# Patient Record
Sex: FEMALE | Race: WHITE | Employment: OTHER | URBAN - METROPOLITAN AREA
[De-identification: names, ages, dates, MRNs, and addresses within clinical notes are randomized per-mention and may not be internally consistent; named-entity substitution may affect disease eponyms.]

---

## 2017-01-10 ENCOUNTER — OFFICE VISIT (OUTPATIENT)
Dept: PULMONOLOGY | Age: 72
End: 2017-01-10

## 2017-01-10 VITALS
DIASTOLIC BLOOD PRESSURE: 70 MMHG | RESPIRATION RATE: 18 BRPM | WEIGHT: 120 LBS | OXYGEN SATURATION: 99 % | BODY MASS INDEX: 19.29 KG/M2 | HEIGHT: 66 IN | HEART RATE: 93 BPM | TEMPERATURE: 97 F | SYSTOLIC BLOOD PRESSURE: 124 MMHG

## 2017-01-10 DIAGNOSIS — Z79.52 CURRENT CHRONIC USE OF SYSTEMIC STEROIDS: ICD-10-CM

## 2017-01-10 DIAGNOSIS — J44.1 COPD WITH ACUTE EXACERBATION (HCC): ICD-10-CM

## 2017-01-10 DIAGNOSIS — F17.200 TOBACCO DEPENDENCE: ICD-10-CM

## 2017-01-10 DIAGNOSIS — J44.9 COPD, SEVERE (HCC): Primary | ICD-10-CM

## 2017-01-10 DIAGNOSIS — M71.21 BAKER'S CYST, RIGHT: ICD-10-CM

## 2017-01-10 NOTE — PROGRESS NOTES
Ms. Hamida Hogan has a reminder for a \"due or due soon\" health maintenance. I have asked that she contact her primary care provider for follow-up on this health maintenance.   O2 SAT RA Rest 84%   Oxygen 2 LPM started O2 SAT 99%

## 2017-01-10 NOTE — PROGRESS NOTES
HISTORY OF PRESENT ILLNESS  Princess Bradshaw is a 73 y.o. female. HPI Comments:  Pt is now back on Advair and Spiriva Respimat added with moderate response. Still complains of baseline SOB with minimal exertion, wheezing and tight cough with chest congestion and tenacious phlegm. This despite compliance with medications and O2. She is on Prednisone 10 mg daily after a taper started early December during an exacerbation. Pt is on home O2, last FEV1 was 30% in Jan 2015. Admits to continued smoking despite several efforts at quitting in the past. She is down to about 1/2 ppd. Shortness of Breath   The history is provided by the patient and relative. This is a chronic problem. The problem occurs frequently. The current episode started more than 1 week ago. The problem has been gradually improving. Associated symptoms include wheezing and leg pain. Pertinent negatives include no fever, no coryza, no rhinorrhea, no sore throat, no swollen glands, no ear pain, no neck pain, no hemoptysis, no PND, no orthopnea, no syncope, no vomiting, no rash and no claudication. Leg swelling: right knee. The problem's precipitants include exercise and weather/humidity. Risk factors include smoking. Treatments tried: Advair and Spiriva. The treatment provided moderate relief. She has had prior hospitalizations. She has had prior ED visits. She has had no prior ICU admissions. Associated medical issues include COPD and chronic lung disease. Leg Pain    The history is provided by the patient. This is a new problem. The current episode started more than 1 week ago. The problem has been gradually improving. The pain is present in the right knee (right leg). The quality of the pain is described as aching. The pain is at a severity of 5/10. Pertinent negatives include no tingling, no itching, no back pain and no neck pain. Associated symptoms comments: swelling. The symptoms are aggravated by movement.        Review of Systems Constitutional: Positive for malaise/fatigue and weight loss. Negative for chills, diaphoresis and fever. HENT: Negative for congestion, ear discharge, ear pain, hearing loss, nosebleeds, rhinorrhea, sore throat and tinnitus. Eyes: Negative for blurred vision, double vision, photophobia, pain, discharge and redness. Respiratory: Positive for wheezing. Negative for hemoptysis and stridor. Cardiovascular: Negative for palpitations, orthopnea, claudication, syncope and PND. Leg swelling: right knee. Gastrointestinal: Negative for blood in stool, constipation, diarrhea, heartburn, melena, nausea and vomiting. Genitourinary: Negative for dysuria, flank pain, frequency, hematuria and urgency. Musculoskeletal: Positive for joint pain. Negative for back pain, falls and neck pain. Myalgias: resolved. Skin: Negative for itching and rash. Neurological: Negative for dizziness, tingling, tremors, sensory change, speech change, focal weakness, seizures, loss of consciousness and weakness. Endo/Heme/Allergies: Negative for environmental allergies. Does not bruise/bleed easily. Psychiatric/Behavioral: Negative for depression, hallucinations, memory loss, substance abuse and suicidal ideas. The patient is not nervous/anxious and does not have insomnia. Past Medical History   Diagnosis Date    Anxiety     Arthritis     Asthma     Back problem     Baker's cyst     Balance problems     Chronic lung disease     Cigarette smoker     Clotting disorder [facility])     COPD (chronic obstructive pulmonary disease) ([facility])     Depression     History of DVT (deep vein thrombosis)     Leg pain      Right    Lung disease     Nausea & vomiting     Osteoporosis     Restless leg syndrome     Spinal stenosis     Vitamin D deficiency      Current Outpatient Prescriptions on File Prior to Visit   Medication Sig Dispense Refill    predniSONE (DELTASONE) 10 mg tablet Take 1 Tab by mouth daily (with breakfast). 60 Tab 1    albuterol (PROAIR HFA) 90 mcg/actuation inhaler INHALE 2 PUFFS BY MOUTH EVERY 4 HOURS AS NEEDED FOR WHEEZING OR SHORTNESS OF BREATH 1 Inhaler 6    fluticasone-salmeterol (ADVAIR) 250-50 mcg/dose diskus inhaler Take 1 Puff by inhalation every twelve (12) hours. 1 Inhaler 3    tiotropium bromide (SPIRIVA RESPIMAT) 2.5 mcg/actuation inhaler Take 2 Puffs by inhalation daily. 3 Inhaler 3    LORazepam (ATIVAN) 1 mg tablet Take  by mouth every four (4) hours as needed for Anxiety.  rOPINIRole (REQUIP) 1 mg tablet Take  by mouth two (2) times a day.  albuterol (PROVENTIL VENTOLIN) 2.5 mg /3 mL (0.083 %) nebulizer solution 3 mL by Nebulization route every four (4) hours as needed for Wheezing or Shortness of Breath. Diag. Code: 290 564 NLJOQWU 3    ergocalciferol (VITAMIN D2) 50,000 unit capsule Take 50,000 Units by mouth every thirty (30) days.  OXYGEN-AIR DELIVERY SYSTEMS 2 L by Does Not Apply route. O2 via nasal cannula with bedtime and activity. O2 Company: [employer]      guaiFENesin-codeine (CHERATUSSIN AC) 100-10 mg/5 mL solution Take 10 mL by mouth three (3) times daily as needed for Cough. Max Daily Amount: 30 mL. 236 mL 0    levoFLOXacin (LEVAQUIN) 750 mg tablet Take 1 Tab by mouth daily. 5 Tab 0    potassium chloride SR (K-TAB) 20 mEq tablet Take 1 Tab by mouth two (2) times a day. 6 Tab 0    PROAIR RESPICLICK 90 mcg/actuation aepb   0    roflumilast (DALIRESP) tab tablet Take 1 Tab by mouth daily. 90 Tab 3    citalopram (CELEXA) 10 mg tablet Take  by mouth daily.  HYDROcodone-acetaminophen (NORCO) 5-325 mg per tablet Take 1-2 tablets PO every 4-6 hours as needed for pain control. If over the counter ibuprofen or acetaminophen was suggested, then only take the vicodin for pain not well controlled with the over the counter medication. 20 Tab 0     No current facility-administered medications on file prior to visit.       Allergies   Allergen Reactions    Anoro Ellipta [Umeclidinium-Vilanterol] Rash and Other (comments)     Muscle cramps in arms    Aspirin Other (comments)     GI upset and bleeding    Augmentin [Amoxicillin-Pot Clavulanate] Not Reported This Time     Possible cramping    Doxycycline Nausea and Vomiting    Morphine Other (comments)     Neurologic symptoms - severe agitation      Shellfish Derived Unknown (comments)     Pt able to eat small amounts per report     Sulfa (Sulfonamide Antibiotics) Rash     Patient relates no longer allergic     Social History     Social History    Marital status:      Spouse name: N/A    Number of children: N/A    Years of education: N/A     Occupational History    Not on file. Social History Main Topics    Smoking status: Current Every Day Smoker     Packs/day: 0.50     Years: 50.00     Types: Cigarettes     Start date: 10/21/1964    Smokeless tobacco: Never Used    Alcohol use No    Drug use: No    Sexual activity: Not on file     Other Topics Concern    Not on file     Social History Narrative     Blood pressure 124/70, pulse 93, temperature 97 °F (36.1 °C), temperature source Oral, resp. rate 18, height 5' 6\" (1.676 m), weight 54.4 kg (120 lb), SpO2 99 %, unknown if currently breastfeeding. Physical Exam   Constitutional: She is oriented to person, place, and time. She appears well-developed. No distress. slender   HENT:   Head: Normocephalic and atraumatic. Nose: Nose normal.   Mouth/Throat: No oropharyngeal exudate. Eyes: Conjunctivae and EOM are normal. Pupils are equal, round, and reactive to light. Right eye exhibits no discharge. Left eye exhibits no discharge. No scleral icterus. Neck: No JVD present. No tracheal deviation present. No thyromegaly present. Cardiovascular: Normal rate, regular rhythm, normal heart sounds and intact distal pulses. Exam reveals no gallop. No murmur heard. Pulmonary/Chest: Effort normal. No stridor. No respiratory distress.  Wheezes: bilateral upper lung fields. She has no rales. She exhibits no tenderness. Distant breath sounds   Abdominal: Soft. She exhibits no mass. There is no tenderness. Musculoskeletal: She exhibits deformity (firm and tender 4x5x5 cm mass right popliteal, tender 1x3 cm lesion without surrounding erythema just distal to right knee). She exhibits no edema or tenderness. Lymphadenopathy:     She has no cervical adenopathy. Neurological: She is alert and oriented to person, place, and time. Skin: Skin is warm and dry. No rash noted. She is not diaphoretic. No erythema. Psychiatric: She has a normal mood and affect. Her behavior is normal. Judgment and thought content normal.       ASSESSMENT and PLAN  Encounter Diagnoses   Name Primary?  COPD, severe ([address] Yes    COPD with acute exacerbation ([address]     Tobacco dependence     Baker's cyst, right     Current chronic use of systemic steroids      Discussed LE  on phone, pt undergoing treatment for Baker's cyst.  Continue Advair and Spiriva. Continue Prednisone 10 mg daily x one month then start 5 mg daily for slow taper. Continue medications and supplemental O2 as before  RTC 4-5 months or sooner prn.

## 2017-01-10 NOTE — MR AVS SNAPSHOT
Visit Information Date & Time Provider Department Dept. Phone Encounter #  
 1/10/2017  9:45 AM Shadi Whitaker MD [facility] [place] [phone] [id number] Follow-up Instructions Return in about 5 months (around 6/10/2017). Upcoming Health Maintenance Date Due Hepatitis C Screening 1945 DTaP/Tdap/Td series (1 - Tdap) 6/4/1968 FOBT Q 1 YEAR AGE 50-75 6/4/1997 ZOSTER VACCINE AGE 60> 6/4/2007 GLAUCOMA SCREENING Q2Y 6/4/2012 MEDICARE YEARLY EXAM 6/4/2012 BREAST CANCER SCRN MAMMOGRAM 9/6/2015 INFLUENZA AGE 9 TO ADULT 8/1/2016 Pneumococcal 65+ High/Highest Risk (2 of 2 - PPSV23) 10/1/2019 Allergies as of 1/10/2017  Review Complete On: 1/10/2017 By: Shadi Whitaker MD  
  
 Severity Noted Reaction Type Reactions Anoro Ellipta [Umeclidinium-vilanterol]  04/04/2016    Rash, Other (comments) Muscle cramps in arms Aspirin    Other (comments) GI upset and bleeding Augmentin [Amoxicillin-pot Clavulanate]    Not Reported This Time Possible cramping Doxycycline  12/30/2013    Nausea and Vomiting Morphine  03/11/2014   Intolerance Other (comments) Neurologic symptoms - severe agitation Shellfish Derived  12/16/2015    Unknown (comments) Pt able to eat small amounts per report Sulfa (Sulfonamide Antibiotics)    Rash Patient relates no longer allergic Current Immunizations  Reviewed on 1/10/2013 Name Date Influenza High Dose Vaccine PF 1/10/2013 Influenza Vaccine (Quad) PF 12/16/2015  2:50 PM  
 Pneumococcal Vaccine (Unspecified Type) 10/1/2014 Not reviewed this visit Vitals BP Pulse Temp Resp Height(growth percentile) Weight(growth percentile) 124/70 (BP 1 Location: Left arm, BP Patient Position: Sitting) 93 97 °F (36.1 °C) (Oral) 18 5' 6\" (1.676 m) 120 lb (54.4 kg) SpO2 BMI OB Status Smoking Status 99% 19.37 kg/m2 Hysterectomy Current Every Day Smoker BMI and BSA Data Body Mass Index Body Surface Area  
 19.37 kg/m 2 1.59 m 2 Preferred Pharmacy Pharmacy Name Phone  [address] [phone] Your Updated Medication List  
  
   
This list is accurate as of: 1/10/17 10:08 AM.  Always use your most recent med list.  
  
  
  
  
 * albuterol 2.5 mg /3 mL (0.083 %) nebulizer solution Commonly known as:  PROVENTIL VENTOLIN  
3 mL by Nebulization route every four (4) hours as needed for Wheezing or Shortness of Breath. Diag. Code: 642 * PROAIR RESPICLICK 90 mcg/actuation Aepb Generic drug:  albuterol sulfate * albuterol 90 mcg/actuation inhaler Commonly known as:  PROAIR HFA INHALE 2 PUFFS BY MOUTH EVERY 4 HOURS AS NEEDED FOR WHEEZING OR SHORTNESS OF BREATH CeleXA 10 mg tablet Generic drug:  citalopram  
Take  by mouth daily. fluticasone-salmeterol 250-50 mcg/dose diskus inhaler Commonly known as:  ADVAIR Take 1 Puff by inhalation every twelve (12) hours. guaiFENesin-codeine 100-10 mg/5 mL solution Commonly known as:  Randine Sallies Take 10 mL by mouth three (3) times daily as needed for Cough. Max Daily Amount: 30 mL. HYDROcodone-acetaminophen 5-325 mg per tablet Commonly known as:  Keisha Oyster Take 1-2 tablets PO every 4-6 hours as needed for pain control. If over the counter ibuprofen or acetaminophen was suggested, then only take the vicodin for pain not well controlled with the over the counter medication. levoFLOXacin 750 mg tablet Commonly known as:  No Schneiders Take 1 Tab by mouth daily. LORazepam 1 mg tablet Commonly known as:  ATIVAN Take  by mouth every four (4) hours as needed for Anxiety. OXYGEN-AIR DELIVERY SYSTEMS  
2 L by Does Not Apply route. O2 via nasal cannula with bedtime and activity. O2 Company: [employer]  
  
 potassium chloride SR 20 mEq tablet Commonly known as:  K-TAB Take 1 Tab by mouth two (2) times a day. predniSONE 10 mg tablet Commonly known as:  Carlitos Anderson Take 1 Tab by mouth daily (with breakfast). roflumilast Tab tablet Commonly known as:  Scott Hagen Take 1 Tab by mouth daily. rOPINIRole 1 mg tablet Commonly known as:  Rose Briggs Take  by mouth two (2) times a day. tiotropium bromide 2.5 mcg/actuation inhaler Commonly known as:  Hannah Buys Take 2 Puffs by inhalation daily. VITAMIN D2 50,000 unit capsule Generic drug:  ergocalciferol Take 50,000 Units by mouth every thirty (30) days. * Notice: This list has 3 medication(s) that are the same as other medications prescribed for you. Read the directions carefully, and ask your doctor or other care provider to review them with you. Follow-up Instructions Return in about 5 months (around 6/10/2017). Introducing [facility]! Tani Aceves introduces [employer] patient portal. Now you can access parts of your medical record, email your doctor's office, and request medication refills online. 1. In your internet browser, go to [email or web address] 2. Click on the First Time User? Click Here link in the Sign In box. You will see the New Member Sign Up page. 3. Enter your [employer] Access Code exactly as it appears below. You will not need to use this code after youve completed the sign-up process. If you do not sign up before the expiration date, you must request a new code. · [employer]ula Access Code: [email or web address] Expires: 4/10/2017 10:08 AM 
 
4. Enter the last four digits of your Social Security Number (xxxx) and Date of Birth (mm/dd/yyyy) as indicated and click Submit. You will be taken to the next sign-up page. 5. Create a [employer] ID. This will be your [employer] login ID and cannot be changed, so think of one that is secure and easy to remember. 6. Create a [employer] password. You can change your password at any time. 7. Enter your Password Reset Question and Answer. This can be used at a later time if you forget your password. 8. Enter your e-mail address. You will receive e-mail notification when new information is available in [address] 9. Click Sign Up. You can now view and download portions of your medical record. 10. Click the Download Summary menu link to download a portable copy of your medical information. If you have questions, please visit the Frequently Asked Questions section of the [employer] website. Remember, [employer] is NOT to be used for urgent needs. For medical emergencies, dial 911. Now available from your iPhone and Android! Please provide this summary of care documentation to your next provider. Your primary care clinician is listed as Karl Segura. If you have any questions after today's visit, please call [phone].

## 2017-02-01 RX ORDER — PREDNISONE 10 MG/1
10 TABLET ORAL
Qty: 60 TAB | Refills: 1 | Status: SHIPPED | OUTPATIENT
Start: 2017-02-01 | End: 2017-02-02

## 2017-02-02 RX ORDER — PREDNISONE 10 MG/1
10 TABLET ORAL DAILY
Qty: 60 TAB | Refills: 1 | Status: SHIPPED | OUTPATIENT
Start: 2017-02-02 | End: 2017-06-22 | Stop reason: SDUPTHER

## 2017-02-02 NOTE — TELEPHONE ENCOUNTER
Pt's prednisone was sent to wrong pharmacy. It should have been sent to [facility] not [facility]. Please resend to correct.

## 2017-02-05 ENCOUNTER — HOSPITAL ENCOUNTER (EMERGENCY)
Age: 72
Discharge: LEFT BEFORE DISCHARGE | End: 2017-02-05
Attending: EMERGENCY MEDICINE
Payer: MEDICARE

## 2017-02-05 ENCOUNTER — APPOINTMENT (OUTPATIENT)
Dept: GENERAL RADIOLOGY | Age: 72
End: 2017-02-05
Attending: EMERGENCY MEDICINE
Payer: MEDICARE

## 2017-02-05 VITALS
RESPIRATION RATE: 20 BRPM | TEMPERATURE: 98.1 F | HEIGHT: 66 IN | HEART RATE: 85 BPM | WEIGHT: 120 LBS | OXYGEN SATURATION: 100 % | BODY MASS INDEX: 19.29 KG/M2 | DIASTOLIC BLOOD PRESSURE: 79 MMHG | SYSTOLIC BLOOD PRESSURE: 142 MMHG

## 2017-02-05 DIAGNOSIS — J44.1 COPD EXACERBATION (HCC): Primary | ICD-10-CM

## 2017-02-05 DIAGNOSIS — Z53.29 LEFT AGAINST MEDICAL ADVICE: ICD-10-CM

## 2017-02-05 LAB
ALBUMIN SERPL BCP-MCNC: 3.4 G/DL (ref 3.4–5)
ALBUMIN/GLOB SERPL: 0.8 {RATIO} (ref 0.8–1.7)
ALP SERPL-CCNC: 76 U/L (ref 45–117)
ALT SERPL-CCNC: 20 U/L (ref 13–56)
ANION GAP BLD CALC-SCNC: 7 MMOL/L (ref 3–18)
AST SERPL W P-5'-P-CCNC: 16 U/L (ref 15–37)
BASOPHILS # BLD AUTO: 0 K/UL (ref 0–0.06)
BASOPHILS # BLD: 0 % (ref 0–2)
BILIRUB SERPL-MCNC: 0.4 MG/DL (ref 0.2–1)
BUN SERPL-MCNC: 19 MG/DL (ref 7–18)
BUN/CREAT SERPL: 25 (ref 12–20)
CALCIUM SERPL-MCNC: 9.5 MG/DL (ref 8.5–10.1)
CHLORIDE SERPL-SCNC: 98 MMOL/L (ref 100–108)
CO2 SERPL-SCNC: 34 MMOL/L (ref 21–32)
CREAT SERPL-MCNC: 0.75 MG/DL (ref 0.6–1.3)
DIFFERENTIAL METHOD BLD: ABNORMAL
EOSINOPHIL # BLD: 0 K/UL (ref 0–0.4)
EOSINOPHIL NFR BLD: 0 % (ref 0–5)
ERYTHROCYTE [DISTWIDTH] IN BLOOD BY AUTOMATED COUNT: 13.1 % (ref 11.6–14.5)
GLOBULIN SER CALC-MCNC: 4.1 G/DL (ref 2–4)
GLUCOSE SERPL-MCNC: 136 MG/DL (ref 74–99)
HCT VFR BLD AUTO: 46.4 % (ref 35–45)
HGB BLD-MCNC: 14.7 G/DL (ref 12–16)
LYMPHOCYTES # BLD AUTO: 5 % (ref 21–52)
LYMPHOCYTES # BLD: 0.7 K/UL (ref 0.9–3.6)
MCH RBC QN AUTO: 31.7 PG (ref 24–34)
MCHC RBC AUTO-ENTMCNC: 31.7 G/DL (ref 31–37)
MCV RBC AUTO: 100 FL (ref 74–97)
MONOCYTES # BLD: 0.5 K/UL (ref 0.05–1.2)
MONOCYTES NFR BLD AUTO: 4 % (ref 3–10)
NEUTS SEG # BLD: 12.6 K/UL (ref 1.8–8)
NEUTS SEG NFR BLD AUTO: 91 % (ref 40–73)
PLATELET # BLD AUTO: 319 K/UL (ref 135–420)
PMV BLD AUTO: 9.8 FL (ref 9.2–11.8)
POTASSIUM SERPL-SCNC: 4.2 MMOL/L (ref 3.5–5.5)
PROT SERPL-MCNC: 7.5 G/DL (ref 6.4–8.2)
RBC # BLD AUTO: 4.64 M/UL (ref 4.2–5.3)
SODIUM SERPL-SCNC: 139 MMOL/L (ref 136–145)
WBC # BLD AUTO: 13.8 K/UL (ref 4.6–13.2)

## 2017-02-05 PROCEDURE — 96374 THER/PROPH/DIAG INJ IV PUSH: CPT

## 2017-02-05 PROCEDURE — 80053 COMPREHEN METABOLIC PANEL: CPT | Performed by: EMERGENCY MEDICINE

## 2017-02-05 PROCEDURE — 99283 EMERGENCY DEPT VISIT LOW MDM: CPT

## 2017-02-05 PROCEDURE — 85025 COMPLETE CBC W/AUTO DIFF WBC: CPT | Performed by: EMERGENCY MEDICINE

## 2017-02-05 PROCEDURE — 74011250636 HC RX REV CODE- 250/636: Performed by: EMERGENCY MEDICINE

## 2017-02-05 PROCEDURE — 94640 AIRWAY INHALATION TREATMENT: CPT

## 2017-02-05 PROCEDURE — 77030013140 HC MSK NEB VYRM -A

## 2017-02-05 PROCEDURE — 71020 XR CHEST PA LAT: CPT

## 2017-02-05 PROCEDURE — 74011000250 HC RX REV CODE- 250: Performed by: EMERGENCY MEDICINE

## 2017-02-05 PROCEDURE — 74011250637 HC RX REV CODE- 250/637: Performed by: EMERGENCY MEDICINE

## 2017-02-05 RX ORDER — LEVOFLOXACIN 500 MG/1
500 TABLET, FILM COATED ORAL DAILY
Qty: 10 TAB | Refills: 0 | Status: SHIPPED | OUTPATIENT
Start: 2017-02-05 | End: 2017-02-15

## 2017-02-05 RX ORDER — LEVOFLOXACIN 500 MG/1
500 TABLET, FILM COATED ORAL
Status: COMPLETED | OUTPATIENT
Start: 2017-02-05 | End: 2017-02-05

## 2017-02-05 RX ORDER — IPRATROPIUM BROMIDE AND ALBUTEROL SULFATE 2.5; .5 MG/3ML; MG/3ML
3 SOLUTION RESPIRATORY (INHALATION) ONCE
Status: COMPLETED | OUTPATIENT
Start: 2017-02-05 | End: 2017-02-05

## 2017-02-05 RX ADMIN — IPRATROPIUM BROMIDE AND ALBUTEROL SULFATE 3 ML: .5; 3 SOLUTION RESPIRATORY (INHALATION) at 17:25

## 2017-02-05 RX ADMIN — LEVOFLOXACIN 500 MG: 500 TABLET, FILM COATED ORAL at 19:00

## 2017-02-05 RX ADMIN — METHYLPREDNISOLONE SODIUM SUCCINATE 125 MG: 125 INJECTION, POWDER, FOR SOLUTION INTRAMUSCULAR; INTRAVENOUS at 17:24

## 2017-02-05 NOTE — ED PROVIDER NOTES
HPI Comments: 5:07 PM Cecilio Hernandez is a 73 y.o. female with a history of COPD who presents to ED to be evaluated for SOB onset Tuesday, 1/31/2017. Pt also c/o cough productive of blood and intermittent fever (100F). Pt has a doctor's appointment schedule for this Wednesday 2/8/2017 but was unable to make it till then due to severity of symptoms. Pt has upped her oxygen to 3, taken 40 mg of prednisone, and 3 albuterol today with no relief. Pt states she would smoke cigarettes \"if she could\" but is unable. Pt has been admitted for COPD before. Pt was seen at [facility] in December 2016 for similar COPD symptoms. Denies nausea, vomiting, diarrhea, or urinary symptoms. No other concerns at this time. The history is provided by the patient.         Past Medical History:   Diagnosis Date    Anxiety     Arthritis     Asthma     Back problem     Baker's cyst     Balance problems     Chronic lung disease     Cigarette smoker     Clotting disorder ([address]     COPD (chronic obstructive pulmonary disease) ([facility])     Depression     History of DVT (deep vein thrombosis)     Leg pain      Right    Lung disease     Nausea & vomiting     Osteoporosis     Restless leg syndrome     Spinal stenosis     Vitamin D deficiency        Past Surgical History:   Procedure Laterality Date    Hx hysterectomy      Hx tonsillectomy      Hx appendectomy      Hx polypectomy      Hx breast biopsy      Hx orthopaedic       Knee    Hx back surgery       x4    Hx lumbar laminectomy      Hx meniscus repair      Hx vein stripping           Family History:   Problem Relation Age of Onset    Cancer Father     Heart Disease Mother     Hypertension Mother     Cancer Brother     Cancer Sister     Diabetes Other     Hypertension Other     Stroke Other     Heart Disease Sister     Hypertension Sister     Heart Disease Brother        Social History     Social History    Marital status:      Spouse name: N/A  Number of children: N/A    Years of education: N/A     Occupational History    Not on file. Social History Main Topics    Smoking status: Current Every Day Smoker     Packs/day: 0.50     Years: 50.00     Types: Cigarettes     Start date: 10/21/1964    Smokeless tobacco: Never Used    Alcohol use No    Drug use: No    Sexual activity: Not on file     Other Topics Concern    Not on file     Social History Narrative         ALLERGIES: Anoro ellipta [umeclidinium-vilanterol]; Aspirin; Augmentin [amoxicillin-pot clavulanate]; Doxycycline; Morphine; Shellfish derived; and Sulfa (sulfonamide antibiotics)    Review of Systems   Constitutional: Positive for fever. HENT: Negative for congestion, ear pain and sore throat. Eyes: Negative for discharge and redness. Respiratory: Positive for cough and shortness of breath. Cardiovascular: Negative for chest pain. Gastrointestinal: Negative for abdominal pain and vomiting. Genitourinary: Negative for difficulty urinating, dysuria and hematuria. Musculoskeletal: Negative for back pain and neck pain. Skin: Negative for rash. Neurological: Negative for dizziness and light-headedness. All other systems reviewed and are negative. Vitals:    02/05/17 1717   BP: 142/79   Pulse: 85   Resp: 20   Temp: 98.1 °F (36.7 °C)   SpO2: 100%   Weight: 54.4 kg (120 lb)   Height: 5' 6\" (1.676 m)            Physical Exam   Constitutional: She is oriented to person, place, and time. She appears well-developed and well-nourished. Non-toxic appearance. She does not have a sickly appearance. She appears ill. No distress. HENT:   Head: Normocephalic and atraumatic. Mouth/Throat: Oropharynx is clear and moist. No oropharyngeal exudate. Eyes: Conjunctivae and EOM are normal. Pupils are equal, round, and reactive to light. No scleral icterus. Neck: Trachea normal and normal range of motion. Neck supple. No hepatojugular reflux and no JVD present.  No tracheal deviation present. No thyromegaly present. Cardiovascular: Regular rhythm, S1 normal, S2 normal, normal heart sounds, intact distal pulses and normal pulses. Tachycardia present. Exam reveals no gallop, no S3 and no S4. No murmur heard. Pulses:       Radial pulses are 2+ on the right side, and 2+ on the left side. Dorsalis pedis pulses are 2+ on the right side, and 2+ on the left side. Pulmonary/Chest: Accessory muscle usage present. Tachypnea noted. She is in respiratory distress (mild). She has no decreased breath sounds. She has wheezes in the right middle field, the right lower field and the left middle field. She has rhonchi in the right upper field, the right middle field, the right lower field, the left middle field and the left lower field. She has no rales. Abdominal: Soft. Normal appearance and bowel sounds are normal. She exhibits no distension and no mass. There is no hepatosplenomegaly. There is no tenderness. There is no rigidity, no rebound, no guarding, no CVA tenderness, no tenderness at McBurney's point and negative Patel's sign. Musculoskeletal: Normal range of motion. She exhibits no edema or tenderness. Strength 5/5 throughout    Lymphadenopathy:        Head (right side): No submental, no submandibular, no preauricular and no occipital adenopathy present. Head (left side): No submental, no submandibular, no preauricular and no occipital adenopathy present. She has no cervical adenopathy. Right: No supraclavicular adenopathy present. Left: No supraclavicular adenopathy present. Neurological: She is alert and oriented to person, place, and time. She has normal strength and normal reflexes. She is not disoriented. No cranial nerve deficit or sensory deficit. Coordination and gait normal. GCS eye subscore is 4. GCS verbal subscore is 5. GCS motor subscore is 6. Grossly intact    Skin: Skin is warm, dry and intact. No rash noted.  She is not diaphoretic. Psychiatric: She has a normal mood and affect. Her speech is normal and behavior is normal. Judgment and thought content normal. Cognition and memory are normal.   Nursing note and vitals reviewed. MDM  Number of Diagnoses or Management Options  COPD exacerbation [facility]):   Left against medical advice:   Diagnosis management comments: Shortness of breath etiologies include chronic obstructive pulmonary disease (COPD), acute asthma exacerbation, congestive heart failure, pneumonia, acute bronchitis, pulmonary embolism, upper respiratory infection, cardiac event to include acute coronary syndrome, acute myocardial infarction or a combination of the above (ex URI on top of COPD thus causing respiratory distress). ED Course       Procedures    Disposition:Discharged    Diagnosis:   1. COPD exacerbation ([address]    2. Left against medical advice          Follow-up Information     Follow up With Details Comments Contact Info    Brock Solorio MD  Follow up [address] [place] [address]  [phone]            6:55 PM  I have reassessed the patient. Patient is not feeling better but would like to be discharged home. I advised against it because she is still SOB and has rhonchi, but the patient has home nebulizers and will follow up with her pulmonologist this week. Patient will be prescribed Levaquin. Patient was discharged in stable condition. Patient is to return to emergency department if any new or worsening condition. Scribe Attestation  I, González Mora, am scribing for, and in the presence of Vicky Haque DO 5:14 PM, 02/05/17. Physician Attestation  I personally performed the services described in the documentation, reviewed the documentation, as recorded by the scribe in my presence, and it accurately and completely records my words and actions.     Vicky Haque DO

## 2017-02-05 NOTE — ED TRIAGE NOTES
Pt reports worsening of cough and wheezing with shortness of breath for past several days.  Reports intermittent fever of 100.0F has hx of COPD on home O2 at 2lpm. Has had to increase her O2 to 3lpm.

## 2017-02-06 NOTE — DISCHARGE INSTRUCTIONS
Chronic Obstructive Pulmonary Disease (COPD): Care Instructions  Your Care Instructions    Chronic obstructive pulmonary disease (COPD) is a general term for a group of lung diseases, including emphysema and chronic bronchitis. People with COPD have decreased airflow in and out of the lungs, which makes it hard to breathe. The airways also can get clogged with thick mucus. Cigarette smoking is a major cause of COPD. Although there is no cure for COPD, you can slow its progress. Following your treatment plan and taking care of yourself can help you feel better and live longer. Follow-up care is a key part of your treatment and safety. Be sure to make and go to all appointments, and call your doctor if you are having problems. It's also a good idea to know your test results and keep a list of the medicines you take. How can you care for yourself at home? Staying healthy  · Do not smoke. This is the most important step you can take to prevent more damage to your lungs. If you need help quitting, talk to your doctor about stop-smoking programs and medicines. These can increase your chances of quitting for good. · Avoid colds and flu. Get a pneumococcal vaccine shot. If you have had one before, ask your doctor whether you need a second dose. Get the flu vaccine every fall. If you must be around people with colds or the flu, wash your hands often. · Avoid secondhand smoke, air pollution, and high altitudes. Also avoid cold, dry air and hot, humid air. Stay at home with your windows closed when air pollution is bad. Medicines and oxygen therapy  · Take your medicines exactly as prescribed. Call your doctor if you think you are having a problem with your medicine. · You may be taking medicines such as:  ¨ Bronchodilators. These help open your airways and make breathing easier. Bronchodilators are either short-acting (work for 6 to 9 hours) or long-acting (work for 24 hours).  You inhale most bronchodilators, so they start to act quickly. Always carry your quick-relief inhaler with you in case you need it while you are away from home. ¨ Corticosteroids (prednisone, budesonide). These reduce airway inflammation. They come in pill or inhaled form. You must take these medicines every day for them to work well. · A spacer may help you get more inhaled medicine to your lungs. Ask your doctor or pharmacist if a spacer is right for you. If it is, ask how to use it properly. · Do not take any vitamins, over-the-counter medicine, or herbal products without talking to your doctor first.  · If your doctor prescribed antibiotics, take them as directed. Do not stop taking them just because you feel better. You need to take the full course of antibiotics. · Oxygen therapy boosts the amount of oxygen in your blood and helps you breathe easier. Use the flow rate your doctor has recommended, and do not change it without talking to your doctor first.  Activity  · Get regular exercise. Walking is an easy way to get exercise. Start out slowly, and walk a little more each day. · Pay attention to your breathing. You are exercising too hard if you cannot talk while you are exercising. · Take short rest breaks when doing household chores and other activities. · Learn breathing methods--such as breathing through pursed lips--to help you become less short of breath. · If your doctor has not set you up with a pulmonary rehabilitation program, talk to him or her about whether rehab is right for you. Rehab includes exercise programs, education about your disease and how to manage it, help with diet and other changes, and emotional support. Diet  · Eat regular, healthy meals. Use bronchodilators about 1 hour before you eat to make it easier to eat. Eat several small meals instead of three large ones. Drink beverages at the end of the meal. Avoid foods that are hard to chew.   · Eat foods that contain protein so that you do not lose muscle mass.  Mental health  · Talk to your family, friends, or a therapist about your feelings. It is normal to feel frightened, angry, hopeless, helpless, and even guilty. Talking openly about bad feelings can help you cope. If these feelings last, talk to your doctor. When should you call for help? Call 911 anytime you think you may need emergency care. For example, call if:  · You have severe trouble breathing. Call your doctor now or seek immediate medical care if:  · You have new or worse trouble breathing. · You cough up blood. · You have a fever. Watch closely for changes in your health, and be sure to contact your doctor if:  · You cough more deeply or more often, especially if you notice more mucus or a change in the color of your mucus. · You have new or worse swelling in your legs or belly. · You are not getting better as expected. Where can you learn more? Go to http://robert-jorge.info/. [place] in the search box to learn more about \"Chronic Obstructive Pulmonary Disease (COPD): Care Instructions. \"  Current as of: May 23, 2016  Content Version: 11.1  © 8825-3732 [employer]. Care instructions adapted under license by [employer] (which disclaims liability or warranty for this information). If you have questions about a medical condition or this instruction, always ask your healthcare professional. [address] any warranty or liability for your use of this information.

## 2017-06-22 ENCOUNTER — OFFICE VISIT (OUTPATIENT)
Dept: PULMONOLOGY | Age: 72
End: 2017-06-22

## 2017-06-22 VITALS
HEIGHT: 66 IN | DIASTOLIC BLOOD PRESSURE: 60 MMHG | TEMPERATURE: 98.6 F | BODY MASS INDEX: 19.77 KG/M2 | RESPIRATION RATE: 18 BRPM | WEIGHT: 123 LBS | OXYGEN SATURATION: 96 % | HEART RATE: 86 BPM | SYSTOLIC BLOOD PRESSURE: 94 MMHG

## 2017-06-22 DIAGNOSIS — R09.02 HYPOXEMIA REQUIRING SUPPLEMENTAL OXYGEN: ICD-10-CM

## 2017-06-22 DIAGNOSIS — J44.9 STAGE 3 SEVERE COPD BY GOLD CLASSIFICATION (HCC): Primary | ICD-10-CM

## 2017-06-22 DIAGNOSIS — Z79.52 CURRENT CHRONIC USE OF SYSTEMIC STEROIDS: ICD-10-CM

## 2017-06-22 DIAGNOSIS — R04.2 HEMOPTYSIS: Chronic | ICD-10-CM

## 2017-06-22 DIAGNOSIS — Z99.81 HYPOXEMIA REQUIRING SUPPLEMENTAL OXYGEN: ICD-10-CM

## 2017-06-22 DIAGNOSIS — J44.9 STEROID-DEPENDENT COPD (HCC): ICD-10-CM

## 2017-06-22 RX ORDER — AZITHROMYCIN 250 MG/1
TABLET, FILM COATED ORAL
Qty: 24 TAB | Refills: 3 | Status: SHIPPED | OUTPATIENT
Start: 2017-06-22 | End: 2017-06-22 | Stop reason: CLARIF

## 2017-06-22 RX ORDER — AZITHROMYCIN 250 MG/1
TABLET, FILM COATED ORAL
Qty: 24 TAB | Refills: 2 | Status: SHIPPED | OUTPATIENT
Start: 2017-06-22 | End: 2017-06-22 | Stop reason: CLARIF

## 2017-06-22 RX ORDER — PREDNISONE 10 MG/1
10 TABLET ORAL DAILY
Qty: 60 TAB | Refills: 3 | Status: SHIPPED | OUTPATIENT
Start: 2017-06-22 | End: 2017-12-05

## 2017-06-22 RX ORDER — AZITHROMYCIN 250 MG/1
TABLET, FILM COATED ORAL
Qty: 24 TAB | Refills: 3 | Status: SHIPPED | OUTPATIENT
Start: 2017-06-22 | End: 2017-06-27

## 2017-06-22 NOTE — MR AVS SNAPSHOT
Visit Information Date & Time Provider Department Dept. Phone Encounter #  
 6/22/2017  3:45 PM MD Hedy Blackman [facility] Aubrie Villa [id number] Follow-up Instructions Return in about 6 months (around 12/22/2017). Upcoming Health Maintenance Date Due Hepatitis C Screening 1945 DTaP/Tdap/Td series (1 - Tdap) 6/4/1968 FOBT Q 1 YEAR AGE 50-75 6/4/1997 ZOSTER VACCINE AGE 60> 6/4/2007 GLAUCOMA SCREENING Q2Y 6/4/2012 MEDICARE YEARLY EXAM 6/4/2012 BREAST CANCER SCRN MAMMOGRAM 9/6/2015 INFLUENZA AGE 9 TO ADULT 8/1/2017 Pneumococcal 65+ High/Highest Risk (2 of 2 - PPSV23) 10/1/2019 Allergies as of 6/22/2017  Review Complete On: 6/22/2017 By: Sharon Tellez MD  
  
 Severity Noted Reaction Type Reactions Anoro Ellipta [Umeclidinium-vilanterol]  04/04/2016    Rash, Other (comments) Muscle cramps in arms Aspirin    Other (comments) GI upset and bleeding Augmentin [Amoxicillin-pot Clavulanate]    Not Reported This Time Possible cramping Doxycycline  12/30/2013    Nausea and Vomiting Morphine  03/11/2014   Intolerance Other (comments) Neurologic symptoms - severe agitation Shellfish Derived  12/16/2015    Unknown (comments) Pt able to eat small amounts per report Sulfa (Sulfonamide Antibiotics)    Rash Patient relates no longer allergic Current Immunizations  Reviewed on 1/10/2013 Name Date Influenza High Dose Vaccine PF 1/10/2013 Influenza Vaccine (Quad) PF 12/16/2015  2:50 PM  
 Pneumococcal Vaccine (Unspecified Type) 10/1/2014 Not reviewed this visit You Were Diagnosed With   
  
 Codes Comments Stage 3 severe COPD by GOLD classification ([facility] [address]    -  Primary ICD-10-CM: J44.9 ICD-9-CM: 743 Hemoptysis     ICD-10-CM: R04.2 ICD-9-CM: 786.30  Current chronic use of systemic steroids     ICD-10-CM: Z79.52 
ICD-9-CM: V58.65   
 Steroid-dependent COPD ([facility]     ICD-10-CM: J44.9 ICD-9-CM: 496, V58.65 Hypoxemia requiring supplemental oxygen     ICD-10-CM: R09.02, Z99.81 ICD-9-CM: 799.02 Vitals BP Pulse Temp Resp Height(growth percentile) Weight(growth percentile) 94/60 (BP 1 Location: Right arm, BP Patient Position: Sitting) 86 98.6 °F (37 °C) (Oral) 18 5' 6\" (1.676 m) 123 lb (55.8 kg) SpO2 BMI OB Status Smoking Status 96% 19.85 kg/m2 Hysterectomy Current Every Day Smoker BMI and BSA Data Body Mass Index Body Surface Area  
 19.85 kg/m 2 1.61 m 2 Preferred Pharmacy Pharmacy Name Phone [address], [address] [place] [address] [phone] Your Updated Medication List  
  
   
This list is accurate as of: 6/22/17  4:55 PM.  Always use your most recent med list.  
  
  
  
  
 * albuterol 2.5 mg /3 mL (0.083 %) nebulizer solution Commonly known as:  PROVENTIL VENTOLIN  
3 mL by Nebulization route every four (4) hours as needed for Wheezing or Shortness of Breath. Diag. Code: 255 * albuterol 90 mcg/actuation inhaler Commonly known as:  PROAIR HFA INHALE 2 PUFFS BY MOUTH EVERY 4 HOURS AS NEEDED FOR WHEEZING OR SHORTNESS OF BREATH  
  
 azithromycin 250 mg tablet Commonly known as:  Jerlean  Take 2 tablets today, then take 1 tablet daily  
  
 fluticasone-salmeterol 250-50 mcg/dose diskus inhaler Commonly known as:  ADVAIR Take 1 Puff by inhalation every twelve (12) hours. LORazepam 1 mg tablet Commonly known as:  ATIVAN Take  by mouth every four (4) hours as needed for Anxiety. OXYGEN-AIR DELIVERY SYSTEMS  
2 L by Does Not Apply route. O2 via nasal cannula with bedtime and activity. O2 Company: [employer]  
  
 predniSONE 10 mg tablet Commonly known as:  Nankennedy Cuna Take 1 Tab by mouth daily. roflumilast Tab tablet Commonly known as:  Clyda Offer Take 1 Tab by mouth daily. rOPINIRole 1 mg tablet Commonly known as:  Percy Dearborn Take  by mouth two (2) times a day. tiotropium bromide 2.5 mcg/actuation inhaler Commonly known as:  Michelwaldo Haider Take 2 Puffs by inhalation daily. VITAMIN D2 50,000 unit capsule Generic drug:  ergocalciferol Take 50,000 Units by mouth every thirty (30) days. * Notice: This list has 2 medication(s) that are the same as other medications prescribed for you. Read the directions carefully, and ask your doctor or other care provider to review them with you. Prescriptions Printed Refills  
 predniSONE (DELTASONE) 10 mg tablet 3 Sig: Take 1 Tab by mouth daily. Class: Print Route: Oral  
 azithromycin (ZITHROMAX) 250 mg tablet 2 Sig: Take 2 tablets today, then take 1 tablet daily Class: Print Follow-up Instructions Return in about 6 months (around 12/22/2017). To-Do List   
 06/23/2017 Imaging:  XR CHEST PA LAT Introducing [facility] SERVICES! Neelima Aly introduces [employer] patient portal. Now you can access parts of your medical record, email your doctor's office, and request medication refills online. 1. In your internet browser, go to [email or web address] 2. Click on the First Time User? Click Here link in the Sign In box. You will see the New Member Sign Up page. 3. Enter your [employer] Access Code exactly as it appears below. You will not need to use this code after youve completed the sign-up process. If you do not sign up before the expiration date, you must request a new code. · [employer] Access Code: [email or web address] Expires: 9/20/2017  3:44 PM 
 
4. Enter the last four digits of your Social Security Number (xxxx) and Date of Birth (mm/dd/yyyy) as indicated and click Submit. You will be taken to the next sign-up page. 5. Create a [facility] ID. This will be your [facility] login ID and cannot be changed, so think of one that is secure and easy to remember. 6. Create a [employer] password. You can change your password at any time. 7. Enter your Password Reset Question and Answer. This can be used at a later time if you forget your password. 8. Enter your e-mail address. You will receive e-mail notification when new information is available in [address] 9. Click Sign Up. You can now view and download portions of your medical record. 10. Click the Download Summary menu link to download a portable copy of your medical information. If you have questions, please visit the Frequently Asked Questions section of the [employer] website. Remember, [employer] is NOT to be used for urgent needs. For medical emergencies, dial 911. Now available from your iPhone and Android! Please provide this summary of care documentation to your next provider. Your primary care clinician is listed as German Mcintosh. If you have any questions after today's visit, please call [phone].

## 2017-06-22 NOTE — PROGRESS NOTES
Ms. Sharifa Hernandez has a reminder for a \"due or due soon\" health maintenance. I have asked that she contact her primary care provider for follow-up on this health maintenance.

## 2017-06-22 NOTE — PROGRESS NOTES
HISTORY OF PRESENT ILLNESS  Shayla Guerrero is a 81 y.o. female. HPI Comments:  Pt is now back on Advair and Spiriva Respimat added with moderate response. Still complains of baseline SOB with minimal exertion, wheezing and tight cough with chest congestion and tenacious phlegm. This despite compliance with medications and O2. She is on Prednisone 10 mg daily after a taper started early December during an exacerbation. She experienced a mild exacerbation when Prednisone was tapered below 10 mg. Pt complains of intermittent hemoptysis, known since 2013, no fever or chills. Pt is on home O2, last FEV1 was 30% in Jan 2015. Admits to continued smoking despite several efforts at quitting in the past. She is down to about 1/2 ppd. Pt reports good response to NIV but had to stop due to facial hematomas probably related to Prednisone use. Shortness of Breath   The history is provided by the patient and relative. This is a chronic problem. The problem occurs frequently. The current episode started more than 1 week ago. The problem has not changed since onset. Associated symptoms include cough, hemoptysis and wheezing. Pertinent negatives include no fever, no headaches, no coryza, no rhinorrhea, no sore throat, no swollen glands, no ear pain, no neck pain, no PND, no orthopnea, no syncope, no vomiting, no abdominal pain, no rash and no claudication. Sputum production: thick and tenacious. Leg swelling: right knee. The problem's precipitants include exercise and weather/humidity. Risk factors include smoking. Treatments tried: Advair and Spiriva. The treatment provided moderate relief. She has had prior hospitalizations. She has had prior ED visits. She has had no prior ICU admissions. Associated medical issues include COPD and chronic lung disease. Review of Systems   Constitutional: Positive for malaise/fatigue and weight loss. Negative for chills, diaphoresis and fever.    HENT: Negative for congestion, ear discharge, ear pain, hearing loss, nosebleeds, rhinorrhea, sore throat and tinnitus. Eyes: Negative for blurred vision, double vision, photophobia, pain, discharge and redness. Respiratory: Positive for cough, hemoptysis, shortness of breath and wheezing. Negative for stridor. Sputum production: thick and tenacious. Cardiovascular: Negative for palpitations, orthopnea, claudication, syncope and PND. Leg swelling: right knee. Gastrointestinal: Negative for abdominal pain, blood in stool, constipation, diarrhea, heartburn, melena, nausea and vomiting. Genitourinary: Negative for dysuria, flank pain, frequency, hematuria and urgency. Musculoskeletal: Positive for joint pain. Negative for falls and neck pain. Myalgias: resolved. Skin: Negative for rash. Neurological: Negative for dizziness, tingling, tremors, sensory change, speech change, focal weakness, seizures, loss of consciousness, weakness and headaches. Endo/Heme/Allergies: Negative for environmental allergies and polydipsia. Bruises/bleeds easily. Psychiatric/Behavioral: Positive for depression. Negative for hallucinations, memory loss, substance abuse and suicidal ideas. The patient is nervous/anxious. The patient does not have insomnia.       Past Medical History:   Diagnosis Date    Anxiety     Arthritis     Asthma     Back problem     Baker's cyst     Balance problems     Chronic lung disease     Cigarette smoker     [facility]     COPD (chronic obstructive pulmonary disease) [facility]     Depression     History of DVT (deep vein thrombosis)     Leg pain     Right    Lung disease     Nausea & vomiting     Osteoporosis     Restless leg syndrome     Spinal stenosis     Vitamin D deficiency      Current Outpatient Prescriptions on File Prior to Visit   Medication Sig Dispense Refill    albuterol (PROAIR HFA) 90 mcg/actuation inhaler INHALE 2 PUFFS BY MOUTH EVERY 4 HOURS AS NEEDED FOR WHEEZING OR SHORTNESS OF BREATH 1 Inhaler 6    fluticasone-salmeterol (ADVAIR) 250-50 mcg/dose diskus inhaler Take 1 Puff by inhalation every twelve (12) hours. 1 Inhaler 3    tiotropium bromide (SPIRIVA RESPIMAT) 2.5 mcg/actuation inhaler Take 2 Puffs by inhalation daily. 3 Inhaler 3    LORazepam (ATIVAN) 1 mg tablet Take  by mouth every four (4) hours as needed for Anxiety.  rOPINIRole (REQUIP) 1 mg tablet Take  by mouth two (2) times a day.  albuterol (PROVENTIL VENTOLIN) 2.5 mg /3 mL (0.083 %) nebulizer solution 3 mL by Nebulization route every four (4) hours as needed for Wheezing or Shortness of Breath. Diag. Code: 957 961 [facility] 3    ergocalciferol (VITAMIN D2) 50,000 unit capsule Take 50,000 Units by mouth every thirty (30) days.  OXYGEN-AIR DELIVERY SYSTEMS 2 L by Does Not Apply route. O2 via nasal cannula with bedtime and activity. O2 Company: [employer]      roflumilast (DALIRESP) tab tablet Take 1 Tab by mouth daily. 90 Tab 3     No current facility-administered medications on file prior to visit. Allergies   Allergen Reactions    Anoro Ellipta [Umeclidinium-Vilanterol] Rash and Other (comments)     Muscle cramps in arms    Aspirin Other (comments)     GI upset and bleeding    Augmentin [Amoxicillin-Pot Clavulanate] Not Reported This Time     Possible cramping    Doxycycline Nausea and Vomiting    Morphine Other (comments)     Neurologic symptoms - severe agitation      Shellfish Derived Unknown (comments)     Pt able to eat small amounts per report     Sulfa (Sulfonamide Antibiotics) Rash     Patient relates no longer allergic     Social History     Social History    Marital status:      Spouse name: N/A    Number of children: N/A    Years of education: N/A     Occupational History    Not on file.      Social History Main Topics    Smoking status: Current Every Day Smoker     Packs/day: 0.50     Years: 50.00     Types: Cigarettes     Start date: 10/21/1964    Smokeless tobacco: Never Used    Alcohol use No    Drug use: No    Sexual activity: Not on file     Other Topics Concern    Not on file     Social History Narrative     Blood pressure 94/60, pulse 86, temperature 98.6 °F (37 °C), temperature source Oral, resp. rate 18, height 5' 6\" (1.676 m), weight 55.8 kg (123 lb), SpO2 96 %, unknown if currently breastfeeding. Physical Exam   Constitutional: She is oriented to person, place, and time. She appears well-developed. No distress. slender   HENT:   Head: Normocephalic and atraumatic. Nose: Nose normal.   Mouth/Throat: No oropharyngeal exudate. Eyes: Conjunctivae and EOM are normal. Pupils are equal, round, and reactive to light. Right eye exhibits no discharge. Left eye exhibits no discharge. No scleral icterus. Neck: No JVD present. No tracheal deviation present. No thyromegaly present. Cardiovascular: Normal rate, regular rhythm, normal heart sounds and intact distal pulses. Exam reveals no gallop. No murmur heard. Pulmonary/Chest: Effort normal. No stridor. No respiratory distress. Wheezes: lft mid lung field. She has no rales. She exhibits no tenderness. Distant breath sounds with bilateral upper lung field rhonchi   Abdominal: Soft. She exhibits no mass. There is no tenderness. Musculoskeletal: She exhibits no edema, tenderness or deformity. Lymphadenopathy:     She has no cervical adenopathy. Neurological: She is alert and oriented to person, place, and time. Skin: Skin is warm and dry. No rash noted. She is not diaphoretic. No erythema. Psychiatric: She has a normal mood and affect. Her behavior is normal. Judgment and thought content normal.       ASSESSMENT and PLAN  Encounter Diagnoses   Name Primary?  Stage 3 severe COPD by GOLD classification ([address] Yes    Hemoptysis     Current chronic use of systemic steroids     Steroid-dependent COPD ([address]     Hypoxemia requiring supplemental oxygen      Continue Advair and Spiriva.   Continue Prednisone 10 mg daily but will start Azithromycin 250 mg MWF for suppression. Continue medications and supplemental O2 as before. Pt instructed to retry NIV use  RTC 6 months or sooner prn.

## 2017-06-27 ENCOUNTER — HOSPITAL ENCOUNTER (OUTPATIENT)
Dept: GENERAL RADIOLOGY | Age: 72
Discharge: HOME | End: 2017-06-27
Payer: MEDICARE

## 2017-06-27 DIAGNOSIS — R04.2 HEMOPTYSIS: Chronic | ICD-10-CM

## 2017-06-27 DIAGNOSIS — R91.1 LUNG NODULE: Primary | ICD-10-CM

## 2017-06-27 DIAGNOSIS — J44.9 STAGE 3 SEVERE COPD BY GOLD CLASSIFICATION (HCC): ICD-10-CM

## 2017-06-27 PROCEDURE — 71020 XR CHEST PA LAT: CPT

## 2017-07-06 ENCOUNTER — HOSPITAL ENCOUNTER (OUTPATIENT)
Dept: CT IMAGING | Age: 72
Discharge: HOME | End: 2017-07-06
Attending: INTERNAL MEDICINE
Payer: MEDICARE

## 2017-07-06 DIAGNOSIS — R91.1 LUNG NODULE: ICD-10-CM

## 2017-07-06 PROCEDURE — 71250 CT THORAX DX C-: CPT

## 2017-07-07 ENCOUNTER — TELEPHONE (OUTPATIENT)
Dept: PULMONOLOGY | Age: 72
End: 2017-07-07

## 2017-08-02 ENCOUNTER — TELEPHONE (OUTPATIENT)
Dept: PULMONOLOGY | Age: 72
End: 2017-08-02

## 2017-08-02 RX ORDER — ALBUTEROL SULFATE 0.83 MG/ML
2.5 SOLUTION RESPIRATORY (INHALATION)
Qty: 375 EACH | Refills: 3 | Status: SHIPPED | OUTPATIENT
Start: 2017-08-02 | End: 2018-11-26 | Stop reason: SDUPTHER

## 2017-08-02 NOTE — TELEPHONE ENCOUNTER
Can Dr. Angela Liu send in albuterol sol to [facility] on [facility]? She was getting from Dr. Raven Kearney, who . She is out.

## 2017-08-14 ENCOUNTER — HOSPITAL ENCOUNTER (OUTPATIENT)
Dept: BONE DENSITY | Age: 72
Discharge: HOME | End: 2017-08-14
Attending: FAMILY MEDICINE
Payer: MEDICARE

## 2017-08-14 ENCOUNTER — HOSPITAL ENCOUNTER (OUTPATIENT)
Dept: MAMMOGRAPHY | Age: 72
Discharge: HOME | End: 2017-08-14
Attending: FAMILY MEDICINE
Payer: MEDICARE

## 2017-08-14 DIAGNOSIS — Z12.31 VISIT FOR SCREENING MAMMOGRAM: ICD-10-CM

## 2017-08-14 DIAGNOSIS — M81.0 OSTEOPOROSIS, POSTMENOPAUSAL: ICD-10-CM

## 2017-08-14 PROCEDURE — 77063 BREAST TOMOSYNTHESIS BI: CPT

## 2017-08-14 PROCEDURE — 77080 DXA BONE DENSITY AXIAL: CPT

## 2017-09-11 ENCOUNTER — OFFICE VISIT (OUTPATIENT)
Dept: PULMONOLOGY | Age: 72
End: 2017-09-11

## 2017-09-11 VITALS
HEIGHT: 66 IN | BODY MASS INDEX: 19.44 KG/M2 | OXYGEN SATURATION: 98 % | DIASTOLIC BLOOD PRESSURE: 50 MMHG | TEMPERATURE: 97.7 F | WEIGHT: 121 LBS | SYSTOLIC BLOOD PRESSURE: 90 MMHG | RESPIRATION RATE: 20 BRPM | HEART RATE: 79 BPM

## 2017-09-11 DIAGNOSIS — F32.9 DEPRESSION, REACTIVE: ICD-10-CM

## 2017-09-11 DIAGNOSIS — Z77.090 ASBESTOS EXPOSURE: ICD-10-CM

## 2017-09-11 DIAGNOSIS — Z99.81 HYPOXEMIA REQUIRING SUPPLEMENTAL OXYGEN: ICD-10-CM

## 2017-09-11 DIAGNOSIS — R09.02 HYPOXEMIA REQUIRING SUPPLEMENTAL OXYGEN: ICD-10-CM

## 2017-09-11 DIAGNOSIS — J44.9 PULMONARY CACHEXIA DUE TO COPD (HCC): ICD-10-CM

## 2017-09-11 DIAGNOSIS — G47.00 INSOMNIA, UNSPECIFIED TYPE: ICD-10-CM

## 2017-09-11 DIAGNOSIS — R64 PULMONARY CACHEXIA DUE TO COPD (HCC): ICD-10-CM

## 2017-09-11 DIAGNOSIS — J44.9 COPD, SEVERE (HCC): Primary | ICD-10-CM

## 2017-09-11 RX ORDER — AZITHROMYCIN 250 MG/1
1 TABLET, FILM COATED ORAL
Refills: 0 | COMMUNITY
Start: 2017-08-16 | End: 2018-01-10

## 2017-09-11 RX ORDER — RAMELTEON 8 MG/1
8 TABLET ORAL
Qty: 30 TAB | Refills: 3 | Status: SHIPPED | OUTPATIENT
Start: 2017-09-11 | End: 2018-08-16

## 2017-09-11 NOTE — MR AVS SNAPSHOT
Visit Information Date & Time Provider Department Dept. Phone Encounter #  
 9/11/2017 11:30 AM [facility] [place] [id number] Follow-up Instructions Return in about 4 months (around 1/11/2018). Upcoming Health Maintenance Date Due Hepatitis C Screening 1945 DTaP/Tdap/Td series (1 - Tdap) 6/4/1968 FOBT Q 1 YEAR AGE 50-75 6/4/1997 ZOSTER VACCINE AGE 60> 4/4/2007 GLAUCOMA SCREENING Q2Y 6/4/2012 MEDICARE YEARLY EXAM 6/4/2012 INFLUENZA AGE 9 TO ADULT 8/1/2017 BREAST CANCER SCRN MAMMOGRAM 8/14/2019 Pneumococcal 65+ High/Highest Risk (2 of 2 - PPSV23) 10/1/2019 Allergies as of 9/11/2017  Review Complete On: 9/11/2017 By: Kasi Early MD  
  
 Severity Noted Reaction Type Reactions Anoro Ellipta [Umeclidinium-vilanterol]  04/04/2016    Rash, Other (comments) Muscle cramps in arms Aspirin    Other (comments) GI upset and bleeding Augmentin [Amoxicillin-pot Clavulanate]    Not Reported This Time Possible cramping Doxycycline  12/30/2013    Nausea and Vomiting Morphine  03/11/2014   Intolerance Other (comments) Neurologic symptoms - severe agitation Shellfish Derived  12/16/2015    Unknown (comments) Pt able to eat small amounts per report Sulfa (Sulfonamide Antibiotics)    Rash Patient relates no longer allergic Current Immunizations  Reviewed on 1/10/2013 Name Date Influenza High Dose Vaccine PF 1/10/2013 Influenza Vaccine (Quad) PF 12/16/2015  2:50 PM  
 Pneumococcal Vaccine (Unspecified Type) 10/1/2014 Not reviewed this visit You Were Diagnosed With   
  
 Codes Comments COPD, severe ([facility]    -  Primary ICD-10-CM: J44.9 ICD-9-CM: 268 Pulmonary cachexia due to COPD ([facility]     ICD-10-CM: J44.9, R64 
ICD-9-CM: 496, 799.4 Depression, reactive     ICD-10-CM: F32.9 ICD-9-CM: 300.4 Hypoxemia requiring supplemental oxygen     ICD-10-CM: R09.02, Z99.81 ICD-9-CM: 799.02 Insomnia, unspecified type     ICD-10-CM: G47.00 ICD-9-CM: 780.52 Asbestos exposure     ICD-10-CM: Z77.090 
ICD-9-CM: V15.84 Vitals BP Pulse Temp Resp Height(growth percentile) Weight(growth percentile) 90/50 (BP 1 Location: Left arm, BP Patient Position: Sitting) 79 97.7 °F (36.5 °C) (Oral) 20 5' 6\" (1.676 m) 121 lb (54.9 kg) SpO2 BMI OB Status Smoking Status 98% 19.53 kg/m2 Hysterectomy Current Every Day Smoker BMI and BSA Data Body Mass Index Body Surface Area  
 19.53 kg/m 2 1.6 m 2 Preferred Pharmacy Pharmacy Name Phone [address], [address] [place] [address] [phone] Your Updated Medication List  
  
   
This list is accurate as of: 9/11/17 12:58 PM.  Always use your most recent med list.  
  
  
  
  
 * albuterol 90 mcg/actuation inhaler Commonly known as:  PROAIR HFA INHALE 2 PUFFS BY MOUTH EVERY 4 HOURS AS NEEDED FOR WHEEZING OR SHORTNESS OF BREATH  
  
 * albuterol 2.5 mg /3 mL (0.083 %) nebulizer solution Commonly known as:  PROVENTIL VENTOLIN  
3 mL by Nebulization route every four (4) hours as needed for Wheezing or Shortness of Breath. Diag. Code: J44.9, R09.02  
  
 azithromycin 250 mg tablet Commonly known as:  Carlos Stallings Take 1 Tab by mouth every Monday, Wednesday, Friday. fluticasone-salmeterol 250-50 mcg/dose diskus inhaler Commonly known as:  ADVAIR Take 1 Puff by inhalation every twelve (12) hours. LORazepam 1 mg tablet Commonly known as:  ATIVAN Take  by mouth every four (4) hours as needed for Anxiety. OXYGEN-AIR DELIVERY SYSTEMS  
2 L by Does Not Apply route. O2 via nasal cannula with bedtime and activity. O2 Company: [employer]  
  
 predniSONE 10 mg tablet Commonly known as:  Kojo Hogan Take 1 Tab by mouth daily. ramelteon 8 mg tablet Commonly known as:  ROZEREM Take 1 Tab by mouth nightly as needed for Sleep. roflumilast Tab tablet Commonly known as:  Liset Cadet Take 1 Tab by mouth daily. rOPINIRole 1 mg tablet Commonly known as:  Emerson Moritz Take  by mouth two (2) times a day. tiotropium bromide 2.5 mcg/actuation inhaler Commonly known as:  Blas Arce Take 2 Puffs by inhalation daily. VITAMIN D2 50,000 unit capsule Generic drug:  ergocalciferol Take 50,000 Units by mouth every thirty (30) days. * Notice: This list has 2 medication(s) that are the same as other medications prescribed for you. Read the directions carefully, and ask your doctor or other care provider to review them with you. Prescriptions Sent to Pharmacy Refills  
 ramelteon (ROZEREM) 8 mg tablet 3 Sig: Take 1 Tab by mouth nightly as needed for Sleep. Class: Normal  
 Pharmacy: [facility], [address] [place] [address] #: [phone] Route: Oral  
  
Follow-up Instructions Return in about 4 months (around 1/11/2018). Please provide this summary of care documentation to your next provider. Your primary care clinician is listed as David Nuno. If you have any questions after today's visit, please call [phone].

## 2017-09-11 NOTE — PROGRESS NOTES
HISTORY OF PRESENT ILLNESS  Daisha Junaid Irvin is a 81 y.o. female. HPI Comments:  Pt is now back on Advair and Spiriva Respimat added with moderate response. Still complains of baseline SOB with minimal exertion, wheezing and tight cough with chest congestion and tenacious phlegm. This despite compliance with medications and O2. She is on Prednisone 10 mg daily after a taper started early December during an exacerbation. She experienced a mild exacerbation when Prednisone was tapered below 10 mg. Pt complains of intermittent hemoptysis, known since 2013, no fever or chills. Pt is on home O2, last FEV1 was 30% in Jan 2015. Admits to continued smoking despite several efforts at quitting in the past.     Pt reports good response to NIV but had to stop due to facial hematomas probably related to Prednisone use. She is waiting for a new interface for he device. Pt notes no apparent response to TIW Azithromycin with no change in amount and character of phlegm. Pt reports depression due to several personal and family stressors. She is planning on moving to an apartment with her daughter. COPD   The history is provided by the patient and relative. This is a chronic problem. The current episode started more than 1 week ago. The problem occurs hourly. The problem has been gradually worsening. Associated symptoms include shortness of breath. Pertinent negatives include no chest pain, no abdominal pain and no headaches. The symptoms are aggravated by stress and exertion. Shortness of Breath   The history is provided by the patient and relative. This is a chronic problem. The problem occurs frequently. The current episode started more than 1 week ago. The problem has not changed since onset. Associated symptoms include cough, hemoptysis and wheezing.  Pertinent negatives include no fever, no headaches, no coryza, no rhinorrhea, no sore throat, no swollen glands, no ear pain, no neck pain, no PND, no orthopnea, no chest pain, no syncope, no vomiting, no abdominal pain, no rash and no claudication. Sputum production: thick and tenacious. Leg swelling: right knee. The problem's precipitants include exercise and weather/humidity. Risk factors include smoking. Treatments tried: Advair and Spiriva. The treatment provided moderate relief. She has had prior hospitalizations. She has had prior ED visits. She has had no prior ICU admissions. Associated medical issues include COPD and chronic lung disease. Review of Systems   Constitutional: Positive for malaise/fatigue and weight loss. Negative for chills, diaphoresis and fever. HENT: Negative for congestion, ear discharge, ear pain, hearing loss, nosebleeds, rhinorrhea, sore throat and tinnitus. Eyes: Negative for blurred vision, double vision, photophobia, pain, discharge and redness. Respiratory: Positive for cough, hemoptysis, shortness of breath and wheezing. Negative for stridor. Sputum production: thick and tenacious. Cardiovascular: Negative for chest pain, palpitations, orthopnea, claudication, syncope and PND. Leg swelling: right knee. Gastrointestinal: Negative for abdominal pain, blood in stool, constipation, diarrhea, heartburn, melena, nausea and vomiting. Genitourinary: Negative for dysuria, flank pain, frequency, hematuria and urgency. Musculoskeletal: Positive for joint pain. Negative for falls and neck pain. Myalgias: resolved. Skin: Negative for itching and rash. Neurological: Negative for dizziness, tingling, tremors, sensory change, speech change, focal weakness, seizures, loss of consciousness, weakness and headaches. Endo/Heme/Allergies: Negative for environmental allergies and polydipsia. Bruises/bleeds easily. Psychiatric/Behavioral: Positive for depression. Negative for hallucinations, memory loss, substance abuse and suicidal ideas. The patient is nervous/anxious and has insomnia.       Past Medical History:   Diagnosis Date  Anxiety     Arthritis     Asthma     Back problem     Baker's cyst     Balance problems     Chronic lung disease     Cigarette smoker     Clotting disorder (HCC)     COPD (chronic obstructive pulmonary disease) (HCC)     Depression     History of DVT (deep vein thrombosis)     Leg pain     Right    Lung disease     Nausea & vomiting     Osteoporosis     Restless leg syndrome     Spinal stenosis     Vitamin D deficiency      Current Outpatient Prescriptions on File Prior to Visit   Medication Sig Dispense Refill    albuterol (PROVENTIL VENTOLIN) 2.5 mg /3 mL (0.083 %) nebulizer solution 3 mL by Nebulization route every four (4) hours as needed for Wheezing or Shortness of Breath. Diag. Code: J44.9, R09.02 375 Each 3    predniSONE (DELTASONE) 10 mg tablet Take 1 Tab by mouth daily. 60 Tab 3    albuterol (PROAIR HFA) 90 mcg/actuation inhaler INHALE 2 PUFFS BY MOUTH EVERY 4 HOURS AS NEEDED FOR WHEEZING OR SHORTNESS OF BREATH 1 Inhaler 6    fluticasone-salmeterol (ADVAIR) 250-50 mcg/dose diskus inhaler Take 1 Puff by inhalation every twelve (12) hours. 1 Inhaler 3    tiotropium bromide (SPIRIVA RESPIMAT) 2.5 mcg/actuation inhaler Take 2 Puffs by inhalation daily. 3 Inhaler 3    LORazepam (ATIVAN) 1 mg tablet Take  by mouth every four (4) hours as needed for Anxiety.  rOPINIRole (REQUIP) 1 mg tablet Take  by mouth two (2) times a day.  ergocalciferol (VITAMIN D2) 50,000 unit capsule Take 50,000 Units by mouth every thirty (30) days.  OXYGEN-AIR DELIVERY SYSTEMS 2 L by Does Not Apply route. O2 via nasal cannula with bedtime and activity. O2 Company: [employer]      roflumilast (DALIRESP) tab tablet Take 1 Tab by mouth daily. 90 Tab 3     No current facility-administered medications on file prior to visit.       Allergies   Allergen Reactions    Anoro Ellipta [Umeclidinium-Vilanterol] Rash and Other (comments)     Muscle cramps in arms    Aspirin Other (comments)     GI upset and bleeding    Augmentin [Amoxicillin-Pot Clavulanate] Not Reported This Time     Possible cramping    Doxycycline Nausea and Vomiting    Morphine Other (comments)     Neurologic symptoms - severe agitation      Shellfish Derived Unknown (comments)     Pt able to eat small amounts per report     Sulfa (Sulfonamide Antibiotics) Rash     Patient relates no longer allergic     Social History     Social History    Marital status:      Spouse name: N/A    Number of children: N/A    Years of education: N/A     Occupational History    Not on file. Social History Main Topics    Smoking status: Current Every Day Smoker     Packs/day: 0.50     Years: 50.00     Types: Cigarettes     Start date: 10/21/1964    Smokeless tobacco: Never Used    Alcohol use No    Drug use: No    Sexual activity: Not on file     Other Topics Concern    Not on file     Social History Narrative     Blood pressure 90/50, pulse 79, temperature 97.7 °F (36.5 °C), temperature source Oral, resp. rate 20, height 5' 6\" (1.676 m), weight 54.9 kg (121 lb), SpO2 98 %, unknown if currently breastfeeding. Physical Exam   Constitutional: She is oriented to person, place, and time. She appears well-developed. No distress. Cachectic   HENT:   Head: Normocephalic and atraumatic. Nose: Nose normal.   Mouth/Throat: No oropharyngeal exudate. Eyes: Conjunctivae and EOM are normal. Pupils are equal, round, and reactive to light. Right eye exhibits no discharge. Left eye exhibits no discharge. No scleral icterus. Neck: No JVD present. No tracheal deviation present. No thyromegaly present. Cardiovascular: Normal rate, regular rhythm, normal heart sounds and intact distal pulses. Exam reveals no gallop. No murmur heard. Pulmonary/Chest: Effort normal. No stridor. No respiratory distress. Wheezes: lft mid lung field  She has no rales. She exhibits no tenderness.    Distant breath sounds with bilateral upper lung field rhonchi Abdominal: Soft. She exhibits no mass. There is no tenderness. Musculoskeletal: She exhibits no edema, tenderness or deformity. Lymphadenopathy:     She has no cervical adenopathy. Neurological: She is alert and oriented to person, place, and time. Skin: Skin is warm and dry. No rash noted. She is not diaphoretic. No erythema. Psychiatric: She has a normal mood and affect. Her behavior is normal. Judgment and thought content normal.     CT Results (most recent):    Results from Hospital Encounter encounter on 07/06/17   CT CHEST WO CONT   Narrative CT CHEST WITHOUT ENHANCEMENT    INDICATION: Lung nodule. TECHNIQUE: Axial images obtained from the thoracic inlet to the level of the  diaphragm without intravenous contrast. Coronal and sagittal reformatted images  were obtained. All CT scans are performed using dose optimization techniques as  appropriate to the performed exam including the following: Automated exposure  control, adjustment of mA and/or kV according to patient size, and use of  iterative reconstructive technique. COMPARISON: Chest film 6/27/2017, CTA chest 4/17/2015. CHEST FINDINGS:   The evaluation of the mediastinum, hilum and vascular structures is limited in  the absence of intravenous contrast.    Lungs: Emphysema. No new or significant lung nodule. New small region of  interstitial thickening and/or atelectasis in the anterior basilar right lower  lobe. Pleura: No pleural effusion. Calcified left pleural plaques. This calcification  explains a nodular focus in the left mid chest on the prior chest film. Pericardium/ Heart: No significant effusion. Lymph Nodes: No mediastinal or axillary adenopathy. The hilum is limited without  contrast.  Aorta/ Vessels: No aortic aneurysm. Thyroid: Unremarkable in its visualized aspects. Bones/soft tissues: Unremarkable. Upper Abdomen: Unremarkable. Impression IMPRESSION:    1.  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 This calcification causes a nodular appearance in the left mid chest on prior  chest film. No lung nodule. 2. Emphysema. ASSESSMENT and PLAN  Encounter Diagnoses   Name Primary?  COPD, severe ([address] Yes    Pulmonary cachexia due to COPD ([address]     Depression, reactive     Hypoxemia requiring supplemental oxygen     Insomnia, unspecified type     Asbestos exposure      Continue Advair and Spiriva. Continue Prednisone 10 mg daily but discontinue Azithromycin for lack of perceived benefit. Continue medications and supplemental O2 as before. Start Ramelteon HS prn, pt educated on use and possible side effects. Await NIV mask  RTC 4 months or sooner prn.   More than 50% of this 40 minute visit spent in face to face counseling

## 2017-12-04 ENCOUNTER — APPOINTMENT (OUTPATIENT)
Dept: GENERAL RADIOLOGY | Age: 72
End: 2017-12-04
Attending: EMERGENCY MEDICINE
Payer: MEDICARE

## 2017-12-04 ENCOUNTER — HOSPITAL ENCOUNTER (EMERGENCY)
Age: 72
Discharge: HOME | End: 2017-12-05
Attending: EMERGENCY MEDICINE
Payer: MEDICARE

## 2017-12-04 ENCOUNTER — APPOINTMENT (OUTPATIENT)
Dept: CT IMAGING | Age: 72
End: 2017-12-04
Attending: EMERGENCY MEDICINE
Payer: MEDICARE

## 2017-12-04 DIAGNOSIS — J44.1 ACUTE EXACERBATION OF CHRONIC OBSTRUCTIVE PULMONARY DISEASE (COPD) (HCC): Primary | ICD-10-CM

## 2017-12-04 DIAGNOSIS — J18.9 PNEUMONIA DUE TO INFECTIOUS ORGANISM, UNSPECIFIED LATERALITY, UNSPECIFIED PART OF LUNG: ICD-10-CM

## 2017-12-04 LAB
ALBUMIN SERPL-MCNC: 4 G/DL (ref 3.4–5)
ALBUMIN/GLOB SERPL: 1.1 {RATIO} (ref 0.8–1.7)
ALP SERPL-CCNC: 97 U/L (ref 45–117)
ALT SERPL-CCNC: 19 U/L (ref 13–56)
ANION GAP SERPL CALC-SCNC: 10 MMOL/L (ref 3–18)
AST SERPL-CCNC: 17 U/L (ref 15–37)
BASOPHILS # BLD: 0 K/UL (ref 0–0.06)
BASOPHILS NFR BLD: 0 % (ref 0–2)
BILIRUB SERPL-MCNC: 0.5 MG/DL (ref 0.2–1)
BNP SERPL-MCNC: 323 PG/ML (ref 0–900)
BUN SERPL-MCNC: 28 MG/DL (ref 7–18)
BUN/CREAT SERPL: 37 (ref 12–20)
CALCIUM SERPL-MCNC: 9.7 MG/DL (ref 8.5–10.1)
CHLORIDE SERPL-SCNC: 101 MMOL/L (ref 100–108)
CO2 SERPL-SCNC: 28 MMOL/L (ref 21–32)
CREAT SERPL-MCNC: 0.75 MG/DL (ref 0.6–1.3)
D DIMER PPP FEU-MCNC: 0.45 UG/ML(FEU)
DIFFERENTIAL METHOD BLD: ABNORMAL
EOSINOPHIL # BLD: 0.1 K/UL (ref 0–0.4)
EOSINOPHIL NFR BLD: 1 % (ref 0–5)
ERYTHROCYTE [DISTWIDTH] IN BLOOD BY AUTOMATED COUNT: 12.6 % (ref 11.6–14.5)
GLOBULIN SER CALC-MCNC: 3.5 G/DL (ref 2–4)
GLUCOSE SERPL-MCNC: 81 MG/DL (ref 74–99)
HCT VFR BLD AUTO: 43.3 % (ref 35–45)
HGB BLD-MCNC: 14.8 G/DL (ref 12–16)
LYMPHOCYTES # BLD: 2.5 K/UL (ref 0.9–3.6)
LYMPHOCYTES NFR BLD: 20 % (ref 21–52)
MAGNESIUM SERPL-MCNC: 2.2 MG/DL (ref 1.6–2.6)
MCH RBC QN AUTO: 32.7 PG (ref 24–34)
MCHC RBC AUTO-ENTMCNC: 34.2 G/DL (ref 31–37)
MCV RBC AUTO: 95.6 FL (ref 74–97)
MONOCYTES # BLD: 1.1 K/UL (ref 0.05–1.2)
MONOCYTES NFR BLD: 9 % (ref 3–10)
NEUTS SEG # BLD: 9.2 K/UL (ref 1.8–8)
NEUTS SEG NFR BLD: 70 % (ref 40–73)
PLATELET # BLD AUTO: 336 K/UL (ref 135–420)
PMV BLD AUTO: 10.6 FL (ref 9.2–11.8)
POTASSIUM SERPL-SCNC: 4.1 MMOL/L (ref 3.5–5.5)
PROT SERPL-MCNC: 7.5 G/DL (ref 6.4–8.2)
RBC # BLD AUTO: 4.53 M/UL (ref 4.2–5.3)
SODIUM SERPL-SCNC: 139 MMOL/L (ref 136–145)
TROPONIN I SERPL-MCNC: <0.02 NG/ML (ref 0–0.04)
WBC # BLD AUTO: 12.9 K/UL (ref 4.6–13.2)

## 2017-12-04 PROCEDURE — 85379 FIBRIN DEGRADATION QUANT: CPT | Performed by: EMERGENCY MEDICINE

## 2017-12-04 PROCEDURE — 74011000250 HC RX REV CODE- 250: Performed by: EMERGENCY MEDICINE

## 2017-12-04 PROCEDURE — 84484 ASSAY OF TROPONIN QUANT: CPT | Performed by: EMERGENCY MEDICINE

## 2017-12-04 PROCEDURE — 80053 COMPREHEN METABOLIC PANEL: CPT | Performed by: EMERGENCY MEDICINE

## 2017-12-04 PROCEDURE — 83735 ASSAY OF MAGNESIUM: CPT | Performed by: EMERGENCY MEDICINE

## 2017-12-04 PROCEDURE — 74011250636 HC RX REV CODE- 250/636: Performed by: EMERGENCY MEDICINE

## 2017-12-04 PROCEDURE — 94640 AIRWAY INHALATION TREATMENT: CPT

## 2017-12-04 PROCEDURE — 96374 THER/PROPH/DIAG INJ IV PUSH: CPT

## 2017-12-04 PROCEDURE — 83880 ASSAY OF NATRIURETIC PEPTIDE: CPT | Performed by: EMERGENCY MEDICINE

## 2017-12-04 PROCEDURE — 71010 XR CHEST SNGL V: CPT

## 2017-12-04 PROCEDURE — 96375 TX/PRO/DX INJ NEW DRUG ADDON: CPT

## 2017-12-04 PROCEDURE — 71275 CT ANGIOGRAPHY CHEST: CPT

## 2017-12-04 PROCEDURE — 93005 ELECTROCARDIOGRAM TRACING: CPT

## 2017-12-04 PROCEDURE — 85025 COMPLETE CBC W/AUTO DIFF WBC: CPT | Performed by: EMERGENCY MEDICINE

## 2017-12-04 PROCEDURE — 99284 EMERGENCY DEPT VISIT MOD MDM: CPT

## 2017-12-04 PROCEDURE — 74011636320 HC RX REV CODE- 636/320: Performed by: EMERGENCY MEDICINE

## 2017-12-04 RX ORDER — ALBUTEROL SULFATE 0.83 MG/ML
2.5 SOLUTION RESPIRATORY (INHALATION)
Status: DISCONTINUED | OUTPATIENT
Start: 2017-12-04 | End: 2017-12-05 | Stop reason: HOSPADM

## 2017-12-04 RX ORDER — IPRATROPIUM BROMIDE AND ALBUTEROL SULFATE 2.5; .5 MG/3ML; MG/3ML
3 SOLUTION RESPIRATORY (INHALATION)
Status: COMPLETED | OUTPATIENT
Start: 2017-12-04 | End: 2017-12-04

## 2017-12-04 RX ORDER — HYDROMORPHONE HYDROCHLORIDE 1 MG/ML
0.5 INJECTION, SOLUTION INTRAMUSCULAR; INTRAVENOUS; SUBCUTANEOUS
Status: COMPLETED | OUTPATIENT
Start: 2017-12-04 | End: 2017-12-04

## 2017-12-04 RX ORDER — ALBUTEROL SULFATE 0.83 MG/ML
5 SOLUTION RESPIRATORY (INHALATION)
Status: COMPLETED | OUTPATIENT
Start: 2017-12-05 | End: 2017-12-05

## 2017-12-04 RX ADMIN — IPRATROPIUM BROMIDE AND ALBUTEROL SULFATE 3 ML: 2.5; .5 SOLUTION RESPIRATORY (INHALATION) at 21:42

## 2017-12-04 RX ADMIN — IPRATROPIUM BROMIDE AND ALBUTEROL SULFATE 3 ML: 2.5; .5 SOLUTION RESPIRATORY (INHALATION) at 20:46

## 2017-12-04 RX ADMIN — HYDROMORPHONE HYDROCHLORIDE 0.5 MG: 1 INJECTION, SOLUTION INTRAMUSCULAR; INTRAVENOUS; SUBCUTANEOUS at 20:47

## 2017-12-04 RX ADMIN — IPRATROPIUM BROMIDE AND ALBUTEROL SULFATE 3 ML: 2.5; .5 SOLUTION RESPIRATORY (INHALATION) at 21:09

## 2017-12-04 RX ADMIN — IOPAMIDOL 100 ML: 755 INJECTION, SOLUTION INTRAVENOUS at 21:54

## 2017-12-04 RX ADMIN — METHYLPREDNISOLONE SODIUM SUCCINATE 125 MG: 125 INJECTION, POWDER, FOR SOLUTION INTRAMUSCULAR; INTRAVENOUS at 20:47

## 2017-12-05 VITALS
WEIGHT: 113.6 LBS | HEIGHT: 66 IN | RESPIRATION RATE: 22 BRPM | DIASTOLIC BLOOD PRESSURE: 61 MMHG | BODY MASS INDEX: 18.26 KG/M2 | TEMPERATURE: 97.9 F | SYSTOLIC BLOOD PRESSURE: 102 MMHG | HEART RATE: 98 BPM | OXYGEN SATURATION: 95 %

## 2017-12-05 LAB
ATRIAL RATE: 88 BPM
CALCULATED P AXIS, ECG09: 87 DEGREES
CALCULATED R AXIS, ECG10: 94 DEGREES
CALCULATED T AXIS, ECG11: 74 DEGREES
DIAGNOSIS, 93000: NORMAL
P-R INTERVAL, ECG05: 122 MS
Q-T INTERVAL, ECG07: 370 MS
QRS DURATION, ECG06: 74 MS
QTC CALCULATION (BEZET), ECG08: 447 MS
VENTRICULAR RATE, ECG03: 88 BPM

## 2017-12-05 PROCEDURE — 77030029684 HC NEB SM VOL KT MONA -A

## 2017-12-05 PROCEDURE — 94640 AIRWAY INHALATION TREATMENT: CPT

## 2017-12-05 PROCEDURE — 74011000250 HC RX REV CODE- 250: Performed by: EMERGENCY MEDICINE

## 2017-12-05 RX ORDER — LEVOFLOXACIN 750 MG/1
750 TABLET ORAL DAILY
Qty: 5 TAB | Refills: 0 | Status: SHIPPED | OUTPATIENT
Start: 2017-12-05 | End: 2017-12-10

## 2017-12-05 RX ORDER — HYDROCODONE BITARTRATE AND ACETAMINOPHEN 5; 325 MG/1; MG/1
1 TABLET ORAL
Qty: 15 TAB | Refills: 0 | Status: ON HOLD | OUTPATIENT
Start: 2017-12-05 | End: 2018-09-24

## 2017-12-05 RX ORDER — PREDNISONE 20 MG/1
60 TABLET ORAL DAILY
Qty: 15 TAB | Refills: 0 | Status: SHIPPED | OUTPATIENT
Start: 2017-12-05 | End: 2017-12-10

## 2017-12-05 RX ADMIN — ALBUTEROL SULFATE 5 MG: 2.5 SOLUTION RESPIRATORY (INHALATION) at 00:00

## 2017-12-05 NOTE — ED PROVIDER NOTES
EMERGENCY DEPARTMENT HISTORY AND PHYSICAL EXAM    8:12 PM      Date: 12/4/2017  Patient Name: Mau Pan    History of Presenting Illness     Chief Complaint   Patient presents with    Shortness of Breath         History Provided By: Patient    Chief Complaint: SOB  Duration:  Many weeks  Timing:  Constant, Progressive and Worsening  Location: Chest  Quality: N/A  Severity: Moderate  Modifying Factors: None  Associated Symptoms: Productive Cough (Yellow-Green, Red streaks)      Additional History (Context): Mau Pan is a 81 y.o. female with history of COPD, DVT, Asthma and Smoking who presents to the ED c/o SOB. Pt reports that she has chronic lung problems but has had worsening SOB for the past couple of weeks. Pt also notes that she has been having left rib pain, left upper back pain and intermittently productive cough (yellow-green to red streaks). Pt notes that she was on hydrocodone cough medication in the past. Pt came to ED because she was not able to be seen by her pulmonologist (Dr. Leon Haider) prior to Thursday. Pt is on 2L O2 at home currently. Pt denies any other acute sx at this time. PCP: Cecy Baker MD    Current Outpatient Prescriptions   Medication Sig Dispense Refill    predniSONE (DELTASONE) 20 mg tablet Take 3 Tabs by mouth daily for 5 days. 15 Tab 0    levoFLOXacin (LEVAQUIN) 750 mg tablet Take 1 Tab by mouth daily for 5 days. 5 Tab 0    HYDROcodone-acetaminophen (NORCO) 5-325 mg per tablet Take 1 Tab by mouth every four (4) hours as needed for Pain. Max Daily Amount: 6 Tabs. 15 Tab 0    azithromycin (ZITHROMAX) 250 mg tablet Take 1 Tab by mouth every Monday, Wednesday, Friday. 0    ramelteon (ROZEREM) 8 mg tablet Take 1 Tab by mouth nightly as needed for Sleep. 30 Tab 3    albuterol (PROVENTIL VENTOLIN) 2.5 mg /3 mL (0.083 %) nebulizer solution 3 mL by Nebulization route every four (4) hours as needed for Wheezing or Shortness of Breath. Diag.  Code: J44.9, R09.02 375 Each 3    albuterol (PROAIR HFA) 90 mcg/actuation inhaler INHALE 2 PUFFS BY MOUTH EVERY 4 HOURS AS NEEDED FOR WHEEZING OR SHORTNESS OF BREATH 1 Inhaler 6    fluticasone-salmeterol (ADVAIR) 250-50 mcg/dose diskus inhaler Take 1 Puff by inhalation every twelve (12) hours. 1 Inhaler 3    tiotropium bromide (SPIRIVA RESPIMAT) 2.5 mcg/actuation inhaler Take 2 Puffs by inhalation daily. 3 Inhaler 3    LORazepam (ATIVAN) 1 mg tablet Take  by mouth every four (4) hours as needed for Anxiety.  roflumilast (DALIRESP) tab tablet Take 1 Tab by mouth daily. 90 Tab 3    rOPINIRole (REQUIP) 1 mg tablet Take  by mouth two (2) times a day.  ergocalciferol (VITAMIN D2) 50,000 unit capsule Take 50,000 Units by mouth every thirty (30) days.  OXYGEN-AIR DELIVERY SYSTEMS 2 L by Does Not Apply route. O2 via nasal cannula with bedtime and activity.  O2 Company: [id number]         Past History     Past Medical History:  Past Medical History:   Diagnosis Date    Anxiety     Arthritis     Asthma     Back problem     Baker's cyst     Balance problems     Chronic lung disease     Cigarette smoker     Clotting disorder (HCC)     COPD (chronic obstructive pulmonary disease) (HCC)     Depression     History of DVT (deep vein thrombosis)     Leg pain     Right    Lung disease     Nausea & vomiting     Osteoporosis     Restless leg syndrome     Spinal stenosis     Vitamin D deficiency        Past Surgical History:  Past Surgical History:   Procedure Laterality Date    HX APPENDECTOMY      HX BACK SURGERY      x4    HX BREAST BIOPSY      HX HYSTERECTOMY      HX LUMBAR LAMINECTOMY      HX MENISCUS REPAIR      HX ORTHOPAEDIC      Knee    HX POLYPECTOMY      HX TONSILLECTOMY      HX VEIN STRIPPING         Family History:  Family History   Problem Relation Age of Onset    Cancer Father     Heart Disease Mother     Hypertension Mother     Cancer Brother     Cancer Sister     Diabetes Other     Hypertension Other     Stroke Other     Heart Disease Sister     Hypertension Sister     Heart Disease Brother        Social History:  Social History   Substance Use Topics    Smoking status: Current Every Day Smoker     Packs/day: 0.50     Years: 50.00     Types: Cigarettes     Start date: 10/21/1964    Smokeless tobacco: Never Used    Alcohol use No       Allergies: Allergies   Allergen Reactions    Anoro Ellipta [Umeclidinium-Vilanterol] Rash and Other (comments)     Muscle cramps in arms    Aspirin Other (comments)     GI upset and bleeding    Augmentin [Amoxicillin-Pot Clavulanate] Not Reported This Time     Possible cramping    Doxycycline Nausea and Vomiting    Morphine Other (comments)     Neurologic symptoms - severe agitation      Shellfish Derived Unknown (comments)     Pt able to eat small amounts per report     Sulfa (Sulfonamide Antibiotics) Rash     Patient relates no longer allergic         Review of Systems       Review of Systems   Constitutional: Negative for chills and fever. HENT: Negative for congestion. Respiratory: Positive for cough and shortness of breath. Cardiovascular: Positive for chest pain (Rib pain). Gastrointestinal: Negative for abdominal pain, diarrhea, nausea and vomiting. Genitourinary: Negative for dysuria. Musculoskeletal: Positive for back pain (Left upper back). Neurological: Negative for dizziness and numbness. All other systems reviewed and are negative. Physical Exam     Visit Vitals    /61    Pulse 98    Temp 97.9 °F (36.6 °C)    Resp 22    Ht 5' 6\" (1.676 m)    Wt 51.5 kg (113 lb 9.6 oz)    SpO2 95%    BMI 18.34 kg/m2       Physical Exam   Constitutional:   General:  Well-developed, well-nourished, thin female not warm to touch moderate respiratory distress. Head:  Normocephalic atraumatic. Eyes:  Pupils midrange extraocular movements intact. No pallor or conjunctival injection.     Nose:  No rhinorrhea, inspection grossly normal.    Ears:  Grossly normal to inspection, no discharge. Mouth:  Mucous membranes moist, no appreciable intraoral lesion. Neck/Back:  Trachea midline, no asymmetry. Chest:  Grossly normal inspection, symmetric chest rise. Pulmonary: Prolonged expiration phase, respiratory wheezing, left-sided rhonchi. Cardiovascular:  S1-S2 no murmurs rubs or gallops. Abdomen: Soft, nontender, scaphoid abdomen no guarding rebound or peritoneal signs. No CVA tenderness  Extremities:  Grossly normal to inspection, peripheral pulses intact in all 4 extremities   no pain on palpation of the calves no asymmetry no swelling no overlying skin changes  Neurologic:  Alert and oriented no appreciable focal neurologic deficit. Skin:  Warm and dry  Psychiatric:  Grossly normal mood and affect. Nursing note reviewed, vital signs reviewed.            Diagnostic Study Results     Labs -  Recent Results (from the past 12 hour(s))   EKG, 12 LEAD, INITIAL    Collection Time: 12/04/17  7:35 PM   Result Value Ref Range    Ventricular Rate 88 BPM    Atrial Rate 88 BPM    P-R Interval 122 ms    QRS Duration 74 ms    Q-T Interval 370 ms    QTC Calculation (Bezet) 447 ms    Calculated P Axis 87 degrees    Calculated R Axis 94 degrees    Calculated T Axis 74 degrees    Diagnosis       Normal sinus rhythm  Rightward axis  Pulmonary disease pattern  Abnormal ECG  When compared with ECG of 02-DEC-2016 03:05,  premature supraventricular complexes are no longer present     CBC WITH AUTOMATED DIFF    Collection Time: 12/04/17  7:53 PM   Result Value Ref Range    WBC 12.9 4.6 - 13.2 K/uL    RBC 4.53 4.20 - 5.30 M/uL    HGB 14.8 12.0 - 16.0 g/dL    HCT 43.3 35.0 - 45.0 %    MCV 95.6 74.0 - 97.0 FL    MCH 32.7 24.0 - 34.0 PG    MCHC 34.2 31.0 - 37.0 g/dL    RDW 12.6 11.6 - 14.5 %    PLATELET 968 184 - 554 K/uL    MPV 10.6 9.2 - 11.8 FL    NEUTROPHILS 70 40 - 73 %    LYMPHOCYTES 20 (L) 21 - 52 %    MONOCYTES 9 3 - 10 % EOSINOPHILS 1 0 - 5 %    BASOPHILS 0 0 - 2 %    ABS. NEUTROPHILS 9.2 (H) 1.8 - 8.0 K/UL    ABS. LYMPHOCYTES 2.5 0.9 - 3.6 K/UL    ABS. MONOCYTES 1.1 0.05 - 1.2 K/UL    ABS. EOSINOPHILS 0.1 0.0 - 0.4 K/UL    ABS. BASOPHILS 0.0 0.0 - 0.06 K/UL    DF AUTOMATED     METABOLIC PANEL, COMPREHENSIVE    Collection Time: 12/04/17  7:53 PM   Result Value Ref Range    Sodium 139 136 - 145 mmol/L    Potassium 4.1 3.5 - 5.5 mmol/L    Chloride 101 100 - 108 mmol/L    CO2 28 21 - 32 mmol/L    Anion gap 10 3.0 - 18 mmol/L    Glucose 81 74 - 99 mg/dL    BUN 28 (H) 7.0 - 18 MG/DL    Creatinine 0.75 0.6 - 1.3 MG/DL    BUN/Creatinine ratio 37 (H) 12 - 20      GFR est AA >60 >60 ml/min/1.73m2    GFR est non-AA >60 >60 ml/min/1.73m2    Calcium 9.7 8.5 - 10.1 MG/DL    Bilirubin, total 0.5 0.2 - 1.0 MG/DL    ALT (SGPT) 19 13 - 56 U/L    AST (SGOT) 17 15 - 37 U/L    Alk. phosphatase 97 45 - 117 U/L    Protein, total 7.5 6.4 - 8.2 g/dL    Albumin 4.0 3.4 - 5.0 g/dL    Globulin 3.5 2.0 - 4.0 g/dL    A-G Ratio 1.1 0.8 - 1.7     MAGNESIUM    Collection Time: 12/04/17  7:53 PM   Result Value Ref Range    Magnesium 2.2 1.6 - 2.6 mg/dL   NT-PRO BNP    Collection Time: 12/04/17  7:53 PM   Result Value Ref Range    NT pro- 0 - 900 PG/ML   D DIMER    Collection Time: 12/04/17  7:53 PM   Result Value Ref Range    D DIMER 0.45 <0.46 ug/ml(FEU)   TROPONIN I    Collection Time: 12/04/17  7:53 PM   Result Value Ref Range    Troponin-I, Qt. <0.02 0.0 - 0.045 NG/ML       Radiologic Studies -   XR CHEST SNGL V   Final Result      CTA CHEST ABD PELV W WO CONT    (Results Pending)         Medical Decision Making   I am the first provider for this patient. I reviewed the vital signs, available nursing notes, past medical history, past surgical history, family history and social history. Vital Signs-Reviewed the patient's vital signs.     Pulse Oximetry Analysis -  97% on room air (Normal)    ED course:  Patient presenting with shortness of breath, left-sided thoracic pain clinically seems to be a COPD exacerbation, has no central chest pain but does report thoracic pain over last 1 week, her family members have arrived and urging her to tell me more about her abdominal pain she reports is in the epigastric region occurring immediately after eating any type of food. She denies any blood in her bowels or vomiting. Primary concern is for recurrence of her known duodenal ulcer versus chronic mesenteric ischemia. She has no active belly pain at this time but given the location of the back pain also concern for aortic pathology. Patient reevaluated at 1149 hrs.: Lab work is completely unremarkable, CTa chest abdomen pelvis is pending read at this time. She states that she feels slightly better, her oxygen saturation is 97%, her lung sounds are persistently wheezy and transmitted upper airway sounds, she does not want to stay in the hospital, discussed her negative workup but I'm still concerned that her pulmonary function is not back to her normal baseline, we'll attempt trial ambulation    Patient ambulated, was short of breath, desaturated down to 92% on her normal 3 L/m nasal cannula, did recommend that she be admitted, she refused, she will be discharged with prednisone, antibiotic, she did not need any albuterol she will follow-up with her pulmonologist and return here with any concerns. This was discussed with her daughter and family present. There is no indication of force her against her will especially since her workup was unremarkable    Patient's presentation, history, physical exam and laboratory evaluations were reviewed. At this time patient was felt to be stable for outpatient management and follow with primary care/specialist.  Patient was instructed to return to the emergency department with any concerns.     Disposition:    Discharged home      Portions of this chart were created with Dragon medical speech to text program.   Unrecognized errors may be present. Disposition:    Follow-up Information     Follow up With Details Comments Contact Michelle Denton MD Call in 1 day  [place] [address] [facility]  [address] [facility] EM[facility]PT  As needed, If symptoms worsen [address]  [phone]           Discharge Medication List as of 12/5/2017  1:21 AM      START taking these medications    Details   levoFLOXacin (LEVAQUIN) 750 mg tablet Take 1 Tab by mouth daily for 5 days. , Print, Disp-5 Tab, R-0      HYDROcodone-acetaminophen (NORCO) 5-325 mg per tablet Take 1 Tab by mouth every four (4) hours as needed for Pain. Max Daily Amount: 6 Tabs., Print, Disp-15 Tab, R-0         CONTINUE these medications which have CHANGED    Details   predniSONE (DELTASONE) 20 mg tablet Take 3 Tabs by mouth daily for 5 days. , Print, Disp-15 Tab, R-0         CONTINUE these medications which have NOT CHANGED    Details   azithromycin (ZITHROMAX) 250 mg tablet Take 1 Tab by mouth every Monday, Wednesday, Friday., Historical Med, R-0      ramelteon (ROZEREM) 8 mg tablet Take 1 Tab by mouth nightly as needed for Sleep., Normal, Disp-30 Tab, R-3      albuterol (PROVENTIL VENTOLIN) 2.5 mg /3 mL (0.083 %) nebulizer solution 3 mL by Nebulization route every four (4) hours as needed for Wheezing or Shortness of Breath. Diag. Code: J44.9, R09.02, Normal, Disp-375 Each, R-3      albuterol (PROAIR HFA) 90 mcg/actuation inhaler INHALE 2 PUFFS BY MOUTH EVERY 4 HOURS AS NEEDED FOR WHEEZING OR SHORTNESS OF BREATH, Normal, Disp-1 Inhaler, R-6      fluticasone-salmeterol (ADVAIR) 250-50 mcg/dose diskus inhaler Take 1 Puff by inhalation every twelve (12) hours. , Normal, Disp-1 Inhaler, R-3      tiotropium bromide (SPIRIVA RESPIMAT) 2.5 mcg/actuation inhaler Take 2 Puffs by inhalation daily. , Normal, Disp-3 Inhaler, R-3      LORazepam (ATIVAN) 1 mg tablet Take  by mouth every four (4) hours as needed for Anxiety. , Historical Med      roflumilast (DALIRESP) tab tablet Take 1 Tab by mouth daily. , Print, Disp-90 Tab, R-3      rOPINIRole (REQUIP) 1 mg tablet Take  by mouth two (2) times a day., Historical Med      ergocalciferol (VITAMIN D2) 50,000 unit capsule Take 50,000 Units by mouth every thirty (30) days. , Historical Med      OXYGEN-AIR DELIVERY SYSTEMS 2 L by Does Not Apply route. O2 via nasal cannula with bedtime and activity. O[employer] Company: [employer] Historical Med           _______________________________    Attestations:  Scribe Attestation     Mallory Solis acting as a scribe for and in the presence of Hillary Galeas MD      December 04, 2017 at 8:12 PM       Provider Attestation:      I personally performed the services described in the documentation, reviewed the documentation, as recorded by the scribe in my presence, and it accurately and completely records my words and actions.  December 04, 2017 at 8:12 PM - Hillary Galeas MD    _______________________________

## 2017-12-05 NOTE — DISCHARGE INSTRUCTIONS
Chronic Obstructive Pulmonary Disease (COPD): Care Instructions  Your Care Instructions    Chronic obstructive pulmonary disease (COPD) is a general term for a group of lung diseases, including emphysema and chronic bronchitis. People with COPD have decreased airflow in and out of the lungs, which makes it hard to breathe. The airways also can get clogged with thick mucus. Cigarette smoking is a major cause of COPD. Although there is no cure for COPD, you can slow its progress. Following your treatment plan and taking care of yourself can help you feel better and live longer. Follow-up care is a key part of your treatment and safety. Be sure to make and go to all appointments, and call your doctor if you are having problems. It's also a good idea to know your test results and keep a list of the medicines you take. How can you care for yourself at home? ?Staying healthy  ? · Do not smoke. This is the most important step you can take to prevent more damage to your lungs. If you need help quitting, talk to your doctor about stop-smoking programs and medicines. These can increase your chances of quitting for good. ? · Avoid colds and flu. Get a pneumococcal vaccine shot. If you have had one before, ask your doctor whether you need a second dose. Get the flu vaccine every fall. If you must be around people with colds or the flu, wash your hands often. ? · Avoid secondhand smoke, air pollution, and high altitudes. Also avoid cold, dry air and hot, humid air. Stay at home with your windows closed when air pollution is bad. ?Medicines and oxygen therapy  ? · Take your medicines exactly as prescribed. Call your doctor if you think you are having a problem with your medicine. ? · You may be taking medicines such as:  ¨ Bronchodilators. These help open your airways and make breathing easier. Bronchodilators are either short-acting (work for 6 to 9 hours) or long-acting (work for 24 hours).  You inhale most bronchodilators, so they start to act quickly. Always carry your quick-relief inhaler with you in case you need it while you are away from home. ¨ Corticosteroids (prednisone, budesonide). These reduce airway inflammation. They come in pill or inhaled form. You must take these medicines every day for them to work well. ? · A spacer may help you get more inhaled medicine to your lungs. Ask your doctor or pharmacist if a spacer is right for you. If it is, ask how to use it properly. ? · Do not take any vitamins, over-the-counter medicine, or herbal products without talking to your doctor first.   ? · If your doctor prescribed antibiotics, take them as directed. Do not stop taking them just because you feel better. You need to take the full course of antibiotics. ? · Oxygen therapy boosts the amount of oxygen in your blood and helps you breathe easier. Use the flow rate your doctor has recommended, and do not change it without talking to your doctor first.   Activity  ? · Get regular exercise. Walking is an easy way to get exercise. Start out slowly, and walk a little more each day. ? · Pay attention to your breathing. You are exercising too hard if you cannot talk while you are exercising. ? · Take short rest breaks when doing household chores and other activities. ? · Learn breathing methods-such as breathing through pursed lips-to help you become less short of breath. ? · If your doctor has not set you up with a pulmonary rehabilitation program, talk to him or her about whether rehab is right for you. Rehab includes exercise programs, education about your disease and how to manage it, help with diet and other changes, and emotional support. Diet  ? · Eat regular, healthy meals. Use bronchodilators about 1 hour before you eat to make it easier to eat. Eat several small meals instead of three large ones. Drink beverages at the end of the meal. Avoid foods that are hard to chew.    ? · Eat foods that contain protein so that you do not lose muscle mass. ? · Talk with your doctor if you gain too much weight or if you lose weight without trying. ?Mental health  ? · Talk to your family, friends, or a therapist about your feelings. It is normal to feel frightened, angry, hopeless, helpless, and even guilty. Talking openly about bad feelings can help you cope. If these feelings last, talk to your doctor. When should you call for help? Call 911 anytime you think you may need emergency care. For example, call if:  ? · You have severe trouble breathing. ?Call your doctor now or seek immediate medical care if:  ? · You have new or worse trouble breathing. ? · You cough up blood. ? · You have a fever. ? Watch closely for changes in your health, and be sure to contact your doctor if:  ? · You cough more deeply or more often, especially if you notice more mucus or a change in the color of your mucus. ? · You have new or worse swelling in your legs or belly. ? · You are not getting better as expected. Where can you learn more? Go to http://robert-jorge.info/. Leatha Jovel in the search box to learn more about \"Chronic Obstructive Pulmonary Disease (COPD): Care Instructions. \"  Current as of: May 12, 2017  Content Version: 11.4  © 8194-8610 [employer]. Care instructions adapted under license by [employer] (which disclaims liability or warranty for this information). If you have questions about a medical condition or this instruction, always ask your healthcare professional. [address] any warranty or liability for your use of this information.

## 2017-12-05 NOTE — ED NOTES
I have reviewed discharge instructions with the patient. The patient verbalized understanding. Patient armband removed and shredded. Attempted to provide patient with incentive spirometer, but patient reports that she has numerous at home. Provided prescriptions for Prednisone, Levaquin, and Norco at this time. Patient instructed on use. Patient is now leaving the ED in stable condition, steady gait noted.

## 2017-12-05 NOTE — ED TRIAGE NOTES
Pt reports all over body pain x a \"few weeks. \" Pt also has a hx of COPD and is having increased SOB.

## 2017-12-21 ENCOUNTER — HOSPITAL ENCOUNTER (OUTPATIENT)
Dept: LAB | Age: 72
Discharge: HOME | End: 2017-12-21
Payer: MEDICARE

## 2017-12-21 ENCOUNTER — HOSPITAL ENCOUNTER (OUTPATIENT)
Dept: GENERAL RADIOLOGY | Age: 72
Discharge: HOME | End: 2017-12-21
Payer: MEDICARE

## 2017-12-21 DIAGNOSIS — M79.641 HAND PAIN, RIGHT: ICD-10-CM

## 2017-12-21 DIAGNOSIS — R94.31 ABNORMAL EKG: ICD-10-CM

## 2017-12-21 LAB
ATRIAL RATE: 71 BPM
CALCULATED P AXIS, ECG09: 85 DEGREES
CALCULATED R AXIS, ECG10: 96 DEGREES
CALCULATED T AXIS, ECG11: 67 DEGREES
DIAGNOSIS, 93000: NORMAL
P-R INTERVAL, ECG05: 126 MS
Q-T INTERVAL, ECG07: 396 MS
QRS DURATION, ECG06: 84 MS
QTC CALCULATION (BEZET), ECG08: 430 MS
VENTRICULAR RATE, ECG03: 71 BPM

## 2017-12-21 PROCEDURE — 93005 ELECTROCARDIOGRAM TRACING: CPT

## 2017-12-21 PROCEDURE — 73130 X-RAY EXAM OF HAND: CPT

## 2018-01-10 ENCOUNTER — TELEPHONE (OUTPATIENT)
Dept: PULMONOLOGY | Age: 73
End: 2018-01-10

## 2018-01-10 DIAGNOSIS — J44.1 COPD WITH ACUTE EXACERBATION (HCC): Primary | ICD-10-CM

## 2018-01-10 RX ORDER — LEVOFLOXACIN 750 MG/1
750 TABLET ORAL DAILY
Qty: 5 TAB | Refills: 0 | Status: SHIPPED | OUTPATIENT
Start: 2018-01-10 | End: 2018-07-05

## 2018-01-10 NOTE — TELEPHONE ENCOUNTER
Pt c/o congestion and cough x 2 weeks. No pt coughing up yellowish sputum. Pt states she feels she needs Levaquin. She states she doesn't respond well to [address].  [address] [place] [address]

## 2018-02-14 ENCOUNTER — OFFICE VISIT (OUTPATIENT)
Dept: PULMONOLOGY | Age: 73
End: 2018-02-14

## 2018-02-14 VITALS
HEART RATE: 74 BPM | WEIGHT: 115 LBS | BODY MASS INDEX: 18.48 KG/M2 | TEMPERATURE: 97.8 F | HEIGHT: 66 IN | SYSTOLIC BLOOD PRESSURE: 100 MMHG | OXYGEN SATURATION: 98 % | DIASTOLIC BLOOD PRESSURE: 72 MMHG | RESPIRATION RATE: 16 BRPM

## 2018-02-14 DIAGNOSIS — J43.9 PULMONARY EMPHYSEMA, UNSPECIFIED EMPHYSEMA TYPE (HCC): ICD-10-CM

## 2018-02-14 DIAGNOSIS — Z77.090 ASBESTOS EXPOSURE: ICD-10-CM

## 2018-02-14 DIAGNOSIS — R64 PULMONARY CACHEXIA DUE TO CHRONIC OBSTRUCTIVE PULMONARY DISEASE (HCC): ICD-10-CM

## 2018-02-14 DIAGNOSIS — R07.9 CHEST PAIN, UNSPECIFIED TYPE: ICD-10-CM

## 2018-02-14 DIAGNOSIS — J44.9 PULMONARY CACHEXIA DUE TO CHRONIC OBSTRUCTIVE PULMONARY DISEASE (HCC): ICD-10-CM

## 2018-02-14 DIAGNOSIS — J44.9 COPD, SEVERE (HCC): Primary | ICD-10-CM

## 2018-02-14 DIAGNOSIS — R04.2 HEMOPTYSIS: Chronic | ICD-10-CM

## 2018-02-14 DIAGNOSIS — R09.02 HYPOXEMIA REQUIRING SUPPLEMENTAL OXYGEN: ICD-10-CM

## 2018-02-14 DIAGNOSIS — Z99.81 HYPOXEMIA REQUIRING SUPPLEMENTAL OXYGEN: ICD-10-CM

## 2018-02-14 RX ORDER — PERPHENAZINE 8 MG
1 TABLET ORAL 2 TIMES DAILY
Qty: 60 TAB | Refills: 5 | Status: SHIPPED | OUTPATIENT
Start: 2018-02-14 | End: 2018-07-19 | Stop reason: SDUPTHER

## 2018-02-14 RX ORDER — PREDNISONE 10 MG/1
TABLET ORAL
COMMUNITY
End: 2018-06-21 | Stop reason: SDUPTHER

## 2018-02-14 NOTE — PROGRESS NOTES
HISTORY OF PRESENT ILLNESS  Villa Pearson Nani Alcaraz is a 81 y.o. female. HPI Comments:  Pt is now back on Advair and Spiriva Respimat added with moderate response. Still complains of baseline SOB with minimal exertion, wheezing and tight cough with chest congestion and tenacious phlegm. This despite compliance with medications including Azithromycin and O2. She is on Prednisone 10 mg daily after a taper during a recent exacerbation. She experienced a mild exacerbation when Prednisone was tapered below 10 mg. Pt complains of intermittent hemoptysis, known since 2013, no fever or chills. Pt is on home O2, last FEV1 was 30% in Jan 2015. Admits to continued smoking despite several efforts at quitting in the past.     Pt reports good response to NIV but had to stop due to facial hematomas probably related to Prednisone use. She is waiting for another new interface for he device. Pt notes no apparent response to TIW Azithromycin with no change in amount and character of phlegm. Pt reports depression due to several personal and family stressors. She is planning on moving to an apartment with her daughter. Of note, recent CT chest showed some pleural calcification, consistent with her history of Asbestos exposure from handling her late 's clothes. Lately, pt has been complaining of left sided chest pain brought on by exertion or emotional upset. COPD   The history is provided by the patient and relative. This is a chronic problem. The current episode started more than 1 week ago. The problem occurs hourly. The problem has been gradually worsening. Associated symptoms include shortness of breath. Pertinent negatives include no chest pain, no abdominal pain and no headaches. The symptoms are aggravated by stress and exertion. Shortness of Breath   The history is provided by the patient and relative. This is a chronic problem. The problem occurs frequently. The current episode started more than 1 week ago. The problem has not changed since onset. Associated symptoms include cough, hemoptysis and wheezing. Pertinent negatives include no fever, no headaches, no coryza, no rhinorrhea, no sore throat, no swollen glands, no ear pain, no neck pain, no PND, no orthopnea, no chest pain, no syncope, no vomiting, no abdominal pain, no rash and no claudication. Sputum production: thick and tenacious. Leg swelling: right knee. The problem's precipitants include exercise and weather/humidity. Risk factors include smoking. Treatments tried: Advair and Spiriva. The treatment provided moderate relief. She has had prior hospitalizations. She has had prior ED visits. She has had no prior ICU admissions. Associated medical issues include COPD and chronic lung disease. Review of Systems   Constitutional: Positive for malaise/fatigue and weight loss. Negative for chills, diaphoresis and fever. HENT: Negative for congestion, ear discharge, ear pain, hearing loss, nosebleeds, rhinorrhea, sore throat and tinnitus. Eyes: Negative for blurred vision, double vision, photophobia, pain, discharge and redness. Respiratory: Positive for cough, hemoptysis, shortness of breath and wheezing. Negative for stridor. Sputum production: thick and tenacious. Cardiovascular: Negative for chest pain, palpitations, orthopnea, claudication, syncope and PND. Leg swelling: right knee. Gastrointestinal: Negative for abdominal pain, blood in stool, constipation, diarrhea, heartburn, melena, nausea and vomiting. Genitourinary: Negative for dysuria, flank pain, frequency, hematuria and urgency. Musculoskeletal: Positive for joint pain. Negative for falls and neck pain. Myalgias: resolved. Skin: Negative for itching and rash. Neurological: Negative for dizziness, tingling, tremors, sensory change, speech change, focal weakness, seizures, loss of consciousness, weakness and headaches.    Endo/Heme/Allergies: Negative for environmental allergies and polydipsia. Bruises/bleeds easily. Psychiatric/Behavioral: Positive for depression. Negative for hallucinations, memory loss, substance abuse and suicidal ideas. The patient is nervous/anxious and has insomnia. Past Medical History:   Diagnosis Date    Anxiety     Arthritis     Asbestosis ([address]     Asthma     Back problem     Baker's cyst     Balance problems     Chronic lung disease     Cigarette smoker     Clotting disorder (HCC)     COPD (chronic obstructive pulmonary disease) (HCC)     Depression     History of DVT (deep vein thrombosis)     Leg pain     Right    Lung disease     Nausea & vomiting     Osteoporosis     Restless leg syndrome     Spinal stenosis     Vitamin D deficiency      Current Outpatient Prescriptions on File Prior to Visit   Medication Sig Dispense Refill    HYDROcodone-acetaminophen (NORCO) 5-325 mg per tablet Take 1 Tab by mouth every four (4) hours as needed for Pain. Max Daily Amount: 6 Tabs. 15 Tab 0    albuterol (PROVENTIL VENTOLIN) 2.5 mg /3 mL (0.083 %) nebulizer solution 3 mL by Nebulization route every four (4) hours as needed for Wheezing or Shortness of Breath. Diag. Code: J44.9, R09.02 375 Each 3    albuterol (PROAIR HFA) 90 mcg/actuation inhaler INHALE 2 PUFFS BY MOUTH EVERY 4 HOURS AS NEEDED FOR WHEEZING OR SHORTNESS OF BREATH 1 Inhaler 6    fluticasone-salmeterol (ADVAIR) 250-50 mcg/dose diskus inhaler Take 1 Puff by inhalation every twelve (12) hours. 1 Inhaler 3    tiotropium bromide (SPIRIVA RESPIMAT) 2.5 mcg/actuation inhaler Take 2 Puffs by inhalation daily. 3 Inhaler 3    LORazepam (ATIVAN) 1 mg tablet Take  by mouth two (2) times a day.  rOPINIRole (REQUIP) 1 mg tablet Take  by mouth two (2) times a day.  ergocalciferol (VITAMIN D2) 50,000 unit capsule Take 50,000 Units by mouth every thirty (30) days.  OXYGEN-AIR DELIVERY SYSTEMS 2 L by Does Not Apply route. O2 via nasal cannula with bedtime and activity. O2 Company: [employer]      levoFLOXacin (LEVAQUIN) 750 mg tablet Take 1 Tab by mouth daily. Indications: COPD exacerbation 5 Tab 0    ramelteon (ROZEREM) 8 mg tablet Take 1 Tab by mouth nightly as needed for Sleep. 30 Tab 3    roflumilast (DALIRESP) tab tablet Take 1 Tab by mouth daily. 90 Tab 3     No current facility-administered medications on file prior to visit. Allergies   Allergen Reactions    Anoro Ellipta [Umeclidinium-Vilanterol] Rash and Other (comments)     Muscle cramps in arms    Aspirin Other (comments)     GI upset and bleeding    Augmentin [Amoxicillin-Pot Clavulanate] Not Reported This Time     Possible cramping    Doxycycline Nausea and Vomiting    Morphine Other (comments)     Neurologic symptoms - severe agitation      Shellfish Derived Unknown (comments)     Pt able to eat small amounts per report     Sulfa (Sulfonamide Antibiotics) Rash     Patient relates no longer allergic     Social History     Social History    Marital status:      Spouse name: N/A    Number of children: N/A    Years of education: N/A     Occupational History    Not on file. Social History Main Topics    Smoking status: Current Every Day Smoker     Packs/day: 0.50     Years: 50.00     Types: Cigarettes     Start date: 10/21/1964    Smokeless tobacco: Never Used    Alcohol use No    Drug use: No    Sexual activity: Not on file     Other Topics Concern    Not on file     Social History Narrative     Blood pressure 100/72, pulse 74, temperature 97.8 °F (36.6 °C), temperature source Oral, resp. rate 16, height 5' 6\" (1.676 m), weight 52.2 kg (115 lb), SpO2 98 %, unknown if currently breastfeeding. Physical Exam   Constitutional: She is oriented to person, place, and time. She appears well-developed. No distress. Cachectic   HENT:   Head: Normocephalic and atraumatic. Nose: Nose normal.   Mouth/Throat: No oropharyngeal exudate.    Eyes: Conjunctivae and EOM are normal. Pupils are equal, round, and reactive to light. Right eye exhibits no discharge. Left eye exhibits no discharge. No scleral icterus. Neck: No JVD present. No tracheal deviation present. No thyromegaly present. Cardiovascular: Normal rate, regular rhythm, normal heart sounds and intact distal pulses. Exam reveals no gallop. No murmur heard. Pulmonary/Chest: Effort normal. No stridor. No respiratory distress. Wheezes: lft mid lung field  She has no rales. She exhibits no tenderness. Distant breath sounds with bilateral upper lung field rhonchi   Abdominal: Soft. She exhibits no mass. There is no tenderness. Musculoskeletal: She exhibits no edema, tenderness or deformity. Lymphadenopathy:     She has no cervical adenopathy. Neurological: She is alert and oriented to person, place, and time. Skin: Skin is warm and dry. No rash noted. She is not diaphoretic. No erythema. Psychiatric: She has a normal mood and affect. Her behavior is normal. Judgment and thought content normal.     CT Results (most recent):    Results from Hospital Encounter encounter on 12/04/17   CTA CHEST ABD PELV W WO CONT   Narrative CTA of chest, abdomen and pelvis: Without and with contrast enhancement:        INDICATION:    Acute back pain and chest pain. Increased shortness of breath. Evaluation for thoracic and abdominal aortic aneurysm,/dissection. Evaluation for possible PE. History of COPD, and chronic lung disease. TECHNIQUE:    Multidetector CT scan with 2.5 mm slice thickness, without intravenous contrast,  without oral contrast, including chest, abdomen and pelvis. Then with intravenous administration of 100 cc of Isovue-370 contrast,  multidetector CT scan with 1.24 mm slice thickness, including chest, abdomen and  pelvis. 3-D MIP coronal and sagittal images reformatted. Multilevel multiplanar CPR images reformatted.     3-D images with surface rendering of aorta and branches are reformatted and  displayed with rotating frames. All CT scans at this facility are performed using dose optimization technique as  appropriate to a  performed  examination, to include automated exposer control,  adjustment mA and / or  KV according to patient size (including appropriate  matching  for site specific examination), or use  of iterative  reconstruction  technique. COMPARISON STUDY: CT scan of chest without contrast on 7/6/2017. CTA of chest on  4/17/2015.    -----------------------------------------------    FINDINGS:  ------------------------------------------  CTA of chest:        No evidence of pulmonary embolism. There is no evidence of thoracic aortic dissection or intramural hematoma. There  are findings of mild to moderate calcified plaques in the walls of arch of aorta  and mild calcified plaques in the descending thoracic aorta. The diameter of thoracic aorta at the level of sinus of Valsalva is 3.2 cm. At the sinotubular junction the diameter of thoracic aorta is 2.73 cm. The midportion of ascending thoracic aorta measures 3.0 cm in diameter. The right side of arch of aorta measures 2.79 cm in diameter. The left side of  arch of aorta measures 2.68 cm in diameter. Midportion of ascending thoracic  aorta measures 2.30 cm in diameter. Distal portion of descending thoracic aorta  measures 2.55 cm in diameter. ---------------------------------  No evidence of mass or lymphadenopathy at the thoracic inlet. There is no  pathologic lymphadenopathy in mediastinum. At the upper aspect of right  pulmonary hilum there is hypodense area measuring 1.9 cm x 2.0 cm, which may  represent lymph node or pericardial recess. There are a few additional  subcentimeter lymph nodes in pulmonary sarina bilaterally. No evidence of pericardial effusion, mediastinal hematoma, pleural effusion or  pneumothorax. Lungs are mildly hyperinflated, indicating COPD.  There are findings are listed  above mild to moderate centrilobular pulmonary emphysema in upper and mid lungs. There is no evidence of borderline lungs. There is no discrete pulmonary nodule  or mass lesion. At the basal right middle lobe there are mild streaky fibrotic  or atelectatic changes noted. At the posterior aspect of basal right lower lobe  there is mild focal wedge-shaped atelectasis is noted. At the base of left lung  posteriorly there are also patchy, wedge-shaped atelectatic changes, with  without focal infiltrates. Mild focal atelectatic changes, or infiltrates  identified in the medial aspect of superior lingula of left lung. This density  measures 1.57 cm x 1.1 cm time 3.5 cm. In the bony thorax there is no definable focal lesion. No definable fracture or  compression in thoracic spine or in visualized ribs. Mild focal pleural  calcifications noted at the lateral aspect of superior lingular segment of left  lung.    --------------------------------------------------------------    CTA of abdomen and pelvis:    Moderate to marked calcified plaques in the abdominal aortic walls and in the  walls of the common iliac arteries bilaterally. The abdominal aorta appears to normal size without definable or dissection. The diameter of proximal abdominal aorta: 2.45 cm x 1.86 cm. Diameter of mid abdominal aorta: 1.93 cm x 2.16 cm. Diameter of infrarenal  segment of abdominal aorta: 1.56 cm x 1.7 cm. Diameter of distal portion of  distal abdominal aorta: 1.48 cm x 1.49 cm. Diameter of right common iliac  artery: 0.9 cm. Diameter of left common iliac artery: 1.0 cm. The celiac artery and superior mesenteric artery and their major branches are  patent and opacified. The inferior mesenteric artery is patent but of small  caliber. At the origin of the right renal artery there are mild to moderate calcified  plaques with mild to moderate stenosis.  At the origin of left renal artery there  are also mild calcified plaques with mild to moderate stenosis.    --------------------------------------    There is no definable focal abnormality or diagnostic finding in liver,  gallbladder, spleen, or bilateral adrenal glands. In both kidney there is no obvious mass, stone or hydronephrosis. No evidence of  ureteric stone or obstruction in either side. No evidence of stone in bladder,  which is not optimally distended. Pancreas is moderate to markedly atrophied without any focal abnormality. No evidence of periaortic or mesenteric pathologic lymphadenopathy. No ascites. Moderate amount of gas scattered in multiple loops of small bowel. Appendix is surgically absent. Moderate amount of stool and small amount of gas present in right colon. Transverse colon contains small to moderate amount of gas and small amount of  scattered stool. In the descending colon small amount of stool and gas are  present. In pelvis, there is no free fluid. Status post hysterectomy. There is no evidence of mass lesion or obvious  pathologic lymphadenopathy in pelvis. Some loops of small bowel contains mildly  increases fluid in pelvis. No evidence of inguinal, femoral or ventral hernia. In the bony structures of pelvis and lumbar spine there is no focal lesion. There are findings of previous fracture and treatment with vertebroplasty  involving L5 vertebral body. In upper sacrum there are some sclerotic changes,  likely to be due to previous surgery with posterior spinal bony fusion at L5-S1  level.    ------------------------------------------------------    IMPRESSIONS:  1. There is no evidence of thoracic or abdominal aortic aneurysm or dissection. 2.  Mild to moderate calcified plaques in arch of aorta. Moderate to marked  calcified plaques in the abdominal aortic walls. 3.  No evidence of pulmonary embolism. 4. In lung there are findings of mild-to-moderate pulmonary emphysema without bulla  formation.   5.  Focal atelectatic changes or fibrotic changes are noted in base of right lower  lobe cortex of left lower lobe and superior lingula of left lung. These focal  findings are new as compared to previous CT scan on 04/17/2015. 6.  Follow-up CT scan of chest, without contrast, in 6 months, is recommended. 7.  Moderate calcified plaques at the origins of renal arteries bilaterally with  mild-to-moderate stenosis. 8.  Nonspecific moderate gaseous scattered in small and large bowel. 9.  All mesenteric arteries and their major branches appear to be patent. 10.    Additional benign findings or negative findings, as described. ASSESSMENT and PLAN  Encounter Diagnoses   Name Primary?  COPD, severe ([address] Yes    Pulmonary emphysema, unspecified emphysema type ([address]     Hypoxemia requiring supplemental oxygen     Pulmonary cachexia due to chronic obstructive pulmonary disease (HCC)     Hemoptysis     Chest pain, unspecified type     Asbestos exposure      Continue Advair and Spiriva. Continue Prednisone 10 mg daily but discontinue Azithromycin for lack of perceived benefit. Will start NAC bid and monitor response  Continue medications and supplemental O2 as before. Pt advised to resume care with Cardiology as CP may be due to angina. Await NIV mask  RTC 4 months or sooner prn.   More than 50% of this 40 minute visit spent in face to face counseling

## 2018-02-14 NOTE — MR AVS SNAPSHOT
[address], [address] [facility] [address] 
[phone] Patient: Caleb Dolan MRN: [id number] [facility]:[birth year] Visit Information Date & Time Provider Department Dept. Phone Encounter #  
 2/14/2018  2:15 PM Marcelina Alcaraz MD [facility] Aubrie Villa [id number] Follow-up Instructions Return in about 4 months (around 6/14/2018). Routing History Follow-up and Disposition History Upcoming Health Maintenance Date Due Hepatitis C Screening 1945 DTaP/Tdap/Td series (1 - Tdap) 6/4/1968 FOBT Q 1 YEAR AGE 50-75 6/4/1997 ZOSTER VACCINE AGE 60> 4/4/2007 GLAUCOMA SCREENING Q2Y 6/4/2012 MEDICARE YEARLY EXAM 6/4/2012 Influenza Age 5 to Adult 8/1/2017 BREAST CANCER SCRN MAMMOGRAM 8/14/2019 Pneumococcal 65+ Low/Medium Risk (2 of 2 - PPSV23) 10/1/2019 Allergies as of 2/14/2018  Review Complete On: 2/14/2018 By: Marcelina Alcaraz MD  
  
 Severity Noted Reaction Type Reactions Anoro Ellipta [Umeclidinium-vilanterol]  04/04/2016    Rash, Other (comments) Muscle cramps in arms Aspirin    Other (comments) GI upset and bleeding Augmentin [Amoxicillin-pot Clavulanate]    Not Reported This Time Possible cramping Doxycycline  12/30/2013    Nausea and Vomiting Morphine  03/11/2014   Intolerance Other (comments) Neurologic symptoms - severe agitation Shellfish Derived  12/16/2015    Unknown (comments) Pt able to eat small amounts per report Sulfa (Sulfonamide Antibiotics)    Rash Patient relates no longer allergic Current Immunizations  Reviewed on 1/10/2013 Name Date Influenza High Dose Vaccine PF 1/10/2013 Influenza Vaccine (Quad) PF 12/16/2015  2:50 PM  
 Pneumococcal Vaccine (Unspecified Type) 10/1/2014 Not reviewed this visit You Were Diagnosed With   
  
 Codes Comments COPD, severe ([facility]    -  Primary ICD-10-CM: J44.9 ICD-9-CM: 831 Pulmonary emphysema, unspecified emphysema type ([facility]     ICD-10-CM: J43.9 ICD-9-CM: 492.8 Hypoxemia requiring supplemental oxygen     ICD-10-CM: R09.02, Z99.81 ICD-9-CM: 799.02   
 Pulmonary cachexia due to chronic obstructive pulmonary disease ([facility]     ICD-10-CM: J44.9, R64 
ICD-9-CM: 496, 799.4 Hemoptysis     ICD-10-CM: R04.2 ICD-9-CM: 786.30 Chest pain, unspecified type     ICD-10-CM: R07.9 ICD-9-CM: 786.50 Asbestos exposure     ICD-10-CM: Z77.090 
ICD-9-CM: V15.84 Vitals BP Pulse Temp Resp Height(growth percentile) Weight(growth percentile) 100/72 (BP 1 Location: Left arm, BP Patient Position: Sitting) 74 97.8 °F (36.6 °C) (Oral) 16 5' 6\" (1.676 m) 115 lb (52.2 kg) SpO2 BMI OB Status Smoking Status 98% 18.56 kg/m2 Hysterectomy Current Every Day Smoker BMI and BSA Data Body Mass Index Body Surface Area 18.56 kg/m 2 1.56 m 2 Preferred Pharmacy Pharmacy Name Phone [address], [address] [place] [address] [phone] Your Updated Medication List  
  
   
This list is accurate as of: 2/14/18  3:41 PM.  Always use your most recent med list.  
  
  
  
  
 acetylcysteine 500 mg Cap Take 1 Caplet by mouth two (2) times a day. * albuterol 90 mcg/actuation inhaler Commonly known as:  PROAIR HFA INHALE 2 PUFFS BY MOUTH EVERY 4 HOURS AS NEEDED FOR WHEEZING OR SHORTNESS OF BREATH  
  
 * albuterol 2.5 mg /3 mL (0.083 %) nebulizer solution Commonly known as:  PROVENTIL VENTOLIN  
3 mL by Nebulization route every four (4) hours as needed for Wheezing or Shortness of Breath. Diag. Code: J44.9, R09.02  
  
 fluticasone-salmeterol 250-50 mcg/dose diskus inhaler Commonly known as:  ADVAIR Take 1 Puff by inhalation every twelve (12) hours. HYDROcodone-acetaminophen 5-325 mg per tablet Commonly known as:  Hong Helms  
 Take 1 Tab by mouth every four (4) hours as needed for Pain. Max Daily Amount: 6 Tabs. levoFLOXacin 750 mg tablet Commonly known as:  Jaiden Dumont Take 1 Tab by mouth daily. Indications: COPD exacerbation LORazepam 1 mg tablet Commonly known as:  ATIVAN Take  by mouth two (2) times a day. OXYGEN-AIR DELIVERY SYSTEMS  
2 L by Does Not Apply route. O2 via nasal cannula with bedtime and activity. O2 Company: [employer]  
  
 predniSONE 10 mg tablet Commonly known as:  Fortuna Rinks Take  by mouth daily (with breakfast). ramelteon 8 mg tablet Commonly known as:  ROZEREM Take 1 Tab by mouth nightly as needed for Sleep. roflumilast Tab tablet Commonly known as:  Rayo Rain Take 1 Tab by mouth daily. rOPINIRole 1 mg tablet Commonly known as:  Mireya Lozoya Take  by mouth two (2) times a day. tiotropium bromide 2.5 mcg/actuation inhaler Commonly known as:  Leland Chi Take 2 Puffs by inhalation daily. VITAMIN D2 50,000 unit capsule Generic drug:  ergocalciferol Take 50,000 Units by mouth every thirty (30) days. * Notice: This list has 2 medication(s) that are the same as other medications prescribed for you. Read the directions carefully, and ask your doctor or other care provider to review them with you. Prescriptions Sent to Pharmacy Refills  
 acetylcysteine 500 mg cap 5 Sig: Take 1 Caplet by mouth two (2) times a day. Class: Normal  
 Pharmacy: [facility], [address] [place] [address] #: [phone] Route: Oral  
  
Follow-up Instructions Return in about 4 months (around 6/14/2018). Please provide this summary of care documentation to your next provider. Your primary care clinician is listed as David Nuno. If you have any questions after today's visit, please call [phone].

## 2018-06-14 ENCOUNTER — HOSPITAL ENCOUNTER (OUTPATIENT)
Dept: CT IMAGING | Age: 73
Discharge: HOME | End: 2018-06-14
Attending: PHYSICIAN ASSISTANT
Payer: MEDICARE

## 2018-06-14 DIAGNOSIS — J98.11 ATELECTASIS OF LEFT LUNG: ICD-10-CM

## 2018-06-14 PROCEDURE — 71250 CT THORAX DX C-: CPT

## 2018-06-21 RX ORDER — PREDNISONE 10 MG/1
10 TABLET ORAL
Qty: 30 TAB | Refills: 2 | Status: SHIPPED | OUTPATIENT
Start: 2018-06-21 | End: 2018-08-16

## 2018-07-05 ENCOUNTER — TELEPHONE (OUTPATIENT)
Dept: PULMONOLOGY | Age: 73
End: 2018-07-05

## 2018-07-05 RX ORDER — PREDNISONE 10 MG/1
TABLET ORAL
Qty: 18 TAB | Refills: 0 | Status: SHIPPED | OUTPATIENT
Start: 2018-07-05 | End: 2018-08-16

## 2018-07-05 RX ORDER — AZITHROMYCIN 250 MG/1
250 TABLET, FILM COATED ORAL SEE ADMIN INSTRUCTIONS
Qty: 6 TAB | Refills: 0 | Status: SHIPPED | OUTPATIENT
Start: 2018-07-05 | End: 2018-07-10

## 2018-07-05 NOTE — TELEPHONE ENCOUNTER
Lisbet White MD   You 32 minutes ago (2:29 PM)                 Sent Rx for Pred taper and Z pack (Routing comment)         The pt. Is informed of same.

## 2018-07-05 NOTE — TELEPHONE ENCOUNTER
The pt. C/o over a week of quite audible chest congestion with a non stop cough with prod. of very thick yellow mucus. Increased SOB and wheezing. On Pred. 10 mg daily. Unknown temp. .  Attempted Delsym and cough drops without effect.

## 2018-07-16 ENCOUNTER — TELEPHONE (OUTPATIENT)
Dept: PULMONOLOGY | Age: 73
End: 2018-07-16

## 2018-07-16 RX ORDER — CLOTRIMAZOLE 10 MG/1
10 LOZENGE ORAL; TOPICAL
Qty: 50 TAB | Refills: 0 | Status: SHIPPED | OUTPATIENT
Start: 2018-07-16 | End: 2018-07-26

## 2018-07-16 NOTE — TELEPHONE ENCOUNTER
Per pt, mouth is extremely sore from the antibiotics. She would like a diflucan sent to [facility]. Please call once done.

## 2018-07-19 ENCOUNTER — OFFICE VISIT (OUTPATIENT)
Dept: PULMONOLOGY | Age: 73
End: 2018-07-19

## 2018-07-19 VITALS
RESPIRATION RATE: 18 BRPM | SYSTOLIC BLOOD PRESSURE: 102 MMHG | BODY MASS INDEX: 18.96 KG/M2 | OXYGEN SATURATION: 97 % | TEMPERATURE: 97.8 F | HEART RATE: 82 BPM | WEIGHT: 118 LBS | HEIGHT: 66 IN | DIASTOLIC BLOOD PRESSURE: 58 MMHG

## 2018-07-19 DIAGNOSIS — Z99.81 HYPOXEMIA REQUIRING SUPPLEMENTAL OXYGEN: ICD-10-CM

## 2018-07-19 DIAGNOSIS — F32.9 DEPRESSION, REACTIVE: ICD-10-CM

## 2018-07-19 DIAGNOSIS — R91.1 LUNG NODULE: Primary | ICD-10-CM

## 2018-07-19 DIAGNOSIS — J44.9 COPD, SEVERE (HCC): ICD-10-CM

## 2018-07-19 DIAGNOSIS — M48.062 SPINAL STENOSIS OF LUMBAR REGION WITH NEUROGENIC CLAUDICATION: ICD-10-CM

## 2018-07-19 DIAGNOSIS — R09.02 HYPOXEMIA REQUIRING SUPPLEMENTAL OXYGEN: ICD-10-CM

## 2018-07-19 RX ORDER — PERPHENAZINE 8 MG
1 TABLET ORAL 2 TIMES DAILY
Qty: 60 CAP | Refills: 3 | Status: SHIPPED | OUTPATIENT
Start: 2018-07-19 | End: 2018-08-16

## 2018-07-19 NOTE — MR AVS SNAPSHOT
[address] [facility], [address] [place] [facility] [address] 
[phone] Patient: Tonya Brown MRN: [id number] AEM:[birth year] Visit Information Date & Time Provider Department Dept. Phone Encounter #  
 7/19/2018 10:15 AM Piper Russo MD [facility] [place] [id number] Upcoming Health Maintenance Date Due Hepatitis C Screening 1945 DTaP/Tdap/Td series (1 - Tdap) 6/4/1968 FOBT Q 1 YEAR AGE 50-75 6/4/1997 ZOSTER VACCINE AGE 60> 4/4/2007 GLAUCOMA SCREENING Q2Y 6/4/2012 MEDICARE YEARLY EXAM 3/14/2018 Influenza Age 5 to Adult 8/1/2018 BREAST CANCER SCRN MAMMOGRAM 8/14/2019 Pneumococcal 65+ Low/Medium Risk (2 of 2 - PPSV23) 10/1/2019 Allergies as of 7/19/2018  Review Complete On: 7/19/2018 By: Piper Russo MD  
  
 Severity Noted Reaction Type Reactions Anoro Ellipta [Umeclidinium-vilanterol]  04/04/2016    Rash, Other (comments) Muscle cramps in arms Aspirin    Other (comments) GI upset and bleeding Augmentin [Amoxicillin-pot Clavulanate]    Not Reported This Time Possible cramping Doxycycline  12/30/2013    Nausea and Vomiting Morphine  03/11/2014   Intolerance Other (comments) Neurologic symptoms - severe agitation Shellfish Derived  12/16/2015    Unknown (comments) Pt able to eat small amounts per report Sulfa (Sulfonamide Antibiotics)    Rash Patient relates no longer allergic Current Immunizations  Reviewed on 1/10/2013 Name Date Influenza High Dose Vaccine PF 1/10/2013 Influenza Vaccine (Quad) PF 12/16/2015  2:50 PM  
 Pneumococcal Vaccine (Unspecified Type) 10/1/2014 Not reviewed this visit You Were Diagnosed With   
  
 Codes Comments Lung nodule    -  Primary ICD-10-CM: R91.1 ICD-9-CM: 793.11 Vitals BP Pulse Temp Resp Height(growth percentile) Weight(growth percentile) 102/58 (BP 1 Location: Left arm, BP Patient Position: Sitting) 82 97.8 °F (36.6 °C) (Oral) 18 5' 6\" (1.676 m) 118 lb (53.5 kg) SpO2 BMI OB Status Smoking Status 97% 19.05 kg/m2 Hysterectomy Current Some Day Smoker Vitals History BMI and BSA Data Body Mass Index Body Surface Area 19.05 kg/m 2 1.58 m 2 Preferred Pharmacy Pharmacy Name Phone [address], [address] [place] [address] [phone] Your Updated Medication List  
  
   
This list is accurate as of 7/19/18 11:12 AM.  Always use your most recent med list.  
  
  
  
  
 acetylcysteine 500 mg Cap Take 1 Caplet by mouth two (2) times a day. * albuterol 90 mcg/actuation inhaler Commonly known as:  PROAIR HFA INHALE 2 PUFFS BY MOUTH EVERY 4 HOURS AS NEEDED FOR WHEEZING OR SHORTNESS OF BREATH  
  
 * albuterol 2.5 mg /3 mL (0.083 %) nebulizer solution Commonly known as:  PROVENTIL VENTOLIN  
3 mL by Nebulization route every four (4) hours as needed for Wheezing or Shortness of Breath. Diag. Code: J44.9, R09.02  
  
 clotrimazole 10 mg keiry Commonly known as:  Therisa Grosser Take 1 Tab by mouth five (5) times daily for 10 days. fluticasone-salmeterol 250-50 mcg/dose diskus inhaler Commonly known as:  ADVAIR Take 1 Puff by inhalation every twelve (12) hours. HYDROcodone-acetaminophen 5-325 mg per tablet Commonly known as:  Radha Arts Take 1 Tab by mouth every four (4) hours as needed for Pain. Max Daily Amount: 6 Tabs. LORazepam 1 mg tablet Commonly known as:  ATIVAN Take  by mouth two (2) times a day. OXYGEN-AIR DELIVERY SYSTEMS  
2 L by Does Not Apply route. O2 via nasal cannula with bedtime and activity. O2 Company: [address] * predniSONE 10 mg tablet Commonly known as:  Amauri Razor Take 1 Tab by mouth daily (with breakfast). * predniSONE 10 mg tablet Commonly known as:  Amauri Razor  
 30 mg po dailyx 3 days 20 mg po daily x 3 days 10 mg po daily x3 days  
  
 ramelteon 8 mg tablet Commonly known as:  ROZEREM Take 1 Tab by mouth nightly as needed for Sleep. rOPINIRole 1 mg tablet Commonly known as:  Mai Glaze Take  by mouth two (2) times a day. tiotropium bromide 2.5 mcg/actuation inhaler Commonly known as:  Gailen Salon Take 2 Puffs by inhalation daily. VITAMIN D2 50,000 unit capsule Generic drug:  ergocalciferol Take 50,000 Units by mouth every thirty (30) days. * Notice: This list has 4 medication(s) that are the same as other medications prescribed for you. Read the directions carefully, and ask your doctor or other care provider to review them with you. Prescriptions Sent to Pharmacy Refills  
 acetylcysteine 500 mg cap 3 Sig: Take 1 Caplet by mouth two (2) times a day. Class: Normal  
 Pharmacy: [facility], [address] [place] [address] #: [phone] Route: Oral  
  
To-Do List   
 10/19/2018 Imaging:  CT CHEST WO CONT Introducing [facility]! [facility] introduces [employer] patient portal. Now you can access parts of your medical record, email your doctor's office, and request medication refills online. 1. In your internet browser, go to [email or web address] 2. Click on the First Time User? Click Here link in the Sign In box. You will see the New Member Sign Up page. 3. Enter your QuN[employer] Access Code exactly as it appears below. You will not need to use this code after youve completed the sign-up process. If you do not sign up before the expiration date, you must request a new code. · QuN[employer] Access Code: [email or web address] Expires: 10/17/2018 10:29 AM 
 
4. Enter the last four digits of your Social Security Number (xxxx) and Date of Birth (mm/dd/yyyy) as indicated and click Submit. You will be taken to the next sign-up page. 5. Create a UtiliData ID. This will be your Util[employer] login ID and cannot be changed, so think of one that is secure and easy to remember. 6. Create a UtiliData password. You can change your password at any time. 7. Enter your Password Reset Question and Answer. This can be used at a later time if you forget your password. 8. Enter your e-mail address. You will receive e-mail notification when new information is available in [address] 9. Click Sign Up. You can now view and download portions of your medical record. 10. Click the Download Summary menu link to download a portable copy of your medical information. If you have questions, please visit the Frequently Asked Questions section of the Utili[employer] website. Remember, UtiliData is NOT to be used for urgent needs. For medical emergencies, dial 911. Now available from your iPhone and Android! Please provide this summary of care documentation to your next provider. Your primary care clinician is listed as David Nuno. If you have any questions after today's visit, please call [phone].

## 2018-07-19 NOTE — PROGRESS NOTES
HISTORY OF PRESENT ILLNESS  Coretta Maples Ettie Leventhal is a 72 y.o. female. HPI Comments:  Pt is now back on Advair and Spiriva Respimat added with moderate response. Still complains of baseline SOB with minimal exertion, wheezing and tight cough with chest congestion and tenacious phlegm. This despite compliance with medications including Azithromycin and O2. She is on Prednisone 10 mg daily after a taper during a recurrent exacerbation. She experienced a mild exacerbation when Prednisone was tapered below 10 mg. Pt uses rescue Albuterol up to 4 times daily. Pt complains of intermittent hemoptysis, known since 2013, no fever or chills. Pt is on home O2, last FEV1 was 30% in Jan 2015. Admits to continued smoking despite several efforts at quitting in the past.     Pt reports good response to NIV but had to stop due to facial hematomas probably related to Prednisone use. She is waiting for another new interface for he device. Pt notes no apparent response to maintenance Azithromycin with no change in amount and character of phlegm. Anthony Jose also did not improve symptoms. Pt was Rxed NAC last visit but for some reasons has not started taking this. Pt reports depression due to several personal and family stressors. She is planning on moving to an apartment with her daughter. Of note, recent CT chest showed some pleural calcification, consistent with her history of Asbestos exposure from handling her late 's clothes. Lately, pt has been complaining of left sided chest pain brought on by exertion or emotional upset. COPD   The history is provided by the patient and relative. This is a chronic problem. The current episode started more than 1 week ago. The problem occurs hourly. The problem has been gradually worsening. Associated symptoms include shortness of breath. Pertinent negatives include no chest pain, no abdominal pain and no headaches. The symptoms are aggravated by stress and exertion. Cough   Associated symptoms include shortness of breath. Pertinent negatives include no chest pain, no abdominal pain and no headaches. Wheezing    Associated symptoms include cough and hemoptysis. Pertinent negatives include no chest pain, no fever, no abdominal pain, no vomiting, no diarrhea, no dysuria, no coryza, no ear pain, no headaches, no rhinorrhea, no sore throat, no swollen glands, no neck pain and no rash. Sputum production: thick and tenacious. Her past medical history is significant for COPD and chronic lung disease. Shortness of Breath   The history is provided by the patient and relative. This is a chronic problem. The problem occurs frequently. The current episode started more than 1 week ago. The problem has not changed since onset. Associated symptoms include cough, hemoptysis and wheezing. Pertinent negatives include no fever, no headaches, no coryza, no rhinorrhea, no sore throat, no swollen glands, no ear pain, no neck pain, no PND, no orthopnea, no chest pain, no syncope, no vomiting, no abdominal pain, no rash and no claudication. Sputum production: thick and tenacious. Leg swelling: right knee. The problem's precipitants include exercise and weather/humidity. Risk factors include smoking. Treatments tried: Advair and Spiriva. The treatment provided moderate relief. She has had prior hospitalizations. She has had prior ED visits. She has had no prior ICU admissions. Associated medical issues include COPD and chronic lung disease. Review of Systems   Constitutional: Positive for malaise/fatigue. Negative for chills, diaphoresis, fever and weight loss. HENT: Negative for congestion, ear discharge, ear pain, hearing loss, nosebleeds, rhinorrhea, sinus pain, sore throat and tinnitus. Eyes: Negative for blurred vision, double vision, photophobia, pain, discharge and redness. Respiratory: Positive for cough, hemoptysis, shortness of breath and wheezing. Negative for stridor.  Sputum production: thick and tenacious. Cardiovascular: Negative for chest pain, palpitations, orthopnea, claudication, syncope and PND. Leg swelling: right knee. Gastrointestinal: Negative for abdominal pain, blood in stool, constipation, diarrhea, heartburn, melena, nausea and vomiting. Genitourinary: Negative for dysuria, flank pain, frequency, hematuria and urgency. Musculoskeletal: Positive for joint pain. Negative for falls and neck pain. Myalgias: resolved. Skin: Negative for itching and rash. Neurological: Negative for dizziness, tingling, tremors, sensory change, speech change, focal weakness, seizures, loss of consciousness, weakness and headaches. Endo/Heme/Allergies: Negative for environmental allergies and polydipsia. Bruises/bleeds easily. Psychiatric/Behavioral: Positive for depression. Negative for hallucinations, memory loss, substance abuse and suicidal ideas. The patient is nervous/anxious and has insomnia. Past Medical History:   Diagnosis Date    Anxiety     Arthritis     Asbestosis ([address]     Asthma     Back problem     Baker's cyst     Balance problems     Chronic lung disease     Cigarette smoker     [facility]ting disorder [facility]     COPD (chronic obstructive pulmonary disease) ([facility])     Depression     History of DVT (deep vein thrombosis)     Leg pain     Right    Lung disease     Nausea & vomiting     Osteoporosis     Restless leg syndrome     Spinal stenosis     Vitamin D deficiency      Current Outpatient Prescriptions on File Prior to Visit   Medication Sig Dispense Refill    clotrimazole (MYCELEX) 10 mg keiry Take 1 Tab by mouth five (5) times daily for 10 days. 50 Tab 0    predniSONE (DELTASONE) 10 mg tablet Take 1 Tab by mouth daily (with breakfast). 30 Tab 2    albuterol (PROVENTIL VENTOLIN) 2.5 mg /3 mL (0.083 %) nebulizer solution 3 mL by Nebulization route every four (4) hours as needed for Wheezing or Shortness of Breath. Diag.  Code: J44.9, R09.02 375 Each 3    albuterol (PROAIR HFA) 90 mcg/actuation inhaler INHALE 2 PUFFS BY MOUTH EVERY 4 HOURS AS NEEDED FOR WHEEZING OR SHORTNESS OF BREATH 1 Inhaler 6    fluticasone-salmeterol (ADVAIR) 250-50 mcg/dose diskus inhaler Take 1 Puff by inhalation every twelve (12) hours. 1 Inhaler 3    tiotropium bromide (SPIRIVA RESPIMAT) 2.5 mcg/actuation inhaler Take 2 Puffs by inhalation daily. 3 Inhaler 3    LORazepam (ATIVAN) 1 mg tablet Take  by mouth two (2) times a day.  rOPINIRole (REQUIP) 1 mg tablet Take  by mouth two (2) times a day.  ergocalciferol (VITAMIN D2) 50,000 unit capsule Take 50,000 Units by mouth every thirty (30) days.  OXYGEN-AIR DELIVERY SYSTEMS 2 L by Does Not Apply route. O2 via nasal cannula with bedtime and activity. O2 Company: [facility]      predniSONE (DELTASONE) 10 mg tablet 30 mg po dailyx 3 days 20 mg po daily x 3 days 10 mg po daily x3 days 18 Tab 0    HYDROcodone-acetaminophen (NORCO) 5-325 mg per tablet Take 1 Tab by mouth every four (4) hours as needed for Pain. Max Daily Amount: 6 Tabs. 15 Tab 0    ramelteon (ROZEREM) 8 mg tablet Take 1 Tab by mouth nightly as needed for Sleep. 30 Tab 3     No current facility-administered medications on file prior to visit.       Allergies   Allergen Reactions    Anoro Ellipta [Umeclidinium-Vilanterol] Rash and Other (comments)     Muscle cramps in arms    Aspirin Other (comments)     GI upset and bleeding    Augmentin [Amoxicillin-Pot Clavulanate] Not Reported This Time     Possible cramping    Doxycycline Nausea and Vomiting    Morphine Other (comments)     Neurologic symptoms - severe agitation      Shellfish Derived Unknown (comments)     Pt able to eat small amounts per report     Sulfa (Sulfonamide Antibiotics) Rash     Patient relates no longer allergic     Social History     Social History    Marital status:      Spouse name: N/A    Number of children: N/A    Years of education: N/A     Occupational History  Not on file. Social History Main Topics    Smoking status: Current Some Day Smoker     Packs/day: 0.25     Years: 50.00     Types: Cigarettes     Start date: 10/21/1964    Smokeless tobacco: Never Used    Alcohol use No    Drug use: No    Sexual activity: Not on file     Other Topics Concern    Not on file     Social History Narrative     Blood pressure 102/58, pulse 82, temperature 97.8 °F (36.6 °C), temperature source Oral, resp. rate 18, height 5' 6\" (1.676 m), weight 53.5 kg (118 lb), SpO2 97 %, unknown if currently breastfeeding. Physical Exam   Constitutional: She is oriented to person, place, and time. She appears well-developed. No distress. Cachectic   HENT:   Head: Normocephalic and atraumatic. Nose: Nose normal.   Mouth/Throat: No oropharyngeal exudate. Ecchymoses over bridge of nose   Eyes: Conjunctivae and EOM are normal. Pupils are equal, round, and reactive to light. Right eye exhibits no discharge. Left eye exhibits no discharge. No scleral icterus. Neck: No JVD present. No tracheal deviation present. No thyromegaly present. Cardiovascular: Normal rate, regular rhythm, normal heart sounds and intact distal pulses. Exam reveals no gallop. No murmur heard. Pulmonary/Chest: Effort normal. No stridor. No respiratory distress. Wheezes: lft mid lung field  She has no rales. She exhibits no tenderness. Distant breath sounds with bilateral upper lung field rhonchi   Abdominal: Soft. She exhibits no mass. There is no tenderness. Musculoskeletal: She exhibits no edema, tenderness or deformity. Lymphadenopathy:     She has no cervical adenopathy. Neurological: She is alert and oriented to person, place, and time. Skin: Skin is warm and dry. No rash noted. She is not diaphoretic. No erythema. Skin tears on both forearms   Psychiatric: She has a normal mood and affect.  Her behavior is normal. Judgment and thought content normal.     CT Results (most recent):    Results from [place] encounter on 06/14/18   CT CHEST WO CONT   Narrative CT scan of chest, without intravenous contrast:        INDICATION:    Focal density, probably atelectasis in left lung on previous CT scan. Follow-up  evaluation. History of COPD. Current smoking. History of asbestosis. TECHNIQUE:    Multidetector CT scan with 5 mm size thickness, without intravenous contrast,  including chest and subphrenic levels. Coronal and sagittal images are reformatted. All CT scans at this facility are performed using dose optimization technique as  appropriate to a  performed  examination, to include automated exposer control,  adjustment mA and / or  KV according to patient size (including appropriate  matching  for site specific examination), or use  of iterative  reconstruction  technique. COMPARISON STUDY: Previous CT scan of chest on 12/0/17. FINDINGS:        Mediastinum and pulmonary sarina:    No diagnostic finding in the thoracic inlet. In mediastinum there is no evidence  of pathologic lymphadenopathy or interval change. At both pulmonary sarina there  is no obvious mass, obvious lymphadenopathy or interval change. Thoracic aorta appears to normal size. Diameter of ascending thoracic aorta is  3.4 cm. No evidence of pleural effusion or pericardial thickening. Pleural spaces:  No evidence of pleural effusion or pneumothorax. Evidence of mild calcified  pleural plaques left hemithorax and minimal calcified pleural plaques in right  hemithorax, suggestive of as best is related pleural disease, similar to  previous study. Bilateral lungs:  Evidence of moderate to marked pulmonary emphysema with moderate to marked  hyperinflation of lungs without any obvious bulla formation.   In the lateral portion of posterior segment of right upper lobe as on image  number 21/3 there is evidence of mild focal irregular density, measuring 0.83 cm  x 0.65 cm, new finding as compared to previous study. In the medial portion of basal right middle lobe there are mild focal irregular  fibrotic/chronic atelectatic changes, which are similar to previous study. On previous study, at the posterolateral portion of base of left lower lobe  there was focal density probably due to focal atelectasis, which is AT this  time. Currently in the lower lingula of left lung there are new patchy and irregular  densities, which vary but a new focal atelectatic changes although subtle  infiltrates cannot be excluded. At the anterior aspect of lower lingula of left  lung there appears to be focal pleural thickening or pleural-based density  measuring 2.3 cm transverse, 1.4 cm AP and 1.89 cm side. These densities are new  as compared to previous study. Thoracic bony structures: There is no definable focal lesion in thoracic bony structures. There are  findings of mild multilevel spondylosis in thoracic spine. Subphrenic images: In visualized upper and midportion of liver, spleen and  bilateral adrenal glands there is no obvious abnormality. In the visualized  portion of tail and body of pancreas and upper poles of both kidneys there is no  focal abnormality. No hiatal hernia. IMPRESSIONS:        Moderate to marked pulmonary emphysema redemonstrated. Mild calcified pleural plaques, left more than right, consistent with  asbestos-related pleural disease, similar to previous study. Previously noted focal density, probably atelectasis in basal left lower lobe  appears to be completely resolved. In lower lingula left lung there are new focal density including patchy  densities and some confluent densities, most likely due to focal atelectasis but  focal infiltrates are not excluded. Focal mass lesion is questionable  possibility.     In the right upper lobe peripherally there is small irregular density, which is  probably fibrotic density but pulmonary nodule is not excluded. For evaluation, follow CT scan of chest without contrast, in 3 months, is  recommended. Additional findings as above in body of report. ASSESSMENT and PLAN  Encounter Diagnoses   Name Primary?  Lung nodule Yes    COPD, severe ([address]     Spinal stenosis of lumbar region with neurogenic claudication     Hypoxemia requiring supplemental oxygen     Depression, reactive      Continue Advair and Spiriva. Continue Prednisone 10 mg daily   Will restart NAC bid and monitor response  Continue medications and supplemental O2 as before. Check Echo to assess right heart pressures. Advance care planning discussed with pt who expressed her wishes for DNR. DDNR form given to pt who will bring this back signed. Repeat CT in 3 months and follow up after CT. Further intervention pending CT results and response to therapy.

## 2018-07-19 NOTE — PROGRESS NOTES
Chief Complaint   Patient presents with    COPD     CT done 6/14    Cough    Wheezing       1. Have you been to the ER, urgent care clinic since your last visit? Hospitalized since your last visit? No    2. Have you seen or consulted any other health care providers outside of the [address] [place] [address] since your last visit? Include any pap smears or colon screening.  Yes Where: Dr Cary Gillespie PCP

## 2018-07-25 ENCOUNTER — HOSPITAL ENCOUNTER (OUTPATIENT)
Dept: CT IMAGING | Age: 73
Discharge: HOME | End: 2018-07-25
Attending: INTERNAL MEDICINE
Payer: MEDICARE

## 2018-07-25 ENCOUNTER — TELEPHONE (OUTPATIENT)
Dept: PULMONOLOGY | Age: 73
End: 2018-07-25

## 2018-07-25 DIAGNOSIS — R91.1 LUNG NODULE: ICD-10-CM

## 2018-07-25 PROCEDURE — 71250 CT THORAX DX C-: CPT

## 2018-08-14 ENCOUNTER — HOSPITAL ENCOUNTER (INPATIENT)
Age: 73
LOS: 2 days | Discharge: HOME | DRG: 191 | End: 2018-08-16
Attending: EMERGENCY MEDICINE | Admitting: HOSPITALIST
Payer: MEDICARE

## 2018-08-14 ENCOUNTER — APPOINTMENT (OUTPATIENT)
Dept: GENERAL RADIOLOGY | Age: 73
DRG: 191 | End: 2018-08-14
Attending: EMERGENCY MEDICINE
Payer: MEDICARE

## 2018-08-14 DIAGNOSIS — J44.1 ACUTE EXACERBATION OF CHRONIC OBSTRUCTIVE PULMONARY DISEASE (COPD) (HCC): Primary | ICD-10-CM

## 2018-08-14 DIAGNOSIS — M54.32 SCIATICA, LEFT SIDE: ICD-10-CM

## 2018-08-14 DIAGNOSIS — M51.36 DEGENERATIVE DISC DISEASE, LUMBAR: ICD-10-CM

## 2018-08-14 PROBLEM — M54.42 LEFT-SIDED LOW BACK PAIN WITH SCIATICA: Status: ACTIVE | Noted: 2018-08-14

## 2018-08-14 LAB
ALBUMIN SERPL-MCNC: 3.6 G/DL (ref 3.4–5)
ALBUMIN/GLOB SERPL: 1 {RATIO} (ref 0.8–1.7)
ALP SERPL-CCNC: 87 U/L (ref 45–117)
ALT SERPL-CCNC: 17 U/L (ref 13–56)
ANION GAP SERPL CALC-SCNC: 5 MMOL/L (ref 3–18)
AST SERPL-CCNC: 16 U/L (ref 15–37)
BASOPHILS # BLD: 0 K/UL (ref 0–0.1)
BASOPHILS NFR BLD: 0 % (ref 0–2)
BILIRUB SERPL-MCNC: 0.4 MG/DL (ref 0.2–1)
BNP SERPL-MCNC: 442 PG/ML (ref 0–900)
BUN SERPL-MCNC: 17 MG/DL (ref 7–18)
BUN/CREAT SERPL: 23 (ref 12–20)
CALCIUM SERPL-MCNC: 8.9 MG/DL (ref 8.5–10.1)
CHLORIDE SERPL-SCNC: 102 MMOL/L (ref 100–108)
CO2 SERPL-SCNC: 31 MMOL/L (ref 21–32)
CREAT SERPL-MCNC: 0.75 MG/DL (ref 0.6–1.3)
DIFFERENTIAL METHOD BLD: NORMAL
EOSINOPHIL # BLD: 0.1 K/UL (ref 0–0.4)
EOSINOPHIL NFR BLD: 1 % (ref 0–5)
ERYTHROCYTE [DISTWIDTH] IN BLOOD BY AUTOMATED COUNT: 13.2 % (ref 11.6–14.5)
GLOBULIN SER CALC-MCNC: 3.5 G/DL (ref 2–4)
GLUCOSE SERPL-MCNC: 80 MG/DL (ref 74–99)
HCT VFR BLD AUTO: 42.3 % (ref 35–45)
HGB BLD-MCNC: 14.7 G/DL (ref 12–16)
LYMPHOCYTES # BLD: 2.2 K/UL (ref 0.9–3.6)
LYMPHOCYTES NFR BLD: 25 % (ref 21–52)
MCH RBC QN AUTO: 32.9 PG (ref 24–34)
MCHC RBC AUTO-ENTMCNC: 34.8 G/DL (ref 31–37)
MCV RBC AUTO: 94.6 FL (ref 74–97)
MONOCYTES # BLD: 0.6 K/UL (ref 0.05–1.2)
MONOCYTES NFR BLD: 7 % (ref 3–10)
NEUTS SEG # BLD: 5.9 K/UL (ref 1.8–8)
NEUTS SEG NFR BLD: 67 % (ref 40–73)
PLATELET # BLD AUTO: 349 K/UL (ref 135–420)
PMV BLD AUTO: 9.7 FL (ref 9.2–11.8)
POTASSIUM SERPL-SCNC: 3.7 MMOL/L (ref 3.5–5.5)
PROT SERPL-MCNC: 7.1 G/DL (ref 6.4–8.2)
RBC # BLD AUTO: 4.47 M/UL (ref 4.2–5.3)
SODIUM SERPL-SCNC: 138 MMOL/L (ref 136–145)
WBC # BLD AUTO: 8.7 K/UL (ref 4.6–13.2)

## 2018-08-14 PROCEDURE — 99285 EMERGENCY DEPT VISIT HI MDM: CPT

## 2018-08-14 PROCEDURE — 74011250637 HC RX REV CODE- 250/637: Performed by: EMERGENCY MEDICINE

## 2018-08-14 PROCEDURE — 71045 X-RAY EXAM CHEST 1 VIEW: CPT

## 2018-08-14 PROCEDURE — 74011250636 HC RX REV CODE- 250/636: Performed by: HOSPITALIST

## 2018-08-14 PROCEDURE — 74011250636 HC RX REV CODE- 250/636: Performed by: EMERGENCY MEDICINE

## 2018-08-14 PROCEDURE — 72100 X-RAY EXAM L-S SPINE 2/3 VWS: CPT

## 2018-08-14 PROCEDURE — 83880 ASSAY OF NATRIURETIC PEPTIDE: CPT | Performed by: EMERGENCY MEDICINE

## 2018-08-14 PROCEDURE — 94640 AIRWAY INHALATION TREATMENT: CPT

## 2018-08-14 PROCEDURE — 74011000250 HC RX REV CODE- 250: Performed by: EMERGENCY MEDICINE

## 2018-08-14 PROCEDURE — 96375 TX/PRO/DX INJ NEW DRUG ADDON: CPT

## 2018-08-14 PROCEDURE — 85025 COMPLETE CBC W/AUTO DIFF WBC: CPT | Performed by: EMERGENCY MEDICINE

## 2018-08-14 PROCEDURE — 65660000000 HC RM CCU STEPDOWN

## 2018-08-14 PROCEDURE — 96365 THER/PROPH/DIAG IV INF INIT: CPT

## 2018-08-14 PROCEDURE — 80053 COMPREHEN METABOLIC PANEL: CPT | Performed by: EMERGENCY MEDICINE

## 2018-08-14 PROCEDURE — 77030029684 HC NEB SM VOL KT MONA -A

## 2018-08-14 PROCEDURE — 74011250637 HC RX REV CODE- 250/637: Performed by: HOSPITALIST

## 2018-08-14 RX ORDER — ROPINIROLE 1 MG/1
1 TABLET, FILM COATED ORAL 2 TIMES DAILY
Status: DISCONTINUED | OUTPATIENT
Start: 2018-08-14 | End: 2018-08-16 | Stop reason: HOSPADM

## 2018-08-14 RX ORDER — ALBUTEROL SULFATE 0.83 MG/ML
2.5 SOLUTION RESPIRATORY (INHALATION)
Status: COMPLETED | OUTPATIENT
Start: 2018-08-14 | End: 2018-08-14

## 2018-08-14 RX ORDER — HYDROCODONE BITARTRATE AND ACETAMINOPHEN 5; 325 MG/1; MG/1
1 TABLET ORAL
Status: DISCONTINUED | OUTPATIENT
Start: 2018-08-14 | End: 2018-08-16 | Stop reason: HOSPADM

## 2018-08-14 RX ORDER — SODIUM CHLORIDE 0.9 % (FLUSH) 0.9 %
5-10 SYRINGE (ML) INJECTION EVERY 8 HOURS
Status: DISCONTINUED | OUTPATIENT
Start: 2018-08-14 | End: 2018-08-16 | Stop reason: HOSPADM

## 2018-08-14 RX ORDER — SODIUM CHLORIDE 9 MG/ML
125 INJECTION, SOLUTION INTRAVENOUS CONTINUOUS
Status: DISCONTINUED | OUTPATIENT
Start: 2018-08-14 | End: 2018-08-14

## 2018-08-14 RX ORDER — BUDESONIDE AND FORMOTEROL FUMARATE DIHYDRATE 160; 4.5 UG/1; UG/1
2 AEROSOL RESPIRATORY (INHALATION)
Status: DISCONTINUED | OUTPATIENT
Start: 2018-08-14 | End: 2018-08-16 | Stop reason: HOSPADM

## 2018-08-14 RX ORDER — LEVOFLOXACIN 5 MG/ML
500 INJECTION, SOLUTION INTRAVENOUS EVERY 24 HOURS
Status: DISCONTINUED | OUTPATIENT
Start: 2018-08-14 | End: 2018-08-16 | Stop reason: HOSPADM

## 2018-08-14 RX ORDER — MAGNESIUM SULFATE HEPTAHYDRATE 40 MG/ML
2 INJECTION, SOLUTION INTRAVENOUS ONCE
Status: COMPLETED | OUTPATIENT
Start: 2018-08-14 | End: 2018-08-14

## 2018-08-14 RX ORDER — SODIUM CHLORIDE 0.9 % (FLUSH) 0.9 %
5-10 SYRINGE (ML) INJECTION AS NEEDED
Status: DISCONTINUED | OUTPATIENT
Start: 2018-08-14 | End: 2018-08-16 | Stop reason: HOSPADM

## 2018-08-14 RX ORDER — HEPARIN SODIUM 5000 [USP'U]/ML
5000 INJECTION, SOLUTION INTRAVENOUS; SUBCUTANEOUS EVERY 8 HOURS
Status: DISCONTINUED | OUTPATIENT
Start: 2018-08-14 | End: 2018-08-16 | Stop reason: HOSPADM

## 2018-08-14 RX ORDER — ALBUTEROL SULFATE 0.83 MG/ML
2.5 SOLUTION RESPIRATORY (INHALATION)
Status: DISCONTINUED | OUTPATIENT
Start: 2018-08-14 | End: 2018-08-16 | Stop reason: HOSPADM

## 2018-08-14 RX ORDER — ONDANSETRON 2 MG/ML
4 INJECTION INTRAMUSCULAR; INTRAVENOUS
Status: DISCONTINUED | OUTPATIENT
Start: 2018-08-14 | End: 2018-08-16 | Stop reason: HOSPADM

## 2018-08-14 RX ORDER — LORAZEPAM 1 MG/1
1 TABLET ORAL
Status: DISCONTINUED | OUTPATIENT
Start: 2018-08-14 | End: 2018-08-16 | Stop reason: HOSPADM

## 2018-08-14 RX ORDER — HYDROCODONE BITARTRATE AND ACETAMINOPHEN 5; 325 MG/1; MG/1
2 TABLET ORAL ONCE
Status: COMPLETED | OUTPATIENT
Start: 2018-08-14 | End: 2018-08-14

## 2018-08-14 RX ORDER — ACETAMINOPHEN 325 MG/1
650 TABLET ORAL
Status: DISCONTINUED | OUTPATIENT
Start: 2018-08-14 | End: 2018-08-16 | Stop reason: HOSPADM

## 2018-08-14 RX ADMIN — SODIUM CHLORIDE 125 ML/HR: 900 INJECTION, SOLUTION INTRAVENOUS at 16:43

## 2018-08-14 RX ADMIN — HYDROCODONE BITARTRATE AND ACETAMINOPHEN 2 TABLET: 5; 325 TABLET ORAL at 09:47

## 2018-08-14 RX ADMIN — ROPINIROLE HYDROCHLORIDE 1 MG: 1 TABLET, FILM COATED ORAL at 23:46

## 2018-08-14 RX ADMIN — LEVOFLOXACIN 500 MG: 5 INJECTION, SOLUTION INTRAVENOUS at 18:00

## 2018-08-14 RX ADMIN — HYDROCODONE BITARTRATE AND ACETAMINOPHEN 1 TABLET: 5; 325 TABLET ORAL at 17:59

## 2018-08-14 RX ADMIN — ALBUTEROL SULFATE 2.5 MG: 2.5 SOLUTION RESPIRATORY (INHALATION) at 13:30

## 2018-08-14 RX ADMIN — ALBUTEROL SULFATE 2.5 MG: 2.5 SOLUTION RESPIRATORY (INHALATION) at 13:17

## 2018-08-14 RX ADMIN — HEPARIN SODIUM 5000 UNITS: 5000 INJECTION, SOLUTION INTRAVENOUS; SUBCUTANEOUS at 18:00

## 2018-08-14 RX ADMIN — METHYLPREDNISOLONE SODIUM SUCCINATE 60 MG: 125 INJECTION, POWDER, FOR SOLUTION INTRAMUSCULAR; INTRAVENOUS at 18:00

## 2018-08-14 RX ADMIN — Medication 10 ML: at 18:01

## 2018-08-14 RX ADMIN — BUDESONIDE AND FORMOTEROL FUMARATE DIHYDRATE 2 PUFF: 160; 4.5 AEROSOL RESPIRATORY (INHALATION) at 19:47

## 2018-08-14 RX ADMIN — MAGNESIUM SULFATE IN WATER 2 G: 40 INJECTION, SOLUTION INTRAVENOUS at 13:25

## 2018-08-14 RX ADMIN — Medication 10 ML: at 23:46

## 2018-08-14 RX ADMIN — METHYLPREDNISOLONE SODIUM SUCCINATE 60 MG: 125 INJECTION, POWDER, FOR SOLUTION INTRAMUSCULAR; INTRAVENOUS at 23:45

## 2018-08-14 RX ADMIN — METHYLPREDNISOLONE SODIUM SUCCINATE 125 MG: 125 INJECTION, POWDER, FOR SOLUTION INTRAMUSCULAR; INTRAVENOUS at 09:47

## 2018-08-14 NOTE — ED PROVIDER NOTES
EMERGENCY DEPARTMENT HISTORY AND PHYSICAL EXAM    9:12 AM      Date: 8/14/2018  Patient Name: Tonya Brown    History of Presenting Illness     Chief Complaint   Patient presents with    Back Pain         History Provided By: Patient    Chief Complaint: Back pain  Duration:  days  Timing:  Worsening and chronic  Location: Lower back, radiating to lower left leg  Quality: Aching  Severity: Severe  Associated Symptoms: left leg pain, cough, wheezing, sob      Additional History (Context): Tonya Brown is a 72 y.o. female with past medical Hx of  spinal stenosis, asthma, chronic lung disease, osteoporesis, and COPD who presents with severe and aching back pain located on her lower back and radiating to her lower left leg with an onset of days  ago. Patient says that pain is chronic and worsening. Associated symptoms include left leg pain, worsening sob, wheezing and cough. Patient says she took OTC Tylenol and one tablet of Gabapentin that she was prescribed in the past with no symptom relief. Her daughter says that she has been prescribed Percocet and Norco for pain in the past. Sister at bedside sates that sob and lung problems seem to be worsening. She states that she uses her nebulizer, takes Prednisone everyday and is on 2L O2 at home.      PCP: Max Nathan MD      Past History     Past Medical History:  Past Medical History:   Diagnosis Date    Anxiety     Arthritis     Asbestosis [facility] [address]     Asthma     Back problem     Baker's cyst     Balance problems     Chronic lung disease     Cigarette smoker     Clotting disorder ([facility] [address]     COPD (chronic obstructive pulmonary disease) (HCC)     Depression     History of DVT (deep vein thrombosis)     Leg pain     Right    Lung disease     Nausea & vomiting     Osteoporosis     Restless leg syndrome     Spinal stenosis     Vitamin D deficiency        Past Surgical History:  Past Surgical History:   Procedure Laterality Date    HX APPENDECTOMY      HX BACK SURGERY      x4    HX BREAST BIOPSY      HX HYSTERECTOMY      HX LUMBAR LAMINECTOMY      HX MENISCUS REPAIR      HX ORTHOPAEDIC      Knee    HX POLYPECTOMY      HX TONSILLECTOMY      HX VEIN STRIPPING         Family History:  Family History   Problem Relation Age of Onset    Cancer Father     Heart Disease Mother     Hypertension Mother     Cancer Brother     Cancer Sister     Diabetes Other     Hypertension Other     Stroke Other     Heart Disease Sister     Hypertension Sister     Heart Disease Brother        Social History:  Social History   Substance Use Topics    Smoking status: Current Some Day Smoker     Packs/day: 0.25     Years: 50.00     Types: Cigarettes     Start date: 10/21/1964    Smokeless tobacco: Never Used    Alcohol use No       Allergies: Allergies   Allergen Reactions    Anoro Ellipta [Umeclidinium-Vilanterol] Rash and Other (comments)     Muscle cramps in arms    Aspirin Other (comments)     GI upset and bleeding    Augmentin [Amoxicillin-Pot Clavulanate] Not Reported This Time     Possible cramping    Doxycycline Nausea and Vomiting    Morphine Other (comments)     Neurologic symptoms - severe agitation      Shellfish Derived Unknown (comments)     Pt able to eat small amounts per report     Sulfa (Sulfonamide Antibiotics) Rash     Patient relates no longer allergic         Review of Systems       Review of Systems   Constitutional: Negative for fever. HENT: Negative for sore throat. Eyes: Negative for redness. Respiratory: Positive for cough, shortness of breath and wheezing. Gastrointestinal: Negative for abdominal pain and nausea. Genitourinary: Negative for dysuria. Musculoskeletal: Positive for arthralgias (left leg), back pain and myalgias (left leg ). Negative for neck stiffness. Skin: Negative for pallor. Neurological: Negative for headaches. Hematological: Does not bruise/bleed easily.    All other systems reviewed and are negative. Physical Exam     Visit Vitals    /72    Pulse 72    Temp 98 °F (36.7 °C)    Resp 15    Ht 5' 5\" (1.651 m)    Wt 55.2 kg (121 lb 11.2 oz)    SpO2 98%    BMI 20.25 kg/m2         Physical Exam   Constitutional: She is oriented to person, place, and time. She appears well-developed and well-nourished. No distress. HENT:   Head: Normocephalic and atraumatic. Mouth/Throat: Oropharynx is clear and moist.   Eyes: Conjunctivae are normal. Pupils are equal, round, and reactive to light. No scleral icterus. Neck: Normal range of motion. Neck supple. Cardiovascular: Intact distal pulses. Capillary refill < 3 seconds   Pulmonary/Chest: Breath sounds normal. She is in respiratory distress. She has no wheezes. Active cough  Patient is in some respiratory distress   Abdominal: Soft. Bowel sounds are normal. She exhibits no distension. There is no tenderness. Musculoskeletal: Normal range of motion. She exhibits no edema. Deep palpation of left gluteal region causes pain to shoot down  There is perispinal and lumbar tenderness to the left and midline  Has good ROM in bilateral lower extremities  Pelvis is stable  There is good flexion of knees  Symmetrical dorsal pedal pulses        Lymphadenopathy:     She has no cervical adenopathy. Neurological: She is alert and oriented to person, place, and time. No cranial nerve deficit. Sensations intact   Skin: Skin is warm and dry. She is not diaphoretic. Nursing note and vitals reviewed.         Diagnostic Study Results     Labs -  Recent Results (from the past 12 hour(s))   CBC WITH AUTOMATED DIFF    Collection Time: 08/14/18  9:42 AM   Result Value Ref Range    WBC 8.7 4.6 - 13.2 K/uL    RBC 4.47 4.20 - 5.30 M/uL    HGB 14.7 12.0 - 16.0 g/dL    HCT 42.3 35.0 - 45.0 %    MCV 94.6 74.0 - 97.0 FL    MCH 32.9 24.0 - 34.0 PG    MCHC 34.8 31.0 - 37.0 g/dL    RDW 13.2 11.6 - 14.5 %    PLATELET 111 473 - 819 K/uL    MPV 9.7 9.2 - 11.8 FL    NEUTROPHILS 67 40 - 73 %    LYMPHOCYTES 25 21 - 52 %    MONOCYTES 7 3 - 10 %    EOSINOPHILS 1 0 - 5 %    BASOPHILS 0 0 - 2 %    ABS. NEUTROPHILS 5.9 1.8 - 8.0 K/UL    ABS. LYMPHOCYTES 2.2 0.9 - 3.6 K/UL    ABS. MONOCYTES 0.6 0.05 - 1.2 K/UL    ABS. EOSINOPHILS 0.1 0.0 - 0.4 K/UL    ABS. BASOPHILS 0.0 0.0 - 0.1 K/UL    DF AUTOMATED     METABOLIC PANEL, COMPREHENSIVE    Collection Time: 08/14/18  9:42 AM   Result Value Ref Range    Sodium 138 136 - 145 mmol/L    Potassium 3.7 3.5 - 5.5 mmol/L    Chloride 102 100 - 108 mmol/L    CO2 31 21 - 32 mmol/L    Anion gap 5 3.0 - 18 mmol/L    Glucose 80 74 - 99 mg/dL    BUN 17 7.0 - 18 MG/DL    Creatinine 0.75 0.6 - 1.3 MG/DL    BUN/Creatinine ratio 23 (H) 12 - 20      GFR est AA >60 >60 ml/min/1.73m2    GFR est non-AA >60 >60 ml/min/1.73m2    Calcium 8.9 8.5 - 10.1 MG/DL    Bilirubin, total 0.4 0.2 - 1.0 MG/DL    ALT (SGPT) 17 13 - 56 U/L    AST (SGOT) 16 15 - 37 U/L    Alk. phosphatase 87 45 - 117 U/L    Protein, total 7.1 6.4 - 8.2 g/dL    Albumin 3.6 3.4 - 5.0 g/dL    Globulin 3.5 2.0 - 4.0 g/dL    A-G Ratio 1.0 0.8 - 1.7     NT-PRO BNP    Collection Time: 08/14/18  9:42 AM   Result Value Ref Range    NT pro- 0 - 900 PG/ML       Radiologic Studies -   XR SPINE LUMB 2 OR 3 V   Final Result   IMPRESSION:  1. Previous kyphoplasty at L1. No new compression fracture was seen. 2. Significant degenerative disc disease at L4-5 and L5-S1. XR CHEST PORT   Final Result   IMPRESSION:  COPD without acute findings. Medical Decision Making   I am the first provider for this patient. I reviewed the vital signs, available nursing notes, past medical history, past surgical history, family history and social history. Vital Signs-Reviewed the patient's vital signs.     Pulse Oximetry Analysis -  2L of O2 via NC (Normal)    Records Reviewed: Nursing Notes (Time of Review: 9:12 AM)    Provider Notes (Medical Decision Making): ddx copd exacerbation, pna, back strain, sciatica, compression fx, lumbar disc disease    Labs, nebs, steroids, analgesia, xrays  MDM  Number of Diagnoses or Management Options  Acute exacerbation of chronic obstructive pulmonary disease (COPD) ([facility] [address]):   Degenerative disc disease, lumbar:   Sciatica, left side:       Medications   magnesium sulfate 2 g/50 ml IVPB (premix or compounded) (2 g IntraVENous New Bag 8/14/18 1325)   0.9% sodium chloride infusion (not administered)   HYDROcodone-acetaminophen (NORCO) 5-325 mg per tablet 2 Tab (2 Tabs Oral Given 8/14/18 0947)   methylPREDNISolone (PF) (SOLU-MEDROL) injection 125 mg (125 mg IntraVENous Given 8/14/18 0947)   albuterol (PROVENTIL VENTOLIN) nebulizer solution 2.5 mg (2.5 mg Nebulization Given 8/14/18 1317)   albuterol (PROVENTIL VENTOLIN) nebulizer solution 2.5 mg (2.5 mg Nebulization Given 8/14/18 1330)     ED Course: Progress Notes, Reevaluation, and Consults:  Reassessed and pain much improved. Still wheezing with sob, give more nebs, add mag IVPB. 12:04 PM  Patient states that she has no allergy to AIbuterol but can take DuoNeb. Uses nebulizer at home. 1:45 PM   Despite, multiple nebulizer, magnesium, and Albuterol treatments, patient is a little better but still experiences wheezing. I believe patient will need admission. Will consult hospitalist.     1:54 PM  Consult:  Discussed care with Dr. Sam Lobo, Specialty: Hospitalist  Standard discussion; including history of patients chief complaint, available diagnostic results, and treatment course. Accepts patient for admission. 8/14/2018     For Hospitalized Patients:    1. Hospitalization Decision Time:  The decision to hospitalize the patient was made by Dr. Sam Lobo at 1:54 PM on 8/14/2018    2. Aspirin: Aspirin was not given because the patient did not present with a stroke at the time of their Emergency Department evaluation      Diagnosis     Clinical Impression:   1.  Acute exacerbation of chronic obstructive pulmonary disease (COPD) ([facility] [address]    2. Degenerative disc disease, lumbar    3. Sciatica, left side        Disposition: Admit    Follow-up Information     None             Attestations:  Scribe Attestation     Hiram Eng acting as a scribe for and in the presence of Elizabeth Clemons DO      August 14, 2018 at 9:21 AM       Provider Attestation:      I personally performed the services described in the documentation, reviewed the documentation, as recorded by the scribe in my presence, and it accurately and completely records my words and actions.  August 14, 2018 at 9:21 AM - Elizabeth Clemons DO    _______________________________

## 2018-08-14 NOTE — H&P
HISTORY & PHYSICAL            Patient: Chichi Van MRN: [id number]  CSN: [id number]    YOB: 1945  Age: 72 y.o. Sex: female    DOA: 8/14/2018 LOS:  LOS: 0 days        DOA: 8/14/2018        Assessment/Plan     Active Problems:    Acute exacerbation of chronic obstructive pulmonary disease (COPD) ([address] (8/14/2018)      Degenerative disc disease, lumbar (8/14/2018)      Left-sided low back pain with sciatica (8/14/2018)      COPD with exacerbation ([facility] [address] (8/14/2018)        Plan:  1. COPD exac - IV Steroids , IV Abx , Bronchodilators   2. Chronic Resp failure - is on Home oxygen 2 lt NC   3. Chronic smoker - says she is still smoking 3-4 cigarettes daily   4.  Chronic Low back pain - Norco prn   DVT Px - heparin   DNR              Severity of Signs & Symptoms -- Moderate  Risk of adverse events -- Moderate  Current Medical Rx Plan - As Above   Patient history & comorbidities - Per HPI  Discharge Plan -- Home            HPI:     Chichi Van is a 72 y.o. female who is being admitted for COPD exac   She mentions she has had a cough with phlegm , yellowish green , no fever , no chest pain , no NVD  She is on home oxygen 2 lt NC & has used her bronchodilators without much relief -- says she wasn't much better so came to the ER for further eval   ER eval - noted to be SOB with wheezing , given IV Mag, IV solumedrol, multiple breathing Rx in the ER , still hypoxic & wheezing - advised admission for further eval   She is a chronic smoker & smokes 3-4 cigarettes daily     Past Medical History:   Diagnosis Date    Anxiety     Arthritis     Asbestosis ([address]     Asthma     Back problem     Baker's cyst     Balance problems     Chronic lung disease     Cigarette smoker     Clotting disorder ([address]     COPD (chronic obstructive pulmonary disease) ([facility] [address]     Depression     History of DVT (deep vein thrombosis)     Leg pain     Right    Lung disease     Nausea & vomiting     Osteoporosis     Restless leg syndrome     Spinal stenosis     Vitamin D deficiency        Past Surgical History:   Procedure Laterality Date    HX APPENDECTOMY      HX BACK SURGERY      x4    HX BREAST BIOPSY      HX HYSTERECTOMY      HX LUMBAR LAMINECTOMY      HX MENISCUS REPAIR      HX ORTHOPAEDIC      Knee    HX POLYPECTOMY      HX TONSILLECTOMY      HX VEIN STRIPPING         Family History   Problem Relation Age of Onset    Cancer Father     Heart Disease Mother     Hypertension Mother     Cancer Brother     Cancer Sister     Diabetes Other     Hypertension Other     Stroke Other     Heart Disease Sister     Hypertension Sister     Heart Disease Brother        Social History     Social History    Marital status:      Spouse name: N/A    Number of children: N/A    Years of education: N/A     Social History Main Topics    Smoking status: Current Some Day Smoker     Packs/day: 0.25     Years: 50.00     Types: Cigarettes     Start date: 10/21/1964    Smokeless tobacco: Never Used    Alcohol use No    Drug use: No    Sexual activity: Not on file     Other Topics Concern    Not on file     Social History Narrative       Prior to Admission medications    Medication Sig Start Date End Date Taking? Authorizing Provider   acetylcysteine 500 mg cap Take 1 Caplet by mouth two (2) times a day. 7/19/18   Katey Maloney MD   predniSONE (DELTASONE) 10 mg tablet 30 mg po dailyx 3 days 20 mg po daily x 3 days 10 mg po daily x3 days 7/5/18   Katey Maloney MD   predniSONE (DELTASONE) 10 mg tablet Take 1 Tab by mouth daily (with breakfast). 6/21/18   Katey Maloney MD   HYDROcodone-acetaminophen (NORCO) 5-325 mg per tablet Take 1 Tab by mouth every four (4) hours as needed for Pain. Max Daily Amount: 6 Tabs. 12/5/17   Eliceo Burden MD   ramelteon (ROZEREM) 8 mg tablet Take 1 Tab by mouth nightly as needed for Sleep.  9/11/17   Katey Maloney MD   albuterol (PROVENTIL VENTOLIN) 2.5 mg /3 mL (0.083 %) nebulizer solution 3 mL by Nebulization route every four (4) hours as needed for Wheezing or Shortness of Breath. Diag. Code: J44.9, R09.02 8/2/17   Cecilia Cortez MD   albuterol (PROAIR HFA) 90 mcg/actuation inhaler INHALE 2 PUFFS BY MOUTH EVERY 4 HOURS AS NEEDED FOR WHEEZING OR SHORTNESS OF BREATH 12/9/16   Noe Rivera MD   fluticasone-salmeterol (ADVAIR) 250-50 mcg/dose diskus inhaler Take 1 Puff by inhalation every twelve (12) hours. 12/9/16   Noe Valverde MD   tiotropium bromide (SPIRIVA RESPIMAT) 2.5 mcg/actuation inhaler Take 2 Puffs by inhalation daily. 9/22/16   Cecilia Cortez MD   LORazepam (ATIVAN) 1 mg tablet Take  by mouth two (2) times a day. Historical Provider   rOPINIRole (REQUIP) 1 mg tablet Take  by mouth two (2) times a day. Historical Provider   ergocalciferol (VITAMIN D2) 50,000 unit capsule Take 50,000 Units by mouth every thirty (30) days. Historical Provider   OXYGEN-AIR DELIVERY SYSTEMS 2 L by Does Not Apply route. O2 via nasal cannula with bedtime and activity. O2 Company: [address]    Historical Provider       Allergies   Allergen Reactions    Anoro Ellipta [Umeclidinium-Vilanterol] Rash and Other (comments)     Muscle cramps in arms    Aspirin Other (comments)     GI upset and bleeding    Augmentin [Amoxicillin-Pot Clavulanate] Not Reported This Time     Possible cramping    Doxycycline Nausea and Vomiting    Morphine Other (comments)     Neurologic symptoms - severe agitation      Shellfish Derived Unknown (comments)     Pt able to eat small amounts per report     Sulfa (Sulfonamide Antibiotics) Rash     Patient relates no longer allergic       Review of Systems  A comprehensive review of systems was negative except for that written in the History of Present Illness.              Physical Exam:      Visit Vitals    /53    Pulse 74    Temp 98 °F (36.7 °C)    Resp 13    Ht 5' 5\" (1.651 m)    Wt 55.2 kg (121 lb 11.2 oz)    SpO2 94%    BMI 20.25 kg/m2       Physical Exam:    Gen: In general, this is a thinly built female in no acute distress  HEENT: Sclerae nonicteric. Oral mucous membranes moist. Dentition normal  Neck: Supple with midline trachea. CV: RRR without murmur or rub appreciated. Resp:Respirations are unlabored without use of accessory muscles. Bilateral decreased BS with expiratory wheezing   Abd: Soft, nontender, nondistended. Extrem: Extremities are warm, without cyanosis or clubbing. No pitting pretibial edema. Skin: Warm, no visible rashes. Neuro: Patient is alert, oriented, and cooperative. No obvious focal defects. Moves all 4 extremities. Labs Reviewed:    Recent Results (from the past 24 hour(s))   CBC WITH AUTOMATED DIFF    Collection Time: 08/14/18  9:42 AM   Result Value Ref Range    WBC 8.7 4.6 - 13.2 K/uL    RBC 4.47 4.20 - 5.30 M/uL    HGB 14.7 12.0 - 16.0 g/dL    HCT 42.3 35.0 - 45.0 %    MCV 94.6 74.0 - 97.0 FL    MCH 32.9 24.0 - 34.0 PG    MCHC 34.8 31.0 - 37.0 g/dL    RDW 13.2 11.6 - 14.5 %    PLATELET 050 077 - 071 K/uL    MPV 9.7 9.2 - 11.8 FL    NEUTROPHILS 67 40 - 73 %    LYMPHOCYTES 25 21 - 52 %    MONOCYTES 7 3 - 10 %    EOSINOPHILS 1 0 - 5 %    BASOPHILS 0 0 - 2 %    ABS. NEUTROPHILS 5.9 1.8 - 8.0 K/UL    ABS. LYMPHOCYTES 2.2 0.9 - 3.6 K/UL    ABS. MONOCYTES 0.6 0.05 - 1.2 K/UL    ABS. EOSINOPHILS 0.1 0.0 - 0.4 K/UL    ABS.  BASOPHILS 0.0 0.0 - 0.1 K/UL    DF AUTOMATED     METABOLIC PANEL, COMPREHENSIVE    Collection Time: 08/14/18  9:42 AM   Result Value Ref Range    Sodium 138 136 - 145 mmol/L    Potassium 3.7 3.5 - 5.5 mmol/L    Chloride 102 100 - 108 mmol/L    CO2 31 21 - 32 mmol/L    Anion gap 5 3.0 - 18 mmol/L    Glucose 80 74 - 99 mg/dL    BUN 17 7.0 - 18 MG/DL    Creatinine 0.75 0.6 - 1.3 MG/DL    BUN/Creatinine ratio 23 (H) 12 - 20      GFR est AA >60 >60 ml/min/1.73m2    GFR est non-AA >60 >60 ml/min/1.73m2    Calcium 8.9 8.5 - 10.1 MG/DL    Bilirubin, total 0.4 0.2 - 1.0 MG/DL    ALT (SGPT) 17 13 - 56 U/L    AST (SGOT) 16 15 - 37 U/L    Alk. phosphatase 87 45 - 117 U/L    Protein, total 7.1 6.4 - 8.2 g/dL    Albumin 3.6 3.4 - 5.0 g/dL    Globulin 3.5 2.0 - 4.0 g/dL    A-G Ratio 1.0 0.8 - 1.7     NT-PRO BNP    Collection Time: 08/14/18  9:42 AM   Result Value Ref Range    NT pro- 0 - 900 PG/ML       Imaging Reviewed:   X ray LS spine     IMPRESSION:  1. Previous kyphoplasty at L1. No new compression fracture was seen. 2. Significant degenerative disc disease at L4-5 and L5-S1. PCXR     IMPRESSION:  COPD without acute findings.       Megan Guerrero MD  8/14/2018, 2:12 PM

## 2018-08-14 NOTE — Clinical Note
Status[de-identified] Inpatient [101] Type of Bed: Medical [8] Inpatient Hospitalization Certified Necessary for the Following Reasons: 3. Patient receiving treatment that can only be provided in an inpatient setting (further clarification in H&P documentation) Admitting Diagnosis: Acute exacerbation of chronic obstructive pulmonary disease (COPD) ([facility] [[id number]] Admitting Diagnosis: Left-sided low back pain with sciatica [[id number]] Admitting Diagnosis: Degenerative disc disease, lumbar [[id number]] Admitting Physician: Adalberto Davila [[id number]] Attending Physician: Adalberto Davila [[id number]] Estimated Length of Stay: 2 Midnights Discharge Plan[de-identified] Home with Office Follow-up

## 2018-08-14 NOTE — ED TRIAGE NOTES
Presents via EMS from home. Chronic back pain that began getting worse approx. 1 week ago after a hard cough.

## 2018-08-14 NOTE — IP AVS SNAPSHOT
[facility] 
 
 
 [address] Patient: Violetta Dietrich MRN: [id number] GWT:[id number] About your hospitalization You were admitted on:  August 14, 2018 You last received care in the:  [address] [place] [address] You were discharged on:  August 16, 2018 Why you were hospitalized Your primary diagnosis was:  Not on File Your diagnoses also included:  Acute Exacerbation Of Chronic Obstructive Pulmonary Disease (Copd) (Hcc), Degenerative Disc Disease, Lumbar, Left-Sided Low Back Pain With Sciatica, Copd With Exacerbation (Hcc) Follow-up Information Follow up With Details Comments Contact Info Linda Randolph MD On 8/20/2018 f/u @ [address] [place] [address] [facility] [place] [address] 
[phone] Your Scheduled Appointments Tuesday August 28, 2018 10:15 AM EDT TRANSITIONAL CARE MANAGEMENT with Butch Devine MD  
4600  46[facility] ([facility]) [address] [place] [address], [address] Saritha Babin [address]  
[phone] Discharge Orders None A check gerardo indicates which time of day the medication should be taken. My Medications START taking these medications Instructions Each Dose to Equal  
 Morning Noon Evening Bedtime  
 levoFLOXacin 500 mg tablet Commonly known as:  Jose Haddad Your last dose was: Your next dose is: Take 1 Tab by mouth daily for 3 days. 500 mg CHANGE how you take these medications Instructions Each Dose to Equal  
 Morning Noon Evening Bedtime  
 predniSONE 50 mg tablet Commonly known as:  Juan A Lopez What changed:   
- medication strength 
- how much to take - when to take this - Another medication with the same name was removed. Continue taking this medication, and follow the directions you see here. Your last dose was: Your next dose is: Take 1 Tab by mouth daily. 50 mg CONTINUE taking these medications Instructions Each Dose to Equal  
 Morning Noon Evening Bedtime * albuterol 90 mcg/actuation inhaler Commonly known as:  PROAIR HFA Your last dose was: Your next dose is:    
   
   
 INHALE 2 PUFFS BY MOUTH EVERY 4 HOURS AS NEEDED FOR WHEEZING OR SHORTNESS OF BREATH  
     
   
   
   
  
 * albuterol 2.5 mg /3 mL (0.083 %) nebulizer solution Commonly known as:  PROVENTIL VENTOLIN Your last dose was: Your next dose is:    
   
   
 3 mL by Nebulization route every four (4) hours as needed for Wheezing or Shortness of Breath. Diag. Code: J44.9, R09.02  
 2.5 mg  
    
   
   
   
  
 fluticasone-salmeterol 250-50 mcg/dose diskus inhaler Commonly known as:  ADVAIR Your last dose was: Your next dose is: Take 1 Puff by inhalation every twelve (12) hours. 1 Puff LORazepam 1 mg tablet Commonly known as:  ATIVAN Your last dose was: Your next dose is: Take  by mouth two (2) times a day. OXYGEN-AIR DELIVERY SYSTEMS Your last dose was: Your next dose is:    
   
   
 2 L by Does Not Apply route. O2 via nasal cannula with bedtime and activity. O2 Company: Pelon Harris 2 L  
    
   
   
   
  
 rOPINIRole 1 mg tablet Commonly known as:  Umang Perez Your last dose was: Your next dose is: Take  by mouth two (2) times a day. tiotropium bromide 2.5 mcg/actuation inhaler Commonly known as:  Relda Daisy Your last dose was: Your next dose is: Take 2 Puffs by inhalation daily. 2 Puff * Notice: This list has 2 medication(s) that are the same as other medications prescribed for you.  Read the directions carefully, and ask your doctor or other care provider to review them with you. STOP taking these medications   
 acetylcysteine 500 mg Cap  
   
  
 ramelteon 8 mg tablet Commonly known as:  ROZEREM  
   
  
 VITAMIN D2 50,000 unit capsule Generic drug:  ergocalciferol ASK your doctor about these medications Instructions Each Dose to Equal  
 Morning Noon Evening Bedtime HYDROcodone-acetaminophen 5-325 mg per tablet Commonly known as:  Radha Elizabeth Your last dose was: Your next dose is: Take 1 Tab by mouth every four (4) hours as needed for Pain. Max Daily Amount: 6 Tabs. 1 Tab Where to Get Your Medications Information on where to get these meds will be given to you by the nurse or doctor. ! Ask your nurse or doctor about these medications  
  levoFLOXacin 500 mg tablet  
 predniSONE 50 mg tablet Opioid Education Prescription Opioids: What You Need to Know: 
 
Prescription opioids can be used to help relieve moderate-to-severe pain and are often prescribed following a surgery or injury, or for certain health conditions. These medications can be an important part of treatment but also come with serious risks. Opioids are strong pain medicines. Examples include hydrocodone, oxycodone, fentanyl, and morphine. Heroin is an example of an illegal opioid. It is important to work with your health care provider to make sure you are getting the safest, most effective care. WHAT ARE THE RISKS AND SIDE EFFECTS OF OPIOID USE? Prescription opioids carry serious risks of addiction and overdose, especially with prolonged use. An opioid overdose, often marked by slow breathing, can cause sudden death. The use of prescription opioids can have a number of side effects as well, even when taken as directed. · Tolerance-meaning you might need to take more of a medication for the same pain relief · Physical dependence-meaning you have symptoms of withdrawal when the medication is stopped. Withdrawal symptoms can include nausea, sweating, chills, diarrhea, stomach cramps, and muscle aches. Withdrawal can last up to several weeks, depending on which drug you took and how long you took it. · Increased sensitivity to pain · Constipation · Nausea, vomiting, and dry mouth · Sleepiness and dizziness · Confusion · Depression · Low levels of testosterone that can result in lower sex drive, energy, and strength · Itching and sweating RISKS ARE GREATER WITH:      
· History of drug misuse, substance use disorder, or overdose · Mental health conditions (such as depression or anxiety) · Sleep apnea · Older age (72 years or older) · Pregnancy Avoid alcohol while taking prescription opioids. Also, unless specifically advised by your health care provider, medications to avoid include: · Benzodiazepines (such as Xanax or Valium) · Muscle relaxants (such as Soma or Flexeril) · Hypnotics (such as Ambien or Lunesta) · Other prescription opioids KNOW YOUR OPTIONS Talk to your health care provider about ways to manage your pain that don't involve prescription opioids. Some of these options may actually work better and have fewer risks and side effects. Options may include: 
· Pain relievers such as acetaminophen, ibuprofen, and naproxen · Some medications that are also used for depression or seizures · Physical therapy and exercise · Counseling to help patients learn how to cope better with triggers of pain and stress. · Application of heat or cold compress · Massage therapy · Relaxation techniques Be Informed Make sure you know the name of your medication, how much and how often to take it, and its potential risks & side effects. IF YOU ARE PRESCRIBED OPIOIDS FOR PAIN: 
· Never take opioids in greater amounts or more often than prescribed. Remember the goal is not to be pain-free but to manage your pain at a tolerable level. · Follow up with your primary care provider to: · Work together to create a plan on how to manage your pain. · Talk about ways to help manage your pain that don't involve prescription opioids. · Talk about any and all concerns and side effects. · Help prevent misuse and abuse. · Never sell or share prescription opioids · Help prevent misuse and abuse. · Store prescription opioids in a secure place and out of reach of others (this may include visitors, children, friends, and family). · Safely dispose of unused/unwanted prescription opioids: Find your community drug take-back program or your pharmacy mail-back program, or flush them down the toilet, following guidance from the Food and Drug Administration (www.fda.gov/Drugs/ResourcesForYou). · Visit www.cdc.gov/drugoverdose to learn about the risks of opioid abuse and overdose. · If you believe you may be struggling with addiction, tell your health care provider and ask for guidance or call [address] [place] [address]"[employer] at 7-539-027-BANJ. Discharge Instructions Learning About Relief for Back Pain What is back tension and strain? Back strain happens when you overstretch, or pull, a muscle in your back. You may hurt your back in an accident or when you exercise or lift something. Most back pain will get better with rest and time. You can take care of yourself at home to help your back heal. 
What can you do first to relieve back pain? When you first feel back pain, try these steps: 
· Walk. Take a short walk (10 to 20 minutes) on a level surface (no slopes, hills, or stairs) every 2 to 3 hours. Walk only distances you can manage without pain, especially leg pain. · Relax.  Find a comfortable position for rest. Some people are comfortable on the floor or a medium-firm bed with a small pillow under their head and another under their knees. Some people prefer to lie on their side with a pillow between their knees. Don't stay in one position for too long. · Try heat or ice. Try using a heating pad on a low or medium setting, or take a warm shower, for 15 to 20 minutes every 2 to 3 hours. Or you can buy single-use heat wraps that last up to 8 hours. You can also try an ice pack for 10 to 15 minutes every 2 to 3 hours. You can use an ice pack or a bag of frozen vegetables wrapped in a thin towel. There is not strong evidence that either heat or ice will help, but you can try them to see if they help. You may also want to try switching between heat and cold. · Take pain medicine exactly as directed. ¨ If the doctor gave you a prescription medicine for pain, take it as prescribed. ¨ If you are not taking a prescription pain medicine, ask your doctor if you can take an over-the-counter medicine. What else can you do? · Stretch and exercise. Exercises that increase flexibility may relieve your pain and make it easier for your muscles to keep your spine in a good, neutral position. And don't forget to keep walking. · Do self-massage. You can use self-massage to unwind after work or school or to energize yourself in the morning. You can easily massage your feet, hands, or neck. Self-massage works best if you are in comfortable clothes and are sitting or lying in a comfortable position. Use oil or lotion to massage bare skin. · Reduce stress. Back pain can lead to a vicious [place]: Distress about the pain tenses the muscles in your back, which in turn causes more pain. Learn how to relax your mind and your muscles to lower your stress. Where can you learn more? Go to http://robert-jorge.info/. Enter E276 in the search box to learn more about \"Learning About Relief for Back Pain. \" Current as of: November 29, 2017 Content Version: 11.7 © 8977-0145 [employer]. Care instructions adapted under license by [employer] (which disclaims liability or warranty for this information). If you have questions about a medical condition or this instruction, always ask your healthcare professional. [address] any warranty or liability for your use of this information. Chronic Obstructive Pulmonary Disease (COPD): Care Instructions Your Care Instructions Chronic obstructive pulmonary disease (COPD) is a general term for a group of lung diseases, including emphysema and chronic bronchitis. People with COPD have decreased airflow in and out of the lungs, which makes it hard to breathe. The airways also can get clogged with thick mucus. Cigarette smoking is a major cause of COPD. Although there is no cure for COPD, you can slow its progress. Following your treatment plan and taking care of yourself can help you feel better and live longer. Follow-up care is a key part of your treatment and safety. Be sure to make and go to all appointments, and call your doctor if you are having problems. It's also a good idea to know your test results and keep a list of the medicines you take. How can you care for yourself at home? 
 Staying healthy 
  · Do not smoke. This is the most important step you can take to prevent more damage to your lungs. If you need help quitting, talk to your doctor about stop-smoking programs and medicines. These can increase your chances of quitting for good.  
  · Avoid colds and flu. Get a pneumococcal vaccine shot. If you have had one before, ask your doctor whether you need a second dose. Get the flu vaccine every fall. If you must be around people with colds or the flu, wash your hands often.  
  · Avoid secondhand smoke, air pollution, and high altitudes. Also avoid cold, dry air and hot, humid air.  Stay at home with your windows closed when air pollution is bad.  
 Medicines and oxygen therapy 
  · Take your medicines exactly as prescribed. Call your doctor if you think you are having a problem with your medicine.  
  · You may be taking medicines such as: ¨ Bronchodilators. These help open your airways and make breathing easier. Bronchodilators are either short-acting (work for 6 to 9 hours) or long-acting (work for 24 hours). You inhale most bronchodilators, so they start to act quickly. Always carry your quick-relief inhaler with you in case you need it while you are away from home. ¨ Corticosteroids (prednisone, budesonide). These reduce airway inflammation. They come in pill or inhaled form. You must take these medicines every day for them to work well.  
  · A spacer may help you get more inhaled medicine to your lungs. Ask your doctor or pharmacist if a spacer is right for you. If it is, ask how to use it properly.  
  · Do not take any vitamins, over-the-counter medicine, or herbal products without talking to your doctor first.  
  · If your doctor prescribed antibiotics, take them as directed. Do not stop taking them just because you feel better. You need to take the full course of antibiotics.  
  · Oxygen therapy boosts the amount of oxygen in your blood and helps you breathe easier. Use the flow rate your doctor has recommended, and do not change it without talking to your doctor first.  
Activity 
  · Get regular exercise. Walking is an easy way to get exercise. Start out slowly, and walk a little more each day.  
  · Pay attention to your breathing.  You are exercising too hard if you cannot talk while you are exercising.  
  · Take short rest breaks when doing household chores and other activities.  
  · Learn breathing methods-such as breathing through pursed lips-to help you become less short of breath.  
  · If your doctor has not set you up with a pulmonary rehabilitation program, talk to him or her about whether rehab is right for you. Rehab includes exercise programs, education about your disease and how to manage it, help with diet and other changes, and emotional support. Diet 
  · Eat regular, healthy meals. Use bronchodilators about 1 hour before you eat to make it easier to eat. Eat several small meals instead of three large ones. Drink beverages at the end of the meal. Avoid foods that are hard to chew.  
  · Eat foods that contain protein so that you do not lose muscle mass.  
  · Talk with your doctor if you gain too much weight or if you lose weight without trying.  
 Mental health 
  · Talk to your family, friends, or a therapist about your feelings. It is normal to feel frightened, angry, hopeless, helpless, and even guilty. Talking openly about bad feelings can help you cope. If these feelings last, talk to your doctor. When should you call for help? Call 911 anytime you think you may need emergency care. For example, call if: 
  · You have severe trouble breathing.  
 Call your doctor now or seek immediate medical care if: 
  · You have new or worse trouble breathing.  
  · You cough up blood.  
  · You have a fever.  
 Watch closely for changes in your health, and be sure to contact your doctor if: 
  · You cough more deeply or more often, especially if you notice more mucus or a change in the color of your mucus.  
  · You have new or worse swelling in your legs or belly.  
  · You are not getting better as expected. Where can you learn more? Go to http://robert-jorge.info/. Fidelia Valiente in the search box to learn more about \"Chronic Obstructive Pulmonary Disease (COPD): Care Instructions. \" Current as of: December 6, 2017 Content Version: 11.7 © 1832-4570 [facility].  Care instructions adapted under license by [employer] (which disclaims liability or warranty for this information). If you have questions about a medical condition or this instruction, always ask your healthcare professional. [address] any warranty or liability for your use of this information. CORP80 Announcement We are excited to announce that we are making your provider's discharge notes available to you in CORP80. You will see these notes when they are completed and signed by the physician that discharged you from your recent hospital stay. If you have any questions or concerns about any information you see in CORP80, please call the Health Information Department where you were seen or reach out to your Primary Care Provider for more information about your plan of care. Introducing [facility]! [employer] introduces CORP80 patient portal. Now you can access parts of your medical record, email your doctor's office, and request medication refills online. 1. In your internet browser, go to [email or web address] 2. Click on the First Time User? Click Here link in the Sign In box. You will see the New Member Sign Up page. 3. Enter your CORP80 Access Code exactly as it appears below. You will not need to use this code after youve completed the sign-up process. If you do not sign up before the expiration date, you must request a new code. · CORP80 Access Code: [email or web address] Expires: 10/17/2018 10:29 AM 
 
4. Enter the last four digits of your Social Security Number (xxxx) and Date of Birth (mm/dd/yyyy) as indicated and click Submit. You will be taken to the next sign-up page. 5. Create a CORP80 ID. This will be your CORP80 login ID and cannot be changed, so think of one that is secure and easy to remember. 6. Create a CORP80 password. You can change your password at any time. 7. Enter your Password Reset Question and Answer. This can be used at a later time if you forget your password. 8. Enter your e-mail address. You will receive e-mail notification when new information is available in [address] 9. Click Sign Up. You can now view and download portions of your medical record. 10. Click the Download Summary menu link to download a portable copy of your medical information. If you have questions, please visit the Frequently Asked Questions section of the [employer] website. Remember, [employer] is NOT to be used for urgent needs. For medical emergencies, dial 911. Now available from your iPhone and Android! Introducing Jacky Ramirez As a [facility] patient, I wanted to make you aware of our electronic visit tool called Jacky Ramirez. [employer] 24/7 allows you to connect within minutes with a medical provider 24 hours a day, seven days a week via a mobile device or tablet or logging into a secure website from your computer. You can access Jacky Ramirez from anywhere in the [place]. A virtual visit might be right for you when you have a simple condition and feel like you just dont want to get out of bed, or cant get away from work for an appointment, when your regular [facility] provider is not available (evenings, weekends or holidays), or when youre out of town and need minor care. Electronic visits cost only $49 and if the [facility] [employer] 24/7 provider determines a prescription is needed to treat your condition, one can be electronically transmitted to a nearby pharmacy*. Please take a moment to enroll today if you have not already done so. The enrollment process is free and takes just a few minutes. To enroll, please download the [employer] 24/[employer] zay to your tablet or phone, or visit [email or web address] to enroll on your computer.    
And, as an [address] [place] [address] patient with a ParentingInformer account, the results of your visits will be scanned into your electronic medical record and your primary care provider will be able to view the scanned results. We urge you to continue to see your regular [employer] provider for your ongoing medical care. And while your primary care provider may not be the one available when you seek a LifeIMAGE virtual visit, the peace of mind you get from getting a real diagnosis real time can be priceless. For more information on LifeIMAGE, view our Frequently Asked Questions (FAQs) at [email or web address]. Sincerely, 
 
David Gaona MD 
Chief Medical Officer Jolene8 Kailyn Romano *:  certain medications cannot be prescribed via LifeIMAGE Providers Seen During Your Hospitalization Provider Specialty Primary office phone [employer], DO Emergency Medicine [phone] Breana Burt MD Internal Medicine [phone] Luciano Gaviria MD [facility] [phone] Mayela Cosby MD Internal Medicine [phone] Your Primary Care Physician (PCP) Primary Care Physician Office Phone Office Fax Teodoro Pankaj, [address] [place] [address] [phone] You are allergic to the following Allergen Reactions Anoro Ellipta (Umeclidinium-Vilanterol) Rash Other (comments) Muscle cramps in arms Aspirin Other (comments) GI upset and bleeding Augmentin (Amoxicillin-Pot Clavulanate) Not Reported This Time Possible cramping Doxycycline Nausea and Vomiting Morphine Other (comments) Neurologic symptoms - severe agitation Shellfish Derived Unknown (comments) Pt able to eat small amounts per report Sulfa (Sulfonamide Antibiotics) Rash Patient relates no longer allergic Recent Documentation Height Weight Breastfeeding? BMI OB Status Smoking Status 1.651 m 55.5 kg No 20.35 kg/m2 Hysterectomy Current Some Day Smoker Emergency Contacts Name Discharge Info Relation Home Work Mobile [facility] DISCHARGE CAREGIVER [3] Son [22] [phone] [facility] DISCHARGE CAREGIVER [3] Sister [23] [phone] Patient Belongings The following personal items are in your possession at time of discharge: 
  Dental Appliances: None  Visual Aid: None      Home Medications: None   Jewelry: None  Clothing: Shirt, Footwear, Pants    Other Valuables: Cell Phone  Personal Items Sent to Safe: 0 Please provide this summary of care documentation to your next provider. Signatures-by signing, you are acknowledging that this After Visit Summary has been reviewed with you and you have received a copy. Patient Signature:  ____________________________________________________________ Date:  ____________________________________________________________  
  
[facility] Signature:  ____________________________________________________________ Date:  ____________________________________________________________

## 2018-08-14 NOTE — IP AVS SNAPSHOT
[address] Patient: Will Orozco MRN: [id number] BJX:0/1/2249 A check gerardo indicates which time of day the medication should be taken. My Medications START taking these medications Instructions Each Dose to Equal  
 Morning Noon Evening Bedtime  
 levoFLOXacin 500 mg tablet Commonly known as:  No Tian Your last dose was: Your next dose is: Take 1 Tab by mouth daily for 3 days. 500 mg CHANGE how you take these medications Instructions Each Dose to Equal  
 Morning Noon Evening Bedtime  
 predniSONE 50 mg tablet Commonly known as:  Cayla Rios What changed:   
- medication strength 
- how much to take - when to take this - Another medication with the same name was removed. Continue taking this medication, and follow the directions you see here. Your last dose was: Your next dose is: Take 1 Tab by mouth daily. 50 mg CONTINUE taking these medications Instructions Each Dose to Equal  
 Morning Noon Evening Bedtime * albuterol 90 mcg/actuation inhaler Commonly known as:  PROAIR HFA Your last dose was: Your next dose is:    
   
   
 INHALE 2 PUFFS BY MOUTH EVERY 4 HOURS AS NEEDED FOR WHEEZING OR SHORTNESS OF BREATH  
     
   
   
   
  
 * albuterol 2.5 mg /3 mL (0.083 %) nebulizer solution Commonly known as:  PROVENTIL VENTOLIN Your last dose was: Your next dose is:    
   
   
 3 mL by Nebulization route every four (4) hours as needed for Wheezing or Shortness of Breath. Diag. Code: J44.9, R09.02  
 2.5 mg  
    
   
   
   
  
 fluticasone-salmeterol 250-50 mcg/dose diskus inhaler Commonly known as:  ADVAIR Your last dose was: Your next dose is: Take 1 Puff by inhalation every twelve (12) hours. 1 Puff LORazepam 1 mg tablet Commonly known as:  ATIVAN Your last dose was: Your next dose is: Take  by mouth two (2) times a day. OXYGEN-AIR DELIVERY SYSTEMS Your last dose was: Your next dose is:    
   
   
 2 L by Does Not Apply route. O2 via nasal cannula with bedtime and activity. O2 Company: [employer] 2 L  
    
   
   
   
  
 rOPINIRole 1 mg tablet Commonly known as:  Daniel Horner Your last dose was: Your next dose is: Take  by mouth two (2) times a day. tiotropium bromide 2.5 mcg/actuation inhaler Commonly known as:  James Giron Your last dose was: Your next dose is: Take 2 Puffs by inhalation daily. 2 Puff * Notice: This list has 2 medication(s) that are the same as other medications prescribed for you. Read the directions carefully, and ask your doctor or other care provider to review them with you. STOP taking these medications   
 acetylcysteine 500 mg Cap  
   
  
 ramelteon 8 mg tablet Commonly known as:  ROZEREM  
   
  
 VITAMIN D2 50,000 unit capsule Generic drug:  ergocalciferol ASK your doctor about these medications Instructions Each Dose to Equal  
 Morning Noon Evening Bedtime HYDROcodone-acetaminophen 5-325 mg per tablet Commonly known as:  Bert Meneses Your last dose was: Your next dose is: Take 1 Tab by mouth every four (4) hours as needed for Pain. Max Daily Amount: 6 Tabs. 1 Tab Where to Get Your Medications Information on where to get these meds will be given to you by the nurse or doctor. ! Ask your nurse or doctor about these medications  
  levoFLOXacin 500 mg tablet  
 predniSONE 50 mg tablet

## 2018-08-14 NOTE — ROUTINE PROCESS
Bedside and Verbal shift change report given to Herminio Sandy (oncoming nurse) by Nelson Humphrey (offgoing nurse). Report included the following information Kardex, Intake/Output and MAR.

## 2018-08-15 LAB
ALBUMIN SERPL-MCNC: 3.1 G/DL (ref 3.4–5)
ALBUMIN/GLOB SERPL: 1 {RATIO} (ref 0.8–1.7)
ALP SERPL-CCNC: 75 U/L (ref 45–117)
ALT SERPL-CCNC: 17 U/L (ref 13–56)
ANION GAP SERPL CALC-SCNC: 6 MMOL/L (ref 3–18)
APTT PPP: 82.5 SEC (ref 23–36.4)
AST SERPL-CCNC: 12 U/L (ref 15–37)
BILIRUB SERPL-MCNC: 0.2 MG/DL (ref 0.2–1)
BUN SERPL-MCNC: 19 MG/DL (ref 7–18)
BUN/CREAT SERPL: 25 (ref 12–20)
CALCIUM SERPL-MCNC: 8 MG/DL (ref 8.5–10.1)
CHLORIDE SERPL-SCNC: 103 MMOL/L (ref 100–108)
CHOLEST SERPL-MCNC: 169 MG/DL
CO2 SERPL-SCNC: 30 MMOL/L (ref 21–32)
CREAT SERPL-MCNC: 0.75 MG/DL (ref 0.6–1.3)
ERYTHROCYTE [DISTWIDTH] IN BLOOD BY AUTOMATED COUNT: 12.7 % (ref 11.6–14.5)
GLOBULIN SER CALC-MCNC: 3.2 G/DL (ref 2–4)
GLUCOSE BLD STRIP.AUTO-MCNC: 123 MG/DL (ref 70–110)
GLUCOSE SERPL-MCNC: 126 MG/DL (ref 74–99)
HCT VFR BLD AUTO: 36.9 % (ref 35–45)
HDLC SERPL-MCNC: 65 MG/DL (ref 40–60)
HDLC SERPL: 2.6 {RATIO} (ref 0–5)
HGB BLD-MCNC: 13 G/DL (ref 12–16)
LDLC SERPL CALC-MCNC: 94 MG/DL (ref 0–100)
LIPID PROFILE,FLP: ABNORMAL
MCH RBC QN AUTO: 32.5 PG (ref 24–34)
MCHC RBC AUTO-ENTMCNC: 35.2 G/DL (ref 31–37)
MCV RBC AUTO: 92.3 FL (ref 74–97)
PLATELET # BLD AUTO: 358 K/UL (ref 135–420)
PMV BLD AUTO: 9.9 FL (ref 9.2–11.8)
POTASSIUM SERPL-SCNC: 4.3 MMOL/L (ref 3.5–5.5)
PROT SERPL-MCNC: 6.3 G/DL (ref 6.4–8.2)
RBC # BLD AUTO: 4 M/UL (ref 4.2–5.3)
SODIUM SERPL-SCNC: 139 MMOL/L (ref 136–145)
TRIGL SERPL-MCNC: 50 MG/DL (ref ?–150)
VLDLC SERPL CALC-MCNC: 10 MG/DL
WBC # BLD AUTO: 6.8 K/UL (ref 4.6–13.2)

## 2018-08-15 PROCEDURE — 74011250636 HC RX REV CODE- 250/636: Performed by: FAMILY MEDICINE

## 2018-08-15 PROCEDURE — 74011250636 HC RX REV CODE- 250/636: Performed by: HOSPITALIST

## 2018-08-15 PROCEDURE — 85730 THROMBOPLASTIN TIME PARTIAL: CPT | Performed by: HOSPITALIST

## 2018-08-15 PROCEDURE — 36415 COLL VENOUS BLD VENIPUNCTURE: CPT | Performed by: HOSPITALIST

## 2018-08-15 PROCEDURE — 80053 COMPREHEN METABOLIC PANEL: CPT | Performed by: HOSPITALIST

## 2018-08-15 PROCEDURE — 82962 GLUCOSE BLOOD TEST: CPT

## 2018-08-15 PROCEDURE — 94640 AIRWAY INHALATION TREATMENT: CPT

## 2018-08-15 PROCEDURE — 74011250637 HC RX REV CODE- 250/637: Performed by: HOSPITALIST

## 2018-08-15 PROCEDURE — 65660000000 HC RM CCU STEPDOWN

## 2018-08-15 PROCEDURE — 80061 LIPID PANEL: CPT | Performed by: HOSPITALIST

## 2018-08-15 PROCEDURE — 85027 COMPLETE CBC AUTOMATED: CPT | Performed by: HOSPITALIST

## 2018-08-15 RX ADMIN — HYDROCODONE BITARTRATE AND ACETAMINOPHEN 1 TABLET: 5; 325 TABLET ORAL at 21:20

## 2018-08-15 RX ADMIN — Medication 10 ML: at 21:22

## 2018-08-15 RX ADMIN — METHYLPREDNISOLONE SODIUM SUCCINATE 60 MG: 125 INJECTION, POWDER, FOR SOLUTION INTRAMUSCULAR; INTRAVENOUS at 17:13

## 2018-08-15 RX ADMIN — HYDROCODONE BITARTRATE AND ACETAMINOPHEN 1 TABLET: 5; 325 TABLET ORAL at 11:05

## 2018-08-15 RX ADMIN — METHYLPREDNISOLONE SODIUM SUCCINATE 60 MG: 125 INJECTION, POWDER, FOR SOLUTION INTRAMUSCULAR; INTRAVENOUS at 05:46

## 2018-08-15 RX ADMIN — HYDROCODONE BITARTRATE AND ACETAMINOPHEN 1 TABLET: 5; 325 TABLET ORAL at 15:00

## 2018-08-15 RX ADMIN — Medication 10 ML: at 08:50

## 2018-08-15 RX ADMIN — METHYLPREDNISOLONE SODIUM SUCCINATE 60 MG: 125 INJECTION, POWDER, FOR SOLUTION INTRAMUSCULAR; INTRAVENOUS at 11:05

## 2018-08-15 RX ADMIN — HEPARIN SODIUM 5000 UNITS: 5000 INJECTION, SOLUTION INTRAVENOUS; SUBCUTANEOUS at 08:48

## 2018-08-15 RX ADMIN — METHYLPREDNISOLONE SODIUM SUCCINATE 60 MG: 125 INJECTION, POWDER, FOR SOLUTION INTRAMUSCULAR; INTRAVENOUS at 21:21

## 2018-08-15 RX ADMIN — BUDESONIDE AND FORMOTEROL FUMARATE DIHYDRATE 2 PUFF: 160; 4.5 AEROSOL RESPIRATORY (INHALATION) at 08:06

## 2018-08-15 RX ADMIN — LORAZEPAM 1 MG: 1 TABLET ORAL at 00:12

## 2018-08-15 RX ADMIN — HEPARIN SODIUM 5000 UNITS: 5000 INJECTION, SOLUTION INTRAVENOUS; SUBCUTANEOUS at 00:11

## 2018-08-15 RX ADMIN — LEVOFLOXACIN 500 MG: 5 INJECTION, SOLUTION INTRAVENOUS at 17:13

## 2018-08-15 RX ADMIN — BUDESONIDE AND FORMOTEROL FUMARATE DIHYDRATE 2 PUFF: 160; 4.5 AEROSOL RESPIRATORY (INHALATION) at 19:59

## 2018-08-15 RX ADMIN — HEPARIN SODIUM 5000 UNITS: 5000 INJECTION, SOLUTION INTRAVENOUS; SUBCUTANEOUS at 17:12

## 2018-08-15 RX ADMIN — ROPINIROLE HYDROCHLORIDE 1 MG: 1 TABLET, FILM COATED ORAL at 21:20

## 2018-08-15 RX ADMIN — ROPINIROLE HYDROCHLORIDE 1 MG: 1 TABLET, FILM COATED ORAL at 08:47

## 2018-08-15 RX ADMIN — Medication 10 ML: at 15:01

## 2018-08-15 NOTE — ROUTINE PROCESS
Bedside and Verbal shift change report given to monserrat suazo (oncoming nurse) by Baron Hashimoto (offgoing nurse). Report included the following information Kardex, Intake/Output and MAR.

## 2018-08-15 NOTE — PROGRESS NOTES
Reason for Admission:  Acute exacerbation of COPD                    RRAT Score:  12                   Plan for utilizing home health:  Home                         Likelihood of Readmission:  Low                         Transition of Care Plan:         Patient was admitted with a dx of acute exacerbation of COPD. Patient is alert and oriented, and independent with her ADL's most of the time, but stated that she does have a walker with wheels and a cane if needed. Patient's PCP is Manju Sepulveda MD, and her last visit was a few months ago. Patient has designated her sister Shahid Arroyo (phone: [phone] (home) [phone] (cell) to participate in her discharge plan and to receive any needed information. Patient is on 2 liters of continuous O2 at home, and her O2 provider is [facility]. Patient is not currently utilizing any other healthcare services at this time. D/c plan is to return home. SARAH Peterson, Care Manager. Care Management Interventions  PCP Verified by CM: Yes (A few months ago)  Palliative Care Criteria Met (RRAT>21 & CHF Dx)?: No  Mode of Transport at Discharge:  Other (see comment) (Family)  Transition of Care Consult (CM Consult): Discharge Planning  Discharge Durable Medical Equipment: No  Physical Therapy Consult: No  Occupational Therapy Consult: No  Current Support Network: Lives Alone  Confirm Follow Up Transport: Family  Plan discussed with Pt/Family/Caregiver: Yes  Discharge Location  Discharge Placement: Home

## 2018-08-15 NOTE — ROUTINE PROCESS
Bedside and Verbal shift change report given to Mark Vieira (oncoming nurse) by Radha Strong   (offgoing nurse). Report included the following information SBAR, Intake/Output, MAR, Recent Results and Cardiac Rhythm Sinus Rhythm.

## 2018-08-15 NOTE — PROGRESS NOTES
conducted an initial consultation and Spiritual Assessment for Alirio Luu, who is a 72 y.o.,female. Patients Primary Language is: [place]. According to the patients EMR Taoism Affiliation is: Non Christianity.     The reason the Patient came to the hospital is:   Patient Active Problem List    Diagnosis Date Noted    Acute exacerbation of chronic obstructive pulmonary disease (COPD) ([address] 08/14/2018    Degenerative disc disease, lumbar 08/14/2018    Left-sided low back pain with sciatica 08/14/2018    COPD with exacerbation ([address] 08/14/2018    Muscle spasm of back 09/12/2016    Sacroiliac joint pain 09/12/2016    Current chronic use of systemic steroids 09/12/2016    Baker's cyst of knee 09/12/2016    COPD with acute exacerbation ([address] 12/15/2015    Neuritis of lower extremity 11/18/2015    Lumbar spinal stenosis 11/13/2015    Facet arthritis of lumbar region ([address] 11/13/2015    Emphysema of lung ([address] 04/15/2015    Hemoptysis 02/05/2014    Nicotine addiction 05/16/2013    COPD, severe ([address]         The  provided the following Interventions:  Initiated a relationship of care and support. Explored issues of janelle, belief, spirituality and Anglican/ritual needs while hospitalized. Listened empathically. Provided information about Spiritual Care Services. Offered prayer and assurance of continued prayers on patient's behalf. Chart reviewed. The following outcomes where achieved:   confirmed Patient's Taoism Affiliation. Patient expressed gratitude for 's visit. Assessment:  Patient's janelle in God is very important for her to cope. Patient does not have any Anglican/cultural needs that will affect patients preferences in health care. There are no spiritual or Anglican issues which require intervention at this time. Plan:  Chaplains will continue to follow and will provide pastoral care on an as needed/requested basis.    recommends bedside caregivers page  on duty if patient shows signs of acute spiritual or emotional distress.     [address]  [phone])

## 2018-08-15 NOTE — PROGRESS NOTES
NUTRITION    Nursing Referral: [facility]     RECOMMENDATIONS / PLAN:     - Add supplements: Ensure Enlive, once daily.  - Monitor and encourage po/supplement intake. - Continue RD inpatient monitoring and evaluation. NUTRITION INTERVENTIONS & DIAGNOSIS:     [x] Meals/snacks: modify composition  [x] Medical food supplement therapy: initiate    Nutrition Diagnosis: Chronic disease or condition related malnutrition related to inadequate energy intake as evidenced by pt consuming <75% of energy needs, > 1 month with severe fat and muscle loss in multiple body regions. ASSESSMENT:     Pt reports unplanned weight loss x several years PTA with poor/decreased po intake. Pt with COPD and reports she finds it difficult to eat well due to shortness of breath and tiredness. Discussed ways in implement more calories at mealtime. Also discussed nutritional supplements and different options. Pt agreeable to try Ensure, states Boost is \"too sweet\" for her. NFPE completed. Average po intake adequate to meet patients estimated nutritional needs:   [] Yes     [x] No   [] Unable to determine at this time    Diet: DIET CARDIAC Regular      Food Allergies: Shellfish  Current Appetite:   [] Good     [] Fair     [x] Poor     [] Other:  Appetite/meal intake prior to admission:   [] Good     [] Fair     [x] Poor x several months     [x] Other: pt reports she is a \"grazer\" consumes very small meals and does not consume nutritional supplements  Feeding Limitations:  [] Swallowing difficulty    [] Chewing difficulty    [] Other:  Current Meal Intake: No data found.       BM: PTA  Skin Integrity: WDL  Edema:   [x] No     [] Yes   Pertinent Medications: Reviewed: steroid, zofran    Recent Labs      08/15/18   0225  08/14/18   0942   NA  139  138   K  4.3  3.7   CL  103  102   CO2  30  31   GLU  126*  80   BUN  19*  17   CREA  0.75  0.75   CA  8.0*  8.9   ALB  3.1*  3.6   SGOT  12*  16   ALT  17  17     No intake or output data in the 24 hours ending 08/15/18 1126    Anthropometrics:  Ht Readings from Last 1 Encounters:   08/14/18 5' 5\" (1.651 m)     Last 3 Recorded Weights in this Encounter    08/14/18 0939   Weight: 55.2 kg (121 lb 11.2 oz)     Body mass index is 20.25 kg/(m^2). Weight History: Pt reports weight loss of 20 lbs over the course of 5 years PTA. Pt denies recent weight changes. Weight Metrics 8/14/2018 7/19/2018 2/14/2018 12/4/2017 9/11/2017 6/22/2017 2/5/2017   Weight 121 lb 11.2 oz 118 lb 115 lb 113 lb 9.6 oz 121 lb 123 lb 120 lb   BMI 20.25 kg/m2 19.05 kg/m2 18.56 kg/m2 18.34 kg/m2 19.53 kg/m2 19.85 kg/m2 19.37 kg/m2        Admitting Diagnosis: Acute exacerbation of chronic obstructive pulmonary disease (COPD) (HCC)  Left-sided low back pain with sciatica  Degenerative disc disease, lumbar  COPD with exacerbation ([facility]  Pertinent PMHx: COPD, lung disease    Education Needs:        [x] None identified  [] Identified - Not appropriate at this time  []  Identified and addressed - refer to education log  Learning Limitations:   [x] None identified  [] Identified    Cultural, Uatsdin & ethnic food preferences:  [x] None identified    [] Identified and addressed     ESTIMATED NUTRITION NEEDS:     Calories: 0736-1070 kcal (25-35 kcal/kg) based on  [] Actual BW      [x] IBW: 57 kg   Protein: 46-68 gm (0.8-1.2 gm/kg) based on  [] Actual BW      [x] IBW   Fluid: 1 mL/kcal     MONITORING & EVALUATION:     Nutrition Goal(s):   1. Po intake of meals will meet >75% of patient estimated nutritional needs within the next 7 days.   Outcome:  [] Met/Ongoing    []  Not Met    [x] New/Initial Goal     Monitoring:   [x] Food and beverage intake   [x] Diet order   [x] Nutrition-focused physical findings   [x] Treatment/therapy   [] Weight   [] Enteral nutrition intake        Previous Recommendations (for follow-up assessments only):     []   Implemented       []   Not Implemented (RD to address)      [] No Longer Appropriate     [] No Recommendation Made     Discharge Planning: cardiac diet; Ensure Enlive or similar nutritional supplements 2-3x daily.   [x] Participated in care planning, discharge planning, & interdisciplinary rounds as appropriate      Asha Baltazar, CASTILLO   Pager: [phone]

## 2018-08-15 NOTE — CDMP QUERY
Please clarify type of chronic respiratory failure:    =>Hypoxic, hypercapneic or combined  =>Other Explanation of clinical findings  =>Unable to Determine (no explanation of clinical findings)    The medical record reflects the following Risk/Clinical Indicators:/Treatment:   Hx COPD  Home O2 continued in hospital    If you DECLINE this query or would like to communicate with [employer], please utilize the \"[employer] message box\" at the TOP of the Progress Note on the right.       Thank you,  Ceci Amor RN/CCDS  [phone]

## 2018-08-15 NOTE — PROGRESS NOTES
[facility]  Progress Note    Patient: Frantz Pelayo Age: 72 y.o. : 1945 MR#: [id number] [id number]: [id number]  Date/Time: 8/15/2018    Subjective:     Seen with family in room. Feels much better. Denies F/C, N/V, CP. Occasional SOB. Wants to go home, understands need to receive continued med tx. Reviewed meds with pt, which family has brought in. Specifically: pt reports intolerance of Spiriva caps, asks to use her home Respimat. Assessment/Plan:   1. Acute COPD exac with chronic hypoxic resp failure - continue steroids, nebs Spiriva, empiric abx. Still wheezing quite a bit on exam. Takes prednisone 10mg daily as chronic medication. Baseline O2 requirement: 2lpm via NC around-the-clock. 2. Chronic back pain - DJDz of L-spine. Does not take any narcotics at home. Tried Neurontin, but this made her feel unwell. Maintain current pain medication regimen, possible short course on discharge with outpt f/u.   3. Tobacco abuse - counseled. Pt has no interest in quitting. She states that she knows the COPD is end-stage. 4. Pt reiterates DNR status during interview. Additional Notes:      Case discussed with:  [x]Patient  [x]Family  [x]Nursing  []Case Management  DVT Prophylaxis:  []Lovenox  [x]Hep SQ  []SCDs  []Coumadin   []On Heparin gtt    Objective:   VS:   Visit Vitals    /62 (BP 1 Location: Left arm, BP Patient Position: At rest)    Pulse 74    Temp 97.5 °F (36.4 °C)    Resp 18    Ht 5' 5\" (1.651 m)    Wt 55.2 kg (121 lb 11.2 oz)    SpO2 92%    Breastfeeding No    BMI 20.25 kg/m2      Tmax/24hrs: Temp (24hrs), Av.4 °F (36.3 °C), Min:97 °F (36.1 °C), Max:98.1 °F (36.7 °C)    Input/Output: No intake or output data in the 24 hours ending 08/15/18 1722    General:  Awake, alert, NAD. Cardiovascular:  RRR. Pulmonary:  CTA B with diffuse exp wheezes. GI:  Soft, NT/ND, NABS. Extremities:  No CT or edema.    Additional:      Labs:    Recent Results (from the past 24 hour(s))   METABOLIC PANEL, COMPREHENSIVE    Collection Time: 08/15/18  2:25 AM   Result Value Ref Range    Sodium 139 136 - 145 mmol/L    Potassium 4.3 3.5 - 5.5 mmol/L    Chloride 103 100 - 108 mmol/L    CO2 30 21 - 32 mmol/L    Anion gap 6 3.0 - 18 mmol/L    Glucose 126 (H) 74 - 99 mg/dL    BUN 19 (H) 7.0 - 18 MG/DL    Creatinine 0.75 0.6 - 1.3 MG/DL    BUN/Creatinine ratio 25 (H) 12 - 20      GFR est AA >60 >60 ml/min/1.73m2    GFR est non-AA >60 >60 ml/min/1.73m2    Calcium 8.0 (L) 8.5 - 10.1 MG/DL    Bilirubin, total 0.2 0.2 - 1.0 MG/DL    ALT (SGPT) 17 13 - 56 U/L    AST (SGOT) 12 (L) 15 - 37 U/L    Alk.  phosphatase 75 45 - 117 U/L    Protein, total 6.3 (L) 6.4 - 8.2 g/dL    Albumin 3.1 (L) 3.4 - 5.0 g/dL    Globulin 3.2 2.0 - 4.0 g/dL    A-G Ratio 1.0 0.8 - 1.7     LIPID PANEL    Collection Time: 08/15/18  2:25 AM   Result Value Ref Range    LIPID PROFILE          Cholesterol, total 169 <200 MG/DL    Triglyceride 50 <150 MG/DL    HDL Cholesterol 65 (H) 40 - 60 MG/DL    LDL, calculated 94 0 - 100 MG/DL    VLDL, calculated 10 MG/DL    CHOL/HDL Ratio 2.6 0 - 5.0     CBC W/O DIFF    Collection Time: 08/15/18  2:25 AM   Result Value Ref Range    WBC 6.8 4.6 - 13.2 K/uL    RBC 4.00 (L) 4.20 - 5.30 M/uL    HGB 13.0 12.0 - 16.0 g/dL    HCT 36.9 35.0 - 45.0 %    MCV 92.3 74.0 - 97.0 FL    MCH 32.5 24.0 - 34.0 PG    MCHC 35.2 31.0 - 37.0 g/dL    RDW 12.7 11.6 - 14.5 %    PLATELET 136 730 - 673 K/uL    MPV 9.9 9.2 - 11.8 FL   PTT    Collection Time: 08/15/18  2:25 AM   Result Value Ref Range    aPTT 82.5 (H) 23.0 - 36.4 SEC     Additional Data Reviewed:      Signed By: Amber Villanueva MD     August 15, 2018 5:22 PM

## 2018-08-16 VITALS
RESPIRATION RATE: 18 BRPM | TEMPERATURE: 98.1 F | SYSTOLIC BLOOD PRESSURE: 106 MMHG | BODY MASS INDEX: 20.37 KG/M2 | HEART RATE: 79 BPM | OXYGEN SATURATION: 100 % | HEIGHT: 65 IN | WEIGHT: 122.3 LBS | DIASTOLIC BLOOD PRESSURE: 65 MMHG

## 2018-08-16 LAB
ANION GAP SERPL CALC-SCNC: 7 MMOL/L (ref 3–18)
BASOPHILS # BLD: 0 K/UL (ref 0–0.1)
BASOPHILS NFR BLD: 0 % (ref 0–2)
BUN SERPL-MCNC: 22 MG/DL (ref 7–18)
BUN/CREAT SERPL: 28 (ref 12–20)
CALCIUM SERPL-MCNC: 8.3 MG/DL (ref 8.5–10.1)
CHLORIDE SERPL-SCNC: 102 MMOL/L (ref 100–108)
CO2 SERPL-SCNC: 29 MMOL/L (ref 21–32)
CREAT SERPL-MCNC: 0.79 MG/DL (ref 0.6–1.3)
DIFFERENTIAL METHOD BLD: ABNORMAL
EOSINOPHIL # BLD: 0 K/UL (ref 0–0.4)
EOSINOPHIL NFR BLD: 0 % (ref 0–5)
ERYTHROCYTE [DISTWIDTH] IN BLOOD BY AUTOMATED COUNT: 13.2 % (ref 11.6–14.5)
GLUCOSE SERPL-MCNC: 139 MG/DL (ref 74–99)
HCT VFR BLD AUTO: 37.9 % (ref 35–45)
HGB BLD-MCNC: 12.5 G/DL (ref 12–16)
LYMPHOCYTES # BLD: 0.7 K/UL (ref 0.9–3.6)
LYMPHOCYTES NFR BLD: 5 % (ref 21–52)
MCH RBC QN AUTO: 31.7 PG (ref 24–34)
MCHC RBC AUTO-ENTMCNC: 33 G/DL (ref 31–37)
MCV RBC AUTO: 96.2 FL (ref 74–97)
MONOCYTES # BLD: 0.4 K/UL (ref 0.05–1.2)
MONOCYTES NFR BLD: 3 % (ref 3–10)
NEUTS SEG # BLD: 12.9 K/UL (ref 1.8–8)
NEUTS SEG NFR BLD: 92 % (ref 40–73)
PLATELET # BLD AUTO: 355 K/UL (ref 135–420)
PMV BLD AUTO: 10.1 FL (ref 9.2–11.8)
POTASSIUM SERPL-SCNC: 4.5 MMOL/L (ref 3.5–5.5)
RBC # BLD AUTO: 3.94 M/UL (ref 4.2–5.3)
SODIUM SERPL-SCNC: 138 MMOL/L (ref 136–145)
WBC # BLD AUTO: 13.9 K/UL (ref 4.6–13.2)

## 2018-08-16 PROCEDURE — 74011250636 HC RX REV CODE- 250/636: Performed by: FAMILY MEDICINE

## 2018-08-16 PROCEDURE — 80048 BASIC METABOLIC PNL TOTAL CA: CPT | Performed by: FAMILY MEDICINE

## 2018-08-16 PROCEDURE — 74011250636 HC RX REV CODE- 250/636: Performed by: HOSPITALIST

## 2018-08-16 PROCEDURE — 36415 COLL VENOUS BLD VENIPUNCTURE: CPT | Performed by: FAMILY MEDICINE

## 2018-08-16 PROCEDURE — 74011250637 HC RX REV CODE- 250/637: Performed by: HOSPITALIST

## 2018-08-16 PROCEDURE — 85025 COMPLETE CBC W/AUTO DIFF WBC: CPT | Performed by: FAMILY MEDICINE

## 2018-08-16 RX ORDER — LEVOFLOXACIN 500 MG/1
500 TABLET, FILM COATED ORAL DAILY
Qty: 3 TAB | Refills: 0 | Status: SHIPPED | OUTPATIENT
Start: 2018-08-16 | End: 2018-08-19

## 2018-08-16 RX ORDER — PREDNISONE 50 MG/1
50 TABLET ORAL DAILY
Qty: 5 TAB | Refills: 0 | Status: SHIPPED | OUTPATIENT
Start: 2018-08-16 | End: 2018-08-31 | Stop reason: ALTCHOICE

## 2018-08-16 RX ADMIN — METHYLPREDNISOLONE SODIUM SUCCINATE 60 MG: 125 INJECTION, POWDER, FOR SOLUTION INTRAMUSCULAR; INTRAVENOUS at 14:19

## 2018-08-16 RX ADMIN — ACETAMINOPHEN 650 MG: 325 TABLET ORAL at 09:00

## 2018-08-16 RX ADMIN — Medication 10 ML: at 14:15

## 2018-08-16 RX ADMIN — Medication 10 ML: at 05:52

## 2018-08-16 RX ADMIN — ROPINIROLE HYDROCHLORIDE 1 MG: 1 TABLET, FILM COATED ORAL at 09:01

## 2018-08-16 RX ADMIN — HEPARIN SODIUM 5000 UNITS: 5000 INJECTION, SOLUTION INTRAVENOUS; SUBCUTANEOUS at 01:53

## 2018-08-16 RX ADMIN — HEPARIN SODIUM 5000 UNITS: 5000 INJECTION, SOLUTION INTRAVENOUS; SUBCUTANEOUS at 09:01

## 2018-08-16 RX ADMIN — METHYLPREDNISOLONE SODIUM SUCCINATE 60 MG: 125 INJECTION, POWDER, FOR SOLUTION INTRAMUSCULAR; INTRAVENOUS at 05:52

## 2018-08-16 NOTE — DISCHARGE SUMMARY
[facility]       Patient: Miquel Page Age: 72 y.o. : 1945 MR#: [id number] SSN: [id number]  PCP on record: Brando Francisco MD  Admit date: 2018  Discharge date: 2018    Consults:  none  -   Procedures: none  -     Significant Diagnostic Studies: -CXR 18: IMPRESSION:  COPD without acute findings  - XR spine lumbar  18: IMPRESSION  IMPRESSION:  1. Previous kyphoplasty at L1. No new compression fracture was seen. 2. Significant degenerative disc disease at L4-5 and L5-S1. Discharge Diagnoses:   -Acute COPD exacerbation w/ chronic hypoxic resp failure  -Chronic back pain                                        Patient Active Problem List   Diagnosis Code    COPD, severe ([facility]) J44.9    Nicotine addiction F17.200    Hemoptysis R04.2    Lumbar spinal stenosis M48.061    Facet arthritis of lumbar region ([address] M46.96    Neuritis of lower extremity G57.90    Emphysema of lung ([facility] J43.9    COPD with acute exacerbation ([facility]) J44.1    Muscle spasm of back M62.830    Sacroiliac joint pain M53.3    Current chronic use of systemic steroids Z79.52    Baker's cyst of knee M71.20    Acute exacerbation of chronic obstructive pulmonary disease (COPD) ([facility]) J44.1    Degenerative disc disease, lumbar M51.36    Left-sided low back pain with sciatica M54.42    COPD with exacerbation [facility]) J44.1       Hospital Course by Problem     72 y o female w/ h/o COPD on home 02 presented to the ED w/ c/o productive cough, noted to be wheezing and hypoxic. She was admitted and was given IV steroid and abx. She had slow improvement and in fact on date of dc still had some wheezing. However pt states her breathing is much improved from how it was when she came in and stated she wanted to go home. She stated that she was able to ambulate w/o significant ONEAL.             Today's examination of the patient revealed:     Subjective: Objective:   VS:   Visit Vitals    /65 (BP 1 Location: Left arm, BP Patient Position: At rest)    Pulse 79    Temp 98.1 °F (36.7 °C)    Resp 18    Ht 5' 5\" (1.651 m)    Wt 55.5 kg (122 lb 4.8 oz)    SpO2 100%    Breastfeeding No    BMI 20.35 kg/m2      Tmax/24hrs: Temp (24hrs), Av.3 °F (36.3 °C), Min:97 °F (36.1 °C), Max:98.1 °F (36.7 °C)     Input/Output:   Intake/Output Summary (Last 24 hours) at 18 1305  Last data filed at 18 1884   Gross per 24 hour   Intake              200 ml   Output                0 ml   Net              200 ml       General:  Alert, awake, in nad  Cardiovascular:  Rrr, no murmurs  Pulmonary:  Diffuse wheezing  GI: soft, nt, nd   Extremities:  No edema  Additional:      Labs:    Recent Results (from the past 24 hour(s))   GLUCOSE, POC    Collection Time: 08/15/18  8:38 PM   Result Value Ref Range    Glucose (POC) 123 (H) 70 - 110 mg/dL   CBC WITH AUTOMATED DIFF    Collection Time: 18  4:00 AM   Result Value Ref Range    WBC 13.9 (H) 4.6 - 13.2 K/uL    RBC 3.94 (L) 4.20 - 5.30 M/uL    HGB 12.5 12.0 - 16.0 g/dL    HCT 37.9 35.0 - 45.0 %    MCV 96.2 74.0 - 97.0 FL    MCH 31.7 24.0 - 34.0 PG    MCHC 33.0 31.0 - 37.0 g/dL    RDW 13.2 11.6 - 14.5 %    PLATELET 615 322 - 796 K/uL    MPV 10.1 9.2 - 11.8 FL    NEUTROPHILS 92 (H) 40 - 73 %    LYMPHOCYTES 5 (L) 21 - 52 %    MONOCYTES 3 3 - 10 %    EOSINOPHILS 0 0 - 5 %    BASOPHILS 0 0 - 2 %    ABS. NEUTROPHILS 12.9 (H) 1.8 - 8.0 K/UL    ABS. LYMPHOCYTES 0.7 (L) 0.9 - 3.6 K/UL    ABS. MONOCYTES 0.4 0.05 - 1.2 K/UL    ABS. EOSINOPHILS 0.0 0.0 - 0.4 K/UL    ABS.  BASOPHILS 0.0 0.0 - 0.1 K/UL    DF AUTOMATED     METABOLIC PANEL, BASIC    Collection Time: 18  4:00 AM   Result Value Ref Range    Sodium 138 136 - 145 mmol/L    Potassium 4.5 3.5 - 5.5 mmol/L    Chloride 102 100 - 108 mmol/L    CO2 29 21 - 32 mmol/L    Anion gap 7 3.0 - 18 mmol/L    Glucose 139 (H) 74 - 99 mg/dL    BUN 22 (H) 7.0 - 18 MG/DL Creatinine 0.79 0.6 - 1.3 MG/DL    BUN/Creatinine ratio 28 (H) 12 - 20      GFR est AA >60 >60 ml/min/1.73m2    GFR est non-AA >60 >60 ml/min/1.73m2    Calcium 8.3 (L) 8.5 - 10.1 MG/DL     Additional Data Reviewed:     Condition: stable  Disposition:    [x]Home   []Home with Home Health   []SNF/NH   []Rehab   []Home with family   []Alternate Facility:____________________      Discharge Medications:     Current Discharge Medication List      START taking these medications    Details   levoFLOXacin (LEVAQUIN) 500 mg tablet Take 1 Tab by mouth daily for 3 days. Qty: 3 Tab, Refills: 0         CONTINUE these medications which have CHANGED    Details   predniSONE (DELTASONE) 50 mg tablet Take 1 Tab by mouth daily. Qty: 5 Tab, Refills: 0         CONTINUE these medications which have NOT CHANGED    Details   HYDROcodone-acetaminophen (NORCO) 5-325 mg per tablet Take 1 Tab by mouth every four (4) hours as needed for Pain. Max Daily Amount: 6 Tabs. Qty: 15 Tab, Refills: 0      albuterol (PROVENTIL VENTOLIN) 2.5 mg /3 mL (0.083 %) nebulizer solution 3 mL by Nebulization route every four (4) hours as needed for Wheezing or Shortness of Breath. Diag. Code: J44.9, R09.02  Qty: 375 Each, Refills: 3      albuterol (PROAIR HFA) 90 mcg/actuation inhaler INHALE 2 PUFFS BY MOUTH EVERY 4 HOURS AS NEEDED FOR WHEEZING OR SHORTNESS OF BREATH  Qty: 1 Inhaler, Refills: 6    Associated Diagnoses: Chronic obstructive pulmonary disease, unspecified COPD type ([facility]      fluticasone-salmeterol (ADVAIR) 250-50 mcg/dose diskus inhaler Take 1 Puff by inhalation every twelve (12) hours. Qty: 1 Inhaler, Refills: 3      tiotropium bromide (SPIRIVA RESPIMAT) 2.5 mcg/actuation inhaler Take 2 Puffs by inhalation daily. Qty: 3 Inhaler, Refills: 3      LORazepam (ATIVAN) 1 mg tablet Take  by mouth two (2) times a day. rOPINIRole (REQUIP) 1 mg tablet Take  by mouth two (2) times a day. OXYGEN-AIR DELIVERY SYSTEMS 2 L by Does Not Apply route. O2 via nasal cannula with bedtime and activity. O2 Company: [facility]         STOP taking these medications       acetylcysteine 500 mg cap Comments:   Reason for Stopping:         ramelteon (ROZEREM) 8 mg tablet Comments:   Reason for Stopping:         ergocalciferol (VITAMIN D2) 50,000 unit capsule Comments:   Reason for Stopping: Follow-up Appointments:   1.  Your PCP: Odilon Cadet MD, within 7-10days    >30 minutes spent coordinating this discharge (review instructions/follow-up, prescriptions, preparing report for sign off)    Signed:  Doyle Paz MD  8/16/2018  1:05 PM

## 2018-08-16 NOTE — PROGRESS NOTES
Problem: Falls - Risk of  Goal: *Absence of Falls  Document [place] Fall Risk and appropriate interventions in the flowsheet. Outcome: Progressing Towards Goal  Fall Risk Interventions:            Medication Interventions: Bed/chair exit alarm                  Problem: Pressure Injury - Risk of  Goal: *Prevention of pressure injury  Document Roman Scale and appropriate interventions in the flowsheet. Outcome: Progressing Towards Goal  Pressure Injury Interventions:             Activity Interventions: Pressure redistribution bed/mattress(bed type)         Nutrition Interventions: Document food/fluid/supplement intake

## 2018-08-16 NOTE — NURSE NAVIGATOR
The objective of todays visit was to review the transitional care plan with the patient. We discussed the importance of transitional care as it relates to reducing the likelihood of readmission. We reviewed the goals for the first 30 days following hospital discharge. The patient and I discussed wrap around services including nurse navigation, care coordination, home health, transitional care appointments with their primary care provider and specialist team. Patient education focused on readmission zones as described as  The Red Zone: High risk for readmission, days 1-21  The Yellow Zone: Moderate risk for readmission, days 22-29  The Green Zone: Lower risk for readmission, days 30 and after    Medication reconciliation was performed. In addition, the patient was instructed on worrisome symptoms for worsening of their medical conditions and sepsis. Learning was assessed using teach back in the form of role playing. The patient identified appropriate wrap around services during this interaction. A written individual care plan was reviewed with the patient as well today. The patient expressed the following specific concerns regarding their discharge  None    Navigator requested family member to bring her portable O2 for discharge. Prescriptions faxed to OP pharmacy, Nurse notified. Discussed ACP as she is a DNR but plan is not on file. Ms. Junior Palafox is tearful about end of life but states \"its coming, I know it\". Encouraged her to bring into her pulmonary appointment for the office to scan in.

## 2018-08-16 NOTE — DISCHARGE INSTRUCTIONS
Using a Metered-Dose Inhaler: Care Instructions  Your Care Instructions    A metered-dose inhaler lets you breathe medicine into your lungs quickly. Inhaled medicine works faster than the same medicine in a pill. An inhaler allows you to take less medicine than you would need if you took it as a pill. \"Metered-dose\" means that the inhaler gives a measured amount of medicine each time you use it. A metered-dose inhaler gives medicine in the form of a liquid mist.  Your doctor may want you to use a spacer with your inhaler. A spacer is a chamber that you attach to the inhaler. The chamber holds the medicine before you inhale it. That way, you can inhale the medicine in as many breaths as you need. Doctors recommend using a spacer with most metered-dose inhalers, especially those with corticosteroid medicines. Follow-up care is a key part of your treatment and safety. Be sure to make and go to all appointments, and call your doctor if you are having problems. It's also a good idea to know your test results and keep a list of the medicines you take. How can you care for yourself at home? To get started using your inhaler  · Talk with your health care provider to be sure you are using your inhaler the right way. It might help if you practice using it in front of a mirror. Use the inhaler exactly as prescribed. · Check that you have the correct medicine. If you use more than one inhaler, put a label on each one. This will let you know which one to use at the right time. · Keep track of how much medicine is in the inhaler. Check the label to see how many doses are in the container. If you know how many puffs you can take, you can replace the inhaler before you run out. Ask your health care provider how you can keep track of how much medicine is left. · Use a spacer if you have problems pressing the inhaler and breathing in at the same time.  You also may need a spacer if you are using corticosteroid medicines. · If you are using a corticosteroid inhaler, gargle and rinse out your mouth with water after use. Do not swallow the water. Swallowing the water will increase the chance that the medicine will get into your bloodstream. This may make it more likely that you will have side effects. To use a spacer with an inhaler  1. Shake the inhaler. Remove the inhaler cap, and place the mouthpiece of the inhaler into the spacer. Check the inhaler instructions to see if you need to prime your inhaler before you use it. If it needs priming, follow the instructions on how to prime your inhaler. 2. Remove the cap from the spacer. 3. Hold the inhaler upright with the mouthpiece at the bottom. 4. Tilt your head back a little, and breathe out slowly and completely. 5. Place the spacer's mouthpiece in your mouth. 6. Press down on the inhaler to spray one puff of medicine into the spacer, and then start breathing in slowly. Wait to inhale until after you have pressed down on the inhaler. Some spacers have a whistle. If you hear it, you should breathe in more slowly. 7. Hold your breath for 10 seconds. This will let the medicine settle in your lungs. 8. If you need to take a second dose, wait 30 to 60 seconds to allow the inhaler valve to refill. To use an inhaler without a spacer  1. Shake the inhaler as directed. Remove the cap. Check the instructions to see if you need to prime your inhaler before you use it. If it needs priming, follow the instructions on how to prime your inhaler. 2. Hold the inhaler upright with the mouthpiece at the bottom. 3. Tilt your head back a little, and breathe out slowly and completely. 4. Position the inhaler in one of two ways:  ¨ You can place the inhaler in your mouth. This is easier for most people. And it lowers the risk that any of the medicine will get into your eyes. ¨ Or you can place the inhaler 1 to 2 inches in front of your open mouth, without closing your lips over it. Try to open your mouth as wide as you can. Placing the inhaler in front of your open mouth may be better for getting the medicine into your lungs. But some people may find this too hard to do. 5. Start taking slow, even breaths through your mouth. Press down on the inhaler once, then inhale fully. 6. Hold your breath for 10 seconds. This will let the medicine settle in your lungs. 7. If you need to take a second dose, wait 30 to 60 seconds to allow the inhaler valve to refill. Where can you learn more? Go to http://robert-jorge.info/. Enter K111 in the search box to learn more about \"Using a Metered-Dose Inhaler: Care Instructions. \"  Current as of: November 12, 2017  Content Version: 11.7  © 3512-1908 [employer]. Care instructions adapted under license by [employer] (which disclaims liability or warranty for this information). If you have questions about a medical condition or this instruction, always ask your healthcare professional. [place] [address] any warranty or liability for your use of this information. Learning About Relief for Back Pain  What is back tension and strain? Back strain happens when you overstretch, or pull, a muscle in your back. You may hurt your back in an accident or when you exercise or lift something. Most back pain will get better with rest and time. You can take care of yourself at home to help your back heal.  What can you do first to relieve back pain? When you first feel back pain, try these steps:  · Walk. Take a short walk (10 to 20 minutes) on a level surface (no slopes, hills, or stairs) every 2 to 3 hours. Walk only distances you can manage without pain, especially leg pain. · Relax. Find a comfortable position for rest. Some people are comfortable on the floor or a medium-firm bed with a small pillow under their head and another under their knees.  Some people prefer to lie on their side with a pillow between their knees. Don't stay in one position for too long. · Try heat or ice. Try using a heating pad on a low or medium setting, or take a warm shower, for 15 to 20 minutes every 2 to 3 hours. Or you can buy single-use heat wraps that last up to 8 hours. You can also try an ice pack for 10 to 15 minutes every 2 to 3 hours. You can use an ice pack or a bag of frozen vegetables wrapped in a thin towel. There is not strong evidence that either heat or ice will help, but you can try them to see if they help. You may also want to try switching between heat and cold. · Take pain medicine exactly as directed. ¨ If the doctor gave you a prescription medicine for pain, take it as prescribed. ¨ If you are not taking a prescription pain medicine, ask your doctor if you can take an over-the-counter medicine. What else can you do? · Stretch and exercise. Exercises that increase flexibility may relieve your pain and make it easier for your muscles to keep your spine in a good, neutral position. And don't forget to keep walking. · Do self-massage. You can use self-massage to unwind after work or school or to energize yourself in the morning. You can easily massage your feet, hands, or neck. Self-massage works best if you are in comfortable clothes and are sitting or lying in a comfortable position. Use oil or lotion to massage bare skin. · Reduce stress. Back pain can lead to a vicious [place]: Distress about the pain tenses the muscles in your back, which in turn causes more pain. Learn how to relax your mind and your muscles to lower your stress. Where can you learn more? Go to http://robert-jorge.info/. Enter Q997 in the search box to learn more about \"Learning About Relief for Back Pain. \"  Current as of: November 29, 2017  Content Version: 11.7  © 7015-8005 [employer], [employer].  Care instructions adapted under license by [employer] (which disclaims liability or warranty for this information). If you have questions about a medical condition or this instruction, always ask your healthcare professional. [address] any warranty or liability for your use of this information. Chronic Obstructive Pulmonary Disease (COPD): Care Instructions  Your Care Instructions    Chronic obstructive pulmonary disease (COPD) is a general term for a group of lung diseases, including emphysema and chronic bronchitis. People with COPD have decreased airflow in and out of the lungs, which makes it hard to breathe. The airways also can get clogged with thick mucus. Cigarette smoking is a major cause of COPD. Although there is no cure for COPD, you can slow its progress. Following your treatment plan and taking care of yourself can help you feel better and live longer. Follow-up care is a key part of your treatment and safety. Be sure to make and go to all appointments, and call your doctor if you are having problems. It's also a good idea to know your test results and keep a list of the medicines you take. How can you care for yourself at home?   Staying healthy    · Do not smoke. This is the most important step you can take to prevent more damage to your lungs. If you need help quitting, talk to your doctor about stop-smoking programs and medicines. These can increase your chances of quitting for good.     · Avoid colds and flu. Get a pneumococcal vaccine shot. If you have had one before, ask your doctor whether you need a second dose. Get the flu vaccine every fall. If you must be around people with colds or the flu, wash your hands often.     · Avoid secondhand smoke, air pollution, and high altitudes. Also avoid cold, dry air and hot, humid air. Stay at home with your windows closed when air pollution is bad.    Medicines and oxygen therapy    · Take your medicines exactly as prescribed.  Call your doctor if you think you are having a problem with your medicine.     · You may be taking medicines such as:  ¨ Bronchodilators. These help open your airways and make breathing easier. Bronchodilators are either short-acting (work for 6 to 9 hours) or long-acting (work for 24 hours). You inhale most bronchodilators, so they start to act quickly. Always carry your quick-relief inhaler with you in case you need it while you are away from home. ¨ Corticosteroids (prednisone, budesonide). These reduce airway inflammation. They come in pill or inhaled form. You must take these medicines every day for them to work well.     · A spacer may help you get more inhaled medicine to your lungs. Ask your doctor or pharmacist if a spacer is right for you. If it is, ask how to use it properly.     · Do not take any vitamins, over-the-counter medicine, or herbal products without talking to your doctor first.     · If your doctor prescribed antibiotics, take them as directed. Do not stop taking them just because you feel better. You need to take the full course of antibiotics.     · Oxygen therapy boosts the amount of oxygen in your blood and helps you breathe easier. Use the flow rate your doctor has recommended, and do not change it without talking to your doctor first.   Activity    · Get regular exercise. Walking is an easy way to get exercise. Start out slowly, and walk a little more each day.     · Pay attention to your breathing. You are exercising too hard if you cannot talk while you are exercising.     · Take short rest breaks when doing household chores and other activities.     · Learn breathing methods-such as breathing through pursed lips-to help you become less short of breath.     · If your doctor has not set you up with a pulmonary rehabilitation program, talk to him or her about whether rehab is right for you. Rehab includes exercise programs, education about your disease and how to manage it, help with diet and other changes, and emotional support. Diet    · Eat regular, healthy meals. Use bronchodilators about 1 hour before you eat to make it easier to eat. Eat several small meals instead of three large ones. Drink beverages at the end of the meal. Avoid foods that are hard to chew.     · Eat foods that contain protein so that you do not lose muscle mass.     · Talk with your doctor if you gain too much weight or if you lose weight without trying.    Mental health    · Talk to your family, friends, or a therapist about your feelings. It is normal to feel frightened, angry, hopeless, helpless, and even guilty. Talking openly about bad feelings can help you cope. If these feelings last, talk to your doctor. When should you call for help? Call 911 anytime you think you may need emergency care. For example, call if:    · You have severe trouble breathing.    Call your doctor now or seek immediate medical care if:    · You have new or worse trouble breathing.     · You cough up blood.     · You have a fever.    Watch closely for changes in your health, and be sure to contact your doctor if:    · You cough more deeply or more often, especially if you notice more mucus or a change in the color of your mucus.     · You have new or worse swelling in your legs or belly.     · You are not getting better as expected. Where can you learn more? Go to http://robert-jorge.info/. Westley Villegas in the search box to learn more about \"Chronic Obstructive Pulmonary Disease (COPD): Care Instructions. \"  Current as of: December 6, 2017  Content Version: 11.7  © 3697-7656 [employer]. Care instructions adapted under license by [employer] (which disclaims liability or warranty for this information). If you have questions about a medical condition or this instruction, always ask your healthcare professional. [place] [address] any warranty or liability for your use of this information.

## 2018-08-16 NOTE — ROUTINE PROCESS
Bedside and Verbal shift change report given Lisa Nick RN to  (oncoming nurse) by Esther Draper   (offgoing nurse).  Report included the following information SBAR, Intake/Output, MAR, Recent Results and Cardiac Rhythm Sinus Rhythm

## 2018-08-17 ENCOUNTER — PATIENT OUTREACH (OUTPATIENT)
Dept: PULMONOLOGY | Age: 73
End: 2018-08-17

## 2018-08-17 RX ORDER — ACETAMINOPHEN 500 MG
500 TABLET ORAL
COMMUNITY

## 2018-08-17 NOTE — Clinical Note
Patient d/c from [facility] on 8/16/18. D/C on  Levaquin ( 2 left ) and prednisone 50 mg daily. On LAMA/LABA/ICS. Last PFT 1/15/16 30% predicted predrug/43% post.  She reports being SOB with min. Exertion , occasional productive cough of clear foamy to thick white. Since yesterday used neb 1 time and rescue inhaler x 3 for SOB . Using 2L NC. No smoking in 7 days but has the urge. No Nicotine patch/reports doesn't work and Chantix makes her have bad dreams. KUNAL appt. 8/28/18 ( [facility]  [id number] ) . No pulse ox in home and No Home health.

## 2018-08-17 NOTE — PROGRESS NOTES
Chronic Obstructive Pulmonary Disease Initial Follow Up Call    Chani Davila RNNurse Navigator (NN) contacted Linda Coyne ,  by telephone to perform COPD Transitions of Care. Verified Name and  as identifiers. Patient reports :      I have been better    A little productive coughing of clear foamy to thick yellow mucus. I sometime force myself to cough to get up the phlegm   ( NN encouraged patient to use  Cornet/ Flute , and TCRS- DB as much as possible and increase water intake to liquify secretions ) patient reports drinking fluids and reports when she uses the flutter device to much xhe becomes dizzy. ( NN encouraged patient to continue to use but decrease frequency.) She voiced understanding. Occasionally a little pain when coughing. Don't like taking pain meds so I take ES tylenol as needed but haven't taken any today. Having 2 Antibiotic pills left    Taking all medications as recommended except takes Requip as needed. ( NN encouraged pt. To rinse mouth after using her Advair )   Patient asked if she had to continue on 50 mg Prednisone . ( NN encouraged her to continue prednisone as ordered until seen by Dr. Sharonda Lewis. ) She voiced understanding. Sleeps in [address] with a couple of pillows: bad for my back but I breath better. ( NN discussed elevation her HOB on blocks or use wedge to elevate self to assist with Breathing)  Patient reports that she has tried all of these with no success. Has a good support system. Her sister stayed with her last night. She tries not to bother her children but if needed they will assist her. SOB with minimal exertion today. ( NN encouraged patient to alternate rest and activity and use of purse lip breathing ) Patient voiced understanding. Holds onto her furniture when mobile. ( NN discussed safety with patient and to use her cane for stability due to furniture is prone to moving when lend against. )  Patient stated, \" okay. \" Using 2 L oxygen continuously     Denies having pulse ox and stated, \" I don't need one because I know my body and if it is low. \"  (NN discussed with patient of why having a pulse oximetry in home is importance to obtain accurate reading to report to MD for evaluation of how she is progressing. )  She voiced understanding. Doesn't sleep well. Up during night , all night and tired in daytime. Patient reports Dr. Roberto Samayoa called her something in for sleep but  She couldn't afford it. ( NN encouraged her to speak with her PCP during upcoming visit ) She voiced understanding. I will be okay. I try not to stress myself, keep confusion away and stay hydrated. I will be alright. Eating okay and having good BM /voiding without problems. Having urges to smoke ( denies smoking x 7 days. ) ( NN discussed Nicotine patch with pt.)     Has bad dreams when she takes Chantix and Nicotine patches doesn't help her. ( NN informed patient that on next outreach NN will give her a resource # to call for smoking cessation support. Patient asked if she could use saline under nose due to irritation. ( NN informed her that any water soluble product is okay but not petroleum based.)   She voiced understanding. Patient denies:     Wheezing today    Chest pain    Hemoptysis    Dizziness    Pain at this time    Smoking x 7 days       Patient very pleasant during outreach but frequently routing conversation to end thus ACP not discussed and depression scale not addressed on this outreach. Noted Priorities:    Patient recently quit smoking ; Reported having urges; refused Nicotine patch d/t doesn't help and Chantix caused her to dream. ( NN will give resource on smoking cessation on next outreach. )   Patient has hx. Of depression and taking Ativan 2 x day as needed per pt. Are you using a rescue inhaler? yes, Type  Albuterol , frequency reports used x 1 since d/c home.      Nebulizer? yes, frequency:  2 times last night and once this morning     Zone:(Pt Reported)  yellow   Provider Notified: yes      Barriers to care? Anxiety, depression        Needs addressed from pathway:   24-48 Hours/Initial   Review/Verification:    ? Identify Care Team    Disposition (Home)   ? DC Instructions, Education, and COPD Zones  ? Mateus Mcmullen in place. [facility]) ( Patient reported she has her family and didn't need anyone to come see her)   ? Evaluate DME needs; arrange home equipment   ? Advanced care planning (e.g.: Palliative Care; Hospice  ? Type of monitoring  ? Follow-up appts are arranged-   ? Identify smoking status,  ? Occupational exposure-   ?      asbestos        ? Identify transportation    Med Rec*   ? CONNOR  ? OSMANY  ? LAMA  ? LABA  ? Steroids- rinse mouth  ? ICS  ? LTOT  ? Antibiotics    Baseline Labs*  ? ABG  ? BMP  ? AAt  ? Sputum Culture      KUNAL Documentation:  ? Goals  ? Challenges/Plan  ? Symptoms      Education Disease Management:    ? Comorbidity Management  ? Advance medical directive status   ? Cornet/ Flute   ? TCRS- DB  ?  Abdominal/ purse lip breathing             Pulmonologist consult while IP: no    Palliative consult while IP:    no      PCP/Specialist follow up: Future Appointments  Date Time Provider Aubrie Mills   8/28/2018 10:15 AM Brook Obrien MD [address]     8/20/18           11:00 Guillermina Maoy MD               PCP    Patient aware of appointments     Transportation: Sister /Daughter provides her transportation     COPD Assessment Test (CAT)    Cat Score >10 with  2 Exacerbation in a year ( Dec. 2017 to  Dec. 2018 )  Category D     I never cough 0 1 2 3  X   4 5 I cough all the time   I have no phelgm (mucus) 0 1 2  X 3 4 5 My chest is completely full of phelgm (mucus)   My chest does not feel tight at all    0 1  X 2 3 4 5 My chest feels tight   When I walk up a hill or one flight of stairs I am not breathless   0 1 2 3 4 5  X When I walk up a hill or one flight of stairs I am very breathless   I am not limited doing any activities at home   0 1 2 3 4  X 5 I am very limited doing activities at home    I am confident leaving my home despite my condition  0  X 1 2 3 4 5 I am not at all confident leaving my home because of my lung condition   I sleep soundly  0 1  X 2 3 4 5 I don't sleep soundly because of my lung condition   I have lots of energy   0   1 2 3  X 4   5 I have no energy at all     TOTAL: 19                    GOLD class- 1, 2, 3, 4  / PFT on 1/15/16  FEV 1 30% predicted predrug and 43 % predicted                                              post drug   FEV1  ? GOLD 1(mild)=FEV1 ?80%   predicted  ? GOLD 2 (Moderate)=50% ? FEV 1< 80%  predicted  ? GOLD 3(severe)= 30% ? FEV1 < 50% predicted  ? GOLD 4 (very severe)=<30% predicted      COPD Medications: CONNOR; OSMANY; LAMA; LABA; ICS; PDE4 ; Macrolides ; O2 LTOT @ 2L NC    Group LABA LAMA OSMANY  prn CONNOR  prn LABA+ICS LAMA+ICS LABA+LAMA LABA+LAMA+ICS PDE-4 Inhibitor  Chronic bronchitis, FEV1<50% predicted   A.       X x        B       X x   X     C    x X  x  X     D  x     X  X  Albuterol neb/  inhaler     X  X  Advair  Spiriva X       Disposition of patient:  Home     [facility] and Contact information: [facility]      DME: O2:  Nebulizer; cane   DME Company and Contact Information: [address] at [phone]    Social support: SisterTheresa Beckman and children. Does patient have an Advance Directive:  not on file  ( Per Eulalia Johnson RN NN note on 8/16/18 reports she discussed ACP with patient and encouraged her to bring completed DNR from to f/u Pulmonary Appt.  To scan into her chart. )      Advance Directive scanned into patients chart:  no       Medication reconciliation performed with patient, reviewed discharge instructions and  Educated patient to monitor and report the following  Red flags:   Increased shortness of breath, wheezing not relieved with albuterol, fever, lethargy, increased fatigue, increased cough, change in sputum color or texture, Irregular HR/Palpitations, having to prop up on pillows to sleep or any new or concerning symptoms. Patient Voiced understanding. Patient reports taking her Requip, and  Ativan  As needed twice a day. Denies having Norco medication filled or Rx but takes ES tylenol 1000 mg as needed. Goals      Attends follow-up appointments as directed. Target Date:  8/28/18      Identification of barriers to adherence to a plan of care such as inability to afford medications, lack of insurance, lack of transportation, etc.Target Date:  11/19/18      Improve activity tolerance and quality of life (ie. identify issue such as depression) Target Date:  11/19/18      Prepare patients and caregivers for end of life decisions (ie. need for hospice, pain management, symptom relief, advance directives etc.)Target Date:  8/28/18           Plan:    Patient will attend f/u visit  Patient will either purchase pulse oximetry or NN will supply patient with one when she comes to pulmonary appt. Patient will notify MD with S/S of Red Flags or worsening of symptoms. Patient will complete her antibiotic regimen   NN will evaluate patient depression level with next outreach and as needed throughout episode. NN will remind patient to bring her DNR/ACP with her to f/u Pulmonary  appt. On next outreach. Patient reminded that there are physicians on call 24 hours a day / 7 days a week (M-F 5pm to 8am and from Friday 5pm until Monday 8a for the weekend) should the patient have questions or concerns. Patient reminded to call 911 if situation is emergent or patient feels the situation is emergent. Pt verbalizes understanding. NN routed staff and chart message to Dr. Cris Forrester re: Patient.

## 2018-08-24 ENCOUNTER — PATIENT OUTREACH (OUTPATIENT)
Dept: PULMONOLOGY | Age: 73
End: 2018-08-24

## 2018-08-24 NOTE — PROGRESS NOTES
Minnie Dubon, RNNurse Navigator (NN) contacted Chris Cazares , by telephone to perform COPD Transitions of Care. No answer. Left message introducing self, role and reason for call. Requested return call. Contact information providedVerified Name and  as identifiers.

## 2018-08-28 ENCOUNTER — OFFICE VISIT (OUTPATIENT)
Dept: PULMONOLOGY | Age: 73
End: 2018-08-28

## 2018-08-28 VITALS
HEART RATE: 72 BPM | WEIGHT: 116 LBS | DIASTOLIC BLOOD PRESSURE: 64 MMHG | SYSTOLIC BLOOD PRESSURE: 118 MMHG | TEMPERATURE: 97.5 F | OXYGEN SATURATION: 96 % | RESPIRATION RATE: 20 BRPM | BODY MASS INDEX: 19.33 KG/M2 | HEIGHT: 65 IN

## 2018-08-28 DIAGNOSIS — R64 PULMONARY CACHEXIA DUE TO CHRONIC OBSTRUCTIVE PULMONARY DISEASE (HCC): ICD-10-CM

## 2018-08-28 DIAGNOSIS — Z99.81 HYPOXEMIA REQUIRING SUPPLEMENTAL OXYGEN: ICD-10-CM

## 2018-08-28 DIAGNOSIS — F17.200 TOBACCO DEPENDENCE: ICD-10-CM

## 2018-08-28 DIAGNOSIS — J44.1 COPD WITH ACUTE EXACERBATION (HCC): Primary | ICD-10-CM

## 2018-08-28 DIAGNOSIS — J44.9 PULMONARY CACHEXIA DUE TO CHRONIC OBSTRUCTIVE PULMONARY DISEASE (HCC): ICD-10-CM

## 2018-08-28 DIAGNOSIS — J43.9 PULMONARY EMPHYSEMA, UNSPECIFIED EMPHYSEMA TYPE (HCC): ICD-10-CM

## 2018-08-28 DIAGNOSIS — R09.02 HYPOXEMIA REQUIRING SUPPLEMENTAL OXYGEN: ICD-10-CM

## 2018-08-28 RX ORDER — BENZONATATE 200 MG/1
200 CAPSULE ORAL
Qty: 90 CAP | Refills: 2 | Status: SHIPPED | OUTPATIENT
Start: 2018-08-28 | End: 2018-09-04

## 2018-08-28 NOTE — PROGRESS NOTES
Chief Complaint   Patient presents with    COPD     [facility] [place] [facility]2018    Lung Nodule    Cough    Shortness of Breath       1. Have you been to the ER, urgent care clinic since your last visit? Hospitalized since your last visit? Yes Where: [facility]-August 2018    2. Have you seen or consulted any other health care providers outside of the [address] [place] [address] since your last visit? Include any pap smears or colon screening.  No

## 2018-08-28 NOTE — PROGRESS NOTES
Patient in to see Dr. Sadia Mo for f/u visit s/p hosp. Stay. NN introduced self to patient since only have spoken with patient via phone. Patient with portable oxygen on at 2 L NC with some rattling of congestion in chest ear audible by NN. Patient reports that is my normal.  No distress noted. Patient very friendly and stated, \" I am so glad I got to meet you. \"  NN informed patient that I would follow up with her later this week and NN called her on last Friday but line was busy. Patient stated, \" I told my daughter that something was wrong with the phone because we were not getting any calls. \"      NN also met patient's daughter, Alonso Potts who transported her to office visit.

## 2018-08-28 NOTE — PROGRESS NOTES
HISTORY OF PRESENT ILLNESS  Read Gita is a 72 y.o. female with COPD. HPI Comments: KUNAL visit after discharge for COPD exacerbation on 8/14. Pt is now back on Advair and Spiriva Respimat. Still complains of baseline SOB with minimal exertion, wheezing and tight cough with chest congestion and tenacious phlegm. Hospital CXR reviewed , no acute infiltrate but with hyperinflation. Pt complains of intermittent hemoptysis, known since 2013, no fever or chills. Pt is on home O2, last FEV1 was 30% in Jan 2015. Admits to continued smoking despite several efforts at quitting in the past.     Pt notes no apparent response to maintenance Azithromycin with no change in amount and character of phlegm. Lenon Gash also did not improve symptoms. Pt was Rxed NAC last visit but for some reasons has not started taking this. Pt reports depression due to several personal and family stressors but is trying to remedy these. Review of Systems   Constitutional: Positive for malaise/fatigue. Negative for chills, diaphoresis, fever and weight loss. HENT: Negative for congestion, ear discharge, ear pain, hearing loss, nosebleeds, sinus pain, sore throat and tinnitus. Eyes: Negative for blurred vision, double vision, photophobia, pain, discharge and redness. Respiratory: Positive for cough, hemoptysis, sputum production and shortness of breath. Negative for stridor. Cardiovascular: Negative for chest pain, palpitations, orthopnea, claudication, leg swelling and PND. Gastrointestinal: Negative for blood in stool, constipation, heartburn, melena and nausea. Genitourinary: Negative for flank pain, frequency, hematuria and urgency. Musculoskeletal: Positive for joint pain. Negative for falls. Myalgias: resolved. Skin: Negative for itching. Neurological: Negative for dizziness, tingling, tremors, sensory change, speech change, focal weakness, seizures, loss of consciousness and weakness. Endo/Heme/Allergies: Negative for environmental allergies and polydipsia. Bruises/bleeds easily. Psychiatric/Behavioral: Positive for depression. Negative for hallucinations, memory loss, substance abuse and suicidal ideas. The patient is nervous/anxious and has insomnia. Past Medical History:   Diagnosis Date    Anxiety     Arthritis     Asbestosis ([address]     Asthma     Back problem     Baker's cyst     Balance problems     Chronic lung disease     Cigarette smoker     Clotting disorder (HCC)     COPD (chronic obstructive pulmonary disease) (HCC)     Depression     History of DVT (deep vein thrombosis)     Leg pain     Right    Lung disease     Nausea & vomiting     Osteoporosis     Restless leg syndrome     Spinal stenosis     Vitamin D deficiency      Current Outpatient Prescriptions on File Prior to Visit   Medication Sig Dispense Refill    acetaminophen (TYLENOL EXTRA STRENGTH) 500 mg tablet Take  by mouth every six (6) hours as needed for Pain.  predniSONE (DELTASONE) 50 mg tablet Take 1 Tab by mouth daily. 5 Tab 0    albuterol (PROVENTIL VENTOLIN) 2.5 mg /3 mL (0.083 %) nebulizer solution 3 mL by Nebulization route every four (4) hours as needed for Wheezing or Shortness of Breath. Diag. Code: J44.9, R09.02 375 Each 3    albuterol (PROAIR HFA) 90 mcg/actuation inhaler INHALE 2 PUFFS BY MOUTH EVERY 4 HOURS AS NEEDED FOR WHEEZING OR SHORTNESS OF BREATH 1 Inhaler 6    fluticasone-salmeterol (ADVAIR) 250-50 mcg/dose diskus inhaler Take 1 Puff by inhalation every twelve (12) hours. 1 Inhaler 3    tiotropium bromide (SPIRIVA RESPIMAT) 2.5 mcg/actuation inhaler Take 2 Puffs by inhalation daily. 3 Inhaler 3    LORazepam (ATIVAN) 1 mg tablet Take  by mouth two (2) times a day.  rOPINIRole (REQUIP) 1 mg tablet Take  by mouth two (2) times a day.  OXYGEN-AIR DELIVERY SYSTEMS 2 L by Does Not Apply route. O2 via nasal cannula with bedtime and activity.  O2 Company: [employer]  HYDROcodone-acetaminophen (NORCO) 5-325 mg per tablet Take 1 Tab by mouth every four (4) hours as needed for Pain. Max Daily Amount: 6 Tabs. 15 Tab 0     No current facility-administered medications on file prior to visit. Allergies   Allergen Reactions    Anoro Ellipta [Umeclidinium-Vilanterol] Rash and Other (comments)     Muscle cramps in arms    Aspirin Other (comments)     GI upset and bleeding    Augmentin [Amoxicillin-Pot Clavulanate] Not Reported This Time     Possible cramping    Doxycycline Nausea and Vomiting    Morphine Other (comments)     Neurologic symptoms - severe agitation      Shellfish Derived Unknown (comments)     Pt able to eat small amounts per report     Sulfa (Sulfonamide Antibiotics) Rash     Patient relates no longer allergic     Social History     Social History    Marital status:      Spouse name: N/A    Number of children: N/A    Years of education: N/A     Occupational History    Not on file. Social History Main Topics    Smoking status: Current Some Day Smoker     Packs/day: 0.25     Years: 50.00     Types: Cigarettes     Start date: 10/21/1964    Smokeless tobacco: Never Used    Alcohol use No    Drug use: No    Sexual activity: Not on file     Other Topics Concern    Not on file     Social History Narrative     Blood pressure 118/64, pulse 72, temperature 97.5 °F (36.4 °C), temperature source Oral, resp. rate 20, height 5' 5\" (1.651 m), weight 52.6 kg (116 lb), SpO2 96 %. Physical Exam   Constitutional: She is oriented to person, place, and time. She appears well-developed. No distress. Cachectic   HENT:   Head: Normocephalic and atraumatic. Nose: Nose normal.   Mouth/Throat: No oropharyngeal exudate. Ecchymoses over bridge of nose   Eyes: Conjunctivae and EOM are normal. Pupils are equal, round, and reactive to light. Right eye exhibits no discharge. Left eye exhibits no discharge. No scleral icterus. Neck: No JVD present.  No tracheal deviation present. No thyromegaly present. Cardiovascular: Normal rate, regular rhythm, normal heart sounds and intact distal pulses. Exam reveals no gallop. No murmur heard. Pulmonary/Chest: Effort normal. No stridor. No respiratory distress. Wheezes: lft mid lung field  She has no rales. She exhibits no tenderness. Distant breath sounds with bilateral upper lung field rhonchi. Transmitted upper airway sounds   Abdominal: Soft. She exhibits no mass. There is no tenderness. Musculoskeletal: She exhibits no edema, tenderness or deformity. Lymphadenopathy:     She has no cervical adenopathy. Neurological: She is alert and oriented to person, place, and time. Skin: Skin is warm and dry. No rash noted. She is not diaphoretic. No erythema. Skin tears on both forearms   Psychiatric: She has a normal mood and affect. Her behavior is normal. Judgment and thought content normal.     CT Results (most recent):    Results from Hospital Encounter encounter on 07/25/18   CT CHEST WO CONT   Narrative EXAM: CT scan of the chest without contrast.    CLINICAL HISTORY/INDICATION: Shortness of breath and cough. COMPARISON: CT chest 6/14/2018. TECHNIQUE: All CT scans at this facility are performed using dose optimization  technique as appropriate to a performed exam, to include automated exposure  control, adjustment of the mA and/or kV according to patient's size (including  appropriate matching for site-specific examinations), or use of iterative  reconstruction technique. James Sparks FINDINGS:    A CT scan of the patient's chest is performed without contrast administration. The noncontrast mediastinal images shows no evidence for mediastinal adenopathy. The hilar areas cannot be evaluated. The caliber of the thoracic aorta is within  normal limits. The lobes of the thyroid gland appears symmetric. No evidence for  pericardial effusion is seen.  The partially visualized upper abdominal organs  are unremarkable. Lung window images shows hyperexpansion of the lung fields with moderate  emphysematous changes bilaterally. When compared with the previous study the  patchy areas of focal irregular density in the lateral portion of the right  upper lobe, the medial portion of the basilar right middle lobe and at the base  of the left lower lobe resolved. In the anterior left lower lobe. Pleural  thickening and density has partially resolved. No new infiltrates pulmonary  nodules pleural effusions or pneumothoraces are observed. .         Impression IMPRESSION:    Previously noted patchy areas of density and interstitial thickening have  resolved. In the anterior left lobe pleural thickening and interstitial density has  partially resolved. No new pulmonary nodules infiltrates pleural effusions or pneumothoraces are  seen. No mediastinal adenopathy. .        ASSESSMENT and PLAN  Encounter Diagnoses   Name Primary?  COPD with acute exacerbation ([address] Yes    Pulmonary emphysema, unspecified emphysema type ([address]     Tobacco dependence     Pulmonary cachexia due to chronic obstructive pulmonary disease (HCC)     Hypoxemia requiring supplemental oxygen      Pt improving after hospital discharge. Continue Advair and Spiriva and other medications as listed. Will  Not restart NAC as pt without response. Start Tessalon perles prn, Rx sent    Discussed CT results, no follow up indicated at this time.   RTC 4 months or sooner prn

## 2018-08-31 ENCOUNTER — PATIENT OUTREACH (OUTPATIENT)
Dept: PULMONOLOGY | Age: 73
End: 2018-08-31

## 2018-08-31 NOTE — Clinical Note
JOSEI:  NN spoke with patient today. Patient  with congested cough, wheezing  and reports it is very productive/thick yellow. NN Educated her on alternating rest/activity, TDRS-DB, use of flutter device, increase water intake, avoid heat, use tessalon pearls/nebs/inhalers / and to call 911/or go to ED if symptoms worsen to Red Zone. She denied any temp., chest pain, or  dizziness or at this time. Thanks.

## 2018-08-31 NOTE — PROGRESS NOTES
Melodie Boss, RNNurse Navigator (NN) contacted Nate Mcfadden , by telephone to perform COPD Transitions of Care. Verified Name and  as identifiers. Patient reports :      I am okay. Just short winded today      Productive cough of thick yellow mucus ( NN encouraged patient to increase water intake TCRS- DB /use flutter device and nebs as needed)  Patient voiced understanding and reported she is doing those things. Has her Tessalon pearls but don't want to stop herself from bringing up the mucus. ( NN instructed her to use Tessalon as ordered but jeanne. If needed to due if profusely coughing and to allow herself to rest at night ) Patient voiced understanding. Some wheezing.   ( NN noted patient with frequent congestive cough while speaking via phone.)     Coughing and short winded  For a couple of days. Using oxygen at 2 L NC continuously. Taking all medications as recommended except takes Requip as needed. ( NN encouraged pt. To rinse mouth after using her Advair )   Patient asked if she had to continue on 50 mg Prednisone . ( NN encouraged her to continue prednisone as ordered until seen by Dr. Abigail Blue. ) She voiced understanding. Sleeps in [address] with a couple of pillows: bad for my back but I breath better. ( NN discussed elevation her HOB on blocks or use wedge to elevate self to assist with Breathing)  Patient reports that she has tried all of these with no success. Has a good support system. Her sister stayed with her last night. She tries not to bother her children but if needed they will assist her. SOB with minimal exertion today. ( NN encouraged patient to alternate rest and activity and use of purse lip breathing ) Patient voiced understanding. Holds onto her furniture when mobile. ( NN discussed safety with patient and to use her cane for stability due to furniture is prone to moving when lend against. )  Patient stated, \" okay. \"      Using 2 L oxygen continuously     Denies having pulse oximetry. ( NN notified patient that Maris Cardozo has a pulse oximetry for her to use daily/as needed for SOB and to log finding for NN review.)     She voiced understanding. Eating okay and having good BM /voiding without problems. Patient denies:     Fever/chills    Chest pain    Hemoptysis    Dizziness    Increase HR/Palpitations. Pain at this time    Smoking since being in hospital       NN encouraged patient to go to ED or  Call 911 if symtoms worsen to Red Zone. Patient voiced understanidng. NN spoke with patent's daughter, Gill Florentino. NN asked Gill Florentino to encourage her mother to alternate her rest/activity, stay out of the heat as much as possible due to reported by Gill Florentino that patient has been out in the heat in the past few days, and to take tessalon pearls as directed so she can get some rest.  Gill Florentino stated, \" I will and I am glad you told her that because she doesn't listen and does to much. \"      Noted Priorities:  Patient's checking her oxygen level daily and as needed for Respiratory distress  ( NN sending a pulse oximetry to patient via Luna Whaley, family member,  to check her oxygen levels daily//prn and log for NN to review. Are you using a rescue inhaler? yes, Type  Albuterol , frequency reports used x 1 since d/c home. Nebulizer? yes, frequency:  2-3 times a day     Zone:(Pt Reported)  yellow     Provider Notified: yes      Barriers to care? None noted today      Week 1-4   ( Wk 3 )    Provide Daily Disease Management  (NN initiated)  Daily Zone Identification (symptom management; increased mucus or discolored, fever, increased cough, SOB, activity/sleep changes, BLE)-notify provider as identified   immediately as indicated  ? Comorbidity Management  ? Confirm follow-up appointments/transportation. Reschedule if needed. ? Smoking status  ? GOLD Stage      Additional assessment   ? CAT score  ?  Activity tolerance assessment   (eg: Vital signs; level of consciousness; dyspnea on exertion; pillow usage; recliner vs bed)   ? Energy conservation management (balance activity with rest)  ? Medication Therapy *as directed  ? Diet/appetite assessment  ? ED/Hospital utilization  ? Immunizations up to date  Yes        Pneumonia       Flu       Shingles    ? Transportation  assistance      Psychosocial: Reassurance/emotional support    Monitoring: ( NONE )  ? Home health  ? H2H  ? [facility]  ? My Chart    Education:    ? Support system identification ( eg: Caregiver, meal planning, community resources, family, friends, Buddhist, support group)   ? Health literacy for COPD  ? TCRS-DB  ? Cornet/ Flute   ? Abdominal/ purse lip breathing  ? Caregiver education and preparation    Surgery:  ? NA    If not progressing- inbox Pulmonologist - may need to increase use of NIPPV      Cat Score >10 with  2 Exacerbation in a year ( Dec. 2017 to  Dec. 2018 )  Category D     I never cough 0 1 2 3     4  X 5 I cough all the time   I have no phelgm (mucus) 0 1 2   3  X 4 5 My chest is completely full of phelgm (mucus)   My chest does not feel tight at all    0 1  X 2 3 4 5 My chest feels tight   When I walk up a hill or one flight of stairs I am not breathless   0 1 2 3 4 5  X When I walk up a hill or one flight of stairs I am very breathless   I am not limited doing any activities at home   0 1 2 3 4  X 5 I am very limited doing activities at home    I am confident leaving my home despite my condition  0  X 1 2 3 4 5 I am not at all confident leaving my home because of my lung condition   I sleep soundly  0 1  X 2 3 4 5 I don't sleep soundly because of my lung condition   I have lots of energy   0   1 2 3   4  X   5 I have no energy at all     TOTAL: 21                  GOLD class- 1, 2, 3, 4  / PFT on 1/15/16  FEV 1 30% predicted predrug and 43 % predicted                                              post drug   FEV1  ? GOLD 1(mild)=FEV1 ?80%   predicted  ?  GOLD 2 (Moderate)=50% ? FEV 1< 80%  predicted  ? GOLD 3(severe)= 30% ? FEV1 < 50% predicted  ? GOLD 4 (very severe)=<30% predicted      COPD Medications: CONNOR; OSMANY; LAMA; LABA; ICS; PDE4 ; Macrolides ; O2 LTOT @ 2L NC    Group LABA LAMA OSMANY  prn CONNOR  prn LABA+ICS LAMA+ICS LABA+LAMA LABA+LAMA+ICS PDE-4 Inhibitor  Chronic bronchitis, FEV1<50% predicted   A.       X x        B       X x   X     C    x X  x  X     D  x     X  X  Albuterol neb/  inhaler     X  X  Advair  Spiriva X       Have you been to an ER/Hospital since discharge from the hospital?   {no    Have you followed up with Pulmonology/ PCP?  ? yes      DME: O2: : nebulizer: Pulse oximetry given on 8/31/18     DME Company and Contact Information: Ana Paula Valdivia  [phone]    Social support: Family / lives with her Daughter, Nick Denson. Does patient have an Advance Directive:  not on file  Patient's family member, Alexis Combs will bring a copy of patient's DNR to office to be scanned into chart. Advance Directive scanned into patients chart:  no      Goals      Attends follow-up appointments as directed. [employer] Date:  8/28/18 8/31/18 Attended f/u appt. With Dr. Abigail Blue on 8/28/18.    8/31/18  Attended f/u appt. With PCP office on 8/20/18        Identification of barriers to adherence to a plan of care such as inability to afford medications, lack of insurance, lack of transportation, etc.Target Date:  11/19/18 8/31/18  Not having a pulse oximetry. ( NN discussed why checking oxygen level is important and pulse oximetry sent to patient on 8/31/18 via Family member listing on [employer] .)         Improve activity tolerance and quality of life (ie. identify issue such as depression) Target Date:  11/19/18 8/31/18  NN discuss with patient to alternate her rest and activity, Use of medications as directed  and to stay out of the heat.          Prepare patients and caregivers for end of life decisions (ie. need for hospice, pain management, symptom relief, advance directives etc.)Target Date:  8/28/18 8/31/18  NN addressed ACP with patient again due to not having on file. Patient will send a copy of her DNR by Deanna Bartholomew ( listed on PMI )  to NN to scan into chart. Plan  Patient will notify MD with S/S of Red Flags or worsening of symptoms   Patient will call 911 or go to ED if current symptoms worsen to Red Zone during extended holiday weekend. Pateint will send a copy of her DNR to NN to scan into her chart    Patient will avoid heat due to trigger for COPD / adhere to medication regimen and pulmonary toileting. NN will evaluate patient depression level  as needed throughout episode. NN will send pulse oximetry to patient via family member listed on PMI. NN will continue to monitor. Patient reminded that there is a physician on call 24 hours a day / 7 days a week (M-F 5pm to 8am and from Friday 5pm until Monday 8a for the weekend) should the patient have questions or concerns. Patient reminded to call 911 if situation is emergent or patient feels the situation is emergent. Pt verbalizes understanding. NN routed staff and chart message to Dr. Brian Yip.

## 2018-09-05 ENCOUNTER — PATIENT OUTREACH (OUTPATIENT)
Dept: PULMONOLOGY | Age: 73
End: 2018-09-05

## 2018-09-05 NOTE — PROGRESS NOTES
Monique Saenz RN, Nurse Navigator (NN) contacted Nilesh Polar , by telephone to perform COPD Transitions of Care Week 4. Verified Name and  as identifiers. Patient reports :      I am trying to stay cool and behave myself. Doing better. Doing my breathing treatments    I'll be honest, I  forgot what you told me about this oxygen monitor. When I checked it my lebel was 81- 89 %. NN asked if that was on her 2 L and patient stated, \" no, that was without oxygen \"      NN reeducated patient that always check her levels with oxygen on because she is on it continuously. ( NN reviewed that writer wants her to check her levels at least daily but also if she is having SOB before and after episode and log for NN to review.)  Patient stated, \" okay honey, I will do that. \"  ( NN asked patient what her 02 level was today and she reports having company all day and hasn't checked it but will now. Productive cough of thick yellow mucus ( NN encouraged patient to increase water intake TCRS- DB /use flutter device and nebs as needed)  Patient voiced understanding and reported she is doing those things. Some wheezing.   ( NN noted patient with frequent congestive cough while speaking via phone.)     Using oxygen at 2 L NC continuously. Taking all medications as recommended except takes Requip as needed. ( NN encouraged pt. To rinse mouth after using her Advair )     Sleeps in Recliner with a couple of pillows: bad for my back but I breath better. ( NN discussed elevation her HOB on blocks or use wedge to elevate self to assist with Breathing)  Patient reports that she has tried all of these with no success. SOB with moderate exertion today. ( NN encouraged patient to alternate rest and activity and use of purse lip breathing ) Patient voiced understanding. Patient stated, \" I have to concentrate when I get SOB because I start to Panic/hyperventilate. I tell myself blow out B'day candles. \" Reports she falls against walls due to dizziness sometimes but denies telling any of the doctors. ( NN encouraged patient to inform her PCP, to check her oxygen levels when she feel dizzy and log results, rise slowly from sitting or lying position and use her walker.) patient stated, \" I have a rollator but my son has it at his house. \"  NN encouraged patient to retrieve walker and use for her safety to avoid falls. She voiced understanding. Eating okay and having good BM /voiding without problems. Patient denies:     Fever/chills    Chest pain    Hemoptysis    Dizziness    Increase HR/Palpitations. Pain at this time    Smoking since being in hospital       Noted Priorities:  Patient's checking her oxygen level daily and as needed for Respiratory distress and using rollator walker for safety. Are you using a rescue inhaler? yes, Type  Albuterol , frequency once this wek    Nebulizer? yes, frequency:  2-3 times a day     Zone:(Pt Reported)  yellow     Provider Notified: yes      Barriers to care? None noted today      Week 1-4   ( Wk 3 )    Provide Daily Disease Management  (NN initiated)  Daily Zone Identification (symptom management; increased mucus or discolored, fever, increased cough, SOB, activity/sleep changes, BLE)-notify provider as identified   immediately as indicated  ? Comorbidity Management  ? Confirm follow-up appointments/transportation. Reschedule if needed. ? Smoking status  ? GOLD Stage      Additional assessment   ? CAT score  ? Activity tolerance assessment   (eg: Vital signs; level of consciousness; dyspnea on exertion; pillow usage; recliner vs bed)   ? Energy conservation management (balance activity with rest)  ? Medication Therapy *as directed  ? Diet/appetite assessment  ? ED/Hospital utilization  ? Immunizations up to date  Yes        Pneumonia       Flu       Shingles    ?  Transportation  assistance      Psychosocial: Reassurance/emotional support      Education:    ? Support system identification ( eg: Caregiver, meal planning, community resources, family, friends, Tenriism, support group)   ? Health literacy for COPD  ? TCRS-DB               IS                           Abdominal/ purse lip breathing      If not progressing- inbox Pulmonologist - may need to increase use of NIPPV      Cat Score >10 with  2 Exacerbation in a year ( Dec. 2017 to  Dec. 2018 )  Category D     I never cough 0 1 2   3  X   4   5 I cough all the time   I have no phelgm (mucus) 0 1 2  X 3   4 5 My chest is completely full of phelgm (mucus)   My chest does not feel tight at all    0 1  X 2 3 4 5 My chest feels tight   When I walk up a hill or one flight of stairs I am not breathless   0 1 2 3 4 5  X When I walk up a hill or one flight of stairs I am very breathless   I am not limited doing any activities at home   0 1 2 3 4  X 5 I am very limited doing activities at home    I am confident leaving my home despite my condition  0  X 1 2 3 4 5 I am not at all confident leaving my home because of my lung condition   I sleep soundly  0 1  X 2 3 4 5 I don't sleep soundly because of my lung condition   I have lots of energy   0   1 2 3  X 4     5 I have no energy at all     TOTAL: 19                  GOLD class- 1, 2, 3, 4  / PFT on 1/15/16  FEV 1 30% predicted predrug and 43 % predicted                                              post drug   FEV1  ? GOLD 1(mild)=FEV1 ?80%   predicted  ? GOLD 2 (Moderate)=50% ? FEV 1< 80%  predicted  ? GOLD 3(severe)= 30% ? FEV1 < 50% predicted  ?  GOLD 4 (very severe)=<30% predicted      COPD Medications: CONNOR; OSMANY; LAMA; LABA; ICS; PDE4 ; Macrolides ; O2 LTOT @ 2L NC    Group LABA LAMA OSMANY  prn CONNOR  prn LABA+ICS LAMA+ICS LABA+LAMA LABA+LAMA+ICS PDE-4 Inhibitor  Chronic bronchitis, FEV1<50% predicted   A.       X x        B       X x   X     C    x X  x  X     D  x     X  X  Albuterol neb/  inhaler     X  X  Advair  Spiriva X       Have you been to an ER/Hospital since discharge from the hospital?   {no    Have you followed up with Pulmonology/ PCP?  ? yes      DME: O2: : nebulizer: Pulse oximetry given on 8/31/18     DME Company and Contact Information: Damaris Tavera  [phone]    Social support: Family / lives with her Daughter, Celina Santana. Does patient have an Advance Directive:  NN received a copy of patient's Advance  directive and DDNR to be scanned into the chart     Advance Directive scanned into patients chart:  no but has in office for scanning. Goals      Attends follow-up appointments as directed. Target Date:  8/28/18 8/31/18 Attended f/u appt. With Dr. Srinivasa Treadwell on 8/28/18.    8/31/18  Attended f/u appt. With PCP office on 8/20/18        Identification of barriers to adherence to a plan of care such as inability to afford medications, lack of insurance, lack of transportation, etc.Target Date:  11/19/18 8/31/18  Not having a pulse oximetry. ( NN discussed why checking oxygen level is important and pulse oximetry sent to patient on 8/31/18 via Family member listing on Agustin Yadav .)         Improve activity tolerance and quality of life (ie. identify issue such as depression) Target Date:  11/19/18 8/31/18  NN discuss with patient to alternate her rest and activity, Use of medications as directed  and to stay out of the heat.  Prepare patients and caregivers for end of life decisions (ie. need for hospice, pain management, symptom relief, advance directives etc.)Target Date:  8/28/18 8/31/18  NN addressed ACP with patient again due to not having on file. Patient will send a copy of her DNR by Montse Hannah ( listed on PMI )  to NN to scan into chart. 9/5/18  NN received copy of patient's DDNR and Advance Directive / Marizol Gottlieb will scanned copies into patient's chart.               Plan  Patient will notify MD with S/S of Red Flags or worsening of symptoms   Patient will call 911 or go to ED if current symptoms worsen to Red Zone during extended holiday weekend. Patient will avoid heat due to trigger for COPD / adhere to medication regimen and pulmonary toileting. Patient will check oxygen level daily and as needed and log for NN review and use IS as tolerated. NN will evaluate patient depression level  as needed throughout episode. NN will continue to monitor. Patient reminded that there is a physician on call 24 hours a day / 7 days a week (M-F 5pm to 8am and from Friday 5pm until Monday 8a for the weekend) should the patient have questions or concerns. Patient reminded to call 911 if situation is emergent or patient feels the situation is emergent. Pt verbalizes understanding.

## 2018-09-07 ENCOUNTER — PATIENT OUTREACH (OUTPATIENT)
Dept: PULMONOLOGY | Age: 73
End: 2018-09-07

## 2018-09-10 NOTE — PROGRESS NOTES
Nurse Navigator, ( NN )  contacted patient for f/u. Verified 2 patient identifiers. Introduced self, role and reason for call. Patient reported:      I am okay. Doing better. On 2 L oxygen     I don't thing this machine you give me to check my oxygen is working right . Patient stated, \" I want to bring it to you next week to see if it is working correctly. \"  NN encouraged patent to bring in the pulse ox. I know I will have a problem if I go out into the heat. Taking Mycelex that Dr. Claudean Fire gave me and it has helped my fouth feel better. I am doing my oral care after using my inhaler. Smoking; last on 3 am this morning. Patient stated, \" I went outside on the porch but I couldn't inhale it . I am wasting cigarettes. ( NN asked patient about using Nicotine patches.) she reported trying but only for 1 day and ripped the patch off.)     Patient stated, \" I don't think that I want to quit. apparently if I wanted to , I would have by now. Bad habit . I don't enjoy it. \"  ( NN offered patient different resources but patient decline at this time and NN encouraged patient to speak with her PCP or Pulmonary MD re: options.) She voiced understanding. Denies having her Rolator due to son has not brought it to her. NN discuss POC to decrease outreach to every 2 wks or as needed and patient is in agreement. NN will continue to monitor patient.

## 2018-09-19 ENCOUNTER — APPOINTMENT (OUTPATIENT)
Dept: GENERAL RADIOLOGY | Age: 73
DRG: 871 | End: 2018-09-19
Attending: EMERGENCY MEDICINE
Payer: MEDICARE

## 2018-09-19 ENCOUNTER — HOSPITAL ENCOUNTER (INPATIENT)
Age: 73
LOS: 4 days | Discharge: HOME HEALTH | DRG: 871 | End: 2018-09-24
Attending: EMERGENCY MEDICINE | Admitting: INTERNAL MEDICINE
Payer: MEDICARE

## 2018-09-19 DIAGNOSIS — R10.31 RIGHT LOWER QUADRANT ABDOMINAL PAIN: ICD-10-CM

## 2018-09-19 DIAGNOSIS — J18.9 SEPSIS DUE TO PNEUMONIA (HCC): ICD-10-CM

## 2018-09-19 DIAGNOSIS — J18.9 PNEUMONIA OF LEFT LOWER LOBE DUE TO INFECTIOUS ORGANISM: ICD-10-CM

## 2018-09-19 DIAGNOSIS — J44.9 CHRONIC OBSTRUCTIVE PULMONARY DISEASE, UNSPECIFIED COPD TYPE (HCC): Primary | ICD-10-CM

## 2018-09-19 DIAGNOSIS — A41.9 SEPSIS DUE TO PNEUMONIA (HCC): ICD-10-CM

## 2018-09-19 DIAGNOSIS — C18.9 COLON CANCER WITHOUT DISTANT METASTASIS (HCC): ICD-10-CM

## 2018-09-19 LAB — LIPASE SERPL-CCNC: 166 U/L (ref 73–393)

## 2018-09-19 PROCEDURE — 71045 X-RAY EXAM CHEST 1 VIEW: CPT

## 2018-09-19 PROCEDURE — 93005 ELECTROCARDIOGRAM TRACING: CPT

## 2018-09-19 PROCEDURE — 99285 EMERGENCY DEPT VISIT HI MDM: CPT

## 2018-09-19 PROCEDURE — 80053 COMPREHEN METABOLIC PANEL: CPT | Performed by: EMERGENCY MEDICINE

## 2018-09-19 PROCEDURE — 83690 ASSAY OF LIPASE: CPT | Performed by: EMERGENCY MEDICINE

## 2018-09-19 PROCEDURE — 85025 COMPLETE CBC W/AUTO DIFF WBC: CPT | Performed by: EMERGENCY MEDICINE

## 2018-09-19 PROCEDURE — 94762 N-INVAS EAR/PLS OXIMTRY CONT: CPT

## 2018-09-19 RX ORDER — SODIUM CHLORIDE 0.9 % (FLUSH) 0.9 %
5-10 SYRINGE (ML) INJECTION AS NEEDED
Status: DISCONTINUED | OUTPATIENT
Start: 2018-09-19 | End: 2018-09-24 | Stop reason: HOSPADM

## 2018-09-19 NOTE — Clinical Note
Status[de-identified] Inpatient [101] Type of Bed: Telemetry [19] Inpatient Hospitalization Certified Necessary for the Following Reasons: 3. Patient receiving treatment that can only be provided in an inpatient setting (further clarification in H&P documentation) Admitting Diagnosis: Pneumonia [[id number]] Admitting Diagnosis: Sepsis due to pneumonia [facility] [[id number]] Admitting Diagnosis: Abdominal pain [[id number]] Admitting Diagnosis: COPD (chronic obstructive pulmonary disease) ([facility] [[id number]] Admitting Physician: Mihaela Barillas [[id number]] Attending Physician: Mihaela Barillas [[id number]] Estimated Length of Stay: 2 Midnights Discharge Plan[de-identified] Home with Office Follow-up

## 2018-09-19 NOTE — IP AVS SNAPSHOT
[address] Patient: Elvis Oliveira MRN: [id number] [facility]:7/0/8438 About your hospitalization You were admitted on:  September 20, 2018 You last received care in the:  [facility] [address] [facility] You were discharged on:  September 24, 2018 Why you were hospitalized Your primary diagnosis was:  Not on File Your diagnoses also included:  Copd (Chronic Obstructive Pulmonary Disease) (Hcc), Pneumonia, Abdominal Pain, Sepsis Due To Pneumonia (Hcc), Abdominal Mass, Colon Cancer Without Distant Metastasis (Hcc), Colon Polyps, Pre-Operative Cardiovascular Examination Follow-up Information Follow up With Details Comments Contact Info Calvin Guido MD On 10/3/2018 @9:20AM [facility] [address] 
[phone] Rita Malcolm MD On 9/27/2018 @8:00AM [facility] 
[phone] Theresa Greer MD   [address] [place] [address] 
[phone] Your Scheduled Appointments Thursday September 27, 2018  8:30 AM EDT New Patient with Rita Malcolm MD  
[facility] ([facility]) Critical access hospital 469 [facility] 240 200 [facility]  
[phone] Thursday October 04, 2018 10:15 AM EDT TRANSITIONAL CARE MANAGEMENT with Ron Farmer MD  
[address] ([address]) [address] [place] [address]  
[phone] Discharge Orders None A check gerardo indicates which time of day the medication should be taken. My Medications START taking these medications Instructions Each Dose to Equal  
 Morning Noon Evening Bedtime  
 therapeutic multivitamin tablet Commonly known as:  [facility] Your last dose was: Your next dose is: Take 1 Tab by mouth daily. 1 Tab CHANGE how you take these medications Instructions Each Dose to Equal  
 Morning Noon Evening Bedtime  
 predniSONE 20 mg tablet Commonly known as:  Alexa Gonzales What changed:   
- medication strength 
- how much to take Your last dose was: Your next dose is: Take 2 Tabs by mouth daily for 2 days. 40 mg CONTINUE taking these medications Instructions Each Dose to Equal  
 Morning Noon Evening Bedtime * albuterol 90 mcg/actuation inhaler Commonly known as:  PROAIR HFA Your last dose was: Your next dose is:    
   
   
 INHALE 2 PUFFS BY MOUTH EVERY 4 HOURS AS NEEDED FOR WHEEZING OR SHORTNESS OF BREATH  
     
   
   
   
  
 * albuterol 2.5 mg /3 mL (0.083 %) nebulizer solution Commonly known as:  PROVENTIL VENTOLIN Your last dose was: Your next dose is:    
   
   
 3 mL by Nebulization route every four (4) hours as needed for Wheezing or Shortness of Breath. Diag. Code: J44.9, R09.02  
 2.5 mg  
    
   
   
   
  
 fluticasone-salmeterol 250-50 mcg/dose diskus inhaler Commonly known as:  ADVAIR Your last dose was: Your next dose is: Take 1 Puff by inhalation every twelve (12) hours. 1 Puff HYDROcodone-acetaminophen 5-325 mg per tablet Commonly known as:  Lenon Gauze Your last dose was: Your next dose is: Take 1 Tab by mouth every four (4) hours as needed for Pain. Max Daily Amount: 6 Tabs. 1 Tab LORazepam 1 mg tablet Commonly known as:  ATIVAN Your last dose was: Your next dose is: Take  by mouth two (2) times a day. OXYGEN-AIR DELIVERY SYSTEMS Your last dose was: Your next dose is:    
   
   
 2 L by Does Not Apply route.  O2 via nasal cannula with bedtime and activity. O2 Company: Dennieerica Fox 2 L  
    
   
   
   
  
 rOPINIRole 1 mg tablet Commonly known as:  Thao Rizzo Your last dose was: Your next dose is: Take  by mouth two (2) times a day. tiotropium bromide 2.5 mcg/actuation inhaler Commonly known as:  Savnanah Aschoff Your last dose was: Your next dose is: Take 2 Puffs by inhalation daily. 2 Puff TYLENOL EXTRA STRENGTH 500 mg tablet Generic drug:  acetaminophen Your last dose was: Your next dose is: Take  by mouth every six (6) hours as needed for Pain. * Notice: This list has 2 medication(s) that are the same as other medications prescribed for you. Read the directions carefully, and ask your doctor or other care provider to review them with you. Where to Get Your Medications Information on where to get these meds will be given to you by the nurse or doctor. ! Ask your nurse or doctor about these medications  
  predniSONE 20 mg tablet  
 therapeutic multivitamin tablet Opioid Education Prescription Opioids: What You Need to Know: 
 
Prescription opioids can be used to help relieve moderate-to-severe pain and are often prescribed following a surgery or injury, or for certain health conditions. These medications can be an important part of treatment but also come with serious risks. Opioids are strong pain medicines. Examples include hydrocodone, oxycodone, fentanyl, and morphine. Heroin is an example of an illegal opioid. It is important to work with your health care provider to make sure you are getting the safest, most effective care. WHAT ARE THE RISKS AND SIDE EFFECTS OF OPIOID USE? Prescription opioids carry serious risks of addiction and overdose, especially with prolonged use.   An opioid overdose, often marked by slow breathing, can cause sudden death. The use of prescription opioids can have a number of side effects as well, even when taken as directed. · Tolerance-meaning you might need to take more of a medication for the same pain relief · Physical dependence-meaning you have symptoms of withdrawal when the medication is stopped. Withdrawal symptoms can include nausea, sweating, chills, diarrhea, stomach cramps, and muscle aches. Withdrawal can last up to several weeks, depending on which drug you took and how long you took it. · Increased sensitivity to pain · Constipation · Nausea, vomiting, and dry mouth · Sleepiness and dizziness · Confusion · Depression · Low levels of testosterone that can result in lower sex drive, energy, and strength · Itching and sweating RISKS ARE GREATER WITH:      
· History of drug misuse, substance use disorder, or overdose · Mental health conditions (such as depression or anxiety) · Sleep apnea · Older age (72 years or older) · Pregnancy Avoid alcohol while taking prescription opioids. Also, unless specifically advised by your health care provider, medications to avoid include: · Benzodiazepines (such as Xanax or Valium) · Muscle relaxants (such as Soma or Flexeril) · Hypnotics (such as Ambien or Lunesta) · Other prescription opioids KNOW YOUR OPTIONS Talk to your health care provider about ways to manage your pain that don't involve prescription opioids. Some of these options may actually work better and have fewer risks and side effects. Consult your physician before adding or stopping any medications, treatments, or physical activity. Options may include: 
· Pain relievers such as acetaminophen, ibuprofen, and naproxen · Some medications that are also used for depression or seizures · Physical therapy and exercise · Counseling to help patients learn how to cope better with triggers of pain and stress. · Application of heat or cold compress · Massage therapy · Relaxation techniques Be Informed Make sure you know the name of your medication, how much and how often to take it, and its potential risks & side effects. IF YOU ARE PRESCRIBED OPIOIDS FOR PAIN: 
· Never take opioids in greater amounts or more often than prescribed. Remember the goal is not to be pain-free but to manage your pain at a tolerable level. · Follow up with your primary care provider to: · Work together to create a plan on how to manage your pain. · Talk about ways to help manage your pain that don't involve prescription opioids. · Talk about any and all concerns and side effects. · Help prevent misuse and abuse. · Never sell or share prescription opioids · Help prevent misuse and abuse. · Store prescription opioids in a secure place and out of reach of others (this may include visitors, children, friends, and family). · Safely dispose of unused/unwanted prescription opioids: Find your community drug take-back program or your pharmacy mail-back program, or flush them down the toilet, following guidance from the Food and Drug Administration (www.fda.gov/Drugs/ResourcesForYou). · Visit www.cdc.gov/drugoverdose to learn about the risks of opioid abuse and overdose. · If you believe you may be struggling with addiction, tell your health care provider and ask for guidance or call [address] [place] [address] at [phone]. Discharge Instructions My[employer] Activation Thank you for requesting access to [employer]. Please follow the instructions below to securely access and download your online medical record. [employer] allows you to send messages to your doctor, view your test results, renew your prescriptions, schedule appointments, and more. How Do I Sign Up? 1. In your internet browser, go to [email or web address] 
 2. Click on the First Time User? Click Here link in the Sign In box. You will be redirect to the New Member Sign Up page. 3. Enter your [employer] Access Code exactly as it appears below. You will not need to use this code after youve completed the sign-up process. If you do not sign up before the expiration date, you must request a new code. [employer] Access Code: [email or web address] Expires: 10/17/2018 10:29 AM (This is the date your [employer] access code will ) 4. Enter the last four digits of your Social Security Number (xxxx) and Date of Birth (mm/dd/yyyy) as indicated and click Submit. You will be taken to the next sign-up page. 5. Create a [employer] ID. This will be your [employer] login ID and cannot be changed, so think of one that is secure and easy to remember. 6. Create a [employer] password. You can change your password at any time. 7. Enter your Password Reset Question and Answer. This can be used at a later time if you forget your password. 8. Enter your e-mail address. You will receive e-mail notification when new information is available in [address] 9. Click Sign Up. You can now view and download portions of your medical record. 10. Click the Download Summary menu link to download a portable copy of your medical information. Additional Information If you have questions, please visit the Frequently Asked Questions section of the [employer] website at [email or web address] Remember, [employer] is NOT to be used for urgent needs. For medical emergencies, dial 911. Patient armband removed and shredded DISCHARGE SUMMARY from Nurse PATIENT INSTRUCTIONS: 
 
 
F-face looks uneven A-arms unable to move or move unevenly S-speech slurred or non-existent T-time-call 911 as soon as signs and symptoms begin-DO NOT go Back to bed or wait to see if you get better-TIME IS BRAIN. Warning Signs of HEART ATTACK Call 911 if you have these symptoms: 
? Chest discomfort. Most heart attacks involve discomfort in the center of the chest that lasts more than a few minutes, or that goes away and comes back. It can feel like uncomfortable pressure, squeezing, fullness, or pain. ? Discomfort in other areas of the upper body. Symptoms can include pain or discomfort in one or both arms, the back, neck, jaw, or stomach. ? Shortness of breath with or without chest discomfort. ? Other signs may include breaking out in a cold sweat, nausea, or lightheadedness. Don't wait more than five minutes to call [address]! Fast action can save your life. Calling 911 is almost always the fastest way to get lifesaving treatment. Emergency Medical Services staff can begin treatment when they arrive  up to an hour sooner than if someone gets to the hospital by car. The discharge information has been reviewed with the patient. The patient verbalized understanding. Discharge medications reviewed with the patient and appropriate educational materials and side effects teaching were provided. ___________________________________________________________________________________________________________________________________ Abdominal Pain: Care Instructions Your Care Instructions Abdominal pain has many possible causes. Some aren't serious and get better on their own in a few days. Others need more testing and treatment. If your pain continues or gets worse, you need to be rechecked and may need more tests to find out what is wrong. You may need surgery to correct the problem.  
Don't ignore new symptoms, such as fever, nausea and vomiting, urination problems, pain that gets worse, and dizziness. These may be signs of a more serious problem. Your doctor may have recommended a follow-up visit in the next 8 to 12 hours. If you are not getting better, you may need more tests or treatment. The doctor has checked you carefully, but problems can develop later. If you notice any problems or new symptoms, get medical treatment right away. Follow-up care is a key part of your treatment and safety. Be sure to make and go to all appointments, and call your doctor if you are having problems. It's also a good idea to know your test results and keep a list of the medicines you take. How can you care for yourself at home? · Rest until you feel better. · To prevent dehydration, drink plenty of fluids, enough so that your urine is light yellow or clear like water. Choose water and other caffeine-free clear liquids until you feel better. If you have kidney, heart, or liver disease and have to limit fluids, talk with your doctor before you increase the amount of fluids you drink. · If your stomach is upset, eat mild foods, such as rice, dry toast or crackers, bananas, and applesauce. Try eating several small meals instead of two or three large ones. · Wait until 48 hours after all symptoms have gone away before you have spicy foods, alcohol, and drinks that contain caffeine. · Do not eat foods that are high in fat. · Avoid anti-inflammatory medicines such as aspirin, ibuprofen (Advil, Motrin), and naproxen (Aleve). These can cause stomach upset. Talk to your doctor if you take daily aspirin for another health problem. When should you call for help? Call 911 anytime you think you may need emergency care. For example, call if: 
  · You passed out (lost consciousness).  
  · You pass maroon or very bloody stools.  
  · You vomit blood or what looks like coffee grounds.  
  · You have new, severe belly pain.  
 Call your doctor now or seek immediate medical care if:   · Your pain gets worse, especially if it becomes focused in one area of your belly.  
  · You have a new or higher fever.  
  · Your stools are black and look like tar, or they have streaks of blood.  
  · You have unexpected vaginal bleeding.  
  · You have symptoms of a urinary tract infection. These may include: 
¨ Pain when you urinate. ¨ Urinating more often than usual. 
¨ Blood in your urine.  
  · You are dizzy or lightheaded, or you feel like you may faint.  
 Watch closely for changes in your health, and be sure to contact your doctor if: 
  · You are not getting better after 1 day (24 hours). Where can you learn more? Go to http://robert-jorge.info/. Enter T338 in the search box to learn more about \"Abdominal Pain: Care Instructions. \" Current as of: November 20, 2017 Content Version: 11.7 © 5409-5518 [employer]. Care instructions adapted under license by [employer] (which disclaims liability or warranty for this information). If you have questions about a medical condition or this instruction, always ask your healthcare professional. [address] any warranty or liability for your use of this information. Learning About Colon Cancer What is colon cancer? Colon cancer happens when cells that are not normal grow in your colon. These cells grow together and form tumors. Colon cancer occurs most often in people older than 48. As with other cancers, treatment works best when colon cancer is found early. What happens when you have colon cancer? Most cases begin as polyps, which are small growths inside the colon. These polyps are very common, and most of them do not turn into cancer. Colon cancer usually grows very slowly. It usually takes years for the cancer to become large enough to cause symptoms.  If the cancer is not removed and keeps growing, it eventually will invade and destroy nearby tissues and then spread farther, first to nearby lymph nodes. From there it may spread to other parts of the body. What are the symptoms? Colon cancer in its early stages usually doesn't cause any symptoms. Symptoms occur later, when the cancer may be harder to treat. The most common symptoms include: 
· Pain in the belly. · Blood in your stool or very dark stools. · A change in your bowel habits, such as more frequent stools or a feeling that your bowels are not emptying completely. · Always feeling tired. How can you preventcolon cancer? Screening tests can find or prevent many cases of colon cancer. They look for a certain disease or condition before any symptoms appear. Screening tests that may find colon cancer early include: · Stool tests, such as the fecal immunochemical test or the fecal occult blood test. 
· Sigmoidoscopy, which lets your doctor look at the inside of the lower part of your colon using a lighted tube. · Colonoscopy, which lets your doctor look at the inside of your entire colon using a thin, flexible tube. Experts recommend colon cancer testing for everyone age 48 and older who has a normal risk for colon cancer. People with a higher risk, such as those with a strong family history of colon cancer, should be tested sooner. Talk to your doctor about when you should be tested. Here are other things you can do to help prevent colon cancer: · Watch your weight. Being very overweight may increase your chance of getting colon cancer. · Eat well. Eat more whole grains, fruits, vegetables, poultry, and fish. And eat less red meat, refined grains, and sweets. · Limit drinking. Drink less than 2 alcohol drinks a day. · Get active. Keep up a physically active lifestyle. · Quit smoking. If you smoke cigarettes, quit smoking to reduce your chance of getting colon cancer. How is colon cancer treated? · Surgery is almost always used to treat colon cancer.  The cancer is more easily removed when it is found early. · If the cancer has spread into the wall of the colon or farther, you may also need radiation or chemotherapy. Follow-up care is a key part of your treatment and safety. Be sure to make and go to all appointments, and call your doctor if you are having problems. It's also a good idea to know your test results and keep a list of the medicines you take. Where can you learn more? Go to http://robert-jorge.info/. Enter M414 in the search box to learn more about \"Learning About Colon Cancer. \" Current as of: May 12, 2017 Content Version: 11.7 © 0645-9743 [employer]. Care instructions adapted under license by [employer] (which disclaims liability or warranty for this information). If you have questions about a medical condition or this instruction, always ask your healthcare professional. [place] [address] any warranty or liability for your use of this information. Colonoscopy: What to Expect at [facility] Your Recovery After you have a colonoscopy, you will stay at the clinic for 1 to 2 hours until the medicines wear off. Then you can go home. But you will need to arrange for a ride. Your doctor will tell you when you can eat and do your other usual activities. Your doctor will talk to you about when you will need your next colonoscopy. Your doctor can help you decide how often you need to be checked. This will depend on the results of your test and your risk for colorectal cancer. After the test, you may be bloated or have gas pains. You may need to pass gas. If a biopsy was done or a polyp was removed, you may have streaks of blood in your stool (feces) for a few days. Problems such as heavy rectal bleeding may not occur until several weeks after the test. This isn't common. But it can happen after polyps are removed.  
This care sheet gives you a general idea about how long it will take for you to recover. But each person recovers at a different pace. Follow the steps below to get better as quickly as possible. How can you care for yourself at home? Activity 
  · Rest when you feel tired.  
  · You can do your normal activities when it feels okay to do so. Diet 
  · Follow your doctor's directions for eating.  
  · Unless your doctor has told you not to, drink plenty of fluids. This helps to replace the fluids that were lost during the colon prep.  
  · Do not drink alcohol. Medicines 
  · Your doctor will tell you if and when you can restart your medicines. He or she will also give you instructions about taking any new medicines.  
  · If you take blood thinners, such as warfarin (Coumadin), clopidogrel (Plavix), or aspirin, be sure to talk to your doctor. He or she will tell you if and when to start taking those medicines again. Make sure that you understand exactly what your doctor wants you to do.  
  · If polyps were removed or a biopsy was done during the test, your doctor may tell you not to take aspirin or other anti-inflammatory medicines for a few days. These include ibuprofen (Advil, Motrin) and naproxen (Aleve). Other instructions 
  · For your safety, do not drive or operate machinery until the medicine wears off and you can think clearly. Your doctor may tell you not to drive or operate machinery until the day after your test.  
  · Do not sign legal documents or make major decisions until the medicine wears off and you can think clearly. The anesthesia can make it hard for you to fully understand what you are agreeing to. Follow-up care is a key part of your treatment and safety. Be sure to make and go to all appointments, and call your doctor if you are having problems. It's also a good idea to know your test results and keep a list of the medicines you take. When should you call for help? Call 911 anytime you think you may need emergency care. For example, call if:   · You passed out (lost consciousness).  
  · You pass maroon or bloody stools.  
  · You have trouble breathing.  
 Call your doctor now or seek immediate medical care if: 
  · You have pain that does not get better after you take pain medicine.  
  · You are sick to your stomach or cannot drink fluids.  
  · You have new or worse belly pain.  
  · You have blood in your stools.  
  · You have a fever.  
  · You cannot pass stools or gas.  
 Watch closely for changes in your health, and be sure to contact your doctor if you have any problems. Where can you learn more? Go to http://robert-jorge.info/. Enter E264 in the search box to learn more about \"Colonoscopy: What to Expect at Home. \" Current as of: May 12, 2017 Content Version: 11.7 © 0114-1852 [employer]. Care instructions adapted under license by [employer]You (which disclaims liability or warranty for this information). If you have questions about a medical condition or this instruction, always ask your healthcare professional. [address] any warranty or liability for your use of this information. Chronic Obstructive Pulmonary Disease (COPD): Care Instructions Your Care Instructions Chronic obstructive pulmonary disease (COPD) is a general term for a group of lung diseases, including emphysema and chronic bronchitis. People with COPD have decreased airflow in and out of the lungs, which makes it hard to breathe. The airways also can get clogged with thick mucus. Cigarette smoking is a major cause of COPD. Although there is no cure for COPD, you can slow its progress. Following your treatment plan and taking care of yourself can help you feel better and live longer. Follow-up care is a key part of your treatment and safety.  Be sure to make and go to all appointments, and call your doctor if you are having problems. It's also a good idea to know your test results and keep a list of the medicines you take. How can you care for yourself at home? 
 Staying healthy 
  · Do not smoke. This is the most important step you can take to prevent more damage to your lungs. If you need help quitting, talk to your doctor about stop-smoking programs and medicines. These can increase your chances of quitting for good.  
  · Avoid colds and flu. Get a pneumococcal vaccine shot. If you have had one before, ask your doctor whether you need a second dose. Get the flu vaccine every fall. If you must be around people with colds or the flu, wash your hands often.  
  · Avoid secondhand smoke, air pollution, and high altitudes. Also avoid cold, dry air and hot, humid air. Stay at home with your windows closed when air pollution is bad.  
 Medicines and oxygen therapy 
  · Take your medicines exactly as prescribed. Call your doctor if you think you are having a problem with your medicine.  
  · You may be taking medicines such as: ¨ Bronchodilators. These help open your airways and make breathing easier. Bronchodilators are either short-acting (work for 6 to 9 hours) or long-acting (work for 24 hours). You inhale most bronchodilators, so they start to act quickly. Always carry your quick-relief inhaler with you in case you need it while you are away from home. ¨ Corticosteroids (prednisone, budesonide). These reduce airway inflammation. They come in pill or inhaled form. You must take these medicines every day for them to work well.  
  · A spacer may help you get more inhaled medicine to your lungs. Ask your doctor or pharmacist if a spacer is right for you. If it is, ask how to use it properly.  
  · Do not take any vitamins, over-the-counter medicine, or herbal products without talking to your doctor first.  
  · If your doctor prescribed antibiotics, take them as directed.  Do not stop taking them just because you feel better. You need to take the full course of antibiotics.  
  · Oxygen therapy boosts the amount of oxygen in your blood and helps you breathe easier. Use the flow rate your doctor has recommended, and do not change it without talking to your doctor first.  
Activity 
  · Get regular exercise. Walking is an easy way to get exercise. Start out slowly, and walk a little more each day.  
  · Pay attention to your breathing. You are exercising too hard if you cannot talk while you are exercising.  
  · Take short rest breaks when doing household chores and other activities.  
  · Learn breathing methods-such as breathing through pursed lips-to help you become less short of breath.  
  · If your doctor has not set you up with a pulmonary rehabilitation program, talk to him or her about whether rehab is right for you. Rehab includes exercise programs, education about your disease and how to manage it, help with diet and other changes, and emotional support. Diet 
  · Eat regular, healthy meals. Use bronchodilators about 1 hour before you eat to make it easier to eat. Eat several small meals instead of three large ones. Drink beverages at the end of the meal. Avoid foods that are hard to chew.  
  · Eat foods that contain protein so that you do not lose muscle mass.  
  · Talk with your doctor if you gain too much weight or if you lose weight without trying.  
 Mental health 
  · Talk to your family, friends, or a therapist about your feelings. It is normal to feel frightened, angry, hopeless, helpless, and even guilty. Talking openly about bad feelings can help you cope. If these feelings last, talk to your doctor. When should you call for help? Call 911 anytime you think you may need emergency care. For example, call if: 
  · You have severe trouble breathing.  
 Call your doctor now or seek immediate medical care if: 
  · You have new or worse trouble breathing.   · You cough up blood.  
  · You have a fever.  
 Watch closely for changes in your health, and be sure to contact your doctor if: 
  · You cough more deeply or more often, especially if you notice more mucus or a change in the color of your mucus.  
  · You have new or worse swelling in your legs or belly.  
  · You are not getting better as expected. Where can you learn more? Go to http://robert-jorge.info/. Сергей Darnell in the search box to learn more about \"Chronic Obstructive Pulmonary Disease (COPD): Care Instructions. \" Current as of: December 6, 2017 Content Version: 11.7 © 1164-7024 [employer]. Care instructions adapted under license by [employer] (which disclaims liability or warranty for this information). If you have questions about a medical condition or this instruction, always ask your healthcare professional. [address] any warranty or liability for your use of this information. [facility] Announcement We are excited to announce that we are making your provider's discharge notes available to you in [facility]. You will see these notes when they are completed and signed by the physician that discharged you from your recent hospital stay. If you have any questions or concerns about any information you see in [facility], please call the Health Information Department where you were seen or reach out to your Primary Care Provider for more information about your plan of care. Introducing [facility] [place] [facility]! [employer] introduces [facility] patient portal. Now you can access parts of your medical record, email your doctor's office, and request medication refills online. 1. In your internet browser, go to [email or web address]/[email or web address] Platformhart 2. Click on the First Time User? Click Here link in the Sign In box. You will see the New Member Sign Up page. 3. Enter your [facility] Access Code exactly as it appears below. You will not need to use this code after youve completed the sign-up process. If you do not sign up before the expiration date, you must request a new code. · [facility] Access Code: [email or web address] Expires: 10/17/2018 10:29 AM 
 
4. Enter the last four digits of your Social Security Number (xxxx) and Date of Birth (mm/dd/yyyy) as indicated and click Submit. You will be taken to the next sign-up page. 5. Create a NPMt ID. This will be your [facility] login ID and cannot be changed, so think of one that is secure and easy to remember. 6. Create a [facility] password. You can change your password at any time. 7. Enter your Password Reset Question and Answer. This can be used at a later time if you forget your password. 8. Enter your e-mail address. You will receive e-mail notification when new information is available in [facility] [address] 9. Click Sign Up. You can now view and download portions of your medical record. 10. Click the Download Summary menu link to download a portable copy of your medical information. If you have questions, please visit the Frequently Asked Questions section of the [facility] website. Remember, [facility] is NOT to be used for urgent needs. For medical emergencies, dial 911. Now available from your iPhone and Android! Introducing Jacky Ramierz As a [employer] patient, I wanted to make you aware of our electronic visit tool called Jacky Shawshaniqua. [employer] 24/7 allows you to connect within minutes with a medical provider 24 hours a day, seven days a week via a mobile device or tablet or logging into a secure website from your computer. You can access Jacky Ramirez from anywhere in the [place].  
 
A virtual visit might be right for you when you have a simple condition and feel like you just dont want to get out of bed, or cant get away from work for an appointment, when your regular [employer] provider is not available (evenings, weekends or holidays), or when youre out of town and need minor care. Electronic visits cost only $49 and if the [employer] 24/7 provider determines a prescription is needed to treat your condition, one can be electronically transmitted to a nearby pharmacy*. Please take a moment to enroll today if you have not already done so. The enrollment process is free and takes just a few minutes. To enroll, please download the [employer] 24/7 zay to your tablet or phone, or visit [email or web address] to enroll on your computer. And, as an [address] [place] [address] patient with a [employer] account, the results of your visits will be scanned into your electronic medical record and your primary care provider will be able to view the scanned results. We urge you to continue to see your regular [employer] provider for your ongoing medical care. And while your primary care provider may not be the one available when you seek a [employer] virtual visit, the peace of mind you get from getting a real diagnosis real time can be priceless. For more information on commercetools, view our Frequently Asked Questions (FAQs) at [email or web address]. Sincerely, 
 
Ada Cabral MD 
Chief Medical Officer [employer] *:  certain medications cannot be prescribed via commercetools Unresulted Labs-Please follow up with your PCP about these lab tests Order Current Status CULTURE, BLOOD Preliminary result CULTURE, BLOOD Preliminary result Providers Seen During Your Hospitalization Provider Specialty Primary office phone Yolanda Thomas MD Emergency Medicine [phone] Jonathan Tam MD [facility] [place], [facility] [phone] Endy Lucero MD Internal Medicine [phone] Delaney Thorpe MD Internal Medicine [phone] Your Primary Care Physician (PCP) Primary Care Physician Office Phone Office Fax Hilario Kingston  [phone] You are allergic to the following Allergen Reactions Anoro Ellipta (Umeclidinium-Vilanterol) Rash Other (comments) Muscle cramps in arms Aspirin Other (comments) GI upset and bleeding Augmentin (Amoxicillin-Pot Clavulanate) Not Reported This Time Possible cramping Doxycycline Nausea and Vomiting Morphine Other (comments) Neurologic symptoms - severe agitation Shellfish Derived Unknown (comments) Pt able to eat small amounts per report Sulfa (Sulfonamide Antibiotics) Rash Patient relates no longer allergic Recent Documentation Height Weight BMI OB Status Smoking Status 1.676 m 53.1 kg 18.88 kg/m2 Hysterectomy Current Some Day Smoker Emergency Contacts Name Discharge Info Relation Home Work Mobile [facility] DISCHARGE CAREGIVER [3] Son [22] [phone] [facility] DISCHARGE CAREGIVER [3] Sister [23] [phone] Patient Belongings The following personal items are in your possession at time of discharge: 
  Dental Appliances: None  Visual Aid: None      Home Medications: None   Jewelry: None  Clothing: Undergarments, Pajamas, With patient, Footwear    Other Valuables: Cell Phone, Other (comment) (oxygen tank from home) Discharge Instructions Attachments/References MEFS - PREDNISONE (PREDNISONE INTENSOL, PREDNICOT, DELTASONE, DANIELLE) - (BY MOUTH) (ENGLISH) MEFS - MULTIVITAMINS WITH MINERALS (AIRBORNE, BASIC'S WOMEN'S MULTIVITAMIN HEALTH FORMULA, CALTRATE PLUS, BUGS BUNNY COMPLETE) - (BY MOUTH) (ENGLISH) Patient Handouts Prednisone (predniSONE Intensol, Prednicot, Deltasone, Danielle) - (By mouth) Why this medicine is used:  
Treats many diseases and conditions, especially problems related to inflammation. Contact a nurse or doctor right away if you have: 
· Rapid weight gain; swelling in your hands, ankles, or feet · Severe stomach pain, nausea, vomiting, or red or black stools · Depression, unusual thoughts, feelings, or behaviors, trouble sleeping · Fever, chills, cough, sore throat, and body aches · Trouble seeing, eye pain Common side effects: 
· Increased appetite, weight gain · Round, puffy face · Muscle pain, weakness © 2017 [address] Information is for End User's use only and may not be sold, redistributed or otherwise used for commercial purposes. Multivitamins with Minerals (Airborne, Basic's Women's Multivitamin Health Formula, Caltrate Plus, Bugs Bunny Complete) - (By mouth) Why this medicine is used:  
Multivitamins with minerals are a dietary supplement. Contact a nurse or doctor right away if you have: 
· Nausea, vomiting, constipation, weakness · Painful or aching bones Common side effects: 
· White, brown, or black spots on the teeth © 2017 [address] Information is for End User's use only and may not be sold, redistributed or otherwise used for commercial purposes. Please provide this summary of care documentation to your next provider. Signatures-by signing, you are acknowledging that this After Visit Summary has been reviewed with you and you have received a copy. Patient Signature:  ____________________________________________________________ Date:  ____________________________________________________________  
  
Madelin Andreea Provider Signature:  ____________________________________________________________ Date:  ____________________________________________________________

## 2018-09-19 NOTE — IP AVS SNAPSHOT
[address] Patient: Monse Marcus MRN: [id number] A check gerardo indicates which time of day the medication should be taken. My Medications START taking these medications Instructions Each Dose to Equal  
 Morning Noon Evening Bedtime  
 therapeutic multivitamin tablet Commonly known as:  [facility] Your last dose was: Your next dose is: Take 1 Tab by mouth daily. 1 Tab CHANGE how you take these medications Instructions Each Dose to Equal  
 Morning Noon Evening Bedtime  
 predniSONE 20 mg tablet Commonly known as:  Eleanor Riggs What changed:   
- medication strength 
- how much to take Your last dose was: Your next dose is: Take 2 Tabs by mouth daily for 2 days. 40 mg CONTINUE taking these medications Instructions Each Dose to Equal  
 Morning Noon Evening Bedtime * albuterol 90 mcg/actuation inhaler Commonly known as:  PROAIR HFA Your last dose was: Your next dose is:    
   
   
 INHALE 2 PUFFS BY MOUTH EVERY 4 HOURS AS NEEDED FOR WHEEZING OR SHORTNESS OF BREATH  
     
   
   
   
  
 * albuterol 2.5 mg /3 mL (0.083 %) nebulizer solution Commonly known as:  PROVENTIL VENTOLIN Your last dose was: Your next dose is:    
   
   
 3 mL by Nebulization route every four (4) hours as needed for Wheezing or Shortness of Breath. Diag. Code: J44.9, R09.02  
 2.5 mg  
    
   
   
   
  
 fluticasone-salmeterol 250-50 mcg/dose diskus inhaler Commonly known as:  ADVAIR Your last dose was: Your next dose is: Take 1 Puff by inhalation every twelve (12) hours. 1 Puff HYDROcodone-acetaminophen 5-325 mg per tablet Commonly known as:  Gerry Adkins Your last dose was: Your next dose is: Take 1 Tab by mouth every four (4) hours as needed for Pain. Max Daily Amount: 6 Tabs. 1 Tab LORazepam 1 mg tablet Commonly known as:  ATIVAN Your last dose was: Your next dose is: Take  by mouth two (2) times a day. OXYGEN-AIR DELIVERY SYSTEMS Your last dose was: Your next dose is:    
   
   
 2 L by Does Not Apply route. O2 via nasal cannula with bedtime and activity. O2 Company: Allylix 2 L  
    
   
   
   
  
 rOPINIRole 1 mg tablet Commonly known as:  Todd Beck Your last dose was: Your next dose is: Take  by mouth two (2) times a day. tiotropium bromide 2.5 mcg/actuation inhaler Commonly known as:  Verlon Girish Your last dose was: Your next dose is: Take 2 Puffs by inhalation daily. 2 Puff TYLENOL EXTRA STRENGTH 500 mg tablet Generic drug:  acetaminophen Your last dose was: Your next dose is: Take  by mouth every six (6) hours as needed for Pain. * Notice: This list has 2 medication(s) that are the same as other medications prescribed for you. Read the directions carefully, and ask your doctor or other care provider to review them with you. Where to Get Your Medications Information on where to get these meds will be given to you by the nurse or doctor. ! Ask your nurse or doctor about these medications  
  predniSONE 20 mg tablet  
 therapeutic multivitamin tablet

## 2018-09-20 ENCOUNTER — APPOINTMENT (OUTPATIENT)
Dept: CT IMAGING | Age: 73
DRG: 871 | End: 2018-09-20
Attending: NURSE PRACTITIONER
Payer: MEDICARE

## 2018-09-20 PROBLEM — R19.00 ABDOMINAL MASS: Status: ACTIVE | Noted: 2018-09-20

## 2018-09-20 PROBLEM — J18.9 PNEUMONIA: Status: ACTIVE | Noted: 2018-09-20

## 2018-09-20 PROBLEM — A41.9 SEPSIS DUE TO PNEUMONIA (HCC): Status: ACTIVE | Noted: 2018-09-20

## 2018-09-20 PROBLEM — J18.9 SEPSIS DUE TO PNEUMONIA (HCC): Status: ACTIVE | Noted: 2018-09-20

## 2018-09-20 PROBLEM — R10.9 ABDOMINAL PAIN: Status: ACTIVE | Noted: 2018-09-20

## 2018-09-20 PROBLEM — J44.9 COPD (CHRONIC OBSTRUCTIVE PULMONARY DISEASE) (HCC): Status: ACTIVE | Noted: 2018-09-20

## 2018-09-20 LAB
ALBUMIN SERPL-MCNC: 2.9 G/DL (ref 3.4–5)
ALBUMIN SERPL-MCNC: 3.9 G/DL (ref 3.4–5)
ALBUMIN/GLOB SERPL: 0.9 {RATIO} (ref 0.8–1.7)
ALBUMIN/GLOB SERPL: 0.9 {RATIO} (ref 0.8–1.7)
ALP SERPL-CCNC: 73 U/L (ref 45–117)
ALP SERPL-CCNC: 97 U/L (ref 45–117)
ALT SERPL-CCNC: 14 U/L (ref 13–56)
ALT SERPL-CCNC: 19 U/L (ref 13–56)
ANION GAP SERPL CALC-SCNC: 6 MMOL/L (ref 3–18)
ANION GAP SERPL CALC-SCNC: 6 MMOL/L (ref 3–18)
APPEARANCE UR: CLEAR
AST SERPL-CCNC: 13 U/L (ref 15–37)
AST SERPL-CCNC: 16 U/L (ref 15–37)
ATRIAL RATE: 93 BPM
BACTERIA URNS QL MICRO: NEGATIVE /HPF
BASOPHILS # BLD: 0 K/UL (ref 0–0.06)
BASOPHILS # BLD: 0 K/UL (ref 0–0.1)
BASOPHILS NFR BLD: 0 % (ref 0–2)
BASOPHILS NFR BLD: 0 % (ref 0–3)
BILIRUB SERPL-MCNC: 0.6 MG/DL (ref 0.2–1)
BILIRUB SERPL-MCNC: 0.7 MG/DL (ref 0.2–1)
BILIRUB UR QL: NEGATIVE
BUN SERPL-MCNC: 15 MG/DL (ref 7–18)
BUN SERPL-MCNC: 20 MG/DL (ref 7–18)
BUN/CREAT SERPL: 22 (ref 12–20)
BUN/CREAT SERPL: 25 (ref 12–20)
CALCIUM SERPL-MCNC: 7.9 MG/DL (ref 8.5–10.1)
CALCIUM SERPL-MCNC: 9.2 MG/DL (ref 8.5–10.1)
CALCULATED P AXIS, ECG09: 78 DEGREES
CALCULATED R AXIS, ECG10: 84 DEGREES
CALCULATED T AXIS, ECG11: 58 DEGREES
CHLORIDE SERPL-SCNC: 106 MMOL/L (ref 100–108)
CHLORIDE SERPL-SCNC: 98 MMOL/L (ref 100–108)
CO2 SERPL-SCNC: 27 MMOL/L (ref 21–32)
CO2 SERPL-SCNC: 31 MMOL/L (ref 21–32)
COLOR UR: YELLOW
CREAT SERPL-MCNC: 0.6 MG/DL (ref 0.6–1.3)
CREAT SERPL-MCNC: 0.89 MG/DL (ref 0.6–1.3)
DIAGNOSIS, 93000: NORMAL
DIFFERENTIAL METHOD BLD: ABNORMAL
DIFFERENTIAL METHOD BLD: ABNORMAL
EOSINOPHIL # BLD: 0 K/UL (ref 0–0.4)
EOSINOPHIL # BLD: 0.2 K/UL (ref 0–0.4)
EOSINOPHIL NFR BLD: 0 % (ref 0–5)
EOSINOPHIL NFR BLD: 1 % (ref 0–5)
EPITH CASTS URNS QL MICRO: NORMAL /LPF (ref 0–5)
ERYTHROCYTE [DISTWIDTH] IN BLOOD BY AUTOMATED COUNT: 13.7 % (ref 11.6–14.5)
ERYTHROCYTE [DISTWIDTH] IN BLOOD BY AUTOMATED COUNT: 13.7 % (ref 11.6–14.5)
GLOBULIN SER CALC-MCNC: 3.2 G/DL (ref 2–4)
GLOBULIN SER CALC-MCNC: 4.3 G/DL (ref 2–4)
GLUCOSE BLD STRIP.AUTO-MCNC: 149 MG/DL (ref 70–110)
GLUCOSE SERPL-MCNC: 112 MG/DL (ref 74–99)
GLUCOSE SERPL-MCNC: 119 MG/DL (ref 74–99)
GLUCOSE UR STRIP.AUTO-MCNC: NEGATIVE MG/DL
HCT VFR BLD AUTO: 37.5 % (ref 35–45)
HCT VFR BLD AUTO: 45.5 % (ref 35–45)
HGB BLD-MCNC: 12.4 G/DL (ref 12–16)
HGB BLD-MCNC: 15.3 G/DL (ref 12–16)
HGB UR QL STRIP: NEGATIVE
HYALINE CASTS URNS QL MICRO: NORMAL /LPF (ref 0–2)
KETONES UR QL STRIP.AUTO: NEGATIVE MG/DL
LACTATE BLD-SCNC: 1.2 MMOL/L (ref 0.4–2)
LEUKOCYTE ESTERASE UR QL STRIP.AUTO: ABNORMAL
LYMPHOCYTES # BLD: 1.4 K/UL (ref 0.9–3.6)
LYMPHOCYTES # BLD: 1.5 K/UL (ref 0.8–3.5)
LYMPHOCYTES NFR BLD: 7 % (ref 20–51)
LYMPHOCYTES NFR BLD: 8 % (ref 21–52)
MCH RBC QN AUTO: 32.6 PG (ref 24–34)
MCH RBC QN AUTO: 33.1 PG (ref 24–34)
MCHC RBC AUTO-ENTMCNC: 33.1 G/DL (ref 31–37)
MCHC RBC AUTO-ENTMCNC: 33.6 G/DL (ref 31–37)
MCV RBC AUTO: 98.5 FL (ref 74–97)
MCV RBC AUTO: 98.7 FL (ref 74–97)
MONOCYTES # BLD: 1.1 K/UL (ref 0.05–1.2)
MONOCYTES # BLD: 1.9 K/UL (ref 0–1)
MONOCYTES NFR BLD: 7 % (ref 3–10)
MONOCYTES NFR BLD: 9 % (ref 2–9)
NEUTS SEG # BLD: 14.4 K/UL (ref 1.8–8)
NEUTS SEG # BLD: 17.7 K/UL (ref 1.8–8)
NEUTS SEG NFR BLD: 83 % (ref 42–75)
NEUTS SEG NFR BLD: 85 % (ref 40–73)
NITRITE UR QL STRIP.AUTO: NEGATIVE
P-R INTERVAL, ECG05: 120 MS
PH UR STRIP: 8 [PH] (ref 5–8)
PLATELET # BLD AUTO: 278 K/UL (ref 135–420)
PLATELET # BLD AUTO: 330 K/UL (ref 135–420)
PLATELET COMMENTS,PCOM: ABNORMAL
PMV BLD AUTO: 9.8 FL (ref 9.2–11.8)
PMV BLD AUTO: 9.9 FL (ref 9.2–11.8)
POTASSIUM SERPL-SCNC: 3.9 MMOL/L (ref 3.5–5.5)
POTASSIUM SERPL-SCNC: 4.1 MMOL/L (ref 3.5–5.5)
PROT SERPL-MCNC: 6.1 G/DL (ref 6.4–8.2)
PROT SERPL-MCNC: 8.2 G/DL (ref 6.4–8.2)
PROT UR STRIP-MCNC: NEGATIVE MG/DL
Q-T INTERVAL, ECG07: 342 MS
QRS DURATION, ECG06: 82 MS
QTC CALCULATION (BEZET), ECG08: 425 MS
RBC # BLD AUTO: 3.8 M/UL (ref 4.2–5.3)
RBC # BLD AUTO: 4.62 M/UL (ref 4.2–5.3)
RBC #/AREA URNS HPF: NORMAL /HPF (ref 0–5)
RBC MORPH BLD: ABNORMAL
SODIUM SERPL-SCNC: 135 MMOL/L (ref 136–145)
SODIUM SERPL-SCNC: 139 MMOL/L (ref 136–145)
SP GR UR REFRACTOMETRY: 1.02 (ref 1–1.03)
UROBILINOGEN UR QL STRIP.AUTO: 1 EU/DL (ref 0.2–1)
VENTRICULAR RATE, ECG03: 93 BPM
WBC # BLD AUTO: 17 K/UL (ref 4.6–13.2)
WBC # BLD AUTO: 21.3 K/UL (ref 4.6–13.2)
WBC URNS QL MICRO: NORMAL /HPF (ref 0–5)

## 2018-09-20 PROCEDURE — 87040 BLOOD CULTURE FOR BACTERIA: CPT | Performed by: NURSE PRACTITIONER

## 2018-09-20 PROCEDURE — 80053 COMPREHEN METABOLIC PANEL: CPT | Performed by: INTERNAL MEDICINE

## 2018-09-20 PROCEDURE — 96375 TX/PRO/DX INJ NEW DRUG ADDON: CPT

## 2018-09-20 PROCEDURE — 74011000250 HC RX REV CODE- 250: Performed by: INTERNAL MEDICINE

## 2018-09-20 PROCEDURE — 74011000250 HC RX REV CODE- 250: Performed by: NURSE PRACTITIONER

## 2018-09-20 PROCEDURE — 74011250636 HC RX REV CODE- 250/636

## 2018-09-20 PROCEDURE — 74177 CT ABD & PELVIS W/CONTRAST: CPT

## 2018-09-20 PROCEDURE — 65270000029 HC RM PRIVATE

## 2018-09-20 PROCEDURE — 96361 HYDRATE IV INFUSION ADD-ON: CPT

## 2018-09-20 PROCEDURE — 74011250636 HC RX REV CODE- 250/636: Performed by: NURSE PRACTITIONER

## 2018-09-20 PROCEDURE — 83605 ASSAY OF LACTIC ACID: CPT

## 2018-09-20 PROCEDURE — 82962 GLUCOSE BLOOD TEST: CPT

## 2018-09-20 PROCEDURE — 81001 URINALYSIS AUTO W/SCOPE: CPT | Performed by: NURSE PRACTITIONER

## 2018-09-20 PROCEDURE — 96374 THER/PROPH/DIAG INJ IV PUSH: CPT

## 2018-09-20 PROCEDURE — 74011636320 HC RX REV CODE- 636/320: Performed by: EMERGENCY MEDICINE

## 2018-09-20 PROCEDURE — 74011250637 HC RX REV CODE- 250/637: Performed by: INTERNAL MEDICINE

## 2018-09-20 PROCEDURE — 87086 URINE CULTURE/COLONY COUNT: CPT | Performed by: NURSE PRACTITIONER

## 2018-09-20 PROCEDURE — 74011250636 HC RX REV CODE- 250/636: Performed by: INTERNAL MEDICINE

## 2018-09-20 PROCEDURE — 85025 COMPLETE CBC W/AUTO DIFF WBC: CPT | Performed by: INTERNAL MEDICINE

## 2018-09-20 RX ORDER — PREDNISONE 10 MG/1
10 TABLET ORAL DAILY
Status: ON HOLD | COMMUNITY
End: 2018-09-24

## 2018-09-20 RX ORDER — SODIUM CHLORIDE 0.9 % (FLUSH) 0.9 %
5-10 SYRINGE (ML) INJECTION EVERY 8 HOURS
Status: DISCONTINUED | OUTPATIENT
Start: 2018-09-21 | End: 2018-09-22 | Stop reason: HOSPADM

## 2018-09-20 RX ORDER — IPRATROPIUM BROMIDE AND ALBUTEROL SULFATE 2.5; .5 MG/3ML; MG/3ML
3 SOLUTION RESPIRATORY (INHALATION) ONCE
Status: COMPLETED | OUTPATIENT
Start: 2018-09-20 | End: 2018-09-20

## 2018-09-20 RX ORDER — SODIUM CHLORIDE FOR INHALATION 10 %
5 VIAL, NEBULIZER (ML) INHALATION 3 TIMES DAILY
Status: DISCONTINUED | OUTPATIENT
Start: 2018-09-20 | End: 2018-09-21

## 2018-09-20 RX ORDER — MAGNESIUM CITRATE
296 SOLUTION, ORAL ORAL
Status: COMPLETED | OUTPATIENT
Start: 2018-09-20 | End: 2018-09-20

## 2018-09-20 RX ORDER — HYDROMORPHONE HYDROCHLORIDE 1 MG/ML
0.5 INJECTION, SOLUTION INTRAMUSCULAR; INTRAVENOUS; SUBCUTANEOUS
Status: DISCONTINUED | OUTPATIENT
Start: 2018-09-20 | End: 2018-09-24 | Stop reason: HOSPADM

## 2018-09-20 RX ORDER — SODIUM CHLORIDE 0.9 % (FLUSH) 0.9 %
5-10 SYRINGE (ML) INJECTION AS NEEDED
Status: DISCONTINUED | OUTPATIENT
Start: 2018-09-21 | End: 2018-09-22 | Stop reason: HOSPADM

## 2018-09-20 RX ORDER — SODIUM CHLORIDE 0.9 % (FLUSH) 0.9 %
5-10 SYRINGE (ML) INJECTION AS NEEDED
Status: DISCONTINUED | OUTPATIENT
Start: 2018-09-20 | End: 2018-09-24 | Stop reason: HOSPADM

## 2018-09-20 RX ORDER — HYDROMORPHONE HYDROCHLORIDE 2 MG/ML
0.5 INJECTION, SOLUTION INTRAMUSCULAR; INTRAVENOUS; SUBCUTANEOUS ONCE
Status: COMPLETED | OUTPATIENT
Start: 2018-09-20 | End: 2018-09-20

## 2018-09-20 RX ORDER — ACETAMINOPHEN 325 MG/1
650 TABLET ORAL
Status: DISCONTINUED | OUTPATIENT
Start: 2018-09-20 | End: 2018-09-24 | Stop reason: HOSPADM

## 2018-09-20 RX ORDER — SODIUM CHLORIDE, SODIUM LACTATE, POTASSIUM CHLORIDE, CALCIUM CHLORIDE 600; 310; 30; 20 MG/100ML; MG/100ML; MG/100ML; MG/100ML
75 INJECTION, SOLUTION INTRAVENOUS CONTINUOUS
Status: DISPENSED | OUTPATIENT
Start: 2018-09-21 | End: 2018-09-22

## 2018-09-20 RX ORDER — NALOXONE HYDROCHLORIDE 0.4 MG/ML
0.4 INJECTION, SOLUTION INTRAMUSCULAR; INTRAVENOUS; SUBCUTANEOUS AS NEEDED
Status: DISCONTINUED | OUTPATIENT
Start: 2018-09-20 | End: 2018-09-24 | Stop reason: HOSPADM

## 2018-09-20 RX ORDER — IPRATROPIUM BROMIDE AND ALBUTEROL SULFATE 2.5; .5 MG/3ML; MG/3ML
3 SOLUTION RESPIRATORY (INHALATION)
Status: DISCONTINUED | OUTPATIENT
Start: 2018-09-20 | End: 2018-09-21

## 2018-09-20 RX ORDER — HEPARIN SODIUM 5000 [USP'U]/ML
5000 INJECTION, SOLUTION INTRAVENOUS; SUBCUTANEOUS EVERY 8 HOURS
Status: DISCONTINUED | OUTPATIENT
Start: 2018-09-20 | End: 2018-09-24 | Stop reason: HOSPADM

## 2018-09-20 RX ORDER — HYDROMORPHONE HYDROCHLORIDE 1 MG/ML
0.5 INJECTION, SOLUTION INTRAMUSCULAR; INTRAVENOUS; SUBCUTANEOUS ONCE
Status: COMPLETED | OUTPATIENT
Start: 2018-09-20 | End: 2018-09-20

## 2018-09-20 RX ORDER — POLYETHYLENE GLYCOL 3350 17 G/17G
17 POWDER, FOR SOLUTION ORAL ONCE
Status: COMPLETED | OUTPATIENT
Start: 2018-09-20 | End: 2018-09-20

## 2018-09-20 RX ORDER — INSULIN LISPRO 100 [IU]/ML
INJECTION, SOLUTION INTRAVENOUS; SUBCUTANEOUS ONCE
Status: ACTIVE | OUTPATIENT
Start: 2018-09-21 | End: 2018-09-21

## 2018-09-20 RX ORDER — ONDANSETRON 2 MG/ML
INJECTION INTRAMUSCULAR; INTRAVENOUS
Status: COMPLETED
Start: 2018-09-20 | End: 2018-09-20

## 2018-09-20 RX ORDER — HYDROMORPHONE HYDROCHLORIDE 2 MG/ML
0.5 INJECTION, SOLUTION INTRAMUSCULAR; INTRAVENOUS; SUBCUTANEOUS ONCE
Status: DISCONTINUED | OUTPATIENT
Start: 2018-09-20 | End: 2018-09-20

## 2018-09-20 RX ORDER — LIDOCAINE HYDROCHLORIDE 10 MG/ML
0.1 INJECTION, SOLUTION EPIDURAL; INFILTRATION; INTRACAUDAL; PERINEURAL AS NEEDED
Status: DISCONTINUED | OUTPATIENT
Start: 2018-09-21 | End: 2018-09-22 | Stop reason: HOSPADM

## 2018-09-20 RX ORDER — ONDANSETRON 2 MG/ML
4 INJECTION INTRAMUSCULAR; INTRAVENOUS
Status: COMPLETED | OUTPATIENT
Start: 2018-09-20 | End: 2018-09-20

## 2018-09-20 RX ORDER — SODIUM CHLORIDE 0.9 % (FLUSH) 0.9 %
5-10 SYRINGE (ML) INJECTION EVERY 8 HOURS
Status: DISCONTINUED | OUTPATIENT
Start: 2018-09-20 | End: 2018-09-24 | Stop reason: HOSPADM

## 2018-09-20 RX ORDER — ALBUTEROL SULFATE 0.83 MG/ML
2.5 SOLUTION RESPIRATORY (INHALATION)
Status: DISCONTINUED | OUTPATIENT
Start: 2018-09-20 | End: 2018-09-24 | Stop reason: HOSPADM

## 2018-09-20 RX ORDER — HYDROMORPHONE HYDROCHLORIDE 1 MG/ML
INJECTION, SOLUTION INTRAMUSCULAR; INTRAVENOUS; SUBCUTANEOUS
Status: COMPLETED
Start: 2018-09-20 | End: 2018-09-20

## 2018-09-20 RX ADMIN — SODIUM CHLORIDE 1000 ML: 900 INJECTION, SOLUTION INTRAVENOUS at 00:49

## 2018-09-20 RX ADMIN — IOPAMIDOL 100 ML: 612 INJECTION, SOLUTION INTRAVENOUS at 02:00

## 2018-09-20 RX ADMIN — SODIUM CHLORIDE 500 MG: 900 INJECTION, SOLUTION INTRAVENOUS at 05:07

## 2018-09-20 RX ADMIN — HYDROMORPHONE HYDROCHLORIDE 0.5 MG: 2 INJECTION, SOLUTION INTRAMUSCULAR; INTRAVENOUS; SUBCUTANEOUS at 04:48

## 2018-09-20 RX ADMIN — IPRATROPIUM BROMIDE AND ALBUTEROL SULFATE 3 ML: .5; 3 SOLUTION RESPIRATORY (INHALATION) at 12:07

## 2018-09-20 RX ADMIN — SODIUM CHLORIDE 10 ML: 9 INJECTION INTRAMUSCULAR; INTRAVENOUS; SUBCUTANEOUS at 06:29

## 2018-09-20 RX ADMIN — METHYLPREDNISOLONE SODIUM SUCCINATE 60 MG: 40 INJECTION, POWDER, FOR SOLUTION INTRAMUSCULAR; INTRAVENOUS at 14:00

## 2018-09-20 RX ADMIN — HEPARIN SODIUM 5000 UNITS: 5000 INJECTION, SOLUTION INTRAVENOUS; SUBCUTANEOUS at 06:26

## 2018-09-20 RX ADMIN — SODIUM CHLORIDE 10 ML: 9 INJECTION INTRAMUSCULAR; INTRAVENOUS; SUBCUTANEOUS at 18:13

## 2018-09-20 RX ADMIN — SODIUM CHLORIDE 10 ML: 9 INJECTION INTRAMUSCULAR; INTRAVENOUS; SUBCUTANEOUS at 23:49

## 2018-09-20 RX ADMIN — IPRATROPIUM BROMIDE AND ALBUTEROL SULFATE 3 ML: .5; 3 SOLUTION RESPIRATORY (INHALATION) at 16:34

## 2018-09-20 RX ADMIN — HYDROMORPHONE HYDROCHLORIDE 0.5 MG: 1 INJECTION, SOLUTION INTRAMUSCULAR; INTRAVENOUS; SUBCUTANEOUS at 01:43

## 2018-09-20 RX ADMIN — POLYETHYLENE GLYCOL 3350 17 G: 17 POWDER, FOR SOLUTION ORAL at 19:25

## 2018-09-20 RX ADMIN — Medication 5 ML: at 16:34

## 2018-09-20 RX ADMIN — METHYLPREDNISOLONE SODIUM SUCCINATE 60 MG: 40 INJECTION, POWDER, FOR SOLUTION INTRAMUSCULAR; INTRAVENOUS at 23:48

## 2018-09-20 RX ADMIN — METHYLPREDNISOLONE SODIUM SUCCINATE 125 MG: 125 INJECTION, POWDER, FOR SOLUTION INTRAMUSCULAR; INTRAVENOUS at 05:02

## 2018-09-20 RX ADMIN — MAGNESIUM CITRATE 296 ML: 1.75 LIQUID ORAL at 19:22

## 2018-09-20 RX ADMIN — HEPARIN SODIUM 5000 UNITS: 5000 INJECTION, SOLUTION INTRAVENOUS; SUBCUTANEOUS at 15:30

## 2018-09-20 RX ADMIN — IPRATROPIUM BROMIDE AND ALBUTEROL SULFATE 3 ML: .5; 3 SOLUTION RESPIRATORY (INHALATION) at 05:00

## 2018-09-20 RX ADMIN — HYDROMORPHONE HYDROCHLORIDE 0.5 MG: 1 INJECTION, SOLUTION INTRAMUSCULAR; INTRAVENOUS; SUBCUTANEOUS at 16:37

## 2018-09-20 RX ADMIN — ONDANSETRON 4 MG: 2 INJECTION INTRAMUSCULAR; INTRAVENOUS at 01:42

## 2018-09-20 RX ADMIN — HYDROMORPHONE HYDROCHLORIDE 0.5 MG: 1 INJECTION, SOLUTION INTRAMUSCULAR; INTRAVENOUS; SUBCUTANEOUS at 20:52

## 2018-09-20 RX ADMIN — CEFEPIME HYDROCHLORIDE 1 G: 1 INJECTION, POWDER, FOR SOLUTION INTRAMUSCULAR; INTRAVENOUS at 01:05

## 2018-09-20 RX ADMIN — HYDROMORPHONE HYDROCHLORIDE 0.5 MG: 1 INJECTION, SOLUTION INTRAMUSCULAR; INTRAVENOUS; SUBCUTANEOUS at 12:09

## 2018-09-20 RX ADMIN — IPRATROPIUM BROMIDE AND ALBUTEROL SULFATE 3 ML: .5; 3 SOLUTION RESPIRATORY (INHALATION) at 08:45

## 2018-09-20 RX ADMIN — SODIUM CHLORIDE 587 ML: 900 INJECTION, SOLUTION INTRAVENOUS at 00:49

## 2018-09-20 RX ADMIN — ACETAMINOPHEN 650 MG: 325 TABLET ORAL at 18:04

## 2018-09-20 NOTE — CONSULTS
[email or web address]  [phone]    GASTROENTEROLOGY CONSULT      Impression:   1. Abdominal pain - RLQ  2. Abnormal CT - notable for soft tissue mass in medial wall of cecum  3. Pneumonia - management as per medical team  4. COPD on home O2      Plan:     1. Soap suds enema x2  2. Start Golytely prep tonight, slip dose  3. Clear liquids today  4. NPO after midnight  5. Plan for colonoscopy tomorrow      Chief Complaint: lower abdominal pain       HPI:  Bienvenido Hi is a 72 y.o. female who I am being asked to see in consultation for an opinion regarding lower abdominal pain that she localizes to her RLQ, has been present for the past 6 weeks but worsening over the last 2 days. She reports a 50 lb weight loss over the last few years unintentionally and intermittent pencil thin stools. Denies fevers, hematochezia, constipation, or diarrhea. No upper abdominal complaints. Last colonoscopy was by Dr. Mirna Neal 7 years ago with a history of numerous polyps one of which has been pre-cancerous and colonoscopies every 3 years.      PMH:   Past Medical History:   Diagnosis Date    Anxiety     Arthritis     Asbestosis ([address]     Asthma     Back problem     Baker's cyst     Balance problems     Chronic lung disease     Cigarette smoker     Clotting disorder (HCC)     COPD (chronic obstructive pulmonary disease) (HCC)     Depression     History of DVT (deep vein thrombosis)     Leg pain     Right    Lung disease     Nausea & vomiting     Osteoporosis     Restless leg syndrome     Spinal stenosis     Vitamin D deficiency        PSH:   Past Surgical History:   Procedure Laterality Date    HX APPENDECTOMY      HX BACK SURGERY      x4    HX BREAST BIOPSY      HX HYSTERECTOMY      HX LUMBAR LAMINECTOMY      HX MENISCUS REPAIR      HX ORTHOPAEDIC      Knee    HX POLYPECTOMY      HX TONSILLECTOMY      HX VEIN STRIPPING         Social HX:   Social History     Social History    Marital status:      Spouse name: N/A    Number of children: N/A    Years of education: N/A     Occupational History    Not on file.      Social History Main Topics    Smoking status: Current Some Day Smoker     Packs/day: 0.25     Years: 50.00     Types: Cigarettes     Start date: 10/21/1964    Smokeless tobacco: Never Used    Alcohol use No    Drug use: No    Sexual activity: Not on file     Other Topics Concern    Not on file     Social History Narrative       FHX:   Family History   Problem Relation Age of Onset    Cancer Father     Heart Disease Mother     Hypertension Mother     Cancer Brother     Cancer Sister     Diabetes Other     Hypertension Other     Stroke Other     Heart Disease Sister     Hypertension Sister     Heart Disease Brother        Allergy:   Allergies   Allergen Reactions    Anoro Ellipta [Umeclidinium-Vilanterol] Rash and Other (comments)     Muscle cramps in arms    Aspirin Other (comments)     GI upset and bleeding    Augmentin [Amoxicillin-Pot Clavulanate] Not Reported This Time     Possible cramping    Doxycycline Nausea and Vomiting    Morphine Other (comments)     Neurologic symptoms - severe agitation      Shellfish Derived Unknown (comments)     Pt able to eat small amounts per report     Sulfa (Sulfonamide Antibiotics) Rash     Patient relates no longer allergic       Patient Active Problem List   Diagnosis Code    COPD, severe ([address] J44.9    Nicotine addiction F17.200    Hemoptysis R04.2    Lumbar spinal stenosis M48.061    Facet arthritis of lumbar region ([address] M46.96    Neuritis of lower extremity G57.90    Emphysema of lung ([address] J43.9    COPD with acute exacerbation ([address] J44.1    Muscle spasm of back M62.830    Sacroiliac joint pain M53.3    Current chronic use of systemic steroids Z79.52    Baker's cyst of knee M71.20    Acute exacerbation of chronic obstructive pulmonary disease (COPD) ([address] J44.1    Degenerative disc disease, lumbar M51.36    Left-sided low back pain with sciatica M54.42    COPD with exacerbation ([facility]) J44.1    COPD (chronic obstructive pulmonary disease) ([facility]) J44.9    Pneumonia J18.9    Abdominal pain R10.9    Sepsis due to pneumonia ([facility] J18.9, A41.9    Abdominal mass R19.00       Home Medications:       (Not in a hospital admission)    Review of Systems:     Constitutional:  + unintentional weight loss, No fevers, chills, fatigue. Skin: No rashes, pruritis, jaundice, ulcerations, erythema. HENT: No headaches, nosebleeds, sinus pressure, rhinorrhea, sore throat. Eyes: No visual changes, blurred vision, eye pain, photophobia, jaundice. Cardiovascular: No chest pain, heart palpitations. Respiratory: No cough, SOB, wheezing, chest discomfort, orthopnea. Gastrointestinal: Abdominal pain RLQ, thin stools   Genitourinary: No dysuria, bleeding, discharge, pyuria. Musculoskeletal: No weakness, arthralgias, wasting. Endo: No sweats. Heme: No bruising, easy bleeding. Allergies: As noted. Neurological: Cranial nerves intact. Alert and oriented. Gait not assessed. Psychiatric:  No anxiety, depression, hallucinations. Visit Vitals    /63 (BP 1 Location: Left arm, BP Patient Position: At rest)    Pulse 81    Temp 98.2 °F (36.8 °C)    Resp 18    Wt 52.9 kg (116 lb 9 oz)    SpO2 92%    BMI 19.4 kg/m2       Physical Assessment:     constitutional: appearance: well developed, well nourished, normal habitus, no deformities, in no acute distress. skin: inspection: no rashes, ulcers, icterus or other lesions; no clubbing or telangiectasias. eyes: inspection: normal conjunctivae and lids; no jaundice pupils: normal  ENMT: mouth: normal oral mucosa,lips and gums; good dentition. neck: thyroid: normal size, consistency and position; no masses or tenderness. respiratory: effort: normal chest excursion; no intercostal retraction or accessory muscle use. Cardiovascular: normal rate & rhythm; no thrill or murmurs. abdominal: abdomen: normal consistency; TTP over RLQ, no masses. hernias: no hernias appreciated. liver: normal size and consistency. spleen: not palpable. rectal: hemoccult/guaiac: not performed. musculoskeletal:  normal range of motion; no pain, deformity or contracture. neurologic: cranial nerves: II-XII normal.   psychiatric: judgement/insight: within normal limits. memory: within normal limits for recent and remote events. mood and affect: no evidence of depression, anxiety or agitation. orientation: oriented to time, space and person. Basic Metabolic Profile   Recent Labs      09/20/18   0630   NA  139   K  4.1   CL  106   CO2  27   BUN  15   GLU  119*   CA  7.9*         CBC w/Diff    Recent Labs      09/20/18   0630   WBC  17.0*   RBC  3.80*   HGB  12.4   HCT  37.5   MCV  98.7*   MCH  32.6   MCHC  33.1   RDW  13.7   PLT  278    Recent Labs      09/20/18   0630   GRANS  85*   LYMPH  8*   EOS  0        Hepatic Function   Recent Labs      09/20/18   0630  09/19/18   2320   ALB  2.9*  3.9   TP  6.1*  8.2   TBILI  0.6  0.7   SGOT  13*  16   AP  73  97   LPSE   --   166        Coags   No results for input(s): PTP, INR, APTT in the last 72 hours. No lab exists for component: TYRELL Barton NP. [facility] [address]  Cell: [phone]  [email or web address]

## 2018-09-20 NOTE — ED NOTES
Emesis bag provided in case pt needs to cough anything up. Pt resting in bed. Family at bedside. Denies needs at this time.

## 2018-09-20 NOTE — ED TRIAGE NOTES
Pt. States \"I'm having terrible pains on the right side of my belly\" reports symptoms started on \"Sunday\". Pt. Also c/o no appetite.

## 2018-09-20 NOTE — ED NOTES
Pt resting on stretcher with no acute distress observed and family at bedside. Pt finished urinating on bedside commode, sample collected. Call bell within reach. No further needs expressed at this time. Will continue to monitor pt and carry out orders. Yes

## 2018-09-20 NOTE — CONSULTS
[facility]  Pulmonary, Critical Care, and Sleep Medicine    Initial Patient Consult    Name: Bart France MRN: [id number]   : 1945 Hospital: [address] [place] [address]   Date: 2018        IMPRESSION:   · Abdominal pain- suspected cecal lesion/malignancy vs infection  · COPD- GOLD 2017-group D   COPD with acute exacerbation ([facility]    Pulmonary emphysema, unspecified emphysema type ([facility] [address] [facility]    Tobacco dependence    Pulmonary cachexia due to chronic obstructive pulmonary disease ([facility] [address]    Hypoxemia requiring supplemental oxygen       · Asbestosis  · Depression and anxiety  · H/o DVT- not on anticogulation      RECOMMENDATIONS:   · Oxygen- continue at 2-3 L to maintain SaO2 goal > 90%  · Bronchial hygiene protocol- will add hypertonic saline  · Bronchodilators- continue LABA + LAMA and prn OSMANY  · Steroids- short taper  · Antibiotics- to cover Pneumonia and abdominal pathogens  · Aspiration precautions  · Nutrition  · Colonoscopy per GI  · Assess home Oxygen needs at discharge  · OT, PT, OOB and ambulate  · Healthy weight  · Will Follow  · DVT, PUD prophylaxis     Subjective:       Patient is a 72 y.o. female with a history of COPD, chronic pain, DVT, depression, anxiety, who presents to ED with a 2 days hx of right lower abd pain,no fever reported. No nausea or vomiting, pt states bowel movements normal, she does state that her urine has been dark and smelly. She reports a 50 lb weight loss over the last few years unintentionally and intermittent pencil thin stools. Denies fevers, hematochezia, constipation, or diarrhea. No upper abdominal complaints. Last colonoscopy was by   Dr. Michele Milton 7 years ago with a history of numerous polyps one of which has been pre-cancerous and colonoscopies every 3 years. Abdominal Pain    This is a new problem. The current episode started 2 days ago. The problem occurs constantly. The problem has been gradually worsening. The pain is located in the RLQ. The pain is at a severity of 9/10. The pain is moderate. Pertinent negatives include no fever, no diarrhea, no nausea, no vomiting, no constipation, no dysuria, no chest pain and no back pain. The pain is worsened by certain positions. The pain is relieved by nothing. COPD:  She follows with Dr. Juan Carlos Brown and is on Advair and Spiriva Respimat. Still complains of baseline SOB with minimal exertion, wheezing and tight cough with chest congestion and tenacious phlegm. August CXR reviewed , no acute infiltrate but with hyperinflation. Pt complains of intermittent hemoptysis, known since 2013, no fever or chills. Pt is on home O2, last FEV1 was 30% in Jan 2015. Admits to continued smoking despite several efforts at quitting in the past.   Pt notes no apparent response to maintenance Azithromycin with no change in amount and character of phlegm. Daliresp also did not improve symptoms      Past Medical History:   Diagnosis Date    Anxiety     Arthritis     Asbestosis ([address]     Asthma     Back problem     Baker's cyst     Balance problems     Chronic lung disease     Cigarette smoker     Clotting disorder (HCC)     COPD (chronic obstructive pulmonary disease) (HCC)     Depression     History of DVT (deep vein thrombosis)     Leg pain     Right    Lung disease     Nausea & vomiting     Osteoporosis     Restless leg syndrome     Spinal stenosis     Vitamin D deficiency       Past Surgical History:   Procedure Laterality Date    HX APPENDECTOMY      HX BACK SURGERY      x4    HX BREAST BIOPSY      HX HYSTERECTOMY      HX LUMBAR LAMINECTOMY      HX MENISCUS REPAIR      HX ORTHOPAEDIC      Knee    HX POLYPECTOMY      HX TONSILLECTOMY      HX VEIN STRIPPING        Prior to Admission medications    Medication Sig Start Date End Date Taking? Authorizing Provider   acetaminophen (TYLENOL EXTRA STRENGTH) 500 mg tablet Take  by mouth every six (6) hours as needed for Pain.     Historical Provider HYDROcodone-acetaminophen (NORCO) 5-325 mg per tablet Take 1 Tab by mouth every four (4) hours as needed for Pain. Max Daily Amount: 6 Tabs. 12/5/17   Angelica Johnson MD   albuterol (PROVENTIL VENTOLIN) 2.5 mg /3 mL (0.083 %) nebulizer solution 3 mL by Nebulization route every four (4) hours as needed for Wheezing or Shortness of Breath. Diag. Code: J44.9, R09.02 8/2/17   Ashley Maria MD   albuterol (PROAIR HFA) 90 mcg/actuation inhaler INHALE 2 PUFFS BY MOUTH EVERY 4 HOURS AS NEEDED FOR WHEEZING OR SHORTNESS OF BREATH 12/9/16   Noe Dobbins MD   fluticasone-salmeterol (ADVAIR) 250-50 mcg/dose diskus inhaler Take 1 Puff by inhalation every twelve (12) hours. 12/9/16   Noe Ray MD   tiotropium bromide (SPIRIVA RESPIMAT) 2.5 mcg/actuation inhaler Take 2 Puffs by inhalation daily. 9/22/16   Ashley Maria MD   LORazepam (ATIVAN) 1 mg tablet Take  by mouth two (2) times a day. Historical Provider   rOPINIRole (REQUIP) 1 mg tablet Take  by mouth two (2) times a day. Historical Provider   OXYGEN-AIR DELIVERY SYSTEMS 2 L by Does Not Apply route. O2 via nasal cannula with bedtime and activity.  O2 Company: [employer]    Historical Provider     Allergies   Allergen Reactions    Anoro Ellipta [Umeclidinium-Vilanterol] Rash and Other (comments)     Muscle cramps in arms    Aspirin Other (comments)     GI upset and bleeding    Augmentin [Amoxicillin-Pot Clavulanate] Not Reported This Time     Possible cramping    Doxycycline Nausea and Vomiting    Morphine Other (comments)     Neurologic symptoms - severe agitation      Shellfish Derived Unknown (comments)     Pt able to eat small amounts per report     Sulfa (Sulfonamide Antibiotics) Rash     Patient relates no longer allergic      Social History   Substance Use Topics    Smoking status: Current Some Day Smoker     Packs/day: 0.25     Years: 50.00     Types: Cigarettes     Start date: 10/21/1964    Smokeless tobacco: Never Used    Alcohol use No Family History   Problem Relation Age of Onset    Cancer Father     Heart Disease Mother     Hypertension Mother     Cancer Brother     Cancer Sister     Diabetes Other     Hypertension Other     Stroke Other     Heart Disease Sister     Hypertension Sister     Heart Disease Brother       Current Facility-Administered Medications   Medication Dose Route Frequency    albuterol-ipratropium (DUO-NEB) 2.5 MG-0.5 MG/3 ML  3 mL Nebulization Q4H RT    sodium chloride (NS) flush 5-10 mL  5-10 mL IntraVENous Q8H    [START ON 2018] azithromycin (ZITHROMAX) 500 mg in 0.9% sodium chloride (MBP/ADV) 250 mL adv  500 mg IntraVENous Q24H    cefTRIAXone (ROCEPHIN) 1 g in sterile water (preservative free) 10 mL IV syringe  1 g IntraVENous Q24H    heparin (porcine) injection 5,000 Units  5,000 Units SubCUTAneous Q8H    methylPREDNISolone (PF) (SOLU-MEDROL) injection 60 mg  60 mg IntraVENous Q8H    peg 3350-electrolytes (COLYTE) 4000 mL  4,000 mL Oral ONCE       Review of Systems:  A comprehensive review of systems was negative except for that written in the HPI. Objective:   Vital Signs:    Visit Vitals    BP 97/54    Pulse 74    Temp 98.2 °F (36.8 °C)    Resp 18    Wt 52.9 kg (116 lb 9 oz)    SpO2 94%    BMI 19.4 kg/m2       O2 Device: Nasal cannula   O2 Flow Rate (L/min): 2 l/min   Temp (24hrs), Av.3 °F (36.8 °C), Min:98.1 °F (36.7 °C), Max:98.7 °F (37.1 °C)       Intake/Output:   Last shift:         Last 3 shifts:    No intake or output data in the 24 hours ending 18 1243   Physical Exam:   General:  Alert, cooperative, no distress, appears stated age. using accessory muscles    Head:  Normocephalic, without obvious abnormality, atraumatic. Eyes:  Conjunctivae/corneas clear. PERRL, EOMs intact. Nose: Nares normal. Septum midline. Mucosa normal. No drainage or sinus tenderness.    Throat: Lips, mucosa, and tongue normal. Teeth and gums normal.   Neck: Supple, symmetrical, trachea midline, no adenopathy, thyroid: no enlargment/tenderness/nodules, no carotid bruit and no JVD. Back:   Symmetric, no curvature. ROM normal.   Lungs:   Bilateral diffuse ronchi and wheezing   Chest wall:  No tenderness or deformity. Heart:  Regular rate and rhythm, S1, S2 normal, no murmur, click, rub or gallop. Abdomen:   Soft, ++ Tenderness RLQ, Bowel sounds normal. No masses,  No organomegaly. Extremities: Extremities normal, atraumatic, no cyanosis or edema. Pulses: 2+ and symmetric all extremities. Skin: Skin color, texture, turgor normal. No rashes or lesions   Lymph nodes: Cervical, supraclavicular, and axillary nodes normal.   Neurologic: Grossly nonfocal     Data review:     Recent Results (from the past 24 hour(s))   CBC WITH AUTOMATED DIFF    Collection Time: 09/19/18 11:20 PM   Result Value Ref Range    WBC 21.3 (H) 4.6 - 13.2 K/uL    RBC 4.62 4.20 - 5.30 M/uL    HGB 15.3 12.0 - 16.0 g/dL    HCT 45.5 (H) 35.0 - 45.0 %    MCV 98.5 (H) 74.0 - 97.0 FL    MCH 33.1 24.0 - 34.0 PG    MCHC 33.6 31.0 - 37.0 g/dL    RDW 13.7 11.6 - 14.5 %    PLATELET 789 759 - 424 K/uL    MPV 9.9 9.2 - 11.8 FL    NEUTROPHILS 83 (H) 42 - 75 %    LYMPHOCYTES 7 (L) 20 - 51 %    MONOCYTES 9 2 - 9 %    EOSINOPHILS 1 0 - 5 %    BASOPHILS 0 0 - 3 %    ABS. NEUTROPHILS 17.7 (H) 1.8 - 8.0 K/UL    ABS. LYMPHOCYTES 1.5 0.8 - 3.5 K/UL    ABS. MONOCYTES 1.9 (H) 0 - 1.0 K/UL    ABS. EOSINOPHILS 0.2 0.0 - 0.4 K/UL    ABS.  BASOPHILS 0.0 0.0 - 0.06 K/UL    DF MANUAL      PLATELET COMMENTS ADEQUATE PLATELETS      RBC COMMENTS NORMOCYTIC, NORMOCHROMIC     METABOLIC PANEL, COMPREHENSIVE    Collection Time: 09/19/18 11:20 PM   Result Value Ref Range    Sodium 135 (L) 136 - 145 mmol/L    Potassium 3.9 3.5 - 5.5 mmol/L    Chloride 98 (L) 100 - 108 mmol/L    CO2 31 21 - 32 mmol/L    Anion gap 6 3.0 - 18 mmol/L    Glucose 112 (H) 74 - 99 mg/dL    BUN 20 (H) 7.0 - 18 MG/DL    Creatinine 0.89 0.6 - 1.3 MG/DL    BUN/Creatinine ratio 22 (H) 12 - 20      GFR est AA >60 >60 ml/min/1.73m2    GFR est non-AA >60 >60 ml/min/1.73m2    Calcium 9.2 8.5 - 10.1 MG/DL    Bilirubin, total 0.7 0.2 - 1.0 MG/DL    ALT (SGPT) 19 13 - 56 U/L    AST (SGOT) 16 15 - 37 U/L    Alk.  phosphatase 97 45 - 117 U/L    Protein, total 8.2 6.4 - 8.2 g/dL    Albumin 3.9 3.4 - 5.0 g/dL    Globulin 4.3 (H) 2.0 - 4.0 g/dL    A-G Ratio 0.9 0.8 - 1.7     LIPASE    Collection Time: 09/19/18 11:20 PM   Result Value Ref Range    Lipase 166 73 - 393 U/L   EKG, 12 LEAD, INITIAL    Collection Time: 09/19/18 11:39 PM   Result Value Ref Range    Ventricular Rate 93 BPM    Atrial Rate 93 BPM    P-R Interval 120 ms    QRS Duration 82 ms    Q-T Interval 342 ms    QTC Calculation (Bezet) 425 ms    Calculated P Axis 78 degrees    Calculated R Axis 84 degrees    Calculated T Axis 58 degrees    Diagnosis       Normal sinus rhythm  Possible Left atrial enlargement  Nonspecific ST abnormality  Abnormal ECG  When compared with ECG of 21-DEC-2017 13:07,  premature ventricular complexes are no longer present  premature atrial complexes are no longer present  Confirmed by David Kerns [id number]) on 9/20/2018 7:41:58 AM     CULTURE, BLOOD    Collection Time: 09/20/18 12:31 AM   Result Value Ref Range    Special Requests: NO SPECIAL REQUESTS      Culture result: NO GROWTH AFTER 5 HOURS     CULTURE, BLOOD    Collection Time: 09/20/18 12:51 AM   Result Value Ref Range    Special Requests: NO SPECIAL REQUESTS      Culture result: NO GROWTH AFTER 5 HOURS     POC LACTIC ACID    Collection Time: 09/20/18 12:53 AM   Result Value Ref Range    Lactic Acid (POC) 1.2 0.4 - 2.0 mmol/L   URINALYSIS W/ RFLX MICROSCOPIC    Collection Time: 09/20/18  1:34 AM   Result Value Ref Range    Color YELLOW      Appearance CLEAR      Specific gravity 1.021 1.005 - 1.030      pH (UA) 8.0 5.0 - 8.0      Protein NEGATIVE  NEG mg/dL    Glucose NEGATIVE  NEG mg/dL    Ketone NEGATIVE  NEG mg/dL    Bilirubin NEGATIVE  NEG      Blood NEGATIVE  NEG Urobilinogen 1.0 0.2 - 1.0 EU/dL    Nitrites NEGATIVE  NEG      Leukocyte Esterase TRACE (A) NEG     URINE MICROSCOPIC ONLY    Collection Time: 09/20/18  1:34 AM   Result Value Ref Range    WBC 0 to 3 0 - 5 /hpf    RBC 4 to 10 0 - 5 /hpf    Epithelial cells FEW 0 - 5 /lpf    Bacteria NEGATIVE  NEG /hpf    Hyaline cast 0 to 3 0 - 2 /lpf   METABOLIC PANEL, COMPREHENSIVE    Collection Time: 09/20/18  6:30 AM   Result Value Ref Range    Sodium 139 136 - 145 mmol/L    Potassium 4.1 3.5 - 5.5 mmol/L    Chloride 106 100 - 108 mmol/L    CO2 27 21 - 32 mmol/L    Anion gap 6 3.0 - 18 mmol/L    Glucose 119 (H) 74 - 99 mg/dL    BUN 15 7.0 - 18 MG/DL    Creatinine 0.60 0.6 - 1.3 MG/DL    BUN/Creatinine ratio 25 (H) 12 - 20      GFR est AA >60 >60 ml/min/1.73m2    GFR est non-AA >60 >60 ml/min/1.73m2    Calcium 7.9 (L) 8.5 - 10.1 MG/DL    Bilirubin, total 0.6 0.2 - 1.0 MG/DL    ALT (SGPT) 14 13 - 56 U/L    AST (SGOT) 13 (L) 15 - 37 U/L    Alk. phosphatase 73 45 - 117 U/L    Protein, total 6.1 (L) 6.4 - 8.2 g/dL    Albumin 2.9 (L) 3.4 - 5.0 g/dL    Globulin 3.2 2.0 - 4.0 g/dL    A-G Ratio 0.9 0.8 - 1.7     CBC WITH AUTOMATED DIFF    Collection Time: 09/20/18  6:30 AM   Result Value Ref Range    WBC 17.0 (H) 4.6 - 13.2 K/uL    RBC 3.80 (L) 4.20 - 5.30 M/uL    HGB 12.4 12.0 - 16.0 g/dL    HCT 37.5 35.0 - 45.0 %    MCV 98.7 (H) 74.0 - 97.0 FL    MCH 32.6 24.0 - 34.0 PG    MCHC 33.1 31.0 - 37.0 g/dL    RDW 13.7 11.6 - 14.5 %    PLATELET 065 135 - 468 K/uL    MPV 9.8 9.2 - 11.8 FL    NEUTROPHILS 85 (H) 40 - 73 %    LYMPHOCYTES 8 (L) 21 - 52 %    MONOCYTES 7 3 - 10 %    EOSINOPHILS 0 0 - 5 %    BASOPHILS 0 0 - 2 %    ABS. NEUTROPHILS 14.4 (H) 1.8 - 8.0 K/UL    ABS. LYMPHOCYTES 1.4 0.9 - 3.6 K/UL    ABS. MONOCYTES 1.1 0.05 - 1.2 K/UL    ABS. EOSINOPHILS 0.0 0.0 - 0.4 K/UL    ABS.  BASOPHILS 0.0 0.0 - 0.1 K/UL    DF AUTOMATED         Imaging:  I have personally reviewed the patients radiographs and have reviewed the reports:  XR Results (most recent):    Results from Hospital Encounter encounter on 09/19/18   XR CHEST PORT   Narrative Chest portable    History: Pain on the right side of the belly since Sunday, shortness of breath. Comparisons: 8/14/2018    Findings:     Lungs are hyperexpanded, compatible with COPD. Heart and mediastinum are grossly  stable. Atherosclerotic calcifications are seen at the arch. Breast and nipple  shadows overlie the midlungs bilaterally. Patchy infiltrate identified at the  left lung base. 1.1 cm nodule at the left midlung appears stable from at least  2/5/2017. Favor granuloma. Impression Impression:     Patchy infiltrate at the left lung base. Infection could not be excluded. Emphysema. Thank you for this referral.          CT Results (most recent):    Results from [place] encounter on 09/19/18   CT ABD PELV W CONT   Narrative Description:  CT abdomen and pelvis with IV contrast    TECHNIQUE: [Helically acquired axial] CT imaging of the [abdomen and pelvis]. 100 cc's of Isovue-300 injected intravenously. [ Coronal and sagittal  reformations generated and reviewed. All CT scans at this facility are performed using dose optimization technique as  appropriate to a performed exam, to include automated exposure control,  adjustment of the mA and/or kV according to patient size (including appropriate  matching for site-specific examinations), or use of iterative reconstruction  technique. Clinical Indication:  Right lower quadrant pain    Comparison: July 25, 2018. Findings:      CT ABDOMEN:  Left lower lobe infiltrate is present. The right lung base is clear with the  exception of mild bronchiectasis at the medial right middle lobe. No pleural  effusion is present. The liver appears normal.  The spleen appears normal.  The pancreas appears normal.  The adrenal glands appear normal.  The kidneys show symmetric enhancement. No hydronephrosis or mass.   The stomach and the duodenum appear normal.  The gallbladder is not well-distended. No hyperdense gallstones are seen. There is normal contrast filling of the portal and the splenic veins. Moderate atherosclerosis of the abdominal aorta is present. No mesenteric or retroperitoneal adenopathy. No free fluid or air. CT PELVIS:  There is increased soft tissue in the cecum (axial, 48). The appendix is not  identified. No definite right lower quadrant inflammation is seen. There is a large amount of retained fecal material within the rectum. The bladder is well distended and has a normal unopacified appearance. No pelvic adenopathy or free fluid. Skeleton/soft tissues: L5 vertebral plasty. Moderate osteopenia. Multilevel  degenerative changes. Impression Impression:      1. Left lower lobe pneumonia. 2. Abnormal soft tissue in the medial wall of the cecum without surrounding  inflammation. Appearance is more concerning for malignancy than an acute  inflammatory process. The appendix is not definitively identified however. Clinical correlation needed. If the patient has not had a recent colonoscopy,  one is advised. Discussed with Dr. Auther Cowden. High complexity decision making was performed during the evaluation of this patient at high risk for decompensation with multiple organ involvement     Above mentioned total time spent on reviewing the case/medical record/data/notes/EMR/patient examination/documentation/coordinating care with nurse/consultants, exclusive of procedures with complex decision making performed and > 50% time spent in face to face evaluation.           Danilo Jones MD

## 2018-09-20 NOTE — ED NOTES
Pt finished breathing treatment. Resting in bed. Lighting adjusted. Pt denies other needs at this time.

## 2018-09-20 NOTE — H&P
History & Physical    Patient: Arielle Isbell MRN: [id number]  CSN: [id number]    YOB: 1945  Age: 72 y.o. Sex: female      DOA: 9/19/2018    Chief Complaint: No chief complaint on file. HPI:     Arielle Isbell is a 72 y.o.   female who has COPD on home oxygen  2liters   Presents to ER with increasing cough and congestion with sputum production and increasing abdominal pain   Causing dyspnea to be worse has hx of villous adenoma and has not had colonoscopy in over 7 years  Noted 30 pound weight loss and some times pencil stool  Denies bloody stools or anemia also continues to smoke   Several cigarettes a day despite inability to inhale completely and being on oxygen     Past Medical History:   Diagnosis Date    Anxiety     Arthritis     Asbestosis ([facility] [address]     Asthma     Back problem     Baker's cyst     Balance problems     Chronic lung disease     Cigarette smoker     Clotting disorder ([facility] [address]     COPD (chronic obstructive pulmonary disease) (HCC)     Depression     History of DVT (deep vein thrombosis)     Leg pain     Right    Lung disease     Nausea & vomiting     Osteoporosis     Restless leg syndrome     Spinal stenosis     Vitamin D deficiency        Past Surgical History:   Procedure Laterality Date    HX APPENDECTOMY      HX BACK SURGERY      x4    HX BREAST BIOPSY      HX HYSTERECTOMY      HX LUMBAR LAMINECTOMY      HX MENISCUS REPAIR      HX ORTHOPAEDIC      Knee    HX POLYPECTOMY      HX TONSILLECTOMY      HX VEIN STRIPPING         Family History   Problem Relation Age of Onset    Cancer Father     Heart Disease Mother     Hypertension Mother     Cancer Brother     Cancer Sister     Diabetes Other     Hypertension Other     Stroke Other     Heart Disease Sister     Hypertension Sister     Heart Disease Brother        Social History     Social History    Marital status:      Spouse name: N/A    Number of children: N/A    Years of education: N/A     Social History Main Topics    Smoking status: Current Some Day Smoker     Packs/day: 0.25     Years: 50.00     Types: Cigarettes     Start date: 10/21/1964    Smokeless tobacco: Never Used    Alcohol use No    Drug use: No    Sexual activity: Not Asked     Other Topics Concern    None     Social History Narrative       Prior to Admission medications    Medication Sig Start Date End Date Taking? Authorizing Provider   acetaminophen (TYLENOL EXTRA STRENGTH) 500 mg tablet Take  by mouth every six (6) hours as needed for Pain. Historical Provider   HYDROcodone-acetaminophen (NORCO) 5-325 mg per tablet Take 1 Tab by mouth every four (4) hours as needed for Pain. Max Daily Amount: 6 Tabs. 12/5/17   Monse Salcido MD   albuterol (PROVENTIL VENTOLIN) 2.5 mg /3 mL (0.083 %) nebulizer solution 3 mL by Nebulization route every four (4) hours as needed for Wheezing or Shortness of Breath. Diag. Code: J44.9, R09.02 8/2/17   Cecilia Cortez MD   albuterol (PROAIR HFA) 90 mcg/actuation inhaler INHALE 2 PUFFS BY MOUTH EVERY 4 HOURS AS NEEDED FOR WHEEZING OR SHORTNESS OF BREATH 12/9/16   Noe Rivera MD   fluticasone-salmeterol (ADVAIR) 250-50 mcg/dose diskus inhaler Take 1 Puff by inhalation every twelve (12) hours. 12/9/16   Noe Valverde MD   tiotropium bromide (SPIRIVA RESPIMAT) 2.5 mcg/actuation inhaler Take 2 Puffs by inhalation daily. 9/22/16   Cecilia Cortez MD   LORazepam (ATIVAN) 1 mg tablet Take  by mouth two (2) times a day. Historical Provider   rOPINIRole (REQUIP) 1 mg tablet Take  by mouth two (2) times a day. Historical Provider   OXYGEN-AIR DELIVERY SYSTEMS 2 L by Does Not Apply route. O2 via nasal cannula with bedtime and activity.  O2 Company: [employer] [place] Provider       Allergies   Allergen Reactions    Anoro Ellipta [Umeclidinium-Vilanterol] Rash and Other (comments)     Muscle cramps in arms    Aspirin Other (comments)     GI upset and bleeding    Augmentin [Amoxicillin-Pot Clavulanate] Not Reported This Time     Possible cramping    Doxycycline Nausea and Vomiting    Morphine Other (comments)     Neurologic symptoms - severe agitation      Shellfish Derived Unknown (comments)     Pt able to eat small amounts per report     Sulfa (Sulfonamide Antibiotics) Rash     Patient relates no longer allergic         Review of Systems  GENERAL: Patient alert, awake and oriented times 3, able to communicate full sentences and not in distress. HEENT: No change in vision, no earache, tinnitus, sore throat or sinus congestion. NECK: No pain or stiffness. PULMONARY: +shortness of breath, cough or wheeze. Cardiovascular: no pnd or orthopnea, no CP  GASTROINTESTINAL:+ abdominal pain, nausea, vomiting or diarrhea, melena or bright red blood per rectum. GENITOURINARY: No urinary frequency, urgency, hesitancy or dysuria. MUSCULOSKELETAL: No joint or muscle pain, no back pain, no recent trauma. DERMATOLOGIC: No rash, no itching, no lesions. ENDOCRINE: No polyuria, polydipsia, no heat or cold intolerance. + recent change in weight. HEMATOLOGICAL: No anemia or easy bruising or bleeding. NEUROLOGIC: No headache, seizures, numbness, tingling+weakness. Physical Exam:     Physical Exam:  Visit Vitals    /66    Pulse 88    Temp 98.7 °F (37.1 °C)    Resp 18    Wt 52.9 kg (116 lb 9 oz)    SpO2 94%    BMI 19.4 kg/m2    O2 Flow Rate (L/min): 2 l/min O2 Device: Nasal cannula    Temp (24hrs), Av.5 °F (36.9 °C), Min:98.3 °F (36.8 °C), Max:98.7 °F (37.1 °C)             General:  Alert, cooperative, no distress, appears stated age. Head: Normocephalic, without obvious abnormality, atraumatic. Eyes:  Conjunctivae/corneas clear. PERRL, EOMs intact. Nose: Nares normal. No drainage or sinus tenderness. Positive thrush    Neck: Supple, symmetrical, trachea midline, no adenopathy, thyroid: no enlargement, no carotid bruit and no JVD.    Lungs: Clear to auscultation bilaterally. Diffuse exp wheeze    Heart:  Regular rate and rhythm, S1, S2 normal.     Abdomen: Soft, non-tender. Bowel sounds normal. Right lower abdominal pain    Extremities: Extremities normal, atraumatic, no cyanosis or edema. Pulses: 2+ and symmetric all extremities. Skin:  No rashes or lesions   Neurologic: AAOx3, No focal motor or sensory deficit. Labs Reviewed:    Recent Results (from the past 24 hour(s))   CBC WITH AUTOMATED DIFF    Collection Time: 09/19/18 11:20 PM   Result Value Ref Range    WBC 21.3 (H) 4.6 - 13.2 K/uL    RBC 4.62 4.20 - 5.30 M/uL    HGB 15.3 12.0 - 16.0 g/dL    HCT 45.5 (H) 35.0 - 45.0 %    MCV 98.5 (H) 74.0 - 97.0 FL    MCH 33.1 24.0 - 34.0 PG    MCHC 33.6 31.0 - 37.0 g/dL    RDW 13.7 11.6 - 14.5 %    PLATELET 116 088 - 843 K/uL    MPV 9.9 9.2 - 11.8 FL    NEUTROPHILS 83 (H) 42 - 75 %    LYMPHOCYTES 7 (L) 20 - 51 %    MONOCYTES 9 2 - 9 %    EOSINOPHILS 1 0 - 5 %    BASOPHILS 0 0 - 3 %    ABS. NEUTROPHILS 17.7 (H) 1.8 - 8.0 K/UL    ABS. LYMPHOCYTES 1.5 0.8 - 3.5 K/UL    ABS. MONOCYTES 1.9 (H) 0 - 1.0 K/UL    ABS. EOSINOPHILS 0.2 0.0 - 0.4 K/UL    ABS. BASOPHILS 0.0 0.0 - 0.06 K/UL    DF MANUAL      PLATELET COMMENTS ADEQUATE PLATELETS      RBC COMMENTS NORMOCYTIC, NORMOCHROMIC     METABOLIC PANEL, COMPREHENSIVE    Collection Time: 09/19/18 11:20 PM   Result Value Ref Range    Sodium 135 (L) 136 - 145 mmol/L    Potassium 3.9 3.5 - 5.5 mmol/L    Chloride 98 (L) 100 - 108 mmol/L    CO2 31 21 - 32 mmol/L    Anion gap 6 3.0 - 18 mmol/L    Glucose 112 (H) 74 - 99 mg/dL    BUN 20 (H) 7.0 - 18 MG/DL    Creatinine 0.89 0.6 - 1.3 MG/DL    BUN/Creatinine ratio 22 (H) 12 - 20      GFR est AA >60 >60 ml/min/1.73m2    GFR est non-AA >60 >60 ml/min/1.73m2    Calcium 9.2 8.5 - 10.1 MG/DL    Bilirubin, total 0.7 0.2 - 1.0 MG/DL    ALT (SGPT) 19 13 - 56 U/L    AST (SGOT) 16 15 - 37 U/L    Alk.  phosphatase 97 45 - 117 U/L    Protein, total 8.2 6.4 - 8.2 g/dL    Albumin 3.9 3.4 - 5.0 g/dL    Globulin 4.3 (H) 2.0 - 4.0 g/dL    A-G Ratio 0.9 0.8 - 1.7     LIPASE    Collection Time: 09/19/18 11:20 PM   Result Value Ref Range    Lipase 166 73 - 393 U/L   EKG, 12 LEAD, INITIAL    Collection Time: 09/19/18 11:39 PM   Result Value Ref Range    Ventricular Rate 93 BPM    Atrial Rate 93 BPM    P-R Interval 120 ms    QRS Duration 82 ms    Q-T Interval 342 ms    QTC Calculation (Bezet) 425 ms    Calculated P Axis 78 degrees    Calculated R Axis 84 degrees    Calculated T Axis 58 degrees    Diagnosis       Normal sinus rhythm  Possible Left atrial enlargement  Nonspecific ST abnormality  Abnormal ECG  When compared with ECG of 21-DEC-2017 13:07,  premature ventricular complexes are no longer present  premature atrial complexes are no longer present     POC LACTIC ACID    Collection Time: 09/20/18 12:53 AM   Result Value Ref Range    Lactic Acid (POC) 1.2 0.4 - 2.0 mmol/L   URINALYSIS W/ RFLX MICROSCOPIC    Collection Time: 09/20/18  1:34 AM   Result Value Ref Range    Color YELLOW      Appearance CLEAR      Specific gravity 1.021 1.005 - 1.030      pH (UA) 8.0 5.0 - 8.0      Protein NEGATIVE  NEG mg/dL    Glucose NEGATIVE  NEG mg/dL    Ketone NEGATIVE  NEG mg/dL    Bilirubin NEGATIVE  NEG      Blood NEGATIVE  NEG      Urobilinogen 1.0 0.2 - 1.0 EU/dL    Nitrites NEGATIVE  NEG      Leukocyte Esterase TRACE (A) NEG     URINE MICROSCOPIC ONLY    Collection Time: 09/20/18  1:34 AM   Result Value Ref Range    WBC 0 to 3 0 - 5 /hpf    RBC 4 to 10 0 - 5 /hpf    Epithelial cells FEW 0 - 5 /lpf    Bacteria NEGATIVE  NEG /hpf    Hyaline cast 0 to 3 0 - 2 /lpf       All lab results for the last 24 hours reviewed.    and EKG    Procedures/imaging: see electronic medical records for all procedures/Xrays and details which were not copied into this note but were reviewed prior to creation of Plan    CT Results  (Last 48 hours)               09/20/18 0214  CT ABD PELV W CONT Final result    Impression: Impression:         1. Left lower lobe pneumonia. 2. Abnormal soft tissue in the medial wall of the cecum without surrounding   inflammation. Appearance is more concerning for malignancy than an acute   inflammatory process. The appendix is not definitively identified however. Clinical correlation needed. If the patient has not had a recent colonoscopy,   one is advised. Discussed with Dr. Wade Sanchez. Narrative:  Description:  CT abdomen and pelvis with IV contrast       TECHNIQUE: [Helically acquired axial] CT imaging of the [abdomen and pelvis]. 100 cc's of Isovue-300 injected intravenously. [ Coronal and sagittal   reformations generated and reviewed. All CT scans at this facility are performed using dose optimization technique as   appropriate to a performed exam, to include automated exposure control,   adjustment of the mA and/or kV according to patient size (including appropriate   matching for site-specific examinations), or use of iterative reconstruction   technique. Clinical Indication:  Right lower quadrant pain       Comparison: July 25, 2018. Findings:         CT ABDOMEN:   Left lower lobe infiltrate is present. The right lung base is clear with the   exception of mild bronchiectasis at the medial right middle lobe. No pleural   effusion is present. The liver appears normal.   The spleen appears normal.   The pancreas appears normal.   The adrenal glands appear normal.   The kidneys show symmetric enhancement. No hydronephrosis or mass. The stomach and the duodenum appear normal.   The gallbladder is not well-distended. No hyperdense gallstones are seen. There is normal contrast filling of the portal and the splenic veins. Moderate atherosclerosis of the abdominal aorta is present. No mesenteric or retroperitoneal adenopathy. No free fluid or air. CT PELVIS:   There is increased soft tissue in the cecum (axial, 48).  The appendix is not identified. No definite right lower quadrant inflammation is seen. There is a large amount of retained fecal material within the rectum. The bladder is well distended and has a normal unopacified appearance. No pelvic adenopathy or free fluid. Skeleton/soft tissues: L5 vertebral plasty. Moderate osteopenia. Multilevel   degenerative changes. Assessment/Plan     Active Problems:    COPD (chronic obstructive pulmonary disease) ([address] (9/20/2018)      Pneumonia (9/20/2018)      Abdominal pain (9/20/2018)      Sepsis due to pneumonia ([address] (9/20/2018)      Abdominal mass (9/20/2018)    Plan: Iv rocephin azithromax  Iv steroids  Gi consult   duoneb   DNR Status    DVT/GI Prophylaxis: Hep SQ    Discussed with patient at bedside about hospital admission and my plan care, who understood and agree with my plan care.     Christie Cadena MD  9/20/2018 5:58 AM

## 2018-09-20 NOTE — ED NOTES
Hospitalist paged.  Awaiting giving second dose of solu-medrol and rocephin until discussed with hospitalist.

## 2018-09-20 NOTE — ED NOTES
Pt sleeping on stretcher with family at bedside. PT in a position of comfort with call bell in reach. Stable vital signs. Will continue to monitor pt and await further orders.

## 2018-09-20 NOTE — PROGRESS NOTES
met with patient while in the Emergency Department, completed the initial Spiritual Assessment of the patient, and offered Pastoral Care, see flow sheets for interventions. Patient was talkative, said she was [employer] in the past and now attends another Faith. Her sister and son were with her. Pastoral support provided. [employer] devotional provided also. Patient does not have any Roman [employer]/cultural needs that will affect patients preferences in health care. Chart reviewed. Chaplains will continue to follow and will provide pastoral care on an as needed/as requested basis. Tim Dalal MDiv.   Board Certified [employer] [phone]

## 2018-09-20 NOTE — ED NOTES
Received patient in room 1. Family at bedside. Patient appears in no distress. Cardiac monitor in place. Awaiting inpatient bed assignment. Reviewed poc with patient and family. Questions encouraged and answered. Patient and family verbalized an understanding. Patient is currently without complaints.

## 2018-09-20 NOTE — ED NOTES
Pt resting on stretcher with family member and other members at bedside. Vital signs stable. No acute distress observed. No needs expressed at time. Additional blanket given. Will continue to monitor pt and carry out orders.

## 2018-09-20 NOTE — PROGRESS NOTES
[facility]ist [facility]  Progress Note    Patient: Frances Bay Age: 72 y.o. : 1945 MR#: [id number] SSN: [id number]  Date/Time: 2018    Subjective:     Seen with family @ bedside. Pt feels slightly better. Still SOB and easily ONEAL. No F/C, N/V, CP. Has occasional cough, productive of yellow-green sputum. Uses 2lpm O2 via NC around-the-clock at home. Assessment/Plan:   1. Acute COPD exac with hx of chronic hypoxic resp failure - med tx with IV steroids, nebs, abx for #2. Appreciate pulmonology eval and recs. 2. Sepsis POA due to PNA - VSS, wnl. WBC down slightly. On abx. Blood cx NGTD so far. 3. Abdominal pain with soft tissue mass medial wall of cecum - as seen on CT. Plan for colo tomorrow per GI. Pt cannot tolerate GoLytely-- has nausea/vomiting/abd pain. Ok with MiraLax. Case d/w GI. NPO p MN. 4. Chronic back pain - hx of DJDz of L-spine. Controlled. 5. Tobacco abuse - counseled on cessation. In the past, has expressed no interest in quitting. 6. Hx anxiety, depression - noted. No acute. 7. Hx DVT - noted. Additional Notes:      Case discussed with:  [x]Patient  [x]Family  [x]Nursing  []Case Management  DVT Prophylaxis:  []Lovenox  [x]Hep SQ  []SCDs  []Coumadin   []On Heparin gtt    Objective:   VS:   Visit Vitals    /74    Pulse 77    Temp 98.2 °F (36.8 °C)    Resp 18    Wt 52.9 kg (116 lb 9 oz)    SpO2 93%    BMI 19.4 kg/m2      Tmax/24hrs: Temp (24hrs), Av.3 °F (36.8 °C), Min:98.1 °F (36.7 °C), Max:98.7 °F (37.1 °C)    Input/Output: No intake or output data in the 24 hours ending 18 1546    General:  Awake, alert, NAD. Cardiovascular:  RRR. Pulmonary:  CTA B with diffuse exp wheezes. GI:  Soft, ND, NABS. Mild TTP RLQ. No rebound/guarding. Extremities:  No CT or edema.    Additional:      Labs:    Recent Results (from the past 24 hour(s))   CBC WITH AUTOMATED DIFF    Collection Time: 18 11:20 PM   Result Value Ref Range    WBC 21.3 (H) 4.6 - 13.2 K/uL    RBC 4.62 4.20 - 5.30 M/uL    HGB 15.3 12.0 - 16.0 g/dL    HCT 45.5 (H) 35.0 - 45.0 %    MCV 98.5 (H) 74.0 - 97.0 FL    MCH 33.1 24.0 - 34.0 PG    MCHC 33.6 31.0 - 37.0 g/dL    RDW 13.7 11.6 - 14.5 %    PLATELET 582 623 - 792 K/uL    MPV 9.9 9.2 - 11.8 FL    NEUTROPHILS 83 (H) 42 - 75 %    LYMPHOCYTES 7 (L) 20 - 51 %    MONOCYTES 9 2 - 9 %    EOSINOPHILS 1 0 - 5 %    BASOPHILS 0 0 - 3 %    ABS. NEUTROPHILS 17.7 (H) 1.8 - 8.0 K/UL    ABS. LYMPHOCYTES 1.5 0.8 - 3.5 K/UL    ABS. MONOCYTES 1.9 (H) 0 - 1.0 K/UL    ABS. EOSINOPHILS 0.2 0.0 - 0.4 K/UL    ABS. BASOPHILS 0.0 0.0 - 0.06 K/UL    DF MANUAL      PLATELET COMMENTS ADEQUATE PLATELETS      RBC COMMENTS NORMOCYTIC, NORMOCHROMIC     METABOLIC PANEL, COMPREHENSIVE    Collection Time: 09/19/18 11:20 PM   Result Value Ref Range    Sodium 135 (L) 136 - 145 mmol/L    Potassium 3.9 3.5 - 5.5 mmol/L    Chloride 98 (L) 100 - 108 mmol/L    CO2 31 21 - 32 mmol/L    Anion gap 6 3.0 - 18 mmol/L    Glucose 112 (H) 74 - 99 mg/dL    BUN 20 (H) 7.0 - 18 MG/DL    Creatinine 0.89 0.6 - 1.3 MG/DL    BUN/Creatinine ratio 22 (H) 12 - 20      GFR est AA >60 >60 ml/min/1.73m2    GFR est non-AA >60 >60 ml/min/1.73m2    Calcium 9.2 8.5 - 10.1 MG/DL    Bilirubin, total 0.7 0.2 - 1.0 MG/DL    ALT (SGPT) 19 13 - 56 U/L    AST (SGOT) 16 15 - 37 U/L    Alk.  phosphatase 97 45 - 117 U/L    Protein, total 8.2 6.4 - 8.2 g/dL    Albumin 3.9 3.4 - 5.0 g/dL    Globulin 4.3 (H) 2.0 - 4.0 g/dL    A-G Ratio 0.9 0.8 - 1.7     LIPASE    Collection Time: 09/19/18 11:20 PM   Result Value Ref Range    Lipase 166 73 - 393 U/L   EKG, 12 LEAD, INITIAL    Collection Time: 09/19/18 11:39 PM   Result Value Ref Range    Ventricular Rate 93 BPM    Atrial Rate 93 BPM    P-R Interval 120 ms    QRS Duration 82 ms    Q-T Interval 342 ms    QTC Calculation (Bezet) 425 ms    Calculated P Axis 78 degrees    Calculated R Axis 84 degrees    Calculated T Axis 58 degrees    Diagnosis Normal sinus rhythm  Possible Left atrial enlargement  Nonspecific ST abnormality  Abnormal ECG  When compared with ECG of 21-DEC-2017 13:07,  premature ventricular complexes are no longer present  premature atrial complexes are no longer present  Confirmed by Sadaf Melton ([id number]) on 9/20/2018 7:41:58 AM     CULTURE, BLOOD    Collection Time: 09/20/18 12:31 AM   Result Value Ref Range    Special Requests: NO SPECIAL REQUESTS      Culture result: NO GROWTH AFTER 5 HOURS     CULTURE, BLOOD    Collection Time: 09/20/18 12:51 AM   Result Value Ref Range    Special Requests: NO SPECIAL REQUESTS      Culture result: NO GROWTH AFTER 5 HOURS     POC LACTIC ACID    Collection Time: 09/20/18 12:53 AM   Result Value Ref Range    Lactic Acid (POC) 1.2 0.4 - 2.0 mmol/L   URINALYSIS W/ RFLX MICROSCOPIC    Collection Time: 09/20/18  1:34 AM   Result Value Ref Range    Color YELLOW      Appearance CLEAR      Specific gravity 1.021 1.005 - 1.030      pH (UA) 8.0 5.0 - 8.0      Protein NEGATIVE  NEG mg/dL    Glucose NEGATIVE  NEG mg/dL    Ketone NEGATIVE  NEG mg/dL    Bilirubin NEGATIVE  NEG      Blood NEGATIVE  NEG      Urobilinogen 1.0 0.2 - 1.0 EU/dL    Nitrites NEGATIVE  NEG      Leukocyte Esterase TRACE (A) NEG     URINE MICROSCOPIC ONLY    Collection Time: 09/20/18  1:34 AM   Result Value Ref Range    WBC 0 to 3 0 - 5 /hpf    RBC 4 to 10 0 - 5 /hpf    Epithelial cells FEW 0 - 5 /lpf    Bacteria NEGATIVE  NEG /hpf    Hyaline cast 0 to 3 0 - 2 /lpf   METABOLIC PANEL, COMPREHENSIVE    Collection Time: 09/20/18  6:30 AM   Result Value Ref Range    Sodium 139 136 - 145 mmol/L    Potassium 4.1 3.5 - 5.5 mmol/L    Chloride 106 100 - 108 mmol/L    CO2 27 21 - 32 mmol/L    Anion gap 6 3.0 - 18 mmol/L    Glucose 119 (H) 74 - 99 mg/dL    BUN 15 7.0 - 18 MG/DL    Creatinine 0.60 0.6 - 1.3 MG/DL    BUN/Creatinine ratio 25 (H) 12 - 20      GFR est AA >60 >60 ml/min/1.73m2    GFR est non-AA >60 >60 ml/min/1.73m2    Calcium 7.9 (L) 8.5 - 10.1 MG/DL    Bilirubin, total 0.6 0.2 - 1.0 MG/DL    ALT (SGPT) 14 13 - 56 U/L    AST (SGOT) 13 (L) 15 - 37 U/L    Alk. phosphatase 73 45 - 117 U/L    Protein, total 6.1 (L) 6.4 - 8.2 g/dL    Albumin 2.9 (L) 3.4 - 5.0 g/dL    Globulin 3.2 2.0 - 4.0 g/dL    A-G Ratio 0.9 0.8 - 1.7     CBC WITH AUTOMATED DIFF    Collection Time: 09/20/18  6:30 AM   Result Value Ref Range    WBC 17.0 (H) 4.6 - 13.2 K/uL    RBC 3.80 (L) 4.20 - 5.30 M/uL    HGB 12.4 12.0 - 16.0 g/dL    HCT 37.5 35.0 - 45.0 %    MCV 98.7 (H) 74.0 - 97.0 FL    MCH 32.6 24.0 - 34.0 PG    MCHC 33.1 31.0 - 37.0 g/dL    RDW 13.7 11.6 - 14.5 %    PLATELET 573 070 - 343 K/uL    MPV 9.8 9.2 - 11.8 FL    NEUTROPHILS 85 (H) 40 - 73 %    LYMPHOCYTES 8 (L) 21 - 52 %    MONOCYTES 7 3 - 10 %    EOSINOPHILS 0 0 - 5 %    BASOPHILS 0 0 - 2 %    ABS. NEUTROPHILS 14.4 (H) 1.8 - 8.0 K/UL    ABS. LYMPHOCYTES 1.4 0.9 - 3.6 K/UL    ABS. MONOCYTES 1.1 0.05 - 1.2 K/UL    ABS. EOSINOPHILS 0.0 0.0 - 0.4 K/UL    ABS.  BASOPHILS 0.0 0.0 - 0.1 K/UL    DF AUTOMATED       Additional Data Reviewed:      Signed By: Hue Rey MD     September 20, 2018 3:46 PM

## 2018-09-20 NOTE — ED PROVIDER NOTES
HPI Comments: 12:40 AM Frances Bay is a 72 y.o. female with a history of Copd, chronic pain, DVT, depression, anxiety, who presents to ED with a 2 days hx of right lower abd pain,no fever reported. No nausea or vomiting, pt states bowel movements normal, she does state that her urine has been dark and smelly. No other complaints, associated symptoms or modifying factors at this time. PCP: Oniel Colunga MD      Patient is a 72 y.o. female presenting with abdominal pain. The history is provided by the patient. Abdominal Pain    This is a new problem. The current episode started 2 days ago. The problem occurs constantly. The problem has been gradually worsening. The pain is located in the RLQ. The pain is at a severity of 9/10. The pain is moderate. Pertinent negatives include no fever, no diarrhea, no nausea, no vomiting, no constipation, no dysuria, no chest pain and no back pain. The pain is worsened by certain positions. The pain is relieved by nothing.         Past Medical History:   Diagnosis Date    Anxiety     Arthritis     Asbestosis ([address]     Asthma     Back problem     Baker's cyst     Balance problems     Chronic lung disease     Cigarette smoker     Clotting disorder (HCC)     COPD (chronic obstructive pulmonary disease) (HCC)     Depression     History of DVT (deep vein thrombosis)     Leg pain     Right    Lung disease     Nausea & vomiting     Osteoporosis     Restless leg syndrome     Spinal stenosis     Vitamin D deficiency        Past Surgical History:   Procedure Laterality Date    HX APPENDECTOMY      HX BACK SURGERY      x4    HX BREAST BIOPSY      HX HYSTERECTOMY      HX LUMBAR LAMINECTOMY      HX MENISCUS REPAIR      HX ORTHOPAEDIC      Knee    HX POLYPECTOMY      HX TONSILLECTOMY      HX VEIN STRIPPING           Family History:   Problem Relation Age of Onset    Cancer Father     Heart Disease Mother     Hypertension Mother     Cancer Brother     Cancer Sister     Diabetes Other     Hypertension Other     Stroke Other     Heart Disease Sister     Hypertension Sister     Heart Disease Brother        Social History     Social History    Marital status:      Spouse name: N/A    Number of children: N/A    Years of education: N/A     Occupational History    Not on file. Social History Main Topics    Smoking status: Current Some Day Smoker     Packs/day: 0.25     Years: 50.00     Types: Cigarettes     Start date: 10/21/1964    Smokeless tobacco: Never Used    Alcohol use No    Drug use: No    Sexual activity: Not on file     Other Topics Concern    Not on file     Social History Narrative         ALLERGIES: Anoro ellipta [umeclidinium-vilanterol]; Aspirin; Augmentin [amoxicillin-pot clavulanate]; Doxycycline; Morphine; Shellfish derived; and Sulfa (sulfonamide antibiotics)    Review of Systems   Constitutional: Negative for fever. Respiratory: Negative for chest tightness. Cardiovascular: Negative for chest pain and palpitations. Gastrointestinal: Positive for abdominal pain. Negative for blood in stool, constipation, diarrhea, nausea and vomiting. Genitourinary: Negative for dysuria. Musculoskeletal: Negative for back pain. Neurological: Negative for dizziness. All other systems reviewed and are negative. Vitals:    09/20/18 0145 09/20/18 0230 09/20/18 0245 09/20/18 0315   BP: 122/59 121/42 116/54 104/55   Pulse: 89 90 90 87   Resp:       Temp:       SpO2: 98% 96% 96% 95%   Weight:                Physical Exam   Constitutional: She is oriented to person, place, and time. She appears well-developed and well-nourished. She appears distressed. Thin    Distressed with pain   HENT:   Head: Normocephalic and atraumatic. Eyes: Conjunctivae are normal. Pupils are equal, round, and reactive to light. Neck: Normal range of motion. Neck supple. Cardiovascular: Normal rate and regular rhythm. Pulmonary/Chest: Effort normal and breath sounds normal. No respiratory distress. Diffuse rhonchi suspected from chronic emphysema     Abdominal: Soft. Bowel sounds are normal. There is tenderness. +exquisite tenderness to right lower abd, no swelling no bruising noted   Musculoskeletal: Normal range of motion. Neurological: She is alert and oriented to person, place, and time. She has normal reflexes. Skin: Skin is warm and dry. Psychiatric: She has a normal mood and affect. Her behavior is normal. Judgment and thought content normal.   Nursing note and vitals reviewed. MDM  Number of Diagnoses or Management Options  Chronic obstructive pulmonary disease, unspecified COPD type ([facility] [address]): established and worsening  Pneumonia of left lower lobe due to infectious organism [facility]): established and worsening  Right lower quadrant abdominal pain: established and improving  Sepsis due to pneumonia [facility]): established and worsening  Diagnosis management comments: Initial labs were completed before I evaluated the patient and given the exquisite nature of her tenderness in her abd and elevated WBC I will activate the septic protocol. Pain meds and ct abd ordered. pts symptoms improved after medication. No increase in respiratory complaints noted per pt.      0345 discussed result of ct abd with radiologist.  azthromycin added to abx now for respiratory source. In to discuss with pt and family, neb, and solumedrol ordered. Pt continues not to complain of increased respiratory problems. A second order of pain med ordered. Discussed admission and pt consented to admission.          Amount and/or Complexity of Data Reviewed  Clinical lab tests: ordered and reviewed  Tests in the radiology section of CPT®: ordered and reviewed  Tests in the medicine section of CPT®: ordered and reviewed  Review and summarize past medical records: yes  Independent visualization of images, tracings, or specimens: yes    Risk of Complications, Morbidity, and/or Mortality  Presenting problems: moderate  Diagnostic procedures: moderate  Management options: moderate    Patient Progress  Patient progress: stable        ED Course       Procedures          Vitals:  Patient Vitals for the past 12 hrs:   Temp Pulse Resp BP SpO2   09/20/18 0315 - 87 - 104/55 95 %   09/20/18 0245 - 90 - 116/54 96 %   09/20/18 0230 - 90 - 121/42 96 %   09/20/18 0145 - 89 - 122/59 98 %   09/20/18 0130 - 92 - 108/64 100 %   09/20/18 0100 - 92 - 125/71 99 %   09/20/18 0045 - 95 - 107/63 94 %   09/20/18 0030 - 95 - 113/88 95 %   09/20/18 0015 - 96 - 107/61 95 %   09/20/18 0000 - 96 - 111/70 91 %   09/19/18 2330 - 96 - 114/76 98 %   09/19/18 2328 - - - - 95 %   09/19/18 2322 - 93 - - 100 %   09/19/18 2321 - 94 - - 97 %   09/19/18 2320 - 94 - - -   09/19/18 2318 - - - 133/77 92 %   09/19/18 2233 98.3 °F (36.8 °C) 100 18 134/89 96 %       Medications ordered:   Medications   sodium chloride (NS) flush 5-10 mL (not administered)   sodium chloride (NS) flush 5-10 mL (not administered)   azithromycin (ZITHROMAX) 500 mg in 0.9% sodium chloride (MBP/ADV) 250 mL adv (not administered)   HYDROmorphone (PF) (DILAUDID) injection 0.5 mg (not administered)   albuterol-ipratropium (DUO-NEB) 2.5 MG-0.5 MG/3 ML (not administered)   methylPREDNISolone (PF) (SOLU-MEDROL) injection 125 mg (not administered)   sodium chloride 0.9 % bolus infusion 1,000 mL (0 mL IntraVENous IV Completed 9/20/18 0136)     Followed by   sodium chloride 0.9 % bolus infusion 587 mL (0 mL IntraVENous IV Completed 9/20/18 0213)   cefepime (MAXIPIME) 1 g in sterile water (preservative free) 10 mL IV syringe (1 g IntraVENous Given 9/20/18 0105)   ondansetron (ZOFRAN) injection 4 mg (4 mg IntraVENous Given 9/20/18 0142)   HYDROmorphone (PF) (DILAUDID) injection 0.5 mg (0.5 mg IntraVENous Given 9/20/18 0143)   iopamidol (ISOVUE 300) 61 % contrast injection  mL (100 mL IntraVENous Given 9/20/18 0200)         Lab findings:  Recent Results (from the past 12 hour(s))   CBC WITH AUTOMATED DIFF    Collection Time: 09/19/18 11:20 PM   Result Value Ref Range    WBC 21.3 (H) 4.6 - 13.2 K/uL    RBC 4.62 4.20 - 5.30 M/uL    HGB 15.3 12.0 - 16.0 g/dL    HCT 45.5 (H) 35.0 - 45.0 %    MCV 98.5 (H) 74.0 - 97.0 FL    MCH 33.1 24.0 - 34.0 PG    MCHC 33.6 31.0 - 37.0 g/dL    RDW 13.7 11.6 - 14.5 %    PLATELET 631 385 - 624 K/uL    MPV 9.9 9.2 - 11.8 FL    NEUTROPHILS 83 (H) 42 - 75 %    LYMPHOCYTES 7 (L) 20 - 51 %    MONOCYTES 9 2 - 9 %    EOSINOPHILS 1 0 - 5 %    BASOPHILS 0 0 - 3 %    ABS. NEUTROPHILS 17.7 (H) 1.8 - 8.0 K/UL    ABS. LYMPHOCYTES 1.5 0.8 - 3.5 K/UL    ABS. MONOCYTES 1.9 (H) 0 - 1.0 K/UL    ABS. EOSINOPHILS 0.2 0.0 - 0.4 K/UL    ABS. BASOPHILS 0.0 0.0 - 0.06 K/UL    DF MANUAL      PLATELET COMMENTS ADEQUATE PLATELETS      RBC COMMENTS NORMOCYTIC, NORMOCHROMIC     METABOLIC PANEL, COMPREHENSIVE    Collection Time: 09/19/18 11:20 PM   Result Value Ref Range    Sodium 135 (L) 136 - 145 mmol/L    Potassium 3.9 3.5 - 5.5 mmol/L    Chloride 98 (L) 100 - 108 mmol/L    CO2 31 21 - 32 mmol/L    Anion gap 6 3.0 - 18 mmol/L    Glucose 112 (H) 74 - 99 mg/dL    BUN 20 (H) 7.0 - 18 MG/DL    Creatinine 0.89 0.6 - 1.3 MG/DL    BUN/Creatinine ratio 22 (H) 12 - 20      GFR est AA >60 >60 ml/min/1.73m2    GFR est non-AA >60 >60 ml/min/1.73m2    Calcium 9.2 8.5 - 10.1 MG/DL    Bilirubin, total 0.7 0.2 - 1.0 MG/DL    ALT (SGPT) 19 13 - 56 U/L    AST (SGOT) 16 15 - 37 U/L    Alk.  phosphatase 97 45 - 117 U/L    Protein, total 8.2 6.4 - 8.2 g/dL    Albumin 3.9 3.4 - 5.0 g/dL    Globulin 4.3 (H) 2.0 - 4.0 g/dL    A-G Ratio 0.9 0.8 - 1.7     LIPASE    Collection Time: 09/19/18 11:20 PM   Result Value Ref Range    Lipase 166 73 - 393 U/L   EKG, 12 LEAD, INITIAL    Collection Time: 09/19/18 11:39 PM   Result Value Ref Range    Ventricular Rate 93 BPM    Atrial Rate 93 BPM    P-R Interval 120 ms    QRS Duration 82 ms    Q-T Interval 342 ms    QTC Calculation (Bezet) 425 ms    Calculated P Axis 78 degrees    Calculated R Axis 84 degrees    Calculated T Axis 58 degrees    Diagnosis       Normal sinus rhythm  Possible Left atrial enlargement  Nonspecific ST abnormality  Abnormal ECG  When compared with ECG of 21-DEC-2017 13:07,  premature ventricular complexes are no longer present  premature atrial complexes are no longer present     POC LACTIC ACID    Collection Time: 09/20/18 12:53 AM   Result Value Ref Range    Lactic Acid (POC) 1.2 0.4 - 2.0 mmol/L   URINALYSIS W/ RFLX MICROSCOPIC    Collection Time: 09/20/18  1:34 AM   Result Value Ref Range    Color YELLOW      Appearance CLEAR      Specific gravity 1.021 1.005 - 1.030      pH (UA) 8.0 5.0 - 8.0      Protein NEGATIVE  NEG mg/dL    Glucose NEGATIVE  NEG mg/dL    Ketone NEGATIVE  NEG mg/dL    Bilirubin NEGATIVE  NEG      Blood NEGATIVE  NEG      Urobilinogen 1.0 0.2 - 1.0 EU/dL    Nitrites NEGATIVE  NEG      Leukocyte Esterase TRACE (A) NEG     URINE MICROSCOPIC ONLY    Collection Time: 09/20/18  1:34 AM   Result Value Ref Range    WBC 0 to 3 0 - 5 /hpf    RBC 4 to 10 0 - 5 /hpf    Epithelial cells FEW 0 - 5 /lpf    Bacteria NEGATIVE  NEG /hpf    Hyaline cast 0 to 3 0 - 2 /lpf         EKG:   Normal sinus rhythm   Possible Left atrial enlargement   Nonspecific ST abnormality   Abnormal ECG   When compared with ECG of 21-DEC-2017 13:07,   premature ventricular complexes are no longer present   premature atrial complexes are no longer present     X-Ray, CT or other radiology findings or impressions:  CT ABD PELV W CONT   Final Result      XR CHEST PORT    (Results Pending)     Description:  CT abdomen and pelvis with IV contrast     TECHNIQUE: [Helically acquired axial] CT imaging of the [abdomen and pelvis]. 100 cc's of Isovue-300 injected intravenously.  [ Coronal and sagittal  reformations generated and reviewed.     All CT scans at this facility are performed using dose optimization technique as  appropriate to a performed exam, to include automated exposure control,  adjustment of the mA and/or kV according to patient size (including appropriate  matching for site-specific examinations), or use of iterative reconstruction  technique.     Clinical Indication:  Right lower quadrant pain     Comparison: July 25, 2018.     Findings:       CT ABDOMEN:  Left lower lobe infiltrate is present. The right lung base is clear with the  exception of mild bronchiectasis at the medial right middle lobe. No pleural  effusion is present. The liver appears normal.  The spleen appears normal.  The pancreas appears normal.  The adrenal glands appear normal.  The kidneys show symmetric enhancement. No hydronephrosis or mass. The stomach and the duodenum appear normal.  The gallbladder is not well-distended. No hyperdense gallstones are seen. There is normal contrast filling of the portal and the splenic veins. Moderate atherosclerosis of the abdominal aorta is present. No mesenteric or retroperitoneal adenopathy. No free fluid or air.     CT PELVIS:  There is increased soft tissue in the cecum (axial, 48). The appendix is not  identified. No definite right lower quadrant inflammation is seen. There is a large amount of retained fecal material within the rectum. The bladder is well distended and has a normal unopacified appearance. No pelvic adenopathy or free fluid.     Skeleton/soft tissues: L5 vertebral plasty. Moderate osteopenia. Multilevel  degenerative changes.     IMPRESSION  Impression:       1. Left lower lobe pneumonia. 2. Abnormal soft tissue in the medial wall of the cecum without surrounding  inflammation. Appearance is more concerning for malignancy than an acute  inflammatory process. The appendix is not definitively identified however. Clinical correlation needed. If the patient has not had a recent colonoscopy,  one is advised. Discussed with Dr. Deangelo Taylor.     Chest xray with chronic emphysema Progress notes, Consult notes or additional Procedure notes:     Discussed with Dr. Audra Lion for admission and she accepted       Disposition:    Diagnosis:   1. Chronic obstructive pulmonary disease, unspecified COPD type ([facility] [address]    2. Pneumonia of left lower lobe due to infectious organism ([facility]    3. Sepsis due to pneumonia ([facility]    4. Right lower quadrant abdominal pain        Disposition: admitted to [facility]      Patient's Medications   Start Taking    No medications on file   Continue Taking    ACETAMINOPHEN (TYLENOL EXTRA STRENGTH) 500 MG TABLET    Take  by mouth every six (6) hours as needed for Pain. ALBUTEROL (PROAIR HFA) 90 MCG/ACTUATION INHALER    INHALE 2 PUFFS BY MOUTH EVERY 4 HOURS AS NEEDED FOR WHEEZING OR SHORTNESS OF BREATH    ALBUTEROL (PROVENTIL VENTOLIN) 2.5 MG /3 ML (0.083 %) NEBULIZER SOLUTION    3 mL by Nebulization route every four (4) hours as needed for Wheezing or Shortness of Breath. Diag. Code: J44.9, R09.02    FLUTICASONE-SALMETEROL (ADVAIR) 250-50 MCG/DOSE DISKUS INHALER    Take 1 Puff by inhalation every twelve (12) hours. HYDROCODONE-ACETAMINOPHEN (NORCO) 5-325 MG PER TABLET    Take 1 Tab by mouth every four (4) hours as needed for Pain. Max Daily Amount: 6 Tabs. LORAZEPAM (ATIVAN) 1 MG TABLET    Take  by mouth two (2) times a day. OXYGEN-AIR DELIVERY SYSTEMS    2 L by Does Not Apply route. O2 via nasal cannula with bedtime and activity. O2 Company: [employer]    ROPINIROLE (REQUIP) 1 MG TABLET    Take  by mouth two (2) times a day. TIOTROPIUM BROMIDE (SPIRIVA RESPIMAT) 2.5 MCG/ACTUATION INHALER    Take 2 Puffs by inhalation daily.    These Medications have changed    No medications on file   Stop Taking    No medications on file            Sepsis Re-Assessment Documentation:     Date: 9/20/2018   Time: 4:18 AM      Vital Signs  Level of Consciousness: Alert  Temp: 98.3 °F (36.8 °C)  Temp Source: Oral  Pulse (Heart Rate): 87  Heart Rate Source: Monitor  Resp Rate: 18  BP: 104/55  MAP (Monitor): 68  MAP (Calculated): 104  BP 1 Location: Left arm  BP 1 Method: Automatic  BP Patient Position: Sitting  MEWS Score: 1          ADMISSION NOTE:  4:06 AM  Patient is being admitted to the hospital by Dr. Prashanth Cunha. The results of their tests and reasons for their admission have been discussed with them and/or available family. They convey agreement and understanding for the need to be admitted and for their admission diagnosis.            Paige Doherty ENP-MORRIS,FNP-C

## 2018-09-20 NOTE — ED NOTES
Pt resting on stretcher sleeping on and off. Pt had noted hypotension on monitor. Pt reminded RN that her normal\" is typically slightly low. Pt has family bedside. Pt stated she felt warm prior. RN checked temp 98.1 orally. Pt expressed no further needs at this time. BP cuff readjusted. Pt expressed no further needs at this time. Will continue to monitor pt and carry out orders.

## 2018-09-21 ENCOUNTER — ANESTHESIA EVENT (OUTPATIENT)
Dept: ENDOSCOPY | Age: 73
DRG: 871 | End: 2018-09-21
Payer: MEDICARE

## 2018-09-21 LAB
ANION GAP SERPL CALC-SCNC: 5 MMOL/L (ref 3–18)
BACTERIA SPEC CULT: NORMAL
BASOPHILS # BLD: 0 K/UL (ref 0–0.1)
BASOPHILS NFR BLD: 0 % (ref 0–2)
BUN SERPL-MCNC: 18 MG/DL (ref 7–18)
BUN/CREAT SERPL: 31 (ref 12–20)
CALCIUM SERPL-MCNC: 8.8 MG/DL (ref 8.5–10.1)
CHLORIDE SERPL-SCNC: 99 MMOL/L (ref 100–108)
CO2 SERPL-SCNC: 32 MMOL/L (ref 21–32)
CREAT SERPL-MCNC: 0.59 MG/DL (ref 0.6–1.3)
DIFFERENTIAL METHOD BLD: ABNORMAL
EOSINOPHIL # BLD: 0 K/UL (ref 0–0.4)
EOSINOPHIL NFR BLD: 0 % (ref 0–5)
ERYTHROCYTE [DISTWIDTH] IN BLOOD BY AUTOMATED COUNT: 13.3 % (ref 11.6–14.5)
GLUCOSE BLD STRIP.AUTO-MCNC: 117 MG/DL (ref 70–110)
GLUCOSE BLD STRIP.AUTO-MCNC: 123 MG/DL (ref 70–110)
GLUCOSE BLD STRIP.AUTO-MCNC: 123 MG/DL (ref 70–110)
GLUCOSE BLD STRIP.AUTO-MCNC: 130 MG/DL (ref 70–110)
GLUCOSE SERPL-MCNC: 143 MG/DL (ref 74–99)
HCT VFR BLD AUTO: 36.3 % (ref 35–45)
HGB BLD-MCNC: 12.1 G/DL (ref 12–16)
LYMPHOCYTES # BLD: 0.8 K/UL (ref 0.9–3.6)
LYMPHOCYTES NFR BLD: 11 % (ref 21–52)
MCH RBC QN AUTO: 32.1 PG (ref 24–34)
MCHC RBC AUTO-ENTMCNC: 33.3 G/DL (ref 31–37)
MCV RBC AUTO: 96.3 FL (ref 74–97)
MONOCYTES # BLD: 0.3 K/UL (ref 0.05–1.2)
MONOCYTES NFR BLD: 4 % (ref 3–10)
NEUTS SEG # BLD: 5.8 K/UL (ref 1.8–8)
NEUTS SEG NFR BLD: 85 % (ref 40–73)
PLATELET # BLD AUTO: 262 K/UL (ref 135–420)
PMV BLD AUTO: 9.8 FL (ref 9.2–11.8)
POTASSIUM SERPL-SCNC: 4.3 MMOL/L (ref 3.5–5.5)
RBC # BLD AUTO: 3.77 M/UL (ref 4.2–5.3)
SERVICE CMNT-IMP: NORMAL
SODIUM SERPL-SCNC: 136 MMOL/L (ref 136–145)
WBC # BLD AUTO: 6.8 K/UL (ref 4.6–13.2)

## 2018-09-21 PROCEDURE — 94640 AIRWAY INHALATION TREATMENT: CPT

## 2018-09-21 PROCEDURE — 74011000250 HC RX REV CODE- 250: Performed by: INTERNAL MEDICINE

## 2018-09-21 PROCEDURE — 74011000250 HC RX REV CODE- 250: Performed by: NURSE PRACTITIONER

## 2018-09-21 PROCEDURE — 74011250636 HC RX REV CODE- 250/636: Performed by: INTERNAL MEDICINE

## 2018-09-21 PROCEDURE — 85025 COMPLETE CBC W/AUTO DIFF WBC: CPT | Performed by: FAMILY MEDICINE

## 2018-09-21 PROCEDURE — 65270000029 HC RM PRIVATE

## 2018-09-21 PROCEDURE — 74011250637 HC RX REV CODE- 250/637: Performed by: INTERNAL MEDICINE

## 2018-09-21 PROCEDURE — 80048 BASIC METABOLIC PNL TOTAL CA: CPT | Performed by: FAMILY MEDICINE

## 2018-09-21 PROCEDURE — 74011250636 HC RX REV CODE- 250/636: Performed by: HOSPITALIST

## 2018-09-21 PROCEDURE — 77010033678 HC OXYGEN DAILY

## 2018-09-21 PROCEDURE — 82962 GLUCOSE BLOOD TEST: CPT

## 2018-09-21 PROCEDURE — 36415 COLL VENOUS BLD VENIPUNCTURE: CPT | Performed by: FAMILY MEDICINE

## 2018-09-21 RX ORDER — ONDANSETRON 2 MG/ML
INJECTION INTRAMUSCULAR; INTRAVENOUS
Status: DISPENSED
Start: 2018-09-21 | End: 2018-09-22

## 2018-09-21 RX ORDER — ALBUTEROL SULFATE 1.25 MG/3ML
1.25 SOLUTION RESPIRATORY (INHALATION)
Status: DISCONTINUED | OUTPATIENT
Start: 2018-09-21 | End: 2018-09-24 | Stop reason: HOSPADM

## 2018-09-21 RX ORDER — FENTANYL CITRATE 50 UG/ML
50 INJECTION, SOLUTION INTRAMUSCULAR; INTRAVENOUS AS NEEDED
Status: CANCELLED | OUTPATIENT
Start: 2018-09-21

## 2018-09-21 RX ORDER — SODIUM CHLORIDE, SODIUM LACTATE, POTASSIUM CHLORIDE, CALCIUM CHLORIDE 600; 310; 30; 20 MG/100ML; MG/100ML; MG/100ML; MG/100ML
75 INJECTION, SOLUTION INTRAVENOUS CONTINUOUS
Status: CANCELLED | OUTPATIENT
Start: 2018-09-21 | End: 2018-09-21

## 2018-09-21 RX ORDER — FAMOTIDINE 20 MG/1
20 TABLET, FILM COATED ORAL ONCE
Status: CANCELLED | OUTPATIENT
Start: 2018-09-21 | End: 2018-09-21

## 2018-09-21 RX ORDER — SODIUM CHLORIDE, SODIUM LACTATE, POTASSIUM CHLORIDE, CALCIUM CHLORIDE 600; 310; 30; 20 MG/100ML; MG/100ML; MG/100ML; MG/100ML
75 INJECTION, SOLUTION INTRAVENOUS CONTINUOUS
Status: CANCELLED | OUTPATIENT
Start: 2018-09-22 | End: 2018-09-22

## 2018-09-21 RX ORDER — ONDANSETRON 2 MG/ML
4 INJECTION INTRAMUSCULAR; INTRAVENOUS
Status: DISCONTINUED | OUTPATIENT
Start: 2018-09-21 | End: 2018-09-24 | Stop reason: HOSPADM

## 2018-09-21 RX ORDER — SODIUM CHLORIDE FOR INHALATION 10 %
5 VIAL, NEBULIZER (ML) INHALATION
Status: DISCONTINUED | OUTPATIENT
Start: 2018-09-21 | End: 2018-09-24 | Stop reason: HOSPADM

## 2018-09-21 RX ORDER — ONDANSETRON 4 MG/1
4 TABLET, ORALLY DISINTEGRATING ORAL
Status: DISCONTINUED | OUTPATIENT
Start: 2018-09-21 | End: 2018-09-21

## 2018-09-21 RX ORDER — NALOXONE HYDROCHLORIDE 0.4 MG/ML
0.1 INJECTION, SOLUTION INTRAMUSCULAR; INTRAVENOUS; SUBCUTANEOUS ONCE
Status: CANCELLED | OUTPATIENT
Start: 2018-09-21 | End: 2018-09-21

## 2018-09-21 RX ORDER — ALBUTEROL SULFATE 1.25 MG/3ML
1.25 SOLUTION RESPIRATORY (INHALATION)
Status: DISCONTINUED | OUTPATIENT
Start: 2018-09-21 | End: 2018-09-21

## 2018-09-21 RX ADMIN — ONDANSETRON HYDROCHLORIDE 4 MG: 2 INJECTION INTRAMUSCULAR; INTRAVENOUS at 17:56

## 2018-09-21 RX ADMIN — HYDROMORPHONE HYDROCHLORIDE 0.5 MG: 1 INJECTION, SOLUTION INTRAMUSCULAR; INTRAVENOUS; SUBCUTANEOUS at 23:38

## 2018-09-21 RX ADMIN — Medication 10 ML: at 15:14

## 2018-09-21 RX ADMIN — SODIUM CHLORIDE 10 ML: 9 INJECTION INTRAMUSCULAR; INTRAVENOUS; SUBCUTANEOUS at 15:14

## 2018-09-21 RX ADMIN — ALBUTEROL SULFATE 1.25 MG: 1.25 SOLUTION RESPIRATORY (INHALATION) at 09:39

## 2018-09-21 RX ADMIN — SODIUM CHLORIDE 10 ML: 9 INJECTION INTRAMUSCULAR; INTRAVENOUS; SUBCUTANEOUS at 23:42

## 2018-09-21 RX ADMIN — METHYLPREDNISOLONE SODIUM SUCCINATE 60 MG: 40 INJECTION, POWDER, FOR SOLUTION INTRAMUSCULAR; INTRAVENOUS at 06:05

## 2018-09-21 RX ADMIN — HYDROMORPHONE HYDROCHLORIDE 0.5 MG: 1 INJECTION, SOLUTION INTRAMUSCULAR; INTRAVENOUS; SUBCUTANEOUS at 01:09

## 2018-09-21 RX ADMIN — TIOTROPIUM BROMIDE 18 MCG: 18 CAPSULE ORAL; RESPIRATORY (INHALATION) at 10:00

## 2018-09-21 RX ADMIN — POLYETHYLENE GLYCOL-3350 AND ELECTROLYTES 4000 ML: 236; 6.74; 5.86; 2.97; 22.74 POWDER, FOR SOLUTION ORAL at 18:11

## 2018-09-21 RX ADMIN — CEFTRIAXONE SODIUM 1 G: 1 INJECTION, POWDER, FOR SOLUTION INTRAMUSCULAR; INTRAVENOUS at 06:05

## 2018-09-21 RX ADMIN — METHYLPREDNISOLONE SODIUM SUCCINATE 40 MG: 40 INJECTION, POWDER, FOR SOLUTION INTRAMUSCULAR; INTRAVENOUS at 23:39

## 2018-09-21 RX ADMIN — SODIUM CHLORIDE 10 ML: 9 INJECTION INTRAMUSCULAR; INTRAVENOUS; SUBCUTANEOUS at 06:06

## 2018-09-21 RX ADMIN — Medication 10 ML: at 23:41

## 2018-09-21 RX ADMIN — HYDROMORPHONE HYDROCHLORIDE 0.5 MG: 1 INJECTION, SOLUTION INTRAMUSCULAR; INTRAVENOUS; SUBCUTANEOUS at 10:47

## 2018-09-21 RX ADMIN — SODIUM CHLORIDE 500 MG: 900 INJECTION, SOLUTION INTRAVENOUS at 06:04

## 2018-09-21 RX ADMIN — METHYLPREDNISOLONE SODIUM SUCCINATE 40 MG: 40 INJECTION, POWDER, FOR SOLUTION INTRAMUSCULAR; INTRAVENOUS at 15:14

## 2018-09-21 NOTE — ROUTINE PROCESS
1600 Received pt on bed. AO4, hob>30. Pt on 2L NC. C/o of pain 5/10 R abdomen. Pain med given. Family at bedside. Pt c/o of nausea, called MD to change to zofran IV, med given. 1800 Golytely given, intructions provided, pt is aware of NPO status at midnight. 1900 Bedside and Verbal shift change report given to [address] [place] [address] (oncoming nurse) by Riana Sanders RN   (offgoing nurse). Report included the following information SBAR and Cardiac Rhythm . . '

## 2018-09-21 NOTE — DIABETES MGMT
Glycemic Control Plan of Care    No history of diabetes mellitus. IV Solumedrol 40 mg every 6 hours. Noted bowel prep today, 9/21/2018, and colonoscopy tomorrow, 9/22/2018. Explained reason for BG monitoring while on steroids. Patient verbalized understanding. Recommendation(s):  1.) BG monitoring with correctional lispro insulin while patient on steroids. Assessment:  Patient is 72year old with past medical history including COPD on home oxygen, asthma, tobacco dependence, asbestosis, DVT, anxiety, and depression,  - was admitted on 9/19/2018 with report of  abdominal pain causing dyspnea, and 30 lbs weight loss. Noted:  COPD exacerbation. Tobacco dependence despite being on home oxygen. Pneumonia. Abdominal pain with abnormal CT: soft tissue mass in medial wall of cecum. Most recent blood glucose values:    Results for Vaibhav Jones (MRN [id number]) as of 9/21/2018 10:39   Ref. Range 9/20/2018 23:15   GLUCOSE,FAST - POC Latest Ref Range: 70 - 110 mg/dL 149 (H)     Results for Vaibhav Jones (MRN [id number]) as of 9/21/2018 10:39   Ref. Range 9/21/2018 09:27   GLUCOSE,FAST - POC Latest Ref Range: 70 - 110 mg/dL 130 (H)     Current A1C: None. No history of diabetes mellitus. Current hospital diabetes medications:  None. Total daily dose insulin requirement previous day: 9/20/2018  None. Home diabetes medications: None. No history of diabetes mellitus.     Diet: Clear liquid    Goals:  Blood glucose will be within target range of  mg/dL by 9/24/2018    Education:  ___  Refer to Diabetes Education Record             _X__  Education not indicated at this time    Ewelina Kearney RN [facility]  Pager: [phone]

## 2018-09-21 NOTE — PROGRESS NOTES
[facility] Hospitalist Group  Progress Note    Patient: Jovan Stevenson Age: 72 y.o. : 1945 MR#: [id number] SSN: [id number]  Date/Time: 2018    Subjective:     not adequately prepped for colonoscopy today with several formed stools throughout the night, no nausea, vomiting, or blood in stool. Has experienced unintentional weight loss over the past year or so. Lights cigarettes but tends not to smoke them. Resides at home with daughter who does not smoke. Most of the time able to break up and expectorate thick sputum, had a hot shower this am which helped. Had abdominal pain since August but did not have it investigated. Assessment/Plan:     1. Acute COPD exac with hx of chronic hypoxic resp failure - med tx with IV steroids, nebs, abx for #2. Appreciate pulmonology eval and recs. 2. Sepsis POA due to PNA - VSS, wnl. WBC down slightly. On abx. Blood cx NGTD so far. 3. Abdominal pain with soft tissue mass medial wall of cecum - as seen on CT. Plan for colo tomorrow per GI. GoLytely. NPO p MN. 4. Chronic back pain - hx of DJDz of L-spine. Controlled. 5. Tobacco abuse - smoking cessation education. 6. Hx anxiety, depression - noted. No acute. 7. Hx DVT - noted. 8. DNR. Durable DNR on file in cc.      Additional Notes:      Case discussed with:  [x]Patient  [x]Family  [x]Nursing  []Case Management  DVT Prophylaxis:  []Lovenox  [x]Hep SQ  []SCDs  []Coumadin   []On Heparin gtt    Objective:   VS:   Visit Vitals    /73 (BP 1 Location: Left arm, BP Patient Position: At rest)    Pulse 78    Temp 98 °F (36.7 °C)    Resp 18    Ht 5' 6\" (1.676 m)    Wt 53.1 kg (117 lb)    SpO2 95%    BMI 18.88 kg/m2      Tmax/24hrs: Temp (24hrs), Av.6 °F (36.4 °C), Min:97.1 °F (36.2 °C), Max:98 °F (36.7 °C)    Input/Output:     Intake/Output Summary (Last 24 hours) at 18 1215  Last data filed at 18 1000   Gross per 24 hour   Intake                0 ml   Output 1150 ml   Net            -1150 ml       General:  Awake, alert, NAD. Family at bedside. Heent: ncat. perrl. Cardiovascular:  RRR. Pulmonary:  CTA B with diffuse exp wheezes. GI:  Soft, ND, NABS. Mild TTP RLQ. No rebound/guarding. Extremities:  No edema. Additional: no rash    Labs:    Recent Results (from the past 24 hour(s))   GLUCOSE, POC    Collection Time: 09/20/18 11:15 PM   Result Value Ref Range    Glucose (POC) 149 (H) 70 - 110 mg/dL   CBC WITH AUTOMATED DIFF    Collection Time: 09/21/18  4:03 AM   Result Value Ref Range    WBC 6.8 4.6 - 13.2 K/uL    RBC 3.77 (L) 4.20 - 5.30 M/uL    HGB 12.1 12.0 - 16.0 g/dL    HCT 36.3 35.0 - 45.0 %    MCV 96.3 74.0 - 97.0 FL    MCH 32.1 24.0 - 34.0 PG    MCHC 33.3 31.0 - 37.0 g/dL    RDW 13.3 11.6 - 14.5 %    PLATELET 805 074 - 288 K/uL    MPV 9.8 9.2 - 11.8 FL    NEUTROPHILS 85 (H) 40 - 73 %    LYMPHOCYTES 11 (L) 21 - 52 %    MONOCYTES 4 3 - 10 %    EOSINOPHILS 0 0 - 5 %    BASOPHILS 0 0 - 2 %    ABS. NEUTROPHILS 5.8 1.8 - 8.0 K/UL    ABS. LYMPHOCYTES 0.8 (L) 0.9 - 3.6 K/UL    ABS. MONOCYTES 0.3 0.05 - 1.2 K/UL    ABS. EOSINOPHILS 0.0 0.0 - 0.4 K/UL    ABS.  BASOPHILS 0.0 0.0 - 0.1 K/UL    DF AUTOMATED     METABOLIC PANEL, BASIC    Collection Time: 09/21/18  4:03 AM   Result Value Ref Range    Sodium 136 136 - 145 mmol/L    Potassium 4.3 3.5 - 5.5 mmol/L    Chloride 99 (L) 100 - 108 mmol/L    CO2 32 21 - 32 mmol/L    Anion gap 5 3.0 - 18 mmol/L    Glucose 143 (H) 74 - 99 mg/dL    BUN 18 7.0 - 18 MG/DL    Creatinine 0.59 (L) 0.6 - 1.3 MG/DL    BUN/Creatinine ratio 31 (H) 12 - 20      GFR est AA >60 >60 ml/min/1.73m2    GFR est non-AA >60 >60 ml/min/1.73m2    Calcium 8.8 8.5 - 10.1 MG/DL   GLUCOSE, POC    Collection Time: 09/21/18  9:27 AM   Result Value Ref Range    Glucose (POC) 130 (H) 70 - 110 mg/dL     Additional Data Reviewed:      Signed By: Bridger Shi MD     September 21, 2018 3:46 PM

## 2018-09-21 NOTE — PROGRESS NOTES
NUTRITION    Nursing Referral: [facility]     RECOMMENDATIONS / PLAN:     - Recommend advancing to cardiac diet as medically appropriate   - Add Ensure Clear TID  - Continue RD inpatient monitoring and evaluation. NUTRITION INTERVENTIONS & DIAGNOSIS:     [x] Meals/snacks: modified composition  [x] Medical food supplement therapy: initiate    Nutrition Diagnosis:   Inadequate energy intake related to insufficient calorie diet as evidenced by pt on clear liquid diet. ASSESSMENT:     Pt unavailable at time of visit. Was NPO today for procedure; started on clear liquid diet. Plan for colonoscopy tomorrow.  Pt underweight; BMI 18.8 kg/m^2    Average po intake adequate to meet patients estimated nutritional needs:   [] Yes     [x] No   [] Unable to determine at this time    Diet: DIET NPO  DIET CLEAR LIQUID      Food Allergies: none known   Current Appetite:   [] Good     [] Fair     [] Poor     [x] Other: unknown   Appetite/meal intake prior to admission:   [] Good     [] Fair     [x] Poor: per nursing screen    [] Other:  Feeding Limitations:  [] Swallowing difficulty    [] Chewing difficulty    [] Other:  Current Meal Intake: Patient Vitals for the past 100 hrs:   % Diet Eaten   09/21/18 0917 0 %   09/20/18 1810 0 %       BM:  9/21  Skin Integrity: no pressure injury or wound noted  Edema:   [x] No     [] Yes   Pertinent Medications: Reviewed: SSI, LR at 75 mL/hr, steroid therapy, zofran    Recent Labs      09/21/18   0403  09/20/18   0630  09/19/18   2320   NA  136  139  135*   K  4.3  4.1  3.9   CL  99*  106  98*   CO2  32  27  31   GLU  143*  119*  112*   BUN  18  15  20*   CREA  0.59*  0.60  0.89   CA  8.8  7.9*  9.2   ALB   --   2.9*  3.9   SGOT   --   13*  16   ALT   --   14  19       Intake/Output Summary (Last 24 hours) at 09/21/18 1529  Last data filed at 09/21/18 1350   Gross per 24 hour   Intake              320 ml   Output             1150 ml   Net             -830 ml       Anthropometrics:  Ht Readings from Last 1 Encounters:   09/20/18 5' 6\" (1.676 m)     Last 3 Recorded Weights in this Encounter    09/19/18 2233 09/20/18 1744   Weight: 52.9 kg (116 lb 9 oz) 53.1 kg (117 lb)     Body mass index is 18.88 kg/(m^2). Underweight    Weight History:   Per H&P, pt reported unplanned wt loss of 30 lb PTA. Per chart hx, noted weight fluctuating from 113-122 in past 1 year PTA    Weight Metrics 9/20/2018 8/28/2018 8/16/2018 7/19/2018 2/14/2018 12/4/2017 9/11/2017   Weight 117 lb 116 lb 122 lb 4.8 oz 118 lb 115 lb 113 lb 9.6 oz 121 lb   BMI 18.88 kg/m2 19.3 kg/m2 20.35 kg/m2 19.05 kg/m2 18.56 kg/m2 18.34 kg/m2 19.53 kg/m2        Admitting Diagnosis: Pneumonia  Sepsis due to pneumonia (HCC)  Abdominal pain  COPD (chronic obstructive pulmonary disease) (HCC)  Pneumonia  DX  dx  Pertinent PMHx: COPD, depression, lung disease, osteoporosis, vitamin D deficiency    Education Needs:        [x] None identified  [] Identified - Not appropriate at this time  []  Identified and addressed - refer to education log  Learning Limitations:   [x] None identified  [] Identified    Cultural, Jewish & ethnic food preferences:  [x] None identified    [] Identified and addressed     ESTIMATED NUTRITION NEEDS:     Calories: 9925-6779 kcal (30-35 kcal/kg) based on  [x] Actual BW: 53 kg      [] IBW   Protein: 53-80 gm (1-1.5 gm/kg) based on  [x] Actual BW      [] IBW   Fluid: 1 mL/kcal     MONITORING & EVALUATION:     Nutrition Goal(s):   1. Po intake of meals will meet >75% of patient estimated nutritional needs within the next 7 days.   Outcome:  [] Met/Ongoing    []  Not Met    [x] New/Initial Goal     Monitoring:   [x] Food and beverage intake   [x] Diet order   [x] Nutrition-focused physical findings   [x] Treatment/therapy   [] Weight   [] Enteral nutrition intake        Previous Recommendations (for follow-up assessments only):     []   Implemented       []   Not Implemented (RD to address)      [] No Longer Appropriate     [] No Recommendation Made     Discharge Planning:  regular diet   [x] Participated in care planning, discharge planning, & interdisciplinary rounds as appropriate      [place], [address] [place] [address]   Pager: [phone]

## 2018-09-21 NOTE — PROGRESS NOTES
Problem: Falls - Risk of  Goal: *Absence of Falls  Document [place] Fall Risk and appropriate interventions in the flowsheet.    Outcome: Progressing Towards Goal  Fall Risk Interventions:  Mobility Interventions: Patient to call before getting OOB         Medication Interventions: Patient to call before getting OOB    Elimination Interventions: Call light in reach, Patient to call for help with toileting needs    History of Falls Interventions: Door open when patient unattended, Investigate reason for fall

## 2018-09-21 NOTE — ROUTINE PROCESS
Bedside shift change report given to Ifeoma Norton (oncoming nurse) by Maricarmen Pacheco RN (offgoing nurse). Report included the following information SBAR, Kardex, Intake/Output, MAR, Recent Results and Quality Measures.

## 2018-09-21 NOTE — PROGRESS NOTES
[facility]  Pulmonary, Critical Care, and Sleep Medicine    Progress note    Name: Rita Chamberlain MRN: [id number]   : 1945 Hospital: [address] [place] [address]    Date: 2018        IMPRESSION:   · Abdominal pain- suspected cecal lesion/malignancy vs infection  · COPD- GOLD 2017-group D   COPD with acute exacerbation ([address]    Pulmonary emphysema, unspecified emphysema type ([facility]    Tobacco dependence    Pulmonary cachexia due to chronic obstructive pulmonary disease ([facility] [facility]    Hypoxemia requiring supplemental oxygen       · Asbestosis  · Depression and anxiety  · H/o DVT- not on anticogulation      RECOMMENDATIONS:   · Oxygen- continue at 2-3 L to maintain SaO2 goal > 90%  · Bronchial hygiene protocol- will add hypertonic saline  · Bronchodilators- continue LABA + LAMA and prn OSMANY. Will start Spiriva, use albuterol prn  · Steroids- short taper  · Antibiotics- to cover Pneumonia and abdominal pathogens  · Aspiration precautions  · Nutrition  · Colonoscopy per GI  · Assess home Oxygen needs at discharge  · OT, PT, OOB and ambulate  · Healthy weight  · Will Follow  · DVT, PUD prophylaxis     Subjective:     18   Feels better  Still with cough and wheezing- improved  For colonoscopy- getting prepped. Denies any new complaints. HPI:  Patient is a 72 y.o. female with a history of COPD, chronic pain, DVT, depression, anxiety, who presents to ED with a 2 days hx of right lower abd pain,no fever reported. No nausea or vomiting, pt states bowel movements normal, she does state that her urine has been dark and smelly. She reports a 50 lb weight loss over the last few years unintentionally and intermittent pencil thin stools. Denies fevers, hematochezia, constipation, or diarrhea. No upper abdominal complaints. [facility] with a history of numerous polyps one of which has been pre-cancerous and colonoscopies every 3 years.    Abdominal Pain This is a new problem. The current episode started 2 days ago. The problem occurs constantly. The problem has been gradually worsening. The pain is located in the RLQ. The pain is at a severity of 9/10. The pain is moderate. Pertinent negatives include no fever, no diarrhea, no nausea, no vomiting, no constipation, no dysuria, no chest pain and no back pain. The pain is worsened by certain positions. The pain is relieved by nothing. COPD:  She follows with Dr. Cris Forrester and is on Advair and Spiriva Respimat. Still complains of baseline SOB with minimal exertion, wheezing and tight cough with chest congestion and tenacious phlegm. August CXR reviewed , no acute infiltrate but with hyperinflation. Pt complains of intermittent hemoptysis, known since 2013, no fever or chills. Pt is on home O2, last FEV1 was 30% in Jan 2015. Admits to continued smoking despite several efforts at quitting in the past.   Pt notes no apparent response to maintenance Azithromycin with no change in amount and character of phlegm.  Daliresp also did not improve symptoms      Past Medical History:   Diagnosis Date    Anxiety     Arthritis     Asbestosis ([address]     Asthma     Back problem     Baker's cyst     Balance problems     Chronic lung disease     Cigarette smoker     Clotting disorder (HCC)     COPD (chronic obstructive pulmonary disease) (HCC)     Depression     History of DVT (deep vein thrombosis)     Leg pain     Right    Lung disease     Nausea & vomiting     Osteoporosis     Restless leg syndrome     Spinal stenosis     Vitamin D deficiency       Past Surgical History:   Procedure Laterality Date    HX APPENDECTOMY      HX BACK SURGERY      x4    HX BREAST BIOPSY      HX HYSTERECTOMY      HX LUMBAR LAMINECTOMY      HX MENISCUS REPAIR      HX ORTHOPAEDIC      Knee    HX POLYPECTOMY      HX TONSILLECTOMY      HX VEIN STRIPPING          Allergies   Allergen Reactions    Anoro Ellipta [Umeclidinium-Vilanterol] Rash and Other (comments)     Muscle cramps in arms    Aspirin Other (comments)     GI upset and bleeding    Augmentin [Amoxicillin-Pot Clavulanate] Not Reported This Time     Possible cramping    Doxycycline Nausea and Vomiting    Morphine Other (comments)     Neurologic symptoms - severe agitation      Shellfish Derived Unknown (comments)     Pt able to eat small amounts per report     Sulfa (Sulfonamide Antibiotics) Rash     Patient relates no longer allergic         Current Facility-Administered Medications   Medication Dose Route Frequency    albuterol (ACCUNEB) nebulizer solution 1.25 mg  1.25 mg Nebulization Q4H RT    methylPREDNISolone (PF) (SOLU-MEDROL) injection 40 mg  40 mg IntraVENous Q8H    tiotropium (SPIRIVA) inhalation capsule 18 mcg  1 Cap Inhalation DAILY    sodium chloride (NS) flush 5-10 mL  5-10 mL IntraVENous Q8H    azithromycin (ZITHROMAX) 500 mg in 0.9% sodium chloride (MBP/ADV) 250 mL adv  500 mg IntraVENous Q24H    cefTRIAXone (ROCEPHIN) 1 g in sterile water (preservative free) 10 mL IV syringe  1 g IntraVENous Q24H    heparin (porcine) injection 5,000 Units  5,000 Units SubCUTAneous Q8H    sodium chloride 10% 15 ml hypertonic neb solution  5 mL Nebulization TID    lactated Ringers infusion  75 mL/hr IntraVENous CONTINUOUS    sodium chloride (NS) flush 5-10 mL  5-10 mL IntraVENous Q8H    insulin lispro (HUMALOG) injection   SubCUTAneous ONCE    famotidine (PF) (PEPCID) 20 mg in sodium chloride 0.9% 10 mL injection  20 mg IntraVENous ONCE       Review of Systems:  A comprehensive review of systems was negative except for that written in the HPI.     Objective:   Vital Signs:    Visit Vitals    /78 (BP 1 Location: Left arm, BP Patient Position: At rest)    Pulse 84    Temp 97.7 °F (36.5 °C)    Resp 18    Ht 5' 6\" (1.676 m)    Wt 53.1 kg (117 lb)    SpO2 94%    BMI 18.88 kg/m2       O2 Device: Nasal cannula   O2 Flow Rate (L/min): 2 l/min   Temp (24hrs), Av.3 °F (36.3 °C), Min:97.1 °F (36.2 °C), Max:97.7 °F (36.5 °C)       Intake/Output:   Last shift:         Last 3 shifts: 1901 -  07  In: 0   Out: 800 [Urine:800]    Intake/Output Summary (Last 24 hours) at 18 0827  Last data filed at 18 1810   Gross per 24 hour   Intake                0 ml   Output              800 ml   Net             -800 ml      Physical Exam:   General:  Alert, cooperative, no distress, appears stated age. using accessory muscles    Head:  Normocephalic, without obvious abnormality, atraumatic. Eyes:  Conjunctivae/corneas clear. PERRL, EOMs intact. Nose: Nares normal. Septum midline. Mucosa normal. No drainage or sinus tenderness. Throat: Lips, mucosa, and tongue normal. Teeth and gums normal.   Neck: Supple, symmetrical, trachea midline, no adenopathy, thyroid: no enlargment/tenderness/nodules, no carotid bruit and no JVD. Back:   Symmetric, no curvature. ROM normal.   Lungs:   Bilateral diffuse ronchi and wheezing   Chest wall:  No tenderness or deformity. Heart:  Regular rate and rhythm, S1, S2 normal, no murmur, click, rub or gallop. Abdomen:   Soft, ++ Tenderness RLQ, Bowel sounds normal. No masses,  No organomegaly. Extremities: Extremities normal, atraumatic, no cyanosis or edema. Pulses: 2+ and symmetric all extremities.    Skin: Skin color, texture, turgor normal. No rashes or lesions   Lymph nodes: Cervical, supraclavicular, and axillary nodes normal.   Neurologic: Grossly nonfocal     Data review:     Recent Results (from the past 24 hour(s))   GLUCOSE, POC    Collection Time: 18 11:15 PM   Result Value Ref Range    Glucose (POC) 149 (H) 70 - 110 mg/dL   CBC WITH AUTOMATED DIFF    Collection Time: 18  4:03 AM   Result Value Ref Range    WBC 6.8 4.6 - 13.2 K/uL    RBC 3.77 (L) 4.20 - 5.30 M/uL    HGB 12.1 12.0 - 16.0 g/dL    HCT 36.3 35.0 - 45.0 %    MCV 96.3 74.0 - 97.0 FL    MCH 32.1 24.0 - 34.0 PG    MCHC 33.3 31.0 - 37.0 g/dL    RDW 13.3 11.6 - 14.5 %    PLATELET 486 156 - 719 K/uL    MPV 9.8 9.2 - 11.8 FL    NEUTROPHILS 85 (H) 40 - 73 %    LYMPHOCYTES 11 (L) 21 - 52 %    MONOCYTES 4 3 - 10 %    EOSINOPHILS 0 0 - 5 %    BASOPHILS 0 0 - 2 %    ABS. NEUTROPHILS 5.8 1.8 - 8.0 K/UL    ABS. LYMPHOCYTES 0.8 (L) 0.9 - 3.6 K/UL    ABS. MONOCYTES 0.3 0.05 - 1.2 K/UL    ABS. EOSINOPHILS 0.0 0.0 - 0.4 K/UL    ABS. BASOPHILS 0.0 0.0 - 0.1 K/UL    DF AUTOMATED     METABOLIC PANEL, BASIC    Collection Time: 09/21/18  4:03 AM   Result Value Ref Range    Sodium 136 136 - 145 mmol/L    Potassium 4.3 3.5 - 5.5 mmol/L    Chloride 99 (L) 100 - 108 mmol/L    CO2 32 21 - 32 mmol/L    Anion gap 5 3.0 - 18 mmol/L    Glucose 143 (H) 74 - 99 mg/dL    BUN 18 7.0 - 18 MG/DL    Creatinine 0.59 (L) 0.6 - 1.3 MG/DL    BUN/Creatinine ratio 31 (H) 12 - 20      GFR est AA >60 >60 ml/min/1.73m2    GFR est non-AA >60 >60 ml/min/1.73m2    Calcium 8.8 8.5 - 10.1 MG/DL       Imaging:  I have personally reviewed the patients radiographs and have reviewed the reports:  XR Results (most recent):    Results from Hospital Encounter encounter on 09/19/18   XR CHEST PORT   Narrative Chest portable    History: Pain on the right side of the belly since Sunday, shortness of breath. Comparisons: 8/14/2018    Findings:     Lungs are hyperexpanded, compatible with COPD. Heart and mediastinum are grossly  stable. Atherosclerotic calcifications are seen at the arch. Breast and nipple  shadows overlie the midlungs bilaterally. Patchy infiltrate identified at the  left lung base. 1.1 cm nodule at the left midlung appears stable from at least  2/5/2017. Favor granuloma. Impression Impression:     Patchy infiltrate at the left lung base. Infection could not be excluded. Emphysema.     Thank you for this referral.          CT Results (most recent):    Results from [place] encounter on 09/19/18   CT ABD PELV W CONT   Narrative Description:  CT abdomen and pelvis with IV contrast    TECHNIQUE: [Helically acquired axial] CT imaging of the [abdomen and pelvis]. 100 cc's of Isovue-300 injected intravenously. [ Coronal and sagittal  reformations generated and reviewed. All CT scans at this facility are performed using dose optimization technique as  appropriate to a performed exam, to include automated exposure control,  adjustment of the mA and/or kV according to patient size (including appropriate  matching for site-specific examinations), or use of iterative reconstruction  technique. Clinical Indication:  Right lower quadrant pain    Comparison: July 25, 2018. Findings:      CT ABDOMEN:  Left lower lobe infiltrate is present. The right lung base is clear with the  exception of mild bronchiectasis at the medial right middle lobe. No pleural  effusion is present. The liver appears normal.  The spleen appears normal.  The pancreas appears normal.  The adrenal glands appear normal.  The kidneys show symmetric enhancement. No hydronephrosis or mass. The stomach and the duodenum appear normal.  The gallbladder is not well-distended. No hyperdense gallstones are seen. There is normal contrast filling of the portal and the splenic veins. Moderate atherosclerosis of the abdominal aorta is present. No mesenteric or retroperitoneal adenopathy. No free fluid or air. CT PELVIS:  There is increased soft tissue in the cecum (axial, 48). The appendix is not  identified. No definite right lower quadrant inflammation is seen. There is a large amount of retained fecal material within the rectum. The bladder is well distended and has a normal unopacified appearance. No pelvic adenopathy or free fluid. Skeleton/soft tissues: L5 vertebral plasty. Moderate osteopenia. Multilevel  degenerative changes. Impression Impression:      1. Left lower lobe pneumonia. 2. Abnormal soft tissue in the medial wall of the cecum without surrounding  inflammation. Appearance is more concerning for malignancy than an acute  inflammatory process. The appendix is not definitively identified however. Clinical correlation needed. If the patient has not had a recent colonoscopy,  one is advised. Discussed with Dr. Mirian Paul. High complexity decision making was performed during the evaluation of this patient at high risk for decompensation with multiple organ involvement     Above mentioned total time spent on reviewing the case/medical record/data/notes/EMR/patient examination/documentation/coordinating care with nurse/consultants, exclusive of procedures with complex decision making performed and > 50% time spent in face to face evaluation.           Laura Devine MD

## 2018-09-21 NOTE — PROGRESS NOTES
0715-  Bedside and Verbal shift change report given to Michela Andres RN (oncoming nurse) by Gab Krause RN (offgoing nurse). Report included the following information SBAR, Kardex, Intake/Output, MAR and Recent Results. 1400- Patient told care partner that one of her IV's was leaking and that she wanted the other one out since we were not using it. When this RN entered the room and asked what was wrong with the IV's and that patient had to at least have one IV even if we weren't using it at the moment. Patient's daughter, who is an RN, yelled out stating that both IV's needed to come out. This RN asked patient again what was wrong with the IV\"s and as patient began to explain her daughter yelled at her mother stating, \"Dont' explain it to her, just take them out. \"  This RN said to the daughter that I would like to talk to my patient. The daughter then yelled at this RN stating, \"I wasn't talking to you\"! This RN asked the daughter not to speak to this RN that way and the daughter stated, \"I'm a nurse and I told you they need to come out\"! This RN then went to the patient to check the IV's to see if there was a problem with them and the daughter yelled, \"Don't check them, I told you to take them out\"! This RN then checked the IV's . One IV was leaking and though it did not appear to be infiltrated patient reported that she felt pressure in her arm when flushing the IV. Patient's daughter continued to yell and be disrespectful to this RN. This RN left the room and asked that Yasmin take over the patient. Yasmin removed both IV's and started a new one. Patient's daughter continued to laugh and tease this RN making snide remarks whenever this RN walked past the room.

## 2018-09-21 NOTE — ROUTINE PROCESS
ADULT PROTOCOL: JET AEROSOL ASSESSMENT    Patient  Nae Green     72 y.o.   female     9/20/2018  10:35 PM    Breath Sounds Pre Procedure:                                               Breath Sounds Post Procedure:                                                 Breathing pattern: Pre procedure             Post procedure       Cough: Pre procedure                  Post procedure      Heart Rate: Pre procedure             Post procedure      Resp Rate: Pre procedure              Post procedure          Nebulizer Therapy: Current medications         Problem List:   Patient Active Problem List   Diagnosis Code    COPD, severe ([facility] J44.9    Nicotine addiction F17.200    Hemoptysis R04.2    Lumbar spinal stenosis M48.061    Facet arthritis of lumbar region ([facility] M46.96    Neuritis of lower extremity G57.90    Emphysema of lung ([facility] J43.9    COPD with acute exacerbation ([facility] J44.1    Muscle spasm of back M62.830    Sacroiliac joint pain M53.3    Current chronic use of systemic steroids Z79.52    Baker's cyst of knee M71.20    Acute exacerbation of chronic obstructive pulmonary disease (COPD) ([facility]) J44.1    Degenerative disc disease, lumbar M51.36    Left-sided low back pain with sciatica M54.42    COPD with exacerbation ([facility]) J44.1    COPD (chronic obstructive pulmonary disease) ([facility]) J44.9    Pneumonia J18.9    Abdominal pain R10.9    Sepsis due to pneumonia ([facility] J18.9, A41.9    Abdominal mass R19.00       Patient alert and cooperative to use MDI: Yes    Home Respiratory Therapy Regimen/Frequency:  YES  Medication Albuterol/Proair/Advair/Spiriva  Device MDI/DPI  Frequency Q4 PRN/Q4 PRN/BID / Daily    SEVERITY INDEX:    ITEM 0 1 2 3 4 Score   Respiratory Pattern and or Rate Regular  10-19 Regular  20-24   24-30    30-34 Severe SOB or   Greater than 35 0   Breath Sounds Clear Occasional Wheeze Mild Wheezing Moderate Wheezing  wheezing/Absent breath sounds 2   Shortness of Breath None Dyspnea on Exertion Dyspnea at Rest Moderate Shortness of Breath at Rest Severe Shortness of Breath - Limited Speech 1       Total Score:  3    * Scoring Guidelines  0-4 pts:  PRN-BID   5-7 pts:  BID, TID, QID  8-9 pts:  TID, QID, Q6  10-12 pts:  Q4-Q6  * - Guidelines used with clinical judgement. PRN Treatments can be ordered to supplement scheduled treatments. Regardless of score, frequency should not be less than normal home regimen. Recommended Order/Frequency: Albuterol HHN Q4 PRN     Comments: Add Albuterol HHN Q4 PRN to scheduled Duoneb tx.         Respiratory Therapist: Jaxon Proctor, RT

## 2018-09-22 ENCOUNTER — ANESTHESIA (OUTPATIENT)
Dept: ENDOSCOPY | Age: 73
DRG: 871 | End: 2018-09-22
Payer: MEDICARE

## 2018-09-22 PROBLEM — K63.5 COLON POLYPS: Status: ACTIVE | Noted: 2018-09-22

## 2018-09-22 PROBLEM — C18.9 COLON CANCER WITHOUT DISTANT METASTASIS (HCC): Status: ACTIVE | Noted: 2018-09-22

## 2018-09-22 LAB
ANION GAP SERPL CALC-SCNC: 5 MMOL/L (ref 3–18)
BASOPHILS # BLD: 0 K/UL (ref 0–0.1)
BASOPHILS NFR BLD: 0 % (ref 0–2)
BUN SERPL-MCNC: 24 MG/DL (ref 7–18)
BUN/CREAT SERPL: 38 (ref 12–20)
CALCIUM SERPL-MCNC: 8.3 MG/DL (ref 8.5–10.1)
CHLORIDE SERPL-SCNC: 99 MMOL/L (ref 100–108)
CO2 SERPL-SCNC: 34 MMOL/L (ref 21–32)
CREAT SERPL-MCNC: 0.64 MG/DL (ref 0.6–1.3)
DIFFERENTIAL METHOD BLD: ABNORMAL
EOSINOPHIL # BLD: 0 K/UL (ref 0–0.4)
EOSINOPHIL NFR BLD: 0 % (ref 0–5)
ERYTHROCYTE [DISTWIDTH] IN BLOOD BY AUTOMATED COUNT: 13.3 % (ref 11.6–14.5)
GLUCOSE BLD STRIP.AUTO-MCNC: 147 MG/DL (ref 70–110)
GLUCOSE SERPL-MCNC: 115 MG/DL (ref 74–99)
HCT VFR BLD AUTO: 35.2 % (ref 35–45)
HGB BLD-MCNC: 11.8 G/DL (ref 12–16)
INR PPP: 1.1 (ref 0.8–1.2)
LYMPHOCYTES # BLD: 0.5 K/UL (ref 0.9–3.6)
LYMPHOCYTES NFR BLD: 5 % (ref 21–52)
MAGNESIUM SERPL-MCNC: 2.7 MG/DL (ref 1.6–2.6)
MCH RBC QN AUTO: 31.9 PG (ref 24–34)
MCHC RBC AUTO-ENTMCNC: 33.5 G/DL (ref 31–37)
MCV RBC AUTO: 95.1 FL (ref 74–97)
MONOCYTES # BLD: 0.8 K/UL (ref 0.05–1.2)
MONOCYTES NFR BLD: 7 % (ref 3–10)
NEUTS SEG # BLD: 9.5 K/UL (ref 1.8–8)
NEUTS SEG NFR BLD: 88 % (ref 40–73)
PLATELET # BLD AUTO: 292 K/UL (ref 135–420)
PMV BLD AUTO: 10 FL (ref 9.2–11.8)
POTASSIUM SERPL-SCNC: 4.1 MMOL/L (ref 3.5–5.5)
PROTHROMBIN TIME: 13.5 SEC (ref 11.5–15.2)
RBC # BLD AUTO: 3.7 M/UL (ref 4.2–5.3)
SODIUM SERPL-SCNC: 138 MMOL/L (ref 136–145)
WBC # BLD AUTO: 10.8 K/UL (ref 4.6–13.2)

## 2018-09-22 PROCEDURE — 0DBL8ZX EXCISION OF TRANSVERSE COLON, VIA NATURAL OR ARTIFICIAL OPENING ENDOSCOPIC, DIAGNOSTIC: ICD-10-PCS | Performed by: INTERNAL MEDICINE

## 2018-09-22 PROCEDURE — 74011000250 HC RX REV CODE- 250: Performed by: INTERNAL MEDICINE

## 2018-09-22 PROCEDURE — 74011250636 HC RX REV CODE- 250/636

## 2018-09-22 PROCEDURE — 88305 TISSUE EXAM BY PATHOLOGIST: CPT | Performed by: INTERNAL MEDICINE

## 2018-09-22 PROCEDURE — 74011250636 HC RX REV CODE- 250/636: Performed by: NURSE ANESTHETIST, CERTIFIED REGISTERED

## 2018-09-22 PROCEDURE — 82962 GLUCOSE BLOOD TEST: CPT

## 2018-09-22 PROCEDURE — 74011250636 HC RX REV CODE- 250/636: Performed by: HOSPITALIST

## 2018-09-22 PROCEDURE — 94640 AIRWAY INHALATION TREATMENT: CPT

## 2018-09-22 PROCEDURE — 36415 COLL VENOUS BLD VENIPUNCTURE: CPT | Performed by: HOSPITALIST

## 2018-09-22 PROCEDURE — 77030009426 HC FCPS BIOP ENDOSC BSC -B: Performed by: INTERNAL MEDICINE

## 2018-09-22 PROCEDURE — 76040000007: Performed by: INTERNAL MEDICINE

## 2018-09-22 PROCEDURE — 80048 BASIC METABOLIC PNL TOTAL CA: CPT | Performed by: HOSPITALIST

## 2018-09-22 PROCEDURE — 77010033678 HC OXYGEN DAILY

## 2018-09-22 PROCEDURE — 65270000029 HC RM PRIVATE

## 2018-09-22 PROCEDURE — 0DBP8ZX EXCISION OF RECTUM, VIA NATURAL OR ARTIFICIAL OPENING ENDOSCOPIC, DIAGNOSTIC: ICD-10-PCS | Performed by: INTERNAL MEDICINE

## 2018-09-22 PROCEDURE — 77030013992 HC SNR POLYP ENDOSC BSC -B: Performed by: INTERNAL MEDICINE

## 2018-09-22 PROCEDURE — 74011250636 HC RX REV CODE- 250/636: Performed by: INTERNAL MEDICINE

## 2018-09-22 PROCEDURE — 74011000258 HC RX REV CODE- 258: Performed by: HOSPITALIST

## 2018-09-22 PROCEDURE — 77030008565 HC TBNG SUC IRR ERBE -B: Performed by: INTERNAL MEDICINE

## 2018-09-22 PROCEDURE — 0DBM8ZX EXCISION OF DESCENDING COLON, VIA NATURAL OR ARTIFICIAL OPENING ENDOSCOPIC, DIAGNOSTIC: ICD-10-PCS | Performed by: INTERNAL MEDICINE

## 2018-09-22 PROCEDURE — 77030018846 HC SOL IRR STRL H20 ICUM -A: Performed by: INTERNAL MEDICINE

## 2018-09-22 PROCEDURE — 76060000032 HC ANESTHESIA 0.5 TO 1 HR: Performed by: INTERNAL MEDICINE

## 2018-09-22 PROCEDURE — 85025 COMPLETE CBC W/AUTO DIFF WBC: CPT | Performed by: HOSPITALIST

## 2018-09-22 PROCEDURE — 85610 PROTHROMBIN TIME: CPT | Performed by: HOSPITALIST

## 2018-09-22 PROCEDURE — 83735 ASSAY OF MAGNESIUM: CPT | Performed by: HOSPITALIST

## 2018-09-22 PROCEDURE — 77030038604 HC SNR ENDO EXACTO USEN -B: Performed by: INTERNAL MEDICINE

## 2018-09-22 PROCEDURE — 77030014179 HC APPL CLP RESOL BSC -C: Performed by: INTERNAL MEDICINE

## 2018-09-22 RX ORDER — THERA TABS 400 MCG
1 TAB ORAL DAILY
Status: DISCONTINUED | OUTPATIENT
Start: 2018-09-23 | End: 2018-09-24 | Stop reason: HOSPADM

## 2018-09-22 RX ORDER — PROPOFOL 10 MG/ML
INJECTION, EMULSION INTRAVENOUS AS NEEDED
Status: DISCONTINUED | OUTPATIENT
Start: 2018-09-22 | End: 2018-09-22 | Stop reason: HOSPADM

## 2018-09-22 RX ORDER — SODIUM CHLORIDE, SODIUM LACTATE, POTASSIUM CHLORIDE, CALCIUM CHLORIDE 600; 310; 30; 20 MG/100ML; MG/100ML; MG/100ML; MG/100ML
75 INJECTION, SOLUTION INTRAVENOUS CONTINUOUS
Status: DISCONTINUED | OUTPATIENT
Start: 2018-09-22 | End: 2018-09-22 | Stop reason: HOSPADM

## 2018-09-22 RX ORDER — LIDOCAINE HYDROCHLORIDE 20 MG/ML
INJECTION, SOLUTION EPIDURAL; INFILTRATION; INTRACAUDAL; PERINEURAL AS NEEDED
Status: DISCONTINUED | OUTPATIENT
Start: 2018-09-22 | End: 2018-09-22 | Stop reason: HOSPADM

## 2018-09-22 RX ORDER — FENTANYL CITRATE 50 UG/ML
INJECTION, SOLUTION INTRAMUSCULAR; INTRAVENOUS
Status: DISCONTINUED
Start: 2018-09-22 | End: 2018-09-22

## 2018-09-22 RX ORDER — NALOXONE HYDROCHLORIDE 0.4 MG/ML
0.1 INJECTION, SOLUTION INTRAMUSCULAR; INTRAVENOUS; SUBCUTANEOUS ONCE
Status: DISCONTINUED | OUTPATIENT
Start: 2018-09-22 | End: 2018-09-22 | Stop reason: HOSPADM

## 2018-09-22 RX ORDER — FENTANYL CITRATE 50 UG/ML
50 INJECTION, SOLUTION INTRAMUSCULAR; INTRAVENOUS AS NEEDED
Status: DISCONTINUED | OUTPATIENT
Start: 2018-09-22 | End: 2018-09-22 | Stop reason: HOSPADM

## 2018-09-22 RX ORDER — SODIUM CHLORIDE, SODIUM LACTATE, POTASSIUM CHLORIDE, CALCIUM CHLORIDE 600; 310; 30; 20 MG/100ML; MG/100ML; MG/100ML; MG/100ML
INJECTION, SOLUTION INTRAVENOUS
Status: DISCONTINUED | OUTPATIENT
Start: 2018-09-22 | End: 2018-09-22 | Stop reason: HOSPADM

## 2018-09-22 RX ADMIN — SODIUM CHLORIDE 10 ML: 9 INJECTION INTRAMUSCULAR; INTRAVENOUS; SUBCUTANEOUS at 23:37

## 2018-09-22 RX ADMIN — HYDROMORPHONE HYDROCHLORIDE 0.5 MG: 1 INJECTION, SOLUTION INTRAMUSCULAR; INTRAVENOUS; SUBCUTANEOUS at 16:41

## 2018-09-22 RX ADMIN — LIDOCAINE HYDROCHLORIDE 20 MG: 20 INJECTION, SOLUTION EPIDURAL; INFILTRATION; INTRACAUDAL; PERINEURAL at 08:27

## 2018-09-22 RX ADMIN — METHYLPREDNISOLONE SODIUM SUCCINATE 40 MG: 40 INJECTION, POWDER, FOR SOLUTION INTRAMUSCULAR; INTRAVENOUS at 22:09

## 2018-09-22 RX ADMIN — LIDOCAINE HYDROCHLORIDE 20 MG: 20 INJECTION, SOLUTION EPIDURAL; INFILTRATION; INTRACAUDAL; PERINEURAL at 08:30

## 2018-09-22 RX ADMIN — ONDANSETRON HYDROCHLORIDE 4 MG: 2 INJECTION INTRAMUSCULAR; INTRAVENOUS at 01:35

## 2018-09-22 RX ADMIN — LIDOCAINE HYDROCHLORIDE 20 MG: 20 INJECTION, SOLUTION EPIDURAL; INFILTRATION; INTRACAUDAL; PERINEURAL at 08:34

## 2018-09-22 RX ADMIN — SODIUM CHLORIDE, SODIUM LACTATE, POTASSIUM CHLORIDE, CALCIUM CHLORIDE: 600; 310; 30; 20 INJECTION, SOLUTION INTRAVENOUS at 08:09

## 2018-09-22 RX ADMIN — HEPARIN SODIUM 5000 UNITS: 5000 INJECTION, SOLUTION INTRAVENOUS; SUBCUTANEOUS at 14:45

## 2018-09-22 RX ADMIN — FENTANYL CITRATE 50 MCG: 50 INJECTION, SOLUTION INTRAMUSCULAR; INTRAVENOUS at 09:03

## 2018-09-22 RX ADMIN — METHYLPREDNISOLONE SODIUM SUCCINATE 40 MG: 40 INJECTION, POWDER, FOR SOLUTION INTRAMUSCULAR; INTRAVENOUS at 14:45

## 2018-09-22 RX ADMIN — SODIUM CHLORIDE 500 MG: 900 INJECTION, SOLUTION INTRAVENOUS at 06:49

## 2018-09-22 RX ADMIN — LIDOCAINE HYDROCHLORIDE 20 MG: 20 INJECTION, SOLUTION EPIDURAL; INFILTRATION; INTRACAUDAL; PERINEURAL at 08:23

## 2018-09-22 RX ADMIN — METHYLPREDNISOLONE SODIUM SUCCINATE 40 MG: 40 INJECTION, POWDER, FOR SOLUTION INTRAMUSCULAR; INTRAVENOUS at 06:52

## 2018-09-22 RX ADMIN — CEFTRIAXONE SODIUM 1 G: 1 INJECTION, POWDER, FOR SOLUTION INTRAMUSCULAR; INTRAVENOUS at 06:50

## 2018-09-22 RX ADMIN — LIDOCAINE HYDROCHLORIDE 20 MG: 20 INJECTION, SOLUTION EPIDURAL; INFILTRATION; INTRACAUDAL; PERINEURAL at 08:36

## 2018-09-22 RX ADMIN — SODIUM CHLORIDE, SODIUM LACTATE, POTASSIUM CHLORIDE, AND CALCIUM CHLORIDE 75 ML/HR: 600; 310; 30; 20 INJECTION, SOLUTION INTRAVENOUS at 09:15

## 2018-09-22 RX ADMIN — HYDROMORPHONE HYDROCHLORIDE 0.5 MG: 1 INJECTION, SOLUTION INTRAMUSCULAR; INTRAVENOUS; SUBCUTANEOUS at 06:48

## 2018-09-22 RX ADMIN — SODIUM CHLORIDE 10 ML: 9 INJECTION INTRAMUSCULAR; INTRAVENOUS; SUBCUTANEOUS at 06:52

## 2018-09-22 RX ADMIN — PROPOFOL 20 MG: 10 INJECTION, EMULSION INTRAVENOUS at 08:19

## 2018-09-22 RX ADMIN — Medication 10 ML: at 09:49

## 2018-09-22 RX ADMIN — HEPARIN SODIUM 5000 UNITS: 5000 INJECTION, SOLUTION INTRAVENOUS; SUBCUTANEOUS at 22:09

## 2018-09-22 RX ADMIN — PROPOFOL 10 MG: 10 INJECTION, EMULSION INTRAVENOUS at 08:23

## 2018-09-22 RX ADMIN — LIDOCAINE HYDROCHLORIDE 20 MG: 20 INJECTION, SOLUTION EPIDURAL; INFILTRATION; INTRACAUDAL; PERINEURAL at 08:17

## 2018-09-22 RX ADMIN — PROPOFOL 20 MG: 10 INJECTION, EMULSION INTRAVENOUS at 08:21

## 2018-09-22 RX ADMIN — LIDOCAINE HYDROCHLORIDE 20 MG: 20 INJECTION, SOLUTION EPIDURAL; INFILTRATION; INTRACAUDAL; PERINEURAL at 08:25

## 2018-09-22 RX ADMIN — LIDOCAINE HYDROCHLORIDE 20 MG: 20 INJECTION, SOLUTION EPIDURAL; INFILTRATION; INTRACAUDAL; PERINEURAL at 08:32

## 2018-09-22 RX ADMIN — Medication 5 ML: at 21:19

## 2018-09-22 RX ADMIN — PROPOFOL 20 MG: 10 INJECTION, EMULSION INTRAVENOUS at 08:17

## 2018-09-22 RX ADMIN — LIDOCAINE HYDROCHLORIDE 20 MG: 20 INJECTION, SOLUTION EPIDURAL; INFILTRATION; INTRACAUDAL; PERINEURAL at 08:19

## 2018-09-22 RX ADMIN — LIDOCAINE HYDROCHLORIDE 20 MG: 20 INJECTION, SOLUTION EPIDURAL; INFILTRATION; INTRACAUDAL; PERINEURAL at 08:42

## 2018-09-22 RX ADMIN — LIDOCAINE HYDROCHLORIDE 20 MG: 20 INJECTION, SOLUTION EPIDURAL; INFILTRATION; INTRACAUDAL; PERINEURAL at 08:38

## 2018-09-22 RX ADMIN — Medication 10 ML: at 06:52

## 2018-09-22 RX ADMIN — PROMETHAZINE HYDROCHLORIDE 12.5 MG: 25 INJECTION INTRAMUSCULAR; INTRAVENOUS at 09:48

## 2018-09-22 RX ADMIN — ALBUTEROL SULFATE 1.25 MG: 1.25 SOLUTION RESPIRATORY (INHALATION) at 21:15

## 2018-09-22 RX ADMIN — SODIUM CHLORIDE 10 ML: 9 INJECTION INTRAMUSCULAR; INTRAVENOUS; SUBCUTANEOUS at 14:57

## 2018-09-22 RX ADMIN — HYDROMORPHONE HYDROCHLORIDE 0.5 MG: 1 INJECTION, SOLUTION INTRAMUSCULAR; INTRAVENOUS; SUBCUTANEOUS at 22:21

## 2018-09-22 NOTE — PERIOP NOTES
TRANSFER - OUT REPORT:    Verbal report given to Fifi AVITIA(name) on [place]   being transferred to [address] [place] [address](unit) for routine post - op       Report consisted of patients Situation, Background, Assessment and   Recommendations(SBAR). Information from the following report(s) SBAR, Kardex, OR Summary, Procedure Summary, Intake/Output, MAR, Recent Results and Med Rec Status was reviewed with the receiving nurse. Lines:   Peripheral IV 09/22/18 Right;Upper Arm (Active)   Site Assessment Clean, dry, & intact 9/22/2018  1:33 AM   Phlebitis Assessment 0 9/22/2018  1:33 AM   Infiltration Assessment 0 9/22/2018  1:33 AM   Dressing Status New 9/22/2018  1:33 AM   Dressing Type Transparent 9/22/2018  1:33 AM   Hub Color/Line Status Pink;Flushed;Capped 9/22/2018  1:33 AM   Alcohol Cap Used Yes 9/22/2018  1:33 AM        Opportunity for questions and clarification was provided.       Patient transported with:   O2 @ 2 liters  Tech

## 2018-09-22 NOTE — ANESTHESIA PREPROCEDURE EVALUATION
Anesthetic History     PONV          Review of Systems / Medical History  Patient summary reviewed, nursing notes reviewed and pertinent labs reviewed    Pulmonary    COPD: severe        Asthma : poorly controlled       Neuro/Psych         Psychiatric history     Cardiovascular                  Exercise tolerance: >4 METS     GI/Hepatic/Renal  Within defined limits              Endo/Other        Arthritis     Other Findings              Physical Exam    Airway  Mallampati: II  TM Distance: 4 - 6 cm  Neck ROM: normal range of motion   Mouth opening: Normal     Cardiovascular  Regular rate and rhythm,  S1 and S2 normal,  no murmur, click, rub, or gallop  Rhythm: regular  Rate: normal         Dental  No notable dental hx       Pulmonary  Breath sounds clear to auscultation               Abdominal  GI exam deferred       Other Findings            Anesthetic Plan    ASA: 4  Anesthesia type: MAC          Induction: Intravenous  Anesthetic plan and risks discussed with: Patient

## 2018-09-22 NOTE — ROUTINE PROCESS
Bedside and Verbal shift change report given to Hilaria Curran (oncoming nurse) by Nena Vela RN (offgoing nurse). Report included the following information SBAR, Kardex, MAR and Recent Results.     SITUATION:    Code Status: DNR   Reason for Admission: Pneumonia   Sepsis due to pneumonia ([facility]   Abdominal pain   COPD (chronic obstructive pulmonary disease) ([facility]   Pneumonia   DX   dx    [facility] day: 2   Problem List:       Hospital Problems  Date Reviewed: 9/22/2018          Codes Class Noted POA    [facility] without distant metastasis [facility] ICD-10-CM: C18.9  ICD-9-CM: 153.9  9/22/2018 Unknown        Colon polyps ICD-10-CM: K63.5  ICD-9-CM: 211.3  9/22/2018 Unknown        COPD (chronic obstructive pulmonary disease) ([facility] ICD-10-CM: J44.9  ICD-9-CM: 496  9/20/2018 Unknown        Pneumonia ICD-10-CM: J18.9  ICD-9-CM: 486  9/20/2018 Unknown        Abdominal pain ICD-10-CM: R10.9  ICD-9-CM: 789.00  9/20/2018 Unknown        Sepsis due to pneumonia [facility]) ICD-10-CM: J18.9, A41.9  ICD-9-CM: 639, 995.91  9/20/2018 Unknown        Abdominal mass ICD-10-CM: R19.00  ICD-9-CM: 789.30  9/20/2018 Unknown              BACKGROUND:    Past Medical History:   Past Medical History:   Diagnosis Date    Anxiety     Arthritis     [facility]     Asthma     Back problem     Baker's cyst     Balance problems     Chronic lung disease     Cigarette smoker     Clotting disorder ([facility]     COPD [facility]chronic obstructive pulmonary disease) [facility]     Depression     History of DVT (deep vein thrombosis)     Leg pain     Right    Lung disease     Nausea & vomiting     Osteoporosis     Restless leg syndrome     Spinal stenosis     Vitamin D deficiency          Patient taking anticoagulants yes     ASSESSMENT:    Changes in Assessment Throughout Shift: colonoscopy this am     Patient has Central Line: no Reasons if yes:    Patient has Morales Cath: no Reasons if yes:       Last Vitals:     Vitals:    09/22/18 6366 09/22/18 0954 09/22/18 1356 09/22/18 1652   BP:  133/84 134/80 95/61   Pulse: 89 80 60 73   Resp: 13 18 16 18   Temp:  97.4 °F (36.3 °C) 97.9 °F (36.6 °C) 98.7 °F (37.1 °C)   SpO2: 99%  95% 95%   Weight:       Height:            IV and DRAINS (will only show if present)   [REMOVED] Peripheral IV 09/20/18 Right Forearm-Site Assessment: Clean, dry, & intact  [REMOVED] Peripheral IV 09/19/18 Left Antecubital-Site Assessment: Clean, dry, & intact  [REMOVED] Peripheral IV 09/21/18 Left Forearm-Site Assessment: Clean, dry, & intact  Peripheral IV 09/22/18 Right;Upper Arm-Site Assessment: Clean, dry, & intact     WOUND (if present)   Wound Type:  none   Dressing present Dressing Present : No   Wound Concerns/Notes:  none     PAIN    Pain Assessment    Pain Intensity 1: 2 (09/22/18 1715)    Pain Location 1: Abdomen    Pain Intervention(s) 1: Medication (see MAR)    Patient Stated Pain Goal: 2  o Interventions for Pain:  Yes, medication  o Intervention effective: yes  o Time of last intervention:    o Reassessment Completed: yes      Last 3 Weights:  Last 3 Recorded Weights in this Encounter    09/19/18 2233 09/20/18 1744   Weight: 52.9 kg (116 lb 9 oz) 53.1 kg (117 lb)     Weight change:      INTAKE/OUPUT    Current Shift: 09/22 0701 - 09/22 1900  In: 830 [P.O.:580; I.V.:250]  Out: 650 [Urine:650]    Last three shifts: 09/20 1901 - 09/22 0700  In: 960 [P.O.:440; I.V.:520]  Out: 750 [Urine:750]     LAB RESULTS     Recent Labs      09/22/18 0415 09/21/18   0403  09/20/18   0630   WBC  10.8  6.8  17.0*   HGB  11.8*  12.1  12.4   HCT  35.2  36.3  37.5   PLT  292  262  278        Recent Labs      09/22/18 0415 09/21/18   0403  09/20/18   0630   NA  138  136  139   K  4.1  4.3  4.1   GLU  115*  143*  119*   BUN  24*  18  15   CREA  0.64  0.59*  0.60   CA  8.3*  8.8  7.9*   MG  2.7*   --    --    INR  1.1   --    --        RECOMMENDATIONS AND DISCHARGE PLANNING     1.  Pending tests/procedures/ Plan of Care or Other Needs: cont current POC, has malignant colon Ca      2. Discharge plan for patient and Needs/Barriers: home    3. Estimated Discharge Date: uknown Posted on Whiteboard in Patients Room: no      4. The patient's care plan was reviewed with the oncoming nurse. \"HEALS\" SAFETY CHECK      Fall Risk    Total Score: 3    Safety Measures: Safety Measures: Bed/Chair-Wheels locked, Bed in low position, Call light within reach, Family at bedside, Fall prevention (comment), Gripper socks, Nurse at bedside, Side rails X 3    A safety check occurred in the patient's room between off going nurse and oncoming nurse listed above. The safety check included the below items  Area Items   H  High Alert Medications - Verify all high alert medication drips (heparin, PCA, etc.)   E  Equipment - Suction is set up for ALL patients (with gloria)  - Red plugs utilized for all equipment (IV pumps, etc.)  - WOWs wiped down at end of shift.  - Room stocked with oxygen, suction, and other unit-specific supplies   A  Alarms - Bed alarm is set for fall risk patients  - Ensure chair alarm is in place and activated if patient is up in a chair   L  Lines - Check IV for any infiltration  - Morales bag is empty if patient has a Morales   - Tubing and IV bags are labeled   S  Safety   - Room is clean, patient is clean, and equipment is clean. - Hallways are clear from equipment besides carts. - Fall bracelet on for fall risk patients  - Ensure room is clear and free of clutter  - Suction is set up for ALL patients (with gloria)  - Hallways are clear from equipment besides carts.    - Isolation precautions followed, supplies available outside room, sign posted     Macarena Paez RN

## 2018-09-22 NOTE — ANESTHESIA POSTPROCEDURE EVALUATION
Post-Anesthesia Evaluation and Assessment    Patient: Charlene Louise MRN: [id number]  SSN: [id number]    YOB: 1945  Age: 72 y.o. Sex: female       Cardiovascular Function/Vital Signs  Visit Vitals    /74    Pulse 75    Temp 36.5 °C (97.7 °F)    Resp 15    Ht 5' 6\" (1.676 m)    Wt 53.1 kg (117 lb)    SpO2 100%    BMI 18.88 kg/m2       Patient is status post MAC anesthesia for Procedure(s):  COLONOSCOPY w bx w polypectonies. Nausea/Vomiting: None    Postoperative hydration reviewed and adequate. Pain:  Pain Scale 1: Numeric (0 - 10) (09/22/18 0746)  Pain Intensity 1: 4 (09/22/18 0746)   Managed    Neurological Status:   Neuro  Neurologic State: Alert (09/21/18 2100)  Orientation Level: Oriented X4 (09/21/18 2100)  Cognition: Appropriate decision making (09/21/18 1506)  Speech: Clear (09/21/18 1506)   At baseline    Mental Status and Level of Consciousness: Arousable    Pulmonary Status:   O2 Device: Nasal cannula (09/22/18 0859)   Adequate oxygenation and airway patent    Complications related to anesthesia: None    Post-anesthesia assessment completed.  No concerns      Signed By: Sanju Ignacio MD     September 22, 2018

## 2018-09-22 NOTE — CONSULTS
[address]    Bianka Naik  MR#: [id number]  : 1945  ACCOUNT #: [de-identified]   DATE OF SERVICE: 2018    REASON FOR CONSULTATION:  Newly diagnosed cecal cancer. HISTORY OF PRESENT ILLNESS:  The patient is a 72-year-old female admitted to the hospitalist service on 2018 complaining of right lower quadrant pain. The pain has been present over the last 4-6 weeks but becoming acutely worse over the last 2-3 days. In discussing this case with her family members who are at the bedside they say that she has had an intermittent \"stomach issues\" including problems tolerating food and digesting her food over the last year, perhaps more. She also reports approximately a 50-pound weight loss over the last several years, which is unintentional.  Her last colonoscopy was 7 years ago, at which time reportedly numerous polyps were excised, one of which was described as precancerous. The family members do not believe she has had a colonoscopy since that time until the one this morning. It is reported that she has had intermittent thinning of her bowel movements, but denies any blood per rectum, melena, constipation, or diarrhea. Colonoscopy this morning with Dr. Sotero Draper revealed a lesion, likely representing a malignancy, in the cecum. It was for this reason, surgical consultation was requested. The patient denies any chest pain, shortness of breath, or other symptoms at this time. PAST MEDICAL HISTORY:  COPD, history of cigarette smoking, history of silent myocardial infarction (no intervention required), history of DVT, on chronic anticoagulation, asthma, asbestosis, arthritis, anxiety. PAST SURGICAL HISTORY:  Appendectomy, back surgery x4, benign breast biopsy, hysterectomy, lumbar laminectomy, knee surgery (meniscus), history of lower extremity vein stripping, history of tonsillectomy. MEDICATIONS: Please see med rec sheet.   Although she did mention she took blood thinners for deep vein thrombosis, I do not see any blood thinners on her admission history and physical.    ALLERGIES:  ANORO. ELLIPTA. ASPIRIN, WHICH JUST CAUSES GI UPSET AND BLEEDING. AUGMENTIN, CRAMPING. DOXYCYCLINE, NAUSEA, VOMITING. MORPHINE, CAUSING AGITATION. SHELLFISH, BUT SHE SAYS SHE IS ABLE TO EAT SMALL AMOUNTS PER REPORT. SULFA, WHICH HAS CAUSED A RASH INTERMITTENTLY IN THE PAST. SOCIAL HISTORY:  She smokes anywhere from a few cigarettes per day to a half pack of cigarettes per day and has done so for the last 50 years. She does not drink any alcohol. She does not use any illicit drugs. FAMILY HISTORY:  Significant for heart disease, diabetes, hypertension, stroke, multiple kinds of cancer including mesothelioma, lung cancer, brain cancer, but no reported family history of colon polyps or colon cancer. REVIEW OF SYSTEMS:  Twelve-point review of systems was reviewed with the patient and was negative apart from that listed in HPI. PHYSICAL EXAMINATION:  GENERAL:  Well-developed, well-nourished elderly, somewhat frail, but no acute distress. VITAL SIGNS:  She had been afebrile for the last 24+ hours, currently 97.4, pulse rate this morning was 80, blood pressure 133/84, respirations 13-18, satting 97%-100% on 2 liters of nasal cannula. HEENT:  Normocephalic, atraumatic. Pupils equal, round, react to light and accommodation. Extraocular movements intact. Sclerae anicteric bilaterally. NECK:  Supple, no JVD. CARDIOVASCULAR:  Regular rate and rhythm. LUNGS:  Clear with good air movement. No wheezing, no accessory muscle use. ABDOMEN:  Soft, nondistended. Good bowel sounds. Minimal tenderness, somewhat localized in the right lower quadrant. No guarding or rebound. Well-healed surgical scars without obvious hernia or breakdown. RECTAL:  Deferred. EXTREMITIES:  No for calf tenderness. Good capillary refill. No edema.     ANCILLARY STUDIES:  Latest labs from this morning show essentially normal BMP except for minimally elevated glucose of 115, BUN 24, creatinine normal at 0.64; white count normal at 10.8, H and H 11.8 and 35.2, slight left shift with 88 neutrophils, platelets 958; PT/INR normal at 13.5 and 1.1. RADIOLOGY DATA:  CT of the abdomen and pelvis from 09/20 shows increased soft tissue in the cecum. The appendix is not identified. No definite right lower quadrant inflammation concerning for malignancy, incidental findings of a liver which appears normal with no mention of any worrisome or suspicious lesions, also incidentally noted is left lower lobe pneumonia. ASSESSMENT:  Newly diagnosed cecal malignancy (likely by colonoscopy report, multiple other polyps removed at time of colonoscopy this morning) in a 79-year-old female with history of coronary artery disease and disease and chronic obstructive pulmonary disease. PLAN:  She has remained on the hospitalist service for ongoing treatment for her pneumonia. Pulmonary consultation has been obtained. Any preoperative recommendations from Pulmonary would be greatly appreciated. Additionally, as long as she is an inpatient, would recommend Cardiology evaluation and any preoperative recommendations they might give in preparation for surgery. A CEA has been ordered as I understand it. We will await the results of that. I did discuss with the patient and the family briefly the nature of surgery required, but would defer any additional specifics regarding the surgery to Dr. Henrry Agarwal of Colorectal Surgery when he sees her on Monday. I remain available for any additional questions over the weekend, but Dr. Henrry Agarwal will be in on Monday to discuss the case further with the patient and her family.       Larissa Castro MD       RP / LN  D: 09/22/2018 12:36     T: 09/22/2018 13:24  JOB #: [id number]  CC: Art Smiley  CC: Volodymyr Tunrer MD  CC: Carlos A Akins MD  CC: Lali Barrios MD

## 2018-09-22 NOTE — ROUTINE PROCESS
TRANSFER - IN REPORT:    Verbal report received from [facility]) on Ruben Barroso  being received from  550(unit) for ordered procedure      Report consisted of patients Situation, Background, Assessment and   Recommendations(SBAR). Information from the following report(s) SBAR and Recent Results was reviewed with the receiving nurse. Opportunity for questions and clarification was provided. Assessment completed upon patients arrival to unit and care assumed.

## 2018-09-22 NOTE — ROUTINE PROCESS
Bedside shift change report given to Kush Babin (oncoming nurse) by Mat  (offgoing nurse). Report included the following information SBAR, Kardex, Intake/Output, MAR, Recent Results, Pre Procedure Checklist, Procedure Verification and Quality Measures.

## 2018-09-22 NOTE — PROGRESS NOTES
[facility]  Pulmonary, Critical Care, and Sleep Medicine    Progress note    Name: Mercedes Honeycutt MRN: [id number]   : 1945 Hospital: [address] [place] [address]   Date: 2018        IMPRESSION:   · Abdominal pain- suspected cecal lesion/malignancy, colon polyps  · COPD- GOLD 2017-group D   COPD with acute exacerbation ([facility]    Pulmonary emphysema, unspecified emphysema type ([facility]    Tobacco dependence    Pulmonary cachexia due to chronic obstructive pulmonary disease ([facility]    Hypoxemia requiring supplemental oxygen       · Asbestosis  · Depression and anxiety  · H/o DVT- not on anticogulation      RECOMMENDATIONS:   · Oxygen- continue at 2-3 L to maintain SaO2 goal > 90%  · Bronchial hygiene protocol- will add hypertonic saline  · Bronchodilators- continue LABA + LAMA and prn OSMANY. Will start Spiriva, use albuterol prn  · Steroids- short taper  · Antibiotics- to cover Pneumonia and abdominal pathogens  · Aspiration precautions  · Nutrition  · Colonoscopy per GI  · Assess home Oxygen needs at discharge  · OT, PT, OOB and ambulate  · Healthy weight  · Will Follow  · DVT, PUD prophylaxis     Subjective:     18   Feels better  Still with cough and wheezing- improved  S/p colonoscopy- findings noted  Denies any new complaints. HPI:  Patient is a 72 y.o. female with a history of COPD, chronic pain, DVT, depression, anxiety, who presents to ED with a 2 days hx of right lower abd pain,no fever reported. No nausea or vomiting, pt states bowel movements normal, she does state that her urine has been dark and smelly. She reports a 50 lb weight loss over the last few years unintentionally and intermittent pencil thin stools. Denies fevers, hematochezia, constipation, or diarrhea. No upper abdominal complaints. Last colonoscopy was by   Dr. Karoline Crawley 7 years ago with a history of numerous polyps one of which has been pre-cancerous and colonoscopies every 3 years.    Abdominal Pain    This is a new problem. The current episode started 2 days ago. The problem occurs constantly. The problem has been gradually worsening. The pain is located in the RLQ. The pain is at a severity of 9/10. The pain is moderate. Pertinent negatives include no fever, no diarrhea, no nausea, no vomiting, no constipation, no dysuria, no chest pain and no back pain. The pain is worsened by certain positions. The pain is relieved by nothing. COPD:  She follows with Dr. Denisse Sauer and is on Advair and Spiriva Respimat. Still complains of baseline SOB with minimal exertion, wheezing and tight cough with chest congestion and tenacious phlegm. August CXR reviewed , no acute infiltrate but with hyperinflation. Pt complains of intermittent hemoptysis, known since 2013, no fever or chills. Pt is on home O2, last FEV1 was 30% in Jan 2015. Admits to continued smoking despite several efforts at quitting in the past.   Pt notes no apparent response to maintenance Azithromycin with no change in amount and character of phlegm.  Daliresp also did not improve symptoms      Past Medical History:   Diagnosis Date    Anxiety     Arthritis     Asbestosis ([address]     Asthma     Back problem     Baker's cyst     Balance problems     Chronic lung disease     Cigarette smoker     Clotting disorder (HCC)     COPD (chronic obstructive pulmonary disease) (HCC)     Depression     History of DVT (deep vein thrombosis)     Leg pain     Right    Lung disease     Nausea & vomiting     Osteoporosis     Restless leg syndrome     Spinal stenosis     Vitamin D deficiency       Past Surgical History:   Procedure Laterality Date    HX APPENDECTOMY      HX BACK SURGERY      x4    HX BREAST BIOPSY      HX HYSTERECTOMY      HX LUMBAR LAMINECTOMY      HX MENISCUS REPAIR      HX ORTHOPAEDIC      Knee    HX POLYPECTOMY      HX TONSILLECTOMY      HX VEIN STRIPPING          Allergies   Allergen Reactions    Anoro Ellipta [Umeclidinium-Vilanterol] Rash and Other (comments)     Muscle cramps in arms    Aspirin Other (comments)     GI upset and bleeding    Augmentin [Amoxicillin-Pot Clavulanate] Not Reported This Time     Possible cramping    Doxycycline Nausea and Vomiting    Morphine Other (comments)     Neurologic symptoms - severe agitation      Shellfish Derived Unknown (comments)     Pt able to eat small amounts per report     Sulfa (Sulfonamide Antibiotics) Rash     Patient relates no longer allergic         Current Facility-Administered Medications   Medication Dose Route Frequency    fentaNYL citrate (PF) 50 mcg/mL injection        promethazine (PHENERGAN) 12.5 mg in 0.9% sodium chloride 50 mL IVPB  12.5 mg IntraVENous ONCE    methylPREDNISolone (PF) (SOLU-MEDROL) injection 40 mg  40 mg IntraVENous Q8H    tiotropium (SPIRIVA) inhalation capsule 18 mcg  1 Cap Inhalation DAILY    albuterol (ACCUNEB) nebulizer solution 1.25 mg  1.25 mg Nebulization TID RT    sodium chloride 10% 15 ml hypertonic neb solution  5 mL Nebulization TID RT    sodium chloride (NS) flush 5-10 mL  5-10 mL IntraVENous Q8H    azithromycin (ZITHROMAX) 500 mg in 0.9% sodium chloride (MBP/ADV) 250 mL adv  500 mg IntraVENous Q24H    cefTRIAXone (ROCEPHIN) 1 g in sterile water (preservative free) 10 mL IV syringe  1 g IntraVENous Q24H    heparin (porcine) injection 5,000 Units  5,000 Units SubCUTAneous Q8H       Review of Systems:  A comprehensive review of systems was negative except for that written in the HPI.     Objective:   Vital Signs:    Visit Vitals    /78    Pulse 89    Temp 97.7 °F (36.5 °C)    Resp 13    Ht 5' 6\" (1.676 m)    Wt 53.1 kg (117 lb)    SpO2 99%    BMI 18.88 kg/m2       O2 Device: Nasal cannula   O2 Flow Rate (L/min): 2 l/min   Temp (24hrs), Av.6 °F (36.4 °C), Min:97 °F (36.1 °C), Max:98 °F (36.7 °C)       Intake/Output:   Last shift:       0701 -  1900  In: 250 [I.V.:250]  Out: -   Last 3 shifts: 09/20 1901 - 09/22 0700  In: 440 [P.O.:440]  Out: 750 [Urine:750]    Intake/Output Summary (Last 24 hours) at 09/22/18 0949  Last data filed at 09/22/18 0845   Gross per 24 hour   Intake              690 ml   Output              400 ml   Net              290 ml      Physical Exam:   General:  Alert, cooperative, no distress, appears stated age. using accessory muscles    Head:  Normocephalic, without obvious abnormality, atraumatic. Eyes:  Conjunctivae/corneas clear. PERRL, EOMs intact. Nose: Nares normal. Septum midline. Mucosa normal. No drainage or sinus tenderness. Throat: Lips, mucosa, and tongue normal. Teeth and gums normal.   Neck: Supple, symmetrical, trachea midline, no adenopathy, thyroid: no enlargment/tenderness/nodules, no carotid bruit and no JVD. Back:   Symmetric, no curvature. ROM normal.   Lungs:   Bilateral diffuse ronchi and wheezing   Chest wall:  No tenderness or deformity. Heart:  Regular rate and rhythm, S1, S2 normal, no murmur, click, rub or gallop. Abdomen:   Soft, ++ Tenderness RLQ, Bowel sounds normal. No masses,  No organomegaly. Extremities: Extremities normal, atraumatic, no cyanosis or edema. Pulses: 2+ and symmetric all extremities.    Skin: Skin color, texture, turgor normal. No rashes or lesions   Lymph nodes: Cervical, supraclavicular, and axillary nodes normal.   Neurologic: Grossly nonfocal     Data review:     Recent Results (from the past 24 hour(s))   GLUCOSE, POC    Collection Time: 09/21/18 11:38 AM   Result Value Ref Range    Glucose (POC) 123 (H) 70 - 110 mg/dL   GLUCOSE, POC    Collection Time: 09/21/18  3:44 PM   Result Value Ref Range    Glucose (POC) 117 (H) 70 - 110 mg/dL   GLUCOSE, POC    Collection Time: 09/21/18  9:28 PM   Result Value Ref Range    Glucose (POC) 123 (H) 70 - 110 mg/dL   CBC WITH AUTOMATED DIFF    Collection Time: 09/22/18  4:15 AM   Result Value Ref Range    WBC 10.8 4.6 - 13.2 K/uL    RBC 3.70 (L) 4.20 - 5.30 M/uL    HGB 11.8 (L) 12.0 - 16.0 g/dL    HCT 35.2 35.0 - 45.0 %    MCV 95.1 74.0 - 97.0 FL    MCH 31.9 24.0 - 34.0 PG    MCHC 33.5 31.0 - 37.0 g/dL    RDW 13.3 11.6 - 14.5 %    PLATELET 271 731 - 342 K/uL    MPV 10.0 9.2 - 11.8 FL    NEUTROPHILS 88 (H) 40 - 73 %    LYMPHOCYTES 5 (L) 21 - 52 %    MONOCYTES 7 3 - 10 %    EOSINOPHILS 0 0 - 5 %    BASOPHILS 0 0 - 2 %    ABS. NEUTROPHILS 9.5 (H) 1.8 - 8.0 K/UL    ABS. LYMPHOCYTES 0.5 (L) 0.9 - 3.6 K/UL    ABS. MONOCYTES 0.8 0.05 - 1.2 K/UL    ABS. EOSINOPHILS 0.0 0.0 - 0.4 K/UL    ABS. BASOPHILS 0.0 0.0 - 0.1 K/UL    DF AUTOMATED     MAGNESIUM    Collection Time: 09/22/18  4:15 AM   Result Value Ref Range    Magnesium 2.7 (H) 1.6 - 2.6 mg/dL   METABOLIC PANEL, BASIC    Collection Time: 09/22/18  4:15 AM   Result Value Ref Range    Sodium 138 136 - 145 mmol/L    Potassium 4.1 3.5 - 5.5 mmol/L    Chloride 99 (L) 100 - 108 mmol/L    CO2 34 (H) 21 - 32 mmol/L    Anion gap 5 3.0 - 18 mmol/L    Glucose 115 (H) 74 - 99 mg/dL    BUN 24 (H) 7.0 - 18 MG/DL    Creatinine 0.64 0.6 - 1.3 MG/DL    BUN/Creatinine ratio 38 (H) 12 - 20      GFR est AA >60 >60 ml/min/1.73m2    GFR est non-AA >60 >60 ml/min/1.73m2    Calcium 8.3 (L) 8.5 - 10.1 MG/DL   PROTHROMBIN TIME + INR    Collection Time: 09/22/18  4:15 AM   Result Value Ref Range    Prothrombin time 13.5 11.5 - 15.2 sec    INR 1.1 0.8 - 1.2         Imaging:  I have personally reviewed the patients radiographs and have reviewed the reports:  XR Results (most recent):    Results from Hospital Encounter encounter on 09/19/18   XR CHEST PORT   Narrative Chest portable    History: Pain on the right side of the belly since Sunday, shortness of breath. Comparisons: 8/14/2018    Findings:     Lungs are hyperexpanded, compatible with COPD. Heart and mediastinum are grossly  stable. Atherosclerotic calcifications are seen at the arch. Breast and nipple  shadows overlie the midlungs bilaterally. Patchy infiltrate identified at the  left lung base.  1.1 cm nodule at the left midlung appears stable from at least  2/5/2017. Favor granuloma. Impression Impression:     Patchy infiltrate at the left lung base. Infection could not be excluded. Emphysema. Thank you for this referral.          CT Results (most recent):    Results from [place] encounter on 09/19/18   CT ABD PELV W CONT   Narrative Description:  CT abdomen and pelvis with IV contrast    TECHNIQUE: [Helically acquired axial] CT imaging of the [abdomen and pelvis]. 100 cc's of Isovue-300 injected intravenously. [ Coronal and sagittal  reformations generated and reviewed. All CT scans at this facility are performed using dose optimization technique as  appropriate to a performed exam, to include automated exposure control,  adjustment of the mA and/or kV according to patient size (including appropriate  matching for site-specific examinations), or use of iterative reconstruction  technique. Clinical Indication:  Right lower quadrant pain    Comparison: July 25, 2018. Findings:      CT ABDOMEN:  Left lower lobe infiltrate is present. The right lung base is clear with the  exception of mild bronchiectasis at the medial right middle lobe. No pleural  effusion is present. The liver appears normal.  The spleen appears normal.  The pancreas appears normal.  The adrenal glands appear normal.  The kidneys show symmetric enhancement. No hydronephrosis or mass. The stomach and the duodenum appear normal.  The gallbladder is not well-distended. No hyperdense gallstones are seen. There is normal contrast filling of the portal and the splenic veins. Moderate atherosclerosis of the abdominal aorta is present. No mesenteric or retroperitoneal adenopathy. No free fluid or air. CT PELVIS:  There is increased soft tissue in the cecum (axial, 48). The appendix is not  identified. No definite right lower quadrant inflammation is seen.   There is a large amount of retained fecal material within the rectum. The bladder is well distended and has a normal unopacified appearance. No pelvic adenopathy or free fluid. Skeleton/soft tissues: L5 vertebral plasty. Moderate osteopenia. Multilevel  degenerative changes. Impression Impression:      1. Left lower lobe pneumonia. 2. Abnormal soft tissue in the medial wall of the cecum without surrounding  inflammation. Appearance is more concerning for malignancy than an acute  inflammatory process. The appendix is not definitively identified however. Clinical correlation needed. If the patient has not had a recent colonoscopy,  one is advised. Discussed with Dr. Belen Baez. High complexity decision making was performed during the evaluation of this patient at high risk for decompensation with multiple organ involvement     Above mentioned total time spent on reviewing the case/medical record/data/notes/EMR/patient examination/documentation/coordinating care with nurse/consultants, exclusive of procedures with complex decision making performed and > 50% time spent in face to face evaluation.           Savana Vieyra MD

## 2018-09-22 NOTE — PROCEDURES
[place] dictated. Cecal malignancy, biopsied. Multiple (>10) mixed polyps throughout rest of colon removed. Will need CRS eval for R hemicolectomy and colo again in 1 year.      Jeremie Guevara MD

## 2018-09-22 NOTE — PROGRESS NOTES
Respiratory Care Assessment for Bronchial hygiene or Lung Expansion Therapy  Patient  Francine Traore     72 y.o.   female     9/22/2018  1:44 PM  Patient Active Problem List   Diagnosis Code    COPD, severe ([facility] J44.9    Nicotine addiction F17.200    Hemoptysis R04.2    Lumbar spinal stenosis M48.061    Facet arthritis of lumbar region ([address] M46.96    Neuritis of lower extremity G57.90    Emphysema of lung ([address] J43.9    COPD with acute exacerbation (HCC) J44.1    Muscle spasm of back M62.830    Sacroiliac joint pain M53.3    Current chronic use of systemic steroids Z79.52    Baker's cyst of knee M71.20    Acute exacerbation of chronic obstructive pulmonary disease (COPD) ([address] J44.1    Degenerative disc disease, lumbar M51.36    Left-sided low back pain with sciatica M54.42    COPD with exacerbation (HCC) J44.1    COPD (chronic obstructive pulmonary disease) (HCC) J44.9    Pneumonia J18.9    Abdominal pain R10.9    Sepsis due to pneumonia ([address] J18.9, A41.9    Abdominal mass R19.00    Colon cancer without distant metastasis (HCC) C18.9    Colon polyps K63.5       ABG:  Date:9/22/2018  No results found for: PH, PHI, PCO2, PCO2I, PO2, PO2I, HCO3, HCO3I, FIO2, FIO2I    Chest X-ray:  Date:9/22/2018    Results from Hospital Encounter encounter on 09/19/18   XR CHEST PORT   Narrative Chest portable    History: Pain on the right side of the belly since Sunday, shortness of breath. Comparisons: 8/14/2018    Findings:     Lungs are hyperexpanded, compatible with COPD. Heart and mediastinum are grossly  stable. Atherosclerotic calcifications are seen at the arch. Breast and nipple  shadows overlie the midlungs bilaterally. Patchy infiltrate identified at the  left lung base. 1.1 cm nodule at the left midlung appears stable from at least  2/5/2017. Favor granuloma. Impression Impression:     Patchy infiltrate at the left lung base. Infection could not be excluded. Emphysema.     Thank you for this referral.          Lab Test:  Date:9/22/2018  WBC:   Lab Results   Component Value Date/Time    WBC 10.8 09/22/2018 04:15 AM   HGB: Lab Results   Component Value Date/Time    HGB 11.8 (L) 09/22/2018 04:15 AM    PLTS: Lab Results   Component Value Date/Time    PLATELET 832 66/72/0755 04:15 AM       SaO2%/flow:   SpO2 Readings from Last 1 Encounters:   09/22/18 99%       Vital Signs:   Patient Vitals for the past 8 hrs:   Temp Pulse Resp BP SpO2   09/22/18 0954 97.4 °F (36.3 °C) 80 18 133/84 -   09/22/18 0922 - 89 13 - 99 %   09/22/18 0917 - 81 16 125/78 97 %   09/22/18 0908 - 74 14 100/74 100 %   09/22/18 0859 97.7 °F (36.5 °C) 75 15 122/74 100 %   09/22/18 0856 97.7 °F (36.5 °C) 75 15 122/74 100 %   09/22/18 0746 97.9 °F (36.6 °C) 74 15 94/69 -         RA/O2 flow/device: NC 2 LPM (home setting)        First Inital Assesment:     [x]Yes []No   Reevaluation/Reassessment:    []Yes [x]No     CHART REVIEW   Points 0 X 1 X 2 X 3 X 4 X Points   Pulmonary History Smoking History (1) none  Recent Smoking History <1 PPD  Recent Smoking History >1 PPD  Pulmonary Disease or Impairment  Severe Pulmonary Disease  4   Surgical History No Surgery  General Surgery  Lower Abdominal  Thoracic or Upper Abdominal  Thoracic & Pulmonary Disease  0   CXR Clear or not indicated  Chronic changes or CXR Pending  Infiltrate, atelectasis or pleural effusions  Infiltrates in more than 1lobe  Infiltrates +atelectasis  +/or pleural effusions  2     PATIENT ASSESSMENT    0 X 1 X 2 X 3 X 4 X Points   Respiratory Pattern Regular pattern RR 12-20  Increased RR 21-27   Mild Dyspnea at rest, irregular pattern RR 28-32  Moderate Dyspnea at rest, Use of accessory muscles, RR 33-36  Severe Dyspnea, Use of accessory muscles RR >36  1   Mental Status Alert Oriented cooperative  Confused, Follows commands  Lethargic, Does not follow commands  Obtunded  Unresponsive  0   Breath Sounds Clear  Decreased Unilaterally  Decreased Bilaterally  Mild Scattered wheezing or Crackles in bases  Severe Wheezing or rhonchi  3   Cough Strong dry NPC  Strong Productive  Weak NPC  Weak productive or weak with rhonchi  No cough or may require suctioning  0   Level of Activity Ambulatory  Ambulatory with assistance  Temporarily Non-ambulatory  Non-Ambulatory, able to position self  Unable to position self, confined to bed  2   Total Points/Score:   12     Specific Intervention Chart(PEP- Aerobika with nebs)    Bronchial Hygiene/Secretion Clearance:    []EZPAP []Rotation bed with vibration    []CPT with percussor []CPT via vest   [x]Oscillastiang positive pressure expiratory device      Lung Expansion:    []Incentive Spirometer w/RT visits []Incentive Spirometer w/nursing    []EZPAP      *Suctioning:    []Nasal Tracheal []Tracheal     *suctioning will be ordered and done PRN with an associated frequency such as QID/PRN based on score       Other:    Care Plan   Level # Score Modality Frequency Comment   Level 1 >17 - -    Level 2 14-17 - -    Level 3 10-13 PEP Q6    Level 4 1-9 - -      BRONCHIAL HYGIENE SCORING AND FREQUENCY GUIDELINES   Frequency Indications/Findings Level #   Q4 ATC Copious secretions, SOB, unable to sleep 1   QID & PRN at night Moderate amounts of secretions 2   TID or Q6 wa Small amounts of secretions and poor cough: recent history of secretions 3   BID or Q8 wa Unable to deep breathe and cough effectively 4        Comments:  -    Respiratory Therapist: Judy Patel, RT

## 2018-09-22 NOTE — OP NOTES
[address] [place] [address]   OPERATIVE REPORT    Richard Mcgee  MR#: [id number]  : 1945  ACCOUNT #: [de-identified]   DATE OF SERVICE: 2018    PREOPERATIVE DIAGNOSIS:  below     POSTOPERATIVE DIAGNOSIS:  Colon cancer      PROCEDURE PERFORMED:  Colonoscopy. INDICATION:  Anemia. ANESTHESIA:  Monitored anesthesia care. SURGEON:  Augustina Mohan MD    SCOPE:  Olympus colonoscope. ASSISTANTS:  None. ESTIMATED BLOOD LOSS:  Zero. SPECIMENS REMOVED:  Multiple colon polyps and biopsy from cecal mass. IMPLANTS:  None. COMPLICATIONS:  None. ESTIMATED BLOOD LOSS:  Zero. OPERATIVE REPORT:  Informed consent was obtained. Risks, benefits, alternatives were discussed with the patient in detail, including but not limited to bleeding, perforation, anesthesia reaction, and death. She was brought to endoscopy suite. Scope was placed in the rectum and advanced to the cecum. Cecum had a large, fungating mass which encompassed majority of the cecum. This area was biopsied. There was a satellite lesion in the proximal ascending colon, which was flat. This was left alone as it will be removed at time of definitive surgery. Multiple hepatic flexure polyps, descending colon polyps, and rectal polyps were all also removed. These polyps ranged from 5 mm to 1.5 cm. They were removed using combination of the hot and cold snare in various locations. Retroflection in the  rectum was unremarkable. The patient tolerated the procedure well without acute complications. ASSESSMENT:  1. Cecal malignancy. 2.  Multiple colon polyps. PLAN:  Consult to colorectal surgery for right hemicolectomy. Will need colonoscopy again in 1 year. A CEA level to be ordered, and I will leave the remainder of the preoperative evaluation to colorectal surgery.       MD LAM Crawford / LN  D: 2018 08:50     T: 2018 12:01  JOB #: [id number]  CC: Nicolasa Piña MD  CC: Fatuma Montes MD

## 2018-09-23 ENCOUNTER — APPOINTMENT (OUTPATIENT)
Dept: NON INVASIVE DIAGNOSTICS | Age: 73
DRG: 871 | End: 2018-09-23
Attending: HOSPITALIST
Payer: MEDICARE

## 2018-09-23 PROBLEM — Z01.810 PRE-OPERATIVE CARDIOVASCULAR EXAMINATION: Status: ACTIVE | Noted: 2018-09-23

## 2018-09-23 LAB
ANION GAP SERPL CALC-SCNC: 7 MMOL/L (ref 3–18)
BASOPHILS # BLD: 0 K/UL (ref 0–0.1)
BASOPHILS NFR BLD: 0 % (ref 0–2)
BUN SERPL-MCNC: 35 MG/DL (ref 7–18)
BUN/CREAT SERPL: 37 (ref 12–20)
CALCIUM SERPL-MCNC: 8.5 MG/DL (ref 8.5–10.1)
CHLORIDE SERPL-SCNC: 97 MMOL/L (ref 100–108)
CO2 SERPL-SCNC: 34 MMOL/L (ref 21–32)
CREAT SERPL-MCNC: 0.95 MG/DL (ref 0.6–1.3)
DIFFERENTIAL METHOD BLD: ABNORMAL
ECHO AO ROOT DIAM: 3.36 CM
ECHO LA AREA 4C: 21.5 CM2
ECHO LA VOL 2C: 33.07 ML (ref 22–52)
ECHO LA VOL 4C: 67.34 ML (ref 22–52)
ECHO LA VOL BP: 52.83 ML (ref 22–52)
ECHO LA VOL/BSA BIPLANE: 33.17 ML/M2
ECHO LA VOLUME INDEX A2C: 20.77 ML/M2
ECHO LA VOLUME INDEX A4C: 42.28 ML/M2
ECHO LV INTERNAL DIMENSION DIASTOLIC: 4.06 CM (ref 3.9–5.3)
ECHO LV INTERNAL DIMENSION SYSTOLIC: 2.64 CM
ECHO LV IVSD: 0.88 CM (ref 0.6–0.9)
ECHO LV MASS 2D: 124.1 G (ref 67–162)
ECHO LV MASS INDEX 2D: 77.9 G/M2
ECHO LV POSTERIOR WALL DIASTOLIC: 0.9 CM (ref 0.6–0.9)
ECHO LVOT DIAM: 1.77 CM
ECHO LVOT PEAK GRADIENT: 5.5 MMHG
ECHO LVOT PEAK VELOCITY: 117.71 CM/S
ECHO LVOT VTI: 23.57 CM
ECHO MV A VELOCITY: 62.18 CM/S
ECHO MV E DECELERATION TIME (DT): 215.4 MS
ECHO MV E VELOCITY: 1 CM/S
ECHO MV E/A RATIO: 0.02
ECHO TV REGURGITANT MAX VELOCITY: 318.12 CM/S
ECHO TV REGURGITANT PEAK GRADIENT: 40.5 MMHG
EOSINOPHIL # BLD: 0 K/UL (ref 0–0.4)
EOSINOPHIL NFR BLD: 0 % (ref 0–5)
ERYTHROCYTE [DISTWIDTH] IN BLOOD BY AUTOMATED COUNT: 13.5 % (ref 11.6–14.5)
GLUCOSE BLD STRIP.AUTO-MCNC: 126 MG/DL (ref 70–110)
GLUCOSE BLD STRIP.AUTO-MCNC: 150 MG/DL (ref 70–110)
GLUCOSE BLD STRIP.AUTO-MCNC: 151 MG/DL (ref 70–110)
GLUCOSE BLD STRIP.AUTO-MCNC: 200 MG/DL (ref 70–110)
GLUCOSE SERPL-MCNC: 173 MG/DL (ref 74–99)
HCT VFR BLD AUTO: 39.8 % (ref 35–45)
HGB BLD-MCNC: 13.3 G/DL (ref 12–16)
LYMPHOCYTES # BLD: 0.9 K/UL (ref 0.9–3.6)
LYMPHOCYTES NFR BLD: 9 % (ref 21–52)
MAGNESIUM SERPL-MCNC: 2.7 MG/DL (ref 1.6–2.6)
MCH RBC QN AUTO: 32.3 PG (ref 24–34)
MCHC RBC AUTO-ENTMCNC: 33.4 G/DL (ref 31–37)
MCV RBC AUTO: 96.6 FL (ref 74–97)
MONOCYTES # BLD: 0 K/UL (ref 0.05–1.2)
MONOCYTES NFR BLD: 0 % (ref 3–10)
NEUTS SEG # BLD: 9.6 K/UL (ref 1.8–8)
NEUTS SEG NFR BLD: 91 % (ref 40–73)
PLATELET # BLD AUTO: 343 K/UL (ref 135–420)
PMV BLD AUTO: 10.3 FL (ref 9.2–11.8)
POTASSIUM SERPL-SCNC: 3.9 MMOL/L (ref 3.5–5.5)
RBC # BLD AUTO: 4.12 M/UL (ref 4.2–5.3)
SODIUM SERPL-SCNC: 138 MMOL/L (ref 136–145)
WBC # BLD AUTO: 10.5 K/UL (ref 4.6–13.2)

## 2018-09-23 PROCEDURE — 94640 AIRWAY INHALATION TREATMENT: CPT

## 2018-09-23 PROCEDURE — 82962 GLUCOSE BLOOD TEST: CPT

## 2018-09-23 PROCEDURE — 74011250636 HC RX REV CODE- 250/636: Performed by: INTERNAL MEDICINE

## 2018-09-23 PROCEDURE — 82607 VITAMIN B-12: CPT | Performed by: HOSPITALIST

## 2018-09-23 PROCEDURE — 77010033678 HC OXYGEN DAILY

## 2018-09-23 PROCEDURE — 80048 BASIC METABOLIC PNL TOTAL CA: CPT | Performed by: HOSPITALIST

## 2018-09-23 PROCEDURE — 83540 ASSAY OF IRON: CPT | Performed by: HOSPITALIST

## 2018-09-23 PROCEDURE — 82728 ASSAY OF FERRITIN: CPT | Performed by: HOSPITALIST

## 2018-09-23 PROCEDURE — 74011250636 HC RX REV CODE- 250/636: Performed by: HOSPITALIST

## 2018-09-23 PROCEDURE — 85025 COMPLETE CBC W/AUTO DIFF WBC: CPT | Performed by: HOSPITALIST

## 2018-09-23 PROCEDURE — 65270000029 HC RM PRIVATE

## 2018-09-23 PROCEDURE — 36415 COLL VENOUS BLD VENIPUNCTURE: CPT | Performed by: HOSPITALIST

## 2018-09-23 PROCEDURE — 82378 CARCINOEMBRYONIC ANTIGEN: CPT | Performed by: HOSPITALIST

## 2018-09-23 PROCEDURE — 74011636637 HC RX REV CODE- 636/637: Performed by: HOSPITALIST

## 2018-09-23 PROCEDURE — 74011250637 HC RX REV CODE- 250/637: Performed by: HOSPITALIST

## 2018-09-23 PROCEDURE — 74011000250 HC RX REV CODE- 250: Performed by: INTERNAL MEDICINE

## 2018-09-23 PROCEDURE — 83735 ASSAY OF MAGNESIUM: CPT | Performed by: HOSPITALIST

## 2018-09-23 PROCEDURE — 94760 N-INVAS EAR/PLS OXIMETRY 1: CPT

## 2018-09-23 PROCEDURE — 93306 TTE W/DOPPLER COMPLETE: CPT

## 2018-09-23 RX ORDER — DEXTROSE 50 % IN WATER (D50W) INTRAVENOUS SYRINGE
25-50 AS NEEDED
Status: DISCONTINUED | OUTPATIENT
Start: 2018-09-23 | End: 2018-09-24 | Stop reason: HOSPADM

## 2018-09-23 RX ORDER — MAGNESIUM SULFATE 100 %
16 CRYSTALS MISCELLANEOUS AS NEEDED
Status: DISCONTINUED | OUTPATIENT
Start: 2018-09-23 | End: 2018-09-24 | Stop reason: HOSPADM

## 2018-09-23 RX ORDER — LORAZEPAM 0.5 MG/1
0.5 TABLET ORAL
Status: DISCONTINUED | OUTPATIENT
Start: 2018-09-23 | End: 2018-09-24 | Stop reason: HOSPADM

## 2018-09-23 RX ORDER — INSULIN LISPRO 100 [IU]/ML
INJECTION, SOLUTION INTRAVENOUS; SUBCUTANEOUS
Status: DISCONTINUED | OUTPATIENT
Start: 2018-09-23 | End: 2018-09-24 | Stop reason: HOSPADM

## 2018-09-23 RX ADMIN — LORAZEPAM 0.5 MG: 0.5 TABLET ORAL at 14:35

## 2018-09-23 RX ADMIN — HEPARIN SODIUM 5000 UNITS: 5000 INJECTION, SOLUTION INTRAVENOUS; SUBCUTANEOUS at 23:01

## 2018-09-23 RX ADMIN — Medication 5 ML: at 20:13

## 2018-09-23 RX ADMIN — SODIUM CHLORIDE 10 ML: 9 INJECTION INTRAMUSCULAR; INTRAVENOUS; SUBCUTANEOUS at 23:05

## 2018-09-23 RX ADMIN — HEPARIN SODIUM 5000 UNITS: 5000 INJECTION, SOLUTION INTRAVENOUS; SUBCUTANEOUS at 06:23

## 2018-09-23 RX ADMIN — METHYLPREDNISOLONE SODIUM SUCCINATE 20 MG: 40 INJECTION, POWDER, FOR SOLUTION INTRAMUSCULAR; INTRAVENOUS at 17:52

## 2018-09-23 RX ADMIN — ALBUTEROL SULFATE 1.25 MG: 1.25 SOLUTION RESPIRATORY (INHALATION) at 20:13

## 2018-09-23 RX ADMIN — SODIUM CHLORIDE 500 MG: 900 INJECTION, SOLUTION INTRAVENOUS at 06:23

## 2018-09-23 RX ADMIN — THERA TABS 1 TABLET: TAB at 08:26

## 2018-09-23 RX ADMIN — ONDANSETRON HYDROCHLORIDE 4 MG: 2 INJECTION INTRAMUSCULAR; INTRAVENOUS at 23:09

## 2018-09-23 RX ADMIN — HYDROMORPHONE HYDROCHLORIDE 0.5 MG: 1 INJECTION, SOLUTION INTRAMUSCULAR; INTRAVENOUS; SUBCUTANEOUS at 15:10

## 2018-09-23 RX ADMIN — SODIUM CHLORIDE 10 ML: 9 INJECTION INTRAMUSCULAR; INTRAVENOUS; SUBCUTANEOUS at 13:58

## 2018-09-23 RX ADMIN — ALBUTEROL SULFATE 1.25 MG: 1.25 SOLUTION RESPIRATORY (INHALATION) at 08:23

## 2018-09-23 RX ADMIN — TIOTROPIUM BROMIDE 18 MCG: 18 CAPSULE ORAL; RESPIRATORY (INHALATION) at 09:03

## 2018-09-23 RX ADMIN — HYDROMORPHONE HYDROCHLORIDE 0.5 MG: 1 INJECTION, SOLUTION INTRAMUSCULAR; INTRAVENOUS; SUBCUTANEOUS at 09:58

## 2018-09-23 RX ADMIN — HYDROMORPHONE HYDROCHLORIDE 0.5 MG: 1 INJECTION, SOLUTION INTRAMUSCULAR; INTRAVENOUS; SUBCUTANEOUS at 03:02

## 2018-09-23 RX ADMIN — Medication 5 ML: at 08:24

## 2018-09-23 RX ADMIN — HYDROMORPHONE HYDROCHLORIDE 0.5 MG: 1 INJECTION, SOLUTION INTRAMUSCULAR; INTRAVENOUS; SUBCUTANEOUS at 21:47

## 2018-09-23 RX ADMIN — ALBUTEROL SULFATE 1.25 MG: 1.25 SOLUTION RESPIRATORY (INHALATION) at 13:56

## 2018-09-23 RX ADMIN — CEFTRIAXONE SODIUM 1 G: 1 INJECTION, POWDER, FOR SOLUTION INTRAMUSCULAR; INTRAVENOUS at 05:03

## 2018-09-23 RX ADMIN — SODIUM CHLORIDE 10 ML: 9 INJECTION INTRAMUSCULAR; INTRAVENOUS; SUBCUTANEOUS at 06:32

## 2018-09-23 RX ADMIN — HEPARIN SODIUM 5000 UNITS: 5000 INJECTION, SOLUTION INTRAVENOUS; SUBCUTANEOUS at 14:33

## 2018-09-23 RX ADMIN — INSULIN LISPRO 2 UNITS: 100 INJECTION, SOLUTION INTRAVENOUS; SUBCUTANEOUS at 23:05

## 2018-09-23 RX ADMIN — METHYLPREDNISOLONE SODIUM SUCCINATE 40 MG: 40 INJECTION, POWDER, FOR SOLUTION INTRAMUSCULAR; INTRAVENOUS at 06:22

## 2018-09-23 RX ADMIN — Medication 5 ML: at 13:56

## 2018-09-23 NOTE — PROGRESS NOTES
Problem: Falls - Risk of  Goal: *Absence of Falls  Document [place] Fall Risk and appropriate interventions in the flowsheet. Outcome: Progressing Towards Goal  Fall Risk Interventions:  Mobility Interventions: Bed/chair exit alarm, Patient to call before getting OOB         Medication Interventions: Bed/chair exit alarm, Evaluate medications/consider consulting pharmacy, Patient to call before getting OOB    Elimination Interventions: Elevated toilet seat, Patient to call for help with toileting needs    History of Falls Interventions: Evaluate medications/consider consulting pharmacy        Problem: Pressure Injury - Risk of  Goal: *Prevention of pressure injury  Document Roman Scale and appropriate interventions in the flowsheet. Outcome: Progressing Towards Goal  Pressure Injury Interventions:             Activity Interventions: PT/OT evaluation, Increase time out of bed    Mobility Interventions: HOB 30 degrees or less    Nutrition Interventions: Document food/fluid/supplement intake    Friction and Shear Interventions: HOB 30 degrees or less

## 2018-09-23 NOTE — CONSULTS
[facility] - Consult Note    Date of  Admission: 9/19/2018 11:15 PM     Primary Care Physician:  Henry Dumont MD     Plan:     1. COPD exacerbation- on antibiotics, breathing tx and inhalers  managed by [facility]. 2. Hx  of CAD- per patient had cardiac cath in [facility] done by Dr. Guadalupe Gorman. Unable to obtain records. No chest no chest pressure. EKG showing NSR w/o acute ischemic changes. 3. Abdominal pain - s/p colonoscopy 9/22/18  With Multiple colon polyps and  cecal mass. 4. Tobacco abuse- Patient advised to stop smoking to reduce future cardiovascular events. 5. Cardiac Clearance-  Will obtain echo to assess lvf,rvf or any wma. Further recommendation based on Echo report. Will follow. Echo reviewed-normal [id number] to proceed with surgery with moderate risk once copd stabilizes   Assessment:     Hospital Problems  Date Reviewed: 9/22/2018          Codes Class Noted POA    [facility] ICD-10-CM: C18.9  ICD-9-CM: 153.9  9/22/2018 Unknown        Colon polyps ICD-10-CM: K63.5  ICD-9-CM: 211.3  9/22/2018 Unknown        COPD (chronic obstructive pulmonary disease) ([facility] ICD-10-CM: J44.9  ICD-9-CM: 496  9/20/2018 Unknown        Pneumonia ICD-10-CM: J18.9  ICD-9-CM: 656  9/20/2018 Unknown        Abdominal pain ICD-10-CM: R10.9  ICD-9-CM: 789.00  9/20/2018 Unknown        Sepsis due to pneumonia [facility]) ICD-10-CM: J18.9, A41.9  ICD-9-CM: 972, 995.91  9/20/2018 Unknown        Abdominal mass ICD-10-CM: R19.00  ICD-9-CM: 789.30  9/20/2018 Unknown                   History of Present Illness: This is a 72 y.o. female admitted for Pneumonia. Sepsis due to pneumonia ([facility] Abdominal pain. COPD (chronic obstructive pulmonary disease) (HCC)Pneumonia. Patient with PMHx of COPD, tobacco abuse and OA. Came to the ED complaining of right lower quadrant pain.   She also reports approximately a 50-pound weight loss over the last several years, which is unintentional. Colonoscopy done 9/22/18  Dr. Veronica Covarrubias revealed a lesion, likely representing a malignancy, in the cecum. Patient  reports a cardiac catheterization in the past  and being followed byDr Devang Kasper for CAD. Cardiology consulted for cardiac clearance. Denies chest pain,  chest pressure. Has SOB on O2 at home. Patient Continues to smoke. Past Medical History:     Past Medical History:   Diagnosis Date    Anxiety     Arthritis     Asbestosis ([address]     Asthma     Back problem     Baker's cyst     Balance problems     Chronic lung disease     Cigarette smoker     Clotting disorder (HCC)     COPD (chronic obstructive pulmonary disease) (HCC)     Depression     History of DVT (deep vein thrombosis)     Leg pain     Right    Lung disease     Nausea & vomiting     Osteoporosis     Restless leg syndrome     Spinal stenosis     Vitamin D deficiency          Social History:     Social History     Social History    Marital status:      Spouse name: N/A    Number of children: N/A    Years of education: N/A     Social History Main Topics    Smoking status: Current Some Day Smoker     Packs/day: 0.25     Years: 50.00     Types: Cigarettes     Start date: 10/21/1964    Smokeless tobacco: Never Used    Alcohol use No    Drug use: No    Sexual activity: Not Asked     Other Topics Concern    None     Social History Narrative        Family History:     Family History   Problem Relation Age of Onset    Cancer Father     Heart Disease Mother     Hypertension Mother     Cancer Brother     Cancer Sister     Diabetes Other     Hypertension Other     Stroke Other     Heart Disease Sister     Hypertension Sister     Heart Disease Brother         Medications:      Allergies   Allergen Reactions    Anoro Ellipta [Umeclidinium-Vilanterol] Rash and Other (comments)     Muscle cramps in arms    Aspirin Other (comments)     GI upset and bleeding    Augmentin [Amoxicillin-Pot Clavulanate] Not Reported This Time     Possible cramping    Doxycycline Nausea and Vomiting    Morphine Other (comments)     Neurologic symptoms - severe agitation      Shellfish Derived Unknown (comments)     Pt able to eat small amounts per report     Sulfa (Sulfonamide Antibiotics) Rash     Patient relates no longer allergic        Current Facility-Administered Medications   Medication Dose Route Frequency    methylPREDNISolone (PF) (SOLU-MEDROL) injection 20 mg  20 mg IntraVENous Q12H    therapeutic multivitamin (THERAGRAN) tablet 1 Tab  1 Tab Oral DAILY    tiotropium (SPIRIVA) inhalation capsule 18 mcg  1 Cap Inhalation DAILY    albuterol (ACCUNEB) nebulizer solution 1.25 mg  1.25 mg Nebulization TID RT    sodium chloride 10% 15 ml hypertonic neb solution  5 mL Nebulization TID RT    ondansetron (ZOFRAN) injection 4 mg  4 mg IntraVENous Q6H PRN    sodium chloride (NS) flush 5-10 mL  5-10 mL IntraVENous PRN    HYDROmorphone (PF) (DILAUDID) injection 0.5 mg  0.5 mg IntraVENous Q4H PRN    sodium chloride (NS) flush 5-10 mL  5-10 mL IntraVENous Q8H    sodium chloride (NS) flush 5-10 mL  5-10 mL IntraVENous PRN    azithromycin (ZITHROMAX) 500 mg in 0.9% sodium chloride (MBP/ADV) 250 mL adv  500 mg IntraVENous Q24H    cefTRIAXone (ROCEPHIN) 1 g in sterile water (preservative free) 10 mL IV syringe  1 g IntraVENous Q24H    acetaminophen (TYLENOL) tablet 650 mg  650 mg Oral Q4H PRN    naloxone (NARCAN) injection 0.4 mg  0.4 mg IntraVENous PRN    heparin (porcine) injection 5,000 Units  5,000 Units SubCUTAneous Q8H    albuterol (PROVENTIL VENTOLIN) nebulizer solution 2.5 mg  2.5 mg Nebulization Q4H PRN    sodium chloride (NS) flush 5-10 mL  5-10 mL IntraVENous PRN        Review Of Systems:       Constitutional: weight loss.  No fever, no chills, no weight loss, no night sweats   HEENT: No epistaxis, no nasal drainage, no difficulty in swallowing, no redness in eyes  Respiratory:  dyspnea on exertion, cough, , wheezing. dyspnea on exertion or emphysema  Cardiovascular: dyspnea  Gastrointestinal: abd pain  Genitourinary: No urinary symptoms or hematuria  Integument/breast: No ulcers or rashes  Musculoskeletal: no muscle pain, no weakness  Neurological: No focal weakness, no seizures, no headaches  Behvioral/Psych: No anxiety, no depression             Physical Exam:     Visit Vitals    /75 (BP 1 Location: Right arm, BP Patient Position: At rest)    Pulse 63    Temp 98 °F (36.7 °C)    Resp 20    Ht 5' 6\" (1.676 m)    Wt 53.1 kg (117 lb)    SpO2 99%    BMI 18.88 kg/m2     BP Readings from Last 3 Encounters:   09/23/18 122/75   08/28/18 118/64   08/16/18 106/65     Pulse Readings from Last 3 Encounters:   09/23/18 63   08/28/18 72   08/16/18 79     Wt Readings from Last 3 Encounters:   09/20/18 53.1 kg (117 lb)   08/28/18 52.6 kg (116 lb)   08/16/18 55.5 kg (122 lb 4.8 oz)       General:  alert, cooperative, no distress, appears stated age  Skin: Warm and dry, acyanotic, normal color. Head: Normocephalic, atraumatic. Eyes: Sclerae anicteric, conjunctivae without injection. Neck:  nontender, no nuchal rigidity, no masses, no stridor, no carotid bruit, no JVD  Lungs:  diffuse  rhonchi , wheezes both lung fields   Heart:  regular rate and rhythm, S1, S2 normal, no click, no rub  Abdomen:  abdomen is soft without significant tenderness, masses, organomegaly or guarding  Extremities:  extremities normal, atraumatic, no cyanosis or edema  Neurological: grossly intact. No focal abnormalities, moves all extremities well. Psychiatric Affect: The patient is awake, alert and oriented x3. Lillette Crank is interactive and appropriate.      Data Review:     Recent Results (from the past 48 hour(s))   GLUCOSE, POC    Collection Time: 09/21/18  9:27 AM   Result Value Ref Range    Glucose (POC) 130 (H) 70 - 110 mg/dL   GLUCOSE, POC    Collection Time: 09/21/18 11:38 AM   Result Value Ref Range    Glucose (POC) 123 (H) 70 - 110 mg/dL   GLUCOSE, POC    Collection Time: 09/21/18  3:44 PM   Result Value Ref Range    Glucose (POC) 117 (H) 70 - 110 mg/dL   GLUCOSE, POC    Collection Time: 09/21/18  9:28 PM   Result Value Ref Range    Glucose (POC) 123 (H) 70 - 110 mg/dL   CBC WITH AUTOMATED DIFF    Collection Time: 09/22/18  4:15 AM   Result Value Ref Range    WBC 10.8 4.6 - 13.2 K/uL    RBC 3.70 (L) 4.20 - 5.30 M/uL    HGB 11.8 (L) 12.0 - 16.0 g/dL    HCT 35.2 35.0 - 45.0 %    MCV 95.1 74.0 - 97.0 FL    MCH 31.9 24.0 - 34.0 PG    MCHC 33.5 31.0 - 37.0 g/dL    RDW 13.3 11.6 - 14.5 %    PLATELET 179 046 - 745 K/uL    MPV 10.0 9.2 - 11.8 FL    NEUTROPHILS 88 (H) 40 - 73 %    LYMPHOCYTES 5 (L) 21 - 52 %    MONOCYTES 7 3 - 10 %    EOSINOPHILS 0 0 - 5 %    BASOPHILS 0 0 - 2 %    ABS. NEUTROPHILS 9.5 (H) 1.8 - 8.0 K/UL    ABS. LYMPHOCYTES 0.5 (L) 0.9 - 3.6 K/UL    ABS. MONOCYTES 0.8 0.05 - 1.2 K/UL    ABS. EOSINOPHILS 0.0 0.0 - 0.4 K/UL    ABS.  BASOPHILS 0.0 0.0 - 0.1 K/UL    DF AUTOMATED     MAGNESIUM    Collection Time: 09/22/18  4:15 AM   Result Value Ref Range    Magnesium 2.7 (H) 1.6 - 2.6 mg/dL   METABOLIC PANEL, BASIC    Collection Time: 09/22/18  4:15 AM   Result Value Ref Range    Sodium 138 136 - 145 mmol/L    Potassium 4.1 3.5 - 5.5 mmol/L    Chloride 99 (L) 100 - 108 mmol/L    CO2 34 (H) 21 - 32 mmol/L    Anion gap 5 3.0 - 18 mmol/L    Glucose 115 (H) 74 - 99 mg/dL    BUN 24 (H) 7.0 - 18 MG/DL    Creatinine 0.64 0.6 - 1.3 MG/DL    BUN/Creatinine ratio 38 (H) 12 - 20      GFR est AA >60 >60 ml/min/1.73m2    GFR est non-AA >60 >60 ml/min/1.73m2    Calcium 8.3 (L) 8.5 - 10.1 MG/DL   PROTHROMBIN TIME + INR    Collection Time: 09/22/18  4:15 AM   Result Value Ref Range    Prothrombin time 13.5 11.5 - 15.2 sec    INR 1.1 0.8 - 1.2     GLUCOSE, POC    Collection Time: 09/22/18 10:00 PM   Result Value Ref Range    Glucose (POC) 147 (H) 70 - 110 mg/dL   CBC WITH AUTOMATED DIFF    Collection Time: 09/23/18  3:01 AM   Result Value Ref Range WBC 10.5 4.6 - 13.2 K/uL    RBC 4.12 (L) 4.20 - 5.30 M/uL    HGB 13.3 12.0 - 16.0 g/dL    HCT 39.8 35.0 - 45.0 %    MCV 96.6 74.0 - 97.0 FL    MCH 32.3 24.0 - 34.0 PG    MCHC 33.4 31.0 - 37.0 g/dL    RDW 13.5 11.6 - 14.5 %    PLATELET 023 587 - 459 K/uL    MPV 10.3 9.2 - 11.8 FL    NEUTROPHILS 91 (H) 40 - 73 %    LYMPHOCYTES 9 (L) 21 - 52 %    MONOCYTES 0 (L) 3 - 10 %    EOSINOPHILS 0 0 - 5 %    BASOPHILS 0 0 - 2 %    ABS. NEUTROPHILS 9.6 (H) 1.8 - 8.0 K/UL    ABS. LYMPHOCYTES 0.9 0.9 - 3.6 K/UL    ABS. MONOCYTES 0.0 (L) 0.05 - 1.2 K/UL    ABS. EOSINOPHILS 0.0 0.0 - 0.4 K/UL    ABS. BASOPHILS 0.0 0.0 - 0.1 K/UL    DF AUTOMATED     MAGNESIUM    Collection Time: 09/23/18  3:01 AM   Result Value Ref Range    Magnesium 2.7 (H) 1.6 - 2.6 mg/dL   METABOLIC PANEL, BASIC    Collection Time: 09/23/18  3:01 AM   Result Value Ref Range    Sodium 138 136 - 145 mmol/L    Potassium 3.9 3.5 - 5.5 mmol/L    Chloride 97 (L) 100 - 108 mmol/L    CO2 34 (H) 21 - 32 mmol/L    Anion gap 7 3.0 - 18 mmol/L    Glucose 173 (H) 74 - 99 mg/dL    BUN 35 (H) 7.0 - 18 MG/DL    Creatinine 0.95 0.6 - 1.3 MG/DL    BUN/Creatinine ratio 37 (H) 12 - 20      GFR est AA >60 >60 ml/min/1.73m2    GFR est non-AA 58 (L) >60 ml/min/1.73m2    Calcium 8.5 8.5 - 10.1 MG/DL   GLUCOSE, POC    Collection Time: 09/23/18  7:45 AM   Result Value Ref Range    Glucose (POC) 126 (H) 70 - 110 mg/dL         Intake/Output Summary (Last 24 hours) at 09/23/18 0847  Last data filed at 09/23/18 8413   Gross per 24 hour   Intake             1420 ml   Output             1000 ml   Net              420 ml       Cardiographics:     ECG: Normal sinus rhythm Nonspecific ST abnormality       Echocardiogram: ordered by medical team     Signed By: Eun Cuellar NP     September 23, 2018      I have independently evaluated taken history and examined the patient. All relevant labs and testing data's are reviewed. Care plan discussed and updated after review.   Gordy Bueno MD

## 2018-09-23 NOTE — PROGRESS NOTES
NUTRITION    Nursing Referral: [facility]     RECOMMENDATIONS / PLAN:     - Modify supplements: Yovana Abdi, TID. Rosa Isela Maria - Monitor and encourage po/supplement intake. - Continue RD inpatient monitoring and evaluation. NUTRITION INTERVENTIONS & DIAGNOSIS:     [x] Meals/snacks: modified composition  [x] Medical food supplement therapy: Ensure Clear TID- modify    Nutrition Diagnosis:  Chronic disease or condition related malnutrition related to inadequate energy intake as evidenced by pt consuming <75% of energy needs, > 1 month with moderate fat and muscle loss in multiple body regions. Patient meets criteria for Moderate Protein Calorie Malnutrition as evidenced by:   ASPEN Malnutrition Criteria  Acute Illness, Chronic Illness, or Social/Enviornmental: Chronic illness  Energy Intake: Less than/equal to 75% of est energy req for greater than/equal to 1 month  Body Fat: Mild (Orbital & Thoracic Regions)  Muscle Mass: Mild (Temple, calvicle, & scapular regions)  ASPEN Malnutrition Score - Chronic Illness: 8  Chronic Illness - Malnutrition Diagnosis: Moderate malnutrition. ASSESSMENT:     9/23: Diet advanced today. Remains with poor intake/appetite but knows she has to eat. S/p colonoscopy revealing cecal malignancy. C&R consulted. Agreeable to modify supplements, no hx of DM but elevated BG levels on steroid therapy. C/o abdominal pain associated with eating. Tolerating diet today. NFPE completed. 9/21: Pt unavailable at time of visit. Was NPO today for procedure; started on clear liquid diet. Plan for colonoscopy tomorrow.  Pt underweight; BMI 18.8 kg/m^2    Average po intake adequate to meet patients estimated nutritional needs:   [] Yes     [x] No   [] Unable to determine at this time    Diet: DIET NUTRITIONAL SUPPLEMENTS Breakfast, Lunch, Dinner; ENSURE CLEAR  DIET CARDIAC Regular      Food Allergies: NKFA   Current Appetite:   [] Good     [x] Fair     [x] Poor     [] Other:   Appetite/meal intake prior to admission:   [] Good     [] Fair     [x] Poor x several months   [x] Other: pt reports on a good day she will eat one full meal/day along with \"grazing behaviors\"  Feeding Limitations:  [] Swallowing difficulty    [] Chewing difficulty    [] Other:  Current Meal Intake:   Patient Vitals for the past 100 hrs:   % Diet Eaten   09/22/18 1533 25 %   09/22/18 1457 5 %   09/21/18 1806 2 %   09/21/18 0917 0 %   09/20/18 1810 0 %       BM:  9/22  Skin Integrity: no pressure injury or wound noted  Edema:   [x] No     [] Yes   Pertinent Medications: Reviewed: steroid therapy, MVI, zofran    Recent Labs      09/23/18   0301  09/22/18   0415  09/21/18   0403   NA  138  138  136   K  3.9  4.1  4.3   CL  97*  99*  99*   CO2  34*  34*  32   GLU  173*  115*  143*   BUN  35*  24*  18   CREA  0.95  0.64  0.59*   CA  8.5  8.3*  8.8   MG  2.7*  2.7*   --        Intake/Output Summary (Last 24 hours) at 09/23/18 1208  Last data filed at 09/23/18 3852   Gross per 24 hour   Intake             1420 ml   Output             1000 ml   Net              420 ml       Anthropometrics:  Ht Readings from Last 1 Encounters:   09/23/18 5' 6\" (1.676 m)     Last 3 Recorded Weights in this Encounter    09/19/18 2233 09/20/18 1744 09/23/18 1048   Weight: 52.9 kg (116 lb 9 oz) 53.1 kg (117 lb) 53.1 kg (117 lb)     Body mass index is 18.88 kg/(m^2). Underweight    Weight History:   Per H&P, pt reported unplanned wt loss of 30 lb PTA.  Per chart hx, noted weight fluctuating from 113-122 in past 1 year PTA    Weight Metrics 9/23/2018 8/28/2018 8/16/2018 7/19/2018 2/14/2018 12/4/2017 9/11/2017   Weight 117 lb 116 lb 122 lb 4.8 oz 118 lb 115 lb 113 lb 9.6 oz 121 lb   BMI 18.88 kg/m2 19.3 kg/m2 20.35 kg/m2 19.05 kg/m2 18.56 kg/m2 18.34 kg/m2 19.53 kg/m2        Admitting Diagnosis: Pneumonia  Sepsis due to pneumonia ([facility]  Abdominal pain  COPD (chronic obstructive pulmonary disease) (HCC)  Pneumonia  DX  dx  Pertinent PMHx: COPD, depression, lung disease, osteoporosis, vitamin D deficiency    Education Needs:        [x] None identified  [] Identified - Not appropriate at this time  []  Identified and addressed - refer to education log  Learning Limitations:   [x] None identified  [] Identified    Cultural, Methodist & ethnic food preferences:  [x] None identified    [] Identified and addressed     ESTIMATED NUTRITION NEEDS:     Calories: 9180-2792 kcal (30-35 kcal/kg) based on  [x] Actual BW: 53 kg      [] IBW   Protein: 53-80 gm (1-1.5 gm/kg) based on  [x] Actual BW      [] IBW   Fluid: 1 mL/kcal     MONITORING & EVALUATION:     Nutrition Goal(s):   1. Po intake of meals will meet >75% of patient estimated nutritional needs within the next 7 days.   Outcome:  [] Met/Ongoing    [x]  Not Met/Progressing    [] New/Initial Goal     Monitoring:   [x] Food and beverage intake   [x] Diet order   [x] Nutrition-focused physical findings   [x] Treatment/therapy   [] Weight   [] Enteral nutrition intake        Previous Recommendations (for follow-up assessments only):     [x]   Implemented       []   Not Implemented (RD to address)      [] No Longer Appropriate     [] No Recommendation Made     Discharge Planning:  regular diet   [x] Participated in care planning, discharge planning, & interdisciplinary rounds as appropriate      Michael Arriola RD   Pager: [phone]

## 2018-09-23 NOTE — ROUTINE PROCESS
Bedside and Verbal shift change report given to SADI Hoffman (oncoming nurse) by Germán Norris RN (offgoing nurse). Report included the following information SBAR, Kardex, MAR and Recent Results.     SITUATION:    Code Status: DNR  Reason for Admission: Pneumonia  Sepsis due to pneumonia ([facility]  Abdominal pain  COPD (chronic obstructive pulmonary disease) ([facility]  Pneumonia  DX   dx    [facility] day: 3   Problem List:       Hospital Problems  Date Reviewed: 9/22/2018          Codes Class Noted POA    [facility] ICD-10-CM: C18.9  ICD-9-CM: 153.9  9/22/2018 Unknown        Colon polyps ICD-10-CM: K63.5  ICD-9-CM: 211.3  9/22/2018 Unknown        COPD (chronic obstructive pulmonary disease) ([facility] ICD-10-CM: J44.9  ICD-9-CM: 496  9/20/2018 Unknown        Pneumonia ICD-10-CM: J18.9  ICD-9-CM: 486  9/20/2018 Unknown        Abdominal pain ICD-10-CM: R10.9  ICD-9-CM: 789.00  9/20/2018 Unknown        Sepsis due to pneumonia [facility]) ICD-10-CM: J18.9, A41.9  ICD-9-CM: 874, 995.91  9/20/2018 Unknown        Abdominal mass ICD-10-CM: R19.00  ICD-9-CM: 789.30  9/20/2018 Unknown              BACKGROUND:    Past Medical History:   Past Medical History:   Diagnosis Date    Anxiety     Arthritis     [facility]     Asthma     Back problem     Baker's cyst     Balance problems     Chronic lung disease     Cigarette smoker     Clotting disorder ([facility]     COPD [facility]ronic obstructive [facility] disease) [facility]     Depression     History of DVT (deep vein thrombosis)     Leg pain     Right    Lung disease     Nausea & vomiting     Osteoporosis     Restless leg syndrome     Spinal stenosis     Vitamin D deficiency          Patient taking anticoagulants yes     ASSESSMENT:    Changes in Assessment Throughout Shift: colonoscopy this am     Patient has Central Line: no Reasons if yes:    Patient has Morales Cath: no Reasons if yes:       Last Vitals:     Vitals:    09/22/18 2120 09/22/18 2122 09/23/18 0024 09/23/18 0408   BP:   112/69 107/66   Pulse:   75 74   Resp:   18 18   Temp:   97.6 °F (36.4 °C) 97.8 °F (36.6 °C)   SpO2: 95% 97% 97% 98%   Weight:       Height:            IV and DRAINS (will only show if present)   [REMOVED] Peripheral IV 09/20/18 Right Forearm-Site Assessment: Clean, dry, & intact  [REMOVED] Peripheral IV 09/19/18 Left Antecubital-Site Assessment: Clean, dry, & intact  [REMOVED] Peripheral IV 09/21/18 Left Forearm-Site Assessment: Clean, dry, & intact  [REMOVED] Peripheral IV 09/22/18 Right;Upper Arm-Site Assessment: Clean, dry, & intact  Peripheral IV 09/22/18 Right Forearm-Site Assessment: Clean, dry, & intact     WOUND (if present)   Wound Type:  none   Dressing present Dressing Present : No   Wound Concerns/Notes:  none     PAIN    Pain Assessment    Pain Intensity 1: 2 (09/23/18 0318)    Pain Location 1: Abdomen    Pain Intervention(s) 1: Medication (see MAR)    Patient Stated Pain Goal: 0  o Interventions for Pain:  Yes, medication  o Intervention effective: yes  o Time of last intervention:  0302  o Reassessment Completed: yes      Last 3 Weights:  Last 3 Recorded Weights in this Encounter    09/19/18 2233 09/20/18 1744   Weight: 52.9 kg (116 lb 9 oz) 53.1 kg (117 lb)     Weight change:      INTAKE/OUPUT    Current Shift: 09/22 1901 - 09/23 0700  In: 240 [P.O.:240]  Out: 350 [Urine:350]    Last three shifts: 09/21 0701 - 09/22 1900  In: 1890 [P.O.:1380; I.V.:510]  Out: 1400 [Urine:1400]     LAB RESULTS     Recent Labs      09/23/18   0301  09/22/18   0415  09/21/18   0403   WBC  10.5  10.8  6.8   HGB  13.3  11.8*  12.1   HCT  39.8  35.2  36.3   PLT  343  292  262        Recent Labs      09/23/18   0301  09/22/18   0415  09/21/18   0403   NA  138  138  136   K  3.9  4.1  4.3   GLU  173*  115*  143*   BUN  35*  24*  18   CREA  0.95  0.64  0.59*   CA  8.5  8.3*  8.8   MG  2.7*  2.7*   --    INR   --   1.1   --        RECOMMENDATIONS AND DISCHARGE PLANNING     1.  Pending tests/procedures/ Plan of Care or Other Needs: cont current POC, has malignant colon Ca      2. Discharge plan for patient and Needs/Barriers: home    3. Estimated Discharge Date: uknown Posted on Whiteboard in Patients Room: no      4. The patient's care plan was reviewed with the oncoming nurse. \"HEALS\" SAFETY CHECK      Fall Risk    Total Score: 4    Safety Measures: Safety Measures: Bed/Chair alarm on, Bed/Chair-Wheels locked, Bed in low position, Call light within reach, Fall prevention (comment), Gripper socks, Side rails X 3    A safety check occurred in the patient's room between off going nurse and oncoming nurse listed above. The safety check included the below items  Area Items   H  High Alert Medications - Verify all high alert medication drips (heparin, PCA, etc.)   E  Equipment - Suction is set up for ALL patients (with gloria)  - Red plugs utilized for all equipment (IV pumps, etc.)  - WOWs wiped down at end of shift.  - Room stocked with oxygen, suction, and other unit-specific supplies   A  Alarms - Bed alarm is set for fall risk patients  - Ensure chair alarm is in place and activated if patient is up in a chair   L  Lines - Check IV for any infiltration  - Morales bag is empty if patient has a Morales   - Tubing and IV bags are labeled   S  Safety   - Room is clean, patient is clean, and equipment is clean. - Hallways are clear from equipment besides carts. - Fall bracelet on for fall risk patients  - Ensure room is clear and free of clutter  - Suction is set up for ALL patients (with gloria)  - Hallways are clear from equipment besides carts.    - Isolation precautions followed, supplies available outside room, sign posted     Britney Neil RN

## 2018-09-23 NOTE — PROGRESS NOTES
[facility] Hospitalist Group  Progress Note    Patient: Francine Traore Age: 72 y.o. : 1945 MR#: [id number] SSN: [id number]  Date/Time: 2018    Subjective:     Hypertonic saline added per pulmonary, and solumedrol decreased. She is working with RT and cough has been more productive, she is able to expectorate. Wants to advance to regular diet. Still has vague abdominal pain. Called later by nsg as patient tearful and requesting something for anxiety, takes ativan at home. Assessment/Plan:     1. COPD GOLD 2017 group D - with acute exacerbation. Solumedrol, BD. Pulmonary input appreciated. 2. Chronic hypoxic respiratory failure on home oxygen 2 L around the clock. 3. Leukocytosis poa - source m/l lungs - resolving. 4. Several cigarettes a day despite inability to inhale completely and being on oxygen - smoking cessation education. 5. Chronic back pain - hx of DJDz of L-spine. Controlled. 6. Underweight Body mass index is 18.88 kg/(m^2). Nutritionist following; on supplements. Multivit. 7. Hx anxiety, depression. On home med  7. Hx DVT - noted. 8. Cad, hx MI, followed with Dr. John Melton in the past - cardiology consult regarding perioperative management. Echo. 5. Newly diagnosed cecal malignancy - follow pathology, and CEA. Follow surgery recs, and pulmonary and cards recs regarding perioperative management. 10. Moderate protein calorie malnutrition - AEB:   ASPEN Malnutrition Criteria  Acute Illness, Chronic Illness, or Social/Enviornmental: Chronic illness  Energy Intake: Less than/equal to 75% of est energy req for greater than/equal to 1 month  Body Fat: Mild (Orbital & Thoracic Regions)  Muscle Mass: Mild (Temple, calvicle, & scapular regions)  ASPEN Malnutrition Score - Chronic Illness: 8  Chronic Illness - Malnutrition Diagnosis: Moderate malnutrition.   --> on supplements, multivit; nutritionist following. 11. Steroid induced hyperglycemia - ssi. Check A1c.   12. DNR. Durable DNR on file in cc. Pt / ot. Additional Notes:      Case discussed with:  [x]Patient  [x]Family  [x]Nursing  []Case Management  DVT Prophylaxis:  []Lovenox  [x]Hep SQ  []SCDs  []Coumadin   []On Heparin gtt    Objective:   VS:   Visit Vitals    /75    Pulse 63    Temp 98 °F (36.7 °C)    Resp 20    Ht 5' 6\" (1.676 m)    Wt 53.1 kg (117 lb)    SpO2 99%    BMI 18.88 kg/m2      Tmax/24hrs: Temp (24hrs), Av.1 °F (36.7 °C), Min:97.6 °F (36.4 °C), Max:98.7 °F (37.1 °C)    Input/Output:     Intake/Output Summary (Last 24 hours) at 18 1124  Last data filed at 18 4233   Gross per 24 hour   Intake             1420 ml   Output             1000 ml   Net              420 ml       General: resting comfortably. Family at bedside. Heent: ncat. perrl. Cardiovascular:  RRR. Pulmonary:  CTA b.l anterior and infraaxillary fields. Symmetrical chest expansion. No dullness to percussion. GI:  Soft, ND, NABS. nt   Extremities:  No edema. dp 2+ b.l  Neuro no focal deficit. Additional: no rash    Labs:    Recent Results (from the past 24 hour(s))   GLUCOSE, POC    Collection Time: 18 10:00 PM   Result Value Ref Range    Glucose (POC) 147 (H) 70 - 110 mg/dL   CBC WITH AUTOMATED DIFF    Collection Time: 18  3:01 AM   Result Value Ref Range    WBC 10.5 4.6 - 13.2 K/uL    RBC 4.12 (L) 4.20 - 5.30 M/uL    HGB 13.3 12.0 - 16.0 g/dL    HCT 39.8 35.0 - 45.0 %    MCV 96.6 74.0 - 97.0 FL    MCH 32.3 24.0 - 34.0 PG    MCHC 33.4 31.0 - 37.0 g/dL    RDW 13.5 11.6 - 14.5 %    PLATELET 603 282 - 899 K/uL    MPV 10.3 9.2 - 11.8 FL    NEUTROPHILS 91 (H) 40 - 73 %    LYMPHOCYTES 9 (L) 21 - 52 %    MONOCYTES 0 (L) 3 - 10 %    EOSINOPHILS 0 0 - 5 %    BASOPHILS 0 0 - 2 %    ABS. NEUTROPHILS 9.6 (H) 1.8 - 8.0 K/UL    ABS. LYMPHOCYTES 0.9 0.9 - 3.6 K/UL    ABS. MONOCYTES 0.0 (L) 0.05 - 1.2 K/UL    ABS. EOSINOPHILS 0.0 0.0 - 0.4 K/UL    ABS.  BASOPHILS 0.0 0.0 - 0.1 K/UL    DF AUTOMATED     MAGNESIUM    Collection Time: 09/23/18  3:01 AM   Result Value Ref Range    Magnesium 2.7 (H) 1.6 - 2.6 mg/dL   METABOLIC PANEL, BASIC    Collection Time: 09/23/18  3:01 AM   Result Value Ref Range    Sodium 138 136 - 145 mmol/L    Potassium 3.9 3.5 - 5.5 mmol/L    Chloride 97 (L) 100 - 108 mmol/L    CO2 34 (H) 21 - 32 mmol/L    Anion gap 7 3.0 - 18 mmol/L    Glucose 173 (H) 74 - 99 mg/dL    BUN 35 (H) 7.0 - 18 MG/DL    Creatinine 0.95 0.6 - 1.3 MG/DL    BUN/Creatinine ratio 37 (H) 12 - 20      GFR est AA >60 >60 ml/min/1.73m2    GFR est non-AA 58 (L) >60 ml/min/1.73m2    Calcium 8.5 8.5 - 10.1 MG/DL   GLUCOSE, POC    Collection Time: 09/23/18  7:45 AM   Result Value Ref Range    Glucose (POC) 126 (H) 70 - 110 mg/dL   ECHO ADULT COMPLETE    Collection Time: 09/23/18 11:11 AM   Result Value Ref Range    LA Volume 52.83 22 - 52 mL    Ao Root D 3.36 cm    LVIDd 4.06 3.9 - 5.3 cm    LVPWd 0.90 0.6 - 0.9 cm    LVIDs 2.64 cm    IVSd 0.88 0.6 - 0.9 cm    LVOT d 1.77 cm    LVOT Peak Velocity 117.71 cm/s    LVOT Peak Gradient 5.5 mmHg    LVOT VTI 23.57 cm    MV A Huy 62.18 cm/s    MV E Huy 1.00 cm/s    MV E/A 1.60     LA Vol 4C 67.34 (A) 22 - 52 mL    LA Vol 2C 33.07 22 - 52 mL    LA Area 4C 21.5 cm2    LV Mass .1 67 - 162 g    LV Mass AL Index 70.4 g/m2    Mitral Valve E Wave Deceleration Time 215.4 ms    Triscuspid Valve Regurgitation Peak Gradient 40.5 mmHg    TR Max Velocity 318.12 cm/s    LA Vol Index 33.17 ml/m2    LA Vol Index 20.77 ml/m2    LA Vol Index 42.28 ml/m2     Additional Data Reviewed:      Signed By: Kayleigh Fox MD     September 23, 2018 3:46 PM

## 2018-09-23 NOTE — PROGRESS NOTES
Problem: Falls - Risk of  Goal: *Absence of Falls  Document Nimco Fall Risk and appropriate interventions in the flowsheet. Outcome: Progressing Towards Goal  Fall Risk Interventions:  Mobility Interventions: Bed/chair exit alarm, Patient to call before getting OOB, Utilize walker, cane, or other assistive device         Medication Interventions: Bed/chair exit alarm, Patient to call before getting OOB, Teach patient to arise slowly    Elimination Interventions: Bed/chair exit alarm, Call light in reach, Patient to call for help with toileting needs, Toilet paper/wipes in reach    History of Falls Interventions: Bed/chair exit alarm, Door open when patient unattended, Room close to nurse's station        Problem: Pressure Injury - Risk of  Goal: *Prevention of pressure injury  Document Roman Scale and appropriate interventions in the flowsheet. Outcome: Progressing Towards Goal  Pressure Injury Interventions:             Activity Interventions: Increase time out of bed    Mobility Interventions: Float heels, HOB 30 degrees or less    Nutrition Interventions: Document food/fluid/supplement intake    Friction and Shear Interventions: HOB 30 degrees or less, Lift sheet, Minimize layers

## 2018-09-23 NOTE — PROGRESS NOTES
[facility] 1945, [address] [facility]  [email or web address]         Impression/Plan:  1. 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 CEA and path pending. Will need R hemicolectomy and colo in one year. Will be avail if needed. Chief Complaint: none      Subjective:  Plan discussed w extended family in detail, including need for 1st degree relatives to get high-risk screening now. They all understand.         Eyes: conjunctiva normal, EOM normal   Neck: ROM normal, supple and trachea normal   Cardiovascular: heart normal, intact distal pulses, normal rate and regular rhythm   Pulmonary/Chest Wall: breath sounds normal and effort normal   Abdominal: appearance normal, bowel sounds normal and soft, non-acute, non-tender                Visit Vitals    BP 99/56 (BP 1 Location: Right arm, BP Patient Position: At rest)    Pulse 78    Temp 97.2 °F (36.2 °C)    Resp 18    Ht 5' 6\" (1.676 m)    Wt 53.1 kg (117 lb)    SpO2 97%    BMI 18.88 kg/m2           Intake/Output Summary (Last 24 hours) at 09/23/18 1223  Last data filed at 09/23/18 4918   Gross per 24 hour   Intake             1420 ml   Output             1000 ml   Net              420 ml       CBC w/Diff    Lab Results   Component Value Date/Time    WBC 10.5 09/23/2018 03:01 AM    RBC 4.12 (L) 09/23/2018 03:01 AM    HGB 13.3 09/23/2018 03:01 AM    HCT 39.8 09/23/2018 03:01 AM    MCV 96.6 09/23/2018 03:01 AM    MCH 32.3 09/23/2018 03:01 AM    MCHC 33.4 09/23/2018 03:01 AM    RDW 13.5 09/23/2018 03:01 AM     09/23/2018 03:01 AM    Lab Results   Component Value Date/Time    GRANS 91 (H) 09/23/2018 03:01 AM    LYMPH 9 (L) 09/23/2018 03:01 AM    EOS 0 09/23/2018 03:01 AM    BANDS 3 12/02/2016 03:29 AM    BASOS 0 09/23/2018 03:01 AM      Basic Metabolic Profile   Recent Labs      09/23/18   0301   NA  138   K  3.9   CL  97*   CO2  34*   BUN  35*   CA  8.5   MG  2.7*        Hepatic Function    Lab Results Component Value Date/Time    ALB 2.9 (L) 09/20/2018 06:30 AM    TP 6.1 (L) 09/20/2018 06:30 AM    AP 73 09/20/2018 06:30 AM    Lab Results   Component Value Date/Time    SGOT 13 (L) 09/20/2018 06:30 AM        @[email or web address](CULT:3)                 )Maricarmen Mcintosh MD, MD  [facility], [address] [place] [address]  [email or web address]

## 2018-09-23 NOTE — PROGRESS NOTES
[facility]  Pulmonary, Critical Care, and Sleep Medicine    Progress note    Name: Tonya Brown MRN: [id number]   : 1945 Hospital: [facility]   Date: 2018        IMPRESSION:   · Abdominal pain- suspected cecal lesion/malignancy, colon polyps  · COPD- GOLD 2017-group D   COPD with acute exacerbation ([address] secondary to LLL pneumonia    Pulmonary emphysema, unspecified emphysema type ([facility]    Tobacco dependence    Pulmonary cachexia due to chronic obstructive pulmonary disease ([facility]    Hypoxemia requiring supplemental oxygen       · Asbestosis  · Depression and anxiety  · H/o DVT- not on anticogulation      RECOMMENDATIONS:   · Oxygen- continue at 2-3 L to maintain SaO2 goal > 90%  · Bronchial hygiene protocol- will add hypertonic saline  · Bronchodilators- continue LABA + LAMA and prn OSMANY. Will start Spiriva, use albuterol prn  · Steroids- short taper  · Antibiotics- to cover Pneumonia - CAP  5 days  · Aspiration precautions  · Nutrition  · Will optimize preop- likely to need colon resection. · Assess home Oxygen needs at discharge  · OT, PT, OOB and ambulate  · Healthy weight  · DVT, PUD prophylaxis     Subjective:     18   Feels better  Still with cough and wheezing- improved. Expectorating mucus better with hypertonic saline  S/p colonoscopy- findings noted  Denies any new complaints. HPI:  Patient is a 72 y.o. female with a history of COPD, chronic pain, DVT, depression, anxiety, who presents to ED with a 2 days hx of right lower abd pain,no fever reported. No nausea or vomiting, pt states bowel movements normal, she does state that her urine has been dark and smelly. She reports a 50 lb weight loss over the last few years unintentionally and intermittent pencil thin stools. Denies fevers, hematochezia, constipation, or diarrhea. No upper abdominal complaints.  Last colonoscopy was by   Dr. Hamilton Rodriguez 7 years ago with a history of numerous polyps one of which has been pre-cancerous and colonoscopies every 3 years. Abdominal Pain    This is a new problem. The current episode started 2 days ago. The problem occurs constantly. The problem has been gradually worsening. The pain is located in the RLQ. The pain is at a severity of 9/10. The pain is moderate. Pertinent negatives include no fever, no diarrhea, no nausea, no vomiting, no constipation, no dysuria, no chest pain and no back pain. The pain is worsened by certain positions. The pain is relieved by nothing. COPD:  She follows with Dr. Marilyn Burton and is on Advair and Spiriva Respimat. Still complains of baseline SOB with minimal exertion, wheezing and tight cough with chest congestion and tenacious phlegm. August CXR reviewed , no acute infiltrate but with hyperinflation. Pt complains of intermittent hemoptysis, known since 2013, no fever or chills. Pt is on home O2, last FEV1 was 30% in Jan 2015. Admits to continued smoking despite several efforts at quitting in the past.   Pt notes no apparent response to maintenance Azithromycin with no change in amount and character of phlegm.  Daliresp also did not improve symptoms      Past Medical History:   Diagnosis Date    Anxiety     Arthritis     Asbestosis ([address]     Asthma     Back problem     Baker's cyst     Balance problems     Chronic lung disease     Cigarette smoker     Clotting disorder (HCC)     COPD (chronic obstructive pulmonary disease) (HCC)     Depression     History of DVT (deep vein thrombosis)     Leg pain     Right    Lung disease     Nausea & vomiting     Osteoporosis     Restless leg syndrome     Spinal stenosis     Vitamin D deficiency       Past Surgical History:   Procedure Laterality Date    HX APPENDECTOMY      HX BACK SURGERY      x4    HX BREAST BIOPSY      HX HYSTERECTOMY      HX LUMBAR LAMINECTOMY      HX MENISCUS REPAIR      HX ORTHOPAEDIC      Knee    HX POLYPECTOMY      HX TONSILLECTOMY      HX VEIN STRIPPING          Allergies   Allergen Reactions    Anoro Ellipta [Umeclidinium-Vilanterol] Rash and Other (comments)     Muscle cramps in arms    Aspirin Other (comments)     GI upset and bleeding    Augmentin [Amoxicillin-Pot Clavulanate] Not Reported This Time     Possible cramping    Doxycycline Nausea and Vomiting    Morphine Other (comments)     Neurologic symptoms - severe agitation      Shellfish Derived Unknown (comments)     Pt able to eat small amounts per report     Sulfa (Sulfonamide Antibiotics) Rash     Patient relates no longer allergic         Current Facility-Administered Medications   Medication Dose Route Frequency    methylPREDNISolone (PF) (SOLU-MEDROL) injection 20 mg  20 mg IntraVENous Q12H    therapeutic multivitamin (THERAGRAN) tablet 1 Tab  1 Tab Oral DAILY    tiotropium (SPIRIVA) inhalation capsule 18 mcg  1 Cap Inhalation DAILY    albuterol (ACCUNEB) nebulizer solution 1.25 mg  1.25 mg Nebulization TID RT    sodium chloride 10% 15 ml hypertonic neb solution  5 mL Nebulization TID RT    sodium chloride (NS) flush 5-10 mL  5-10 mL IntraVENous Q8H    azithromycin (ZITHROMAX) 500 mg in 0.9% sodium chloride (MBP/ADV) 250 mL adv  500 mg IntraVENous Q24H    cefTRIAXone (ROCEPHIN) 1 g in sterile water (preservative free) 10 mL IV syringe  1 g IntraVENous Q24H    heparin (porcine) injection 5,000 Units  5,000 Units SubCUTAneous Q8H       Review of Systems:  A comprehensive review of systems was negative except for that written in the HPI.     Objective:   Vital Signs:    Visit Vitals    /75 (BP 1 Location: Right arm, BP Patient Position: At rest)    Pulse 63    Temp 98 °F (36.7 °C)    Resp 20    Ht 5' 6\" (1.676 m)    Wt 53.1 kg (117 lb)    SpO2 99%    BMI 18.88 kg/m2       O2 Device: Nasal cannula   O2 Flow Rate (L/min): 2 l/min   Temp (24hrs), Av.9 °F (36.6 °C), Min:97.4 °F (36.3 °C), Max:98.7 °F (37.1 °C)       Intake/Output:   Last shift:         Last 3 shifts: 09/21 1901 - 09/23 0700  In: 1930 [P.O.:1420; I.V.:510]  Out: 1000 [Urine:1000]    Intake/Output Summary (Last 24 hours) at 09/23/18 0844  Last data filed at 09/23/18 7869   Gross per 24 hour   Intake             1670 ml   Output             1000 ml   Net              670 ml      Physical Exam:   General:  Alert, cooperative, no distress, appears stated age. using accessory muscles    Head:  Normocephalic, without obvious abnormality, atraumatic. Eyes:  Conjunctivae/corneas clear. PERRL, EOMs intact. Nose: Nares normal. Septum midline. Mucosa normal. No drainage or sinus tenderness. Throat: Lips, mucosa, and tongue normal. Teeth and gums normal.   Neck: Supple, symmetrical, trachea midline, no adenopathy, thyroid: no enlargment/tenderness/nodules, no carotid bruit and no JVD. Back:   Symmetric, no curvature. ROM normal.   Lungs:   Bilateral diffuse ronchi and wheezing   Chest wall:  No tenderness or deformity. Heart:  Regular rate and rhythm, S1, S2 normal, no murmur, click, rub or gallop. Abdomen:   Soft, +Tenderness RLQ, Bowel sounds normal. No masses,  No organomegaly. Extremities: Extremities normal, atraumatic, no cyanosis or edema. Pulses: 2+ and symmetric all extremities.    Skin: Skin color, texture, turgor normal. No rashes or lesions   Lymph nodes: Cervical, supraclavicular, and axillary nodes normal.   Neurologic: Grossly nonfocal     Data review:     Recent Results (from the past 24 hour(s))   GLUCOSE, POC    Collection Time: 09/22/18 10:00 PM   Result Value Ref Range    Glucose (POC) 147 (H) 70 - 110 mg/dL   CBC WITH AUTOMATED DIFF    Collection Time: 09/23/18  3:01 AM   Result Value Ref Range    WBC 10.5 4.6 - 13.2 K/uL    RBC 4.12 (L) 4.20 - 5.30 M/uL    HGB 13.3 12.0 - 16.0 g/dL    HCT 39.8 35.0 - 45.0 %    MCV 96.6 74.0 - 97.0 FL    MCH 32.3 24.0 - 34.0 PG    MCHC 33.4 31.0 - 37.0 g/dL    RDW 13.5 11.6 - 14.5 %    PLATELET 994 002 - 587 K/uL    MPV 10.3 9.2 - 11.8 FL NEUTROPHILS 91 (H) 40 - 73 %    LYMPHOCYTES 9 (L) 21 - 52 %    MONOCYTES 0 (L) 3 - 10 %    EOSINOPHILS 0 0 - 5 %    BASOPHILS 0 0 - 2 %    ABS. NEUTROPHILS 9.6 (H) 1.8 - 8.0 K/UL    ABS. LYMPHOCYTES 0.9 0.9 - 3.6 K/UL    ABS. MONOCYTES 0.0 (L) 0.05 - 1.2 K/UL    ABS. EOSINOPHILS 0.0 0.0 - 0.4 K/UL    ABS. BASOPHILS 0.0 0.0 - 0.1 K/UL    DF AUTOMATED     MAGNESIUM    Collection Time: 09/23/18  3:01 AM   Result Value Ref Range    Magnesium 2.7 (H) 1.6 - 2.6 mg/dL   METABOLIC PANEL, BASIC    Collection Time: 09/23/18  3:01 AM   Result Value Ref Range    Sodium 138 136 - 145 mmol/L    Potassium 3.9 3.5 - 5.5 mmol/L    Chloride 97 (L) 100 - 108 mmol/L    CO2 34 (H) 21 - 32 mmol/L    Anion gap 7 3.0 - 18 mmol/L    Glucose 173 (H) 74 - 99 mg/dL    BUN 35 (H) 7.0 - 18 MG/DL    Creatinine 0.95 0.6 - 1.3 MG/DL    BUN/Creatinine ratio 37 (H) 12 - 20      GFR est AA >60 >60 ml/min/1.73m2    GFR est non-AA 58 (L) >60 ml/min/1.73m2    Calcium 8.5 8.5 - 10.1 MG/DL   GLUCOSE, POC    Collection Time: 09/23/18  7:45 AM   Result Value Ref Range    Glucose (POC) 126 (H) 70 - 110 mg/dL       Imaging:  I have personally reviewed the patients radiographs and have reviewed the reports:  XR Results (most recent):    Results from Hospital Encounter encounter on 09/19/18   XR CHEST PORT   Narrative Chest portable    History: Pain on the right side of the belly since Sunday, shortness of breath. Comparisons: 8/14/2018    Findings:     Lungs are hyperexpanded, compatible with COPD. Heart and mediastinum are grossly  stable. Atherosclerotic calcifications are seen at the arch. Breast and nipple  shadows overlie the midlungs bilaterally. Patchy infiltrate identified at the  left lung base. 1.1 cm nodule at the left midlung appears stable from at least  2/5/2017. Favor granuloma. Impression Impression:     Patchy infiltrate at the left lung base. Infection could not be excluded. Emphysema.     Thank you for this referral.          CT Results (most recent):    Results from Hospital Encounter encounter on 09/19/18   CT ABD PELV W CONT   Narrative Description:  CT abdomen and pelvis with IV contrast    TECHNIQUE: [Helically acquired axial] CT imaging of the [abdomen and pelvis]. 100 cc's of Isovue-300 injected intravenously. [ Coronal and sagittal  reformations generated and reviewed. All CT scans at this facility are performed using dose optimization technique as  appropriate to a performed exam, to include automated exposure control,  adjustment of the mA and/or kV according to patient size (including appropriate  matching for site-specific examinations), or use of iterative reconstruction  technique. Clinical Indication:  Right lower quadrant pain    Comparison: July 25, 2018. Findings:      CT ABDOMEN:  Left lower lobe infiltrate is present. The right lung base is clear with the  exception of mild bronchiectasis at the medial right middle lobe. No pleural  effusion is present. The liver appears normal.  The spleen appears normal.  The pancreas appears normal.  The adrenal glands appear normal.  The kidneys show symmetric enhancement. No hydronephrosis or mass. The stomach and the duodenum appear normal.  The gallbladder is not well-distended. No hyperdense gallstones are seen. There is normal contrast filling of the portal and the splenic veins. Moderate atherosclerosis of the abdominal aorta is present. No mesenteric or retroperitoneal adenopathy. No free fluid or air. CT PELVIS:  There is increased soft tissue in the cecum (axial, 48). The appendix is not  identified. No definite right lower quadrant inflammation is seen. There is a large amount of retained fecal material within the rectum. The bladder is well distended and has a normal unopacified appearance. No pelvic adenopathy or free fluid. Skeleton/soft tissues: L5 vertebral plasty. Moderate osteopenia. Multilevel  degenerative changes.          Impression Impression:      1. Left lower lobe pneumonia. 2. Abnormal soft tissue in the medial wall of the cecum without surrounding  inflammation. Appearance is more concerning for malignancy than an acute  inflammatory process. The appendix is not definitively identified however. Clinical correlation needed. If the patient has not had a recent colonoscopy,  one is advised. Discussed with Dr. Duke Hammans. High complexity decision making was performed during the evaluation of this patient at high risk for decompensation with multiple organ involvement     Above mentioned total time spent on reviewing the case/medical record/data/notes/EMR/patient examination/documentation/coordinating care with nurse/consultants, exclusive of procedures with complex decision making performed and > 50% time spent in face to face evaluation.           Rocio Huang MD

## 2018-09-24 VITALS
RESPIRATION RATE: 18 BRPM | HEIGHT: 66 IN | TEMPERATURE: 98.5 F | OXYGEN SATURATION: 99 % | BODY MASS INDEX: 18.8 KG/M2 | WEIGHT: 117 LBS | HEART RATE: 65 BPM | SYSTOLIC BLOOD PRESSURE: 150 MMHG | DIASTOLIC BLOOD PRESSURE: 86 MMHG

## 2018-09-24 PROBLEM — J18.9 SEPSIS DUE TO PNEUMONIA (HCC): Status: RESOLVED | Noted: 2018-09-20 | Resolved: 2018-09-24

## 2018-09-24 PROBLEM — A41.9 SEPSIS DUE TO PNEUMONIA (HCC): Status: RESOLVED | Noted: 2018-09-20 | Resolved: 2018-09-24

## 2018-09-24 LAB
ANION GAP SERPL CALC-SCNC: 5 MMOL/L (ref 3–18)
BASOPHILS # BLD: 0 K/UL (ref 0–0.1)
BASOPHILS NFR BLD: 0 % (ref 0–2)
BUN SERPL-MCNC: 33 MG/DL (ref 7–18)
BUN/CREAT SERPL: 53 (ref 12–20)
CALCIUM SERPL-MCNC: 7.9 MG/DL (ref 8.5–10.1)
CEA SERPL-MCNC: 13.4 NG/ML
CHLORIDE SERPL-SCNC: 104 MMOL/L (ref 100–108)
CO2 SERPL-SCNC: 32 MMOL/L (ref 21–32)
CREAT SERPL-MCNC: 0.62 MG/DL (ref 0.6–1.3)
DIFFERENTIAL METHOD BLD: ABNORMAL
EOSINOPHIL # BLD: 0 K/UL (ref 0–0.4)
EOSINOPHIL NFR BLD: 0 % (ref 0–5)
ERYTHROCYTE [DISTWIDTH] IN BLOOD BY AUTOMATED COUNT: 13.6 % (ref 11.6–14.5)
EST. AVERAGE GLUCOSE BLD GHB EST-MCNC: 128 MG/DL
FERRITIN SERPL-MCNC: 118 NG/ML (ref 8–388)
FOLATE SERPL-MCNC: 16.1 NG/ML (ref 3.1–17.5)
GLUCOSE BLD STRIP.AUTO-MCNC: 102 MG/DL (ref 70–110)
GLUCOSE BLD STRIP.AUTO-MCNC: 105 MG/DL (ref 70–110)
GLUCOSE SERPL-MCNC: 130 MG/DL (ref 74–99)
HBA1C MFR BLD: 6.1 % (ref 4.2–5.6)
HCT VFR BLD AUTO: 33.6 % (ref 35–45)
HGB BLD-MCNC: 11 G/DL (ref 12–16)
IRON SATN MFR SERPL: 33 %
IRON SERPL-MCNC: 87 UG/DL (ref 50–175)
LYMPHOCYTES # BLD: 0.8 K/UL (ref 0.9–3.6)
LYMPHOCYTES NFR BLD: 8 % (ref 21–52)
MCH RBC QN AUTO: 32.6 PG (ref 24–34)
MCHC RBC AUTO-ENTMCNC: 32.7 G/DL (ref 31–37)
MCV RBC AUTO: 99.7 FL (ref 74–97)
MONOCYTES # BLD: 1 K/UL (ref 0.05–1.2)
MONOCYTES NFR BLD: 9 % (ref 3–10)
NEUTS SEG # BLD: 8.7 K/UL (ref 1.8–8)
NEUTS SEG NFR BLD: 83 % (ref 40–73)
PLATELET # BLD AUTO: 269 K/UL (ref 135–420)
PMV BLD AUTO: 10.4 FL (ref 9.2–11.8)
POTASSIUM SERPL-SCNC: 4.1 MMOL/L (ref 3.5–5.5)
RBC # BLD AUTO: 3.37 M/UL (ref 4.2–5.3)
SODIUM SERPL-SCNC: 141 MMOL/L (ref 136–145)
TIBC SERPL-MCNC: 260 UG/DL (ref 250–450)
VIT B12 SERPL-MCNC: 560 PG/ML (ref 211–911)
WBC # BLD AUTO: 10.5 K/UL (ref 4.6–13.2)

## 2018-09-24 PROCEDURE — 85025 COMPLETE CBC W/AUTO DIFF WBC: CPT | Performed by: HOSPITALIST

## 2018-09-24 PROCEDURE — 74011250636 HC RX REV CODE- 250/636: Performed by: INTERNAL MEDICINE

## 2018-09-24 PROCEDURE — 74011000250 HC RX REV CODE- 250: Performed by: INTERNAL MEDICINE

## 2018-09-24 PROCEDURE — 94640 AIRWAY INHALATION TREATMENT: CPT

## 2018-09-24 PROCEDURE — 97165 OT EVAL LOW COMPLEX 30 MIN: CPT

## 2018-09-24 PROCEDURE — 36415 COLL VENOUS BLD VENIPUNCTURE: CPT | Performed by: HOSPITALIST

## 2018-09-24 PROCEDURE — 77010033678 HC OXYGEN DAILY

## 2018-09-24 PROCEDURE — 97161 PT EVAL LOW COMPLEX 20 MIN: CPT

## 2018-09-24 PROCEDURE — 83036 HEMOGLOBIN GLYCOSYLATED A1C: CPT | Performed by: HOSPITALIST

## 2018-09-24 PROCEDURE — 80048 BASIC METABOLIC PNL TOTAL CA: CPT | Performed by: HOSPITALIST

## 2018-09-24 PROCEDURE — 82962 GLUCOSE BLOOD TEST: CPT

## 2018-09-24 RX ORDER — THERA TABS 400 MCG
1 TAB ORAL DAILY
Qty: 30 TAB | Refills: 11 | Status: SHIPPED | OUTPATIENT
Start: 2018-09-24

## 2018-09-24 RX ORDER — NALOXONE HYDROCHLORIDE 4 MG/.1ML
SPRAY NASAL
Qty: 1 EACH | Refills: 1 | Status: SHIPPED | OUTPATIENT
Start: 2018-09-24 | End: 2018-10-09

## 2018-09-24 RX ORDER — PREDNISONE 20 MG/1
40 TABLET ORAL DAILY
Qty: 4 TAB | Refills: 0 | Status: SHIPPED | OUTPATIENT
Start: 2018-09-24 | End: 2018-09-27

## 2018-09-24 RX ORDER — HYDROCODONE BITARTRATE AND ACETAMINOPHEN 5; 325 MG/1; MG/1
1 TABLET ORAL
Qty: 25 TAB | Refills: 0 | Status: ON HOLD | OUTPATIENT
Start: 2018-09-24 | End: 2018-11-14 | Stop reason: SDUPTHER

## 2018-09-24 RX ORDER — PREDNISONE 20 MG/1
40 TABLET ORAL
Status: DISCONTINUED | OUTPATIENT
Start: 2018-09-25 | End: 2018-09-24 | Stop reason: HOSPADM

## 2018-09-24 RX ADMIN — SODIUM CHLORIDE 10 ML: 9 INJECTION INTRAMUSCULAR; INTRAVENOUS; SUBCUTANEOUS at 05:52

## 2018-09-24 RX ADMIN — CEFTRIAXONE SODIUM 1 G: 1 INJECTION, POWDER, FOR SOLUTION INTRAMUSCULAR; INTRAVENOUS at 05:51

## 2018-09-24 RX ADMIN — HYDROMORPHONE HYDROCHLORIDE 0.5 MG: 1 INJECTION, SOLUTION INTRAMUSCULAR; INTRAVENOUS; SUBCUTANEOUS at 12:51

## 2018-09-24 RX ADMIN — METHYLPREDNISOLONE SODIUM SUCCINATE 20 MG: 40 INJECTION, POWDER, FOR SOLUTION INTRAMUSCULAR; INTRAVENOUS at 05:51

## 2018-09-24 RX ADMIN — TIOTROPIUM BROMIDE 18 MCG: 18 CAPSULE ORAL; RESPIRATORY (INHALATION) at 08:00

## 2018-09-24 RX ADMIN — HEPARIN SODIUM 5000 UNITS: 5000 INJECTION, SOLUTION INTRAVENOUS; SUBCUTANEOUS at 05:51

## 2018-09-24 RX ADMIN — SODIUM CHLORIDE 500 MG: 900 INJECTION, SOLUTION INTRAVENOUS at 05:57

## 2018-09-24 NOTE — PROGRESS NOTES
I have assumed care of Ms. Monteiro [facility]. She was assessed after bedside report. She is currently resting comfortably in bed.

## 2018-09-24 NOTE — PROGRESS NOTES
Problem: Mobility Impaired (Adult and Pediatric)  Goal: *Acute Goals and Plan of Care (Insert Text)  Outcome: Resolved/Met Date Met: 09/24/18  physical Therapy EVALUATION & Discharge    Patient: Bart France (72 y.o. female)  Date: 9/24/2018  Primary Diagnosis: Pneumonia  Sepsis due to pneumonia ([facility] [address]  Abdominal pain  COPD (chronic obstructive pulmonary disease) (HCC)  Pneumonia  DX  dx  Procedure(s) (LRB):  COLONOSCOPY w bx w polypectonies (N/A) 2 Days Post-Op   Precautions: O2, fall       ASSESSMENT AND RECOMMENDATIONS:   Based on the objective data described below, the patient presents at mod I/independent level with ambulation and transfers using a rolling walker and on 2L of O2. Pt reports not using an assistive device prior to admission however frequently stumbles or bumps into walls. Pt had no loss of balance when ambulating with a RW and was able to maintain conversation without shortness of breath. Pt does have a rollator at home and it is recommended that she starts using it at discharge to prevent falls. .  Skilled physical therapy is not indicated at this time. Discharge Recommendations: Outpatient Pulmonary Rehab  Further Equipment Recommendations for Discharge: N/A      G-CODES:     Mobility  Current  CI= 1-19%   Goal  CI= 1-19%  D/C  CI= 1-19%. The severity rating is based on the Level of Assistance required for Functional Mobility and ADLs. Eval Complexity: History: MEDIUM  Complexity : 1-2 comorbidities / personal factors will impact the outcome/ POC Exam:LOW Complexity : 1-2 Standardized tests and measures addressing body structure, function, activity limitation and / or participation in recreation  Presentation: LOW Complexity : Stable, uncomplicated  Clinical Decision Making:Low Complexity , Overall Complexity:LOW     SUBJECTIVE:   Patient stated I feel good.     OBJECTIVE DATA SUMMARY:     Past Medical History:   Diagnosis Date    Anxiety     Arthritis     Asbestosis ([facility] [address]     Asthma     Back problem     Baker's cyst     Balance problems     Chronic lung disease     Cigarette smoker     Clotting disorder (HCC)     COPD (chronic obstructive pulmonary disease) (HCC)     Depression     History of DVT (deep vein thrombosis)     Leg pain     Right    Lung disease     Nausea & vomiting     Osteoporosis     Restless leg syndrome     Spinal stenosis     Vitamin D deficiency      Past Surgical History:   Procedure Laterality Date    HX APPENDECTOMY      HX BACK SURGERY      x4    HX BREAST BIOPSY      HX HYSTERECTOMY      HX LUMBAR LAMINECTOMY      HX MENISCUS REPAIR      HX ORTHOPAEDIC      Knee    HX POLYPECTOMY      HX TONSILLECTOMY      HX VEIN STRIPPING       Barriers to Learning/Limitations: None  Compensate with: visual, verbal, tactile, kinesthetic cues/model  Prior Level of Function/Home Situation: independent with mobility without an assistive device however frequent losses of balance, has rollator, good family support  Home Situation  Home Environment: Private residence  # Steps to Enter: 3  One/Two Story Residence: One story  Living Alone: No  Support Systems: Friends \ neighbors, Family member(s), Child(sarah)  Patient Expects to be Discharged to[de-identified] Private residence  Current DME Used/Available at Home: Cane, straight, Shower chair, Nebulizer (rolator)  Critical Behavior:   calm and cooperative, motivated   Strength:    Strength: Within functional limits (B LEs)      Tone & Sensation:   Tone: Normal (B LEs)       Range Of Motion:  AROM: Within functional limits (B LEs)   Functional Mobility:  Bed Mobility:  Rolling: Independent  Supine to Sit: Independent  Sit to Supine: Independent     Transfers:  Sit to Stand: Independent  Stand to Sit: Independent  Balance:   Sitting: Intact  Standing: Intact; With support  Ambulation/Gait Training:  Distance (ft): 250 Feet (ft)  Assistive Device: Walker, rolling  Ambulation - Level of Assistance: Modified independent  Base of Support: Narrowed      Pain:  Pt reports 0/10 pain or discomfort prior to treatment.    Pt reports 0/10 pain or discomfort post treatment. Activity Tolerance:   fair  Please refer to the flowsheet for vital signs taken during this treatment. After treatment:   []         Patient left in no apparent distress sitting up in chair  [x]         Patient left in no apparent distress in bed  [x]         Call bell left within reach  [x]         Nursing notified  [x]         Caregiver present  []         Bed alarm activated    COMMUNICATION/EDUCATION:   [x]         Fall prevention education was provided and the patient/caregiver indicated understanding. [x]         Patient/family have participated as able in goal setting and plan of care.-eval only  []         Patient/family agree to work toward stated goals and plan of care. []         Patient understands intent and goals of therapy, but is neutral about his/her participation. []         Patient is unable to participate in goal setting and plan of care.   Educated patient on increasing daily activity with assistance while in the hospital    Thank you for this referral.  Mallory Guadalupe, PT   Time Calculation: 15 mins

## 2018-09-24 NOTE — DIABETES MGMT
Glycemic Control Plan of Care    No history of diabetes mellitus. Current A1c of 6.1% (9/24/2018). Currently on IV Solumedrol 20 mg every 12 hours. POC BG range on 9/23/2018: 126-200 mg/dL. POC BG report on 9/24/2018 at time of review: 102 mg/dL    Patient and family members at bedside stated no history of diabetes. Explained reason for BG monitoring while on steroids. Patient and family verbalized understanding. Recommendation(s):  1.) Continue BG monitoring and correctional lispro insulin while patient on steroids. Assessment:  Patient is 72year old with past medical history including COPD on home oxygen, asthma, tobacco dependence, asbestosis, DVT, anxiety, and depression,  - was admitted on 9/19/2018 with report of  abdominal pain causing dyspnea, and 30 lbs weight loss. Noted:  COPD exacerbation. Tobacco dependence despite being on home oxygen. Pneumonia. New diagnosis of cecal malignancy. Moderate protein calorie malnutrition. Most recent blood glucose values:    Results for Paul De La O (MRN [id number]) as of 9/24/2018 09:54   Ref. Range 9/23/2018 07:45 9/23/2018 12:07 9/23/2018 16:31 9/23/2018 23:00   GLUCOSE,FAST - POC Latest Ref Range: 70 - 110 mg/dL 126 (H) 200 (H) 150 (H) 151 (H)     Results for Paul De La O (MRN [id number]) as of 9/24/2018 09:54   Ref. Range 9/24/2018 08:24   GLUCOSE,FAST - POC Latest Ref Range: 70 - 110 mg/dL 102     Current A1C: 6.1% (9/24/2018) which is equivalent to estimated average blood glucose of 120 mg/dL during the past 2-3 months. Current hospital diabetes medications:  Correctional lispro insulin ACHS. Normal sensitivity dose. Total daily dose insulin requirement previous day: 9/23/2018  Lispro: 2 units    Home diabetes medications: None. No history of diabetes mellitus. Diet: Diabetic consistent carb 1500-1600kcal; no concentrated sweets; nutr suppl: glucerna shake with all meals (breakfast, lunch, dinner).     Goals:  Blood glucose will be within target range of  mg/dL by 9/27/2018    Education:  ___  Refer to Diabetes Education Record             _X__  Education not indicated at this time    Luna Acosta RN [facility]  Pager: [phone]

## 2018-09-24 NOTE — DISCHARGE SUMMARY
Discharge Summary    Patient: Michael Stockton MRN: [id number]  CSN: [id number]    YOB: 1945  Age: 72 y.o.   Sex: female    DOA: 9/19/2018 LOS:  LOS: 4 days   Discharge Date:      Admission Diagnoses: Pneumonia  Sepsis due to pneumonia ([facility]  Abdominal pain  COPD (chronic obstructive pulmonary disease) ([facility]  Pneumonia  DX  dx    Discharge Diagnoses:    Problem List as of 9/24/2018  Date Reviewed: 9/22/2018          Codes Class Noted - Resolved    Pre-operative cardiovascular examination ICD-10-CM: Z01.810  ICD-9-CM: V72.81  9/23/2018 - Present        Colon cancer without distant metastasis ([facility] ICD-10-CM: C18.9  ICD-9-CM: 153.9  9/22/2018 - Present        Colon polyps ICD-10-CM: K63.5  ICD-9-CM: 211.3  9/22/2018 - Present        COPD (chronic obstructive pulmonary disease) ([facility] ICD-10-CM: J44.9  ICD-9-CM: 418  9/20/2018 - Present        Pneumonia ICD-10-CM: J18.9  ICD-9-CM: 486  9/20/2018 - Present        Abdominal pain ICD-10-CM: R10.9  ICD-9-CM: 789.00  9/20/2018 - Present        Abdominal mass ICD-10-CM: R19.00  ICD-9-CM: 789.30  9/20/2018 - Present        Acute exacerbation of chronic obstructive pulmonary disease (COPD) ([facility] ICD-10-CM: J44.1  ICD-9-CM: 491.21  8/14/2018 - Present        Degenerative disc disease, lumbar ICD-10-CM: M51.36  ICD-9-CM: 722.52  8/14/2018 - Present        Left-sided low back pain with sciatica ICD-10-CM: M54.42  ICD-9-CM: 724.3  8/14/2018 - Present        COPD with exacerbation ([facility] ICD-10-CM: J44.1  ICD-9-CM: 491.21  8/14/2018 - Present        Muscle spasm of back ICD-10-CM: M62.830  ICD-9-CM: 724.8  9/12/2016 - Present        Sacroiliac joint pain ICD-10-CM: M53.3  ICD-9-CM: 724.6  9/12/2016 - Present        Current chronic use of systemic steroids ICD-10-CM: Z79.52  ICD-9-CM: V58.65  9/12/2016 - Present        Baker's cyst of knee ICD-10-CM: M71.20  ICD-9-CM: 727.51  9/12/2016 - Present        COPD with acute exacerbation ([facility] ICD-10-CM: J44.1  ICD-9-CM: 491.21 12/15/2015 - Present        Neuritis of lower extremity ICD-10-CM: G57.90  ICD-9-CM: 355.8  11/18/2015 - Present        Lumbar spinal stenosis ICD-10-CM: M48.061  ICD-9-CM: 724.02  11/13/2015 - Present        Facet arthritis of lumbar region [facility]) ICD-10-CM: M46.96  ICD-9-CM: 721.3  11/13/2015 - Present        Emphysema of lung ([facility] ICD-10-CM: J43.9  ICD-9-CM: 492.8  4/15/2015 - Present        Hemoptysis (Chronic) ICD-10-CM: R04.2  ICD-9-CM: 786.30  2/5/2014 - Present        Nicotine addiction (Chronic) ICD-10-CM: P37.480  ICD-9-CM: 305.1  5/16/2013 - Present        COPD, severe ([facility] ICD-10-CM: J44.9  ICD-9-CM: 261  Unknown - Present        RESOLVED: Sepsis due to pneumonia [facility]) ICD-10-CM: J18.9, A41.9  ICD-9-CM: 392, 995.91  9/20/2018 - 9/24/2018            Reason for Admission  72 y.o.  female who has COPD on home oxygen 2liters presented to ED with increasing cough and congestion with associated sputum production and increasing abdominal pain, causing dyspnea to be worse. she has hx of villous adenoma and did not have colonoscopy in over 7 years. Noted 30 pound weight loss and some times pencil thin stool. Denied bloody stools or anemia. She also continues to smoke several cigarettes a day despite inability to inhale completely and being on oxygen. Discharge Condition: Good    PHYSICAL EXAM at discharge  Visit Vitals    /75 (BP 1 Location: Right arm, BP Patient Position: At rest)    Pulse 61    Temp 98.8 °F (37.1 °C)    Resp 18    Ht 5' 6\" (1.676 m)    Wt 53.1 kg (117 lb)    SpO2 95%    BMI 18.88 kg/m2     General: awake alert and oriented. Daughter at bedside. Heent: ncat. perrl. Cardiovascular:  RRR. Pulmonary:  CTA b.l anterior and infraaxillary fields. Symmetrical chest expansion. No dullness to percussion. GI:  Soft, ND, NABS. nt   Extremities:  No edema. dp 2+ b.l  Neuro no focal deficit. Additional: no rash    Hospital Course:  1.  COPD GOLD 2017 group D - with acute exacerbation resolving s/p solumedrol, BD. Pulmonary input appreciated. 2. Chronic hypoxic respiratory failure on home oxygen 2 L around the clock. 3. Leukocytosis poa - source m/l lungs - resolving. 4. Several cigarettes a day despite inability to inhale completely and being on oxygen - smoking cessation education done prior to discharge. 5. Chronic back pain - hx of DJDz of L-spine. Controlled. 6. Underweight Body mass index is 18.88 kg/(m^2). Nutritionist following; on supplements. Multivit. 7. Hx anxiety, depression. On home med  7. Hx DVT - noted. 8. Cad, hx MI, followed with Dr. Severa Lane in the past - cardiology consulted regarding perioperative management. Echo noted below. Per cardiology recs, Echo with normal lvef-ok to proceed with surgery with moderate risk once copd stabilizes. 5. Newly diagnosed cecal malignancy - follow pathology, and CEA. Follow surgery recs, and pulmonary and cards recs regarding perioperative management. 10. Moderate protein calorie malnutrition - AEB:   ASPEN Malnutrition Criteria  Acute Illness, Chronic Illness, or Social/Enviornmental: Chronic illness  Energy Intake: Less than/equal to 75% of est energy req for greater than/equal to 1 month  Body Fat: Mild (Orbital & Thoracic Regions)  Muscle Mass: Mild (Temple, calvicle, & scapular regions)  ASPEN Malnutrition Score - Chronic Illness: 8  Chronic Illness - Malnutrition Diagnosis: Moderate malnutrition.   --> on supplements, multivit; nutritionist followed. 11. Steroid induced hyperglycemia - ssi. A1c 6.1, c/w prediabetes. Diabetes educator worked with her. Needs to be followed in outpatient setting. 12. DNR. Durable DNR on file in cc. Discharge to home with hh. Patient and daughter at bedside agree. Extensive discussion held; all questions answered to the best of my ability. Consults: Cardiology Dr. Philip Pretty. Significant Diagnostic Studies: labs:    Ref.  Range 9/24/2018 04:00 WBC Latest Ref Range: 4.6 - 13.2 K/uL 10.5   RBC Latest Ref Range: 4.20 - 5.30 M/uL 3.37 (L)   HGB Latest Ref Range: 12.0 - 16.0 g/dL 11.0 (L)   HCT Latest Ref Range: 35.0 - 45.0 % 33.6 (L)   MCV Latest Ref Range: 74.0 - 97.0 FL 99.7 (H)   MCH Latest Ref Range: 24.0 - 34.0 PG 32.6   MCHC Latest Ref Range: 31.0 - 37.0 g/dL 32.7   RDW Latest Ref Range: 11.6 - 14.5 % 13.6   PLATELET Latest Ref Range: 135 - 420 K/uL 269   MPV Latest Ref Range: 9.2 - 11.8 FL 10.4   NEUTROPHILS Latest Ref Range: 40 - 73 % 83 (H)   LYMPHOCYTES Latest Ref Range: 21 - 52 % 8 (L)   MONOCYTES Latest Ref Range: 3 - 10 % 9   EOSINOPHILS Latest Ref Range: 0 - 5 % 0   BASOPHILS Latest Ref Range: 0 - 2 % 0   DF Latest Units:   AUTOMATED   ABS. NEUTROPHILS Latest Ref Range: 1.8 - 8.0 K/UL 8.7 (H)   ABS. LYMPHOCYTES Latest Ref Range: 0.9 - 3.6 K/UL 0.8 (L)   ABS. MONOCYTES Latest Ref Range: 0.05 - 1.2 K/UL 1.0   ABS. EOSINOPHILS Latest Ref Range: 0.0 - 0.4 K/UL 0.0   ABS. BASOPHILS Latest Ref Range: 0.0 - 0.1 K/UL 0.0      Ref. Range 9/24/2018 04:00   Sodium Latest Ref Range: 136 - 145 mmol/L 141   Potassium Latest Ref Range: 3.5 - 5.5 mmol/L 4.1   Chloride Latest Ref Range: 100 - 108 mmol/L 104   CO2 Latest Ref Range: 21 - 32 mmol/L 32   Anion gap Latest Ref Range: 3.0 - 18 mmol/L 5   Glucose Latest Ref Range: 74 - 99 mg/dL 130 (H)   BUN Latest Ref Range: 7.0 - 18 MG/DL 33 (H)   Creatinine Latest Ref Range: 0.6 - 1.3 MG/DL 0.62   BUN/Creatinine ratio Latest Ref Range: 12 - 20   53 (H)   Calcium Latest Ref Range: 8.5 - 10.1 MG/DL 7.9 (L)   GFR est non-AA Latest Ref Range: >60 ml/min/1.73m2 >60      Ref.  Range 9/24/2018 04:00   Hemoglobin A1c, (calculated) Latest Ref Range: 4.2 - 5.6 % 6.1 (H)   Est. average glucose Latest Units: mg/dL 128     All Micro Results     Procedure Component Value Units Date/Time    CULTURE, BLOOD [[id number]] Collected:  09/20/18 0051    Order Status:  Completed Specimen:  Blood from Blood Updated:  09/26/18 0715 Special Requests: NO SPECIAL REQUESTS        Culture result: NO GROWTH 6 DAYS       CULTURE, BLOOD [[id number]] Collected:  09/20/18 0031    Order Status:  Completed Specimen:  Blood from Blood Updated:  09/26/18 0715     Special Requests: NO SPECIAL REQUESTS        Culture result: NO GROWTH 6 DAYS       CULTURE, URINE [[id number]] Collected:  09/20/18 0134    Order Status:  Completed Specimen:  Urine from Clean catch Updated:  09/21/18 1050     Special Requests: NO SPECIAL REQUESTS        Culture result:       [id number]  COLONIES/mL  MIXED GRAM POSITIVE SURENDRA, PROBABLE SKIN/GENITAL CONTAMINATION. CULTURE, BLOOD [[id number]] Collected:  09/20/18 0600    Order Status:  Canceled Specimen:  Blood from Blood     CULTURE, BLOOD [[id number]] Collected:  09/20/18 0600    Order Status:  Canceled Specimen:  Blood from Blood         IMAGING  CT Results (most recent):    Results from [place] encounter on 09/19/18   CT ABD PELV W CONT   Narrative Description:  CT abdomen and pelvis with IV contrast    TECHNIQUE: [Helically acquired axial] CT imaging of the [abdomen and pelvis]. 100 cc's of Isovue-300 injected intravenously. [ Coronal and sagittal  reformations generated and reviewed. All CT scans at this facility are performed using dose optimization technique as  appropriate to a performed exam, to include automated exposure control,  adjustment of the mA and/or kV according to patient size (including appropriate  matching for site-specific examinations), or use of iterative reconstruction  technique. Clinical Indication:  Right lower quadrant pain    Comparison: July 25, 2018. Findings:      CT ABDOMEN:  Left lower lobe infiltrate is present. The right lung base is clear with the  exception of mild bronchiectasis at the medial right middle lobe. No pleural  effusion is present.   The liver appears normal.  The spleen appears normal.  The pancreas appears normal.  The adrenal glands appear normal.  The kidneys show symmetric enhancement. No hydronephrosis or mass. The stomach and the duodenum appear normal.  The gallbladder is not well-distended. No hyperdense gallstones are seen. There is normal contrast filling of the portal and the splenic veins. Moderate atherosclerosis of the abdominal aorta is present. No mesenteric or retroperitoneal adenopathy. No free fluid or air. CT PELVIS:  There is increased soft tissue in the cecum (axial, 48). The appendix is not  identified. No definite right lower quadrant inflammation is seen. There is a large amount of retained fecal material within the rectum. The bladder is well distended and has a normal unopacified appearance. No pelvic adenopathy or free fluid. Skeleton/soft tissues: L5 vertebral plasty. Moderate osteopenia. Multilevel  degenerative changes. Impression Impression:      1. Left lower lobe pneumonia. 2. Abnormal soft tissue in the medial wall of the cecum without surrounding  inflammation. Appearance is more concerning for malignancy than an acute  inflammatory process. The appendix is not definitively identified however. Clinical correlation needed. If the patient has not had a recent colonoscopy,  one is advised. Discussed with Dr. Rob Pena. XR Results (most recent):    Results from Hospital Encounter encounter on 09/19/18   XR CHEST PORT   Narrative Chest portable    History: Pain on the right side of the belly since Sunday, shortness of breath. Comparisons: 8/14/2018    Findings:     Lungs are hyperexpanded, compatible with COPD. Heart and mediastinum are grossly  stable. Atherosclerotic calcifications are seen at the arch. Breast and nipple  shadows overlie the midlungs bilaterally. Patchy infiltrate identified at the  left lung base. 1.1 cm nodule at the left midlung appears stable from at least  2/5/2017. Favor granuloma. Impression Impression:     Patchy infiltrate at the left lung base.  Infection could not be excluded. Emphysema. Thank you for this referral.        Echo Adult Complete 9/23/18  · Estimated left ventricular ejection fraction is 61 - 65%. Visually measured ejection fraction. Normal left ventricular wall motion, no regional wall motion abnormality noted. Inconclusive left ventricular diastolic function. · Moderate tricuspid valve regurgitation is present. Pulmonary arterial systolic pressure is 43 mmHg. Mild pulmonary hypertension is present. · Mild mitral valve regurgitation. Discharge Medications:     Current Discharge Medication List      START taking these medications    Details   therapeutic multivitamin (THERAGRAN) tablet Take 1 Tab by mouth daily. Qty: 30 Tab, Refills: 11         CONTINUE these medications which have CHANGED    Details   predniSONE (DELTASONE) 20 mg tablet Take 2 Tabs by mouth daily for 2 days. Qty: 4 Tab, Refills: 0         CONTINUE these medications which have NOT CHANGED    Details   albuterol (PROAIR HFA) 90 mcg/actuation inhaler INHALE 2 PUFFS BY MOUTH EVERY 4 HOURS AS NEEDED FOR WHEEZING OR SHORTNESS OF BREATH  Qty: 1 Inhaler, Refills: 6    Associated Diagnoses: Chronic obstructive pulmonary disease, unspecified COPD type (HCC)      fluticasone-salmeterol (ADVAIR) 250-50 mcg/dose diskus inhaler Take 1 Puff by inhalation every twelve (12) hours. Qty: 1 Inhaler, Refills: 3      tiotropium bromide (SPIRIVA RESPIMAT) 2.5 mcg/actuation inhaler Take 2 Puffs by inhalation daily. Qty: 3 Inhaler, Refills: 3      rOPINIRole (REQUIP) 1 mg tablet Take  by mouth two (2) times a day. OXYGEN-AIR DELIVERY SYSTEMS 2 L by Does Not Apply route. O2 via nasal cannula with bedtime and activity. O2 Company: [employer]      acetaminophen (TYLENOL EXTRA STRENGTH) 500 mg tablet Take  by mouth every six (6) hours as needed for Pain. HYDROcodone-acetaminophen (NORCO) 5-325 mg per tablet Take 1 Tab by mouth every four (4) hours as needed for Pain. Max Daily Amount: 6 Tabs.   Qty: 15 Tab, Refills: 0      albuterol (PROVENTIL VENTOLIN) 2.5 mg /3 mL (0.083 %) nebulizer solution 3 mL by Nebulization route every four (4) hours as needed for Wheezing or Shortness of Breath. Diag. Code: J44.9, R09.02  Qty: 375 Each, Refills: 3      LORazepam (ATIVAN) 1 mg tablet Take  by mouth two (2) times a day. Activity: PT/OT per Home Health    Diet: Cardiac Diet and Diabetic Diet    Wound Care: None needed    Follow-up:   1. Dr. Kanika Glass 10 days  2. Dr. Jorge Curiel 9/27/18  3. Dr. Katelyn Aguilera 4 weeks (Dr. Pita Kelly to arrange).     Minutes spent on discharge: greater than 30

## 2018-09-24 NOTE — ROUTINE PROCESS
Bedside and Verbal shift change report given to Vannessa Sawant RN (oncoming nurse) by Pawan Black RN (offgoing nurse). Report included the following information SBAR, Kardex, MAR and Recent Results.     SITUATION:    Code Status: DNR  Reason for Admission: Pneumonia  Sepsis due to pneumonia ([facility]  Abdominal pain  COPD (chronic obstructive pulmonary disease) ([facility]  Pneumonia  DX   dx    [facility] day: 4   Problem List:       Hospital Problems  Date Reviewed: 9/22/2018          Codes Class Noted POA    Pre-operative cardiovascular examination ICD-10-CM: Z01.810  ICD-9-CM: V72.81  9/23/2018 Unknown        Colon cancer without distant metastasis ([address] ICD-10-CM: C18.9  ICD-9-CM: 153.9  9/22/2018 Unknown        Colon polyps ICD-10-CM: K63.5  ICD-9-CM: 211.3  9/22/2018 Unknown        COPD (chronic obstructive pulmonary disease) ([facility] [facility] ICD-10-CM: J44.9  ICD-9-CM: 496  9/20/2018 Unknown        Pneumonia ICD-10-CM: J18.9  ICD-9-CM: 486  9/20/2018 Unknown        Abdominal pain ICD-10-CM: R10.9  ICD-9-CM: 789.00  9/20/2018 Unknown        Sepsis due to pneumonia [facility]) ICD-10-CM: J18.9, A41.9  ICD-9-CM: 628, 995.91  9/20/2018 Unknown        Abdominal mass ICD-10-CM: R19.00  ICD-9-CM: 789.30  9/20/2018 Unknown              BACKGROUND:    Past Medical History:   Past Medical History:   Diagnosis Date    Anxiety     Arthritis     Asbestosis ([facility]     Asthma     Back problem     Baker's cyst     Balance problems     Chronic lung disease     Cigarette smoker     Clotting disorder ([address]     COPD (chronic obstructive pulmonary disease) ([address]     Depression     History of DVT (deep vein thrombosis)     Leg pain     Right    Lung disease     Nausea & vomiting     Osteoporosis     Restless leg syndrome     Spinal stenosis     Vitamin D deficiency          Patient taking anticoagulants yes     ASSESSMENT:    Changes in Assessment Throughout Shift: colonoscopy this am     Patient has Central Line: no Reasons if yes:  Patient has Morales Cath: no Reasons if yes:       Last Vitals:     Vitals:    09/23/18 2145 09/24/18 0015 09/24/18 0510 09/24/18 0755   BP: 113/68 115/68 123/79    Pulse:  76 (!) 109 66   Resp: 18 18 18    Temp: 97.5 °F (36.4 °C) 98.7 °F (37.1 °C) 98.6 °F (37 °C)    SpO2:  96% (!) 86% 98%   Weight:       Height:            IV and DRAINS (will only show if present)   [REMOVED] Peripheral IV 09/20/18 Right Forearm-Site Assessment: Clean, dry, & intact  [REMOVED] Peripheral IV 09/19/18 Left Antecubital-Site Assessment: Clean, dry, & intact  [REMOVED] Peripheral IV 09/21/18 Left Forearm-Site Assessment: Clean, dry, & intact  [REMOVED] Peripheral IV 09/22/18 Right;Upper Arm-Site Assessment: Clean, dry, & intact  [REMOVED] Peripheral IV 09/22/18 Right Forearm-Site Assessment: Clean, dry, & intact  Peripheral IV 09/23/18 Left Hand-Site Assessment: Clean, dry, & intact     WOUND (if present)   Wound Type:  none   Dressing present Dressing Present : No   Wound Concerns/Notes:  none     PAIN    Pain Assessment    Pain Intensity 1: 0 (09/24/18 0400)    Pain Location 1: Abdomen    Pain Intervention(s) 1: Medication (see MAR)    Patient Stated Pain Goal: 0  o Interventions for Pain:  Yes, medication  o Intervention effective: yes  o Time of last intervention:  0302  o Reassessment Completed: yes      Last 3 Weights:  Last 3 Recorded Weights in this Encounter    09/19/18 2233 09/20/18 1744 09/23/18 1048   Weight: 52.9 kg (116 lb 9 oz) 53.1 kg (117 lb) 53.1 kg (117 lb)     Weight change:      INTAKE/OUPUT    Current Shift:      Last three shifts: 09/22 1901 - 09/24 0700  In: 480 [P.O.:480]  Out: 350 [Urine:350]     LAB RESULTS     Recent Labs      09/24/18   0400  09/23/18   0301  09/22/18   0415   WBC  10.5  10.5  10.8   HGB  11.0*  13.3  11.8*   HCT  33.6*  39.8  35.2   PLT  269  343  292        Recent Labs      09/24/18   0400  09/23/18   0301  09/22/18   0415   NA  141  138  138   K  4.1  3.9  4.1   GLU  130*  173* 115*   BUN  33*  35*  24*   CREA  0.62  0.95  0.64   CA  7.9*  8.5  8.3*   MG   --   2.7*  2.7*   INR   --    --   1.1       RECOMMENDATIONS AND DISCHARGE PLANNING     1. Pending tests/procedures/ Plan of Care or Other Needs: cont current POC, has malignant colon Ca      2. Discharge plan for patient and Needs/Barriers: home    3. Estimated Discharge Date: uknown Posted on Whiteboard in Patients Room: no      4. The patient's care plan was reviewed with the oncoming nurse. \"HEALS\" SAFETY CHECK      Fall Risk    Total Score: 2    Safety Measures: Safety Measures: Bed/Chair-Wheels locked, Bed in low position, Call light within reach, Fall prevention (comment)    A safety check occurred in the patient's room between off going nurse and oncoming nurse listed above. The safety check included the below items  Area Items   H  High Alert Medications - Verify all high alert medication drips (heparin, PCA, etc.)   E  Equipment - Suction is set up for ALL patients (with gloria)  - Red plugs utilized for all equipment (IV pumps, etc.)  - WOWs wiped down at end of shift.  - Room stocked with oxygen, suction, and other unit-specific supplies   A  Alarms - Bed alarm is set for fall risk patients  - Ensure chair alarm is in place and activated if patient is up in a chair   L  Lines - Check IV for any infiltration  - Morales bag is empty if patient has a Morales   - Tubing and IV bags are labeled   S  Safety   - Room is clean, patient is clean, and equipment is clean. - Hallways are clear from equipment besides carts. - Fall bracelet on for fall risk patients  - Ensure room is clear and free of clutter  - Suction is set up for ALL patients (with gloria)  - Hallways are clear from equipment besides carts.    - Isolation precautions followed, supplies available outside room, sign posted     Mic Garcia, RN

## 2018-09-24 NOTE — NURSE NAVIGATOR
Reason for Admission:   Pneumonia, Abdominal pain, Colon Mass               RRAT Score:     25             Resources/supports as identified by patient/family:  Has many family members, some are nurses                Top Challenges facing patient (as identified by patient/family and CM): Finances/Medication cost?                    Transportation? Support system or lack thereof? Living arrangements? Self-care/ADLs/Cognition? Current Advanced Directive/Advance Care Plan:                            Plan for utilizing home health:    None at present but list of available agencies left with patient in the event she needs to choose one. Likelihood of readmission:  high                 Transition of Care Plan:            Patient lives with her daughter in a one story home. There are 3-4 steps at entryway. They have a rollator, oxygen, cpap, nebulizer, and shower chair at home. Patient is independent in her self care /ADL's and daughter is always home with patient to assist if needed. Her family will be available to transport her home at time of discharge. Care Management Interventions  PCP Verified by CM: Yes  Palliative Care Criteria Met (RRAT>21 & CHF Dx)?: No  Mode of Transport at Discharge: Self  Transition of Care Consult (CM Consult): Home Health  Current Support Network: Own Home, Other, Family Lives Nearby (Daughter lives with her)  Confirm Follow Up Transport: Family  Plan discussed with Pt/Family/Caregiver:  Yes

## 2018-09-24 NOTE — PROGRESS NOTES
[facility]  Pulmonary, Critical Care, and Sleep Medicine    Progress note    Name: Michael Stockton MRN: [id number]   : 1945 Hospital: [address] [place] [address]   Date: 2018        IMPRESSION:   · Abdominal pain- suspected cecal lesion/malignancy, colon polyps  · COPD- GOLD 2017-group D   COPD with acute exacerbation ([facility] [address] secondary to LLL pneumonia    Pulmonary emphysema, unspecified emphysema type ([facility] [address]    Tobacco dependence    Pulmonary cachexia due to chronic obstructive pulmonary disease ([facility] [address]    Hypoxemia requiring supplemental oxygen       · Asbestosis  · Depression and anxiety  · H/o DVT- not on anticogulation      RECOMMENDATIONS:   · Oxygen- continue at 2-3 L to maintain SaO2 goal > 90%  · Bronchial hygiene protocol  · Bronchodilators- continue LABA ( on Advair at home)+ LAMA and prn OSMANY. Will start Spiriva, use albuterol prn  · Steroids- short taper- Prednisone 40 mg x 2 more days  · Antibiotics- to cover Pneumonia - CAP  Total 5 days  · Aspiration precautions  · Nutrition  · Will optimize preop- likely to need colon resection. · Assess home Oxygen needs at discharge  · OT, PT, OOB and ambulate  · Healthy weight  · DVT, PUD prophylaxis  · Will follow up KUNAL     Subjective:     18   Feels better  Still with cough and wheezing- improved. Expectorating mucus better with hypertonic saline  S/p colonoscopy- findings noted. Awaiting colorectal surgical consult. Path pending. Denies any new complaints. HPI:  Patient is a 72 y.o. female with a history of COPD, chronic pain, DVT, depression, anxiety, who presents to ED with a 2 days hx of right lower abd pain,no fever reported. No nausea or vomiting, pt states bowel movements normal, she does state that her urine has been dark and smelly. She reports a 50 lb weight loss over the last few years unintentionally and intermittent pencil thin stools. Denies fevers, hematochezia, constipation, or diarrhea.  No upper abdominal complaints. Last colonoscopy was by   Dr. Nadeem Acosta 7 years ago with a history of numerous polyps one of which has been pre-cancerous and colonoscopies every 3 years. Abdominal Pain    This is a new problem. The current episode started 2 days ago. The problem occurs constantly. The problem has been gradually worsening. The pain is located in the RLQ. The pain is at a severity of 9/10. The pain is moderate. Pertinent negatives include no fever, no diarrhea, no nausea, no vomiting, no constipation, no dysuria, no chest pain and no back pain. The pain is worsened by certain positions. The pain is relieved by nothing. COPD:  She follows with Dr. Juan Carlos Brown and is on Advair and Spiriva Respimat. Still complains of baseline SOB with minimal exertion, wheezing and tight cough with chest congestion and tenacious phlegm. August CXR reviewed , no acute infiltrate but with hyperinflation. Pt complains of intermittent hemoptysis, known since 2013, no fever or chills. Pt is on home O2, last FEV1 was 30% in Jan 2015. Admits to continued smoking despite several efforts at quitting in the past.   Pt notes no apparent response to maintenance Azithromycin with no change in amount and character of phlegm.  Daliresp also did not improve symptoms      Past Medical History:   Diagnosis Date    Anxiety     Arthritis     Asbestosis ([address]     Asthma     Back problem     Baker's cyst     Balance problems     Chronic lung disease     Cigarette smoker     Clotting disorder (HCC)     COPD (chronic obstructive pulmonary disease) (HCC)     Depression     History of DVT (deep vein thrombosis)     Leg pain     Right    Lung disease     Nausea & vomiting     Osteoporosis     Restless leg syndrome     Spinal stenosis     Vitamin D deficiency       Past Surgical History:   Procedure Laterality Date    HX APPENDECTOMY      HX BACK SURGERY      x4    HX BREAST BIOPSY      HX HYSTERECTOMY      HX LUMBAR LAMINECTOMY  HX MENISCUS REPAIR      HX ORTHOPAEDIC      Knee    HX POLYPECTOMY      HX TONSILLECTOMY      HX VEIN STRIPPING          Allergies   Allergen Reactions    Anoro Ellipta [Umeclidinium-Vilanterol] Rash and Other (comments)     Muscle cramps in arms    Aspirin Other (comments)     GI upset and bleeding    Augmentin [Amoxicillin-Pot Clavulanate] Not Reported This Time     Possible cramping    Doxycycline Nausea and Vomiting    Morphine Other (comments)     Neurologic symptoms - severe agitation      Shellfish Derived Unknown (comments)     Pt able to eat small amounts per report     Sulfa (Sulfonamide Antibiotics) Rash     Patient relates no longer allergic         Current Facility-Administered Medications   Medication Dose Route Frequency    methylPREDNISolone (PF) (SOLU-MEDROL) injection 20 mg  20 mg IntraVENous Q12H    insulin lispro (HUMALOG) injection   SubCUTAneous AC&HS    therapeutic multivitamin (THERAGRAN) tablet 1 Tab  1 Tab Oral DAILY    tiotropium (SPIRIVA) inhalation capsule 18 mcg  1 Cap Inhalation DAILY    albuterol (ACCUNEB) nebulizer solution 1.25 mg  1.25 mg Nebulization TID RT    sodium chloride 10% 15 ml hypertonic neb solution  5 mL Nebulization TID RT    sodium chloride (NS) flush 5-10 mL  5-10 mL IntraVENous Q8H    azithromycin (ZITHROMAX) 500 mg in 0.9% sodium chloride (MBP/ADV) 250 mL adv  500 mg IntraVENous Q24H    cefTRIAXone (ROCEPHIN) 1 g in sterile water (preservative free) 10 mL IV syringe  1 g IntraVENous Q24H    heparin (porcine) injection 5,000 Units  5,000 Units SubCUTAneous Q8H       Review of Systems:  A comprehensive review of systems was negative except for that written in the HPI.     Objective:   Vital Signs:    Visit Vitals    /75 (BP 1 Location: Right arm, BP Patient Position: At rest)    Pulse 61    Temp 98.8 °F (37.1 °C)    Resp 18    Ht 5' 6\" (1.676 m)    Wt 53.1 kg (117 lb)    SpO2 95%    BMI 18.88 kg/m2       O2 Device: Nasal cannula O2 Flow Rate (L/min): 2 l/min   Temp (24hrs), Av °F (36.7 °C), Min:97.2 °F (36.2 °C), Max:98.8 °F (37.1 °C)       Intake/Output:   Last shift:      701 - 1900  In: 110 [P.O.:110]  Out: -   Last 3 shifts: 1901 -  07  In: 480 [P.O.:480]  Out: 350 [Urine:350]    Intake/Output Summary (Last 24 hours) at 18 5350  Last data filed at 18 8975   Gross per 24 hour   Intake              110 ml   Output                0 ml   Net              110 ml      Physical Exam:   General:  Alert, cooperative, no distress, appears stated age. using accessory muscles    Head:  Normocephalic, without obvious abnormality, atraumatic. Eyes:  Conjunctivae/corneas clear. PERRL, EOMs intact. Nose: Nares normal. Septum midline. Mucosa normal. No drainage or sinus tenderness. Throat: Lips, mucosa, and tongue normal. Teeth and gums normal.   Neck: Supple, symmetrical, trachea midline, no adenopathy, thyroid: no enlargment/tenderness/nodules, no carotid bruit and no JVD. Back:   Symmetric, no curvature. ROM normal.   Lungs:   Bilateral diffuse ronchi and wheezing   Chest wall:  No tenderness or deformity. Heart:  Regular rate and rhythm, S1, S2 normal, no murmur, click, rub or gallop. Abdomen:   Soft, +Tenderness RLQ, Bowel sounds normal. No masses,  No organomegaly. Extremities: Extremities normal, atraumatic, no cyanosis or edema. Pulses: 2+ and symmetric all extremities.    Skin: Skin color, texture, turgor normal. No rashes or lesions   Lymph nodes: Cervical, supraclavicular, and axillary nodes normal.   Neurologic: Grossly nonfocal     Data review:     Recent Results (from the past 24 hour(s))   ECHO ADULT COMPLETE    Collection Time: 18 11:11 AM   Result Value Ref Range    LA Volume 52.83 22 - 52 mL    Ao Root D 3.36 cm    LVIDd 4.06 3.9 - 5.3 cm    LVPWd 0.90 0.6 - 0.9 cm    LVIDs 2.64 cm    IVSd 0.88 0.6 - 0.9 cm    LVOT d 1.77 cm    LVOT Peak Velocity 117.71 cm/s    LVOT Peak Gradient 5.5 mmHg    LVOT VTI 23.57 cm    MV A Huy 62.18 cm/s    MV E Huy 1.00 cm/s    MV E/A 0.02     LA Vol 4C 67.34 (A) 22 - 52 mL    LA Vol 2C 33.07 22 - 52 mL    LA Area 4C 21.5 cm2    LV Mass .1 67 - 162 g    LV Mass AL Index 77.9 g/m2    Mitral Valve E Wave Deceleration Time 215.4 ms    Triscuspid Valve Regurgitation Peak Gradient 40.5 mmHg    TR Max Velocity 318.12 cm/s    LA Vol Index 33.17 ml/m2    LA Vol Index 20.77 ml/m2    LA Vol Index 42.28 ml/m2   GLUCOSE, POC    Collection Time: 09/23/18 12:07 PM   Result Value Ref Range    Glucose (POC) 200 (H) 70 - 110 mg/dL   GLUCOSE, POC    Collection Time: 09/23/18  4:31 PM   Result Value Ref Range    Glucose (POC) 150 (H) 70 - 110 mg/dL   GLUCOSE, POC    Collection Time: 09/23/18 11:00 PM   Result Value Ref Range    Glucose (POC) 151 (H) 70 - 110 mg/dL   HEMOGLOBIN A1C WITH EAG    Collection Time: 09/24/18  4:00 AM   Result Value Ref Range    Hemoglobin A1c 6.1 (H) 4.2 - 5.6 %    Est. average glucose 358 mg/dL   METABOLIC PANEL, BASIC    Collection Time: 09/24/18  4:00 AM   Result Value Ref Range    Sodium 141 136 - 145 mmol/L    Potassium 4.1 3.5 - 5.5 mmol/L    Chloride 104 100 - 108 mmol/L    CO2 32 21 - 32 mmol/L    Anion gap 5 3.0 - 18 mmol/L    Glucose 130 (H) 74 - 99 mg/dL    BUN 33 (H) 7.0 - 18 MG/DL    Creatinine 0.62 0.6 - 1.3 MG/DL    BUN/Creatinine ratio 53 (H) 12 - 20      GFR est AA >60 >60 ml/min/1.73m2    GFR est non-AA >60 >60 ml/min/1.73m2    Calcium 7.9 (L) 8.5 - 10.1 MG/DL   CBC WITH AUTOMATED DIFF    Collection Time: 09/24/18  4:00 AM   Result Value Ref Range    WBC 10.5 4.6 - 13.2 K/uL    RBC 3.37 (L) 4.20 - 5.30 M/uL    HGB 11.0 (L) 12.0 - 16.0 g/dL    HCT 33.6 (L) 35.0 - 45.0 %    MCV 99.7 (H) 74.0 - 97.0 FL    MCH 32.6 24.0 - 34.0 PG    MCHC 32.7 31.0 - 37.0 g/dL    RDW 13.6 11.6 - 14.5 %    PLATELET 341 028 - 360 K/uL    MPV 10.4 9.2 - 11.8 FL    NEUTROPHILS 83 (H) 40 - 73 %    LYMPHOCYTES 8 (L) 21 - 52 %    MONOCYTES 9 3 - 10 % EOSINOPHILS 0 0 - 5 %    BASOPHILS 0 0 - 2 %    ABS. NEUTROPHILS 8.7 (H) 1.8 - 8.0 K/UL    ABS. LYMPHOCYTES 0.8 (L) 0.9 - 3.6 K/UL    ABS. MONOCYTES 1.0 0.05 - 1.2 K/UL    ABS. EOSINOPHILS 0.0 0.0 - 0.4 K/UL    ABS. BASOPHILS 0.0 0.0 - 0.1 K/UL    DF AUTOMATED     GLUCOSE, POC    Collection Time: 09/24/18  8:24 AM   Result Value Ref Range    Glucose (POC) 102 70 - 110 mg/dL       Imaging:  I have personally reviewed the patients radiographs and have reviewed the reports:  XR Results (most recent):    Results from Hospital Encounter encounter on 09/19/18   XR CHEST PORT   Narrative Chest portable    History: Pain on the right side of the belly since Sunday, shortness of breath. Comparisons: 8/14/2018    Findings:     Lungs are hyperexpanded, compatible with COPD. Heart and mediastinum are grossly  stable. Atherosclerotic calcifications are seen at the arch. Breast and nipple  shadows overlie the midlungs bilaterally. Patchy infiltrate identified at the  left lung base. 1.1 cm nodule at the left midlung appears stable from at least  2/5/2017. Favor granuloma. Impression Impression:     Patchy infiltrate at the left lung base. Infection could not be excluded. Emphysema. Thank you for this referral.          CT Results (most recent):    Results from [place] encounter on 09/19/18   CT ABD PELV W CONT   Narrative Description:  CT abdomen and pelvis with IV contrast    TECHNIQUE: [Helically acquired axial] CT imaging of the [abdomen and pelvis]. 100 cc's of Isovue-300 injected intravenously. [ Coronal and sagittal  reformations generated and reviewed. All CT scans at this facility are performed using dose optimization technique as  appropriate to a performed exam, to include automated exposure control,  adjustment of the mA and/or kV according to patient size (including appropriate  matching for site-specific examinations), or use of iterative reconstruction  technique.     Clinical Indication:  Right lower quadrant pain    Comparison: July 25, 2018. Findings:      CT ABDOMEN:  Left lower lobe infiltrate is present. The right lung base is clear with the  exception of mild bronchiectasis at the medial right middle lobe. No pleural  effusion is present. The liver appears normal.  The spleen appears normal.  The pancreas appears normal.  The adrenal glands appear normal.  The kidneys show symmetric enhancement. No hydronephrosis or mass. The stomach and the duodenum appear normal.  The gallbladder is not well-distended. No hyperdense gallstones are seen. There is normal contrast filling of the portal and the splenic veins. Moderate atherosclerosis of the abdominal aorta is present. No mesenteric or retroperitoneal adenopathy. No free fluid or air. CT PELVIS:  There is increased soft tissue in the cecum (axial, 48). The appendix is not  identified. No definite right lower quadrant inflammation is seen. There is a large amount of retained fecal material within the rectum. The bladder is well distended and has a normal unopacified appearance. No pelvic adenopathy or free fluid. Skeleton/soft tissues: L5 vertebral plasty. Moderate osteopenia. Multilevel  degenerative changes. Impression Impression:      1. Left lower lobe pneumonia. 2. Abnormal soft tissue in the medial wall of the cecum without surrounding  inflammation. Appearance is more concerning for malignancy than an acute  inflammatory process. The appendix is not definitively identified however. Clinical correlation needed. If the patient has not had a recent colonoscopy,  one is advised. Discussed with Dr. Safia Keith.              High complexity decision making was performed during the evaluation of this patient at high risk for decompensation with multiple organ involvement     Above mentioned total time spent on reviewing the case/medical record/data/notes/EMR/patient examination/documentation/coordinating care with nurse/consultants, exclusive of procedures with complex decision making performed and > 50% time spent in face to face evaluation.           Merrick Anderson MD

## 2018-09-24 NOTE — DISCHARGE INSTRUCTIONS
[employer] Activation    Thank you for requesting access to [employer]. Please follow the instructions below to securely access and download your online medical record. [employer] allows you to send messages to your doctor, view your test results, renew your prescriptions, schedule appointments, and more. How Do I Sign Up? 1. In your internet browser, go to [email or web address] ([email or web address])  2. Click on the First Time User? Click Here link in the Sign In box. You will be redirect to the New Member Sign Up page. 3. Enter your [employer] Access Code exactly as it appears below. You will not need to use this code after youve completed the sign-up process. If you do not sign up before the expiration date, you must request a new code. [employer] Access Code: [email or web address]  Expires: 10/17/2018 10:29 AM (This is the date your [employer] access code will )    4. Enter the last four digits of your Social Security Number (xxxx) and Date of Birth (mm/dd/yyyy) as indicated and click Submit. You will be taken to the next sign-up page. 5. Create a [employer] ID. This will be your [employer] login ID and cannot be changed, so think of one that is secure and easy to remember. 6. Create a [employer] password. You can change your password at any time. 7. Enter your Password Reset Question and Answer. This can be used at a later time if you forget your password. 8. Enter your e-mail address. You will receive e-mail notification when new information is available in [address]. 9. Click Sign Up. You can now view and download portions of your medical record. 10. Click the Download Summary menu link to download a portable copy of your medical information. Additional Information    If you have questions, please visit the Frequently Asked Questions section of the [employer] website at [email or web address] Remember, [employer] is NOT to be used for urgent needs. For medical emergencies, dial 911.     Patient armband removed and shredded  DISCHARGE SUMMARY from Nurse    PATIENT INSTRUCTIONS:    After general anesthesia or intravenous sedation, for 24 hours or while taking prescription Narcotics:  · Limit your activities  · Do not drive and operate hazardous machinery  · Do not make important personal or business decisions  · Do  not drink alcoholic beverages  · If you have not urinated within 8 hours after discharge, please contact your surgeon on call. Report the following to your surgeon:  · Excessive pain, swelling, redness or odor of or around the surgical area  · Temperature over 100.5  · Nausea and vomiting lasting longer than 4 hours or if unable to take medications  · Any signs of decreased circulation or nerve impairment to extremity: change in color, persistent  numbness, tingling, coldness or increase pain  · Any questions    What to do at Home:  Recommended activity: Activity as tolerated, Pt refused Home Health    If you experience any of the following symptoms Pain not controlled. Breathing worse, please follow up with Primary care doctor,    *  Please give a list of your current medications to your Primary Care Provider. *  Please update this list whenever your medications are discontinued, doses are      changed, or new medications (including over-the-counter products) are added. *  Please carry medication information at all times in case of emergency situations. These are general instructions for a healthy lifestyle:    No smoking/ No tobacco products/ Avoid exposure to second hand smoke  Surgeon General's Warning:  Quitting smoking now greatly reduces serious risk to your health.     Obesity, smoking, and sedentary lifestyle greatly increases your risk for illness    A healthy diet, regular physical exercise & weight monitoring are important for maintaining a healthy lifestyle    You may be retaining fluid if you have a history of heart failure or if you experience any of the following symptoms:  Weight gain of 3 pounds or more overnight or 5 pounds in a week, increased swelling in our hands or feet or shortness of breath while lying flat in bed. Please call your doctor as soon as you notice any of these symptoms; do not wait until your next office visit. Recognize signs and symptoms of STROKE:    F-face looks uneven    A-arms unable to move or move unevenly    S-speech slurred or non-existent    T-time-call 911 as soon as signs and symptoms begin-DO NOT go       Back to bed or wait to see if you get better-TIME IS BRAIN. Warning Signs of HEART ATTACK     Call 911 if you have these symptoms:   Chest discomfort. Most heart attacks involve discomfort in the center of the chest that lasts more than a few minutes, or that goes away and comes back. It can feel like uncomfortable pressure, squeezing, fullness, or pain.  Discomfort in other areas of the upper body. Symptoms can include pain or discomfort in one or both arms, the back, neck, jaw, or stomach.  Shortness of breath with or without chest discomfort.  Other signs may include breaking out in a cold sweat, nausea, or lightheadedness. Don't wait more than five minutes to call 911 - MINUTES MATTER! Fast action can save your life. Calling 911 is almost always the fastest way to get lifesaving treatment. Emergency Medical Services staff can begin treatment when they arrive -- up to an hour sooner than if someone gets to the hospital by car. The discharge information has been reviewed with the patient. The patient verbalized understanding. Discharge medications reviewed with the patient and appropriate educational materials and side effects teaching were provided. ___________________________________________________________________________________________________________________________________     Abdominal Pain: Care Instructions  Your Care Instructions    Abdominal pain has many possible causes.  Some aren't serious and get better on their own in a few days. Others need more testing and treatment. If your pain continues or gets worse, you need to be rechecked and may need more tests to find out what is wrong. You may need surgery to correct the problem. Don't ignore new symptoms, such as fever, nausea and vomiting, urination problems, pain that gets worse, and dizziness. These may be signs of a more serious problem. Your doctor may have recommended a follow-up visit in the next 8 to 12 hours. If you are not getting better, you may need more tests or treatment. The doctor has checked you carefully, but problems can develop later. If you notice any problems or new symptoms, get medical treatment right away. Follow-up care is a key part of your treatment and safety. Be sure to make and go to all appointments, and call your doctor if you are having problems. It's also a good idea to know your test results and keep a list of the medicines you take. How can you care for yourself at home? · Rest until you feel better. · To prevent dehydration, drink plenty of fluids, enough so that your urine is light yellow or clear like water. Choose water and other caffeine-free clear liquids until you feel better. If you have kidney, heart, or liver disease and have to limit fluids, talk with your doctor before you increase the amount of fluids you drink. · If your stomach is upset, eat mild foods, such as rice, dry toast or crackers, bananas, and applesauce. Try eating several small meals instead of two or three large ones. · Wait until 48 hours after all symptoms have gone away before you have spicy foods, alcohol, and drinks that contain caffeine. · Do not eat foods that are high in fat. · Avoid anti-inflammatory medicines such as aspirin, ibuprofen (Advil, Motrin), and naproxen (Aleve). These can cause stomach upset. Talk to your doctor if you take daily aspirin for another health problem. When should you call for help?   Call 911 anytime you think you may need emergency care. For example, call if:    · You passed out (lost consciousness).     · You pass maroon or very bloody stools.     · You vomit blood or what looks like coffee grounds.     · You have new, severe belly pain.    Call your doctor now or seek immediate medical care if:    · Your pain gets worse, especially if it becomes focused in one area of your belly.     · You have a new or higher fever.     · Your stools are black and look like tar, or they have streaks of blood.     · You have unexpected vaginal bleeding.     · You have symptoms of a urinary tract infection. These may include:  ¨ Pain when you urinate. ¨ Urinating more often than usual.  ¨ Blood in your urine.     · You are dizzy or lightheaded, or you feel like you may faint.    Watch closely for changes in your health, and be sure to contact your doctor if:    · You are not getting better after 1 day (24 hours). Where can you learn more? Go to http://robert-jorge.info/. Enter H850 in the search box to learn more about \"Abdominal Pain: Care Instructions. \"  Current as of: November 20, 2017  Content Version: 11.7  © 4223-0045 [employer]. Care instructions adapted under license by [employer] (which disclaims liability or warranty for this information). If you have questions about a medical condition or this instruction, always ask your healthcare professional. [address] any warranty or liability for your use of this information. Learning About Colon Cancer  What is colon cancer? Colon cancer happens when cells that are not normal grow in your colon. These cells grow together and form tumors. Colon cancer occurs most often in people older than 48. As with other cancers, treatment works best when colon cancer is found early. What happens when you have colon cancer? Most cases begin as polyps, which are small growths inside the colon.  These polyps are very common, and most of them do not turn into cancer. Colon cancer usually grows very slowly. It usually takes years for the cancer to become large enough to cause symptoms. If the cancer is not removed and keeps growing, it eventually will invade and destroy nearby tissues and then spread farther, first to nearby lymph nodes. From there it may spread to other parts of the body. What are the symptoms? Colon cancer in its early stages usually doesn't cause any symptoms. Symptoms occur later, when the cancer may be harder to treat. The most common symptoms include:  · Pain in the belly. · Blood in your stool or very dark stools. · A change in your bowel habits, such as more frequent stools or a feeling that your bowels are not emptying completely. · Always feeling tired. How can you preventcolon cancer? Screening tests can find or prevent many cases of colon cancer. They look for a certain disease or condition before any symptoms appear. Screening tests that may find colon cancer early include:  · Stool tests, such as the fecal immunochemical test or the fecal occult blood test.  · Sigmoidoscopy, which lets your doctor look at the inside of the lower part of your colon using a lighted tube. · Colonoscopy, which lets your doctor look at the inside of your entire colon using a thin, flexible tube. Experts recommend colon cancer testing for everyone age 48 and older who has a normal risk for colon cancer. People with a higher risk, such as those with a strong family history of colon cancer, should be tested sooner. Talk to your doctor about when you should be tested. Here are other things you can do to help prevent colon cancer:  · Watch your weight. Being very overweight may increase your chance of getting colon cancer. · Eat well. Eat more whole grains, fruits, vegetables, poultry, and fish. And eat less red meat, refined grains, and sweets. · Limit drinking. Drink less than 2 alcohol drinks a day. · Get active.  Keep up a physically active lifestyle. · Quit smoking. If you smoke cigarettes, quit smoking to reduce your chance of getting colon cancer. How is colon cancer treated? · Surgery is almost always used to treat colon cancer. The cancer is more easily removed when it is found early. · If the cancer has spread into the wall of the colon or farther, you may also need radiation or chemotherapy. Follow-up care is a key part of your treatment and safety. Be sure to make and go to all appointments, and call your doctor if you are having problems. It's also a good idea to know your test results and keep a list of the medicines you take. Where can you learn more? Go to http://robert-jorge.info/. Enter M414 in the search box to learn more about \"Learning About Colon Cancer. \"  Current as of: May 12, 2017  Content Version: 11.7  © 5920-3479 "[employer]". Care instructions adapted under license by "[employer]" (which disclaims liability or warranty for this information). If you have questions about a medical condition or this instruction, always ask your healthcare professional. [place] [address] any warranty or liability for your use of this information. Colonoscopy: What to Expect at [address] [place] [address]  After you have a colonoscopy, you will stay at the clinic for 1 to 2 hours until the medicines wear off. Then you can go home. But you will need to arrange for a ride. Your doctor will tell you when you can eat and do your other usual activities. Your doctor will talk to you about when you will need your next colonoscopy. Your doctor can help you decide how often you need to be checked. This will depend on the results of your test and your risk for colorectal cancer. After the test, you may be bloated or have gas pains. You may need to pass gas. If a biopsy was done or a polyp was removed, you may have streaks of blood in your stool (feces) for a few days. Problems such as heavy rectal bleeding may not occur until several weeks after the test. This isn't common. But it can happen after polyps are removed. This care sheet gives you a general idea about how long it will take for you to recover. But each person recovers at a different pace. Follow the steps below to get better as quickly as possible. How can you care for yourself at home? Activity    · Rest when you feel tired.     · You can do your normal activities when it feels okay to do so. Diet    · Follow your doctor's directions for eating.     · Unless your doctor has told you not to, drink plenty of fluids. This helps to replace the fluids that were lost during the colon prep.     · Do not drink alcohol. Medicines    · Your doctor will tell you if and when you can restart your medicines. He or she will also give you instructions about taking any new medicines.     · If you take blood thinners, such as warfarin (Coumadin), clopidogrel (Plavix), or aspirin, be sure to talk to your doctor. He or she will tell you if and when to start taking those medicines again. Make sure that you understand exactly what your doctor wants you to do.     · If polyps were removed or a biopsy was done during the test, your doctor may tell you not to take aspirin or other anti-inflammatory medicines for a few days. These include ibuprofen (Advil, Motrin) and naproxen (Aleve). Other instructions    · For your safety, do not drive or operate machinery until the medicine wears off and you can think clearly. Your doctor may tell you not to drive or operate machinery until the day after your test.     · Do not sign legal documents or make major decisions until the medicine wears off and you can think clearly. The anesthesia can make it hard for you to fully understand what you are agreeing to. Follow-up care is a key part of your treatment and safety.  Be sure to make and go to all appointments, and call your doctor if you are having problems. It's also a good idea to know your test results and keep a list of the medicines you take. When should you call for help? Call 911 anytime you think you may need emergency care. For example, call if:    · You passed out (lost consciousness).     · You pass maroon or bloody stools.     · You have trouble breathing.    Call your doctor now or seek immediate medical care if:    · You have pain that does not get better after you take pain medicine.     · You are sick to your stomach or cannot drink fluids.     · You have new or worse belly pain.     · You have blood in your stools.     · You have a fever.     · You cannot pass stools or gas.    Watch closely for changes in your health, and be sure to contact your doctor if you have any problems. Where can you learn more? Go to http://robert-jorge.info/. Enter E264 in the search box to learn more about \"Colonoscopy: What to Expect at Home. \"  Current as of: May 12, 2017  Content Version: 11.7  © 0183-4133 [employer]. Care instructions adapted under license by [employer] (which disclaims liability or warranty for this information). If you have questions about a medical condition or this instruction, always ask your healthcare professional. [address] any warranty or liability for your use of this information. Chronic Obstructive Pulmonary Disease (COPD): Care Instructions  Your Care Instructions    Chronic obstructive pulmonary disease (COPD) is a general term for a group of lung diseases, including emphysema and chronic bronchitis. People with COPD have decreased airflow in and out of the lungs, which makes it hard to breathe. The airways also can get clogged with thick mucus. Cigarette smoking is a major cause of COPD. Although there is no cure for COPD, you can slow its progress.  Following your treatment plan and taking care of yourself can help you feel better and live longer. Follow-up care is a key part of your treatment and safety. Be sure to make and go to all appointments, and call your doctor if you are having problems. It's also a good idea to know your test results and keep a list of the medicines you take. How can you care for yourself at home?   Staying healthy    · Do not smoke. This is the most important step you can take to prevent more damage to your lungs. If you need help quitting, talk to your doctor about stop-smoking programs and medicines. These can increase your chances of quitting for good.     · Avoid colds and flu. Get a pneumococcal vaccine shot. If you have had one before, ask your doctor whether you need a second dose. Get the flu vaccine every fall. If you must be around people with colds or the flu, wash your hands often.     · Avoid secondhand smoke, air pollution, and high altitudes. Also avoid cold, dry air and hot, humid air. Stay at home with your windows closed when air pollution is bad.    Medicines and oxygen therapy    · Take your medicines exactly as prescribed. Call your doctor if you think you are having a problem with your medicine.     · You may be taking medicines such as:  ¨ Bronchodilators. These help open your airways and make breathing easier. Bronchodilators are either short-acting (work for 6 to 9 hours) or long-acting (work for 24 hours). You inhale most bronchodilators, so they start to act quickly. Always carry your quick-relief inhaler with you in case you need it while you are away from home. ¨ Corticosteroids (prednisone, budesonide). These reduce airway inflammation. They come in pill or inhaled form. You must take these medicines every day for them to work well.     · A spacer may help you get more inhaled medicine to your lungs. Ask your doctor or pharmacist if a spacer is right for you.  If it is, ask how to use it properly.     · Do not take any vitamins, over-the-counter medicine, or herbal products without talking to your doctor first.     · If your doctor prescribed antibiotics, take them as directed. Do not stop taking them just because you feel better. You need to take the full course of antibiotics.     · Oxygen therapy boosts the amount of oxygen in your blood and helps you breathe easier. Use the flow rate your doctor has recommended, and do not change it without talking to your doctor first.   Activity    · Get regular exercise. Walking is an easy way to get exercise. Start out slowly, and walk a little more each day.     · Pay attention to your breathing. You are exercising too hard if you cannot talk while you are exercising.     · Take short rest breaks when doing household chores and other activities.     · Learn breathing methods-such as breathing through pursed lips-to help you become less short of breath.     · If your doctor has not set you up with a pulmonary rehabilitation program, talk to him or her about whether rehab is right for you. Rehab includes exercise programs, education about your disease and how to manage it, help with diet and other changes, and emotional support. Diet    · Eat regular, healthy meals. Use bronchodilators about 1 hour before you eat to make it easier to eat. Eat several small meals instead of three large ones. Drink beverages at the end of the meal. Avoid foods that are hard to chew.     · Eat foods that contain protein so that you do not lose muscle mass.     · Talk with your doctor if you gain too much weight or if you lose weight without trying.    Mental health    · Talk to your family, friends, or a therapist about your feelings. It is normal to feel frightened, angry, hopeless, helpless, and even guilty. Talking openly about bad feelings can help you cope. If these feelings last, talk to your doctor. When should you call for help? Call 911 anytime you think you may need emergency care.  For example, call if:    · You have severe trouble breathing.    Call your doctor now or seek immediate medical care if:    · You have new or worse trouble breathing.     · You cough up blood.     · You have a fever.    Watch closely for changes in your health, and be sure to contact your doctor if:    · You cough more deeply or more often, especially if you notice more mucus or a change in the color of your mucus.     · You have new or worse swelling in your legs or belly.     · You are not getting better as expected. Where can you learn more? Go to http://robert-jorge.info/. Fidelia Valiente in the search box to learn more about \"Chronic Obstructive Pulmonary Disease (COPD): Care Instructions. \"  Current as of: December 6, 2017  Content Version: 11.7  © 5459-1242 [employer] Care instructions adapted under license by [employer] (which disclaims liability or warranty for this information). If you have questions about a medical condition or this instruction, always ask your healthcare professional. [address] any warranty or liability for your use of this information.

## 2018-09-24 NOTE — PROGRESS NOTES
Problem: Falls - Risk of  Goal: *Absence of Falls  Document [place] Fall Risk and appropriate interventions in the flowsheet. Outcome: Progressing Towards Goal  Fall Risk Interventions:  Mobility Interventions: Assess mobility with egress test, Patient to call before getting OOB         Medication Interventions: Patient to call before getting OOB    Elimination Interventions: Call light in reach, Patient to call for help with toileting needs    History of Falls Interventions: Consult care management for discharge planning        Problem: Pressure Injury - Risk of  Goal: *Prevention of pressure injury  Document Roman Scale and appropriate interventions in the flowsheet. Outcome: Progressing Towards Goal  Pressure Injury Interventions:             Activity Interventions: Increase time out of bed, Pressure redistribution bed/mattress(bed type)    Mobility Interventions: HOB 30 degrees or less    Nutrition Interventions: Document food/fluid/supplement intake    Friction and Shear Interventions: HOB 30 degrees or less

## 2018-09-24 NOTE — PROGRESS NOTES
[facility]      CARDIOLOGY PROGRESS NOTE  RECS:         1. COPD exacerbation- on antibiotics, breathing tx and inhalers  managed by [facility]. 2. Hx  of CAD- per patient had cardiac cath in [facility] done by Dr. Alicia Casey. Stable no anginal symptoms   3. Abdominal pain - s/p colonoscopy 9/22/18  With Multiple colon polyps and  cecal mass. 4. Tobacco abuse- Patient advised to stop smoking to reduce future cardiovascular events. 5. Cardiac Clearance- stable no chest pain chest pressure no CHF symptoms. Echo reviewed-normal [id number] to proceed with surgery with moderate risk once copd stabilizes    Agree with above recommendation- no active chest pain.   Known dyspnea from COPD  Can proceed to planned surgery with moderate risk provided her COPD is well controlled      ASSESSMENT:  Hospital Problems  Date Reviewed: 9/22/2018          Codes Class Noted POA    Pre-operative cardiovascular examination ICD-10-CM: Z01.810  ICD-9-CM: V72.81  9/23/2018 Unknown        Colon cancer without distant metastasis ([facility] ICD-10-CM: C18.9  ICD-9-CM: 153.9  9/22/2018 Unknown        Colon polyps ICD-10-CM: K63.5  ICD-9-CM: 211.3  9/22/2018 Unknown        COPD (chronic obstructive pulmonary disease) ([facility] ICD-10-CM: J44.9  ICD-9-CM: 646  9/20/2018 Unknown        Pneumonia ICD-10-CM: J18.9  ICD-9-CM: 372  9/20/2018 Unknown        Abdominal pain ICD-10-CM: R10.9  ICD-9-CM: 789.00  9/20/2018 Unknown        Abdominal mass ICD-10-CM: R19.00  ICD-9-CM: 789.30  9/20/2018 Unknown                SUBJECTIVE:  No CP  No SOB    OBJECTIVE:    VS:   Visit Vitals    /86 (BP 1 Location: Right arm, BP Patient Position: Sitting)    Pulse 65    Temp 98.5 °F (36.9 °C)    Resp 18    Ht 5' 6\" (1.676 m)    Wt 53.1 kg (117 lb)    SpO2 99%    BMI 18.88 kg/m2         Intake/Output Summary (Last 24 hours) at 09/24/18 1147  Last data filed at 09/24/18 0929   Gross per 24 hour   Intake              350 ml   Output              400 ml Net              -50 ml     TELE:not on telemetry     General: alert, pleasant and in no apparent distress  HENT: Normocephalic, atraumatic. Normal external eye. Neck :  no bruit, no JVD  Cardiac:  regular rate and rhythm, S1, S2 normal, no click, no rub  Lungs: rhonchi and wheezes both lung fields   Abdomen: Soft, nontender, no masses  Extremities:  No c/c/e, peripheral pulses present      Labs: Results:       Chemistry Recent Labs      09/24/18 0400 09/23/18   0301 09/22/18   0415   GLU  130*  173*  115*   NA  141  138  138   K  4.1  3.9  4.1   CL  104  97*  99*   CO2  32  34*  34*   BUN  33*  35*  24*   CREA  0.62  0.95  0.64   CA  7.9*  8.5  8.3*   AGAP  5  7  5   BUCR  53*  37*  38*      CBC w/Diff Recent Labs      09/24/18 0400 09/23/18 0301 09/22/18 0415   WBC  10.5  10.5  10.8   RBC  3.37*  4.12*  3.70*   HGB  11.0*  13.3  11.8*   HCT  33.6*  39.8  35.2   PLT  269  343  292   GRANS  83*  91*  88*   LYMPH  8*  9*  5*   EOS  0  0  0      Cardiac Enzymes No results for input(s): CPK, CKND1, BERNY in the last 72 hours. No lab exists for component: CKRMB, TROIP   Coagulation Recent Labs      09/22/18 0415   PTP  13.5   INR  1.1       Lipid Panel Lab Results   Component Value Date/Time    Cholesterol, total 169 08/15/2018 02:25 AM    HDL Cholesterol 65 (H) 08/15/2018 02:25 AM    LDL, calculated 94 08/15/2018 02:25 AM    VLDL, calculated 10 08/15/2018 02:25 AM    Triglyceride 50 08/15/2018 02:25 AM    CHOL/HDL Ratio 2.6 08/15/2018 02:25 AM      BNP No results for input(s): BNPP in the last 72 hours. Liver Enzymes No results for input(s): TP, ALB, TBIL, AP, SGOT, GPT in the last 72 hours. No lab exists for component: DBIL   Thyroid Studies Lab Results   Component Value Date/Time    T4, Total 10.3 03/04/2010 01:00 AM    TSH 1.12 04/12/2010 01:21 PM              Sujey Drew Felicita:[phone] supervised    I have independently evaluated and examined the patient.   All relevant labs and testing data's are reviewed. Care plan discussed and updated after review.     Alfreda Ca MD

## 2018-09-24 NOTE — PROGRESS NOTES
Problem: Self Care Deficits Care Plan (Adult)  Goal: *Acute Goals and Plan of Care (Insert Text)  Outcome: Resolved/Met Date Met: 09/24/18  Occupational Therapy EVALUATION/discharge    Patient: Jessie Novoa (38 y.o. female)  Date: 9/24/2018  Primary Diagnosis: Pneumonia  Sepsis due to pneumonia ([facility]  Abdominal pain  COPD (chronic obstructive pulmonary disease) (HCC)  Pneumonia  DX  dx  Procedure(s) (LRB):  COLONOSCOPY w bx w polypectonies (N/A) 2 Days Post-Op   Precautions:   Fall    ASSESSMENT AND RECOMMENDATIONS:  Based on the objective data described below, the patient is able to perform basic self care tasks, while seated and in standing, without assistance except in times of increased SOB. Supportive sister and daughter staying with patient to assist her prn. She has needed shower chair for safety at home. Patient educated on energy conservation techniques during daily activities and verbalized understanding. Skilled occupational therapy is not indicated at this time. Discharge Recommendations: Outpatient Pulmonary Rehab  Further Equipment Recommendations for Discharge: N/A      Barriers to Learning/Limitations: None  Compensate with: visual, verbal, tactile, kinesthetic cues/model     COMPLEXITY     Eval Complexity: History: LOW Complexity : Brief history review ; Examination: LOW Complexity : 1-3 performance deficits relating to physical, cognitive , or psychosocial skils that result in activity limitations and / or participation restrictions ; Decision Making:LOW Complexity : No comorbidities that affect functional and no verbal or physical assistance needed to complete eval tasks  Assessment: Low Complexity        G-CODES:     Self Care  Current  CI= 1-19%   Goal  CI= 1-19%   D/C  CI= 1-19%. The severity rating is based on the Level of Assistance required for Functional Mobility and ADLs.     SUBJECTIVE:   Patient stated I'm going to ask if the apartment place will put grab bars up in the bathroom.     OBJECTIVE DATA SUMMARY:     Past Medical History:   Diagnosis Date    Anxiety     Arthritis     Asbestosis ([facility]     Asthma     Back problem     Baker's cyst     Balance problems     Chronic lung disease     Cigarette smoker     Clotting disorder ([facility]     COPD (chronic obstructive pulmonary disease) (HCC)     Depression     History of DVT (deep vein thrombosis)     Leg pain     Right    Lung disease     Nausea & vomiting     Osteoporosis     Restless leg syndrome     Spinal stenosis     Vitamin D deficiency      Past Surgical History:   Procedure Laterality Date    HX APPENDECTOMY      HX BACK SURGERY      x4    HX BREAST BIOPSY      HX HYSTERECTOMY      HX LUMBAR LAMINECTOMY      HX MENISCUS REPAIR      HX ORTHOPAEDIC      Knee    HX POLYPECTOMY      HX TONSILLECTOMY      HX VEIN STRIPPING       Prior Level of Function/Home Situation: Pt was independent with basic self care tasks and functional mobility PTA. Home Situation  Home Environment: Private residence  # Steps to Enter: 3  One/Two Story Residence: One story  Living Alone: No  Support Systems: Friends \ neighbors, Family member(s), Child(sarah)  Patient Expects to be Discharged to[de-identified] Private residence  Current DME Used/Available at Home: Cane, straight, Shower chair, Nebulizer (rolator)  Tub or Shower Type: Tub/Shower combination (with seat)  [x]     Right hand dominant   []     Left hand dominant  Cognitive/Behavioral Status:  Neurologic State: Alert  Orientation Level: Oriented X4  Cognition: Appropriate decision making; Follows commands  Safety/Judgement: Awareness of environment; Fall prevention    Skin: Intact on UEs    Edema: None noted in UEs    Vision/Perceptual:    Acuity: Within Defined Limits      Coordination:  Coordination: Generally decreased, functional (B LEs)  Fine Motor Skills-Upper: Left Intact; Right Intact    Gross Motor Skills-Upper: Left Intact; Right Intact    Balance:  Sitting: Intact  Standing: Intact; With support    Strength:  Strength: Within functional limits (UEs)    Tone & Sensation:  Tone: Normal (UEs)  Sensation: Intact (UEs)    Range of Motion:  AROM: Within functional limits (UEs)    Functional Mobility and Transfers for ADLs:  Bed Mobility:  Rolling: Independent  Supine to Sit: Independent  Sit to Supine: Independent  Transfers:  Sit to Stand: Independent   Toilet Transfer : Independent    ADL Assessment:  Feeding: Independent    Oral Facial Hygiene/Grooming: Independent    Bathing: Supervision    Upper Body Dressing: Independent    Lower Body Dressing: Supervision    Toileting: Independent    Pain:  Pt reports 0/10 pain or discomfort prior to treatment.    Pt reports 0/10 pain or discomfort post treatment. Activity Tolerance:   Good    Please refer to the flowsheet for vital signs taken during this treatment. After treatment:   []  Patient left in no apparent distress sitting up in chair  [x]  Patient left in no apparent distress in bed  [x]  Call bell left within reach  []  Nursing notified  [x]  Caregiver (sister) present  []  Bed alarm activated    COMMUNICATION/EDUCATION:   Communication/Collaboration:  [x]      Home safety education was provided and the patient/caregiver indicated understanding. [x]      Patient/family have participated as able and agree with findings and recommendations. []      Patient is unable to participate in plan of care at this time.     Kenia Galvez MS OTR/L  Time Calculation: 15 mins

## 2018-09-26 ENCOUNTER — PATIENT OUTREACH (OUTPATIENT)
Dept: PULMONOLOGY | Age: 73
End: 2018-09-26

## 2018-09-26 LAB
BACTERIA SPEC CULT: NORMAL
BACTERIA SPEC CULT: NORMAL
SERVICE CMNT-IMP: NORMAL
SERVICE CMNT-IMP: NORMAL

## 2018-09-26 NOTE — LETTER
9/26/2018 6:31 PM 
 
Ms. Charlene Miller. [address] [facility] [address] Sepsis is a very serious illness. The infection is caused by germs called bacteria. That can spread through your body very quickly. It will cause you to not feel well when you should be getting better. The infection makes it hard for oxygen and nutrients to get to your organs, like the kidneys and lungs, and they may start to have problems working. Sepsis is so serious that it  may even cause death in some people who have a weakened immune systems or other health problems that prevent their body from fighting off infections. It is important that we stay in touch with you after you are discharged from the hospital to watch for any problems that may arise. When we check on you we want to know if you are experiencing any of the following signs and symptoms: 
 
 Confusion  Fast breathing  Chills or shaking  Fever or low body temperature  Fast heart beat  Lightheadedness  Less urine output  Skin rash or skin color changes Are you at risk? Anyone can get sepsis, but some people are at a greater risk, such as: 
 
 People with catheters  Those with severe burns or trauma [facility] Babies younger than 1 year  People over the age of 72  Those with chronic diseases like liver disease, kidney disease or cancer  Those who have weak immune systems because of treatments or illness Prevention When you get home from the hospital it is important that you: If you are on antibiotic you need to take it to completion. Report any of the above signs and symptoms to your provider or your nurse navigator. Make sure you wash your hands frequently; it is the best way to prevent germs from spreading. You can wash your hands with soap and water or use alcohol based hand . Be sure anyone helping with your care or visiting you also washes their hands before touching you. Staying current on vaccines, like flu and pneumonia will also help to prevent infection. If have been treated for sepsis in the hospital it is important that you report any worsening symptoms as well. The key to a safe transition from the hospital is being an informed patient and working with your health care team very closely over the next 30 days once you get home. Treatment If signs and symptoms develop, your provider will see you in the office and discuss the best place for you to receive care. Your provider will likely order blood tests or other tests may be done to check for Sepsis. You may need to start on intravenous (IV) antibiotics, oxygen, and other medicines.   
If body organs start to not work properly, other treatments may be needed and you will likely have to go back to the hospital.  
 
 
Sincerely, 
 
 
 
Alayna Carranza RN

## 2018-09-26 NOTE — PROGRESS NOTES
Hospital Discharge Follow-Up      Date/Time:  2018 1:28 PM    Patient was admitted to [facility] on 18 and discharged on 18 for:     Pneumonia     Sepsis due to pneumonia    COPD with Acute exacerbation    Abdominal Pain ( Colonospopy with Bx with Polypectomies )     The physician discharge summary was not available at the time of outreach. Patient was contacted within 2 business days of discharge. Top Challenges reviewed with the provider   Adherence to safety precautions and monitoring or Temp and Sp02. Nicotine use         Method of communication with provider :chart routing, staff message     Inpatient RRAT score: 12 +    Was this a readmission? no   Patient stated reason for the readmission: NA    Nurse Navigator (NN) contacted the patient by telephone to perform post hospital discharge assessment. Verified name and  with caregiver, Daughter, Ladonna Councilman  as identifiers. Provided introduction to self, and explanation of the Nurse Navigator role. Ladonna Councilman reports Patient:      Mom is sleeping     Her Mom's sister has been assisting her with care and medications    She has been coughing up greenish/yellow phlegm    HR 78    Sp02  96-97%  ( NN Discussed with Ladonna Councilman the improtance of checking patient's oxygen level daily or when SOB to know if she needs to contact MD ) Ladonna Councilman asked what patient's oxygen levels should be. (  NN educated her that MD wants her Sp02 >90% and if lower okay to increase oxygen to 2.5-3 L as needed and reduce Liters once patient's oxygen level goes back up.)  She voiced understanding. Decreased appetite     Has not taken temp. Today but it has been normal since being home. ( NN encouraged Ladonna Councilman to check temp daily and notify MD if elevated. ) She voiced understanding. Used nebulizer twice today. Having occasional dizzy spells when up.   ( NN instructed Ladonna Councilman of safety /using walker/rolator or human assist, rising from lying or sitting position slowly ) Claire Simpson stated,  \" I will be honest with you , she has a nice walker with seat and wheels at by brother's house ) NN encouraged her to retrieve walker for patient to use to assist with prevention of falls. She voiced understanding. Will she surgeon on tomorrow    Claire Simpson denies Patient:      Fever/chills    Edema    Chest pain    Hemoptysis    Increase HR/Palpitations. Pain at this time    Smoking since being in hospital     Using her IS due to c/o makes her have a headache. Blood in Stool      Noted Priorities:  Patient's checking her oxygen level daily and as needed for Respiratory distress and using rollator walker for safety. Reviewed discharge instructions and red flags  ( See Below ) with caregiver who verbalized understanding. Caregiver given an opportunity to ask questions and does not have any further questions or concerns at this time. The caregiver agrees to contact the PCP office for questions related to their healthcare. NN provided contact information for future reference. Red flags:   Increased cough with phlegm or pus  Fever/ chills  Difficulty breathing  SOB or Increased SOB  Wheezing and or unrelieved with albuterol   Chest Pain   Increase HR/Palpitations   Loss of appetite  Fatigue   Increased cough  Change in sputum color or texture  Having to prop up on pillows to sleep or any new or concerning symptoms. Disease Specific:   Sepsis/ COPD     Summary of patient's top problems:  1. Pneumonia /Sepsis    2. COPD with acute exacerbation/ Current Smoker    3. Abdominal Mass     Home Health orders at discharge: patient refused  [address]: NA  Date of initial visit: NA    Durable Medical Equipment ordered/company: None   Durable Medical Equipment received: None     Barriers to care?  Smoking , monitoring of Red Flags     Advance Care Planning:   Does patient have an Advance Directive:  Yes , which has been given to Pulmonary office to scan into chart Medication(s):     New Medications at Discharge:     therapeutic multivitamin tablet   Commonly known as:  [address] [place] [address] last dose was:          Your next dose is:                 Take 1 Tab by mouth daily.     1 Tab         Changed Medications at Discharge:     predniSONE 20 mg tablet   Commonly known as:  [facility]   What changed:    - medication strength  - how much to take        Your last dose was:          Your next dose is:                 Take 2 Tabs by mouth daily for 2 days.         Discontinued Medications at Discharge:  NA    Medication reconciliation was performed with caregiver, who verbalizes understanding of administration of home medications. There were no barriers to obtaining medications identified at this time. Referral to Pharm D needed: no     Current Outpatient Prescriptions   Medication Sig    predniSONE (DELTASONE) 20 mg tablet Take 2 Tabs by mouth daily for 2 days.  therapeutic multivitamin (THERAGRAN) tablet Take 1 Tab by mouth daily.  HYDROcodone-acetaminophen (NORCO) 5-325 mg per tablet Take 1 Tab by mouth every six (6) hours as needed for Pain. Max Daily Amount: 4 Tabs.  naloxone (NARCAN) 4 mg/actuation nasal spray Use 1 spray intranasally, then discard. Repeat with new spray every 2 min as needed for opioid overdose symptoms, alternating nostrils.  acetaminophen (TYLENOL EXTRA STRENGTH) 500 mg tablet Take  by mouth every six (6) hours as needed for Pain.  albuterol (PROVENTIL VENTOLIN) 2.5 mg /3 mL (0.083 %) nebulizer solution 3 mL by Nebulization route every four (4) hours as needed for Wheezing or Shortness of Breath. Diag. Code: J44.9, R09.02    albuterol (PROAIR HFA) 90 mcg/actuation inhaler INHALE 2 PUFFS BY MOUTH EVERY 4 HOURS AS NEEDED FOR WHEEZING OR SHORTNESS OF BREATH    fluticasone-salmeterol (ADVAIR) 250-50 mcg/dose diskus inhaler Take 1 Puff by inhalation every twelve (12) hours.     tiotropium bromide (SPIRIVA RESPIMAT) 2.5 mcg/actuation inhaler Take 2 Puffs by inhalation daily.  LORazepam (ATIVAN) 1 mg tablet Take  by mouth two (2) times a day.  rOPINIRole (REQUIP) 1 mg tablet Take  by mouth two (2) times a day.  OXYGEN-AIR DELIVERY SYSTEMS 2 L by Does Not Apply route. O2 via nasal cannula with bedtime and activity. O2 Company: [facility]    metroNIDAZOLE (FLAGYL) 500 mg tablet Take 1 Tab by mouth three (3) times daily for 1 day. Take at 1 pm, 2 pm and 11 pm the day prior to surgery    neomycin (MYCIFRADIN) 500 mg tablet Take 2 Tabs by mouth three (3) times daily for 3 doses. Take at 1 pm, 2 pm and 11 pm the day prior to surgery. No current facility-administered medications for this visit. There are no discontinued medications. Sepsis Note     Most recent vital signs: Wt Readings from Last 3 Encounters:   09/27/18 56.2 kg (124 lb)   09/23/18 53.1 kg (117 lb)   08/28/18 52.6 kg (116 lb)     Temp Readings from Last 3 Encounters:   09/27/18 98 °F (36.7 °C)   09/24/18 98.5 °F (36.9 °C)   08/28/18 97.5 °F (36.4 °C) (Oral)     BP Readings from Last 3 Encounters:   09/27/18 112/70   09/24/18 150/86   08/28/18 118/64     Pulse Readings from Last 3 Encounters:   09/27/18 72   09/24/18 65   08/28/18 72         Source of Infection:  Pneumonia     Antibiotic prescribed at discharge: NO Length of remaining treatment: NA  Are you taking your antibiotic as prescribed? NA     Infectious Disease Consult during admission: no     In the last 24 hour have you experienced;     Fever no    Low body temperature no    Chills or shaking no    Sweating no    Fast heart rate no    Fast breathing no    Dizziness/lightheadedness yes    Confusion or unusual change in mental status no    Diarrhea no    Nausea no    Vomiting no    Shortness of breath or difficulty breathing no  ( other than usual )    Less urine output no    Cold, clammy, and pale skin no     Skin rash or skin color changes no     If the patients has two or more symptoms present notify the primary care provider of the concern for sepsis. [facility] follow up appointment(s):   Future Appointments  Date Time Provider Aubrie Mills   10/4/2018 10:15 AM Anum Bryant MD [address]   10/15/2018 1:15 PM Trenton Lane MD [facility].      Non-[facility] follow up appointment(s):     Dr. Cristal Gonzales on 10/3/18 @ 9:20     [facility] aware of patient's f/u appointments and transportation arranged    [facility]:  out of service area       Goals      Attends follow-up appointments as directed. Target Date:  10/15/18       Identification of barriers to adherence to a plan of care such as inability to afford medications, lack of insurance, lack of transportation, etc.Target Date:  11/19/18 8/31/18  Not having a pulse oximetry. ( NN discussed why checking oxygen level is important and pulse oximetry sent to patient on 8/31/18 via Family member listing on [employer] .)      9/7/18 NN discussed smoking cessation with patient.  Improve activity tolerance and quality of life (ie. identify issue such as depression) Target Date:  11/19/18 8/31/18  NN discuss with patient to alternate her rest and activity, Use of medications as directed  and to stay out of the heat.  Instruct pt./cg on red flags (ie. fever, increased productive cough, SOB, and chest pain) and when to seek urgent medical treatment. 10/26/18            9/26/18  NN educated patient's daughter, Ladonna Councilman on Sepsis/PNA /COPD Red Flags to report. Plan:      Patient will notify MD with S/S of Red Flags or worsening of symptoms   Patient will call 911 or go to ED if current symptoms worsen to Red Zone during extended holiday weekend. Patient will avoid heat due to trigger for COPD / adhere to medication regimen and pulmonary toileting. Patient will check oxygen level and temp. daily and as needed and log for NN review and use IS as tolerated.    NN will evaluate patient depression level  as needed throughout episode. NN will continue to monitor.

## 2018-09-27 ENCOUNTER — OFFICE VISIT (OUTPATIENT)
Dept: SURGERY | Age: 73
End: 2018-09-27

## 2018-09-27 VITALS
WEIGHT: 124 LBS | HEART RATE: 72 BPM | HEIGHT: 66 IN | DIASTOLIC BLOOD PRESSURE: 70 MMHG | RESPIRATION RATE: 18 BRPM | SYSTOLIC BLOOD PRESSURE: 112 MMHG | BODY MASS INDEX: 19.93 KG/M2 | TEMPERATURE: 98 F

## 2018-09-27 DIAGNOSIS — C18.0 CECAL CANCER (HCC): Primary | ICD-10-CM

## 2018-09-27 RX ORDER — NEOMYCIN SULFATE 500 MG/1
1000 TABLET ORAL 3 TIMES DAILY
Qty: 6 TAB | Refills: 0 | Status: SHIPPED | OUTPATIENT
Start: 2018-09-27 | End: 2018-09-28

## 2018-09-27 RX ORDER — METRONIDAZOLE 500 MG/1
500 TABLET ORAL 3 TIMES DAILY
Qty: 3 TAB | Refills: 0 | Status: SHIPPED | OUTPATIENT
Start: 2018-09-27 | End: 2018-09-28

## 2018-09-27 NOTE — MR AVS SNAPSHOT
[facility] 240 706 [facility] 
[phone] Patient: Will Orozco MRN: [id number] JNG:[id number] Visit Information Date & Time Provider Department Dept. Phone Encounter #  
 9/27/2018  8:30 AM Kasandra Coleman MD [place] [address] [phone] [id number] Your Appointments 10/15/2018  1:15 PM  
Office Visit with Tracy Granger MD  
[facility]) Appt Note: h/f  
 [address] [facility], [address] [facility] [address]  
[address], [address]  
  
    
  
 10/4/2018 10:15 AM  
TRANSITIONAL CARE MANAGEMENT with Lexy Saravia MD  
[address] ([facility]) Appt Note: shane per VAP  
 [address] [place] [address], [address] [address]  
[phone]  
  
   
 [address] [place] [address], [address] [facility] [place] [address] Upcoming Health Maintenance Date Due Hepatitis C Screening 1945 DTaP/Tdap/Td series (1 - Tdap) 6/4/1968 Shingrix Vaccine Age 50> (1 of 2) 6/4/1997 FOBT Q 1 YEAR AGE 50-75 6/4/1997 GLAUCOMA SCREENING Q2Y 6/4/2012 MEDICARE YEARLY EXAM 3/14/2018 Influenza Age 5 to Adult 8/1/2018 BREAST CANCER SCRN MAMMOGRAM 8/14/2019 Pneumococcal 65+ High/Highest Risk (2 of 2 - PPSV23) 10/1/2019 Allergies as of 9/27/2018  Review Complete On: 9/27/2018 By: Kasandra Coleman MD  
  
 Severity Noted Reaction Type Reactions Anoro Ellipta [Umeclidinium-vilanterol]  04/04/2016    Rash, Other (comments) Muscle cramps in arms Aspirin    Other (comments) GI upset and bleeding Augmentin [Amoxicillin-pot Clavulanate]    Not Reported This Time Possible cramping Doxycycline  12/30/2013    Nausea and Vomiting Morphine  03/11/2014   Intolerance Other (comments) Neurologic symptoms - severe agitation Shellfish Derived  12/16/2015    Unknown (comments) Pt able to eat small amounts per report Sulfa (Sulfonamide Antibiotics)    Rash Patient relates no longer allergic Current Immunizations  Reviewed on 1/10/2013 Name Date Influenza High Dose Vaccine PF 1/10/2013 Influenza Vaccine (Quad) PF 12/16/2015  2:50 PM  
 Pneumococcal Vaccine (Unspecified Type) 10/1/2014 Not reviewed this visit You Were Diagnosed With   
  
 Codes Comments Cecal cancer ([facility]    -  Primary ICD-10-CM: C18.0 ICD-9-CM: 153.4 Vitals BP Pulse Temp Resp Height(growth percentile) Weight(growth percentile) 112/70 72 98 °F (36.7 °C) 18 5' 6\" (1.676 m) 124 lb (56.2 kg) BMI OB Status Smoking Status 20.01 kg/m2 Hysterectomy Current Some Day Smoker BMI and BSA Data Body Mass Index Body Surface Area 20.01 kg/m 2 1.62 m 2 Preferred Pharmacy Pharmacy Name Phone [address], [address] [place] [address] [phone] Your Updated Medication List  
  
   
This list is accurate as of 9/27/18  9:04 AM.  Always use your most recent med list.  
  
  
  
  
 * albuterol 90 mcg/actuation inhaler Commonly known as:  PROAIR HFA INHALE 2 PUFFS BY MOUTH EVERY 4 HOURS AS NEEDED FOR WHEEZING OR SHORTNESS OF BREATH  
  
 * albuterol 2.5 mg /3 mL (0.083 %) nebulizer solution Commonly known as:  PROVENTIL VENTOLIN  
3 mL by Nebulization route every four (4) hours as needed for Wheezing or Shortness of Breath. Diag. Code: J44.9, R09.02  
  
 fluticasone-salmeterol 250-50 mcg/dose diskus inhaler Commonly known as:  ADVAIR Take 1 Puff by inhalation every twelve (12) hours. HYDROcodone-acetaminophen 5-325 mg per tablet Commonly known as:  Justino Dolphin Take 1 Tab by mouth every six (6) hours as needed for Pain. Max Daily Amount: 4 Tabs. LORazepam 1 mg tablet Commonly known as:  ATIVAN Take  by mouth two (2) times a day. naloxone 4 mg/actuation nasal spray Commonly known as:  [employer] Use 1 spray intranasally, then discard. Repeat with new spray every 2 min as needed for opioid overdose symptoms, alternating nostrils. OXYGEN-AIR DELIVERY SYSTEMS  
2 L by Does Not Apply route. O2 via nasal cannula with bedtime and activity. O2 Company: [employer]  
  
 predniSONE 20 mg tablet Commonly known as:  Hollace Goldy Take 2 Tabs by mouth daily for 2 days. rOPINIRole 1 mg tablet Commonly known as:  Sarah El Take  by mouth two (2) times a day. therapeutic multivitamin tablet Commonly known as:  Lovella Basil Take 1 Tab by mouth daily. tiotropium bromide 2.5 mcg/actuation inhaler Commonly known as:  Lauren Schneiders Take 2 Puffs by inhalation daily. TYLENOL EXTRA STRENGTH 500 mg tablet Generic drug:  acetaminophen Take  by mouth every six (6) hours as needed for Pain. * Notice: This list has 2 medication(s) that are the same as other medications prescribed for you. Read the directions carefully, and ask your doctor or other care provider to review them with you. Introducing [facility]! Doug Caceres introduces [employer] patient portal. Now you can access parts of your medical record, email your doctor's office, and request medication refills online. 1. In your internet browser, go to [email or web address] 2. Click on the First Time User? Click Here link in the Sign In box. You will see the New Member Sign Up page. 3. Enter your [employer] Access Code exactly as it appears below. You will not need to use this code after youve completed the sign-up process. If you do not sign up before the expiration date, you must request a new code. · [employer] Access Code: [email or web address] Expires: 10/17/2018 10:29 AM 
 
4. Enter the last four digits of your Social Security Number (xxxx) and Date of Birth (mm/dd/yyyy) as indicated and click Submit.  You will be taken to the next sign-up page. 5. Create a [facility] ID. This will be your [facility] login ID and cannot be changed, so think of one that is secure and easy to remember. 6. Create a [facility] password. You can change your password at any time. 7. Enter your Password Reset Question and Answer. This can be used at a later time if you forget your password. 8. Enter your e-mail address. You will receive e-mail notification when new information is available in [address]. 9. Click Sign Up. You can now view and download portions of your medical record. 10. Click the Download Summary menu link to download a portable copy of your medical information. If you have questions, please visit the Frequently Asked Questions section of the [facility] website. Remember, [facility] is NOT to be used for urgent needs. For medical emergencies, dial 911. Now available from your iPhone and Android! Please provide this summary of care documentation to your next provider. Your primary care clinician is listed as David Nuno. If you have any questions after today's visit, please call [phone].

## 2018-09-27 NOTE — LETTER
9/27/2018 9:08 AM 
 
Patient:  Beth Abdul YOB: 1945 Date of Visit: 9/27/2018 Marcus Canseco MD 
[facility] [address] [facility] [facility] [place] [address] VIA Facsimile: [phone] [address], [address] [place] [address] VIA In Basket Dear Thierry Ny, 
 
I saw Dorys Alvarez in the office today after her recent hospitalization for pneumonia and abdominal pain. Her colonoscopy showed a malignant appearing cecal mass as well as a proximal ascending colon polyp which was left in situ. She was discharged from the hospital and today is feeling well, though a bit fatigued. CT imaging of the chest abdomen pelvis does not show any evidence of metastatic disease. We will proceed to the operating room for laparoscopic right colectomy in October. She will follow-up with her pulmonologist to be sure she is optimized. She has a high risk surgical candidate given her chronic steroid use and underlying COPD. Thank you very much for your referral of Ms. Gianna Kirby. If you have questions, please do not hesitate to call me. I look forward to following Ms. Nikki Phelan along with you and will keep you updated as to her progress. Sincerely, Sonja Frazier MD

## 2018-09-27 NOTE — H&P (VIEW-ONLY)
HPI: Minerva Clement is a 72 y.o. female presenting with chief complain of cecal cancer. The patient was hospitalized recently for chest and abdominal pain. CT imaging raise suspicion for a mass. Colonoscopy was performed which showed a malignant appearing mass in the cecum as well as a large proximal ascending colon polyp which was left in place given the proximity to the cecum. Pathology of this proved polyp with dysplasia. Patient has epigastric and right lower quadrant pain. She has lost 20 pounds over the last 6 months. She denies nausea or vomiting. She moves her bowels 3 times per day. Denies constipation or diarrhea. She rarely sees blood per rectum. Her last colonoscopy was 7 years ago where polyps were identified. She denies fecal incontinence. She has no family medical history of colorectal cancer. She has COPD. She currently takes her maintenance dose of 10 mg/day of prednisone. She is completed her antibiotic course for pneumonia. Past Medical History:  
Diagnosis Date  Anxiety  Arthritis  Asbestosis ([address]  Asthma  Back problem  Baker's cyst   
 Balance problems  Chronic lung disease  Cigarette smoker  Clotting disorder ([address]  COPD (chronic obstructive pulmonary disease) (HCC)  Depression  History of DVT (deep vein thrombosis)  Leg pain Right  Lung disease  Nausea & vomiting  Osteoporosis  Restless leg syndrome  Spinal stenosis  Thromboembolus ([address]  Vitamin D deficiency Past Surgical History:  
Procedure Laterality Date  COLONOSCOPY N/A 9/22/2018 COLONOSCOPY w bx w polypectonies performed by Ursula Raymundo MD at [address] [place] [address] Ave  HX BACK SURGERY    
 x4  
 HX BREAST BIOPSY  HX HEENT    
 cataract right  HX HYSTERECTOMY  HX LUMBAR LAMINECTOMY  HX MENISCUS REPAIR    
 HX ORTHOPAEDIC Knee bakers cyst  
 HX POLYPECTOMY  HX TONSILLECTOMY  HX VEIN STRIPPING  VASCULAR SURGERY PROCEDURE UNLIST    
 vein stripping Family History Problem Relation Age of Onset  Cancer Father  Heart Disease Mother  Hypertension Mother  Cancer Brother  Cancer Sister  Diabetes Other  Hypertension Other  Stroke Other  Heart Disease Sister  Hypertension Sister  Heart Disease Brother Social History Social History  Marital status:  Spouse name: N/A  
 Number of children: N/A  
 Years of education: N/A Social History Main Topics  Smoking status: Current Some Day Smoker Packs/day: 0.25 Years: 50.00 Types: Cigarettes Start date: 10/21/1964  Smokeless tobacco: Never Used  Alcohol use No  
 Drug use: No  
 Sexual activity: Not Asked Other Topics Concern  None Social History Narrative Review of Systems - Review of Systems Constitutional: Positive for malaise/fatigue and weight loss. Negative for chills, diaphoresis and fever. HENT: Positive for nosebleeds. Negative for congestion, ear discharge, ear pain, hearing loss, sinus pain, sore throat and tinnitus. Eyes: Negative. Negative for redness. Respiratory: Positive for hemoptysis, sputum production, shortness of breath and wheezing. Negative for cough and stridor. Cardiovascular: Positive for chest pain and leg swelling. Negative for palpitations, orthopnea, claudication and PND. Gastrointestinal: Positive for abdominal pain, heartburn and nausea. Negative for blood in stool, constipation, diarrhea, melena and vomiting. Genitourinary: Negative. Musculoskeletal: Positive for back pain and joint pain. Negative for falls, myalgias and neck pain. Skin: Negative. Negative for itching and rash. Neurological: Positive for weakness and headaches. Negative for dizziness, tingling, tremors, sensory change, speech change, focal weakness, seizures and loss of consciousness. Endo/Heme/Allergies: Negative for environmental allergies and polydipsia. Bruises/bleeds easily. Psychiatric/Behavioral: Positive for depression. Negative for hallucinations, memory loss, substance abuse and suicidal ideas. The patient is nervous/anxious and has insomnia. Outpatient Prescriptions Marked as Taking for the 9/27/18 encounter (Office Visit) with Lulú Ambrosio MD  
Medication Sig Dispense Refill  predniSONE (DELTASONE) 20 mg tablet Take 2 Tabs by mouth daily for 2 days. 4 Tab 0  
 therapeutic multivitamin (THERAGRAN) tablet Take 1 Tab by mouth daily. 30 Tab 11  
 HYDROcodone-acetaminophen (NORCO) 5-325 mg per tablet Take 1 Tab by mouth every six (6) hours as needed for Pain. Max Daily Amount: 4 Tabs. 25 Tab 0  
 acetaminophen (TYLENOL EXTRA STRENGTH) 500 mg tablet Take  by mouth every six (6) hours as needed for Pain.  albuterol (PROVENTIL VENTOLIN) 2.5 mg /3 mL (0.083 %) nebulizer solution 3 mL by Nebulization route every four (4) hours as needed for Wheezing or Shortness of Breath. Diag. Code: J44.9, R09.02 375 Each 3  
 albuterol (PROAIR HFA) 90 mcg/actuation inhaler INHALE 2 PUFFS BY MOUTH EVERY 4 HOURS AS NEEDED FOR WHEEZING OR SHORTNESS OF BREATH 1 Inhaler 6  
 fluticasone-salmeterol (ADVAIR) 250-50 mcg/dose diskus inhaler Take 1 Puff by inhalation every twelve (12) hours. 1 Inhaler 3  
 tiotropium bromide (SPIRIVA RESPIMAT) 2.5 mcg/actuation inhaler Take 2 Puffs by inhalation daily. 3 Inhaler 3  
 LORazepam (ATIVAN) 1 mg tablet Take  by mouth two (2) times a day.  rOPINIRole (REQUIP) 1 mg tablet Take  by mouth two (2) times a day.  OXYGEN-AIR DELIVERY SYSTEMS 2 L by Does Not Apply route. O2 via nasal cannula with bedtime and activity. O2 Company: [address] Allergies Allergen Reactions  Anoro Ellipta [Umeclidinium-Vilanterol] Rash and Other (comments) Muscle cramps in arms  Aspirin Other (comments) GI upset and bleeding  Augmentin [Amoxicillin-Pot Clavulanate] Not Reported This Time Possible cramping  Doxycycline Nausea and Vomiting  Morphine Other (comments) Neurologic symptoms - severe agitation  Shellfish Derived Unknown (comments) Pt able to eat small amounts per report  Sulfa (Sulfonamide Antibiotics) Rash Patient relates no longer allergic Vitals:  
 09/27/18 6559 BP: 112/70 Pulse: 72 Resp: 18 Temp: 98 °F (36.7 °C) Weight: 56.2 kg (124 lb) Height: 5' 6\" (1.676 m) PainSc:   5 PainLoc: Abdomen Physical Exam  
Constitutional: She appears well-developed and well-nourished. HENT:  
Head: Normocephalic and atraumatic. Eyes: Conjunctivae and EOM are normal.  
Abdominal: Soft. She exhibits no distension. There is tenderness (RLQ). Musculoskeletal: Normal range of motion. Lymphadenopathy:  
  She has no cervical adenopathy. Right: No inguinal adenopathy present. Left: No inguinal adenopathy present. Neurological: She exhibits normal muscle tone. Skin: Skin is warm and dry. No rash noted. Psychiatric: She has a normal mood and affect. Her speech is normal.  
Rectum: Normal tone, no mass CT of the abdomen and pelvis reviewed, cecal mass, no evidence of metastatic disease CT of the chest was performed in July which did not show evidence of metastatic disease Colonoscopy report reviewed, consistent with the above cecal malignancy and ascending colon polyp Pathology consistent with dysplasia Assessment / Plan Adenocarcinoma of the cecum Schedule laparoscopic right colectomy Pulmonary follow-up for optimization Will likely use stress dose steroids perioperatively High risk candidate given COPD, pt told she may require prolonged ventilation post-operatively The diagnoses and plan were discussed with the patient. All questions answered. Plan of care agreed to by all concerned.

## 2018-09-27 NOTE — PROGRESS NOTES
HPI: Dmitry Fernandes is a 72 y.o. female presenting with chief complain of cecal cancer. The patient was hospitalized recently for chest and abdominal pain. CT imaging raise suspicion for a mass. Colonoscopy was performed which showed a malignant appearing mass in the cecum as well as a large proximal ascending colon polyp which was left in place given the proximity to the cecum. Pathology of this proved polyp with dysplasia. Patient has epigastric and right lower quadrant pain. She has lost 20 pounds over the last 6 months. She denies nausea or vomiting. She moves her bowels 3 times per day. Denies constipation or diarrhea. She rarely sees blood per rectum. Her last colonoscopy was 7 years ago where polyps were identified. She denies fecal incontinence. She has no family medical history of colorectal cancer. She has COPD. She currently takes her maintenance dose of 10 mg/day of prednisone. She is completed her antibiotic course for pneumonia.     Past Medical History:   Diagnosis Date    Anxiety     Arthritis     Asbestosis ([address]     Asthma     Back problem     Baker's cyst     Balance problems     Chronic lung disease     Cigarette smoker     Clotting disorder (HCC)     COPD (chronic obstructive pulmonary disease) (HCC)     Depression     History of DVT (deep vein thrombosis)     Leg pain     Right    Lung disease     Nausea & vomiting     Osteoporosis     Restless leg syndrome     Spinal stenosis     Thromboembolus ([address]     Vitamin D deficiency        Past Surgical History:   Procedure Laterality Date    COLONOSCOPY N/A 9/22/2018    COLONOSCOPY w bx w polypectonies performed by Raleigh Severance, MD at [address] HX APPENDECTOMY      HX BACK SURGERY      x4    HX BREAST BIOPSY      HX HEENT      cataract right    HX HYSTERECTOMY      HX LUMBAR LAMINECTOMY      HX MENISCUS REPAIR      HX ORTHOPAEDIC      Knee bakers cyst    HX POLYPECTOMY      HX TONSILLECTOMY      HX VEIN STRIPPING      VASCULAR SURGERY PROCEDURE UNLIST      vein stripping       Family History   Problem Relation Age of Onset    Cancer Father     Heart Disease Mother     Hypertension Mother     Cancer Brother     Cancer Sister     Diabetes Other     Hypertension Other     Stroke Other     Heart Disease Sister     Hypertension Sister     Heart Disease Brother        Social History     Social History    Marital status:      Spouse name: N/A    Number of children: N/A    Years of education: N/A     Social History Main Topics    Smoking status: Current Some Day Smoker     Packs/day: 0.25     Years: 50.00     Types: Cigarettes     Start date: 10/21/1964    Smokeless tobacco: Never Used    Alcohol use No    Drug use: No    Sexual activity: Not Asked     Other Topics Concern    None     Social History Narrative       Review of Systems - Review of Systems   Constitutional: Positive for malaise/fatigue and weight loss. Negative for chills, diaphoresis and fever. HENT: Positive for nosebleeds. Negative for congestion, ear discharge, ear pain, hearing loss, sinus pain, sore throat and tinnitus. Eyes: Negative. Negative for redness. Respiratory: Positive for hemoptysis, sputum production, shortness of breath and wheezing. Negative for cough and stridor. Cardiovascular: Positive for chest pain and leg swelling. Negative for palpitations, orthopnea, claudication and PND. Gastrointestinal: Positive for abdominal pain, heartburn and nausea. Negative for blood in stool, constipation, diarrhea, melena and vomiting. Genitourinary: Negative. Musculoskeletal: Positive for back pain and joint pain. Negative for falls, myalgias and neck pain. Skin: Negative. Negative for itching and rash. Neurological: Positive for weakness and headaches. Negative for dizziness, tingling, tremors, sensory change, speech change, focal weakness, seizures and loss of consciousness. Endo/Heme/Allergies: Negative for environmental allergies and polydipsia. Bruises/bleeds easily. Psychiatric/Behavioral: Positive for depression. Negative for hallucinations, memory loss, substance abuse and suicidal ideas. The patient is nervous/anxious and has insomnia. Outpatient Prescriptions Marked as Taking for the 9/27/18 encounter (Office Visit) with Darrius Rutherford MD   Medication Sig Dispense Refill    predniSONE (DELTASONE) 20 mg tablet Take 2 Tabs by mouth daily for 2 days. 4 Tab 0    therapeutic multivitamin (THERAGRAN) tablet Take 1 Tab by mouth daily. 30 Tab 11    HYDROcodone-acetaminophen (NORCO) 5-325 mg per tablet Take 1 Tab by mouth every six (6) hours as needed for Pain. Max Daily Amount: 4 Tabs. 25 Tab 0    acetaminophen (TYLENOL EXTRA STRENGTH) 500 mg tablet Take  by mouth every six (6) hours as needed for Pain.  albuterol (PROVENTIL VENTOLIN) 2.5 mg /3 mL (0.083 %) nebulizer solution 3 mL by Nebulization route every four (4) hours as needed for Wheezing or Shortness of Breath. Diag. Code: J44.9, R09.02 375 Each 3    albuterol (PROAIR HFA) 90 mcg/actuation inhaler INHALE 2 PUFFS BY MOUTH EVERY 4 HOURS AS NEEDED FOR WHEEZING OR SHORTNESS OF BREATH 1 Inhaler 6    fluticasone-salmeterol (ADVAIR) 250-50 mcg/dose diskus inhaler Take 1 Puff by inhalation every twelve (12) hours. 1 Inhaler 3    tiotropium bromide (SPIRIVA RESPIMAT) 2.5 mcg/actuation inhaler Take 2 Puffs by inhalation daily. 3 Inhaler 3    LORazepam (ATIVAN) 1 mg tablet Take  by mouth two (2) times a day.  rOPINIRole (REQUIP) 1 mg tablet Take  by mouth two (2) times a day.  OXYGEN-AIR DELIVERY SYSTEMS 2 L by Does Not Apply route. O2 via nasal cannula with bedtime and activity.  O2 Company: [employer]         Allergies   Allergen Reactions    Anoro Ellipta [Umeclidinium-Vilanterol] Rash and Other (comments)     Muscle cramps in arms    Aspirin Other (comments)     GI upset and bleeding    Augmentin [Amoxicillin-Pot Clavulanate] Not Reported This Time     Possible cramping    Doxycycline Nausea and Vomiting    Morphine Other (comments)     Neurologic symptoms - severe agitation      Shellfish Derived Unknown (comments)     Pt able to eat small amounts per report     Sulfa (Sulfonamide Antibiotics) Rash     Patient relates no longer allergic       Vitals:    09/27/18 0819   BP: 112/70   Pulse: 72   Resp: 18   Temp: 98 °F (36.7 °C)   Weight: 56.2 kg (124 lb)   Height: 5' 6\" (1.676 m)   PainSc:   5   PainLoc: Abdomen       Physical Exam   Constitutional: She appears well-developed and well-nourished. HENT:   Head: Normocephalic and atraumatic. Eyes: Conjunctivae and EOM are normal.   Abdominal: Soft. She exhibits no distension. There is tenderness (RLQ). Musculoskeletal: Normal range of motion. Lymphadenopathy:     She has no cervical adenopathy. Right: No inguinal adenopathy present. Left: No inguinal adenopathy present. Neurological: She exhibits normal muscle tone. Skin: Skin is warm and dry. No rash noted. Psychiatric: She has a normal mood and affect. Her speech is normal.   Rectum: Normal tone, no mass    CT of the abdomen and pelvis reviewed, cecal mass, no evidence of metastatic disease  CT of the chest was performed in July which did not show evidence of metastatic disease  Colonoscopy report reviewed, consistent with the above cecal malignancy and ascending colon polyp  Pathology consistent with dysplasia    Assessment / Plan    Adenocarcinoma of the cecum  Schedule laparoscopic right colectomy  Pulmonary follow-up for optimization  Will likely use stress dose steroids perioperatively  High risk candidate given COPD, pt told she may require prolonged ventilation post-operatively    The diagnoses and plan were discussed with the patient. All questions answered. Plan of care agreed to by all concerned.

## 2018-10-04 ENCOUNTER — OFFICE VISIT (OUTPATIENT)
Dept: PULMONOLOGY | Age: 73
End: 2018-10-04

## 2018-10-04 VITALS
HEIGHT: 66 IN | RESPIRATION RATE: 18 BRPM | WEIGHT: 118 LBS | DIASTOLIC BLOOD PRESSURE: 64 MMHG | SYSTOLIC BLOOD PRESSURE: 110 MMHG | BODY MASS INDEX: 18.96 KG/M2 | TEMPERATURE: 97.7 F | HEART RATE: 74 BPM | OXYGEN SATURATION: 99 %

## 2018-10-04 DIAGNOSIS — F17.200 TOBACCO USE DISORDER: ICD-10-CM

## 2018-10-04 DIAGNOSIS — J18.9 COMMUNITY ACQUIRED PNEUMONIA, UNSPECIFIED LATERALITY: Primary | ICD-10-CM

## 2018-10-04 DIAGNOSIS — K63.89 COLONIC MASS: ICD-10-CM

## 2018-10-04 DIAGNOSIS — J44.9 COPD, SEVERE (HCC): ICD-10-CM

## 2018-10-04 RX ORDER — FLUCONAZOLE 200 MG/1
200 TABLET ORAL
COMMUNITY
End: 2019-01-02

## 2018-10-04 RX ORDER — PREDNISONE 10 MG/1
10 TABLET ORAL DAILY
Refills: 0 | COMMUNITY
Start: 2018-09-11 | End: 2019-02-28

## 2018-10-04 NOTE — PROGRESS NOTES
Patient in office for f/u MD visit. NN spoke with patient briefly. Patient brought in her pulse oximetry for checking due to reporting it was not reading accurately. Dr. Katey Ravi rooming nurse will check patient's pulse oximetry while in office and compare it to office pulse oximetry results.

## 2018-10-04 NOTE — MR AVS SNAPSHOT
[address] [facility], [address] [address] 
[phone] Patient: Destini Padron MRN: [id number] RDO:5/5/0973 Visit Information Date & Time Provider Department Dept. Phone Encounter #  
 10/4/2018  9:15 AM Alan Delgadillo MD [facility] [phone] [id number] Your Appointments 10/15/2018  1:15 PM  
Office Visit with Dejan Morocho MD  
[facility]) Appt Note: h/f  
 [address] [facility], [address] [facility] [id number], [address] Upcoming Health Maintenance Date Due Hepatitis C Screening 1945 DTaP/Tdap/Td series (1 - Tdap) 6/4/1968 Shingrix Vaccine Age 50> (1 of 2) 6/4/1997 FOBT Q 1 YEAR AGE 50-75 6/4/1997 GLAUCOMA SCREENING Q2Y 6/4/2012 MEDICARE YEARLY EXAM 3/14/2018 Influenza Age 5 to Adult 8/1/2018 BREAST CANCER SCRN MAMMOGRAM 8/14/2019 Pneumococcal 65+ High/Highest Risk (2 of 2 - PPSV23) 10/1/2019 Allergies as of 10/4/2018  Review Complete On: 10/4/2018 By: Felix Courser, LPN Severity Noted Reaction Type Reactions Anoro Ellipta [Umeclidinium-vilanterol]  04/04/2016    Rash, Other (comments) Muscle cramps in arms Aspirin    Other (comments) GI upset and bleeding Augmentin [Amoxicillin-pot Clavulanate]    Not Reported This Time Possible cramping Doxycycline  12/30/2013    Nausea and Vomiting Morphine  03/11/2014   Intolerance Other (comments) Neurologic symptoms - severe agitation Shellfish Derived  12/16/2015    Unknown (comments) Pt able to eat small amounts per report Sulfa (Sulfonamide Antibiotics)    Rash Patient relates no longer allergic Current Immunizations  Reviewed on 1/10/2013 Name Date Influenza High Dose Vaccine PF 1/10/2013  Influenza Vaccine (Quad) PF 12/16/2015  2:50 PM  
 Pneumococcal Vaccine (Unspecified Type) 10/1/2014 Not reviewed this visit You Were Diagnosed With   
  
 Codes Comments Community acquired pneumonia, unspecified laterality    -  Primary ICD-10-CM: J18.9 ICD-9-CM: 545 COPD, severe ([facility] [address]     ICD-10-CM: J44.9 ICD-9-CM: 346 Tobacco use disorder     ICD-10-CM: F17.200 ICD-9-CM: 305.1 Colonic mass     ICD-10-CM: K63.9 ICD-9-CM: 569.89 Vitals BP Pulse Temp Resp Height(growth percentile) Weight(growth percentile) 110/64 (BP 1 Location: Right arm, BP Patient Position: Sitting) 74 97.7 °F (36.5 °C) (Oral) 18 5' 6\" (1.676 m) 118 lb (53.5 kg) SpO2 BMI OB Status Smoking Status 99% 19.05 kg/m2 Hysterectomy Current Some Day Smoker Vitals History BMI and BSA Data Body Mass Index Body Surface Area 19.05 kg/m 2 1.58 m 2 Preferred Pharmacy Pharmacy Name Phone [address] [phone] Your Updated Medication List  
  
   
This list is accurate as of 10/4/18 10:39 AM.  Always use your most recent med list.  
  
  
  
  
 * albuterol 90 mcg/actuation inhaler Commonly known as:  PROAIR HFA INHALE 2 PUFFS BY MOUTH EVERY 4 HOURS AS NEEDED FOR WHEEZING OR SHORTNESS OF BREATH  
  
 * albuterol 2.5 mg /3 mL (0.083 %) nebulizer solution Commonly known as:  PROVENTIL VENTOLIN  
3 mL by Nebulization route every four (4) hours as needed for Wheezing or Shortness of Breath. Diag. Code: J44.9, R09.02  
  
 DIFLUCAN 200 mg tablet Generic drug:  fluconazole Take 200 mg by mouth daily. FDA advises cautious prescribing of oral fluconazole in pregnancy. fluticasone-salmeterol 250-50 mcg/dose diskus inhaler Commonly known as:  ADVAIR Take 1 Puff by inhalation every twelve (12) hours. HYDROcodone-acetaminophen 5-325 mg per tablet Commonly known as:  Sharashley Lejunior Take 1 Tab by mouth every six (6) hours as needed for Pain.  Max Daily Amount: 4 Tabs. LORazepam 1 mg tablet Commonly known as:  ATIVAN Take  by mouth two (2) times a day.  
  
 naloxone 4 mg/actuation nasal spray Commonly known as:  [employer] Use 1 spray intranasally, then discard. Repeat with new spray every 2 min as needed for opioid overdose symptoms, alternating nostrils. OXYGEN-AIR DELIVERY SYSTEMS  
2 L by Does Not Apply route. O2 via nasal cannula with bedtime and activity. O2 Company: [employer]  
  
 predniSONE 10 mg tablet Commonly known as:  DELTASONE  
  
 rOPINIRole 1 mg tablet Commonly known as:  Neris Dines Take  by mouth two (2) times a day. therapeutic multivitamin tablet Commonly known as:  [facility] Take 1 Tab by mouth daily. tiotropium bromide 2.5 mcg/actuation inhaler Commonly known as:  Tess Siemens Take 2 Puffs by inhalation daily. TYLENOL EXTRA STRENGTH 500 mg tablet Generic drug:  acetaminophen Take  by mouth every six (6) hours as needed for Pain. * Notice: This list has 2 medication(s) that are the same as other medications prescribed for you. Read the directions carefully, and ask your doctor or other care provider to review them with you. To-Do List   
 10/04/2018 Imaging:  XR CHEST PA LAT Introducing [facility] [place] [facility]! [facility] introduces [employer] patient portal. Now you can access parts of your medical record, email your doctor's office, and request medication refills online. 1. In your internet browser, go to [email or web address] 2. Click on the First Time User? Click Here link in the Sign In box. You will see the New Member Sign Up page. 3. Enter your [employer] Access Code exactly as it appears below. You will not need to use this code after youve completed the sign-up process. If you do not sign up before the expiration date, you must request a new code. · [employer] Access Code: [email or web address] Expires: 10/17/2018 10:29 AM 
 
 4. Enter the last four digits of your Social Security Number (xxxx) and Date of Birth (mm/dd/yyyy) as indicated and click Submit. You will be taken to the next sign-up page. 5. Create a [employer] ID. This will be your [employer] login ID and cannot be changed, so think of one that is secure and easy to remember. 6. Create a [employer] password. You can change your password at any time. 7. Enter your Password Reset Question and Answer. This can be used at a later time if you forget your password. 8. Enter your e-mail address. You will receive e-mail notification when new information is available in [address] 9. Click Sign Up. You can now view and download portions of your medical record. 10. Click the Download Summary menu link to download a portable copy of your medical information. If you have questions, please visit the Frequently Asked Questions section of the [employer] website. Remember, [employer] is NOT to be used for urgent needs. For medical emergencies, dial 911. Now available from your iPhone and Android! Please provide this summary of care documentation to your next provider. Your primary care clinician is listed as David Nuno. If you have any questions after today's visit, please call [phone].

## 2018-10-04 NOTE — PROGRESS NOTES
Chief Complaint   Patient presents with   [facility] Follow Up     CXR done 9/19    COPD    Lung Mass     1. Have you been to the ER, urgent care clinic since your last visit? Hospitalized since your last visit? Yes Where: [facility]    2. Have you seen or consulted any other health care providers outside of the [address] [place] [address] since your last visit? Include any pap smears or colon screening.  Yes Where: Dr José Manuel Lord PCP

## 2018-10-04 NOTE — PROGRESS NOTES
HISTORY OF PRESENT ILLNESS  Serafin Davila is a 72 y.o. female with COPD. HPI Comments: KUNAL visit after discharge for pneumonia and COPD exacerbation. During that admission pt was also diagnosed with a colonic mass, thought to be malignant and is now scheduled for laparoscopic colectomy Oct 16, 2018. Still complains of baseline SOB with minimal exertion, wheezing and tight cough with chest congestion and tenacious phlegm. Pt complains of intermittent hemoptysis, known since 2013, no fever or chills. Pt is on home O2, last FEV1 was 30% in Jan 2015. Pt has quit smoking since hospital admission 3 weeks ago. Pt notes no apparent response to maintenance Azithromycin with no change in amount and character of phlegm. Maebelle Sago also did not improve symptoms. Pt reports depression due to several personal and family stressors but is trying to remedy these. COPD   The history is provided by the patient. This is a chronic problem. The problem has been gradually worsening. Associated symptoms include abdominal pain and shortness of breath. Pertinent negatives include no chest pain and no headaches. The symptoms are aggravated by exertion. Treatments tried: see list. The treatment provided moderate relief. Review of Systems   Constitutional: Positive for malaise/fatigue and weight loss. Negative for chills, diaphoresis and fever. HENT: Negative for congestion, ear discharge, ear pain, hearing loss, nosebleeds, sinus pain, sore throat and tinnitus. Eyes: Negative for blurred vision, double vision, photophobia, pain, discharge and redness. Respiratory: Positive for cough, hemoptysis, sputum production, shortness of breath and wheezing. Negative for stridor. Cardiovascular: Negative for chest pain, palpitations, orthopnea, claudication, leg swelling and PND. Gastrointestinal: Positive for abdominal pain.  Negative for blood in stool, constipation, diarrhea, heartburn, melena and nausea. Genitourinary: Negative for dysuria, flank pain, frequency, hematuria and urgency. Musculoskeletal: Positive for joint pain. Negative for back pain, falls and neck pain. Myalgias: resolved. Skin: Negative for itching and rash. Neurological: Negative for dizziness, tingling, tremors, sensory change, speech change, focal weakness, seizures, loss of consciousness, weakness and headaches. Endo/Heme/Allergies: Negative for environmental allergies and polydipsia. Bruises/bleeds easily. Psychiatric/Behavioral: Positive for depression. Negative for hallucinations, memory loss, substance abuse and suicidal ideas. The patient is nervous/anxious and has insomnia. Past Medical History:   Diagnosis Date    Anxiety     Arthritis     Asbestosis ([facility] [address]     Asthma     Back problem     Baker's cyst     Balance problems     Chronic lung disease     Cigarette smoker     Clotting disorder ([facility])     COPD (chronic obstructive pulmonary disease) ([facility])     oxygen 2/liters    Depression     History of DVT (deep vein thrombosis)     Leg pain     Right    Lung disease     Nausea & vomiting     Osteoporosis     Restless leg syndrome     Spinal stenosis     Thromboembolus ([facility])     Vitamin D deficiency      Current Outpatient Prescriptions on File Prior to Visit   Medication Sig Dispense Refill    therapeutic multivitamin (THERAGRAN) tablet Take 1 Tab by mouth daily. 30 Tab 11    HYDROcodone-acetaminophen (NORCO) 5-325 mg per tablet Take 1 Tab by mouth every six (6) hours as needed for Pain. Max Daily Amount: 4 Tabs. 25 Tab 0    naloxone (NARCAN) 4 mg/actuation nasal spray Use 1 spray intranasally, then discard. Repeat with new spray every 2 min as needed for opioid overdose symptoms, alternating nostrils. 1 Each 1    albuterol (PROVENTIL VENTOLIN) 2.5 mg /3 mL (0.083 %) nebulizer solution 3 mL by Nebulization route every four (4) hours as needed for Wheezing or Shortness of Breath. Diag.  Code: J44.9, R09.02 375 Each 3    albuterol (PROAIR HFA) 90 mcg/actuation inhaler INHALE 2 PUFFS BY MOUTH EVERY 4 HOURS AS NEEDED FOR WHEEZING OR SHORTNESS OF BREATH 1 Inhaler 6    fluticasone-salmeterol (ADVAIR) 250-50 mcg/dose diskus inhaler Take 1 Puff by inhalation every twelve (12) hours. 1 Inhaler 3    tiotropium bromide (SPIRIVA RESPIMAT) 2.5 mcg/actuation inhaler Take 2 Puffs by inhalation daily. 3 Inhaler 3    LORazepam (ATIVAN) 1 mg tablet Take  by mouth two (2) times a day.  rOPINIRole (REQUIP) 1 mg tablet Take  by mouth two (2) times a day.  OXYGEN-AIR DELIVERY SYSTEMS 2 L by Does Not Apply route. O2 via nasal cannula with bedtime and activity. O2 Company: [email or web address]      acetaminophen (TYLENOL EXTRA STRENGTH) 500 mg tablet Take  by mouth every six (6) hours as needed for Pain. No current facility-administered medications on file prior to visit. Allergies   Allergen Reactions    Anoro Ellipta [Umeclidinium-Vilanterol] Rash and Other (comments)     Muscle cramps in arms    Aspirin Other (comments)     GI upset and bleeding    Augmentin [Amoxicillin-Pot Clavulanate] Not Reported This Time     Possible cramping    Doxycycline Nausea and Vomiting    Morphine Other (comments)     Neurologic symptoms - severe agitation      Shellfish Derived Unknown (comments)     Pt able to eat small amounts per report     Sulfa (Sulfonamide Antibiotics) Rash     Patient relates no longer allergic     Social History     Social History    Marital status:      Spouse name: N/A    Number of children: N/A    Years of education: N/A     Occupational History    Not on file.      Social History Main Topics    Smoking status: Current Some Day Smoker     Packs/day: 0.25     Years: 50.00     Types: Cigarettes     Start date: 10/21/1964    Smokeless tobacco: Never Used    Alcohol use No    Drug use: No    Sexual activity: Not on file     Other Topics Concern    Not on file     Social History Narrative     Blood pressure 110/64, pulse 74, temperature 97.7 °F (36.5 °C), temperature source Oral, resp. rate 18, height 5' 6\" (1.676 m), weight 53.5 kg (118 lb), SpO2 99 %. Physical Exam   Constitutional: She is oriented to person, place, and time. She appears well-developed. No distress. Cachectic   HENT:   Head: Normocephalic and atraumatic. Nose: Nose normal.   Mouth/Throat: No oropharyngeal exudate. Eyes: Conjunctivae and EOM are normal. Pupils are equal, round, and reactive to light. Right eye exhibits no discharge. Left eye exhibits no discharge. No scleral icterus. Neck: No JVD present. No tracheal deviation present. No thyromegaly present. Cardiovascular: Normal rate, regular rhythm, normal heart sounds and intact distal pulses. Exam reveals no gallop. No murmur heard. Pulmonary/Chest: Effort normal. No stridor. No respiratory distress. Wheezes: lft mid lung field  She has no rales. She exhibits no tenderness. Distant breath sounds ,  Transmitted upper airway sounds   Abdominal: Soft. She exhibits no mass. There is no tenderness. Musculoskeletal: She exhibits no edema, tenderness or deformity. Lymphadenopathy:     She has no cervical adenopathy. Neurological: She is alert and oriented to person, place, and time. Skin: Skin is warm and dry. No rash noted. She is not diaphoretic. No erythema. Psychiatric: She has a normal mood and affect. Her behavior is normal. Judgment and thought content normal.     CT Results (most recent):    Results from Hospital Encounter encounter on 09/19/18   CT ABD PELV W CONT   Narrative Description:  CT abdomen and pelvis with IV contrast    TECHNIQUE: [Helically acquired axial] CT imaging of the [abdomen and pelvis]. 100 cc's of Isovue-300 injected intravenously. [ Coronal and sagittal  reformations generated and reviewed.     All CT scans at this facility are performed using dose optimization technique as  appropriate to a performed exam, to include automated exposure control,  adjustment of the mA and/or kV according to patient size (including appropriate  matching for site-specific examinations), or use of iterative reconstruction  technique. Clinical Indication:  Right lower quadrant pain    Comparison: July 25, 2018. Findings:      CT ABDOMEN:  Left lower lobe infiltrate is present. The right lung base is clear with the  exception of mild bronchiectasis at the medial right middle lobe. No pleural  effusion is present. The liver appears normal.  The spleen appears normal.  The pancreas appears normal.  The adrenal glands appear normal.  The kidneys show symmetric enhancement. No hydronephrosis or mass. The stomach and the duodenum appear normal.  The gallbladder is not well-distended. No hyperdense gallstones are seen. There is normal contrast filling of the portal and the splenic veins. Moderate atherosclerosis of the abdominal aorta is present. No mesenteric or retroperitoneal adenopathy. No free fluid or air. CT PELVIS:  There is increased soft tissue in the cecum (axial, 48). The appendix is not  identified. No definite right lower quadrant inflammation is seen. There is a large amount of retained fecal material within the rectum. The bladder is well distended and has a normal unopacified appearance. No pelvic adenopathy or free fluid. Skeleton/soft tissues: L5 vertebral plasty. Moderate osteopenia. Multilevel  degenerative changes. Impression Impression:      1. Left lower lobe pneumonia. 2. Abnormal soft tissue in the medial wall of the cecum without surrounding  inflammation. Appearance is more concerning for malignancy than an acute  inflammatory process. The appendix is not definitively identified however. Clinical correlation needed. If the patient has not had a recent colonoscopy,  one is advised. Discussed with Dr. Kanu Anne.           Chest PA L personal review: hyperinflated, resolution of small LLL infiltrate. No new infiltrates  ASSESSMENT and PLAN  Encounter Diagnoses   Name Primary?  Community acquired pneumonia, unspecified laterality Yes    COPD, severe ([address]     Tobacco use disorder     Colonic mass      Pt with high risk of post op pulmonary complications , ARISCAT score of at least 59. Discussed high likelihood of mechanical ventilation and ICU admission with pt and family. However surgery is necessary and will need to optimize pulmonary function as much as possible. Pt's quitting smoking does alleviate some of the risk and she has been encouraged on smoking abstinence. Waiting until 1 month after diagnosis of pneumonia also decreases ARISCAT score by 17 points however pt remains in high risk category. Will continue Advair and Spiriva which may be changed in hospital to nebulized LABA/ICS. Encouraged IS and bronchial hygiene in and out of hospital.  Will be available during her admission. Pt is on chronic Prednisone and would need stress dose steroids perioperatively. Follow up CXR ordered, reviewed with pt. Pt refused flu vaccination. Will  Not restart NAC as pt without response. Continue  Tessalon perles prn.   RTC 2 months or sooner prn

## 2018-10-09 ENCOUNTER — HOSPITAL ENCOUNTER (OUTPATIENT)
Dept: PREADMISSION TESTING | Age: 73
Discharge: HOME | End: 2018-10-09
Payer: MEDICARE

## 2018-10-09 DIAGNOSIS — C18.0 CECAL CANCER (HCC): ICD-10-CM

## 2018-10-09 LAB
ALBUMIN SERPL-MCNC: 3.8 G/DL (ref 3.4–5)
ANION GAP SERPL CALC-SCNC: 6 MMOL/L (ref 3–18)
APTT PPP: 26.9 SEC (ref 23–36.4)
BASOPHILS # BLD: 0 K/UL (ref 0–0.1)
BASOPHILS NFR BLD: 0 % (ref 0–2)
BUN SERPL-MCNC: 20 MG/DL (ref 7–18)
BUN/CREAT SERPL: 28 (ref 12–20)
CALCIUM SERPL-MCNC: 9.1 MG/DL (ref 8.5–10.1)
CHLORIDE SERPL-SCNC: 103 MMOL/L (ref 100–108)
CO2 SERPL-SCNC: 31 MMOL/L (ref 21–32)
CREAT SERPL-MCNC: 0.72 MG/DL (ref 0.6–1.3)
DIFFERENTIAL METHOD BLD: ABNORMAL
EOSINOPHIL # BLD: 0 K/UL (ref 0–0.4)
EOSINOPHIL NFR BLD: 0 % (ref 0–5)
ERYTHROCYTE [DISTWIDTH] IN BLOOD BY AUTOMATED COUNT: 14.2 % (ref 11.6–14.5)
GLUCOSE SERPL-MCNC: 102 MG/DL (ref 74–99)
HCT VFR BLD AUTO: 39.8 % (ref 35–45)
HGB BLD-MCNC: 13 G/DL (ref 12–16)
INR PPP: 0.9 (ref 0.8–1.2)
LYMPHOCYTES # BLD: 1 K/UL (ref 0.9–3.6)
LYMPHOCYTES NFR BLD: 10 % (ref 21–52)
MCH RBC QN AUTO: 32.7 PG (ref 24–34)
MCHC RBC AUTO-ENTMCNC: 32.7 G/DL (ref 31–37)
MCV RBC AUTO: 100 FL (ref 74–97)
MONOCYTES # BLD: 0.4 K/UL (ref 0.05–1.2)
MONOCYTES NFR BLD: 4 % (ref 3–10)
NEUTS SEG # BLD: 8.5 K/UL (ref 1.8–8)
NEUTS SEG NFR BLD: 86 % (ref 40–73)
PLATELET # BLD AUTO: 318 K/UL (ref 135–420)
PMV BLD AUTO: 10 FL (ref 9.2–11.8)
POTASSIUM SERPL-SCNC: 4.3 MMOL/L (ref 3.5–5.5)
PROTHROMBIN TIME: 11.7 SEC (ref 11.5–15.2)
RBC # BLD AUTO: 3.98 M/UL (ref 4.2–5.3)
SODIUM SERPL-SCNC: 140 MMOL/L (ref 136–145)
WBC # BLD AUTO: 9.8 K/UL (ref 4.6–13.2)

## 2018-10-09 PROCEDURE — 80048 BASIC METABOLIC PNL TOTAL CA: CPT | Performed by: COLON & RECTAL SURGERY

## 2018-10-09 PROCEDURE — 85025 COMPLETE CBC W/AUTO DIFF WBC: CPT | Performed by: COLON & RECTAL SURGERY

## 2018-10-09 PROCEDURE — 86900 BLOOD TYPING SEROLOGIC ABO: CPT | Performed by: COLON & RECTAL SURGERY

## 2018-10-09 PROCEDURE — 85730 THROMBOPLASTIN TIME PARTIAL: CPT | Performed by: COLON & RECTAL SURGERY

## 2018-10-09 PROCEDURE — 85610 PROTHROMBIN TIME: CPT | Performed by: COLON & RECTAL SURGERY

## 2018-10-09 PROCEDURE — 36415 COLL VENOUS BLD VENIPUNCTURE: CPT | Performed by: COLON & RECTAL SURGERY

## 2018-10-09 PROCEDURE — 82040 ASSAY OF SERUM ALBUMIN: CPT | Performed by: COLON & RECTAL SURGERY

## 2018-10-09 NOTE — PERIOP NOTES
Dala Romberg was here today for her PAT appointment. Health assessment was completed and instructions given regarding NPO status, medications, Hibiclens washes, and removal of all jewelry and/or body piercing. Instructed patient not to remove the red Blood Bank armband that was placed on their arm when the Type and Screen was drawn. Opportunity was given to ask questions and all questions were answered. Understanding of instructions was verbalized.

## 2018-10-21 LAB
ABO + RH BLD: NORMAL
BLOOD GROUP ANTIBODIES SERPL: NORMAL
SPECIMEN EXP DATE BLD: NORMAL

## 2018-10-22 ENCOUNTER — ANESTHESIA EVENT (OUTPATIENT)
Dept: SURGERY | Age: 73
DRG: 330 | End: 2018-10-22
Payer: MEDICARE

## 2018-10-23 ENCOUNTER — HOSPITAL ENCOUNTER (INPATIENT)
Age: 73
LOS: 4 days | Discharge: HOME | DRG: 330 | End: 2018-10-27
Attending: COLON & RECTAL SURGERY | Admitting: COLON & RECTAL SURGERY
Payer: MEDICARE

## 2018-10-23 ENCOUNTER — ANESTHESIA (OUTPATIENT)
Dept: SURGERY | Age: 73
DRG: 330 | End: 2018-10-23
Payer: MEDICARE

## 2018-10-23 PROBLEM — C18.0 CECAL CANCER (HCC): Status: ACTIVE | Noted: 2018-10-23

## 2018-10-23 PROCEDURE — 74011250636 HC RX REV CODE- 250/636

## 2018-10-23 PROCEDURE — 74011000250 HC RX REV CODE- 250: Performed by: COLON & RECTAL SURGERY

## 2018-10-23 PROCEDURE — 77030028754 HC RCTRCTR LAPSCP ALX DSP AMR -B: Performed by: COLON & RECTAL SURGERY

## 2018-10-23 PROCEDURE — 8E0W4CZ ROBOTIC ASSISTED PROCEDURE OF TRUNK REGION, PERCUTANEOUS ENDOSCOPIC APPROACH: ICD-10-PCS | Performed by: COLON & RECTAL SURGERY

## 2018-10-23 PROCEDURE — 77030035277 HC OBTRTR BLDELSS DISP INTU -B: Performed by: COLON & RECTAL SURGERY

## 2018-10-23 PROCEDURE — 77030035278 HC STPLR SEAL ENDOWR INTU -B: Performed by: COLON & RECTAL SURGERY

## 2018-10-23 PROCEDURE — 77030019605: Performed by: COLON & RECTAL SURGERY

## 2018-10-23 PROCEDURE — 74011000272 HC RX REV CODE- 272: Performed by: COLON & RECTAL SURGERY

## 2018-10-23 PROCEDURE — 77030032522 HC SHT STPL PK ENDOWR INTU -B: Performed by: COLON & RECTAL SURGERY

## 2018-10-23 PROCEDURE — 77030002966 HC SUT PDS J&J -A: Performed by: COLON & RECTAL SURGERY

## 2018-10-23 PROCEDURE — 74011250636 HC RX REV CODE- 250/636: Performed by: NURSE ANESTHETIST, CERTIFIED REGISTERED

## 2018-10-23 PROCEDURE — 74011250637 HC RX REV CODE- 250/637: Performed by: COLON & RECTAL SURGERY

## 2018-10-23 PROCEDURE — 88309 TISSUE EXAM BY PATHOLOGIST: CPT | Performed by: COLON & RECTAL SURGERY

## 2018-10-23 PROCEDURE — 76060000038 HC ANESTHESIA 3.5 TO 4 HR: Performed by: COLON & RECTAL SURGERY

## 2018-10-23 PROCEDURE — 74011250637 HC RX REV CODE- 250/637

## 2018-10-23 PROCEDURE — 77030008756 HC TU IRR SUC STRY -B: Performed by: COLON & RECTAL SURGERY

## 2018-10-23 PROCEDURE — 74011250636 HC RX REV CODE- 250/636: Performed by: COLON & RECTAL SURGERY

## 2018-10-23 PROCEDURE — 77030032523 HC RELD STPL PK ENDORS INTU -C: Performed by: COLON & RECTAL SURGERY

## 2018-10-23 PROCEDURE — 77030035048 HC TRCR ENDOSC OPTCL COVD -B: Performed by: COLON & RECTAL SURGERY

## 2018-10-23 PROCEDURE — 77030022704 HC SUT VLOC COVD -B: Performed by: COLON & RECTAL SURGERY

## 2018-10-23 PROCEDURE — 77030002933 HC SUT MCRYL J&J -A: Performed by: COLON & RECTAL SURGERY

## 2018-10-23 PROCEDURE — 76210000016 HC OR PH I REC 1 TO 1.5 HR: Performed by: COLON & RECTAL SURGERY

## 2018-10-23 PROCEDURE — 77030011296 HC FCPS GRSP ENDOSC J&J -B: Performed by: COLON & RECTAL SURGERY

## 2018-10-23 PROCEDURE — 74011250637 HC RX REV CODE- 250/637: Performed by: NURSE ANESTHETIST, CERTIFIED REGISTERED

## 2018-10-23 PROCEDURE — 77030020703 HC SEAL CANN DISP INTU -B: Performed by: COLON & RECTAL SURGERY

## 2018-10-23 PROCEDURE — 77030031139 HC SUT VCRL2 J&J -A: Performed by: COLON & RECTAL SURGERY

## 2018-10-23 PROCEDURE — 65270000029 HC RM PRIVATE

## 2018-10-23 PROCEDURE — 0DTF4ZZ RESECTION OF RIGHT LARGE INTESTINE, PERCUTANEOUS ENDOSCOPIC APPROACH: ICD-10-PCS | Performed by: COLON & RECTAL SURGERY

## 2018-10-23 PROCEDURE — 77030011894 HC TY FOL URIMTR BARD -B: Performed by: COLON & RECTAL SURGERY

## 2018-10-23 PROCEDURE — 74011000250 HC RX REV CODE- 250

## 2018-10-23 PROCEDURE — 77030003580 HC NDL INSUF VERES J&J -B: Performed by: COLON & RECTAL SURGERY

## 2018-10-23 PROCEDURE — 77030035489 HC REDUCR CANN ENDOWR INTU -C: Performed by: COLON & RECTAL SURGERY

## 2018-10-23 PROCEDURE — 77030018836 HC SOL IRR NACL ICUM -A: Performed by: COLON & RECTAL SURGERY

## 2018-10-23 PROCEDURE — 74011000258 HC RX REV CODE- 258: Performed by: COLON & RECTAL SURGERY

## 2018-10-23 PROCEDURE — 77030011266 HC ELECTRD BLD INSL COVD -A: Performed by: COLON & RECTAL SURGERY

## 2018-10-23 PROCEDURE — 77030003028 HC SUT VCRL J&J -A: Performed by: COLON & RECTAL SURGERY

## 2018-10-23 PROCEDURE — 76010000879 HC OR TIME 3.5 TO 4HR INTENSV - TIER 2: Performed by: COLON & RECTAL SURGERY

## 2018-10-23 PROCEDURE — 77030032490 HC SLV COMPR SCD KNE COVD -B: Performed by: COLON & RECTAL SURGERY

## 2018-10-23 RX ORDER — DEXTROSE 50 % IN WATER (D50W) INTRAVENOUS SYRINGE
25-50 AS NEEDED
Status: DISCONTINUED | OUTPATIENT
Start: 2018-10-23 | End: 2018-10-23 | Stop reason: HOSPADM

## 2018-10-23 RX ORDER — ACETAMINOPHEN 500 MG
1000 TABLET ORAL EVERY 6 HOURS
Status: DISCONTINUED | OUTPATIENT
Start: 2018-10-23 | End: 2018-10-27 | Stop reason: HOSPADM

## 2018-10-23 RX ORDER — LORAZEPAM 2 MG/ML
1 INJECTION INTRAMUSCULAR ONCE
Status: ACTIVE | OUTPATIENT
Start: 2018-10-23 | End: 2018-10-23

## 2018-10-23 RX ORDER — BUDESONIDE AND FORMOTEROL FUMARATE DIHYDRATE 160; 4.5 UG/1; UG/1
2 AEROSOL RESPIRATORY (INHALATION) 2 TIMES DAILY
Status: DISCONTINUED | OUTPATIENT
Start: 2018-10-23 | End: 2018-10-24

## 2018-10-23 RX ORDER — GABAPENTIN 300 MG/1
600 CAPSULE ORAL ONCE
Status: COMPLETED | OUTPATIENT
Start: 2018-10-23 | End: 2018-10-23

## 2018-10-23 RX ORDER — CELECOXIB 400 MG/1
400 CAPSULE ORAL ONCE
Status: DISCONTINUED | OUTPATIENT
Start: 2018-10-23 | End: 2018-10-23 | Stop reason: SDUPTHER

## 2018-10-23 RX ORDER — VECURONIUM BROMIDE FOR INJECTION 1 MG/ML
INJECTION, POWDER, LYOPHILIZED, FOR SOLUTION INTRAVENOUS AS NEEDED
Status: DISCONTINUED | OUTPATIENT
Start: 2018-10-23 | End: 2018-10-23 | Stop reason: HOSPADM

## 2018-10-23 RX ORDER — METRONIDAZOLE 500 MG/100ML
500 INJECTION, SOLUTION INTRAVENOUS
Status: COMPLETED | OUTPATIENT
Start: 2018-10-23 | End: 2018-10-23

## 2018-10-23 RX ORDER — SUCCINYLCHOLINE CHLORIDE 20 MG/ML
INJECTION INTRAMUSCULAR; INTRAVENOUS AS NEEDED
Status: DISCONTINUED | OUTPATIENT
Start: 2018-10-23 | End: 2018-10-23 | Stop reason: HOSPADM

## 2018-10-23 RX ORDER — HYDROMORPHONE HYDROCHLORIDE 2 MG/ML
0.5 INJECTION, SOLUTION INTRAMUSCULAR; INTRAVENOUS; SUBCUTANEOUS AS NEEDED
Status: DISCONTINUED | OUTPATIENT
Start: 2018-10-23 | End: 2018-10-23 | Stop reason: HOSPADM

## 2018-10-23 RX ORDER — ALBUTEROL SULFATE 90 UG/1
AEROSOL, METERED RESPIRATORY (INHALATION) AS NEEDED
Status: DISCONTINUED | OUTPATIENT
Start: 2018-10-23 | End: 2018-10-23 | Stop reason: HOSPADM

## 2018-10-23 RX ORDER — HYDROMORPHONE HYDROCHLORIDE 1 MG/ML
0.5 INJECTION, SOLUTION INTRAMUSCULAR; INTRAVENOUS; SUBCUTANEOUS
Status: DISCONTINUED | OUTPATIENT
Start: 2018-10-23 | End: 2018-10-27 | Stop reason: HOSPADM

## 2018-10-23 RX ORDER — CEFAZOLIN SODIUM 2 G/50ML
2 SOLUTION INTRAVENOUS EVERY 8 HOURS
Status: COMPLETED | OUTPATIENT
Start: 2018-10-23 | End: 2018-10-24

## 2018-10-23 RX ORDER — ONDANSETRON 2 MG/ML
INJECTION INTRAMUSCULAR; INTRAVENOUS AS NEEDED
Status: DISCONTINUED | OUTPATIENT
Start: 2018-10-23 | End: 2018-10-23 | Stop reason: HOSPADM

## 2018-10-23 RX ORDER — SODIUM CHLORIDE, SODIUM LACTATE, POTASSIUM CHLORIDE, CALCIUM CHLORIDE 600; 310; 30; 20 MG/100ML; MG/100ML; MG/100ML; MG/100ML
75 INJECTION, SOLUTION INTRAVENOUS CONTINUOUS
Status: DISCONTINUED | OUTPATIENT
Start: 2018-10-23 | End: 2018-10-24

## 2018-10-23 RX ORDER — DIPHENHYDRAMINE HYDROCHLORIDE 50 MG/ML
25 INJECTION, SOLUTION INTRAMUSCULAR; INTRAVENOUS
Status: DISCONTINUED | OUTPATIENT
Start: 2018-10-23 | End: 2018-10-27 | Stop reason: HOSPADM

## 2018-10-23 RX ORDER — FENTANYL CITRATE 50 UG/ML
INJECTION, SOLUTION INTRAMUSCULAR; INTRAVENOUS AS NEEDED
Status: DISCONTINUED | OUTPATIENT
Start: 2018-10-23 | End: 2018-10-23 | Stop reason: HOSPADM

## 2018-10-23 RX ORDER — CEFAZOLIN SODIUM 2 G/50ML
2 SOLUTION INTRAVENOUS
Status: COMPLETED | OUTPATIENT
Start: 2018-10-23 | End: 2018-10-23

## 2018-10-23 RX ORDER — CELECOXIB 100 MG/1
200 CAPSULE ORAL 2 TIMES DAILY
Status: DISCONTINUED | OUTPATIENT
Start: 2018-10-23 | End: 2018-10-27 | Stop reason: HOSPADM

## 2018-10-23 RX ORDER — MAGNESIUM SULFATE 100 %
4 CRYSTALS MISCELLANEOUS AS NEEDED
Status: DISCONTINUED | OUTPATIENT
Start: 2018-10-23 | End: 2018-10-23 | Stop reason: HOSPADM

## 2018-10-23 RX ORDER — FAMOTIDINE 20 MG/1
20 TABLET, FILM COATED ORAL ONCE
Status: COMPLETED | OUTPATIENT
Start: 2018-10-23 | End: 2018-10-23

## 2018-10-23 RX ORDER — GLYCOPYRROLATE 0.2 MG/ML
INJECTION INTRAMUSCULAR; INTRAVENOUS AS NEEDED
Status: DISCONTINUED | OUTPATIENT
Start: 2018-10-23 | End: 2018-10-23 | Stop reason: HOSPADM

## 2018-10-23 RX ORDER — HEPARIN SODIUM 5000 [USP'U]/ML
5000 INJECTION, SOLUTION INTRAVENOUS; SUBCUTANEOUS EVERY 8 HOURS
Status: DISCONTINUED | OUTPATIENT
Start: 2018-10-23 | End: 2018-10-27 | Stop reason: HOSPADM

## 2018-10-23 RX ORDER — SODIUM CHLORIDE, SODIUM LACTATE, POTASSIUM CHLORIDE, CALCIUM CHLORIDE 600; 310; 30; 20 MG/100ML; MG/100ML; MG/100ML; MG/100ML
75 INJECTION, SOLUTION INTRAVENOUS CONTINUOUS
Status: DISCONTINUED | OUTPATIENT
Start: 2018-10-23 | End: 2018-10-23 | Stop reason: HOSPADM

## 2018-10-23 RX ORDER — SODIUM CHLORIDE 9 MG/ML
500 INJECTION, SOLUTION INTRAVENOUS ONCE
Status: COMPLETED | OUTPATIENT
Start: 2018-10-23 | End: 2018-10-23

## 2018-10-23 RX ORDER — SODIUM CHLORIDE 0.9 % (FLUSH) 0.9 %
5-10 SYRINGE (ML) INJECTION EVERY 8 HOURS
Status: DISCONTINUED | OUTPATIENT
Start: 2018-10-23 | End: 2018-10-23 | Stop reason: HOSPADM

## 2018-10-23 RX ORDER — HEPARIN SODIUM 5000 [USP'U]/ML
5000 INJECTION, SOLUTION INTRAVENOUS; SUBCUTANEOUS EVERY 8 HOURS
Status: DISCONTINUED | OUTPATIENT
Start: 2018-10-23 | End: 2018-10-23 | Stop reason: SDUPTHER

## 2018-10-23 RX ORDER — GABAPENTIN 300 MG/1
600 CAPSULE ORAL ONCE
Status: DISCONTINUED | OUTPATIENT
Start: 2018-10-23 | End: 2018-10-23 | Stop reason: SDUPTHER

## 2018-10-23 RX ORDER — ALBUTEROL SULFATE 0.83 MG/ML
2.5 SOLUTION RESPIRATORY (INHALATION)
Status: DISCONTINUED | OUTPATIENT
Start: 2018-10-23 | End: 2018-10-27 | Stop reason: HOSPADM

## 2018-10-23 RX ORDER — GABAPENTIN 300 MG/1
300 CAPSULE ORAL 3 TIMES DAILY
Status: DISCONTINUED | OUTPATIENT
Start: 2018-10-23 | End: 2018-10-27 | Stop reason: HOSPADM

## 2018-10-23 RX ORDER — ACETAMINOPHEN 500 MG
1000 TABLET ORAL ONCE
Status: COMPLETED | OUTPATIENT
Start: 2018-10-23 | End: 2018-10-23

## 2018-10-23 RX ORDER — PREDNISONE 10 MG/1
10 TABLET ORAL
Status: DISCONTINUED | OUTPATIENT
Start: 2018-10-25 | End: 2018-10-27 | Stop reason: HOSPADM

## 2018-10-23 RX ORDER — NEOSTIGMINE METHYLSULFATE 5 MG/5 ML
SYRINGE (ML) INTRAVENOUS AS NEEDED
Status: DISCONTINUED | OUTPATIENT
Start: 2018-10-23 | End: 2018-10-23 | Stop reason: HOSPADM

## 2018-10-23 RX ORDER — ONDANSETRON 2 MG/ML
4 INJECTION INTRAMUSCULAR; INTRAVENOUS
Status: DISCONTINUED | OUTPATIENT
Start: 2018-10-23 | End: 2018-10-27 | Stop reason: HOSPADM

## 2018-10-23 RX ORDER — ONDANSETRON 2 MG/ML
4 INJECTION INTRAMUSCULAR; INTRAVENOUS ONCE
Status: DISCONTINUED | OUTPATIENT
Start: 2018-10-23 | End: 2018-10-23 | Stop reason: HOSPADM

## 2018-10-23 RX ORDER — METRONIDAZOLE 500 MG/100ML
500 INJECTION, SOLUTION INTRAVENOUS
Status: DISCONTINUED | OUTPATIENT
Start: 2018-10-23 | End: 2018-10-23 | Stop reason: SDUPTHER

## 2018-10-23 RX ORDER — ACETAMINOPHEN 500 MG
1000 TABLET ORAL ONCE
Status: DISCONTINUED | OUTPATIENT
Start: 2018-10-23 | End: 2018-10-23 | Stop reason: SDUPTHER

## 2018-10-23 RX ORDER — CEFAZOLIN SODIUM 2 G/50ML
2 SOLUTION INTRAVENOUS
Status: DISCONTINUED | OUTPATIENT
Start: 2018-10-23 | End: 2018-10-23 | Stop reason: SDUPTHER

## 2018-10-23 RX ORDER — CELECOXIB 400 MG/1
400 CAPSULE ORAL ONCE
Status: COMPLETED | OUTPATIENT
Start: 2018-10-23 | End: 2018-10-23

## 2018-10-23 RX ORDER — PROPOFOL 10 MG/ML
INJECTION, EMULSION INTRAVENOUS AS NEEDED
Status: DISCONTINUED | OUTPATIENT
Start: 2018-10-23 | End: 2018-10-23 | Stop reason: HOSPADM

## 2018-10-23 RX ORDER — MIDAZOLAM HYDROCHLORIDE 1 MG/ML
INJECTION, SOLUTION INTRAMUSCULAR; INTRAVENOUS AS NEEDED
Status: DISCONTINUED | OUTPATIENT
Start: 2018-10-23 | End: 2018-10-23 | Stop reason: HOSPADM

## 2018-10-23 RX ORDER — ROCURONIUM BROMIDE 10 MG/ML
INJECTION, SOLUTION INTRAVENOUS AS NEEDED
Status: DISCONTINUED | OUTPATIENT
Start: 2018-10-23 | End: 2018-10-23 | Stop reason: HOSPADM

## 2018-10-23 RX ORDER — HYDROCORTISONE SODIUM SUCCINATE 100 MG/2ML
50 INJECTION, POWDER, FOR SOLUTION INTRAMUSCULAR; INTRAVENOUS EVERY 8 HOURS
Status: COMPLETED | OUTPATIENT
Start: 2018-10-23 | End: 2018-10-24

## 2018-10-23 RX ORDER — BUPIVACAINE HYDROCHLORIDE AND EPINEPHRINE 2.5; 5 MG/ML; UG/ML
INJECTION, SOLUTION EPIDURAL; INFILTRATION; INTRACAUDAL; PERINEURAL AS NEEDED
Status: DISCONTINUED | OUTPATIENT
Start: 2018-10-23 | End: 2018-10-23 | Stop reason: HOSPADM

## 2018-10-23 RX ORDER — SODIUM CHLORIDE 0.9 % (FLUSH) 0.9 %
5-10 SYRINGE (ML) INJECTION AS NEEDED
Status: DISCONTINUED | OUTPATIENT
Start: 2018-10-23 | End: 2018-10-23 | Stop reason: HOSPADM

## 2018-10-23 RX ORDER — METRONIDAZOLE 500 MG/100ML
500 INJECTION, SOLUTION INTRAVENOUS EVERY 8 HOURS
Status: COMPLETED | OUTPATIENT
Start: 2018-10-23 | End: 2018-10-24

## 2018-10-23 RX ORDER — LIDOCAINE HYDROCHLORIDE 20 MG/ML
INJECTION, SOLUTION EPIDURAL; INFILTRATION; INTRACAUDAL; PERINEURAL AS NEEDED
Status: DISCONTINUED | OUTPATIENT
Start: 2018-10-23 | End: 2018-10-23 | Stop reason: HOSPADM

## 2018-10-23 RX ORDER — ROPINIROLE 0.25 MG/1
1 TABLET, FILM COATED ORAL 2 TIMES DAILY
Status: DISCONTINUED | OUTPATIENT
Start: 2018-10-23 | End: 2018-10-27 | Stop reason: HOSPADM

## 2018-10-23 RX ADMIN — FAMOTIDINE 20 MG: 20 TABLET, FILM COATED ORAL at 06:59

## 2018-10-23 RX ADMIN — Medication 3 MG: at 10:56

## 2018-10-23 RX ADMIN — SODIUM CHLORIDE 100 MG: 900 INJECTION, SOLUTION INTRAVENOUS at 07:03

## 2018-10-23 RX ADMIN — HYDROMORPHONE HYDROCHLORIDE 0.5 MG: 1 INJECTION, SOLUTION INTRAMUSCULAR; INTRAVENOUS; SUBCUTANEOUS at 23:23

## 2018-10-23 RX ADMIN — VECURONIUM BROMIDE FOR INJECTION 1 MG: 1 INJECTION, POWDER, LYOPHILIZED, FOR SOLUTION INTRAVENOUS at 10:00

## 2018-10-23 RX ADMIN — SUCCINYLCHOLINE CHLORIDE 100 MG: 20 INJECTION INTRAMUSCULAR; INTRAVENOUS at 07:38

## 2018-10-23 RX ADMIN — CELECOXIB 400 MG: 400 CAPSULE ORAL at 06:59

## 2018-10-23 RX ADMIN — METRONIDAZOLE 500 MG: 500 INJECTION, SOLUTION INTRAVENOUS at 23:23

## 2018-10-23 RX ADMIN — ROCURONIUM BROMIDE 5 MG: 10 INJECTION, SOLUTION INTRAVENOUS at 07:38

## 2018-10-23 RX ADMIN — CEFAZOLIN SODIUM 2 G: 2 SOLUTION INTRAVENOUS at 07:30

## 2018-10-23 RX ADMIN — ROPINIROLE HYDROCHLORIDE 1 MG: 0.25 TABLET, FILM COATED ORAL at 17:49

## 2018-10-23 RX ADMIN — CEFAZOLIN SODIUM 2 G: 2 SOLUTION INTRAVENOUS at 15:04

## 2018-10-23 RX ADMIN — HYDROCORTISONE SODIUM SUCCINATE 50 MG: 100 INJECTION, POWDER, FOR SOLUTION INTRAMUSCULAR; INTRAVENOUS at 21:50

## 2018-10-23 RX ADMIN — ALBUTEROL SULFATE 2 PUFF: 90 AEROSOL, METERED RESPIRATORY (INHALATION) at 10:58

## 2018-10-23 RX ADMIN — HEPARIN SODIUM 5000 UNITS: 5000 INJECTION, SOLUTION INTRAVENOUS; SUBCUTANEOUS at 15:34

## 2018-10-23 RX ADMIN — FAMOTIDINE 20 MG: 10 INJECTION, SOLUTION INTRAVENOUS at 15:05

## 2018-10-23 RX ADMIN — VECURONIUM BROMIDE FOR INJECTION 1 MG: 1 INJECTION, POWDER, LYOPHILIZED, FOR SOLUTION INTRAVENOUS at 08:30

## 2018-10-23 RX ADMIN — ACETAMINOPHEN 1000 MG: 500 TABLET, FILM COATED ORAL at 23:23

## 2018-10-23 RX ADMIN — LIDOCAINE HYDROCHLORIDE 100 MG: 20 INJECTION, SOLUTION EPIDURAL; INFILTRATION; INTRACAUDAL; PERINEURAL at 07:38

## 2018-10-23 RX ADMIN — HEPARIN SODIUM 5000 UNITS: 5000 INJECTION, SOLUTION INTRAVENOUS; SUBCUTANEOUS at 23:22

## 2018-10-23 RX ADMIN — GABAPENTIN 300 MG: 300 CAPSULE ORAL at 21:50

## 2018-10-23 RX ADMIN — CELECOXIB 200 MG: 100 CAPSULE ORAL at 17:49

## 2018-10-23 RX ADMIN — MIDAZOLAM HYDROCHLORIDE 2 MG: 1 INJECTION, SOLUTION INTRAMUSCULAR; INTRAVENOUS at 07:30

## 2018-10-23 RX ADMIN — ALBUTEROL SULFATE 2 PUFF: 90 AEROSOL, METERED RESPIRATORY (INHALATION) at 07:30

## 2018-10-23 RX ADMIN — GLYCOPYRROLATE 0.6 MG: 0.2 INJECTION INTRAMUSCULAR; INTRAVENOUS at 10:56

## 2018-10-23 RX ADMIN — HYDROMORPHONE HYDROCHLORIDE 0.5 MG: 1 INJECTION, SOLUTION INTRAMUSCULAR; INTRAVENOUS; SUBCUTANEOUS at 18:33

## 2018-10-23 RX ADMIN — METRONIDAZOLE 500 MG: 500 INJECTION, SOLUTION INTRAVENOUS at 15:04

## 2018-10-23 RX ADMIN — ACETAMINOPHEN 1000 MG: 500 TABLET, FILM COATED ORAL at 17:48

## 2018-10-23 RX ADMIN — PROPOFOL 120 MG: 10 INJECTION, EMULSION INTRAVENOUS at 07:38

## 2018-10-23 RX ADMIN — SODIUM CHLORIDE, SODIUM LACTATE, POTASSIUM CHLORIDE, AND CALCIUM CHLORIDE 75 ML/HR: 600; 310; 30; 20 INJECTION, SOLUTION INTRAVENOUS at 06:59

## 2018-10-23 RX ADMIN — VECURONIUM BROMIDE FOR INJECTION 1 MG: 1 INJECTION, POWDER, LYOPHILIZED, FOR SOLUTION INTRAVENOUS at 09:03

## 2018-10-23 RX ADMIN — HYDROCORTISONE SODIUM SUCCINATE 50 MG: 100 INJECTION, POWDER, FOR SOLUTION INTRAMUSCULAR; INTRAVENOUS at 15:05

## 2018-10-23 RX ADMIN — GABAPENTIN 300 MG: 300 CAPSULE ORAL at 15:35

## 2018-10-23 RX ADMIN — SODIUM CHLORIDE, SODIUM LACTATE, POTASSIUM CHLORIDE, AND CALCIUM CHLORIDE 75 ML/HR: 600; 310; 30; 20 INJECTION, SOLUTION INTRAVENOUS at 15:06

## 2018-10-23 RX ADMIN — FENTANYL CITRATE 50 MCG: 50 INJECTION, SOLUTION INTRAMUSCULAR; INTRAVENOUS at 07:59

## 2018-10-23 RX ADMIN — FAMOTIDINE 20 MG: 10 INJECTION, SOLUTION INTRAVENOUS at 21:50

## 2018-10-23 RX ADMIN — FENTANYL CITRATE 50 MCG: 50 INJECTION, SOLUTION INTRAMUSCULAR; INTRAVENOUS at 09:40

## 2018-10-23 RX ADMIN — METRONIDAZOLE 500 MG: 500 SOLUTION INTRAVENOUS at 07:45

## 2018-10-23 RX ADMIN — HEPARIN SODIUM 5000 UNITS: 5000 INJECTION, SOLUTION INTRAVENOUS; SUBCUTANEOUS at 07:17

## 2018-10-23 RX ADMIN — ACETAMINOPHEN 1000 MG: 500 TABLET, FILM COATED ORAL at 06:59

## 2018-10-23 RX ADMIN — CEFAZOLIN SODIUM 2 G: 2 SOLUTION INTRAVENOUS at 23:22

## 2018-10-23 RX ADMIN — GABAPENTIN 600 MG: 300 CAPSULE ORAL at 06:59

## 2018-10-23 RX ADMIN — VECURONIUM BROMIDE FOR INJECTION 4 MG: 1 INJECTION, POWDER, LYOPHILIZED, FOR SOLUTION INTRAVENOUS at 07:59

## 2018-10-23 RX ADMIN — SODIUM CHLORIDE 500 ML/HR: 900 INJECTION, SOLUTION INTRAVENOUS at 21:45

## 2018-10-23 RX ADMIN — ONDANSETRON 4 MG: 2 INJECTION INTRAMUSCULAR; INTRAVENOUS at 09:58

## 2018-10-23 RX ADMIN — SODIUM CHLORIDE, SODIUM LACTATE, POTASSIUM CHLORIDE, AND CALCIUM CHLORIDE: 600; 310; 30; 20 INJECTION, SOLUTION INTRAVENOUS at 10:00

## 2018-10-23 NOTE — BRIEF OP NOTE
BRIEF OPERATIVE NOTE    Date of Procedure: 10/23/2018   Preoperative Diagnosis: [facility] ([facility] [C18.0]  Postoperative Diagnosis: Cecal [facility] ([facility] [C18.0]    Procedure(s):  ROBOT ASSISTED  RIGHT COLECTOMY  Surgeon(s) and Role:     Ricky Diaz MD - Primary         Surgical Assistant: none    Surgical Staff:  Circ-1: Chris Penn RN  Circ-Relief: Jo Diaz RN; Jarvis Pace RN  Scrub Tech-1: [facility]  Surg Asst-1: Winifred Montgomery  Event Time In Time Out   Incision Start 0800    Incision Close       Anesthesia: General   Estimated Blood Loss: 50 ml  Specimens:   ID Type Source Tests Collected by Time Destination   1 : ileum, cecum, ascending and portion of transverse colon with additional ileum Preservative Colon  Rakesh Saeed MD 10/23/2018 1047 Pathology      Findings: cecal cancer   Complications: none  Implants: * No implants in log *    [id number]

## 2018-10-23 NOTE — ANESTHESIA PREPROCEDURE EVALUATION
Anesthetic History     PONV          Review of Systems / Medical History  Patient summary reviewed and pertinent labs reviewed    Pulmonary    COPD (On Oxygen 2 L via Nasal cannula): moderate        Asthma : well controlled       Neuro/Psych         Psychiatric history (Deprression)     Cardiovascular                  Exercise tolerance: >4 METS     GI/Hepatic/Renal                Endo/Other        Arthritis     Other Findings              Physical Exam    Airway  Mallampati: II  TM Distance: 4 - 6 cm  Neck ROM: normal range of motion   Mouth opening: Normal     Cardiovascular  Regular rate and rhythm,  S1 and S2 normal,  no murmur, click, rub, or gallop             Dental  No notable dental hx       Pulmonary  Breath sounds clear to auscultation               Abdominal  GI exam deferred       Other Findings            Anesthetic Plan    ASA: 3  Anesthesia type: general          Induction: Intravenous  Anesthetic plan and risks discussed with: Patient      Possible post op ventilation d/w with patient

## 2018-10-23 NOTE — OP NOTES
[facility]  OPERATIVE REPORT    Helen Soulier  MR#: [id number]  : 1945  ACCOUNT #: [de-identified]   DATE OF SERVICE: 10/23/2018    PREOPERATIVE DIAGNOSIS:  Adenocarcinoma of the cecum. POSTOPERATIVE DIAGNOSIS:  Adenocarcinoma of the cecum. PROCEDURE PERFORMED:  Robotic right colectomy with intracorporeal anastomosis. SURGEON:  Сергей Garcia MD    ANESTHESIA:  General.    ESTIMATED BLOOD LOSS:  15 mL. SPECIMENS REMOVED:  Ileum, cecum, ascending and portion of transverse colon to Pathology. COMPLICATIONS:  None. IMPLANTS:  None. ASSISTANTS:  None. INDICATIONS:  The patient is a 79-year-old woman with a cecal mass which was clinically an adenocarcinoma. She was brought to the operating room for robotic right colectomy. I explained the risks of procedure including bleeding, infection, injury to surrounding structures, need for a temporary or permanent stoma and death. She understood and wished to proceed. PROCEDURE:  The patient was properly identified in the holding area, brought to the operating room. She was laid supine on the operating table. General anesthesia was administered. Morales catheter was placed. Chest was strapped to the bed. Arms were tucked at her side. Abdomen was prepped and draped in the usual sterile fashion. We made a small stab incision beneath the left subcostal margin, inserted a Veress needle and insufflated the abdomen to 15 mmHg, placed an 8 mm robotic port there, two 8 mm robotic ports in the left lateral abdomen and one 12 mm port in the lower midline 2 fingerbreadths above the pubis. The abdomen was explored. There was no significant adhesive disease aside from the tumor, which was adherent to the anterior abdominal wall. The bowel was swept out of the way and the robot was docked. We isolated the ileocolic pedicle, dissected this circumferentially and transected it with the vessel sealing device.   Hemostasis was excellent. We performed a medial to lateral dissection of the cecum, ascending colon and first portion of the transverse colon. Duodenum was swept downward and unharmed. We transected mesentery to the terminal ileum approximately 10 cm proximal to the cecum. We stapled across the ileum with a single firing of a 45 mm blue load robotic stapler. Likewise, we transected proximal transverse colon mesentery and came across the proximal transverse colon with 2 firings of a 45 mm blue load robotic stapler. We took additional hepatocolic ligament attachments until the colon was attached only by the lateral attachments along the sidewall. We aligned the terminal ileum with the transverse colon, created enterotomies in the antimesenteric borders and fired a 45 mm blue load robotic stapler to create the anastomosis. The resulting defect was closed in 2 layers with a running 3-0 V-Loc suture. We noted that there was some additional length of the ileum past the anastomosis. We stapled across this with an additional firing of 45 mm blue load MARK stapler. At this point, we removed the remaining attachments along the lateral abdomen along the white line of Toldt, thus completely freeing the colon from surrounding tissues. The robot was undocked. The abdomen was desufflated. We expanded the lower midline incision to create a Pfannenstiel incision with a 15 blade, carried this down to subcutaneous tissues, incised the fascia anteriorly and posteriorly and entered a wound protector. We removed both the additional piece of small intestine as well as the entire right colon and passed this off the field. The extraction site was closed in 2 layers posteriorly with 0 Vicryl suture and anteriorly with #1 PDS suture. Subcutaneous tissues were irrigated. Skin incisions were closed with 4-0 Monocryl subcuticular suture. Steri-Strips were applied. The patient tolerated the procedure well.   All instrument, sponge and needle counts were correct at the end of the case x2. The patient awoke from anesthesia, was extubated and transported to the PACU in stable condition. MD ARTHUR Norman / Celine Nelson  D: 10/23/2018 11:12     T: 10/23/2018 18:07  JOB #: [id number]

## 2018-10-23 NOTE — PROGRESS NOTES
Reason for Admission:   ROBOT ASSISTED  RIGHT COLECTOMY               RRAT Score:     25             Resources/supports as identified by patient/family:   She has Medicare Part A & B as well as [employer] [facility] [employer]. She lives with her sister: Shanta Washington. She's her emergency contact and she could be reached at [phone]. Her son and daughter: Marlyn Cheney live nearby to her. Marlyn Cheney can be reached at [phone]. Top Challenges facing patient (as identified by patient/family and CM): Finances/Medication cost?      The patient is sleeping s/p surgery. No problems were reported by the family in this area. They state that she can obtain her medications from the pharmacy and take her medications as directed. Transportation? Family              Support system or lack thereof? Her adult children, and her sister. Living arrangements? She lives with her sister: Shanta Washington. Self-care/ADLs/Cognition? A/Ox3. Independent of her ADL/IADLs as per family. Current Advanced Directive/Advance Care Plan: On file                          Plan for utilizing home health:    No home health orders at this time. Likelihood of readmission: High                 Transition of Care Plan:      The patient plans to return home with her sister as mentioned above. Transportation will be provided by her family upon discharge. She ambulates w/o DME, and is independent of her ADL/IADLs. The family states that she \"stumbles\" while ambulating, but the patient refuses to use DME. Patient is sleeping; the family Vero Hagen, son; Marlyn Cheney, daughter; & Shanta Washington, sister) have no concerns for discharge.   She has never used home health in the past.           Care Management Interventions  PCP Verified by CM: Yes(Last seen by PCP last week)  Mode of Transport at Discharge: Self  Transition of Care Consult (CM Consult): Discharge Planning  Discharge Durable Medical Equipment: (The family states that the patient does not like to use DME; she has a cane, RW, & rollator at home.)  Current Support Network:  Other, Family Lives Nearby(Lives with her sister: Olu Jaffe)  Confirm Follow Up Transport: Family  Plan discussed with Pt/Family/Caregiver: Yes  Discharge Location  Discharge Placement: Home with family assistance

## 2018-10-23 NOTE — PERIOP NOTES
TRANSFER - OUT REPORT:    Verbal report given to Mariela AVITIA on Serafin Davila  being transferred to [facility] for routine post - op       Report consisted of patients Situation, Background, Assessment and   Recommendations(SBAR). Information from the following report(s) SBAR, OR Summary, Procedure Summary, Intake/Output, MAR and Recent Results was reviewed with the receiving nurse. Lines:   Peripheral IV 10/23/18 Right; Anterior Forearm (Active)   Site Assessment Clean, dry, & intact 10/23/2018 11:30 AM   Phlebitis Assessment 0 10/23/2018 11:30 AM   Infiltration Assessment 0 10/23/2018 11:30 AM   Dressing Status Clean, dry, & intact 10/23/2018 11:30 AM   Dressing Type Tape;Transparent 10/23/2018 11:30 AM   Hub Color/Line Status Pink; Infusing 10/23/2018 11:30 AM       Peripheral IV 10/23/18 Left; Anterior Forearm (Active)   Site Assessment Clean, dry, & intact 10/23/2018 11:30 AM   Phlebitis Assessment 0 10/23/2018 11:30 AM   Infiltration Assessment 0 10/23/2018 11:30 AM   Dressing Status Clean, dry, & intact 10/23/2018 11:30 AM   Dressing Type Tape;Transparent 10/23/2018 11:30 AM   Hub Color/Line Status Pink;Capped 10/23/2018 11:30 AM        Opportunity for questions and clarification was provided.       Patient transported with:   O2 @ 3 liters  Tech

## 2018-10-23 NOTE — ANESTHESIA POSTPROCEDURE EVALUATION
Procedure(s):  ROBOT ASSISTED  RIGHT COLECTOMY.     Anesthesia Post Evaluation      Multimodal analgesia: multimodal analgesia used between 6 hours prior to anesthesia start to PACU discharge  Patient location during evaluation: bedside  Patient participation: complete - patient participated  Level of consciousness: awake  Pain management: adequate  Airway patency: patent  Anesthetic complications: no  Cardiovascular status: acceptable  Respiratory status: acceptable  Hydration status: acceptable        Visit Vitals  BP 97/57   Pulse 82   Temp 36.4 °C (97.5 °F)   Resp 16   Ht 5' 6\" (1.676 m)   Wt 54 kg (119 lb)   SpO2 97%   BMI 19.21 kg/m²

## 2018-10-23 NOTE — PROGRESS NOTES
conducted a pre-surgery visit with Serafin Davila, who is a 72 y.o.,female. The  provided the following Interventions:  Initiated a relationship of care and support. Offered prayer and assurance of continued prayers on patient's behalf. Plan:  Chaplains will continue to follow and will provide pastoral care on an as needed/requested basis.  recommends bedside caregivers page  on duty if patient shows signs of acute spiritual or emotional distress.     [address] [facility] Certified [address] [place] [address]   [phone]

## 2018-10-23 NOTE — INTERVAL H&P NOTE
H&P Update:  Dg Thacker was seen and examined. History and physical has been reviewed. The patient has been examined.  There have been no significant clinical changes since the completion of the originally dated History and Physical.    Signed By: Araceli Griggs MD     October 23, 2018 7:08 AM

## 2018-10-24 LAB
ANION GAP SERPL CALC-SCNC: 6 MMOL/L (ref 3–18)
BUN SERPL-MCNC: 22 MG/DL (ref 7–18)
BUN/CREAT SERPL: 27 (ref 12–20)
CALCIUM SERPL-MCNC: 7.8 MG/DL (ref 8.5–10.1)
CHLORIDE SERPL-SCNC: 102 MMOL/L (ref 100–108)
CO2 SERPL-SCNC: 28 MMOL/L (ref 21–32)
CREAT SERPL-MCNC: 0.82 MG/DL (ref 0.6–1.3)
ERYTHROCYTE [DISTWIDTH] IN BLOOD BY AUTOMATED COUNT: 13.7 % (ref 11.6–14.5)
GLUCOSE SERPL-MCNC: 108 MG/DL (ref 74–99)
HCT VFR BLD AUTO: 36.4 % (ref 35–45)
HGB BLD-MCNC: 11.9 G/DL (ref 12–16)
MAGNESIUM SERPL-MCNC: 2.1 MG/DL (ref 1.6–2.6)
MCH RBC QN AUTO: 32.4 PG (ref 24–34)
MCHC RBC AUTO-ENTMCNC: 32.7 G/DL (ref 31–37)
MCV RBC AUTO: 99.2 FL (ref 74–97)
PHOSPHATE SERPL-MCNC: 4 MG/DL (ref 2.5–4.9)
PLATELET # BLD AUTO: 298 K/UL (ref 135–420)
PMV BLD AUTO: 9.9 FL (ref 9.2–11.8)
POTASSIUM SERPL-SCNC: 4.7 MMOL/L (ref 3.5–5.5)
RBC # BLD AUTO: 3.67 M/UL (ref 4.2–5.3)
SODIUM SERPL-SCNC: 136 MMOL/L (ref 136–145)
WBC # BLD AUTO: 10.3 K/UL (ref 4.6–13.2)

## 2018-10-24 PROCEDURE — 74011250636 HC RX REV CODE- 250/636: Performed by: COLON & RECTAL SURGERY

## 2018-10-24 PROCEDURE — 74011000250 HC RX REV CODE- 250: Performed by: COLON & RECTAL SURGERY

## 2018-10-24 PROCEDURE — 83735 ASSAY OF MAGNESIUM: CPT | Performed by: COLON & RECTAL SURGERY

## 2018-10-24 PROCEDURE — 80048 BASIC METABOLIC PNL TOTAL CA: CPT | Performed by: COLON & RECTAL SURGERY

## 2018-10-24 PROCEDURE — 36415 COLL VENOUS BLD VENIPUNCTURE: CPT | Performed by: COLON & RECTAL SURGERY

## 2018-10-24 PROCEDURE — 74011250637 HC RX REV CODE- 250/637: Performed by: COLON & RECTAL SURGERY

## 2018-10-24 PROCEDURE — 84100 ASSAY OF PHOSPHORUS: CPT | Performed by: COLON & RECTAL SURGERY

## 2018-10-24 PROCEDURE — 85027 COMPLETE CBC AUTOMATED: CPT | Performed by: COLON & RECTAL SURGERY

## 2018-10-24 PROCEDURE — 94640 AIRWAY INHALATION TREATMENT: CPT

## 2018-10-24 PROCEDURE — 65270000029 HC RM PRIVATE

## 2018-10-24 RX ORDER — BUDESONIDE AND FORMOTEROL FUMARATE DIHYDRATE 160; 4.5 UG/1; UG/1
2 AEROSOL RESPIRATORY (INHALATION)
Status: DISCONTINUED | OUTPATIENT
Start: 2018-10-24 | End: 2018-10-27 | Stop reason: HOSPADM

## 2018-10-24 RX ADMIN — HYDROMORPHONE HYDROCHLORIDE 0.5 MG: 1 INJECTION, SOLUTION INTRAMUSCULAR; INTRAVENOUS; SUBCUTANEOUS at 21:28

## 2018-10-24 RX ADMIN — FAMOTIDINE 20 MG: 10 INJECTION, SOLUTION INTRAVENOUS at 21:00

## 2018-10-24 RX ADMIN — CEFAZOLIN SODIUM 2 G: 2 SOLUTION INTRAVENOUS at 06:31

## 2018-10-24 RX ADMIN — ROPINIROLE HYDROCHLORIDE 1 MG: 0.25 TABLET, FILM COATED ORAL at 09:50

## 2018-10-24 RX ADMIN — FAMOTIDINE 20 MG: 10 INJECTION, SOLUTION INTRAVENOUS at 08:30

## 2018-10-24 RX ADMIN — TIOTROPIUM BROMIDE 18 MCG: 18 CAPSULE ORAL; RESPIRATORY (INHALATION) at 09:00

## 2018-10-24 RX ADMIN — HYDROMORPHONE HYDROCHLORIDE 0.5 MG: 1 INJECTION, SOLUTION INTRAMUSCULAR; INTRAVENOUS; SUBCUTANEOUS at 03:50

## 2018-10-24 RX ADMIN — CELECOXIB 200 MG: 100 CAPSULE ORAL at 17:13

## 2018-10-24 RX ADMIN — METRONIDAZOLE 500 MG: 500 INJECTION, SOLUTION INTRAVENOUS at 06:31

## 2018-10-24 RX ADMIN — GABAPENTIN 300 MG: 300 CAPSULE ORAL at 21:28

## 2018-10-24 RX ADMIN — CELECOXIB 200 MG: 100 CAPSULE ORAL at 08:30

## 2018-10-24 RX ADMIN — ROPINIROLE HYDROCHLORIDE 1 MG: 0.25 TABLET, FILM COATED ORAL at 17:13

## 2018-10-24 RX ADMIN — HYDROCORTISONE SODIUM SUCCINATE 50 MG: 100 INJECTION, POWDER, FOR SOLUTION INTRAMUSCULAR; INTRAVENOUS at 05:46

## 2018-10-24 RX ADMIN — BUDESONIDE AND FORMOTEROL FUMARATE DIHYDRATE 2 PUFF: 160; 4.5 AEROSOL RESPIRATORY (INHALATION) at 08:52

## 2018-10-24 RX ADMIN — HEPARIN SODIUM 5000 UNITS: 5000 INJECTION, SOLUTION INTRAVENOUS; SUBCUTANEOUS at 08:30

## 2018-10-24 RX ADMIN — HYDROMORPHONE HYDROCHLORIDE 0.5 MG: 1 INJECTION, SOLUTION INTRAMUSCULAR; INTRAVENOUS; SUBCUTANEOUS at 17:12

## 2018-10-24 RX ADMIN — ACETAMINOPHEN 1000 MG: 500 TABLET, FILM COATED ORAL at 05:47

## 2018-10-24 RX ADMIN — BUDESONIDE AND FORMOTEROL FUMARATE DIHYDRATE 2 PUFF: 160; 4.5 AEROSOL RESPIRATORY (INHALATION) at 21:14

## 2018-10-24 RX ADMIN — HYDROMORPHONE HYDROCHLORIDE 0.5 MG: 1 INJECTION, SOLUTION INTRAMUSCULAR; INTRAVENOUS; SUBCUTANEOUS at 11:02

## 2018-10-24 RX ADMIN — GABAPENTIN 300 MG: 300 CAPSULE ORAL at 08:30

## 2018-10-24 RX ADMIN — HEPARIN SODIUM 5000 UNITS: 5000 INJECTION, SOLUTION INTRAVENOUS; SUBCUTANEOUS at 17:12

## 2018-10-24 RX ADMIN — ACETAMINOPHEN 1000 MG: 500 TABLET, FILM COATED ORAL at 17:11

## 2018-10-24 RX ADMIN — SODIUM CHLORIDE, SODIUM LACTATE, POTASSIUM CHLORIDE, AND CALCIUM CHLORIDE 75 ML/HR: 600; 310; 30; 20 INJECTION, SOLUTION INTRAVENOUS at 12:58

## 2018-10-24 NOTE — ROUTINE PROCESS
2100: Pt has low BP MD to be notified. 2135: Order given by Dr. Surya Hunt. Will recheck BP  0132: Pt sleeping. Scheduled and pain medication given as ordered. Denies nausea or vomiting. Family members in the room with pt. No issue. Will continue to monitor the pt.

## 2018-10-24 NOTE — CDMP QUERY
Patient is noted to have a BMI of 19.21. Please clarify if this patient is:  
 
=>Underweight 
=>Cachectic 
=>Normal body habitus 
=>Other explanation of clinical findings =>Clinically Undetermined (no explanation for clinical findings) Presentation: 5'6\", 119 lbs = BMI 19.21 If you DECLINE this query or would like to communicate with [employer], please utilize the \"[employer] message box\" at the TOP of the Progress Note on the right. Thank you, Yin Cancino RN/CCDS 
[phone]

## 2018-10-24 NOTE — PROGRESS NOTES
Problem: Falls - Risk of  Goal: *Absence of Falls  Document [place] Fall Risk and appropriate interventions in the flowsheet.   Outcome: Progressing Towards Goal  Fall Risk Interventions:  Mobility Interventions: Communicate number of staff needed for ambulation/transfer, Patient to call before getting OOB, PT Consult for mobility concerns         Medication Interventions: Patient to call before getting OOB, Teach patient to arise slowly    Elimination Interventions: Call light in reach, Patient to call for help with toileting needs, Toilet paper/wipes in reach, Toileting schedule/hourly rounds    History of Falls Interventions: Bed/chair exit alarm

## 2018-10-24 NOTE — ROUTINE PROCESS
Mobility Intervention:       [] Pt dangled at edge of bed    [] Pt assisted OOB to bedside commode    [] Pt assisted OOB to chair    [] Pt ambulated to bathroom    [x] Patient was ambulated in room/hallway    Assistive Device Utilized:       [x] Rolling walker   [] Crutches   [] Straight Cane   [] Knee immobilizer   [] IV pole    After Mobilization:     [x] Patient left in no apparent distress sitting up in chair  [] Patient left in no apparent distress in bed  [x] Call bell left within reach  [x] SCDs on & machine turned on  [] Ice applied  [x] RN notified  [] Caregiver present  [] Bed alarm activated    Reason patient not mobilized:      [] Patient refused   [] Nausea/vomiting   [] Low blood pressure   [] Drowsy/lethargic    Pain Rating:     [] 0  [] 1  Assistive Device:        [] 2  [] 3  [] 4  [] 5  [] 6  Assistive Device:        [] 7  [] 8  [] 9  [] 10    Comments:

## 2018-10-24 NOTE — ROUTINE PROCESS
Bedside and Verbal shift change report [facility] (oncoming nurse) by Meryle Gable (offgoing nurse). Report included the following information SBAR, Kardex, Intake/Output, MAR and Recent Results.

## 2018-10-24 NOTE — ROUTINE PROCESS
Bedside and Verbal shift change report given to  Joslyn AVITIA (oncoming nurse) by Kathleen Combs RN (offgoing nurse). Report included the following information SBAR, Kardex, MAR and Recent Results. SITUATION:    Code Status: Prior   Reason for Admission: Cecal cancer ([facility] [C18.0]    [facility] day: 1   Problem List:       Hospital Problems  Date Reviewed: 9/27/2018          Codes Class Noted POA    Cecal cancer ([facility] ICD-10-CM: C18.0  ICD-9-CM: 153.4  10/23/2018 Unknown              BACKGROUND:    Past Medical History:   Past Medical History:   Diagnosis Date    Anxiety     Arthritis     Asbestosis ([facility]     Asthma     Back problem     Baker's cyst     Balance problems     Chronic lung disease     Cigarette smoker     Clotting disorder (HCC)     COPD (chronic obstructive pulmonary disease) (HCC)     oxygen 2/liters    Depression     History of DVT (deep vein thrombosis)     Leg pain     Right    Lung disease     Nausea & vomiting     Osteoporosis     Restless leg syndrome     Spinal stenosis     Thromboembolus ([facility]     Vitamin D deficiency          Patient taking anticoagulants no     ASSESSMENT:    Changes in Assessment Throughout Shift: No     Patient has Central Line: no Reasons if yes: No   Patient has Morales Cath: no Reasons if yes: No      Last Vitals:     Vitals:    10/23/18 1657 10/23/18 2014 10/23/18 2100 10/23/18 2318   BP: 96/59 (!) 81/47 (!) 79/50 97/60   Pulse: 81 76 78 77   Resp: 16 18 18 16   Temp: 98.2 °F (36.8 °C) 98.3 °F (36.8 °C) 97.5 °F (36.4 °C) 98 °F (36.7 °C)   SpO2: 98% 94% 96% 97%   Weight:       Height:            IV and DRAINS (will only show if present)   Peripheral IV 10/23/18 Right; Anterior Forearm-Site Assessment: Clean, dry, & intact  Peripheral IV 10/23/18 Left; Anterior Forearm-Site Assessment: Clean, dry, & intact     WOUND (if present)   Wound Type:  none   Dressing present Dressing Present : No   Wound Concerns/Notes:  none     PAIN    Pain Assessment    Pain Intensity 1: 6 (10/23/18 6598)    Pain Location 1: Abdomen    Pain Intervention(s) 1: Medication (see MAR)    Patient Stated Pain Goal: 0  o Interventions for Pain:  none  o Intervention effective: no  o Time of last intervention: 0700   o Reassessment Completed: no      Last 3 Weights:  Last 3 Recorded Weights in this Encounter    10/09/18 1015 10/23/18 0657   Weight: 53.5 kg (118 lb) 54 kg (119 lb)     Weight change:      INTAKE/OUPUT    Current Shift: 10/23 1901 - 10/24 0700  In: -   Out: 450 [Urine:450]    Last three shifts: 10/22 0701 - 10/23 1900  In: 1100 [I.V.:1100]  Out: 395 [Urine:365]     LAB RESULTS   No results for input(s): WBC, HGB, HCT, PLT, HGBEXT, HCTEXT, PLTEXT in the last 72 hours. No results for input(s): NA, K, GLU, BUN, CREA, CA, MG, INR in the last 72 hours. No lab exists for component: PT, PTT, INREXT    RECOMMENDATIONS AND DISCHARGE PLANNING     1. Pending tests/procedures/ Plan of Care or Other Needs: TBD     2. Discharge plan for patient and Needs/Barriers: TBD    3. Estimated Discharge Date: TBD Posted on Whiteboard in Patients Room: no      4. The patient's care plan was reviewed with the oncoming nurse. \"HEALS\" SAFETY CHECK      Fall Risk    Total Score: 1    Safety Measures: Safety Measures: Bed/Chair alarm on, Bed/Chair-Wheels locked, Bed in low position, Call light within reach, Fall prevention (comment)    A safety check occurred in the patient's room between off going nurse and oncoming nurse listed above.     The safety check included the below items  Area Items   H  High Alert Medications - Verify all high alert medication drips (heparin, PCA, etc.)   E  Equipment - Suction is set up for ALL patients (with yanker)  - Red plugs utilized for all equipment (IV pumps, etc.)  - WOWs wiped down at end of shift.  - Room stocked with oxygen, suction, and other unit-specific supplies   A  Alarms - Bed alarm is set for fall risk patients  - Ensure chair alarm is in place and activated if patient is up in a chair   L  Lines - Check IV for any infiltration  - Morales bag is empty if patient has a Morales   - Tubing and IV bags are labeled   S  Safety   - Room is clean, patient is clean, and equipment is clean. - Hallways are clear from equipment besides carts. - Fall bracelet on for fall risk patients  - Ensure room is clear and free of clutter  - Suction is set up for ALL patients (with yanker)  - Hallways are clear from equipment besides carts.    - Isolation precautions followed, supplies available outside room, sign posted     Casandra Matthews RN

## 2018-10-24 NOTE — PROGRESS NOTES
[facility]  [address] [place] [id number] [phone]  Colon and Rectal Surgery Progress Note      Patient: Eleazar Taylor MRN: [id number]  SSN: [id number]    YOB: 1945  Age: 72 y.o. Sex: female      Admit Date: 10/23/2018    LOS: 1 day     Subjective: Tolerating clears, mild nausea with broth. No bowel function. Ambulating.     Objective:     Vitals:    10/24/18 0341 10/24/18 0727 10/24/18 1153 10/24/18 1257   BP: 99/55 98/60 107/65 105/58   Pulse: 80 78 73 74   Resp: 18 17 18 17   Temp: 98.2 °F (36.8 °C) 98.2 °F (36.8 °C) 98.7 °F (37.1 °C) 99.3 °F (37.4 °C)   SpO2: 95% 97% 98% 97%   Weight:       Height:            Intake and Output:  Current Shift: 10/24 0701 - 10/24 1900  In: 120 [P.O.:120]  Out: 500 [Urine:500]  Last three shifts: 10/22 1901 - 10/24 0700  In: 1100 [I.V.:1100]  Out: 845 [Urine:815]    Physical Exam:     abd soft, appr tender, ND    Lab/Data Review:    BMP:   Lab Results   Component Value Date/Time     10/24/2018 04:50 AM    K 4.7 10/24/2018 04:50 AM     10/24/2018 04:50 AM    CO2 28 10/24/2018 04:50 AM    AGAP 6 10/24/2018 04:50 AM     (H) 10/24/2018 04:50 AM    BUN 22 (H) 10/24/2018 04:50 AM    CREA 0.82 10/24/2018 04:50 AM    GFRAA >60 10/24/2018 04:50 AM    GFRNA >60 10/24/2018 04:50 AM     CMP:   Lab Results   Component Value Date/Time     10/24/2018 04:50 AM    K 4.7 10/24/2018 04:50 AM     10/24/2018 04:50 AM    CO2 28 10/24/2018 04:50 AM    AGAP 6 10/24/2018 04:50 AM     (H) 10/24/2018 04:50 AM    BUN 22 (H) 10/24/2018 04:50 AM    CREA 0.82 10/24/2018 04:50 AM    GFRAA >60 10/24/2018 04:50 AM    GFRNA >60 10/24/2018 04:50 AM    CA 7.8 (L) 10/24/2018 04:50 AM    MG 2.1 10/24/2018 04:50 AM    PHOS 4.0 10/24/2018 04:50 AM     CBC:   Lab Results   Component Value Date/Time    WBC 10.3 10/24/2018 04:50 AM    HGB 11.9 (L) 10/24/2018 04:50 AM    HCT 36.4 10/24/2018 04:50 AM     10/24/2018 04:50 AM Assessment:     POD 1 s/p robotic right colectomy    Plan:     Clears to fulls  D/C gutierres  Hydrocortisone taper    Signed By: Juliane Roberts MD        October 24, 2018

## 2018-10-24 NOTE — PROGRESS NOTES
NUTRITION    Nursing Referral: [facility]     RECOMMENDATIONS / PLAN:     - Monitor GI symptoms and advance po diet as tolerated per surgery. - Add nutrition supplements: Unjury once daily and Magic Cup once daily. - Continue RD inpatient monitoring and evaluation. NUTRITION INTERVENTIONS & DIAGNOSIS:     [x] Meals/snacks: modified composition  [x] Medical food supplement therapy: initiate     Nutrition Diagnosis: Inadequate energy intake related to altered GI function and diet tolerance as evidenced by pt on a liquid diet after colostomy. Patient meets criteria for Moderate Protein Calorie Malnutrition as evidenced by:   ASPEN Malnutrition Criteria  Acute Illness, Chronic Illness, or Social/Enviornmental: Chronic illness  Energy Intake: <75% est energy req for greater than/equal to 1 month  Body Fat: Mild(orbital, thoracic regions)  Muscle Mass: Mild(temple, clavicle, trapezius regions)  ASPEN Malnutrition Score - Chronic Illness: 3  Chronic Illness - Malnutrition Diagnosis: Moderate malnutrition. ASSESSMENT:     Cecal cancer s/p colectomy 10/23. Tolerating liquid diet. H/o pain with eating and poor appetite, sometimes skipping all meals or just consuming 1 per day. Per sister, intake improved over the last month after pt began living with sister. Pt dislikes Ensure/Glucerna, but began drinking 1 Premiere Protein per day 3 weeks ago. Discussed supplement options in home and additional options on discharge. NFPE completed.     Average po intake adequate to meet patients estimated nutritional needs:   [] Yes     [x] No   [] Unable to determine at this time    Diet: DIET CLEAR LIQUID      Food Allergies: Shellfish  Current Appetite:   [] Good     [x] Fair     [] Poor     [] Other:  Appetite/meal intake prior to admission:   [] Good     [x] Fair     [x] Poor     [] Other:  Feeding Limitations:  [] Swallowing difficulty    [] Chewing difficulty    [] Other:  Current Meal Intake:   Patient Vitals for the past 100 hrs:   % Diet Eaten   10/24/18 1058 0 %   10/24/18 0901 0 %       BM: 10/23  Skin Integrity: surgical wound to abdomen  Edema:   [x] No     [] Yes   Pertinent Medications: Reviewed: deltasone    Recent Labs     10/24/18  0450      K 4.7      CO2 28   *   BUN 22*   CREA 0.82   CA 7.8*   MG 2.1   PHOS 4.0       Intake/Output Summary (Last 24 hours) at 10/24/2018 1219  Last data filed at 10/24/2018 1058  Gross per 24 hour   Intake 0 ml   Output 950 ml   Net -950 ml       Anthropometrics:  Ht Readings from Last 1 Encounters:   10/09/18 5' 6\" (1.676 m)     Last 3 Recorded Weights in this Encounter    10/09/18 1015 10/23/18 0657   Weight: 53.5 kg (118 lb) 54 kg (119 lb)     Body mass index is 19.21 kg/m². Weight History: -4% x 1 month. H/o weight fluctuations over the last year PTA. Weight Metrics 10/23/2018 10/4/2018 9/27/2018 9/23/2018 8/28/2018 8/16/2018 7/19/2018   Weight 119 lb 118 lb 124 lb 117 lb 116 lb 122 lb 4.8 oz 118 lb   BMI 19.21 kg/m2 19.05 kg/m2 20.01 kg/m2 18.88 kg/m2 19.3 kg/m2 20.35 kg/m2 19.05 kg/m2        Admitting Diagnosis: Cecal cancer (HCC) [C18.0]  Pertinent PMHx: COPD, depression, nausea & vomiting, osteoporosis, vitamin D deficiency     Education Needs:        [x] None identified  [] Identified - Not appropriate at this time  []  Identified and addressed - refer to education log  Learning Limitations:   [x] None identified  [] Identified    Cultural, Bahai & ethnic food preferences:  [x] None identified    [] Identified and addressed     ESTIMATED NUTRITION NEEDS:     Calories: 3376-8420 kcal (HBEx1.2-1.4) based on  [] Actual BW      [x] IBW 59 kg  Protein: 59-71  gm (1-1.2 gm/kg) based on  [] Actual BW      [x] IBW   Fluid: 1 mL/kcal     MONITORING & EVALUATION:     Nutrition Goal(s):   1. Po intake of meals will meet >75% of patient estimated nutritional needs within the next 7 days.   Outcome:  [] Met/Ongoing    []  Not Met    [x] New/Initial Goal Monitoring:   [x] Food and beverage intake   [x] Diet order   [x] Nutrition-focused physical findings   [x] Treatment/therapy   [] Weight   [] Enteral nutrition intake        Previous Recommendations (for follow-up assessments only):     []   Implemented       []   Not Implemented (RD to address)      [] No Longer Appropriate     [] No Recommendation Made     Discharge Planning: regular diet pending diet tolerance, Premiere protein once daily + carnation instant breakfast once daily   [x] Participated in care planning, discharge planning, & interdisciplinary rounds as appropriate      Jenise Treviño RD, [address] [place]   Pager: [phone]

## 2018-10-24 NOTE — ROUTINE PROCESS
Pt did not tolerate CLD. Stated \"I feel nauseous. \"  Pt offered zofran 3 times. Pt refused and did not \"want any of that medication. VS stable and shows no other signs of distress. Able to tolerate ginger ale with ice.

## 2018-10-25 LAB
ANION GAP SERPL CALC-SCNC: 5 MMOL/L (ref 3–18)
BUN SERPL-MCNC: 22 MG/DL (ref 7–18)
BUN/CREAT SERPL: 29 (ref 12–20)
CALCIUM SERPL-MCNC: 8.3 MG/DL (ref 8.5–10.1)
CHLORIDE SERPL-SCNC: 101 MMOL/L (ref 100–108)
CO2 SERPL-SCNC: 31 MMOL/L (ref 21–32)
CREAT SERPL-MCNC: 0.76 MG/DL (ref 0.6–1.3)
ERYTHROCYTE [DISTWIDTH] IN BLOOD BY AUTOMATED COUNT: 13.7 % (ref 11.6–14.5)
GLUCOSE SERPL-MCNC: 102 MG/DL (ref 74–99)
HCT VFR BLD AUTO: 32.4 % (ref 35–45)
HGB BLD-MCNC: 10.9 G/DL (ref 12–16)
MAGNESIUM SERPL-MCNC: 2.4 MG/DL (ref 1.6–2.6)
MCH RBC QN AUTO: 32.9 PG (ref 24–34)
MCHC RBC AUTO-ENTMCNC: 33.6 G/DL (ref 31–37)
MCV RBC AUTO: 97.9 FL (ref 74–97)
PHOSPHATE SERPL-MCNC: 2.4 MG/DL (ref 2.5–4.9)
PLATELET # BLD AUTO: 248 K/UL (ref 135–420)
PMV BLD AUTO: 9.8 FL (ref 9.2–11.8)
POTASSIUM SERPL-SCNC: 4.1 MMOL/L (ref 3.5–5.5)
RBC # BLD AUTO: 3.31 M/UL (ref 4.2–5.3)
SODIUM SERPL-SCNC: 137 MMOL/L (ref 136–145)
WBC # BLD AUTO: 9.2 K/UL (ref 4.6–13.2)

## 2018-10-25 PROCEDURE — 74011636637 HC RX REV CODE- 636/637: Performed by: COLON & RECTAL SURGERY

## 2018-10-25 PROCEDURE — 83735 ASSAY OF MAGNESIUM: CPT | Performed by: COLON & RECTAL SURGERY

## 2018-10-25 PROCEDURE — 85027 COMPLETE CBC AUTOMATED: CPT | Performed by: COLON & RECTAL SURGERY

## 2018-10-25 PROCEDURE — 77010033678 HC OXYGEN DAILY

## 2018-10-25 PROCEDURE — 80048 BASIC METABOLIC PNL TOTAL CA: CPT | Performed by: COLON & RECTAL SURGERY

## 2018-10-25 PROCEDURE — 74011000250 HC RX REV CODE- 250: Performed by: COLON & RECTAL SURGERY

## 2018-10-25 PROCEDURE — 94640 AIRWAY INHALATION TREATMENT: CPT

## 2018-10-25 PROCEDURE — 84100 ASSAY OF PHOSPHORUS: CPT | Performed by: COLON & RECTAL SURGERY

## 2018-10-25 PROCEDURE — 74011250637 HC RX REV CODE- 250/637: Performed by: COLON & RECTAL SURGERY

## 2018-10-25 PROCEDURE — 36415 COLL VENOUS BLD VENIPUNCTURE: CPT | Performed by: COLON & RECTAL SURGERY

## 2018-10-25 PROCEDURE — 65270000029 HC RM PRIVATE

## 2018-10-25 PROCEDURE — 74011250636 HC RX REV CODE- 250/636: Performed by: COLON & RECTAL SURGERY

## 2018-10-25 RX ADMIN — ACETAMINOPHEN 1000 MG: 500 TABLET, FILM COATED ORAL at 18:19

## 2018-10-25 RX ADMIN — FAMOTIDINE 20 MG: 10 INJECTION, SOLUTION INTRAVENOUS at 09:18

## 2018-10-25 RX ADMIN — ONDANSETRON HYDROCHLORIDE 4 MG: 2 SOLUTION INTRAMUSCULAR; INTRAVENOUS at 09:18

## 2018-10-25 RX ADMIN — ACETAMINOPHEN 1000 MG: 500 TABLET, FILM COATED ORAL at 11:17

## 2018-10-25 RX ADMIN — BUDESONIDE AND FORMOTEROL FUMARATE DIHYDRATE 2 PUFF: 160; 4.5 AEROSOL RESPIRATORY (INHALATION) at 20:06

## 2018-10-25 RX ADMIN — PREDNISONE 10 MG: 10 TABLET ORAL at 09:20

## 2018-10-25 RX ADMIN — CELECOXIB 200 MG: 100 CAPSULE ORAL at 09:20

## 2018-10-25 RX ADMIN — TIOTROPIUM BROMIDE 18 MCG: 18 CAPSULE ORAL; RESPIRATORY (INHALATION) at 12:50

## 2018-10-25 RX ADMIN — HYDROMORPHONE HYDROCHLORIDE 0.5 MG: 1 INJECTION, SOLUTION INTRAMUSCULAR; INTRAVENOUS; SUBCUTANEOUS at 20:21

## 2018-10-25 RX ADMIN — HEPARIN SODIUM 5000 UNITS: 5000 INJECTION, SOLUTION INTRAVENOUS; SUBCUTANEOUS at 00:21

## 2018-10-25 RX ADMIN — FAMOTIDINE 20 MG: 10 INJECTION, SOLUTION INTRAVENOUS at 20:22

## 2018-10-25 RX ADMIN — ACETAMINOPHEN 1000 MG: 500 TABLET, FILM COATED ORAL at 00:18

## 2018-10-25 RX ADMIN — BUDESONIDE AND FORMOTEROL FUMARATE DIHYDRATE 2 PUFF: 160; 4.5 AEROSOL RESPIRATORY (INHALATION) at 12:50

## 2018-10-25 RX ADMIN — ACETAMINOPHEN 1000 MG: 500 TABLET, FILM COATED ORAL at 05:46

## 2018-10-25 RX ADMIN — ROPINIROLE HYDROCHLORIDE 1 MG: 0.25 TABLET, FILM COATED ORAL at 09:20

## 2018-10-25 RX ADMIN — HYDROMORPHONE HYDROCHLORIDE 0.5 MG: 1 INJECTION, SOLUTION INTRAMUSCULAR; INTRAVENOUS; SUBCUTANEOUS at 04:04

## 2018-10-25 RX ADMIN — CELECOXIB 200 MG: 100 CAPSULE ORAL at 18:18

## 2018-10-25 RX ADMIN — DIPHENHYDRAMINE HYDROCHLORIDE 25 MG: 50 INJECTION INTRAMUSCULAR; INTRAVENOUS at 00:20

## 2018-10-25 RX ADMIN — HEPARIN SODIUM 5000 UNITS: 5000 INJECTION, SOLUTION INTRAVENOUS; SUBCUTANEOUS at 09:18

## 2018-10-25 RX ADMIN — HYDROMORPHONE HYDROCHLORIDE 0.5 MG: 1 INJECTION, SOLUTION INTRAMUSCULAR; INTRAVENOUS; SUBCUTANEOUS at 11:17

## 2018-10-25 RX ADMIN — ROPINIROLE HYDROCHLORIDE 1 MG: 0.25 TABLET, FILM COATED ORAL at 18:18

## 2018-10-25 RX ADMIN — HEPARIN SODIUM 5000 UNITS: 5000 INJECTION, SOLUTION INTRAVENOUS; SUBCUTANEOUS at 16:15

## 2018-10-25 NOTE — ROUTINE PROCESS

## 2018-10-25 NOTE — ROUTINE PROCESS
IV site leaking ,new IV #22 inserted in the right forearm, patient tolerated procedure. Patient c/o body itching benadryl 25 mg iv given. Will continues to monitor.

## 2018-10-25 NOTE — ROUTINE PROCESS
Patient ambulated around the smiley way, tolerated well. No respiratory distress noted, lungs sound coarse, O2 sat 92% in RA. Abdomen tender Lap sites with steri strips dry and clean, denies N/V, pain rating 6/10, pain med due @ 2120. Patient voiding adequate amounts in antoinette toilet.

## 2018-10-25 NOTE — PROGRESS NOTES
[facility]  [address] [place] [address]  [phone]  Colon and Rectal Surgery Progress Note      Patient: Mario Myers MRN: [id number]  SSN: [id number]    YOB: 1945  Age: 72 y.o. Sex: female      Admit Date: 10/23/2018    LOS: 2 days     Subjective: Tolerating fulls but mild nausea. No bowel function. Objective:     Vitals:    10/24/18 2037 10/24/18 2115 10/25/18 0021 10/25/18 0357   BP: 117/73  128/80 106/66   Pulse: 78  77 79   Resp: 20 18 18   Temp: 98.8 °F (37.1 °C)  98.5 °F (36.9 °C) 97.3 °F (36.3 °C)   SpO2: 94% 94%  96%   Weight:       Height:            Intake and Output:  Current Shift: No intake/output data recorded.   Last three shifts: 10/23 1901 - 10/25 0700  In: 1485 [P.O.:960; I.V.:525]  Out: 2350 [Urine:2350]    Physical Exam:     abd soft, appr tender, ND    Lab/Data Review:    BMP:   Lab Results   Component Value Date/Time     10/25/2018 03:43 AM    K 4.1 10/25/2018 03:43 AM     10/25/2018 03:43 AM    CO2 31 10/25/2018 03:43 AM    AGAP 5 10/25/2018 03:43 AM     (H) 10/25/2018 03:43 AM    BUN 22 (H) 10/25/2018 03:43 AM    CREA 0.76 10/25/2018 03:43 AM    GFRAA >60 10/25/2018 03:43 AM    GFRNA >60 10/25/2018 03:43 AM     CMP:   Lab Results   Component Value Date/Time     10/25/2018 03:43 AM    K 4.1 10/25/2018 03:43 AM     10/25/2018 03:43 AM    CO2 31 10/25/2018 03:43 AM    AGAP 5 10/25/2018 03:43 AM     (H) 10/25/2018 03:43 AM    BUN 22 (H) 10/25/2018 03:43 AM    CREA 0.76 10/25/2018 03:43 AM    GFRAA >60 10/25/2018 03:43 AM    GFRNA >60 10/25/2018 03:43 AM    CA 8.3 (L) 10/25/2018 03:43 AM    MG 2.4 10/25/2018 03:43 AM    PHOS 2.4 (L) 10/25/2018 03:43 AM     CBC:   Lab Results   Component Value Date/Time    WBC 9.2 10/25/2018 03:43 AM    HGB 10.9 (L) 10/25/2018 03:43 AM    HCT 32.4 (L) 10/25/2018 03:43 AM     10/25/2018 03:43 AM        Assessment:     POD 2 s/p robotic right colectomy    Plan: Continue fulls  Ambulate  Moderate protein calorie malnutrition - supplements per nutrition    Signed By: Abi Mercer MD        October 25, 2018

## 2018-10-25 NOTE — PROGRESS NOTES
Problem: Falls - Risk of  Goal: *Absence of Falls  Document Nimco Fall Risk and appropriate interventions in the flowsheet.   Outcome: Progressing Towards Goal  Fall Risk Interventions:  Mobility Interventions: Patient to call before getting OOB         Medication Interventions: Patient to call before getting OOB    Elimination Interventions: Call light in reach    History of Falls Interventions: Bed/chair exit alarm

## 2018-10-25 NOTE — PROGRESS NOTES
Review chart. Met with pt and daughters with consent. No change to discharge plan to return home. CM will continue to monitor for any transitional needs.   Nicolle Her BSN, [phone]

## 2018-10-25 NOTE — PROGRESS NOTES
NUTRITION    Nursing Referral: [facility]     RECOMMENDATIONS / PLAN:     - Monitor GI symptoms and advance po diet as tolerated per surgery. - Continue nutrition supplement: Magic Cup once daily.   - Discontinue Unjury.   - Monitor nutrition related laboratory values. - Continue RD inpatient monitoring and evaluation. NUTRITION INTERVENTIONS & DIAGNOSIS:     [x] Meals/snacks: modified composition  [x] Medical food supplement therapy: modify  [x] Collaboration and referral of nutrition care: discussed low phosphorus lab with RN, RN to address with MD.     Nutrition Diagnosis: Inadequate energy intake related to altered GI function and diet tolerance as evidenced by pt on a liquid diet after colostomy. Patient meets criteria for Moderate Protein Calorie Malnutrition as evidenced by:   ASPEN Malnutrition Criteria  Acute Illness, Chronic Illness, or Social/Enviornmental: Chronic illness  Energy Intake: <75% est energy req for greater than/equal to 1 month  Body Fat: Mild(orbital, thoracic regions)  Muscle Mass: Mild(temple, clavicle, trapezius regions)  ASPEN Malnutrition Score - Chronic Illness: 3  Chronic Illness - Malnutrition Diagnosis: Moderate malnutrition. ASSESSMENT:     10/25: Pt with fair intake for breakfast this am, c/o mild nausea, zofran given. Family providing Premier Protein supplements, plan to discontinue Unjury. Pt liked Magic Cup and consumed 100% at dinner. Encouraged meal and supplement intake. Low phosphorus noted, discussed with RN, RN to discuss with MD.    10/24: Cecal cancer s/p colectomy 10/23. Tolerating liquid diet. H/o pain with eating and poor appetite, sometimes skipping all meals or just consuming 1 per day. Per sister, intake improved over the last month after pt began living with sister. Pt dislikes Ensure/Glucerna, but began drinking 1 Premiere Protein per day 3 weeks ago. Discussed supplement options in home and additional options on discharge.   NFPE completed. Average po intake adequate to meet patients estimated nutritional needs:   [] Yes     [x] No   [] Unable to determine at this time    Diet: DIET FULL LIQUID  DIET NUTRITIONAL SUPPLEMENTS Breakfast; UNJURY  DIET NUTRITIONAL SUPPLEMENTS Dinner; [address] Allergies: Shellfish  Current Appetite:   [] Good     [x] Fair     [] Poor     [] Other:  Appetite/meal intake prior to admission:   [] Good     [x] Fair     [x] Poor     [] Other:  Feeding Limitations:  [] Swallowing difficulty    [] Chewing difficulty    [] Other:  Current Meal Intake:   Patient Vitals for the past 100 hrs:   % Diet Eaten   10/25/18 0800 50 %   10/24/18 1857 25 %   10/24/18 1058 0 %   10/24/18 0901 0 %       BM: 10/25- loose  Skin Integrity: surgical wound to abdomen  Edema:   [x] No     [] Yes   Pertinent Medications: Reviewed: deltasone    Recent Labs     10/25/18  0343 10/24/18  0450    136   K 4.1 4.7    102   CO2 31 28   * 108*   BUN 22* 22*   CREA 0.76 0.82   CA 8.3* 7.8*   MG 2.4 2.1   PHOS 2.4* 4.0       Intake/Output Summary (Last 24 hours) at 10/25/2018 0950  Last data filed at 10/25/2018 0800  Gross per 24 hour   Intake 1605 ml   Output 1900 ml   Net -295 ml       Anthropometrics:  Ht Readings from Last 1 Encounters:   10/09/18 5' 6\" (1.676 m)     Last 3 Recorded Weights in this Encounter    10/09/18 1015 10/23/18 0657   Weight: 53.5 kg (118 lb) 54 kg (119 lb)     Body mass index is 19.21 kg/m². Weight History: -4% x 1 month. H/o weight fluctuations over the last year PTA.      Weight Metrics 10/23/2018 10/4/2018 9/27/2018 9/23/2018 8/28/2018 8/16/2018 7/19/2018   Weight 119 lb 118 lb 124 lb 117 lb 116 lb 122 lb 4.8 oz 118 lb   BMI 19.21 kg/m2 19.05 kg/m2 20.01 kg/m2 18.88 kg/m2 19.3 kg/m2 20.35 kg/m2 19.05 kg/m2        Admitting Diagnosis: Cecal cancer (HCC) [C18.0]  Pertinent PMHx: COPD, depression, nausea & vomiting, osteoporosis, vitamin D deficiency     Education Needs:        [x] None identified  [] Identified - Not appropriate at this time  []  Identified and addressed - refer to education log  Learning Limitations:   [x] None identified  [] Identified    Cultural, Buddhist & ethnic food preferences:  [x] None identified    [] Identified and addressed     ESTIMATED NUTRITION NEEDS:     Calories: 4905-8645 kcal (HBEx1.2-1.4) based on  [] Actual BW      [x] IBW 59 kg  Protein: 59-71  gm (1-1.2 gm/kg) based on  [] Actual BW      [x] IBW   Fluid: 1 mL/kcal     MONITORING & EVALUATION:     Nutrition Goal(s):   1. Po intake of meals will meet >75% of patient estimated nutritional needs within the next 7 days.   Outcome:  [] Met/Ongoing    [x]  Not Met/Progressing    [] New/Initial Goal     Monitoring:   [x] Food and beverage intake   [x] Diet order   [x] Nutrition-focused physical findings   [x] Treatment/therapy   [] Weight   [] Enteral nutrition intake        Previous Recommendations (for follow-up assessments only):     [x]   Implemented       []   Not Implemented (RD to address)      [] No Longer Appropriate     [] No Recommendation Made     Discharge Planning: regular diet pending diet tolerance, Premiere protein once daily + carnation instant breakfast once daily   [x] Participated in care planning, discharge planning, & interdisciplinary rounds as appropriate       Raeford Boeck   Dietetic Intern   Pager: [phone]

## 2018-10-25 NOTE — ROUTINE PROCESS
Bedside and Verbal shift change report given to Mendez Siegel (oncoming nurse) by Nakia Cortez RN (offgoing nurse). Report included the following information SBAR, Kardex, Intake/Output, MAR and Recent Results.

## 2018-10-26 LAB
ANION GAP SERPL CALC-SCNC: 6 MMOL/L (ref 3–18)
BUN SERPL-MCNC: 19 MG/DL (ref 7–18)
BUN/CREAT SERPL: 23 (ref 12–20)
CALCIUM SERPL-MCNC: 8.4 MG/DL (ref 8.5–10.1)
CHLORIDE SERPL-SCNC: 102 MMOL/L (ref 100–108)
CO2 SERPL-SCNC: 32 MMOL/L (ref 21–32)
CREAT SERPL-MCNC: 0.81 MG/DL (ref 0.6–1.3)
ERYTHROCYTE [DISTWIDTH] IN BLOOD BY AUTOMATED COUNT: 13.5 % (ref 11.6–14.5)
GLUCOSE BLD STRIP.AUTO-MCNC: 133 MG/DL (ref 70–110)
GLUCOSE BLD STRIP.AUTO-MCNC: 143 MG/DL (ref 70–110)
GLUCOSE SERPL-MCNC: 91 MG/DL (ref 74–99)
HCT VFR BLD AUTO: 32.5 % (ref 35–45)
HGB BLD-MCNC: 10.9 G/DL (ref 12–16)
MAGNESIUM SERPL-MCNC: 2.2 MG/DL (ref 1.6–2.6)
MCH RBC QN AUTO: 32.2 PG (ref 24–34)
MCHC RBC AUTO-ENTMCNC: 33.5 G/DL (ref 31–37)
MCV RBC AUTO: 96.2 FL (ref 74–97)
PHOSPHATE SERPL-MCNC: 2.9 MG/DL (ref 2.5–4.9)
PLATELET # BLD AUTO: 257 K/UL (ref 135–420)
PMV BLD AUTO: 9.8 FL (ref 9.2–11.8)
POTASSIUM SERPL-SCNC: 4.4 MMOL/L (ref 3.5–5.5)
RBC # BLD AUTO: 3.38 M/UL (ref 4.2–5.3)
SODIUM SERPL-SCNC: 140 MMOL/L (ref 136–145)
WBC # BLD AUTO: 9.2 K/UL (ref 4.6–13.2)

## 2018-10-26 PROCEDURE — 74011000250 HC RX REV CODE- 250: Performed by: COLON & RECTAL SURGERY

## 2018-10-26 PROCEDURE — 65270000029 HC RM PRIVATE

## 2018-10-26 PROCEDURE — 74011250636 HC RX REV CODE- 250/636: Performed by: COLON & RECTAL SURGERY

## 2018-10-26 PROCEDURE — 82962 GLUCOSE BLOOD TEST: CPT

## 2018-10-26 PROCEDURE — 74011636637 HC RX REV CODE- 636/637: Performed by: COLON & RECTAL SURGERY

## 2018-10-26 PROCEDURE — 84100 ASSAY OF PHOSPHORUS: CPT | Performed by: COLON & RECTAL SURGERY

## 2018-10-26 PROCEDURE — 94640 AIRWAY INHALATION TREATMENT: CPT

## 2018-10-26 PROCEDURE — 80048 BASIC METABOLIC PNL TOTAL CA: CPT | Performed by: COLON & RECTAL SURGERY

## 2018-10-26 PROCEDURE — 83735 ASSAY OF MAGNESIUM: CPT | Performed by: COLON & RECTAL SURGERY

## 2018-10-26 PROCEDURE — 36415 COLL VENOUS BLD VENIPUNCTURE: CPT | Performed by: COLON & RECTAL SURGERY

## 2018-10-26 PROCEDURE — 85027 COMPLETE CBC AUTOMATED: CPT | Performed by: COLON & RECTAL SURGERY

## 2018-10-26 PROCEDURE — 74011250637 HC RX REV CODE- 250/637: Performed by: COLON & RECTAL SURGERY

## 2018-10-26 RX ORDER — CELECOXIB 200 MG/1
200 CAPSULE ORAL 2 TIMES DAILY
Qty: 14 CAP | Refills: 0 | Status: SHIPPED | OUTPATIENT
Start: 2018-10-26 | End: 2018-11-15

## 2018-10-26 RX ORDER — ACETAMINOPHEN 500 MG
1000 TABLET ORAL EVERY 6 HOURS
Qty: 56 TAB | Refills: 0 | Status: SHIPPED | OUTPATIENT
Start: 2018-10-26 | End: 2018-11-02

## 2018-10-26 RX ADMIN — HYDROMORPHONE HYDROCHLORIDE 0.5 MG: 1 INJECTION, SOLUTION INTRAMUSCULAR; INTRAVENOUS; SUBCUTANEOUS at 21:34

## 2018-10-26 RX ADMIN — FAMOTIDINE 20 MG: 10 INJECTION, SOLUTION INTRAVENOUS at 21:33

## 2018-10-26 RX ADMIN — HEPARIN SODIUM 5000 UNITS: 5000 INJECTION, SOLUTION INTRAVENOUS; SUBCUTANEOUS at 00:09

## 2018-10-26 RX ADMIN — ROPINIROLE HYDROCHLORIDE 1 MG: 0.25 TABLET, FILM COATED ORAL at 18:07

## 2018-10-26 RX ADMIN — ALBUTEROL SULFATE 2.5 MG: 2.5 SOLUTION RESPIRATORY (INHALATION) at 11:00

## 2018-10-26 RX ADMIN — ACETAMINOPHEN 1000 MG: 500 TABLET, FILM COATED ORAL at 23:53

## 2018-10-26 RX ADMIN — BUDESONIDE AND FORMOTEROL FUMARATE DIHYDRATE 2 PUFF: 160; 4.5 AEROSOL RESPIRATORY (INHALATION) at 23:57

## 2018-10-26 RX ADMIN — CELECOXIB 200 MG: 100 CAPSULE ORAL at 17:03

## 2018-10-26 RX ADMIN — ACETAMINOPHEN 1000 MG: 500 TABLET, FILM COATED ORAL at 12:00

## 2018-10-26 RX ADMIN — HEPARIN SODIUM 5000 UNITS: 5000 INJECTION, SOLUTION INTRAVENOUS; SUBCUTANEOUS at 08:18

## 2018-10-26 RX ADMIN — FAMOTIDINE 20 MG: 10 INJECTION, SOLUTION INTRAVENOUS at 08:19

## 2018-10-26 RX ADMIN — HYDROMORPHONE HYDROCHLORIDE 0.5 MG: 1 INJECTION, SOLUTION INTRAMUSCULAR; INTRAVENOUS; SUBCUTANEOUS at 00:16

## 2018-10-26 RX ADMIN — DIPHENHYDRAMINE HYDROCHLORIDE 25 MG: 50 INJECTION INTRAMUSCULAR; INTRAVENOUS at 10:59

## 2018-10-26 RX ADMIN — GABAPENTIN 300 MG: 300 CAPSULE ORAL at 21:32

## 2018-10-26 RX ADMIN — TIOTROPIUM BROMIDE 18 MCG: 18 CAPSULE ORAL; RESPIRATORY (INHALATION) at 09:00

## 2018-10-26 RX ADMIN — HYDROMORPHONE HYDROCHLORIDE 0.5 MG: 1 INJECTION, SOLUTION INTRAMUSCULAR; INTRAVENOUS; SUBCUTANEOUS at 10:59

## 2018-10-26 RX ADMIN — HEPARIN SODIUM 5000 UNITS: 5000 INJECTION, SOLUTION INTRAVENOUS; SUBCUTANEOUS at 23:53

## 2018-10-26 RX ADMIN — GABAPENTIN 300 MG: 300 CAPSULE ORAL at 08:18

## 2018-10-26 RX ADMIN — DIPHENHYDRAMINE HYDROCHLORIDE 25 MG: 50 INJECTION INTRAMUSCULAR; INTRAVENOUS at 00:22

## 2018-10-26 RX ADMIN — CELECOXIB 200 MG: 100 CAPSULE ORAL at 08:18

## 2018-10-26 RX ADMIN — HYDROMORPHONE HYDROCHLORIDE 0.5 MG: 1 INJECTION, SOLUTION INTRAMUSCULAR; INTRAVENOUS; SUBCUTANEOUS at 16:59

## 2018-10-26 RX ADMIN — ROPINIROLE HYDROCHLORIDE 1 MG: 0.25 TABLET, FILM COATED ORAL at 08:17

## 2018-10-26 RX ADMIN — BUDESONIDE AND FORMOTEROL FUMARATE DIHYDRATE 2 PUFF: 160; 4.5 AEROSOL RESPIRATORY (INHALATION) at 08:50

## 2018-10-26 RX ADMIN — DIPHENHYDRAMINE HYDROCHLORIDE 25 MG: 50 INJECTION INTRAMUSCULAR; INTRAVENOUS at 21:33

## 2018-10-26 RX ADMIN — ACETAMINOPHEN 1000 MG: 500 TABLET, FILM COATED ORAL at 06:48

## 2018-10-26 RX ADMIN — ACETAMINOPHEN 1000 MG: 500 TABLET, FILM COATED ORAL at 00:08

## 2018-10-26 RX ADMIN — HEPARIN SODIUM 5000 UNITS: 5000 INJECTION, SOLUTION INTRAVENOUS; SUBCUTANEOUS at 16:59

## 2018-10-26 RX ADMIN — PREDNISONE 10 MG: 10 TABLET ORAL at 08:18

## 2018-10-26 NOTE — PROGRESS NOTES
Problem: Falls - Risk of  Goal: *Absence of Falls  Document Nimco Fall Risk and appropriate interventions in the flowsheet.   Fall Risk Interventions:  Mobility Interventions: Patient to call before getting OOB         Medication Interventions: Patient to call before getting OOB    Elimination Interventions: Call light in reach    History of Falls Interventions: Bed/chair exit alarm

## 2018-10-26 NOTE — DISCHARGE SUMMARY
Colon and Rectal Surgery Discharge Summary     Patient: Dang Bravo MRN: [id number]  SSN: [id number]    YOB: 1945  Age: 72 y.o.   Sex: female       Admit Date: 10/23/2018    Discharge Date: 10/27/2018    Admission Diagnoses: Cecal cancer ([facility] [C18.0]    Discharge Diagnoses: Cecal cancer    Procedure: Robotic Right Colectomy    Problem List as of 10/26/2018 Date Reviewed: 9/27/2018          Codes Class Noted - Resolved    Cecal cancer ([facility] ICD-10-CM: C18.0  ICD-9-CM: 153.4  10/23/2018 - Present        Pre-operative cardiovascular examination ICD-10-CM: Z01.810  ICD-9-CM: V72.81  9/23/2018 - Present        Colon cancer without distant metastasis ([facility] ICD-10-CM: C18.9  ICD-9-CM: 153.9  9/22/2018 - Present        Colon polyps ICD-10-CM: K63.5  ICD-9-CM: 211.3  9/22/2018 - Present        COPD (chronic obstructive pulmonary disease) ([facility] ICD-10-CM: J44.9  ICD-9-CM: 875  9/20/2018 - Present        Pneumonia ICD-10-CM: J18.9  ICD-9-CM: 486  9/20/2018 - Present        Abdominal pain ICD-10-CM: R10.9  ICD-9-CM: 789.00  9/20/2018 - Present        Abdominal mass ICD-10-CM: R19.00  ICD-9-CM: 789.30  9/20/2018 - Present        Acute exacerbation of chronic obstructive pulmonary disease (COPD) ([facility] ICD-10-CM: J44.1  ICD-9-CM: 491.21  8/14/2018 - Present        Degenerative disc disease, lumbar ICD-10-CM: M51.36  ICD-9-CM: 722.52  8/14/2018 - Present        Left-sided low back pain with sciatica ICD-10-CM: M54.42  ICD-9-CM: 724.3  8/14/2018 - Present        COPD with exacerbation ([facility] ICD-10-CM: J44.1  ICD-9-CM: 491.21  8/14/2018 - Present        Muscle spasm of back ICD-10-CM: M62.830  ICD-9-CM: 724.8  9/12/2016 - Present        Sacroiliac joint pain ICD-10-CM: M53.3  ICD-9-CM: 724.6  9/12/2016 - Present        Current chronic use of systemic steroids ICD-10-CM: Z79.52  ICD-9-CM: V58.65  9/12/2016 - Present        Baker's cyst of knee ICD-10-CM: M71.20  ICD-9-CM: 727.51  9/12/2016 - Present        COPD with acute exacerbation ([facility] ICD-10-CM: J44.1  ICD-9-CM: 491.21  12/15/2015 - Present        Neuritis of lower extremity ICD-10-CM: G57.90  ICD-9-CM: 355.8  11/18/2015 - Present        Lumbar spinal stenosis ICD-10-CM: M48.061  ICD-9-CM: 724.02  11/13/2015 - Present        Facet arthritis of lumbar region [facility]) ICD-10-CM: M46.96  ICD-9-CM: 721.3  11/13/2015 - Present        Emphysema of lung ([facility] ICD-10-CM: J43.9  ICD-9-CM: 492.8  4/15/2015 - Present        Hemoptysis (Chronic) ICD-10-CM: R04.2  ICD-9-CM: 786.30  2/5/2014 - Present        Nicotine addiction (Chronic) ICD-10-CM: E48.881  ICD-9-CM: 305.1  5/16/2013 - Present        COPD, severe ([facility] ICD-10-CM: J44.9  ICD-9-CM: 150  Unknown - Present        RESOLVED: Sepsis due to pneumonia [facility]) ICD-10-CM: J18.9, A41.9  ICD-9-CM: 473, 995.91  9/20/2018 - 9/24/2018               Discharge Condition: Good    Hospital Course: To OR for above. Post-operatively diet advanced. BM and flatus. Ambulated. Remained afebrile with normal wbc. Given stress dose steroids periop and resumed oral prednisone after rapid taper. No significant issues with respiratory status. Consults: None    Significant Diagnostic Studies: None    Disposition: home    Discharge Medications:   Current Discharge Medication List      START taking these medications    Details   celecoxib (CELEBREX) 200 mg capsule Take 1 Cap by mouth two (2) times a day for 7 days. Qty: 14 Cap, Refills: 0      !! acetaminophen (TYLENOL) 500 mg tablet Take 2 Tabs by mouth every six (6) hours for 7 days. Qty: 56 Tab, Refills: 0       !! - Potential duplicate medications found. Please discuss with provider. CONTINUE these medications which have NOT CHANGED    Details   predniSONE (DELTASONE) 10 mg tablet daily. Refills: 0      fluconazole (DIFLUCAN) 200 mg tablet Take 200 mg by mouth daily as needed. FDA advises cautious prescribing of oral fluconazole in pregnancy.        HYDROcodone-acetaminophen ([place]) 2-217 mg per tablet Take 1 Tab by mouth every six (6) hours as needed for Pain. Max Daily Amount: 4 Tabs. Qty: 25 Tab, Refills: 0    Associated Diagnoses: Colon cancer without distant metastasis ([facility]      ! ! acetaminophen (TYLENOL EXTRA STRENGTH) 500 mg tablet Take  by mouth every six (6) hours as needed for Pain. albuterol (PROVENTIL VENTOLIN) 2.5 mg /3 mL (0.083 %) nebulizer solution 3 mL by Nebulization route every four (4) hours as needed for Wheezing or Shortness of Breath. Diag. Code: J44.9, R09.02  Qty: 375 Each, Refills: 3      albuterol (PROAIR HFA) 90 mcg/actuation inhaler INHALE 2 PUFFS BY MOUTH EVERY 4 HOURS AS NEEDED FOR WHEEZING OR SHORTNESS OF BREATH  Qty: 1 Inhaler, Refills: 6    Associated Diagnoses: Chronic obstructive pulmonary disease, unspecified COPD type ([facility]      fluticasone-salmeterol (ADVAIR) 250-50 mcg/dose diskus inhaler Take 1 Puff by inhalation every twelve (12) hours. Qty: 1 Inhaler, Refills: 3      tiotropium bromide (SPIRIVA RESPIMAT) 2.5 mcg/actuation inhaler Take 2 Puffs by inhalation daily. Qty: 3 Inhaler, Refills: 3      LORazepam (ATIVAN) 1 mg tablet Take  by mouth two (2) times daily as needed for Anxiety. rOPINIRole (REQUIP) 1 mg tablet Take  by mouth two (2) times a day. OXYGEN-AIR DELIVERY SYSTEMS 2 L by Does Not Apply route. O2 via nasal cannula with bedtime and activity. O2 Company: [employer]      therapeutic multivitamin (THERAGRAN) tablet Take 1 Tab by mouth daily. Qty: 30 Tab, Refills: 11       !! - Potential duplicate medications found. Please discuss with provider. Activity: No heavy lifting for 6 weeks  Diet: Regular Diet  Wound Care: None needed    Follow-up Appointments   Procedures    FOLLOW UP VISIT Appointment in: Two Weeks     Standing Status:   Standing     Number of Occurrences:   1     Order Specific Question:   Appointment in     Answer:    Two Weeks       Signed By: Selin Welch MD     October 26, 2018

## 2018-10-26 NOTE — PROGRESS NOTES
NUTRITION    Nursing Referral: [facility]     RECOMMENDATIONS / PLAN:     - Continue supplements: Premeire Protein once daily (provided by family) and Magic Cup once daily. Continue 2 supplements per day on discharge. - Continue RD inpatient monitoring and evaluation. NUTRITION INTERVENTIONS & DIAGNOSIS:     [x] Meals/snacks: modified composition  [x] Medical food supplement therapy: continue    Nutrition Diagnosis:   Malnutrition in the context of chronic disease or condition related to inadequate energy intake as evidenced by pt report of meal intake, weight loss, muscle loss and fat loss. Inadequate energy intake related to altered GI function and diet tolerance as evidenced by pt on a liquid diet after colostomy.  - Resolved    Patient meets criteria for Moderate Protein Calorie Malnutrition as evidenced by:   ASPEN Malnutrition Criteria  Acute Illness, Chronic Illness, or Social/Enviornmental: Chronic illness  Energy Intake: <75% est energy req for greater than/equal to 1 month  Body Fat: Mild(orbital, thoracic regions)  Muscle Mass: Mild(temple, clavicle, trapezius regions)  ASPEN Malnutrition Score - Chronic Illness: 3  Chronic Illness - Malnutrition Diagnosis: Moderate malnutrition. ASSESSMENT:     10/26: Tolerating diet; advanced to solid foods at lunch. Lunch tray at bedside during visit, 75% consumed. Pt continues to consume 1 Premiere Protein and 1 Magic Cup per day. Discussed diet and supplement intake on discharge and encouraged pt to continue 2 supplements per day; pt agreeable. Phos WNL today. 10/25: Pt with fair intake for breakfast this am, c/o mild nausea, zofran given. Family providing Premier Protein supplements, plan to discontinue Unjury. Pt liked Magic Cup and consumed 100% at dinner. Encouraged meal and supplement intake. Low phosphorus noted, discussed with RN, RN to discuss with MD.    10/24: Cecal cancer s/p colectomy 10/23. Tolerating liquid diet.   H/o pain with eating and poor appetite, sometimes skipping all meals or just consuming 1 per day. Per sister, intake improved over the last month after pt began living with sister. Pt dislikes Ensure/Glucerna, but began drinking 1 Premiere Protein per day 3 weeks ago. Discussed supplement options in home and additional options on discharge. NFPE completed. Average po intake adequate to meet patients estimated nutritional needs:   [x] Yes     [] No   [] Unable to determine at this time    Diet: DIET NUTRITIONAL SUPPLEMENTS Dinner; MAGIC CUPS  DIET REGULAR Low Fiber      Food Allergies: Shellfish  Current Appetite:   [x] Good     [x] Fair     [] Poor     [] Other:  Appetite/meal intake prior to admission:   [] Good     [x] Fair     [x] Poor     [] Other:  Feeding Limitations:  [] Swallowing difficulty    [] Chewing difficulty    [] Other:  Current Meal Intake: Patient Vitals for the past 100 hrs:   % Diet Eaten   10/26/18 1205 30 %   10/26/18 1016 30 %   10/25/18 1714 25 %   10/25/18 1251 50 %   10/25/18 0800 50 %   10/24/18 1857 25 %   10/24/18 1058 0 %   10/24/18 0901 0 %       BM: 10/25- loose  Skin Integrity: surgical wound to abdomen  Edema:   [x] No     [] Yes   Pertinent Medications: Reviewed: deltasone    Recent Labs     10/26/18  0310 10/25/18  0343 10/24/18  0450    137 136   K 4.4 4.1 4.7    101 102   CO2 32 31 28   GLU 91 102* 108*   BUN 19* 22* 22*   CREA 0.81 0.76 0.82   CA 8.4* 8.3* 7.8*   MG 2.2 2.4 2.1   PHOS 2.9 2.4* 4.0       Intake/Output Summary (Last 24 hours) at 10/26/2018 1324  Last data filed at 10/26/2018 1205  Gross per 24 hour   Intake 1280 ml   Output 700 ml   Net 580 ml       Anthropometrics:  Ht Readings from Last 1 Encounters:   10/09/18 5' 6\" (1.676 m)     Last 3 Recorded Weights in this Encounter    10/09/18 1015 10/23/18 0657   Weight: 53.5 kg (118 lb) 54 kg (119 lb)     Body mass index is 19.21 kg/m². Weight History: -4% x 1 month. H/o weight fluctuations over the last year PTA. Weight Metrics 10/23/2018 10/4/2018 9/27/2018 9/23/2018 8/28/2018 8/16/2018 7/19/2018   Weight 119 lb 118 lb 124 lb 117 lb 116 lb 122 lb 4.8 oz 118 lb   BMI 19.21 kg/m2 19.05 kg/m2 20.01 kg/m2 18.88 kg/m2 19.3 kg/m2 20.35 kg/m2 19.05 kg/m2        Admitting Diagnosis: Cecal cancer (HCC) [C18.0]  Pertinent PMHx: COPD, depression, nausea & vomiting, osteoporosis, vitamin D deficiency     Education Needs:        [x] None identified  [] Identified - Not appropriate at this time  []  Identified and addressed - refer to education log  Learning Limitations:   [x] None identified  [] Identified    Cultural, Alevism & ethnic food preferences:  [x] None identified    [] Identified and addressed     ESTIMATED NUTRITION NEEDS:     Calories: 7220-2662 kcal (HBEx1.2-1.4) based on  [] Actual BW      [x] IBW 59 kg  Protein: 59-71  gm (1-1.2 gm/kg) based on  [] Actual BW      [x] IBW   Fluid: 1 mL/kcal     MONITORING & EVALUATION:     Nutrition Goal(s):   1. Po intake of meals will meet >75% of patient estimated nutritional needs within the next 7 days. Outcome:  [x] Met/Ongoing    []  Not Met/Progressing    [] New/Initial Goal     Monitoring:   [x] Food and beverage intake   [x] Diet order   [x] Nutrition-focused physical findings   [x] Treatment/therapy   [] Weight   [] Enteral nutrition intake        Previous Recommendations (for follow-up assessments only):     [x]   Implemented       []   Not Implemented (RD to address)      [] No Longer Appropriate     [] No Recommendation Made     Discharge Planning: regular diet pending diet tolerance, Premiere protein once daily + carnation instant breakfast once daily or comparable supplement.   [x] Participated in care planning, discharge planning, & interdisciplinary rounds as appropriate       Jenise Treviño RD, [address] [place]   Pager: [phone]

## 2018-10-26 NOTE — PROGRESS NOTES
[facility]  [address] [place] [address]  [phone]  Colon and Rectal Surgery Progress Note      Patient: Esperanza Perdomo MRN: [id number]  SSN: [id number]    YOB: 1945  Age: 72 y.o. Sex: female      Admit Date: 10/23/2018    LOS: 3 days     Subjective: Tolerating fulls. Bowel function.      Objective:     Vitals:    10/25/18 1555 10/25/18 1933 10/26/18 0459 10/26/18 0752   BP: 130/80 139/84 124/77 159/87   Pulse: 71 79 75 72   Resp: 20 18 16 17   Temp: 98.4 °F (36.9 °C) 98 °F (36.7 °C) 98.1 °F (36.7 °C) 97.8 °F (36.6 °C)   SpO2: 94% 96% 97% 98%   Weight:       Height:            Intake and Output:  Current Shift: 10/26 0701 - 10/26 1900  In: 240 [P.O.:240]  Out: -   Last three shifts: 10/24 1901 - 10/26 0700  In: 1300 [P.O.:1300]  Out: 1550 [Urine:1550]    Physical Exam:     abd soft, appr tender, ND    Lab/Data Review:    BMP:   Lab Results   Component Value Date/Time     10/26/2018 03:10 AM    K 4.4 10/26/2018 03:10 AM     10/26/2018 03:10 AM    CO2 32 10/26/2018 03:10 AM    AGAP 6 10/26/2018 03:10 AM    GLU 91 10/26/2018 03:10 AM    BUN 19 (H) 10/26/2018 03:10 AM    CREA 0.81 10/26/2018 03:10 AM    GFRAA >60 10/26/2018 03:10 AM    GFRNA >60 10/26/2018 03:10 AM     CMP:   Lab Results   Component Value Date/Time     10/26/2018 03:10 AM    K 4.4 10/26/2018 03:10 AM     10/26/2018 03:10 AM    CO2 32 10/26/2018 03:10 AM    AGAP 6 10/26/2018 03:10 AM    GLU 91 10/26/2018 03:10 AM    BUN 19 (H) 10/26/2018 03:10 AM    CREA 0.81 10/26/2018 03:10 AM    GFRAA >60 10/26/2018 03:10 AM    GFRNA >60 10/26/2018 03:10 AM    CA 8.4 (L) 10/26/2018 03:10 AM    MG 2.2 10/26/2018 03:10 AM    PHOS 2.9 10/26/2018 03:10 AM     CBC:   Lab Results   Component Value Date/Time    WBC 9.2 10/26/2018 03:10 AM    HGB 10.9 (L) 10/26/2018 03:10 AM    HCT 32.5 (L) 10/26/2018 03:10 AM     10/26/2018 03:10 AM        Assessment:     POD 3 s/p robotic right colectomy    Plan: Advance to solids  Home over weekend if tolerates    Signed By: Tony Truong MD        October 26, 2018

## 2018-10-26 NOTE — PROGRESS NOTES
Bedside and Verbal shift change report given to Carlos Watts (oncoming nurse) by She Man RN (offgoing nurse). Report included the following information SBAR, Kardex, Intake/Output, MAR and Recent Results.

## 2018-10-26 NOTE — PROGRESS NOTES
Problem: Falls - Risk of  Goal: *Absence of Falls  Document Nimco Fall Risk and appropriate interventions in the flowsheet.   Outcome: Progressing Towards Goal  Fall Risk Interventions:  Mobility Interventions: Communicate number of staff needed for ambulation/transfer, Patient to call before getting OOB         Medication Interventions: Teach patient to arise slowly, Patient to call before getting OOB    Elimination Interventions: Call light in reach, Toilet paper/wipes in reach, Patient to call for help with toileting needs, Toileting schedule/hourly rounds    History of Falls Interventions: Bed/chair exit alarm

## 2018-10-26 NOTE — ROUTINE PROCESS
Bedside and Verbal shift change report given to Sandra Dubon (oncoming nurse) by Ashley Edge (offgoing nurse). Report included the following information SBAR, Kardex, Intake/Output and MAR.

## 2018-10-26 NOTE — ROUTINE PROCESS
Patient in bed AAOx4  respiration slightly labored , O2 2 liter via NC in place O2sat 94%. Patient c/o 7/10 incional pain gave dilaudid 0.5 mg iv. Lap sites x4 with steri strips dry and clean, denies N/V, tolerating liquid diet. Visitor at bed side. Patient and family was educated about dilaudid, and heparin sides effects.

## 2018-10-27 VITALS
TEMPERATURE: 97.9 F | HEART RATE: 76 BPM | OXYGEN SATURATION: 93 % | SYSTOLIC BLOOD PRESSURE: 150 MMHG | RESPIRATION RATE: 16 BRPM | HEIGHT: 66 IN | DIASTOLIC BLOOD PRESSURE: 79 MMHG | BODY MASS INDEX: 19.13 KG/M2 | WEIGHT: 119 LBS

## 2018-10-27 LAB
ANION GAP SERPL CALC-SCNC: 7 MMOL/L (ref 3–18)
BUN SERPL-MCNC: 27 MG/DL (ref 7–18)
BUN/CREAT SERPL: 32 (ref 12–20)
CALCIUM SERPL-MCNC: 8.6 MG/DL (ref 8.5–10.1)
CHLORIDE SERPL-SCNC: 106 MMOL/L (ref 100–108)
CO2 SERPL-SCNC: 29 MMOL/L (ref 21–32)
CREAT SERPL-MCNC: 0.85 MG/DL (ref 0.6–1.3)
ERYTHROCYTE [DISTWIDTH] IN BLOOD BY AUTOMATED COUNT: 14 % (ref 11.6–14.5)
GLUCOSE BLD STRIP.AUTO-MCNC: 90 MG/DL (ref 70–110)
GLUCOSE SERPL-MCNC: 106 MG/DL (ref 74–99)
HCT VFR BLD AUTO: 36.3 % (ref 35–45)
HGB BLD-MCNC: 11.9 G/DL (ref 12–16)
MAGNESIUM SERPL-MCNC: 2.2 MG/DL (ref 1.6–2.6)
MCH RBC QN AUTO: 31.7 PG (ref 24–34)
MCHC RBC AUTO-ENTMCNC: 32.8 G/DL (ref 31–37)
MCV RBC AUTO: 96.8 FL (ref 74–97)
PHOSPHATE SERPL-MCNC: 4.3 MG/DL (ref 2.5–4.9)
PLATELET # BLD AUTO: 275 K/UL (ref 135–420)
PMV BLD AUTO: 10 FL (ref 9.2–11.8)
POTASSIUM SERPL-SCNC: 3.8 MMOL/L (ref 3.5–5.5)
RBC # BLD AUTO: 3.75 M/UL (ref 4.2–5.3)
SODIUM SERPL-SCNC: 142 MMOL/L (ref 136–145)
WBC # BLD AUTO: 9.8 K/UL (ref 4.6–13.2)

## 2018-10-27 PROCEDURE — 74011636637 HC RX REV CODE- 636/637: Performed by: COLON & RECTAL SURGERY

## 2018-10-27 PROCEDURE — 74011250636 HC RX REV CODE- 250/636

## 2018-10-27 PROCEDURE — 74011000250 HC RX REV CODE- 250: Performed by: COLON & RECTAL SURGERY

## 2018-10-27 PROCEDURE — 83735 ASSAY OF MAGNESIUM: CPT | Performed by: COLON & RECTAL SURGERY

## 2018-10-27 PROCEDURE — 85027 COMPLETE CBC AUTOMATED: CPT | Performed by: COLON & RECTAL SURGERY

## 2018-10-27 PROCEDURE — 74011250636 HC RX REV CODE- 250/636: Performed by: COLON & RECTAL SURGERY

## 2018-10-27 PROCEDURE — 80048 BASIC METABOLIC PNL TOTAL CA: CPT | Performed by: COLON & RECTAL SURGERY

## 2018-10-27 PROCEDURE — 74011250637 HC RX REV CODE- 250/637: Performed by: COLON & RECTAL SURGERY

## 2018-10-27 PROCEDURE — 36415 COLL VENOUS BLD VENIPUNCTURE: CPT | Performed by: COLON & RECTAL SURGERY

## 2018-10-27 PROCEDURE — 84100 ASSAY OF PHOSPHORUS: CPT | Performed by: COLON & RECTAL SURGERY

## 2018-10-27 PROCEDURE — 82962 GLUCOSE BLOOD TEST: CPT

## 2018-10-27 RX ORDER — HYDROMORPHONE HYDROCHLORIDE 1 MG/ML
INJECTION, SOLUTION INTRAMUSCULAR; INTRAVENOUS; SUBCUTANEOUS
Status: COMPLETED
Start: 2018-10-27 | End: 2018-10-27

## 2018-10-27 RX ADMIN — FAMOTIDINE 20 MG: 10 INJECTION, SOLUTION INTRAVENOUS at 07:53

## 2018-10-27 RX ADMIN — ACETAMINOPHEN 1000 MG: 500 TABLET, FILM COATED ORAL at 06:30

## 2018-10-27 RX ADMIN — HEPARIN SODIUM 5000 UNITS: 5000 INJECTION, SOLUTION INTRAVENOUS; SUBCUTANEOUS at 07:51

## 2018-10-27 RX ADMIN — CELECOXIB 200 MG: 100 CAPSULE ORAL at 08:05

## 2018-10-27 RX ADMIN — HYDROMORPHONE HYDROCHLORIDE 0.5 MG: 1 INJECTION, SOLUTION INTRAMUSCULAR; INTRAVENOUS; SUBCUTANEOUS at 07:37

## 2018-10-27 RX ADMIN — PREDNISONE 10 MG: 10 TABLET ORAL at 07:54

## 2018-10-27 RX ADMIN — ROPINIROLE HYDROCHLORIDE 1 MG: 0.25 TABLET, FILM COATED ORAL at 07:52

## 2018-10-27 NOTE — ROUTINE PROCESS
Patient d/c'd home. No distress noted. D/C inst. Given to patient and signature page placed in chart.

## 2018-10-27 NOTE — ROUTINE PROCESS
Bedside and Verbal shift change report given to Rashawn Caldwell RN (oncoming nurse) by Gabriel Diop RN (offgoing nurse). Report included the following information SBAR, Kardex, MAR and Recent Results. SITUATION:    Code Status: Full Code   Reason for Admission: Cecal cancer ([facility] [C18.0]    [facility] day: 4   Problem List:       Hospital Problems  Date Reviewed: 9/27/2018          Codes Class Noted POA    Cecal cancer ([facility] ICD-10-CM: C18.0  ICD-9-CM: 153.4  10/23/2018 Unknown              BACKGROUND:    Past Medical History:   Past Medical History:   Diagnosis Date    Anxiety     Arthritis     Asbestosis ([facility]     Asthma     Back problem     Baker's cyst     Balance problems     Chronic lung disease     Cigarette smoker     Clotting disorder ([facility]     COPD (chronic obstructive pulmonary disease) (HCC)     oxygen 2/liters    Depression     History of DVT (deep vein thrombosis)     Leg pain     Right    Lung disease     Nausea & vomiting     Osteoporosis     Restless leg syndrome     Spinal stenosis     Th[facility]embolus ([facility]     Vitamin D deficiency          Patient taking anticoagulants yes     ASSESSMENT:    Changes in Assessment Throughout Shift: none     Patient has Central Line: no Reasons if yes:    Patient has Morales Cath: no Reasons if yes:       Last Vitals:     Vitals:    10/26/18 1601 10/26/18 1938 10/26/18 1954 10/27/18 0359   BP: 142/80 141/85  121/74   Pulse: 76 73  74   Resp: 17 20  20   Temp: 98.2 °F (36.8 °C) 98.4 °F (36.9 °C)  97.9 °F (36.6 °C)   SpO2: 96% 96% 96% 91%   Weight:       Height:            IV and DRAINS (will only show if present)   [REMOVED] Peripheral IV 10/23/18 Right; Anterior Forearm-Site Assessment: Clean, dry, & intact  [REMOVED] Peripheral IV 10/23/18 Left; Anterior Forearm-Site Assessment: Clean, dry, & intact  [REMOVED] Peripheral IV 10/25/18 Right Arm-Site Assessment: Clean, dry, & intact  Peripheral IV 10/26/18 Anterior; Inferior; Lower;Right Arm-Site Assessment: Clean, dry, & intact     WOUND (if present)   Wound Type:  Lap sites   Dressing present yes   Wound Concerns/Notes:  none     PAIN    Pain Assessment    Pain Intensity 1: 0 (10/27/18 0359)    Pain Location 1: Abdomen    Pain Intervention(s) 1: Medication (see MAR)    Patient Stated Pain Goal: 0  o Interventions for Pain:  See Mar  o Intervention effective: yes  o Time of last intervention: see mar   o Reassessment Completed: yes      Last 3 Weights:  Last 3 Recorded Weights in this Encounter    10/09/18 1015 10/23/18 0657   Weight: 53.5 kg (118 lb) 54 kg (119 lb)     Weight change:      INTAKE/OUPUT    Current Shift: No intake/output data recorded. Last three shifts: 10/25 1901 - 10/27 0700  In: 1900 [P.O.:1900]  Out: 950 [Urine:950]     LAB RESULTS     Recent Labs     10/27/18  0353 10/26/18  0310 10/25/18  0343   WBC 9.8 9.2 9.2   HGB 11.9* 10.9* 10.9*   HCT 36.3 32.5* 32.4*    257 248        Recent Labs     10/27/18  0353 10/26/18  0310 10/25/18  0343    140 137   K 3.8 4.4 4.1   * 91 102*   BUN 27* 19* 22*   CREA 0.85 0.81 0.76   CA 8.6 8.4* 8.3*   MG 2.2 2.2 2.4       RECOMMENDATIONS AND DISCHARGE PLANNING     1. Pending tests/procedures/ Plan of Care or Other Needs: none     2. Discharge plan for patient and Needs/Barriers: home    3. Estimated Discharge Date: 2 days Posted on Whiteboard in [facility]: yes      4. The patient's care plan was reviewed with the oncoming nurse. \"HEALS\" SAFETY CHECK      Fall Risk    Total Score: 3    Safety Measures: Safety Measures: Bed/Chair-Wheels locked, Bed in low position, Call light within reach    A safety check occurred in the patient's room between off going nurse and oncoming nurse listed above.     The safety check included the below items  Area Items   H  High Alert Medications - Verify all high alert medication drips (heparin, PCA, etc.)   E  Equipment - Suction is set up for ALL patients (with estefanyker)  - Red plugs utilized for all equipment (IV pumps, etc.)  - WOWs wiped down at end of shift.  - Room stocked with oxygen, suction, and other unit-specific supplies   A  Alarms - Bed alarm is set for fall risk patients  - Ensure chair alarm is in place and activated if patient is up in a chair   L  Lines - Check IV for any infiltration  - Morales bag is empty if patient has a Morales   - Tubing and IV bags are labeled   S  Safety   - Room is clean, patient is clean, and equipment is clean. - Hallways are clear from equipment besides carts. - Fall bracelet on for fall risk patients  - Ensure room is clear and free of clutter  - Suction is set up for ALL patients (with yanker)  - Hallways are clear from equipment besides carts.    - Isolation precautions followed, supplies available outside room, sign posted     Laisha Varner RN

## 2018-10-27 NOTE — DISCHARGE INSTRUCTIONS
DISCHARGE SUMMARY from Nurse    PATIENT INSTRUCTIONS:    After general anesthesia or intravenous sedation, for 24 hours or while taking prescription Narcotics:  · Limit your activities  · Do not drive and operate hazardous machinery  · Do not make important personal or business decisions  · Do  not drink alcoholic beverages  · If you have not urinated within 8 hours after discharge, please contact your surgeon on call. Report the following to your surgeon:  · Excessive pain, swelling, redness or odor of or around the surgical area  · Temperature over 100.5  · Nausea and vomiting lasting longer than 4 hours or if unable to take medications  · Any signs of decreased circulation or nerve impairment to extremity: change in color, persistent  numbness, tingling, coldness or increase pain  · Any questions    What to do at Home:  Recommended activity: Activity as tolerated, ,no strenuous activity or heavy lifting. If you experience any of the following symptoms severe abdominal ,persistent nausea or vomiting,increased redness or thick yellowish-green drainage from incision please follow up with . *  Please give a list of your current medications to your Primary Care Provider. *  Please update this list whenever your medications are discontinued, doses are      changed, or new medications (including over-the-counter products) are added. *  Please carry medication information at all times in case of emergency situations. These are general instructions for a healthy lifestyle:    No smoking/ No tobacco products/ Avoid exposure to second hand smoke  Surgeon General's Warning:  Quitting smoking now greatly reduces serious risk to your health.     Obesity, smoking, and sedentary lifestyle greatly increases your risk for illness    A healthy diet, regular physical exercise & weight monitoring are important for maintaining a healthy lifestyle    You may be retaining fluid if you have a history of heart failure or if you experience any of the following symptoms:  Weight gain of 3 pounds or more overnight or 5 pounds in a week, increased swelling in our hands or feet or shortness of breath while lying flat in bed. Please call your doctor as soon as you notice any of these symptoms; do not wait until your next office visit. Recognize signs and symptoms of STROKE:    F-face looks uneven    A-arms unable to move or move unevenly    S-speech slurred or non-existent    T-time-call 911 as soon as signs and symptoms begin-DO NOT go       Back to bed or wait to see if you get better-TIME IS BRAIN. Warning Signs of HEART ATTACK     Call 911 if you have these symptoms:   Chest discomfort. Most heart attacks involve discomfort in the center of the chest that lasts more than a few minutes, or that goes away and comes back. It can feel like uncomfortable pressure, squeezing, fullness, or pain.  Discomfort in other areas of the upper body. Symptoms can include pain or discomfort in one or both arms, the back, neck, jaw, or stomach.  Shortness of breath with or without chest discomfort.  Other signs may include breaking out in a cold sweat, nausea, or lightheadedness. Don't wait more than five minutes to call 911 - MINUTES MATTER! Fast action can save your life. Calling 911 is almost always the fastest way to get lifesaving treatment. Emergency Medical Services staff can begin treatment when they arrive -- up to an hour sooner than if someone gets to the hospital by car. The discharge information has been reviewed with the patient. The patient verbalized understanding. Discharge medications reviewed with the patient and appropriate educational materials and side effects teaching were provided.   ___________________________________________________________________________________________________________________________________Instructions After Colectomy    No heavy lifting (nothing more than 10-15 lbs)    Low fiber diet - avoid raw fruits and vegetables, but small amounts of cooked vegetables are ok. Eat white bread products instead of wheat bread products. Meats, fish, chicken, noodles are ok. Showers over your wounds are ok, but avoid submerging wounds in pools or baths. The strips over your wound will fall off on their own or I will remove them in the office  MyChart Activation    Thank you for requesting access to [employer]. Please follow the instructions below to securely access and download your online medical record. [employer] allows you to send messages to your doctor, view your test results, renew your prescriptions, schedule appointments, and more. How Do I Sign Up? 1. In your internet browser, go to [email or web address]. 2. Click on the First Time User? Click Here link in the Sign In box. You will see the New Member Sign Up page. 3. Enter your [employer] Access Code exactly as it appears below. You will not need to use this code after youve completed the sign-up process. If you do not sign up before the expiration date, you must request a new code. [employer] Access Code: [email or web address]  Expires: 2019  5:21 PM (This is the date your [employer] access code will )    4. Enter the last four digits of your Social Security Number (xxxx) and Date of Birth (mm/dd/yyyy) as indicated and click Submit. You will be taken to the next sign-up page. 5. Create a [employer] ID. This will be your [employer] login ID and cannot be changed, so think of one that is secure and easy to remember. 6. Create a [employer] password. You can change your password at any time. 7. Enter your Password Reset Question and Answer. This can be used at a later time if you forget your password. 8. Enter your e-mail address. You will receive e-mail notification when new information is available in [address] 9. Click Sign Up. You can now view and download portions of your medical record.   10. Click the Download Summary menu link to download a portable copy of your medical information. Additional Information    If you have questions, please visit the Frequently Asked Questions section of the [employer] website at [email or web address] Remember, [employer] is NOT to be used for urgent needs. For medical emergencies, dial 911. Make an office follow up appointment in 2 weeks  Patient armband removed and shredded.

## 2018-10-27 NOTE — PROGRESS NOTES
Pt stated her daughter will be taking her home and her daughter and sister will be caring for her at home. She has no concerns for discharge.

## 2018-10-27 NOTE — PROGRESS NOTES
Todd Deleon M.D. FACS  PROGRESS NOTE    Name: Destini Padron MRN: [id number]   : 1945 Hospital: [facility]   Date: 10/27/2018 Admission Date: 10/23/2018  5:30 AM     Hospital Day: 5  4 Days Post-Op  Subjective:  Pt without acute overnight events. + BM and flatus. No n/v with regular diet. Objective:  Vitals:    10/26/18 1601 10/26/18 1938 10/26/18 1954 10/27/18 0359   BP: 142/80 141/85  121/74   Pulse: 76 73  74   Resp: 17 20  20   Temp: 98.2 °F (36.8 °C) 98.4 °F (36.9 °C)  97.9 °F (36.6 °C)   SpO2: 96% 96% 96% 91%   Weight:       Height:         Date 10/26/18 0700 - 10/27/18 0659 10/27/18 0700 - 10/28/18 0659   Shift 2475-2198 8142-8137 24 Hour Total 7631-6905 5631-3009 24 Hour Total   INTAKE   P.O. 5950 573 8842        P. O. 9823 531 3519      Shift Total(mL/kg) 1120(20.7) 480(8.9) 1600(29.6)      OUTPUT   Urine(mL/kg/hr) 550(0.8)  550(0.4)        Urine Voided 550  550        Urine Occurrence(s) 4 x  4 x      Stool           Stool Occurrence(s) 4 x  4 x      Shift Total(mL/kg) 550(10.2)  550(10.2)       028 4690      Weight (kg) 54 54 54 54 54 54         Physical Exam:    General: A&A, NAD, Ox4   Abdomen: abdomen is soft with minimal left sided tenderness. Incision(s) are C/D/I.   No   masses, organomegaly or guarding    Labs:  Recent Results (from the past 24 hour(s))   GLUCOSE, POC    Collection Time: 10/26/18 12:09 PM   Result Value Ref Range    Glucose (POC) 143 (H) 70 - 110 mg/dL   GLUCOSE, POC    Collection Time: 10/26/18  5:10 PM   Result Value Ref Range    Glucose (POC) 133 (H) 70 - 421 mg/dL   METABOLIC PANEL, BASIC    Collection Time: 10/27/18  3:53 AM   Result Value Ref Range    Sodium 142 136 - 145 mmol/L    Potassium 3.8 3.5 - 5.5 mmol/L    Chloride 106 100 - 108 mmol/L    CO2 29 21 - 32 mmol/L    Anion gap 7 3.0 - 18 mmol/L    Glucose 106 (H) 74 - 99 mg/dL    BUN 27 (H) 7.0 - 18 MG/DL    Creatinine 0.85 0.6 - 1.3 MG/DL    BUN/Creatinine ratio 32 (H) 12 - 20 GFR est AA >60 >60 ml/min/1.73m2    GFR est non-AA >60 >60 ml/min/1.73m2    Calcium 8.6 8.5 - 10.1 MG/DL   CBC W/O DIFF    Collection Time: 10/27/18  3:53 AM   Result Value Ref Range    WBC 9.8 4.6 - 13.2 K/uL    RBC 3.75 (L) 4.20 - 5.30 M/uL    HGB 11.9 (L) 12.0 - 16.0 g/dL    HCT 36.3 35.0 - 45.0 %    MCV 96.8 74.0 - 97.0 FL    MCH 31.7 24.0 - 34.0 PG    MCHC 32.8 31.0 - 37.0 g/dL    RDW 14.0 11.6 - 14.5 %    PLATELET 193 232 - 396 K/uL    MPV 10.0 9.2 - 11.8 FL   MAGNESIUM    Collection Time: 10/27/18  3:53 AM   Result Value Ref Range    Magnesium 2.2 1.6 - 2.6 mg/dL   PHOSPHORUS    Collection Time: 10/27/18  3:53 AM   Result Value Ref Range    Phosphorus 4.3 2.5 - 4.9 MG/DL   GLUCOSE, POC    Collection Time: 10/27/18  6:30 AM   Result Value Ref Range    Glucose (POC) 90 70 - 110 mg/dL     All Micro Results     None          Current Medications:  Current Facility-Administered Medications   Medication Dose Route Frequency Provider Last Rate Last Dose    budesonide-formoterol (SYMBICORT) 160-4.5 mcg/actuation HFA inhaler 2 Puff  2 Puff Inhalation BID RT Roe Acevedo MD   2 Puff at 10/26/18 2357    heparin (porcine) injection 5,000 Units  5,000 Units SubCUTAneous Q8H Roe Acevedo MD   5,000 Units at 10/26/18 2353    diphenhydrAMINE (BENADRYL) injection 25 mg  25 mg IntraVENous Q6H PRN Roe Acevedo MD   25 mg at 10/26/18 2133    celecoxib (CELEBREX) capsule 200 mg  200 mg Oral BID Roe Acevedo MD   200 mg at 10/26/18 1703    gabapentin (NEURONTIN) capsule 300 mg  300 mg Oral TID Roe Acevedo MD   300 mg at 10/26/18 2132    acetaminophen (TYLENOL) tablet 1,000 mg  1,000 mg Oral Q6H [place], MD   1,000 mg at 10/27/18 0630    ondansetron (ZOFRAN) injection 4 mg  4 mg IntraVENous Q6H PRN Roe Acevedo MD   4 mg at 10/25/18 0918    HYDROmorphone (PF) (DILAUDID) injection 0.5 mg  0.5 mg IntraVENous Q4H PRN Roe Acevedo MD   0.5 mg at 10/26/18 8524    predniSONE (DELTASONE) tablet 10 mg  10 mg Oral DAILY WITH Mat Richards MD   10 mg at 10/26/18 0818    albuterol (PROVENTIL VENTOLIN) nebulizer solution 2.5 mg  2.5 mg Nebulization Q4H PRN Bernardo Fulton MD   2.5 mg at 10/26/18 1100    tiotropium (SPIRIVA) inhalation capsule 18 mcg  1 Cap Inhalation DAILY Bernardo Fulton MD   18 mcg at 10/26/18 0900    rOPINIRole (REQUIP) tablet 1 mg  1 mg Oral BID Bernardo Fulton MD   1 mg at 10/26/18 1807    famotidine (PF) (PEPCID) 20 mg in sodium chloride 0.9% 10 mL injection  20 mg IntraVENous Q12H Bernardo Fulton MD   20 mg at 10/26/18 2832       Chart and notes reviewed. Data reviewed. I have evaluated and examined the patient. IMPRESSION:   · Pt doing well.        PLAN:/DISCUSION:   · D/C home  · F/u with Dr. Carolee Kaur MD

## 2018-10-28 ENCOUNTER — APPOINTMENT (OUTPATIENT)
Dept: GENERAL RADIOLOGY | Age: 73
DRG: 329 | End: 2018-10-28
Attending: STUDENT IN AN ORGANIZED HEALTH CARE EDUCATION/TRAINING PROGRAM
Payer: MEDICARE

## 2018-10-28 ENCOUNTER — HOSPITAL ENCOUNTER (INPATIENT)
Age: 73
LOS: 18 days | Discharge: HOME HEALTH | DRG: 329 | End: 2018-11-15
Attending: EMERGENCY MEDICINE | Admitting: COLON & RECTAL SURGERY
Payer: MEDICARE

## 2018-10-28 ENCOUNTER — APPOINTMENT (OUTPATIENT)
Dept: CT IMAGING | Age: 73
DRG: 329 | End: 2018-10-28
Attending: STUDENT IN AN ORGANIZED HEALTH CARE EDUCATION/TRAINING PROGRAM
Payer: MEDICARE

## 2018-10-28 DIAGNOSIS — C18.9 COLON CANCER WITHOUT DISTANT METASTASIS (HCC): ICD-10-CM

## 2018-10-28 DIAGNOSIS — K56.609 SBO (SMALL BOWEL OBSTRUCTION) (HCC): Primary | ICD-10-CM

## 2018-10-28 PROBLEM — K56.7 ILEUS FOLLOWING GASTROINTESTINAL SURGERY (HCC): Status: ACTIVE | Noted: 2018-10-28

## 2018-10-28 PROBLEM — G89.18 POST-OP PAIN: Status: ACTIVE | Noted: 2018-10-28

## 2018-10-28 PROBLEM — K91.89 ILEUS FOLLOWING GASTROINTESTINAL SURGERY (HCC): Status: ACTIVE | Noted: 2018-10-28

## 2018-10-28 LAB
ALBUMIN SERPL-MCNC: 3.3 G/DL (ref 3.4–5)
ALBUMIN SERPL-MCNC: 3.5 G/DL (ref 3.4–5)
ALBUMIN/GLOB SERPL: 1.1 {RATIO} (ref 0.8–1.7)
ALBUMIN/GLOB SERPL: 1.1 {RATIO} (ref 0.8–1.7)
ALP SERPL-CCNC: 58 U/L (ref 45–117)
ALP SERPL-CCNC: 58 U/L (ref 45–117)
ALT SERPL-CCNC: 25 U/L (ref 13–56)
ALT SERPL-CCNC: 27 U/L (ref 13–56)
ANION GAP SERPL CALC-SCNC: 5 MMOL/L (ref 3–18)
ANION GAP SERPL CALC-SCNC: 5 MMOL/L (ref 3–18)
APPEARANCE UR: CLEAR
AST SERPL-CCNC: 29 U/L (ref 15–37)
AST SERPL-CCNC: 37 U/L (ref 15–37)
BASOPHILS # BLD: 0 K/UL (ref 0–0.1)
BASOPHILS NFR BLD: 0 % (ref 0–2)
BILIRUB DIRECT SERPL-MCNC: 0.2 MG/DL (ref 0–0.2)
BILIRUB SERPL-MCNC: 0.6 MG/DL (ref 0.2–1)
BILIRUB SERPL-MCNC: 0.6 MG/DL (ref 0.2–1)
BILIRUB UR QL: NEGATIVE
BUN SERPL-MCNC: 19 MG/DL (ref 7–18)
BUN SERPL-MCNC: 20 MG/DL (ref 7–18)
BUN/CREAT SERPL: 26 (ref 12–20)
BUN/CREAT SERPL: 27 (ref 12–20)
CALCIUM SERPL-MCNC: 8.6 MG/DL (ref 8.5–10.1)
CALCIUM SERPL-MCNC: 9.3 MG/DL (ref 8.5–10.1)
CHLORIDE SERPL-SCNC: 97 MMOL/L (ref 100–108)
CHLORIDE SERPL-SCNC: 97 MMOL/L (ref 100–108)
CO2 SERPL-SCNC: 33 MMOL/L (ref 21–32)
CO2 SERPL-SCNC: 35 MMOL/L (ref 21–32)
COLOR UR: YELLOW
CREAT SERPL-MCNC: 0.71 MG/DL (ref 0.6–1.3)
CREAT SERPL-MCNC: 0.77 MG/DL (ref 0.6–1.3)
DIFFERENTIAL METHOD BLD: ABNORMAL
EOSINOPHIL # BLD: 0 K/UL (ref 0–0.4)
EOSINOPHIL NFR BLD: 0 % (ref 0–5)
ERYTHROCYTE [DISTWIDTH] IN BLOOD BY AUTOMATED COUNT: 13.7 % (ref 11.6–14.5)
GLOBULIN SER CALC-MCNC: 3.1 G/DL (ref 2–4)
GLOBULIN SER CALC-MCNC: 3.1 G/DL (ref 2–4)
GLUCOSE SERPL-MCNC: 121 MG/DL (ref 74–99)
GLUCOSE SERPL-MCNC: 124 MG/DL (ref 74–99)
GLUCOSE UR STRIP.AUTO-MCNC: NEGATIVE MG/DL
HCT VFR BLD AUTO: 41.6 % (ref 35–45)
HGB BLD-MCNC: 14.5 G/DL (ref 12–16)
HGB UR QL STRIP: NEGATIVE
INR PPP: 0.9 (ref 0.8–1.2)
KETONES UR QL STRIP.AUTO: NEGATIVE MG/DL
LACTATE BLD-SCNC: 0.7 MMOL/L (ref 0.4–2)
LEUKOCYTE ESTERASE UR QL STRIP.AUTO: NEGATIVE
LIPASE SERPL-CCNC: 104 U/L (ref 73–393)
LYMPHOCYTES # BLD: 0.8 K/UL (ref 0.9–3.6)
LYMPHOCYTES NFR BLD: 5 % (ref 21–52)
MAGNESIUM SERPL-MCNC: 2 MG/DL (ref 1.6–2.6)
MCH RBC QN AUTO: 33.1 PG (ref 24–34)
MCHC RBC AUTO-ENTMCNC: 34.9 G/DL (ref 31–37)
MCV RBC AUTO: 95 FL (ref 74–97)
MONOCYTES # BLD: 1.1 K/UL (ref 0.05–1.2)
MONOCYTES NFR BLD: 7 % (ref 3–10)
NEUTS SEG # BLD: 13.4 K/UL (ref 1.8–8)
NEUTS SEG NFR BLD: 88 % (ref 40–73)
NITRITE UR QL STRIP.AUTO: NEGATIVE
PH UR STRIP: 7 [PH] (ref 5–8)
PLATELET # BLD AUTO: 328 K/UL (ref 135–420)
PMV BLD AUTO: 10.4 FL (ref 9.2–11.8)
POTASSIUM SERPL-SCNC: 4.5 MMOL/L (ref 3.5–5.5)
POTASSIUM SERPL-SCNC: 4.6 MMOL/L (ref 3.5–5.5)
PROT SERPL-MCNC: 6.4 G/DL (ref 6.4–8.2)
PROT SERPL-MCNC: 6.6 G/DL (ref 6.4–8.2)
PROT UR STRIP-MCNC: NEGATIVE MG/DL
PROTHROMBIN TIME: 11.8 SEC (ref 11.5–15.2)
RBC # BLD AUTO: 4.38 M/UL (ref 4.2–5.3)
SODIUM SERPL-SCNC: 135 MMOL/L (ref 136–145)
SODIUM SERPL-SCNC: 137 MMOL/L (ref 136–145)
SP GR UR REFRACTOMETRY: >1.03 (ref 1–1.03)
UROBILINOGEN UR QL STRIP.AUTO: 0.2 EU/DL (ref 0.2–1)
WBC # BLD AUTO: 15.3 K/UL (ref 4.6–13.2)

## 2018-10-28 PROCEDURE — 74011250636 HC RX REV CODE- 250/636: Performed by: STUDENT IN AN ORGANIZED HEALTH CARE EDUCATION/TRAINING PROGRAM

## 2018-10-28 PROCEDURE — 80076 HEPATIC FUNCTION PANEL: CPT | Performed by: STUDENT IN AN ORGANIZED HEALTH CARE EDUCATION/TRAINING PROGRAM

## 2018-10-28 PROCEDURE — 99285 EMERGENCY DEPT VISIT HI MDM: CPT

## 2018-10-28 PROCEDURE — 85025 COMPLETE CBC W/AUTO DIFF WBC: CPT | Performed by: STUDENT IN AN ORGANIZED HEALTH CARE EDUCATION/TRAINING PROGRAM

## 2018-10-28 PROCEDURE — 65270000029 HC RM PRIVATE

## 2018-10-28 PROCEDURE — 85610 PROTHROMBIN TIME: CPT | Performed by: STUDENT IN AN ORGANIZED HEALTH CARE EDUCATION/TRAINING PROGRAM

## 2018-10-28 PROCEDURE — 94761 N-INVAS EAR/PLS OXIMETRY MLT: CPT

## 2018-10-28 PROCEDURE — 71046 X-RAY EXAM CHEST 2 VIEWS: CPT

## 2018-10-28 PROCEDURE — 74011250636 HC RX REV CODE- 250/636: Performed by: SURGERY

## 2018-10-28 PROCEDURE — 83605 ASSAY OF LACTIC ACID: CPT

## 2018-10-28 PROCEDURE — 94640 AIRWAY INHALATION TREATMENT: CPT

## 2018-10-28 PROCEDURE — 74011000250 HC RX REV CODE- 250: Performed by: STUDENT IN AN ORGANIZED HEALTH CARE EDUCATION/TRAINING PROGRAM

## 2018-10-28 PROCEDURE — 96375 TX/PRO/DX INJ NEW DRUG ADDON: CPT

## 2018-10-28 PROCEDURE — 81003 URINALYSIS AUTO W/O SCOPE: CPT | Performed by: STUDENT IN AN ORGANIZED HEALTH CARE EDUCATION/TRAINING PROGRAM

## 2018-10-28 PROCEDURE — 96374 THER/PROPH/DIAG INJ IV PUSH: CPT

## 2018-10-28 PROCEDURE — 93005 ELECTROCARDIOGRAM TRACING: CPT

## 2018-10-28 PROCEDURE — 71275 CT ANGIOGRAPHY CHEST: CPT

## 2018-10-28 PROCEDURE — 83690 ASSAY OF LIPASE: CPT | Performed by: STUDENT IN AN ORGANIZED HEALTH CARE EDUCATION/TRAINING PROGRAM

## 2018-10-28 PROCEDURE — 74011636320 HC RX REV CODE- 636/320: Performed by: EMERGENCY MEDICINE

## 2018-10-28 PROCEDURE — 83735 ASSAY OF MAGNESIUM: CPT | Performed by: STUDENT IN AN ORGANIZED HEALTH CARE EDUCATION/TRAINING PROGRAM

## 2018-10-28 PROCEDURE — 80053 COMPREHEN METABOLIC PANEL: CPT | Performed by: STUDENT IN AN ORGANIZED HEALTH CARE EDUCATION/TRAINING PROGRAM

## 2018-10-28 PROCEDURE — 74011250636 HC RX REV CODE- 250/636: Performed by: EMERGENCY MEDICINE

## 2018-10-28 PROCEDURE — 0D9670Z DRAINAGE OF STOMACH WITH DRAINAGE DEVICE, VIA NATURAL OR ARTIFICIAL OPENING: ICD-10-PCS | Performed by: STUDENT IN AN ORGANIZED HEALTH CARE EDUCATION/TRAINING PROGRAM

## 2018-10-28 PROCEDURE — 77030029684 HC NEB SM VOL KT MONA -A

## 2018-10-28 PROCEDURE — 74177 CT ABD & PELVIS W/CONTRAST: CPT

## 2018-10-28 PROCEDURE — 74011000258 HC RX REV CODE- 258: Performed by: EMERGENCY MEDICINE

## 2018-10-28 PROCEDURE — 87040 BLOOD CULTURE FOR BACTERIA: CPT | Performed by: EMERGENCY MEDICINE

## 2018-10-28 PROCEDURE — 74018 RADEX ABDOMEN 1 VIEW: CPT

## 2018-10-28 RX ORDER — SODIUM CHLORIDE, SODIUM LACTATE, POTASSIUM CHLORIDE, CALCIUM CHLORIDE 600; 310; 30; 20 MG/100ML; MG/100ML; MG/100ML; MG/100ML
100 INJECTION, SOLUTION INTRAVENOUS CONTINUOUS
Status: DISCONTINUED | OUTPATIENT
Start: 2018-10-28 | End: 2018-10-30

## 2018-10-28 RX ORDER — HYDROMORPHONE HYDROCHLORIDE 2 MG/ML
0.5 INJECTION, SOLUTION INTRAMUSCULAR; INTRAVENOUS; SUBCUTANEOUS ONCE
Status: COMPLETED | OUTPATIENT
Start: 2018-10-28 | End: 2018-10-28

## 2018-10-28 RX ORDER — ENOXAPARIN SODIUM 100 MG/ML
40 INJECTION SUBCUTANEOUS EVERY 24 HOURS
Status: DISCONTINUED | OUTPATIENT
Start: 2018-10-28 | End: 2018-11-15 | Stop reason: HOSPADM

## 2018-10-28 RX ORDER — ONDANSETRON 2 MG/ML
4 INJECTION INTRAMUSCULAR; INTRAVENOUS
Status: COMPLETED | OUTPATIENT
Start: 2018-10-28 | End: 2018-10-28

## 2018-10-28 RX ORDER — LORAZEPAM 2 MG/ML
1 INJECTION INTRAMUSCULAR
Status: DISCONTINUED | OUTPATIENT
Start: 2018-10-28 | End: 2018-10-29

## 2018-10-28 RX ORDER — KETOROLAC TROMETHAMINE 15 MG/ML
15 INJECTION, SOLUTION INTRAMUSCULAR; INTRAVENOUS EVERY 8 HOURS
Status: DISCONTINUED | OUTPATIENT
Start: 2018-10-28 | End: 2018-10-29

## 2018-10-28 RX ORDER — METOCLOPRAMIDE HYDROCHLORIDE 5 MG/ML
10 INJECTION INTRAMUSCULAR; INTRAVENOUS
Status: DISCONTINUED | OUTPATIENT
Start: 2018-10-28 | End: 2018-10-29

## 2018-10-28 RX ORDER — DIPHENHYDRAMINE HYDROCHLORIDE 50 MG/ML
12.5 INJECTION, SOLUTION INTRAMUSCULAR; INTRAVENOUS
Status: DISCONTINUED | OUTPATIENT
Start: 2018-10-28 | End: 2018-11-09

## 2018-10-28 RX ORDER — SODIUM CHLORIDE 0.9 % (FLUSH) 0.9 %
5-10 SYRINGE (ML) INJECTION EVERY 8 HOURS
Status: DISCONTINUED | OUTPATIENT
Start: 2018-10-28 | End: 2018-11-15 | Stop reason: HOSPADM

## 2018-10-28 RX ORDER — NALOXONE HYDROCHLORIDE 0.4 MG/ML
0.4 INJECTION, SOLUTION INTRAMUSCULAR; INTRAVENOUS; SUBCUTANEOUS AS NEEDED
Status: DISCONTINUED | OUTPATIENT
Start: 2018-10-28 | End: 2018-11-09

## 2018-10-28 RX ORDER — SODIUM CHLORIDE 0.9 % (FLUSH) 0.9 %
5-10 SYRINGE (ML) INJECTION AS NEEDED
Status: DISCONTINUED | OUTPATIENT
Start: 2018-10-28 | End: 2018-11-08 | Stop reason: SDUPTHER

## 2018-10-28 RX ORDER — ONDANSETRON 2 MG/ML
4 INJECTION INTRAMUSCULAR; INTRAVENOUS
Status: DISCONTINUED | OUTPATIENT
Start: 2018-10-28 | End: 2018-11-09

## 2018-10-28 RX ORDER — FACIAL-BODY WIPES
10 EACH TOPICAL DAILY PRN
Status: DISCONTINUED | OUTPATIENT
Start: 2018-10-28 | End: 2018-10-29

## 2018-10-28 RX ORDER — SODIUM CHLORIDE 0.9 % (FLUSH) 0.9 %
5-10 SYRINGE (ML) INJECTION AS NEEDED
Status: DISCONTINUED | OUTPATIENT
Start: 2018-10-28 | End: 2018-11-15 | Stop reason: HOSPADM

## 2018-10-28 RX ORDER — IPRATROPIUM BROMIDE AND ALBUTEROL SULFATE 2.5; .5 MG/3ML; MG/3ML
3 SOLUTION RESPIRATORY (INHALATION)
Status: COMPLETED | OUTPATIENT
Start: 2018-10-28 | End: 2018-10-28

## 2018-10-28 RX ORDER — LIDOCAINE HYDROCHLORIDE 20 MG/ML
15 SOLUTION OROPHARYNGEAL
Status: DISCONTINUED | OUTPATIENT
Start: 2018-10-28 | End: 2018-11-15 | Stop reason: HOSPADM

## 2018-10-28 RX ADMIN — HYDROMORPHONE HYDROCHLORIDE 0.5 MG: 2 INJECTION, SOLUTION INTRAMUSCULAR; INTRAVENOUS; SUBCUTANEOUS at 18:12

## 2018-10-28 RX ADMIN — SODIUM CHLORIDE, SODIUM LACTATE, POTASSIUM CHLORIDE, AND CALCIUM CHLORIDE 125 ML/HR: 600; 310; 30; 20 INJECTION, SOLUTION INTRAVENOUS at 21:58

## 2018-10-28 RX ADMIN — SODIUM CHLORIDE 1000 ML: 900 INJECTION, SOLUTION INTRAVENOUS at 20:06

## 2018-10-28 RX ADMIN — IOPAMIDOL 100 ML: 755 INJECTION, SOLUTION INTRAVENOUS at 17:43

## 2018-10-28 RX ADMIN — ONDANSETRON HYDROCHLORIDE 4 MG: 2 SOLUTION INTRAMUSCULAR; INTRAVENOUS at 18:12

## 2018-10-28 RX ADMIN — ENOXAPARIN SODIUM 40 MG: 100 INJECTION SUBCUTANEOUS at 21:49

## 2018-10-28 RX ADMIN — LORAZEPAM 1 MG: 2 INJECTION INTRAMUSCULAR; INTRAVENOUS at 21:53

## 2018-10-28 RX ADMIN — LIDOCAINE HYDROCHLORIDE 15 ML: 20 SOLUTION ORAL; TOPICAL at 20:06

## 2018-10-28 RX ADMIN — KETOROLAC TROMETHAMINE 15 MG: 15 INJECTION, SOLUTION INTRAMUSCULAR; INTRAVENOUS at 21:53

## 2018-10-28 RX ADMIN — IPRATROPIUM BROMIDE AND ALBUTEROL SULFATE 3 ML: .5; 3 SOLUTION RESPIRATORY (INHALATION) at 18:12

## 2018-10-28 RX ADMIN — PIPERACILLIN SODIUM,TAZOBACTAM SODIUM 3.38 G: 3; .375 INJECTION, POWDER, FOR SOLUTION INTRAVENOUS at 20:06

## 2018-10-28 NOTE — ED TRIAGE NOTES
Pt. Arrived via medic from home complaining of SOB. Pt. had surgery on Tuesday (hx of colon CA. Pt had 125 of solumedrol and 1 nebulizer tx on medic. Pt hx of COPD and on 2L NC @ home.

## 2018-10-28 NOTE — ED PROVIDER NOTES
EMERGENCY DEPARTMENT HISTORY AND PHYSICAL EXAM    4:46 PM      Date: 10/28/2018  Patient Name: Payal Peterson    History of Presenting Illness     Chief Complaint   Patient presents with    Shortness of Breath         History Provided By: Patient and Patient's Son    Chief Complaint: shortness of breath, vomiting and abdominal pain 5 days post op CR surgery  Duration:  Days  Timing:  Gradual, Constant and Worsening  Location: lungs, abdomen, flank and back  Quality: Burning, Dull and Sharp  Severity: Severe  Modifying Factors: worse with any movement or palpation  Associated Symptoms: bilious emesis      Additional History (Context): Payal Peterson is a 72 y.o. female with hypertension, hyperlipidemia, malignancy, deep vein thrombosis, pulmonary embolism, COPD and asthma who presents with acute worsening SOB, wheezing, abdominal pain with bilious emesis 1 day after discharge 5 days s/p robotic excision of CRC and primary anastomosis. Pt was tolerating full liquid diet, passing gas and having BMs on discharge.   Sxs acutely worsened last night    PCP: Emi Clemens MD    Current Facility-Administered Medications   Medication Dose Route Frequency Provider Last Rate Last Dose    iopamidol (ISOVUE 300) 61 % contrast injection  mL   mL IntraVENous RAD ONCE Kate Munoz MD        sodium chloride (NS) flush 5-10 mL  5-10 mL IntraVENous PRN Kate Munoz MD        piperacillin-tazobactam (ZOSYN) 3.375 g in 0.9% sodium chloride (MBP/ADV) 100 mL MBP  3.375 g IntraVENous Q6H Kate Munoz MD   Stopped at 10/28/18 2036    lidocaine (XYLOCAINE) 2 % viscous solution 15 mL  15 mL Mouth/Throat Q3H PRN Shay Cade DO   15 mL at 10/28/18 2006    sodium chloride (NS) flush 5-10 mL  5-10 mL IntraVENous Q8H Randall Koroma MD   Stopped at 10/28/18 2200    sodium chloride (NS) flush 5-10 mL  5-10 mL IntraVENous PRN Randall Koroma MD        lactated Ringers infusion  125 mL/hr IntraVENous CONTINUOUS Nohelia Chapa  mL/hr at 10/28/18 2158 125 mL/hr at 10/28/18 2158    naloxone [facility]) injection 0.4 mg  0.4 mg IntraVENous PRN Nohelia Chapa MD        metoclopramide HCl (REGLAN) injection 10 mg  10 mg IntraVENous Q4H PRN Nohelia Chapa MD        diphenhydrAMINE (BENADRYL) injection 12.5 mg  12.5 mg IntraVENous Q4H PRN Nohelia Chapa MD        bisacodyl (DULCOLAX) suppository 10 mg  10 mg Rectal DAILY PRN Nohelia Chapa MD        LORazepam (ATIVAN) injection 1 mg  1 mg IntraVENous Q6H PRN Nohelia Chapa MD   1 mg at 10/28/18 2153    enoxaparin (LOVENOX) injection 40 mg  40 mg SubCUTAneous Q24H Nohelia Chapa MD   40 mg at 10/28/18 2149    ondansetron (ZOFRAN) injection 4 mg  4 mg IntraVENous Q4H PRN Nohelia Chapa MD        ketorolac (TORADOL) injection 15 mg  15 mg IntraVENous Q8H Nohelia Chapa MD   15 mg at 10/28/18 2153       Past History     Past Medical History:  Past Medical History:   Diagnosis Date    Anxiety     Arthritis     Asbestosis ([address]     Asthma     Back problem     Baker's cyst     Balance problems     Chronic lung disease     Cigarette smoker     Clotting disorder ([address]     COPD (chronic obstructive pulmonary disease) (HCC)     oxygen 2/liters    Depression     History of DVT (deep vein thrombosis)     Leg pain     Right    Lung disease     Nausea & vomiting     Osteoporosis     Restless leg syndrome     Spinal stenosis     Thromboembolus ([address]     Vitamin D deficiency        Past Surgical History:  Past Surgical History:   Procedure Laterality Date    HX APPENDECTOMY      HX BACK SURGERY      x4    HX BREAST BIOPSY      HX HEENT      cataract right    HX HYSTERECTOMY      HX LUMBAR LAMINECTOMY      HX MENISCUS REPAIR      HX ORTHOPAEDIC      Knee bakers cyst    HX POLYPECTOMY      HX TONSILLECTOMY      HX VEIN STRIPPING      LAP,SURG,COLECTOMY, PARTIAL, W/ANAST N/A 10/23/2018    Dr. Burma Severin VASCULAR SURGERY PROCEDURE UNLIST      vein stripping       Family History:  Family History   Problem Relation Age of Onset   Anguiano.Heather Cancer Father     Heart Disease Mother     Hypertension Mother     Cancer Brother     Cancer Sister     Diabetes Other     Hypertension Other     Stroke Other     Heart Disease Sister     Hypertension Sister     Heart Disease Brother        Social History:  Social History     Tobacco Use    Smoking status: Current Some Day Smoker     Packs/day: 0.25     Years: 50.00     Pack years: 12.50     Types: Cigarettes     Start date: 10/21/1964    Smokeless tobacco: Never Used   Substance Use Topics    Alcohol use: No    Drug use: No       Allergies: Allergies   Allergen Reactions    Anoro Ellipta [Umeclidinium-Vilanterol] Rash and Other (comments)     Muscle cramps in arms    Aspirin Other (comments)     GI upset and bleeding    Augmentin [Amoxicillin-Pot Clavulanate] Not Reported This Time     Possible cramping    Doxycycline Nausea and Vomiting    Morphine Other (comments)     Neurologic symptoms - severe agitation      Shellfish Derived Unknown (comments)     Pt able to eat small amounts per report     Sulfa (Sulfonamide Antibiotics) Rash     Patient relates no longer allergic         Review of Systems     Review of Systems   Constitutional: Positive for appetite change. Negative for activity change, chills, diaphoresis, fatigue and fever. HENT: Negative for trouble swallowing. Respiratory: Positive for cough, choking, chest tightness, shortness of breath, wheezing and stridor. Cardiovascular: Negative for chest pain, palpitations and leg swelling. Gastrointestinal: Positive for abdominal distention, abdominal pain, nausea and vomiting. Negative for blood in stool. Genitourinary: Negative for difficulty urinating. Neurological: Positive for weakness. Negative for dizziness, tremors, syncope, speech difficulty, light-headedness, numbness and headaches.    All other systems reviewed and are negative. Physical Exam     Visit Vitals  /78   Pulse 85   Temp 97.8 °F (36.6 °C)   Resp 16   Ht 5' 6\" (1.676 m) Comment: From September 2018 Surgery office visit   SpO2 97%   BMI 19.21 kg/m²         Physical Exam   Constitutional: She is oriented to person, place, and time. Vital signs are normal. She appears cachectic. She appears distressed. Nasal cannula in place. Eyes: Pupils are equal, round, and reactive to light. Neck: Normal range of motion. Neck supple. No tracheal deviation present. Cardiovascular: Normal rate, regular rhythm and normal heart sounds. Pulmonary/Chest: She is in respiratory distress. She has wheezes. She has rales. She exhibits no tenderness. Abdominal: She exhibits distension. There is tenderness. There is rebound and guarding. Musculoskeletal: Normal range of motion. Neurological: She is alert and oriented to person, place, and time. Skin: Skin is warm. Psychiatric: She has a normal mood and affect. Diagnostic Study Results     Labs -  Recent Results (from the past 12 hour(s))   CBC WITH AUTOMATED DIFF    Collection Time: 10/28/18  4:29 PM   Result Value Ref Range    WBC 15.3 (H) 4.6 - 13.2 K/uL    RBC 4.38 4.20 - 5.30 M/uL    HGB 14.5 12.0 - 16.0 g/dL    HCT 41.6 35.0 - 45.0 %    MCV 95.0 74.0 - 97.0 FL    MCH 33.1 24.0 - 34.0 PG    MCHC 34.9 31.0 - 37.0 g/dL    RDW 13.7 11.6 - 14.5 %    PLATELET 402 236 - 183 K/uL    MPV 10.4 9.2 - 11.8 FL    NEUTROPHILS 88 (H) 40 - 73 %    LYMPHOCYTES 5 (L) 21 - 52 %    MONOCYTES 7 3 - 10 %    EOSINOPHILS 0 0 - 5 %    BASOPHILS 0 0 - 2 %    ABS. NEUTROPHILS 13.4 (H) 1.8 - 8.0 K/UL    ABS. LYMPHOCYTES 0.8 (L) 0.9 - 3.6 K/UL    ABS. MONOCYTES 1.1 0.05 - 1.2 K/UL    ABS. EOSINOPHILS 0.0 0.0 - 0.4 K/UL    ABS.  BASOPHILS 0.0 0.0 - 0.1 K/UL    DF AUTOMATED     PROTHROMBIN TIME + INR    Collection Time: 10/28/18  4:29 PM   Result Value Ref Range    Prothrombin time 11.8 11.5 - 15.2 sec INR 0.9 0.8 - 1.2     METABOLIC PANEL, BASIC    Collection Time: 10/28/18  4:29 PM   Result Value Ref Range    Sodium 137 136 - 145 mmol/L    Potassium 4.5 3.5 - 5.5 mmol/L    Chloride 97 (L) 100 - 108 mmol/L    CO2 35 (H) 21 - 32 mmol/L    Anion gap 5 3.0 - 18 mmol/L    Glucose 124 (H) 74 - 99 mg/dL    BUN 20 (H) 7.0 - 18 MG/DL    Creatinine 0.77 0.6 - 1.3 MG/DL    BUN/Creatinine ratio 26 (H) 12 - 20      GFR est AA >60 >60 ml/min/1.73m2    GFR est non-AA >60 >60 ml/min/1.73m2    Calcium 9.3 8.5 - 10.1 MG/DL   HEPATIC FUNCTION PANEL    Collection Time: 10/28/18  4:29 PM   Result Value Ref Range    Protein, total 6.6 6.4 - 8.2 g/dL    Albumin 3.5 3.4 - 5.0 g/dL    Globulin 3.1 2.0 - 4.0 g/dL    A-G Ratio 1.1 0.8 - 1.7      Bilirubin, total 0.6 0.2 - 1.0 MG/DL    Bilirubin, direct 0.2 0.0 - 0.2 MG/DL    Alk.  phosphatase 58 45 - 117 U/L    AST (SGOT) 37 15 - 37 U/L    ALT (SGPT) 27 13 - 56 U/L   LIPASE    Collection Time: 10/28/18  4:29 PM   Result Value Ref Range    Lipase 104 73 - 393 U/L   MAGNESIUM    Collection Time: 10/28/18  4:29 PM   Result Value Ref Range    Magnesium 2.0 1.6 - 2.6 mg/dL   EKG, 12 LEAD, INITIAL    Collection Time: 10/28/18  4:32 PM   Result Value Ref Range    Ventricular Rate 87 BPM    Atrial Rate 87 BPM    P-R Interval 116 ms    QRS Duration 72 ms    Q-T Interval 362 ms    QTC Calculation (Bezet) 435 ms    Calculated P Axis 81 degrees    Calculated R Axis 84 degrees    Calculated T Axis 80 degrees    Diagnosis       Normal sinus rhythm with sinus arrhythmia  Normal ECG  When compared with ECG of 19-SEP-2018 23:39,  Nonspecific T wave abnormality now evident in Anterior leads     POC LACTIC ACID    Collection Time: 10/28/18  6:36 PM   Result Value Ref Range    Lactic Acid (POC) 0.7 0.4 - 2.0 mmol/L   METABOLIC PANEL, COMPREHENSIVE    Collection Time: 10/28/18  7:35 PM   Result Value Ref Range    Sodium 135 (L) 136 - 145 mmol/L    Potassium 4.6 3.5 - 5.5 mmol/L    Chloride 97 (L) 100 - 108 mmol/L    CO2 33 (H) 21 - 32 mmol/L    Anion gap 5 3.0 - 18 mmol/L    Glucose 121 (H) 74 - 99 mg/dL    BUN 19 (H) 7.0 - 18 MG/DL    Creatinine 0.71 0.6 - 1.3 MG/DL    BUN/Creatinine ratio 27 (H) 12 - 20      GFR est AA >60 >60 ml/min/1.73m2    GFR est non-AA >60 >60 ml/min/1.73m2    Calcium 8.6 8.5 - 10.1 MG/DL    Bilirubin, total 0.6 0.2 - 1.0 MG/DL    ALT (SGPT) 25 13 - 56 U/L    AST (SGOT) 29 15 - 37 U/L    Alk. phosphatase 58 45 - 117 U/L    Protein, total 6.4 6.4 - 8.2 g/dL    Albumin 3.3 (L) 3.4 - 5.0 g/dL    Globulin 3.1 2.0 - 4.0 g/dL    A-G Ratio 1.1 0.8 - 1.7         Radiologic Studies -   XR ABD (KUB)   Final Result      XR CHEST PA LAT   Final Result      CT ABD PELV W CONT   Final Result      CTA CHEST W OR W WO CONT   Final Result      XR ABD ACUTE W 1 V CHEST    (Results Pending)     IMPRESSION:     1. Small bowel is diffusely dilated up to 4 cm with transition point at level of  an enterocolic anastomosis, consistent with bowel obstruction.  -Right colon is absent and remaining transverse colon is unremarkable however  the descending colon and sigmoid colon are somewhat collapsed. -Stomach is also distended with a large amount of fluid.     2. Right greater than left ventral superficial, wall soft tissue emphysema,  likely postoperative in this setting however clinical correlation advised.  -Small amount of pelvic free fluid is also likely postoperative or reactive due  to bowel findings above.     3. Status post hysterectomy. See additional details above. See same day chest  CT. Medical Decision Making   I am the first provider for this patient. I reviewed the vital signs, available nursing notes, past medical history, past surgical history, family history and social history. Vital Signs-Reviewed the patient's vital signs. Pulse Oximetry Analysis -  95% on 2L of O2 via NC   Cardiac Monitor:  Rate: 80s  Rhythm:  Normal Sinus Rhythm     EKG: Interpreted by the EP.   Normal sinus rhythm with sinus arrhythmia  Normal ECG  When compared with ECG of 19-SEP-2018 23:39,  Nonspecific T wave abnormality now evident in Anterior leads    Records Reviewed: Nursing Notes, Old Medical Records, Previous electrocardiograms, Previous Radiology Studies and Previous Laboratory Studies (Time of Review: 4:46 PM)    ED Course: Progress Notes, Reevaluation, and Consults:  2 SIRS criteria met (WBC and RR), sepsis suspected and protocol initiated 1645, though do not suspect severe sepsis as pt has a normal lactate and is not hypotensive    Provider Notes (Medical Decision Making):     5 days Post op robotic colon surgery with abdo pain, distention and bilious emesis. abdo firm and NTTP with crepitus. eval for anastomotic leak, bleed, abscess vs post op ileus    Pt high risk for PE with recent malignancy, surgery and hx of PE and hypercoagulable d/o off coumadin     CTA chest with no e/o PE or developing PNA, no PTX  CT abdo/pelvis with large bowel obstruction. Dw Dr. Calixto Valdivia with general surgery who will admit         Procedures: NGT placed wo difficutly      For Hospitalized Patients:    1. Critical Care Time: 30 minutes     2. Hospitalization Decision Time:  The decision to hospitalize the patient was made by Dr. Calixto Valdivia on 10/28/2018        4. Core Measures: sepsis bundle    Diagnosis     Clinical Impression: post op bowel obstruction, pain, nausea and vomiting    Disposition: admit to surgery    Follow-up Information    None             Medication List      ASK your doctor about these medications    * albuterol 90 mcg/actuation inhaler  Commonly known as:  PROAIR HFA  INHALE 2 PUFFS BY MOUTH EVERY 4 HOURS AS NEEDED FOR WHEEZING OR SHORTNESS OF BREATH     * albuterol 2.5 mg /3 mL (0.083 %) nebulizer solution  Commonly known as:  PROVENTIL VENTOLIN  3 mL by Nebulization route every four (4) hours as needed for Wheezing or Shortness of Breath. Diag.  Code: J44.9, R09.02     celecoxib 200 mg capsule  Commonly known as: CELEBREX  Take 1 Cap by mouth two (2) times a day for 7 days. DIFLUCAN 200 mg tablet  Generic drug:  fluconazole     fluticasone-salmeterol 250-50 mcg/dose diskus inhaler  Commonly known as:  ADVAIR  Take 1 Puff by inhalation every twelve (12) hours. HYDROcodone-acetaminophen 5-325 mg per tablet  Commonly known as:  NORCO  Take 1 Tab by mouth every six (6) hours as needed for Pain. Max Daily Amount: 4 Tabs. LORazepam 1 mg tablet  Commonly known as:  ATIVAN     OXYGEN-AIR DELIVERY SYSTEMS     predniSONE 10 mg tablet  Commonly known as:  DELTASONE     rOPINIRole 1 mg tablet  Commonly known as:  REQUIP     therapeutic multivitamin tablet  Commonly known as:  THERAGRAN  Take 1 Tab by mouth daily. tiotropium bromide 2.5 mcg/actuation inhaler  Commonly known as:  SPIRIVA RESPIMAT  Take 2 Puffs by inhalation daily. * TYLENOL EXTRA STRENGTH 500 mg tablet  Generic drug:  acetaminophen     * acetaminophen 500 mg tablet  Commonly known as:  TYLENOL  Take 2 Tabs by mouth every six (6) hours for 7 days. * This list has 4 medication(s) that are the same as other medications prescribed for you.  Read the directions carefully, and ask your doctor or other care provider to review them with you.              _______________________________         Rey Cash DO    PGY3    October 28, 2018 at 10:02 PM         _______________________________

## 2018-10-29 LAB
ALBUMIN SERPL-MCNC: 2.6 G/DL (ref 3.4–5)
ALBUMIN/GLOB SERPL: 1 {RATIO} (ref 0.8–1.7)
ALP SERPL-CCNC: 47 U/L (ref 45–117)
ALT SERPL-CCNC: 22 U/L (ref 13–56)
ANION GAP SERPL CALC-SCNC: 5 MMOL/L (ref 3–18)
AST SERPL-CCNC: 23 U/L (ref 15–37)
ATRIAL RATE: 87 BPM
BASOPHILS # BLD: 0 K/UL (ref 0–0.1)
BASOPHILS NFR BLD: 0 % (ref 0–2)
BILIRUB DIRECT SERPL-MCNC: 0.2 MG/DL (ref 0–0.2)
BILIRUB SERPL-MCNC: 0.5 MG/DL (ref 0.2–1)
BUN SERPL-MCNC: 22 MG/DL (ref 7–18)
BUN/CREAT SERPL: 29 (ref 12–20)
CALCIUM SERPL-MCNC: 7.5 MG/DL (ref 8.5–10.1)
CALCULATED P AXIS, ECG09: 81 DEGREES
CALCULATED R AXIS, ECG10: 84 DEGREES
CALCULATED T AXIS, ECG11: 80 DEGREES
CHLORIDE SERPL-SCNC: 103 MMOL/L (ref 100–108)
CO2 SERPL-SCNC: 29 MMOL/L (ref 21–32)
CREAT SERPL-MCNC: 0.77 MG/DL (ref 0.6–1.3)
DIAGNOSIS, 93000: NORMAL
DIFFERENTIAL METHOD BLD: ABNORMAL
EOSINOPHIL # BLD: 0 K/UL (ref 0–0.4)
EOSINOPHIL NFR BLD: 0 % (ref 0–5)
ERYTHROCYTE [DISTWIDTH] IN BLOOD BY AUTOMATED COUNT: 14 % (ref 11.6–14.5)
GLOBULIN SER CALC-MCNC: 2.6 G/DL (ref 2–4)
GLUCOSE SERPL-MCNC: 127 MG/DL (ref 74–99)
HCT VFR BLD AUTO: 34.7 % (ref 35–45)
HGB BLD-MCNC: 11.6 G/DL (ref 12–16)
LYMPHOCYTES # BLD: 0.5 K/UL (ref 0.9–3.6)
LYMPHOCYTES NFR BLD: 6 % (ref 21–52)
MAGNESIUM SERPL-MCNC: 1.9 MG/DL (ref 1.6–2.6)
MCH RBC QN AUTO: 31.7 PG (ref 24–34)
MCHC RBC AUTO-ENTMCNC: 33.4 G/DL (ref 31–37)
MCV RBC AUTO: 94.8 FL (ref 74–97)
MONOCYTES # BLD: 0.1 K/UL (ref 0.05–1.2)
MONOCYTES NFR BLD: 1 % (ref 3–10)
NEUTS SEG # BLD: 8.9 K/UL (ref 1.8–8)
NEUTS SEG NFR BLD: 93 % (ref 40–73)
P-R INTERVAL, ECG05: 116 MS
PHOSPHATE SERPL-MCNC: 4.9 MG/DL (ref 2.5–4.9)
PLATELET # BLD AUTO: 304 K/UL (ref 135–420)
PMV BLD AUTO: 10 FL (ref 9.2–11.8)
POTASSIUM SERPL-SCNC: 4.9 MMOL/L (ref 3.5–5.5)
PROT SERPL-MCNC: 5.2 G/DL (ref 6.4–8.2)
Q-T INTERVAL, ECG07: 362 MS
QRS DURATION, ECG06: 72 MS
QTC CALCULATION (BEZET), ECG08: 435 MS
RBC # BLD AUTO: 3.66 M/UL (ref 4.2–5.3)
SODIUM SERPL-SCNC: 137 MMOL/L (ref 136–145)
VENTRICULAR RATE, ECG03: 87 BPM
WBC # BLD AUTO: 9.5 K/UL (ref 4.6–13.2)

## 2018-10-29 PROCEDURE — 74011250637 HC RX REV CODE- 250/637: Performed by: COLON & RECTAL SURGERY

## 2018-10-29 PROCEDURE — 74011250636 HC RX REV CODE- 250/636: Performed by: SURGERY

## 2018-10-29 PROCEDURE — 77010033678 HC OXYGEN DAILY

## 2018-10-29 PROCEDURE — 85025 COMPLETE CBC W/AUTO DIFF WBC: CPT | Performed by: SURGERY

## 2018-10-29 PROCEDURE — 74011250636 HC RX REV CODE- 250/636: Performed by: EMERGENCY MEDICINE

## 2018-10-29 PROCEDURE — 36415 COLL VENOUS BLD VENIPUNCTURE: CPT | Performed by: SURGERY

## 2018-10-29 PROCEDURE — 83735 ASSAY OF MAGNESIUM: CPT | Performed by: SURGERY

## 2018-10-29 PROCEDURE — 74011250636 HC RX REV CODE- 250/636: Performed by: COLON & RECTAL SURGERY

## 2018-10-29 PROCEDURE — 94640 AIRWAY INHALATION TREATMENT: CPT

## 2018-10-29 PROCEDURE — 74011000250 HC RX REV CODE- 250: Performed by: STUDENT IN AN ORGANIZED HEALTH CARE EDUCATION/TRAINING PROGRAM

## 2018-10-29 PROCEDURE — 80076 HEPATIC FUNCTION PANEL: CPT | Performed by: SURGERY

## 2018-10-29 PROCEDURE — 65270000029 HC RM PRIVATE

## 2018-10-29 PROCEDURE — 74011000258 HC RX REV CODE- 258: Performed by: EMERGENCY MEDICINE

## 2018-10-29 PROCEDURE — 80048 BASIC METABOLIC PNL TOTAL CA: CPT | Performed by: SURGERY

## 2018-10-29 PROCEDURE — 74011250637 HC RX REV CODE- 250/637: Performed by: STUDENT IN AN ORGANIZED HEALTH CARE EDUCATION/TRAINING PROGRAM

## 2018-10-29 PROCEDURE — 84100 ASSAY OF PHOSPHORUS: CPT | Performed by: SURGERY

## 2018-10-29 RX ORDER — HYDROMORPHONE HYDROCHLORIDE 2 MG/ML
0.5 INJECTION, SOLUTION INTRAMUSCULAR; INTRAVENOUS; SUBCUTANEOUS
Status: COMPLETED | OUTPATIENT
Start: 2018-10-29 | End: 2018-10-29

## 2018-10-29 RX ORDER — BUDESONIDE AND FORMOTEROL FUMARATE DIHYDRATE 160; 4.5 UG/1; UG/1
2 AEROSOL RESPIRATORY (INHALATION) 2 TIMES DAILY
Status: DISCONTINUED | OUTPATIENT
Start: 2018-10-29 | End: 2018-10-29 | Stop reason: SDUPTHER

## 2018-10-29 RX ORDER — ALBUTEROL SULFATE 0.83 MG/ML
2.5 SOLUTION RESPIRATORY (INHALATION)
Status: DISCONTINUED | OUTPATIENT
Start: 2018-10-29 | End: 2018-11-03

## 2018-10-29 RX ORDER — BUDESONIDE AND FORMOTEROL FUMARATE DIHYDRATE 160; 4.5 UG/1; UG/1
2 AEROSOL RESPIRATORY (INHALATION)
Status: DISCONTINUED | OUTPATIENT
Start: 2018-10-29 | End: 2018-11-15 | Stop reason: HOSPADM

## 2018-10-29 RX ORDER — LORAZEPAM 2 MG/ML
2 INJECTION INTRAMUSCULAR
Status: DISCONTINUED | OUTPATIENT
Start: 2018-10-29 | End: 2018-11-09

## 2018-10-29 RX ORDER — KETOROLAC TROMETHAMINE 30 MG/ML
15 INJECTION, SOLUTION INTRAMUSCULAR; INTRAVENOUS EVERY 6 HOURS
Status: COMPLETED | OUTPATIENT
Start: 2018-10-29 | End: 2018-11-03

## 2018-10-29 RX ORDER — HYDROCORTISONE SODIUM SUCCINATE 100 MG/2ML
15 INJECTION, POWDER, FOR SOLUTION INTRAMUSCULAR; INTRAVENOUS EVERY 8 HOURS
Status: DISCONTINUED | OUTPATIENT
Start: 2018-10-29 | End: 2018-11-09

## 2018-10-29 RX ORDER — IBUPROFEN 200 MG
1 TABLET ORAL DAILY
Status: DISCONTINUED | OUTPATIENT
Start: 2018-10-30 | End: 2018-11-15 | Stop reason: HOSPADM

## 2018-10-29 RX ADMIN — KETOROLAC TROMETHAMINE 15 MG: 15 INJECTION, SOLUTION INTRAMUSCULAR; INTRAVENOUS at 05:15

## 2018-10-29 RX ADMIN — LORAZEPAM 1 MG: 2 INJECTION INTRAMUSCULAR; INTRAVENOUS at 06:03

## 2018-10-29 RX ADMIN — KETOROLAC TROMETHAMINE 15 MG: 30 INJECTION, SOLUTION INTRAMUSCULAR at 23:31

## 2018-10-29 RX ADMIN — PIPERACILLIN SODIUM,TAZOBACTAM SODIUM 3.38 G: 3; .375 INJECTION, POWDER, FOR SOLUTION INTRAVENOUS at 06:02

## 2018-10-29 RX ADMIN — SODIUM CHLORIDE, SODIUM LACTATE, POTASSIUM CHLORIDE, AND CALCIUM CHLORIDE 100 ML/HR: 600; 310; 30; 20 INJECTION, SOLUTION INTRAVENOUS at 22:41

## 2018-10-29 RX ADMIN — ENOXAPARIN SODIUM 40 MG: 100 INJECTION SUBCUTANEOUS at 22:28

## 2018-10-29 RX ADMIN — PIPERACILLIN SODIUM,TAZOBACTAM SODIUM 3.38 G: 3; .375 INJECTION, POWDER, FOR SOLUTION INTRAVENOUS at 00:31

## 2018-10-29 RX ADMIN — LORAZEPAM 2 MG: 2 INJECTION INTRAMUSCULAR; INTRAVENOUS at 22:29

## 2018-10-29 RX ADMIN — HYDROCORTISONE SODIUM SUCCINATE 15 MG: 100 INJECTION, POWDER, FOR SOLUTION INTRAMUSCULAR; INTRAVENOUS at 15:46

## 2018-10-29 RX ADMIN — HYDROCORTISONE SODIUM SUCCINATE 15 MG: 100 INJECTION, POWDER, FOR SOLUTION INTRAMUSCULAR; INTRAVENOUS at 22:28

## 2018-10-29 RX ADMIN — HYDROMORPHONE HYDROCHLORIDE 0.5 MG: 2 INJECTION, SOLUTION INTRAMUSCULAR; INTRAVENOUS; SUBCUTANEOUS at 08:47

## 2018-10-29 RX ADMIN — Medication 10 ML: at 22:37

## 2018-10-29 RX ADMIN — BUDESONIDE AND FORMOTEROL FUMARATE DIHYDRATE 2 PUFF: 160; 4.5 AEROSOL RESPIRATORY (INHALATION) at 10:33

## 2018-10-29 RX ADMIN — Medication 10 ML: at 15:48

## 2018-10-29 RX ADMIN — KETOROLAC TROMETHAMINE 15 MG: 30 INJECTION, SOLUTION INTRAMUSCULAR at 18:25

## 2018-10-29 RX ADMIN — BUDESONIDE AND FORMOTEROL FUMARATE DIHYDRATE 2 PUFF: 160; 4.5 AEROSOL RESPIRATORY (INHALATION) at 21:36

## 2018-10-29 RX ADMIN — TIOTROPIUM BROMIDE 18 MCG: 18 CAPSULE ORAL; RESPIRATORY (INHALATION) at 10:33

## 2018-10-29 RX ADMIN — HYDROCORTISONE SODIUM SUCCINATE 15 MG: 100 INJECTION, POWDER, FOR SOLUTION INTRAMUSCULAR; INTRAVENOUS at 08:52

## 2018-10-29 RX ADMIN — LIDOCAINE HYDROCHLORIDE 15 ML: 20 SOLUTION ORAL; TOPICAL at 05:15

## 2018-10-29 RX ADMIN — KETOROLAC TROMETHAMINE 15 MG: 30 INJECTION, SOLUTION INTRAMUSCULAR at 11:58

## 2018-10-29 NOTE — CDMP QUERY
Please clarify if this patient is being treated/managed for: 
 
=> COPD with exacerbation 
=> COPD, without exaceration  
=> Other Explanation of clinical findings 
=> Unable to Determine (no explanation of clinical findings) The medical record reflects the following: 
 
-----> Risk: 72 yr old with hx COPD presented with c/o SOB, wheezing, abd pain, emesis -----> Clinical Indicators: * 15-23-09 @5739 ED PN:  \"Pt. Arrived via medic from home complaining of SOB. Pt. had surgery on Tuesday (hx of colon CA. Pt had 125 of solumedrol and 1 nebulizer tx on medic. Pt hx of COPD and on 2L NC @ home. \" 
     * 10-28-18 ED VS:  T 97.6;  P: 86;  R: 19;  119/78;  98% 4LNC * PTA Med list:  \"Oxygen-Air Delivery Systems: O2 via nasal cannula with bedtime and activity\" * 10-28-18 CXR:  \"IMPRESSION:   1.  COPD\" 
 
-----> Treatment: CXR; O2 @ 4LNC; duo neb; IV Solumedrol; albut neb; spiriva inh Please clarify and document your clinical opinion in the progress notes and discharge summary including the definitive and/or presumptive diagnosis, (suspected or probable), related to the above clinical findings. Please include clinical findings supporting your diagnosis. If you DECLINE this query or would like to communicate with [facility], please utilize the \"[facility] message box\" at the TOP of the Progress Note on the right. Thank you, Randi Hinds, RN 
[phone]

## 2018-10-29 NOTE — ROUTINE PROCESS
Bedside and Verbal shift change report given to [employer], RN (oncoming nurse) by Ciera Pelayo RN (offgoing nurse). Report included the following information SBAR, Kardex, MAR and Recent Results.     SITUATION:    Code Status: Full Code   Reason for Admission: Obstruction of bowel ([address]   Post-op pain   Ileus following gastrointestinal surgery    [facility] day: 1   Problem List:       Hospital Problems  Date Reviewed: 9/27/2018          Codes Class Noted POA    Post-op pain ICD-10-CM: G89.18  ICD-9-CM: 338.18  10/28/2018 Unknown        Obstruction of bowel ([facility] ICD-10-CM: O37.055  ICD-9-CM: 560.9  10/28/2018 Unknown        Ileus following gastrointestinal surgery ICD-10-CM: K91.30  ICD-9-CM: 997.49, 560.1  10/28/2018 Unknown              BACKGROUND:    Past Medical History:   Past Medical History:   Diagnosis Date    Anxiety     Arthritis     Asbestosis ([facility]     Asthma     Back problem     Baker's cyst     Balance problems     Chronic lung disease     Cigarette smoker     Clotting disorder (HCC)     COPD [facility]chronic obstructive pulmonary disease) ([facility])     oxygen 2/liters    Depression     History of DVT (deep vein thrombosis)     Leg pain     Right    Lung disease     Nausea & vomiting     Osteoporosis     Restless leg syndrome     Spinal stenosis     Thromboembolus ([facility]     Vitamin D deficiency          Patient taking anticoagulants yes     ASSESSMENT:    Changes in Assessment Throughout Shift: SBO     Patient has Central Line: no Reasons if yes:    Patient has Morales Cath: no Reasons if yes:       Last Vitals:     Vitals:    10/28/18 2100 10/28/18 2209 10/28/18 2210 10/29/18 0302   BP: 116/78  114/75 99/62   Pulse: 85  83 79   Resp: 16  20 20   Temp:   98 °F (36.7 °C) 98 °F (36.7 °C)   SpO2: 97%  95% 90%   Weight:  49.2 kg (108 lb 8 oz)     Height:            IV and DRAINS (will only show if present)   Nasogastric Tube 10/28/18-Site Assessment: Clean, dry, & intact  Peripheral IV 10/28/18 Left Antecubital-Site Assessment: Clean, dry, & intact     WOUND (if present)   Wound Type:  Lap sites   Dressing present Dressing Present : Yes   Wound Concerns/Notes:  none     PAIN    Pain Assessment    Pain Intensity 1: 2 (10/29/18 0302)              Patient Stated Pain Goal: 2  o Interventions for Pain:  See mar  o Intervention effective: yes  o Time of last intervention: see mar   o Reassessment Completed: yes      Last 3 Weights:  Last 3 Recorded Weights in this Encounter    10/28/18 2209   Weight: 49.2 kg (108 lb 8 oz)     Weight change:      INTAKE/OUPUT    Current Shift: 10/28 1901 - 10/29 0700  In: 60.4 [I.V.:60.4]  Out: 350 [Urine:300]    Last three shifts: No intake/output data recorded.  LAB RESULTS     Recent Labs     10/29/18  0259 10/28/18  1629 10/27/18  0353   WBC 9.5 15.3* 9.8   HGB 11.6* 14.5 11.9*   HCT 34.7* 41.6 36.3    328 275        Recent Labs     10/29/18  0259 10/28/18  1935 10/28/18  1629 10/27/18  0353    135* 137 142   K 4.9 4.6 4.5 3.8   * 121* 124* 106*   BUN 22* 19* 20* 27*   CREA 0.77 0.71 0.77 0.85   CA 7.5* 8.6 9.3 8.6   MG 1.9  --  2.0 2.2   INR  --   --  0.9  --        RECOMMENDATIONS AND DISCHARGE PLANNING     1. Pending tests/procedures/ Plan of Care or Other Needs: none     2. Discharge plan for patient and Needs/Barriers: home    3. Estimated Discharge Date: unknown Posted on Whiteboard in Patients Room: no      4. The patient's care plan was reviewed with the oncoming nurse. \"HEALS\" SAFETY CHECK      Fall Risk    Total Score: 3    Safety Measures: Safety Measures: Bed/Chair-Wheels locked, Bed in low position, Call light within reach, Family at bedside, Side rails X 3, Gripper socks    A safety check occurred in the patient's room between off going nurse and oncoming nurse listed above.     The safety check included the below items  Area Items   H  High Alert Medications - Verify all high alert medication drips (heparin, PCA, etc.)   E  Equipment - Suction is set up for ALL patients (with gloria)  - Red plugs utilized for all equipment (IV pumps, etc.)  - WOWs wiped down at end of shift.  - Room stocked with oxygen, suction, and other unit-specific supplies   A  Alarms - Bed alarm is set for fall risk patients  - Ensure chair alarm is in place and activated if patient is up in a chair   L  Lines - Check IV for any infiltration  - Morales bag is empty if patient has a Morales   - Tubing and IV bags are labeled   S  Safety   - Room is clean, patient is clean, and equipment is clean. - Hallways are clear from equipment besides carts. - Fall bracelet on for fall risk patients  - Ensure room is clear and free of clutter  - Suction is set up for ALL patients (with gloria)  - Hallways are clear from equipment besides carts.    - Isolation precautions followed, supplies available outside room, sign posted     Lilibeth Pérez RN

## 2018-10-29 NOTE — PROGRESS NOTES
NUTRITION    Nursing Referral: [facility]     RECOMMENDATIONS / PLAN:     - Monitor GI symptoms and readiness for diet initiation.  - If anticipate pt will not be able to tolerate po within the next 3 days, consider parenteral nutrition 2/2 current nutritional status. - Weight pt on standing scale when appropriate.  - Continue RD inpatient monitoring and evaluation. NUTRITION INTERVENTIONS & DIAGNOSIS:     [x] Meals/snacks: NPO  [x] IV Fluids: continue LR at 100 mL/hr  [x] Coordination and referral of nutrition care: discussed obtaining standing weight with nursing. Nutrition Diagnosis: Inadequate nutrient intake related to altered GI function and inability to consume oral diet as evidenced by pt NPO with possible ileus after GI surgery. Patient meets criteria for Moderate Protein Calorie Malnutrition as evidenced by:   ASPEN Malnutrition Criteria  Acute Illness, Chronic Illness, or Social/Enviornmental: Acute illness  Weight Loss: Greater than 5% x 1 mo  Body Fat: Mild(orbital, thoracic regions)  Muscle Mass: Mild(temple, clavicle, trapezius regions)  ASPEN Malnutrition Score - Chronic Illness: 8  Chronic Illness - Malnutrition Diagnosis: Moderate malnutrition. ASSESSMENT:     Readmitted with n/v after recent colectomy on 10/23 for cecal cancer. NGT placed to suction, minimal output noted, however pt's son report canister was emptied last night and pt report a lot of output yesterday. Pt weaker, with significant weight loss noted since last admission weight. Plan to obtain standing weight when appropriate to verify current weight. Pt continues to have muscle and fat loss, currently meets criteria for moderate malnutrition, but has had <50% of energy intake x 3 days, pt at high risk for becoming severely malnourished.       Average po intake adequate to meet patients estimated nutritional needs:   [] Yes     [x] No   [] Unable to determine at this time    Diet: DIET NPO      Food Allergies: Shellfish  Current Appetite:   [] Good     [] Fair     [] Poor     [x] Other: NPO  Appetite/meal intake prior to admission:   [] Good     [] Fair     [] Poor     [x] Other: Good appetite, but poor-fair intake after surgery  Feeding Limitations:  [] Swallowing difficulty    [] Chewing difficulty    [] Other:  Current Meal Intake:   Patient Vitals for the past 100 hrs:   % Diet Eaten   10/29/18 0946 0 %       BM: 10/27  Skin Integrity: surgical incision to abdomen  Edema:   [x] No     [] Yes   Pertinent Medications: Reviewed    Recent Labs     10/29/18  0259 10/28/18  1935 10/28/18  1629 10/27/18  0353    135* 137 142   K 4.9 4.6 4.5 3.8    97* 97* 106   CO2 29 33* 35* 29   * 121* 124* 106*   BUN 22* 19* 20* 27*   CREA 0.77 0.71 0.77 0.85   CA 7.5* 8.6 9.3 8.6   MG 1.9  --  2.0 2.2   PHOS 4.9  --   --  4.3   ALB 2.6* 3.3* 3.5  --    SGOT 23 29 37  --    ALT 22 25 27  --        Intake/Output Summary (Last 24 hours) at 10/29/2018 1151  Last data filed at 10/29/2018 0946  Gross per 24 hour   Intake 60.42 ml   Output 475 ml   Net -414.58 ml       Anthropometrics:  Ht Readings from Last 1 Encounters:   10/28/18 5' 6\" (1.676 m)     Last 3 Recorded Weights in this Encounter    10/28/18 2209   Weight: 49.2 kg (108 lb 8 oz)     Body mass index is 17.51 kg/m².   Underweight    Weight History: -11 lbs (9%) x 5 days, -16 lbs (13%) x 1 month  Weight Metrics 10/28/2018 10/23/2018 10/4/2018 9/27/2018 9/23/2018 8/28/2018 8/16/2018   Weight 108 lb 8 oz 119 lb 118 lb 124 lb 117 lb 116 lb 122 lb 4.8 oz   BMI 17.51 kg/m2 19.21 kg/m2 19.05 kg/m2 20.01 kg/m2 18.88 kg/m2 19.3 kg/m2 20.35 kg/m2        Admitting Diagnosis: Obstruction of bowel (HCC)  Post-op pain  Ileus following gastrointestinal surgery  Pertinent PMHx: COPD, depression, nausea & vomiting, osteoporosis, vitamin D deficiency     Education Needs:        [x] None identified  [] Identified - Not appropriate at this time  []  Identified and addressed - refer to education log  Learning Limitations:   [x] None identified  [] Identified    Cultural, Baptist & ethnic food preferences:  [x] None identified    [] Identified and addressed     ESTIMATED NUTRITION NEEDS:     Calories: 4834-7618 kcal (HBEx1.2-1.4) based on  [] Actual BW      [x] IBW 59 kg  Protein: 59-71  gm (1-1.2 gm/kg) based on  [] Actual BW      [x] IBW   Fluid: 1 mL/kcal     MONITORING & EVALUATION:     Nutrition Goal(s):   1. Nutritional needs will be met through adequate oral intake or nutrition support within the next 7 days.   Outcome:  [] Met/Ongoing    []  Not Met    [x] New/Initial Goal      Monitoring:   [] Food and beverage intake   [x] Diet order   [x] Nutrition-focused physical findings   [x] Treatment/therapy   [x] Weight   [] Enteral nutrition intake        Previous Recommendations (for follow-up assessments only):     []   Implemented       []   Not Implemented (RD to address)      [] No Longer Appropriate     [] No Recommendation Made     Discharge Planning: pending GI function  [x] Participated in care planning, discharge planning, & interdisciplinary rounds as appropriate      Larry Rodriguez RD, [address] [place]   Pager: [phone]

## 2018-10-29 NOTE — ED NOTES
TRANSFER - OUT REPORT:    Verbal report given to Uri Bhardwaj RN (name) on Liddie Mcburney  being transferred to [address] [facility] (unit) for routine progression of care       Report consisted of patients Situation, Background, Assessment and   Recommendations(SBAR). Information from the following report(s) SBAR, Kardex, ED Summary, Intake/Output, MAR and Recent Results was reviewed with the receiving nurse. Lines:   Peripheral IV 10/28/18 Left Antecubital (Active)   Site Assessment Clean, dry, & intact 10/28/2018  4:53 PM   Phlebitis Assessment 0 10/28/2018  4:53 PM   Infiltration Assessment 0 10/28/2018  4:53 PM   Dressing Status Clean, dry, & intact 10/28/2018  4:53 PM   Hub Color/Line Status Blue 10/28/2018  4:53 PM        Opportunity for questions and clarification was provided.       Patient transported with:   Monitor  O2 @ 4 liters  Registered Nurse

## 2018-10-29 NOTE — PROGRESS NOTES
met with patient, completed the initial Spiritual Assessment of the patient, and offered Pastoral Care, see flow sheets for interventions. Patient said she was discharged a few days ago and felt well and now she has returned. She expressed her disappointment and at the same time talked of hope in her feeling better and restored. Pastoral support provided with prayer. Her brother and son were in the room and supportive. Patient does not have any Mormonism/cultural needs that will affect patients preferences in health care. Chart reviewed. Chaplains will continue to follow and will provide pastoral care on an as needed/as requested basis. Tim Dalal MDiv.   Board Certified [employer] [phone]

## 2018-10-29 NOTE — H&P
HPI: Liddie Mcburney is a 72 y.o. female presenting with chief complain of nausea, vomiting after r colectomy.      Past Medical History:   Diagnosis Date    Anxiety     Arthritis     Asbestosis ([facility]     Asthma     Back problem     Baker's cyst     Balance problems     Chronic lung disease     Cigarette smoker     [facility]     COPD (chronic obstructive pulmonary disease) (HCC)     oxygen 2/liters    Depression     History of DVT (deep vein thrombosis)     Leg pain     Right    Lung disease     Nausea & vomiting     Osteoporosis     Restless leg syndrome     Spinal stenosis     Thromboembolus (HCC)     Vitamin D deficiency        Past Surgical History:   Procedure Laterality Date    HX APPENDECTOMY      HX BACK SURGERY      x4    HX BREAST BIOPSY      HX HEENT      cataract right    HX HYSTERECTOMY      HX LUMBAR LAMINECTOMY      HX MENISCUS REPAIR      HX ORTHOPAEDIC      Knee bakers cyst    HX POLYPECTOMY      HX TONSILLECTOMY      HX VEIN STRIPPING      LAP,SURG,COLECTOMY, PARTIAL, W/ANAST N/A 10/23/2018    Dr. Leon Morris      vein stripping       Family History   Problem Relation Age of Onset    Cancer Father     Heart Disease Mother     Hypertension Mother     Cancer Brother     Cancer Sister     Diabetes Other     Hypertension Other     Stroke Other     Heart Disease Sister     Hypertension Sister     Heart Disease Brother        Social History     Socioeconomic History    Marital status:      Spouse name: Not on file    Number of children: Not on file    Years of education: Not on file    Highest education level: Not on file   Social Needs    Financial resource strain: Not on file    Food insecurity - worry: Not on file    Food insecurity - inability: Not on file   [place] needs - medical: Not on file   [place] needs - non-medical: Not on file   Occupational History    Not on file Tobacco Use    Smoking status: Current Some Day Smoker     Packs/day: 0.25     Years: 50.00     Pack years: 12.50     Types: Cigarettes     Start date: 10/21/1964    Smokeless tobacco: Never Used   Substance and Sexual Activity    Alcohol use: No    Drug use: No    Sexual activity: Not on file   Other Topics Concern    Not on file   Social History Narrative    Not on file       Review of Systems - neg        Allergies   Allergen Reactions    Anoro Ellipta [Umeclidinium-Vilanterol] Rash and Other (comments)     Muscle cramps in arms    Aspirin Other (comments)     GI upset and bleeding    Augmentin [Amoxicillin-Pot Clavulanate] Not Reported This Time     Possible cramping    Doxycycline Nausea and Vomiting    Morphine Other (comments)     Neurologic symptoms - severe agitation      Shellfish Derived Unknown (comments)     Pt able to eat small amounts per report     Sulfa (Sulfonamide Antibiotics) Rash     Patient relates no longer allergic       Vitals:    10/28/18 2100 10/28/18 2209 10/28/18 2210 10/29/18 0302   BP: 116/78  114/75 99/62   Pulse: 85  83 79   Resp: 16  20 20   Temp:   98 °F (36.7 °C) 98 °F (36.7 °C)   SpO2: 97%  95% 90%   Weight:  49.2 kg (108 lb 8 oz)     Height:           Physical Exam   Constitutional: She appears well-developed and well-nourished. HENT:   Head: Normocephalic and atraumatic. Eyes: Conjunctivae and EOM are normal.   Abdominal: Soft. She exhibits distension. There is no tenderness. Musculoskeletal: Normal range of motion. Lymphadenopathy:     She has no cervical adenopathy. Right: No inguinal adenopathy present. Left: No inguinal adenopathy present. Neurological: She exhibits normal muscle tone. Skin: Skin is warm and dry. No rash noted. Psychiatric: She has a normal mood and affect.  Her speech is normal.     CMP:   Lab Results   Component Value Date/Time     10/29/2018 02:59 AM    K 4.9 10/29/2018 02:59 AM     10/29/2018 02:59 AM    CO2 29 10/29/2018 02:59 AM    AGAP 5 10/29/2018 02:59 AM     (H) 10/29/2018 02:59 AM    BUN 22 (H) 10/29/2018 02:59 AM    CREA 0.77 10/29/2018 02:59 AM    GFRAA >60 10/29/2018 02:59 AM    GFRNA >60 10/29/2018 02:59 AM    CA 7.5 (L) 10/29/2018 02:59 AM    MG 1.9 10/29/2018 02:59 AM    PHOS 4.9 10/29/2018 02:59 AM    ALB 2.6 (L) 10/29/2018 02:59 AM    TP 5.2 (L) 10/29/2018 02:59 AM    GLOB 2.6 10/29/2018 02:59 AM    AGRAT 1.0 10/29/2018 02:59 AM    SGOT 23 10/29/2018 02:59 AM    ALT 22 10/29/2018 02:59 AM     CBC:   Lab Results   Component Value Date/Time    WBC 9.5 10/29/2018 02:59 AM    HGB 11.6 (L) 10/29/2018 02:59 AM    HCT 34.7 (L) 10/29/2018 02:59 AM     10/29/2018 02:59 AM     CT abd/pelvis reviewed, likely ileus  CT chest negative for PE      Assessment / Plan    Post operative Ileus after recent discharge for robotic R colectomy, complication  NPO, IVF, NG  IV steroids at maintenance dose  Maintenance medications for COPD, stable    The diagnoses and plan were discussed with the patient. All questions answered. Plan of care agreed to by all concerned.

## 2018-10-29 NOTE — PROGRESS NOTES
Reason for Readmission:    Obstruction of bowl; post-op pain; ileus gastrointestinal surgery          RRAT Score and Risk Level:   29       Level of Readmission:  2        Care Conference scheduled:   no       Resources/supports as identified by patient/family:  Immediate and extended family support         Top Challenges facing patient (as identified by patient/family and CM): Finances/Medication cost?       Transportation        Support system or lack thereof? Living arrangements? Self-care/ADLs/Cognition? Current Advanced Directive/Advance Care Plan:  Full code           Plan for utilizing home health:   No orders at this time. Likelihood of additional readmission: high               Transition of Care Plan:    Based on readmission, the patient's previous Plan of Care   has been evaluated and/or modified. The current Transition of Care Plan is:  Discharge plan is to return home with live in daughter. Met with pt with sons and consent. Pt's daughter lives in home and assist with ADL's. Pt is adamant about living at home due to excellent family support. Pt owns rollator, oxygen, CPAP, nebulizer, and shower chair. Daughter will transport home at discharge. CM will continue to monitor for transitional needs. ISAURA Weir, [phone]    Care Management Interventions  PCP Verified by CM:  Yes  Mode of Transport at Discharge: Self  Transition of Care Consult (CM Consult): Discharge Planning  MyChart Signup: No  Discharge Durable Medical Equipment: No  Physical Therapy Consult: No  Occupational Therapy Consult: No  Speech Therapy Consult: No  Current Support Network: Own Home  Confirm Follow Up Transport: Family  Plan discussed with Pt/Family/Caregiver: Yes  Discharge Location  Discharge Placement: Home

## 2018-10-30 LAB
ANION GAP SERPL CALC-SCNC: 5 MMOL/L (ref 3–18)
BUN SERPL-MCNC: 26 MG/DL (ref 7–18)
BUN/CREAT SERPL: 46 (ref 12–20)
CALCIUM SERPL-MCNC: 8.5 MG/DL (ref 8.5–10.1)
CHLORIDE SERPL-SCNC: 102 MMOL/L (ref 100–108)
CO2 SERPL-SCNC: 32 MMOL/L (ref 21–32)
CREAT SERPL-MCNC: 0.56 MG/DL (ref 0.6–1.3)
ERYTHROCYTE [DISTWIDTH] IN BLOOD BY AUTOMATED COUNT: 14.4 % (ref 11.6–14.5)
GLUCOSE SERPL-MCNC: 80 MG/DL (ref 74–99)
HCT VFR BLD AUTO: 32 % (ref 35–45)
HGB BLD-MCNC: 10.7 G/DL (ref 12–16)
MAGNESIUM SERPL-MCNC: 2.2 MG/DL (ref 1.6–2.6)
MCH RBC QN AUTO: 32.9 PG (ref 24–34)
MCHC RBC AUTO-ENTMCNC: 33.4 G/DL (ref 31–37)
MCV RBC AUTO: 98.5 FL (ref 74–97)
PHOSPHATE SERPL-MCNC: 2.7 MG/DL (ref 2.5–4.9)
PLATELET # BLD AUTO: 256 K/UL (ref 135–420)
PMV BLD AUTO: 10.1 FL (ref 9.2–11.8)
POTASSIUM SERPL-SCNC: 4 MMOL/L (ref 3.5–5.5)
RBC # BLD AUTO: 3.25 M/UL (ref 4.2–5.3)
SODIUM SERPL-SCNC: 139 MMOL/L (ref 136–145)
WBC # BLD AUTO: 11.7 K/UL (ref 4.6–13.2)

## 2018-10-30 PROCEDURE — 65270000029 HC RM PRIVATE

## 2018-10-30 PROCEDURE — 80048 BASIC METABOLIC PNL TOTAL CA: CPT | Performed by: COLON & RECTAL SURGERY

## 2018-10-30 PROCEDURE — 77030027138 HC INCENT SPIROMETER -A

## 2018-10-30 PROCEDURE — 84100 ASSAY OF PHOSPHORUS: CPT | Performed by: COLON & RECTAL SURGERY

## 2018-10-30 PROCEDURE — 77010033678 HC OXYGEN DAILY

## 2018-10-30 PROCEDURE — 85027 COMPLETE CBC AUTOMATED: CPT | Performed by: COLON & RECTAL SURGERY

## 2018-10-30 PROCEDURE — 83735 ASSAY OF MAGNESIUM: CPT | Performed by: COLON & RECTAL SURGERY

## 2018-10-30 PROCEDURE — 74011250636 HC RX REV CODE- 250/636: Performed by: COLON & RECTAL SURGERY

## 2018-10-30 PROCEDURE — 94640 AIRWAY INHALATION TREATMENT: CPT

## 2018-10-30 PROCEDURE — 74011250637 HC RX REV CODE- 250/637: Performed by: COLON & RECTAL SURGERY

## 2018-10-30 PROCEDURE — 74011250636 HC RX REV CODE- 250/636: Performed by: SURGERY

## 2018-10-30 PROCEDURE — 36415 COLL VENOUS BLD VENIPUNCTURE: CPT | Performed by: COLON & RECTAL SURGERY

## 2018-10-30 RX ORDER — ROPINIROLE 1 MG/1
1 TABLET, FILM COATED ORAL 2 TIMES DAILY
Status: DISCONTINUED | OUTPATIENT
Start: 2018-10-30 | End: 2018-11-15 | Stop reason: HOSPADM

## 2018-10-30 RX ORDER — DEXTROSE, SODIUM CHLORIDE, AND POTASSIUM CHLORIDE 5; .45; .15 G/100ML; G/100ML; G/100ML
75 INJECTION INTRAVENOUS CONTINUOUS
Status: DISPENSED | OUTPATIENT
Start: 2018-10-30 | End: 2018-11-01

## 2018-10-30 RX ADMIN — KETOROLAC TROMETHAMINE 15 MG: 30 INJECTION, SOLUTION INTRAMUSCULAR at 23:10

## 2018-10-30 RX ADMIN — ENOXAPARIN SODIUM 40 MG: 100 INJECTION SUBCUTANEOUS at 20:59

## 2018-10-30 RX ADMIN — KETOROLAC TROMETHAMINE 15 MG: 30 INJECTION, SOLUTION INTRAMUSCULAR at 12:09

## 2018-10-30 RX ADMIN — ONDANSETRON 4 MG: 2 INJECTION INTRAMUSCULAR; INTRAVENOUS at 21:03

## 2018-10-30 RX ADMIN — LORAZEPAM 2 MG: 2 INJECTION INTRAMUSCULAR; INTRAVENOUS at 21:08

## 2018-10-30 RX ADMIN — DIPHENHYDRAMINE HYDROCHLORIDE 12.5 MG: 50 INJECTION INTRAMUSCULAR; INTRAVENOUS at 01:32

## 2018-10-30 RX ADMIN — HYDROCORTISONE SODIUM SUCCINATE 15 MG: 100 INJECTION, POWDER, FOR SOLUTION INTRAMUSCULAR; INTRAVENOUS at 05:17

## 2018-10-30 RX ADMIN — ONDANSETRON 4 MG: 2 INJECTION INTRAMUSCULAR; INTRAVENOUS at 05:18

## 2018-10-30 RX ADMIN — SODIUM CHLORIDE, SODIUM LACTATE, POTASSIUM CHLORIDE, AND CALCIUM CHLORIDE 100 ML/HR: 600; 310; 30; 20 INJECTION, SOLUTION INTRAVENOUS at 09:12

## 2018-10-30 RX ADMIN — HYDROCORTISONE SODIUM SUCCINATE 15 MG: 100 INJECTION, POWDER, FOR SOLUTION INTRAMUSCULAR; INTRAVENOUS at 21:01

## 2018-10-30 RX ADMIN — KETOROLAC TROMETHAMINE 15 MG: 30 INJECTION, SOLUTION INTRAMUSCULAR at 18:13

## 2018-10-30 RX ADMIN — Medication 10 ML: at 21:08

## 2018-10-30 RX ADMIN — BUDESONIDE AND FORMOTEROL FUMARATE DIHYDRATE 2 PUFF: 160; 4.5 AEROSOL RESPIRATORY (INHALATION) at 21:09

## 2018-10-30 RX ADMIN — DEXTROSE MONOHYDRATE, SODIUM CHLORIDE, AND POTASSIUM CHLORIDE 75 ML/HR: 50; 4.5; 1.49 INJECTION, SOLUTION INTRAVENOUS at 14:13

## 2018-10-30 RX ADMIN — ROPINIROLE 1 MG: 1 TABLET, FILM COATED ORAL at 18:13

## 2018-10-30 RX ADMIN — BUDESONIDE AND FORMOTEROL FUMARATE DIHYDRATE 2 PUFF: 160; 4.5 AEROSOL RESPIRATORY (INHALATION) at 09:09

## 2018-10-30 RX ADMIN — Medication 10 ML: at 05:30

## 2018-10-30 RX ADMIN — Medication 10 ML: at 14:00

## 2018-10-30 RX ADMIN — KETOROLAC TROMETHAMINE 15 MG: 30 INJECTION, SOLUTION INTRAMUSCULAR at 05:17

## 2018-10-30 RX ADMIN — TIOTROPIUM BROMIDE 18 MCG: 18 CAPSULE ORAL; RESPIRATORY (INHALATION) at 12:08

## 2018-10-30 RX ADMIN — HYDROCORTISONE SODIUM SUCCINATE 15 MG: 100 INJECTION, POWDER, FOR SOLUTION INTRAMUSCULAR; INTRAVENOUS at 14:12

## 2018-10-30 RX ADMIN — ROPINIROLE 1 MG: 1 TABLET, FILM COATED ORAL at 08:59

## 2018-10-30 NOTE — ROUTINE PROCESS
@ 2045: [facility] Afebrile. Pt family stated pt was experiencing pain, however pt did not verbalize pain to this nurse. No s/sx of distress noted. Pt is restless this p.m., ativan given per orders with partial relief noted. NGT to LIS dark green drainage noted. IVF infusing per order. SCD's in place. Call light within reach. Pt and family encouraged to call for assistance as needed. Will continue to monitor.

## 2018-10-30 NOTE — PROGRESS NOTES
Problem: Falls - Risk of  Goal: *Absence of Falls  Document [place] Fall Risk and appropriate interventions in the flowsheet.   Outcome: Progressing Towards Goal  Fall Risk Interventions:  Mobility Interventions: Patient to call before getting OOB         Medication Interventions: Patient to call before getting OOB    Elimination Interventions: Call light in reach, Patient to call for help with toileting needs, Toileting schedule/hourly rounds

## 2018-10-30 NOTE — ROUTINE PROCESS
Bedside and Verbal shift change report given to [address] (oncoming nurse) by Winifred Davis RN (offgoing nurse). Report included the following information SBAR, Kardex and MAR.

## 2018-10-30 NOTE — PROGRESS NOTES
NUTRITION    Nursing Referral: [facility]     RECOMMENDATIONS / PLAN:     - Monitor GI symptoms and readiness for diet initiation.  - Continue IVF. - If anticipate pt will not be able to tolerate po within the next 2 days, consider parenteral nutrition 2/2 current nutritional status. - Continue RD inpatient monitoring and evaluation. NUTRITION INTERVENTIONS & DIAGNOSIS:     [x] Meals/snacks: NPO  [x] IV Fluids: continue D5 1/2NS w/ 20 mEq/L KCl at 75 mL/hr (90 gm dextrose, 306 kcal)    Nutrition Diagnosis: Inadequate nutrient intake related to altered GI function and inability to consume oral diet as evidenced by pt NPO with possible ileus after GI surgery. Patient meets criteria for Moderate Protein Calorie Malnutrition as evidenced by:   ASPEN Malnutrition Criteria  Acute Illness, Chronic Illness, or Social/Enviornmental: Acute illness  Weight Loss: Greater than 5% x 1 mo  Body Fat: Mild(orbital, thoracic regions)  Muscle Mass: Mild(temple, clavicle, trapezius regions)  ASPEN Malnutrition Score - Chronic Illness: 8  Chronic Illness - Malnutrition Diagnosis: Moderate malnutrition. ASSESSMENT:     10/30: In pain today. Episode of nausea last night, resolved with medication. One episode of flatus. Some abdominal distension. Pt weighed on standing scale yesterday, now with weight gain; question accuracy of weights and possible fluid masking weight change. 10/29: Readmitted with n/v after recent colectomy on 10/23 for cecal cancer. NGT placed to suction, minimal output noted, however pt's son report canister was emptied last night and pt report a lot of output yesterday. Pt weaker, with significant weight loss noted since last admission weight. Plan to obtain standing weight when appropriate to verify current weight. Pt continues to have muscle and fat loss, currently meets criteria for moderate malnutrition, but has had <50% of energy intake x 3 days, pt at high risk for becoming severely malnourished. Average po intake adequate to meet patients estimated nutritional needs:   [] Yes     [x] No   [] Unable to determine at this time    Diet: DIET NPO      Food Allergies: Shellfish  Current Appetite:   [] Good     [] Fair     [] Poor     [x] Other: NPO  Appetite/meal intake prior to admission:   [] Good     [] Fair     [] Poor     [x] Other: Good appetite, but poor-fair intake after surgery  Feeding Limitations:  [] Swallowing difficulty    [] Chewing difficulty    [] Other:  Current Meal Intake: Patient Vitals for the past 100 hrs:   % Diet Eaten   10/29/18 2140 0 %   10/29/18 1853 0 %   10/29/18 0946 0 %       BM: 10/27  Skin Integrity: surgical incision to abdomen  Edema:   [x] No     [] Yes   Pertinent Medications: Reviewed    Recent Labs     10/30/18  0950 10/29/18  0259 10/28/18  1935 10/28/18  1629    137 135* 137   K 4.0 4.9 4.6 4.5    103 97* 97*   CO2 32 29 33* 35*   GLU 80 127* 121* 124*   BUN 26* 22* 19* 20*   CREA 0.56* 0.77 0.71 0.77   CA 8.5 7.5* 8.6 9.3   MG 2.2 1.9  --  2.0   PHOS 2.7 4.9  --   --    ALB  --  2.6* 3.3* 3.5   SGOT  --  23 29 37   ALT  --  22 25 27       Intake/Output Summary (Last 24 hours) at 10/30/2018 1220  Last data filed at 10/30/2018 1028  Gross per 24 hour   Intake 30 ml   Output 950 ml   Net -920 ml       Anthropometrics:  Ht Readings from Last 1 Encounters:   10/28/18 5' 6\" (1.676 m)     Last 3 Recorded Weights in this Encounter    10/28/18 2209 10/29/18 1201   Weight: 49.2 kg (108 lb 8 oz) 57.6 kg (127 lb)     Body mass index is 20.5 kg/m².   Underweight    Weight History: -11 lbs (9%) x 5 days, -16 lbs (13%) x 1 month  Weight Metrics 10/29/2018 10/23/2018 10/4/2018 9/27/2018 9/23/2018 8/28/2018 8/16/2018   Weight 127 lb 119 lb 118 lb 124 lb 117 lb 116 lb 122 lb 4.8 oz   BMI 20.5 kg/m2 19.21 kg/m2 19.05 kg/m2 20.01 kg/m2 18.88 kg/m2 19.3 kg/m2 20.35 kg/m2        Admitting Diagnosis: Obstruction of bowel (HCC)  Post-op pain  Ileus following gastrointestinal surgery  Pertinent PMHx: COPD, depression, nausea & vomiting, osteoporosis, vitamin D deficiency     Education Needs:        [x] None identified  [] Identified - Not appropriate at this time  []  Identified and addressed - refer to education log  Learning Limitations:   [x] None identified  [] Identified    Cultural, Yarsanism & ethnic food preferences:  [x] None identified    [] Identified and addressed     ESTIMATED NUTRITION NEEDS:     Calories: 6237-3416 kcal (HBEx1.2-1.4) based on  [] Actual BW      [x] IBW 59 kg  Protein: 59-71  gm (1-1.2 gm/kg) based on  [] Actual BW      [x] IBW   Fluid: 1 mL/kcal     MONITORING & EVALUATION:     Nutrition Goal(s):   1. Nutritional needs will be met through adequate oral intake or nutrition support within the next 7 days.   Outcome:  [] Met/Ongoing    [x]  Not Met    [] New/Initial Goal      Monitoring:   [] Food and beverage intake   [x] Diet order   [x] Nutrition-focused physical findings   [x] Treatment/therapy   [x] Weight   [] Enteral nutrition intake        Previous Recommendations (for follow-up assessments only):     []   Implemented       []   Not Implemented (RD to address)      [] No Longer Appropriate     [x] No Recommendation Made     Discharge Planning: pending GI function  [x] Participated in care planning, discharge planning, & interdisciplinary rounds as appropriate      Arielle Long RD, [address] [place]   Pager: [phone]

## 2018-10-30 NOTE — PROGRESS NOTES
[facility]  [address] [place] [id number] [phone]  Colon and Rectal Surgery Progress Note      Patient: Brenda Muñoz MRN: [id number]  SSN: [id number]    YOB: 1945  Age: 72 y.o.   Sex: female      Admit Date: 10/28/2018    LOS: 2 days     Subjective:     C/o abd pain. + flatus x 1 this am.     Objective:     Vitals:    10/29/18 2148 10/30/18 0513 10/30/18 0818 10/30/18 0912   BP:  116/64 124/81    Pulse:  78 65    Resp:  18 18    Temp:  97.7 °F (36.5 °C) 95.7 °F (35.4 °C)    SpO2: 92% 96% 98% 97%   Weight:       Height:            Intake and Output:  Current Shift: 10/30 0701 - 10/30 1900  In: 30   Out: -   Last three shifts: 10/28 1901 - 10/30 0700  In: 60.4 [I.V.:60.4]  Out: 1425 [Urine:875]    Physical Exam:     abd soft, mildly distended, mildly tender  Wounds healthy    Lab/Data Review:    CMP:   Lab Results   Component Value Date/Time     10/30/2018 09:50 AM    K 4.0 10/30/2018 09:50 AM     10/30/2018 09:50 AM    CO2 32 10/30/2018 09:50 AM    AGAP 5 10/30/2018 09:50 AM    GLU 80 10/30/2018 09:50 AM    BUN 26 (H) 10/30/2018 09:50 AM    CREA 0.56 (L) 10/30/2018 09:50 AM    GFRAA >60 10/30/2018 09:50 AM    GFRNA >60 10/30/2018 09:50 AM    CA 8.5 10/30/2018 09:50 AM    MG 2.2 10/30/2018 09:50 AM    PHOS 2.7 10/30/2018 09:50 AM     CBC:   Lab Results   Component Value Date/Time    WBC 11.7 10/30/2018 09:50 AM    HGB 10.7 (L) 10/30/2018 09:50 AM    HCT 32.0 (L) 10/30/2018 09:50 AM     10/30/2018 09:50 AM        Assessment:     S/P robotic R colectomy, ileus    Plan:     Continue NG, IVF  Await bowel function  IS     Signed By: Brunilda Olvera MD        October 30, 2018

## 2018-10-31 LAB
ANION GAP SERPL CALC-SCNC: 7 MMOL/L (ref 3–18)
BUN SERPL-MCNC: 21 MG/DL (ref 7–18)
BUN/CREAT SERPL: 40 (ref 12–20)
CALCIUM SERPL-MCNC: 7.8 MG/DL (ref 8.5–10.1)
CHLORIDE SERPL-SCNC: 103 MMOL/L (ref 100–108)
CO2 SERPL-SCNC: 30 MMOL/L (ref 21–32)
CREAT SERPL-MCNC: 0.52 MG/DL (ref 0.6–1.3)
ERYTHROCYTE [DISTWIDTH] IN BLOOD BY AUTOMATED COUNT: 14.1 % (ref 11.6–14.5)
GLUCOSE SERPL-MCNC: 93 MG/DL (ref 74–99)
HCT VFR BLD AUTO: 32.4 % (ref 35–45)
HGB BLD-MCNC: 10.9 G/DL (ref 12–16)
MAGNESIUM SERPL-MCNC: 2 MG/DL (ref 1.6–2.6)
MCH RBC QN AUTO: 32.2 PG (ref 24–34)
MCHC RBC AUTO-ENTMCNC: 33.6 G/DL (ref 31–37)
MCV RBC AUTO: 95.9 FL (ref 74–97)
PHOSPHATE SERPL-MCNC: 2.6 MG/DL (ref 2.5–4.9)
PLATELET # BLD AUTO: 275 K/UL (ref 135–420)
PMV BLD AUTO: 10 FL (ref 9.2–11.8)
POTASSIUM SERPL-SCNC: 3.8 MMOL/L (ref 3.5–5.5)
RBC # BLD AUTO: 3.38 M/UL (ref 4.2–5.3)
SODIUM SERPL-SCNC: 140 MMOL/L (ref 136–145)
WBC # BLD AUTO: 7.4 K/UL (ref 4.6–13.2)

## 2018-10-31 PROCEDURE — 83735 ASSAY OF MAGNESIUM: CPT | Performed by: COLON & RECTAL SURGERY

## 2018-10-31 PROCEDURE — 74011250636 HC RX REV CODE- 250/636: Performed by: SURGERY

## 2018-10-31 PROCEDURE — 74011000250 HC RX REV CODE- 250: Performed by: COLON & RECTAL SURGERY

## 2018-10-31 PROCEDURE — 94640 AIRWAY INHALATION TREATMENT: CPT

## 2018-10-31 PROCEDURE — 36415 COLL VENOUS BLD VENIPUNCTURE: CPT | Performed by: COLON & RECTAL SURGERY

## 2018-10-31 PROCEDURE — 80048 BASIC METABOLIC PNL TOTAL CA: CPT | Performed by: COLON & RECTAL SURGERY

## 2018-10-31 PROCEDURE — 74011250637 HC RX REV CODE- 250/637: Performed by: COLON & RECTAL SURGERY

## 2018-10-31 PROCEDURE — 84100 ASSAY OF PHOSPHORUS: CPT | Performed by: COLON & RECTAL SURGERY

## 2018-10-31 PROCEDURE — 85027 COMPLETE CBC AUTOMATED: CPT | Performed by: COLON & RECTAL SURGERY

## 2018-10-31 PROCEDURE — 74011250636 HC RX REV CODE- 250/636: Performed by: COLON & RECTAL SURGERY

## 2018-10-31 PROCEDURE — 65270000029 HC RM PRIVATE

## 2018-10-31 RX ORDER — HYDROMORPHONE HYDROCHLORIDE 1 MG/ML
0.5 INJECTION, SOLUTION INTRAMUSCULAR; INTRAVENOUS; SUBCUTANEOUS ONCE
Status: COMPLETED | OUTPATIENT
Start: 2018-10-31 | End: 2018-10-31

## 2018-10-31 RX ORDER — HYDROMORPHONE HYDROCHLORIDE 1 MG/ML
0.5 INJECTION, SOLUTION INTRAMUSCULAR; INTRAVENOUS; SUBCUTANEOUS
Status: DISCONTINUED | OUTPATIENT
Start: 2018-10-31 | End: 2018-11-03

## 2018-10-31 RX ADMIN — Medication 10 ML: at 14:00

## 2018-10-31 RX ADMIN — KETOROLAC TROMETHAMINE 15 MG: 30 INJECTION, SOLUTION INTRAMUSCULAR at 11:51

## 2018-10-31 RX ADMIN — HYDROCORTISONE SODIUM SUCCINATE 15 MG: 100 INJECTION, POWDER, FOR SOLUTION INTRAMUSCULAR; INTRAVENOUS at 14:24

## 2018-10-31 RX ADMIN — Medication 10 ML: at 21:17

## 2018-10-31 RX ADMIN — DEXTROSE MONOHYDRATE, SODIUM CHLORIDE, AND POTASSIUM CHLORIDE 75 ML/HR: 50; 4.5; 1.49 INJECTION, SOLUTION INTRAVENOUS at 19:53

## 2018-10-31 RX ADMIN — ROPINIROLE 1 MG: 1 TABLET, FILM COATED ORAL at 09:07

## 2018-10-31 RX ADMIN — ALBUTEROL SULFATE 2.5 MG: 2.5 SOLUTION RESPIRATORY (INHALATION) at 02:13

## 2018-10-31 RX ADMIN — BUDESONIDE AND FORMOTEROL FUMARATE DIHYDRATE 2 PUFF: 160; 4.5 AEROSOL RESPIRATORY (INHALATION) at 22:08

## 2018-10-31 RX ADMIN — KETOROLAC TROMETHAMINE 15 MG: 30 INJECTION, SOLUTION INTRAMUSCULAR at 19:36

## 2018-10-31 RX ADMIN — Medication 10 ML: at 05:30

## 2018-10-31 RX ADMIN — HYDROMORPHONE HYDROCHLORIDE 0.5 MG: 1 INJECTION, SOLUTION INTRAMUSCULAR; INTRAVENOUS; SUBCUTANEOUS at 02:41

## 2018-10-31 RX ADMIN — ROPINIROLE 1 MG: 1 TABLET, FILM COATED ORAL at 18:00

## 2018-10-31 RX ADMIN — TIOTROPIUM BROMIDE 18 MCG: 18 CAPSULE ORAL; RESPIRATORY (INHALATION) at 09:00

## 2018-10-31 RX ADMIN — BUDESONIDE AND FORMOTEROL FUMARATE DIHYDRATE 2 PUFF: 160; 4.5 AEROSOL RESPIRATORY (INHALATION) at 08:20

## 2018-10-31 RX ADMIN — DEXTROSE MONOHYDRATE, SODIUM CHLORIDE, AND POTASSIUM CHLORIDE 75 ML/HR: 50; 4.5; 1.49 INJECTION, SOLUTION INTRAVENOUS at 02:45

## 2018-10-31 RX ADMIN — ONDANSETRON 4 MG: 2 INJECTION INTRAMUSCULAR; INTRAVENOUS at 10:27

## 2018-10-31 RX ADMIN — KETOROLAC TROMETHAMINE 15 MG: 30 INJECTION, SOLUTION INTRAMUSCULAR at 05:25

## 2018-10-31 RX ADMIN — HYDROMORPHONE HYDROCHLORIDE 0.5 MG: 1 INJECTION, SOLUTION INTRAMUSCULAR; INTRAVENOUS; SUBCUTANEOUS at 15:29

## 2018-10-31 RX ADMIN — ONDANSETRON 4 MG: 2 INJECTION INTRAMUSCULAR; INTRAVENOUS at 02:48

## 2018-10-31 RX ADMIN — HYDROCORTISONE SODIUM SUCCINATE 15 MG: 100 INJECTION, POWDER, FOR SOLUTION INTRAMUSCULAR; INTRAVENOUS at 21:13

## 2018-10-31 RX ADMIN — ENOXAPARIN SODIUM 40 MG: 100 INJECTION SUBCUTANEOUS at 21:15

## 2018-10-31 RX ADMIN — HYDROCORTISONE SODIUM SUCCINATE 15 MG: 100 INJECTION, POWDER, FOR SOLUTION INTRAMUSCULAR; INTRAVENOUS at 05:24

## 2018-10-31 RX ADMIN — HYDROMORPHONE HYDROCHLORIDE 0.5 MG: 1 INJECTION, SOLUTION INTRAMUSCULAR; INTRAVENOUS; SUBCUTANEOUS at 21:43

## 2018-10-31 NOTE — SENIOR SERVICES NOTE
0200 Patient c/9/10 abdominal pain , Dr Frantz Russell notified new order for dilaudid 0.5mg x1 dose received.

## 2018-10-31 NOTE — PROGRESS NOTES
[facility]  [address] [place] [id number] [phone]  Colon and Rectal Surgery Progress Note      Patient: Luz Elena Driscoll MRN: [id number]  SSN: [id number]    YOB: 1945  Age: 72 y.o. Sex: female      Admit Date: 10/28/2018    LOS: 3 days     Subjective:     Abd pain, no bowel function. Objective:     Vitals:    10/31/18 0522 10/31/18 0737 10/31/18 0820 10/31/18 1129   BP: 122/71 119/64  124/72   Pulse: 89 81  84   Resp: 20 22  20   Temp: 99.2 °F (37.3 °C) 98 °F (36.7 °C)  99.2 °F (37.3 °C)   SpO2: 96% 93% 96% 96%   Weight:       Height:            Intake and Output:  Current Shift: No intake/output data recorded.   Last three shifts: 10/29 1901 - 10/31 0700  In: 810 [I.V.:750]  Out: 1950 [Urine:850]    Physical Exam:     abd soft, distended, moderately tender    Lab/Data Review:    CMP:   Lab Results   Component Value Date/Time     10/31/2018 03:03 AM    K 3.8 10/31/2018 03:03 AM     10/31/2018 03:03 AM    CO2 30 10/31/2018 03:03 AM    AGAP 7 10/31/2018 03:03 AM    GLU 93 10/31/2018 03:03 AM    BUN 21 (H) 10/31/2018 03:03 AM    CREA 0.52 (L) 10/31/2018 03:03 AM    GFRAA >60 10/31/2018 03:03 AM    GFRNA >60 10/31/2018 03:03 AM    CA 7.8 (L) 10/31/2018 03:03 AM    MG 2.0 10/31/2018 03:03 AM    PHOS 2.6 10/31/2018 03:03 AM     CBC:   Lab Results   Component Value Date/Time    WBC 7.4 10/31/2018 03:03 AM    HGB 10.9 (L) 10/31/2018 03:03 AM    HCT 32.4 (L) 10/31/2018 03:03 AM     10/31/2018 03:03 AM        Assessment:     S/P robotic R colectomy, ileus    Plan:     Continue NG, IVF  Await bowel function  IS  Consider PICC TPN if no improvement by tomorrow  CT abd/pelvis tomorrow if no improvement with PO contrast     Signed By: Jesi Galicia MD        October 31, 2018

## 2018-10-31 NOTE — ROUTINE PROCESS
2100  Patient  in bed AAOx4 . Respiration slightly labored , O2 sat 94 % on Oxygen 2 liter via NC. NGT in place to LIWS, draining greenish gastric fluids, abdomen slightly distended , no passing flatus yet. Patient was medicated with zofran for nausea and ativan for anxiety.  Will continues to monitor

## 2018-11-01 ENCOUNTER — APPOINTMENT (OUTPATIENT)
Dept: INTERVENTIONAL RADIOLOGY/VASCULAR | Age: 73
DRG: 329 | End: 2018-11-01
Attending: COLON & RECTAL SURGERY
Payer: MEDICARE

## 2018-11-01 ENCOUNTER — APPOINTMENT (OUTPATIENT)
Dept: CT IMAGING | Age: 73
DRG: 329 | End: 2018-11-01
Attending: COLON & RECTAL SURGERY
Payer: MEDICARE

## 2018-11-01 LAB
ANION GAP SERPL CALC-SCNC: 4 MMOL/L (ref 3–18)
BUN SERPL-MCNC: 16 MG/DL (ref 7–18)
BUN/CREAT SERPL: 28 (ref 12–20)
CALCIUM SERPL-MCNC: 7.5 MG/DL (ref 8.5–10.1)
CHLORIDE SERPL-SCNC: 100 MMOL/L (ref 100–108)
CO2 SERPL-SCNC: 33 MMOL/L (ref 21–32)
CREAT SERPL-MCNC: 0.57 MG/DL (ref 0.6–1.3)
ERYTHROCYTE [DISTWIDTH] IN BLOOD BY AUTOMATED COUNT: 14.3 % (ref 11.6–14.5)
GLUCOSE SERPL-MCNC: 112 MG/DL (ref 74–99)
HCT VFR BLD AUTO: 29.9 % (ref 35–45)
HGB BLD-MCNC: 9.9 G/DL (ref 12–16)
MAGNESIUM SERPL-MCNC: 2.1 MG/DL (ref 1.6–2.6)
MCH RBC QN AUTO: 32.7 PG (ref 24–34)
MCHC RBC AUTO-ENTMCNC: 33.1 G/DL (ref 31–37)
MCV RBC AUTO: 98.7 FL (ref 74–97)
PHOSPHATE SERPL-MCNC: 2.9 MG/DL (ref 2.5–4.9)
PLATELET # BLD AUTO: 251 K/UL (ref 135–420)
PMV BLD AUTO: 9.9 FL (ref 9.2–11.8)
POTASSIUM SERPL-SCNC: 3.9 MMOL/L (ref 3.5–5.5)
RBC # BLD AUTO: 3.03 M/UL (ref 4.2–5.3)
SODIUM SERPL-SCNC: 137 MMOL/L (ref 136–145)
WBC # BLD AUTO: 10.7 K/UL (ref 4.6–13.2)

## 2018-11-01 PROCEDURE — 83735 ASSAY OF MAGNESIUM: CPT | Performed by: COLON & RECTAL SURGERY

## 2018-11-01 PROCEDURE — 74011636320 HC RX REV CODE- 636/320: Performed by: COLON & RECTAL SURGERY

## 2018-11-01 PROCEDURE — 36415 COLL VENOUS BLD VENIPUNCTURE: CPT | Performed by: COLON & RECTAL SURGERY

## 2018-11-01 PROCEDURE — 74011250636 HC RX REV CODE- 250/636: Performed by: SURGERY

## 2018-11-01 PROCEDURE — 80048 BASIC METABOLIC PNL TOTAL CA: CPT | Performed by: COLON & RECTAL SURGERY

## 2018-11-01 PROCEDURE — 94640 AIRWAY INHALATION TREATMENT: CPT

## 2018-11-01 PROCEDURE — 74011000258 HC RX REV CODE- 258: Performed by: COLON & RECTAL SURGERY

## 2018-11-01 PROCEDURE — 74011250636 HC RX REV CODE- 250/636: Performed by: COLON & RECTAL SURGERY

## 2018-11-01 PROCEDURE — 3E0436Z INTRODUCTION OF NUTRITIONAL SUBSTANCE INTO CENTRAL VEIN, PERCUTANEOUS APPROACH: ICD-10-PCS | Performed by: COLON & RECTAL SURGERY

## 2018-11-01 PROCEDURE — 76937 US GUIDE VASCULAR ACCESS: CPT

## 2018-11-01 PROCEDURE — 74177 CT ABD & PELVIS W/CONTRAST: CPT

## 2018-11-01 PROCEDURE — 65270000029 HC RM PRIVATE

## 2018-11-01 PROCEDURE — 02HV33Z INSERTION OF INFUSION DEVICE INTO SUPERIOR VENA CAVA, PERCUTANEOUS APPROACH: ICD-10-PCS | Performed by: RADIOLOGY

## 2018-11-01 PROCEDURE — 74011000250 HC RX REV CODE- 250: Performed by: COLON & RECTAL SURGERY

## 2018-11-01 PROCEDURE — 74011250637 HC RX REV CODE- 250/637: Performed by: COLON & RECTAL SURGERY

## 2018-11-01 PROCEDURE — 84100 ASSAY OF PHOSPHORUS: CPT | Performed by: COLON & RECTAL SURGERY

## 2018-11-01 PROCEDURE — 85027 COMPLETE CBC AUTOMATED: CPT | Performed by: COLON & RECTAL SURGERY

## 2018-11-01 RX ORDER — ALBUTEROL SULFATE 0.83 MG/ML
2.5 SOLUTION RESPIRATORY (INHALATION)
Status: DISCONTINUED | OUTPATIENT
Start: 2018-11-01 | End: 2018-11-03

## 2018-11-01 RX ADMIN — KETOROLAC TROMETHAMINE 15 MG: 30 INJECTION, SOLUTION INTRAMUSCULAR at 23:03

## 2018-11-01 RX ADMIN — BUDESONIDE AND FORMOTEROL FUMARATE DIHYDRATE 2 PUFF: 160; 4.5 AEROSOL RESPIRATORY (INHALATION) at 09:02

## 2018-11-01 RX ADMIN — KETOROLAC TROMETHAMINE 15 MG: 30 INJECTION, SOLUTION INTRAMUSCULAR at 01:05

## 2018-11-01 RX ADMIN — HYDROMORPHONE HYDROCHLORIDE 0.5 MG: 1 INJECTION, SOLUTION INTRAMUSCULAR; INTRAVENOUS; SUBCUTANEOUS at 09:53

## 2018-11-01 RX ADMIN — ROPINIROLE 1 MG: 1 TABLET, FILM COATED ORAL at 09:55

## 2018-11-01 RX ADMIN — Medication 10 ML: at 05:49

## 2018-11-01 RX ADMIN — TIOTROPIUM BROMIDE 18 MCG: 18 CAPSULE ORAL; RESPIRATORY (INHALATION) at 09:02

## 2018-11-01 RX ADMIN — ENOXAPARIN SODIUM 40 MG: 100 INJECTION SUBCUTANEOUS at 21:10

## 2018-11-01 RX ADMIN — HYDROCORTISONE SODIUM SUCCINATE 15 MG: 100 INJECTION, POWDER, FOR SOLUTION INTRAMUSCULAR; INTRAVENOUS at 21:11

## 2018-11-01 RX ADMIN — KETOROLAC TROMETHAMINE 15 MG: 30 INJECTION, SOLUTION INTRAMUSCULAR at 11:11

## 2018-11-01 RX ADMIN — ROPINIROLE 1 MG: 1 TABLET, FILM COATED ORAL at 18:25

## 2018-11-01 RX ADMIN — LORAZEPAM 2 MG: 2 INJECTION INTRAMUSCULAR; INTRAVENOUS at 21:11

## 2018-11-01 RX ADMIN — SODIUM CHLORIDE: 234 INJECTION INTRAMUSCULAR; INTRAVENOUS; SUBCUTANEOUS at 20:29

## 2018-11-01 RX ADMIN — HYDROCORTISONE SODIUM SUCCINATE 15 MG: 100 INJECTION, POWDER, FOR SOLUTION INTRAMUSCULAR; INTRAVENOUS at 05:48

## 2018-11-01 RX ADMIN — DIATRIZOATE MEGLUMINE AND DIATRIZOATE SODIUM 30 ML: 660; 100 LIQUID ORAL; RECTAL at 11:11

## 2018-11-01 RX ADMIN — KETOROLAC TROMETHAMINE 15 MG: 30 INJECTION, SOLUTION INTRAMUSCULAR at 18:26

## 2018-11-01 RX ADMIN — ALBUTEROL SULFATE 2.5 MG: 2.5 SOLUTION RESPIRATORY (INHALATION) at 09:06

## 2018-11-01 RX ADMIN — ALBUTEROL SULFATE 2.5 MG: 2.5 SOLUTION RESPIRATORY (INHALATION) at 20:07

## 2018-11-01 RX ADMIN — IOPAMIDOL 80 ML: 612 INJECTION, SOLUTION INTRAVENOUS at 16:29

## 2018-11-01 RX ADMIN — KETOROLAC TROMETHAMINE 15 MG: 30 INJECTION, SOLUTION INTRAMUSCULAR at 05:48

## 2018-11-01 RX ADMIN — Medication 10 ML: at 23:04

## 2018-11-01 RX ADMIN — HYDROCORTISONE SODIUM SUCCINATE 15 MG: 100 INJECTION, POWDER, FOR SOLUTION INTRAMUSCULAR; INTRAVENOUS at 14:25

## 2018-11-01 RX ADMIN — HYDROMORPHONE HYDROCHLORIDE 0.5 MG: 1 INJECTION, SOLUTION INTRAMUSCULAR; INTRAVENOUS; SUBCUTANEOUS at 16:55

## 2018-11-01 RX ADMIN — LORAZEPAM 2 MG: 2 INJECTION INTRAMUSCULAR; INTRAVENOUS at 01:09

## 2018-11-01 NOTE — PROGRESS NOTES
NUTRITION consult    Nursing Referral: [facility]  Nutrition Consult: TPN: RD to Manage     RECOMMENDATIONS / PLAN:     - Plan to provide parenteral nutrition support, monitor labs and adjust electrolytes daily. First PN bag acceptable for peripheral infusion in the event PICC line is not placed today. - Check ionized calcium, prealbumin and triglyceride.   - Discontinue IV fluid once PN starts at 20:00 tonight. - Monitor GI symptoms and readiness for diet initiation.  - Continue RD inpatient monitoring and evaluation. Please Note:   Parenteral nutrition support to be provided using custom product, allowing for modification of electrolyte and macronutrient content day to day. Calcium and magnesium will be left out due to component shortages and must be replaced outside of TPN as needed. Lipids and MVI included in PN on MWF. Macronutrient Goal: 71 gm amino acids, 325 gm dextrose, 250 mL lipids (1603 kcal weekly average).       PARENTERAL NUTRITION ORDER:     Electrolyte Adjustments: not applicable (first PN bag to start tonight)     Day 1 PN to provide: 141 mEq Na, 52 mEq K, 18 mmol Phos, 690 kcal, 45 gm amino acids, 150 gm dextrose, 2.5 mL trace elements     PN Rate: 75 mL/hr   Glucose Infusion Rate: 1.79 mg/kg/min    Osmolarity: 880 mOsm   Parenteral Nutrition Access Device: peripheral IV (awaiting PICC placement- planned today)  Indication for PN: unable to tolerate oral diet following GI surgery with malnutrition   Refeeding Risk:      [x]  Yes (risk diminished, pt receiving dextrose infusion since 10/30) [] No     NUTRITION DIAGNOSIS & INTERVENTIONS:     [x] Parenteral nutrition: initiate   [x] IV fluid: D5 1/2NS + 20 mEq/L KCl at 75 mL/hr (90 gm dextrose, 306 kcal, 36 mEq KCl per day)- stop  [x] Collaboration and referral of nutrition care: MD to review PN order and note daily; will not call to verify content and changes, per MD request. IV fluid and PN rate discussed with Dr Hermelinda Mai     Nutrition Diagnosis: Inadequate nutrient intake related to altered GI function and inability to consume oral diet as evidenced by pt NPO with possible ileus after GI surgery. Patient meets criteria for Moderate Protein Calorie Malnutrition as evidenced by:   ASPEN Malnutrition Criteria  Acute Illness, Chronic Illness, or Social/Enviornmental: Acute illness  Weight Loss: Greater than 5% x 1 mo  Body Fat: Mild(orbital, thoracic regions)  Muscle Mass: Mild(temple, clavicle, trapezius regions)  ASPEN Malnutrition Score - Chronic Illness: 8  Chronic Illness - Malnutrition Diagnosis: Moderate malnutrition. ASSESSMENT:     11/1: Remains NPO with plan for PICC placement to start PN per MD, awaiting return of bowel function and CT abdomen/pelvis ordered. 10/30: In pain today. Episode of nausea last night, resolved with medication. One episode of flatus. Some abdominal distension. Pt weighed on standing scale yesterday, now with weight gain; question accuracy of weights and possible fluid masking weight change. 10/29: Readmitted with n/v after recent colectomy on 10/23 for cecal cancer. NGT placed to suction, minimal output noted, however pt's son report canister was emptied last night and pt report a lot of output yesterday. Pt weaker, with significant weight loss noted since last admission weight. Plan to obtain standing weight when appropriate to verify current weight. Pt continues to have muscle and fat loss, currently meets criteria for moderate malnutrition, but has had <50% of energy intake x 3 days, pt at high risk for becoming severely malnourished.      PN infusion adequate to meet patients estimated nutritional needs:   [] Yes     [x] No   [] Unable to determine at this time    Diet: DIET NPO  TPN ADULT - PERIPHERAL      Food Allergies: shellfish   Current Appetite:   [] Good     [] Fair     [] Poor     [x] Other: NPO  Appetite/meal intake prior to admission:   [] Good     [] Fair     [] Poor     [x] Other: Good appetite, but poor-fair intake after surgery  Feeding Limitations:  [] Swallowing difficulty    [] Chewing difficulty    [] Other:  Current Meal Intake:   Patient Vitals for the past 100 hrs:   % Diet Eaten   10/29/18 2140 0 %   10/29/18 1853 0 %   10/29/18 0946 0 %     NGT to intermittent suction, output x last 24 hours: 300 mL  BM: 10/27 (PTA)   Skin Integrity: abdominal surgical incision   Edema:  [x] No     [] Yes   Pertinent Medications: Reviewed: zofran     Labs: BMP, MG, Phos ordered daily; CMP, Triglyceride, Prealbumin ordered initially and weekly  Recent Labs     11/01/18  0404 10/31/18  0303 10/30/18  0950    140 139   K 3.9 3.8 4.0    103 102   CO2 33* 30 32   * 93 80   BUN 16 21* 26*   CREA 0.57* 0.52* 0.56*   CA 7.5* 7.8* 8.5   MG 2.1 2.0 2.2   PHOS 2.9 2.6 2.7     Prealbumin: ordered  Triglyceride: ordered   Recent Labs     11/01/18  0404 10/31/18  0303 10/30/18  0950   WBC 10.7 7.4 11.7   HGB 9.9* 10.9* 10.7*   HCT 29.9* 32.4* 32.0*    275 256         Intake/Output Summary (Last 24 hours) at 11/1/2018 1430  Last data filed at 11/1/2018 0556  Gross per 24 hour   Intake 855 ml   Output 850 ml   Net 5 ml       Anthropometrics:   Ht Readings from Last 1 Encounters:   10/28/18 5' 6\" (1.676 m)       Last 3 Recorded Weights in this Encounter    10/29/18 1201 10/30/18 1538 11/01/18 0716   Weight: 57.6 kg (127 lb) 57.9 kg (127 lb 9.6 oz) 58.2 kg (128 lb 3.2 oz)      Body mass index is 20.69 kg/m².      Weight History:  -11 lbs (9%) x 5 days, -16 lbs (13%) x 1 month   Weight Metrics 11/1/2018 10/23/2018 10/4/2018 9/27/2018 9/23/2018 8/28/2018 8/16/2018   Weight 128 lb 3.2 oz 119 lb 118 lb 124 lb 117 lb 116 lb 122 lb 4.8 oz   BMI 20.69 kg/m2 19.21 kg/m2 19.05 kg/m2 20.01 kg/m2 18.88 kg/m2 19.3 kg/m2 20.35 kg/m2          Admitting Diagnosis: Obstruction of bowel (HCC)  Post-op pain  Ileus following gastrointestinal surgery  Pertinent PMHx: COPD, depression, nausea & vomiting, osteoporosis, vitamin D deficiency    Education Needs:        [x] None identified  [] Identified - Not appropriate at this time  []  Identified and addressed - refer to education log  Learning Limitations:   [x] None identified  [] Identified    Cultural, Buddhism & ethnic food preferences:  [x] None identified    [] Identified and addressed     ESTIMATED NUTRITION NEEDS:     Calories: 0602-5060 kcal (HBEx1.2-1.4) based on   [] Actual BW      [x] IBW: 59 kg  Protein: 59-77 gm (1-1.3 gm/kg) based on   [] Actual BW      [x] IBW:   Fluid: 1 mL/kcal     MONITORING & EVALUATION:     Nutrition Goals: goals modified   1. Patient will receive nutrition via PN until they are able to tolerate adequate po intake/enteral nutrition. Outcome:  [] Met    []  Not Met    [x] New Goal  2. Patient will tolerate advancement of macronutrients towards meeting estimated nutrition needs within the next 7 days. Outcome:  [] Met    []  Not Met    [x] New Goal    Monitoring:  [x] Parenteral nutrition intake and administration  [x] Biochemical data, medical tests, and procedures   [x] Fluid intake   [x] Nutrition-focused physical findings   [x] Treatment/therapy   [] Food and beverage intake   [] Diet order      [] Weight        Previous Recommendations (for follow-up assessments only):     [x]   Implemented       []   Not Implemented (RD to address)      [] No Longer Appropriate     [] No Recommendation Made        Discharge Planning: Nutrition recommendations pending patient's ability to tolerate po intake/enteral nutrition.    [x]  Participated in care planning, discharge planning, & interdisciplinary rounds as appropriate      Girish Sauer, [address], [address] [place] [address]   Pager: [phone]

## 2018-11-01 NOTE — ROUTINE PROCESS
Bedside shift report given to [id number] Carrington Sepulveda RN    Reviewed:  · Kardex  · SBAR  · MAR  · I/Os

## 2018-11-01 NOTE — PROGRESS NOTES
[facility]  [address] [place] [id number] [phone]  Colon and Rectal Surgery Progress Note      Patient: Georgina Low MRN: [id number]  SSN: [id number]    YOB: 1945  Age: 72 y.o. Sex: female      Admit Date: 10/28/2018    LOS: 4 days     Subjective:     No bowel function. Objective:     Vitals:    11/01/18 0446 11/01/18 0716 11/01/18 0749 11/01/18 0902   BP:   124/71    Pulse: 72  77    Resp: 18  16    Temp: 98.8 °F (37.1 °C)  98.7 °F (37.1 °C)    SpO2: 99%  97% 96%   Weight:  58.2 kg (128 lb 3.2 oz)     Height:            Intake and Output:  Current Shift: No intake/output data recorded.   Last three shifts: 10/30 1901 - 11/01 0700  In: 9188 [I.V.:1575]  Out: 1550 [Urine:950]        Physical Exam:     abd soft, distended, mildly tender    Lab/Data Review:    CMP:   Lab Results   Component Value Date/Time     11/01/2018 04:04 AM    K 3.9 11/01/2018 04:04 AM     11/01/2018 04:04 AM    CO2 33 (H) 11/01/2018 04:04 AM    AGAP 4 11/01/2018 04:04 AM     (H) 11/01/2018 04:04 AM    BUN 16 11/01/2018 04:04 AM    CREA 0.57 (L) 11/01/2018 04:04 AM    GFRAA >60 11/01/2018 04:04 AM    GFRNA >60 11/01/2018 04:04 AM    CA 7.5 (L) 11/01/2018 04:04 AM    MG 2.1 11/01/2018 04:04 AM    PHOS 2.9 11/01/2018 04:04 AM     CBC:   Lab Results   Component Value Date/Time    WBC 10.7 11/01/2018 04:04 AM    HGB 9.9 (L) 11/01/2018 04:04 AM    HCT 29.9 (L) 11/01/2018 04:04 AM     11/01/2018 04:04 AM        Assessment:     S/P robotic R colectomy, ileus    Plan:     Continue NG, IVF  Await bowel function  IS  CT abd/pelvis  PIcc/TPN     Signed By: Randolph Graves MD        November 1, 2018

## 2018-11-01 NOTE — PROGRESS NOTES
Reviewed chart. Met with pt and pt's sister and brother with consent. and discussed discharge plan. Pt is adamant about returning home with family. Pt stated \" I have a good support system. My family will take care of me. \" CM will continue to monitor for transitional needs.  Phyllis Rockwell, BSN, [phone]

## 2018-11-01 NOTE — PROCEDURES
INTERVENTIONAL RADIOLOGY POST PICC LINE NOTE       November 1, 2018       4:08 PM     Preoperative Diagnosis:   IV Access    Postoperative Diagnosis:  Same. :  Angelique Harrison PA-C    Assistant:  None. Attending Physician: Dr. Sushant Metcalf    Type of Anesthesia:  1% Lidocaine local    Procedure/Description: right basilic upper extremity PICC Line. Findings:  right basilic 4 [place] catheter placed. Tip at SVC/RA junction. OK to use. Length 31 cm. Estimated blood Loss: Minimal    Specimen Removed: None    Drains: None     Complications:  None. Condition:  Stable.     Discharge Plan:  continue present therapy

## 2018-11-01 NOTE — PROGRESS NOTES
ADULT PROTOCOL: JET AEROSOL ASSESSMENT    Patient  Minerva Clement     72 y.o.   female     11/1/2018  9:25 AM    Breath Sounds Pre Procedure:  Breath Sounds Bilateral: Rhonchi, Scattered wheezing                                            Breath Sounds Post Procedure: Breath Sounds Bilateral: Rhonchi, Scattered wheezing                                               Breathing pattern: Pre procedure  Breathing Pattern: Regular          Post procedure  Breathing Pattern: Regular    Cough: Pre procedure  Cough: Non-productive               Post procedure Cough: Non-productive    Heart Rate: Pre procedure Pulse: 86           Post procedure Pulse: 88    Resp Rate: Pre procedure  Respirations: 20           Post procedure          Nebulizer Therapy: Current medications Aerosolized Medications: Albuterol       Problem List:   Patient Active Problem List   Diagnosis Code    COPD, severe ([facility]) J44.9    Nicotine addiction F17.200    Hemoptysis R04.2    Lumbar spinal stenosis M48.061    Facet arthritis of lumbar region ([address] M46.96    Neuritis of lower extremity G57.90    Emphysema of lung ([address] J43.9    COPD with acute exacerbation ([facility]) J44.1    Muscle spasm of back M62.830    Sacroiliac joint pain M53.3    Current chronic use of systemic steroids Z79.52    Baker's cyst of knee M71.20    Acute exacerbation of chronic obstructive pulmonary disease (COPD) ([address] J44.1    Degenerative disc disease, lumbar M51.36    Left-sided low back pain with sciatica M54.42    COPD with exacerbation (HCC) J44.1    COPD (chronic obstructive pulmonary disease) (HCC) J44.9    Pneumonia J18.9    Abdominal pain R10.9    Abdominal mass R19.00    Colon cancer without distant metastasis (HCC) C18.9    Colon polyps K63.5    Pre-operative cardiovascular examination Z01.810    Cecal cancer (HCC) C18.0    Post-op pain G89.18    Obstruction of bowel ([address] K56.609    Ileus following gastrointestinal surgery K91.30       Patient alert and cooperative to use MDI: Yes    Home Respiratory Therapy Regimen/Frequency:  YES  Medication   Device  Frequency     SEVERITY INDEX:    ITEM 0 1 2 3 4 Score   Respiratory Pattern and or Rate Regular  10-19 Regular  20-24   24-30    30-34 Severe SOB or   Greater than 35 0   Breath Sounds Clear Occasional Wheeze Mild Wheezing Moderate Wheezing  wheezing/Absent breath sounds 3   Shortness of Breath None Dyspnea on Exertion Dyspnea at Rest Moderate Shortness of Breath at Rest Severe Shortness of Breath - Limited Speech 1       Total Score:  4    * Scoring Guidelines  0-4 pts:  PRN-BID   5-7 pts:  BID, TID, QID  8-9 pts:  TID, QID, Q6  10-12 pts:  Q4-Q6  * - Guidelines used with clinical judgement. PRN Treatments can be ordered to supplement scheduled treatments. Regardless of score, frequency should not be less than normal home regimen.     Recommended Order/Frequency:  TID    Comments:          Respiratory Therapist: Alicia Camp, RT    ADULT PROTOCOL: JET AEROSOL ASSESSMENT    Patient  Dang August     72 y.o.   female     11/1/2018  9:25 AM    Breath Sounds Pre Procedure:  Breath Sounds Bilateral: Rhonchi, Scattered wheezing                                            Breath Sounds Post Procedure: Breath Sounds Bilateral: Rhonchi, Scattered wheezing                                               Breathing pattern: Pre procedure  Breathing Pattern: Regular          Post procedure  Breathing Pattern: Regular    Cough: Pre procedure  Cough: Non-productive               Post procedure Cough: Non-productive    Heart Rate: Pre procedure Pulse: 86           Post procedure Pulse: 88    Resp Rate: Pre procedure  Respirations: 20           Post procedure          Nebulizer Therapy: Current medications Aerosolized Medications: Albuterol       Problem List:   Patient Active Problem List   Diagnosis Code    COPD, severe (HCC) J44.9    Nicotine addiction F17.200    Hemoptysis R04.2    Lumbar spinal stenosis M48.061    Facet arthritis of lumbar region ([facility] M46.96    Neuritis of lower extremity G57.90    Emphysema of lung ([facility]) J43.9    COPD with acute exacerbation ([facility]) J44.1    Muscle spasm of back M62.830    Sacroiliac joint pain M53.3    Current chronic use of systemic steroids Z79.52    Baker's cyst of knee M71.20    Acute exacerbation of chronic obstructive pulmonary disease (COPD) ([facility]) J44.1    Degenerative disc disease, lumbar M51.36    Left-sided low back pain with sciatica M54.42    COPD with exacerbation ([facility]) J44.1    COPD (chronic obstructive pulmonary disease) ([facility]) J44.9    Pneumonia J18.9    Abdominal pain R10.9    Abdominal mass R19.00    Colon cancer without distant metastasis ([facility]) C18.9    Colon polyps K63.5    Pre-operative cardiovascular examination Z01.810    Cecal cancer ([facility]) C18.0    Post-op pain G89.18    [facility]st[facility] of bowel ([facility] K56.609    Ileus following gastrointestinal surgery K91.30       Patient alert and cooperative to use MDI: Yes    Home Respiratory Therapy Regimen/Frequency:  YES  Medication   Device  Frequency     SEVERITY INDEX:    ITEM 0 1 2 3 4 Score   Respiratory Pattern and or Rate Regular  10-19 Regular  20-24   24-30    30-34 Severe SOB or   Greater than 35 0   Breath Sounds Clear Occasional Wheeze Mild Wheezing Moderate Wheezing  wheezing/Absent breath sounds 3   Shortness of Breath None Dyspnea on Exertion Dyspnea at Rest Moderate Shortness of Breath at Rest Severe Shortness of Breath - Limited Speech 1       Total Score:  4    * Scoring Guidelines  0-4 pts:  PRN-BID   5-7 pts:  BID, TID, QID  8-9 pts:  TID, QID, Q6  10-12 pts:  Q4-Q6  * - Guidelines used with clinical judgement. PRN Treatments can be ordered to supplement scheduled treatments. Regardless of score, frequency should not be less than normal home regimen. Recommended Order/Frequency:  Patient takes nebulizer treatments at home.   Albuterol nebulizer TID    Comments: Respiratory Therapist: Pardeep Aguilar, RT

## 2018-11-02 LAB
ANION GAP SERPL CALC-SCNC: 4 MMOL/L (ref 3–18)
BUN SERPL-MCNC: 10 MG/DL (ref 7–18)
BUN/CREAT SERPL: 21 (ref 12–20)
CA-I SERPL-SCNC: 1.09 MMOL/L (ref 1.12–1.32)
CALCIUM SERPL-MCNC: 7.8 MG/DL (ref 8.5–10.1)
CHLORIDE SERPL-SCNC: 99 MMOL/L (ref 100–108)
CO2 SERPL-SCNC: 32 MMOL/L (ref 21–32)
CREAT SERPL-MCNC: 0.48 MG/DL (ref 0.6–1.3)
ERYTHROCYTE [DISTWIDTH] IN BLOOD BY AUTOMATED COUNT: 14.1 % (ref 11.6–14.5)
EST. AVERAGE GLUCOSE BLD GHB EST-MCNC: 111 MG/DL
GLUCOSE BLD STRIP.AUTO-MCNC: 139 MG/DL (ref 70–110)
GLUCOSE BLD STRIP.AUTO-MCNC: 161 MG/DL (ref 70–110)
GLUCOSE BLD STRIP.AUTO-MCNC: 183 MG/DL (ref 70–110)
GLUCOSE SERPL-MCNC: 177 MG/DL (ref 74–99)
HBA1C MFR BLD: 5.5 % (ref 4.2–5.6)
HCT VFR BLD AUTO: 30.7 % (ref 35–45)
HGB BLD-MCNC: 10.3 G/DL (ref 12–16)
MAGNESIUM SERPL-MCNC: 2.1 MG/DL (ref 1.6–2.6)
MCH RBC QN AUTO: 32.9 PG (ref 24–34)
MCHC RBC AUTO-ENTMCNC: 33.6 G/DL (ref 31–37)
MCV RBC AUTO: 98.1 FL (ref 74–97)
PHOSPHATE SERPL-MCNC: 2.7 MG/DL (ref 2.5–4.9)
PLATELET # BLD AUTO: 283 K/UL (ref 135–420)
PMV BLD AUTO: 10.5 FL (ref 9.2–11.8)
POTASSIUM SERPL-SCNC: 3.8 MMOL/L (ref 3.5–5.5)
PREALB SERPL-MCNC: 7.6 MG/DL (ref 20–40)
RBC # BLD AUTO: 3.13 M/UL (ref 4.2–5.3)
SODIUM SERPL-SCNC: 135 MMOL/L (ref 136–145)
TRIGL SERPL-MCNC: 84 MG/DL (ref ?–150)
WBC # BLD AUTO: 12.2 K/UL (ref 4.6–13.2)

## 2018-11-02 PROCEDURE — 94640 AIRWAY INHALATION TREATMENT: CPT

## 2018-11-02 PROCEDURE — 74011250636 HC RX REV CODE- 250/636: Performed by: SURGERY

## 2018-11-02 PROCEDURE — 85027 COMPLETE CBC AUTOMATED: CPT | Performed by: COLON & RECTAL SURGERY

## 2018-11-02 PROCEDURE — 94761 N-INVAS EAR/PLS OXIMETRY MLT: CPT

## 2018-11-02 PROCEDURE — 83735 ASSAY OF MAGNESIUM: CPT | Performed by: COLON & RECTAL SURGERY

## 2018-11-02 PROCEDURE — 82962 GLUCOSE BLOOD TEST: CPT

## 2018-11-02 PROCEDURE — 77010033678 HC OXYGEN DAILY

## 2018-11-02 PROCEDURE — 83036 HEMOGLOBIN GLYCOSYLATED A1C: CPT | Performed by: COLON & RECTAL SURGERY

## 2018-11-02 PROCEDURE — 84134 ASSAY OF PREALBUMIN: CPT | Performed by: COLON & RECTAL SURGERY

## 2018-11-02 PROCEDURE — 74011250636 HC RX REV CODE- 250/636: Performed by: COLON & RECTAL SURGERY

## 2018-11-02 PROCEDURE — 84100 ASSAY OF PHOSPHORUS: CPT | Performed by: COLON & RECTAL SURGERY

## 2018-11-02 PROCEDURE — 80048 BASIC METABOLIC PNL TOTAL CA: CPT | Performed by: COLON & RECTAL SURGERY

## 2018-11-02 PROCEDURE — 74011250637 HC RX REV CODE- 250/637: Performed by: COLON & RECTAL SURGERY

## 2018-11-02 PROCEDURE — 74011000250 HC RX REV CODE- 250: Performed by: COLON & RECTAL SURGERY

## 2018-11-02 PROCEDURE — 84478 ASSAY OF TRIGLYCERIDES: CPT | Performed by: COLON & RECTAL SURGERY

## 2018-11-02 PROCEDURE — 65270000029 HC RM PRIVATE

## 2018-11-02 PROCEDURE — 82330 ASSAY OF CALCIUM: CPT | Performed by: COLON & RECTAL SURGERY

## 2018-11-02 PROCEDURE — 74011000258 HC RX REV CODE- 258: Performed by: COLON & RECTAL SURGERY

## 2018-11-02 RX ORDER — DEXTROSE 50 % IN WATER (D50W) INTRAVENOUS SYRINGE
25-50 AS NEEDED
Status: DISCONTINUED | OUTPATIENT
Start: 2018-11-02 | End: 2018-11-09

## 2018-11-02 RX ORDER — MAGNESIUM SULFATE 100 %
4 CRYSTALS MISCELLANEOUS AS NEEDED
Status: DISCONTINUED | OUTPATIENT
Start: 2018-11-02 | End: 2018-11-15 | Stop reason: HOSPADM

## 2018-11-02 RX ORDER — INSULIN LISPRO 100 [IU]/ML
INJECTION, SOLUTION INTRAVENOUS; SUBCUTANEOUS EVERY 6 HOURS
Status: DISCONTINUED | OUTPATIENT
Start: 2018-11-02 | End: 2018-11-15 | Stop reason: HOSPADM

## 2018-11-02 RX ADMIN — HYDROMORPHONE HYDROCHLORIDE 0.5 MG: 1 INJECTION, SOLUTION INTRAMUSCULAR; INTRAVENOUS; SUBCUTANEOUS at 08:42

## 2018-11-02 RX ADMIN — KETOROLAC TROMETHAMINE 15 MG: 30 INJECTION, SOLUTION INTRAMUSCULAR at 17:38

## 2018-11-02 RX ADMIN — Medication 10 ML: at 07:08

## 2018-11-02 RX ADMIN — HYDROCORTISONE SODIUM SUCCINATE 15 MG: 100 INJECTION, POWDER, FOR SOLUTION INTRAMUSCULAR; INTRAVENOUS at 21:24

## 2018-11-02 RX ADMIN — KETOROLAC TROMETHAMINE 15 MG: 30 INJECTION, SOLUTION INTRAMUSCULAR at 06:21

## 2018-11-02 RX ADMIN — KETOROLAC TROMETHAMINE 15 MG: 30 INJECTION, SOLUTION INTRAMUSCULAR at 13:16

## 2018-11-02 RX ADMIN — CALCIUM GLUCONATE 1 G: 94 INJECTION, SOLUTION INTRAVENOUS at 13:46

## 2018-11-02 RX ADMIN — POTASSIUM CHLORIDE: 2 INJECTION, SOLUTION, CONCENTRATE INTRAVENOUS at 19:39

## 2018-11-02 RX ADMIN — ROPINIROLE 1 MG: 1 TABLET, FILM COATED ORAL at 17:38

## 2018-11-02 RX ADMIN — Medication 10 ML: at 14:00

## 2018-11-02 RX ADMIN — Medication 10 ML: at 21:25

## 2018-11-02 RX ADMIN — ALBUTEROL SULFATE 2.5 MG: 2.5 SOLUTION RESPIRATORY (INHALATION) at 19:55

## 2018-11-02 RX ADMIN — HYDROMORPHONE HYDROCHLORIDE 0.5 MG: 1 INJECTION, SOLUTION INTRAMUSCULAR; INTRAVENOUS; SUBCUTANEOUS at 18:09

## 2018-11-02 RX ADMIN — LORAZEPAM 2 MG: 2 INJECTION INTRAMUSCULAR; INTRAVENOUS at 21:24

## 2018-11-02 RX ADMIN — HYDROCORTISONE SODIUM SUCCINATE 15 MG: 100 INJECTION, POWDER, FOR SOLUTION INTRAMUSCULAR; INTRAVENOUS at 14:00

## 2018-11-02 RX ADMIN — HYDROCORTISONE SODIUM SUCCINATE 15 MG: 100 INJECTION, POWDER, FOR SOLUTION INTRAMUSCULAR; INTRAVENOUS at 06:21

## 2018-11-02 RX ADMIN — ENOXAPARIN SODIUM 40 MG: 100 INJECTION SUBCUTANEOUS at 21:24

## 2018-11-02 RX ADMIN — ROPINIROLE 1 MG: 1 TABLET, FILM COATED ORAL at 08:42

## 2018-11-02 RX ADMIN — ALBUTEROL SULFATE 2.5 MG: 2.5 SOLUTION RESPIRATORY (INHALATION) at 07:39

## 2018-11-02 NOTE — ROUTINE PROCESS
0002: Pt stable and sleeping in bed. TPN started  And infusing well. Pt tolerating well. NG tube in place  And draining well. Family members at the bedside. Will continue to monitor the pt.

## 2018-11-02 NOTE — NURSE NAVIGATOR
Important Message from [id number] [facility]" reviewed and explained with the patient and/or representative at bedside and signature was obtained. A signed copy provided to patient/representative. Original signed document placed in patient's chart.

## 2018-11-02 NOTE — PROGRESS NOTES
conducted a Follow up consultation and Spiritual Assessment for Princess Blizzard, who is a 72 y.o.,female. The  provided the following Interventions:  Continued the relationship of care and support. Listened empathically. Offered prayer and assurance of continued prayer on patients behalf. Chart reviewed. The following outcomes were achieved:  Patient expressed gratitude for 's visit. Assessment:  There are no further spiritual or Shinto issues which require Spiritual Care Services interventions at this time. Plan:  Chaplains will continue to follow and will provide pastoral care on an as needed/requested basis.  recommends bedside caregivers page  on duty if patient shows signs of acute spiritual or emotional distress.        [facility]   [phone]

## 2018-11-02 NOTE — PROGRESS NOTES
Problem: Pressure Injury - Risk of  Goal: *Prevention of pressure injury  Document Roman Scale and appropriate interventions in the flowsheet. Outcome: Progressing Towards Goal  Pressure Injury Interventions:  Sensory Interventions: Maintain/enhance activity level    Moisture Interventions: Maintain skin hydration (lotion/cream)    Activity Interventions: Pressure redistribution bed/mattress(bed type)    Mobility Interventions: Pressure redistribution bed/mattress (bed type)    Nutrition Interventions: Offer support with meals,snacks and hydration    Friction and Shear Interventions: HOB 30 degrees or less               Problem: Falls - Risk of  Goal: *Absence of Falls  Document [place] Fall Risk and appropriate interventions in the flowsheet.   Outcome: Progressing Towards Goal  Fall Risk Interventions:  Mobility Interventions: Bed/chair exit alarm    Mentation Interventions: Door open when patient unattended    Medication Interventions: Patient to call before getting OOB    Elimination Interventions: Call light in reach

## 2018-11-02 NOTE — PROGRESS NOTES
Patient does not take Symbicort at home and does not want it here. She will continue to take her on Advair, which is what she takes at home. Patient does not take the capsule form of Spiriva at home and will not take it here. She will continue to take her own Spiriva Respimed inhaler. She will take albuterol nebulizers while she is admitted.       11/02/18 0739   Oxygen Therapy   O2 Sat (%) 95 %   Pulse via Oximetry 68 beats per minute   O2 Device Nasal cannula   O2 Flow Rate (L/min) 2 l/min   Pre-Treatment   Breathing Pattern Regular   Cough (none)   Breath Sounds Bilateral Clear;Diminished   Pulse 68   SpO2 95 %   Respirations 19   Post-Treatment   Breathing Pattern Regular   Breath Sounds Bilateral Expiratory wheezing;Diminished   Pulse 70   SpO2 96 %   Respirations 19   Treatment Tolerance Well   Procedures   $$ Subsequent Procedure Aerosol   Delivery Source Mouthpiece   Aerosolized Medications Albuterol

## 2018-11-02 NOTE — ROUTINE PROCESS
Bedside shift report given to [address] (oncoming nurse)    Reviewed:  · Kardex  · SBAR  · MAR  · I/Os

## 2018-11-02 NOTE — PROGRESS NOTES
NUTRITION consult    Nursing Referral: [facility]  Nutrition Consult: TPN: RD to Manage     RECOMMENDATIONS / PLAN:     - Plan to provide parenteral nutrition support, monitor labs and adjust electrolytes daily. Next bag for central infusion only. - Replace calcium with 1 gm IV today and rechecked ionized calcium tomorrow per MD.   - Initiate subcutaneous insulin order set for BG monitoring and correctional coverage every 6 hours. - Continue RD inpatient monitoring and evaluation. Please Note:   Parenteral nutrition support to be provided using custom product, allowing for modification of electrolyte and macronutrient content day to day. Calcium and magnesium will be left out due to component shortages and must be replaced outside of TPN as needed. Lipids and MVI included in PN on MWF. Macronutrient Goal: 71 gm amino acids, 325 gm dextrose, 250 mL lipids (1603 kcal weekly average).       PARENTERAL NUTRITION ORDER:     Electrolyte Adjustments: Na, K and Phos increased  Additional replacement today: 1 gm Ca IV     Day 2 PN to provide: 185 mEq Na, 60 mEq K, 25 mmol Phos, 1420 kcal, 60 gm amino acids, 200 gm dextrose, 250 mL lipids, 10 mL MVI, 2.5 mL trace elements     PN Rate: 75 mL/hr   Glucose Infusion Rate: 2.36 mg/kg/min    Osmolarity: 1179 mOsm   Parenteral Nutrition Access Device: PICC placed 11/1/18  Indication for PN: unable to tolerate oral diet following GI surgery with malnutrition   Refeeding Risk:      [x]  Yes (risk diminished, pt receiving dextrose infusion since 10/30) [] No     NUTRITION DIAGNOSIS & INTERVENTIONS:     [x] Parenteral nutrition: continue   [x] Vitamin and mineral supplement therapy: initiate  [x] Nutrition related medication management: add corrective coverage insulin order set  [x] Collaboration and referral of nutrition care: MD to review PN order and note daily; will not call to verify content and changes, per MD request. Obtained verbal order for IV calcium replacement from Dr Nicanor Sherwood Nutrition Diagnosis: Inadequate nutrient intake related to altered GI function and inability to consume oral diet as evidenced by pt NPO with possible ileus after GI surgery. Patient meets criteria for Severe Protein Calorie Malnutrition as evidenced by:   ASPEN Malnutrition Criteria  Acute Illness, Chronic Illness, or Social/Enviornmental: Acute illness  Energy Intake: Less than/equal to 50% est energy req for greater than/equal to 5 days  Body Fat: Moderate(orbital, upper arm, thoracic and lumbar regions)  Muscle Mass: Moderate(moderate-hand, scapula; severe- temple, clavicle, shoulders.)  ASPEN Malnutrition Score - Acute Illness: 18  Acute Illness - Malnutrition Diagnosis: Severe malnutrition  Weight Loss: Greater than 5% x 1 mo  Body Fat: Mild(orbital, thoracic regions)  Muscle Mass: Mild(temple, clavicle, trapezius regions)  ASPEN Malnutrition Score - Chronic Illness: 8  Chronic Illness - Malnutrition Diagnosis: Moderate malnutrition. ASSESSMENT:     11/2: Recent BG elevated 161-183 mg/dL and receiving steroids, remains NPO with moderate dark green/brown output from NGT and no findings of abscess per CT. Symptoms improving and OOB, ambulating but no bowel function. RD completed NFPE.     11/1: Remains NPO with plan for PICC placement to start PN per MD, awaiting return of bowel function and CT abdomen/pelvis ordered. 10/30: In pain today. Episode of nausea last night, resolved with medication. One episode of flatus. Some abdominal distension. Pt weighed on standing scale yesterday, now with weight gain; question accuracy of weights and possible fluid masking weight change. 10/29: Readmitted with n/v after recent colectomy on 10/23 for cecal cancer. NGT placed to suction, minimal output noted, however pt's son report canister was emptied last night and pt report a lot of output yesterday. Pt weaker, with significant weight loss noted since last admission weight.   Plan to obtain standing weight when appropriate to verify current weight. Pt continues to have muscle and fat loss, currently meets criteria for moderate malnutrition, but has had <50% of energy intake x 3 days, pt at high risk for becoming severely malnourished.      PN infusion adequate to meet patients estimated nutritional needs:   [] Yes     [x] No   [] Unable to determine at this time    Diet: DIET NPO  TPN ADULT - PERIPHERAL  TPN ADULT - CENTRAL      Food Allergies: shellfish   Current Appetite:   [] Good     [] Fair     [] Poor     [x] Other: NPO x 4 days on admission prior to starting PN and first PN bag met <50% of energy needs   Appetite/meal intake prior to admission:   [] Good     [] Fair     [] Poor     [x] Other: Good appetite, but poor-fair intake after surgery  Feeding Limitations:  [] Swallowing difficulty    [] Chewing difficulty    [] Other:  Current Meal Intake:   Patient Vitals for the past 100 hrs:   % Diet Eaten   10/29/18 2140 0 %   10/29/18 1853 0 %   10/29/18 0946 0 %     NGT to intermittent suction, output x last 24 hours: 500 mL  BM: 10/27 (PTA)   Skin Integrity: abdominal surgical incision   Edema:  [x] No     [] Yes   Pertinent Medications: Reviewed: zofran, steroid     Labs: BMP, MG, Phos ordered daily; CMP, Triglyceride, Prealbumin ordered initially and weekly  Recent Labs     11/02/18  0220 11/01/18  0404 10/31/18  0303   * 137 140   K 3.8 3.9 3.8   CL 99* 100 103   CO2 32 33* 30   * 112* 93   BUN 10 16 21*   CREA 0.48* 0.57* 0.52*   CA 7.8* 7.5* 7.8*   MG 2.1 2.1 2.0   PHOS 2.7 2.9 2.6     Prealbumin: ordered  Triglyceride: 84 mg/dL (11/2/18)  Ionized Calcium: 1.09 mmol/L (11/2/18)   Recent Labs     11/02/18 0220 11/01/18  0404 10/31/18  0303   WBC 12.2 10.7 7.4   HGB 10.3* 9.9* 10.9*   HCT 30.7* 29.9* 32.4*    251 275         Intake/Output Summary (Last 24 hours) at 11/2/2018 1241  Last data filed at 11/2/2018 0733  Gross per 24 hour   Intake 4016.25 ml   Output 2350 ml   Net 1666.25 ml       Anthropometrics:   Ht Readings from Last 1 Encounters:   10/28/18 5' 6\" (1.676 m)       Last 3 Recorded Weights in this Encounter    10/30/18 1538 11/01/18 0716 11/02/18 0907   Weight: 57.9 kg (127 lb 9.6 oz) 58.2 kg (128 lb 3.2 oz) 58.9 kg (129 lb 12.8 oz)      Body mass index is 20.95 kg/m². Weight History:  -11 lbs (9%) x 5 days, -16 lbs (13%) x 1 month   Weight Metrics 11/2/2018 10/23/2018 10/4/2018 9/27/2018 9/23/2018 8/28/2018 8/16/2018   Weight 129 lb 12.8 oz 119 lb 118 lb 124 lb 117 lb 116 lb 122 lb 4.8 oz   BMI 20.95 kg/m2 19.21 kg/m2 19.05 kg/m2 20.01 kg/m2 18.88 kg/m2 19.3 kg/m2 20.35 kg/m2          Admitting Diagnosis: Obstruction of bowel (HCC)  Post-op pain  Ileus following gastrointestinal surgery  Pertinent PMHx: COPD, depression, nausea & vomiting, osteoporosis, vitamin D deficiency    Education Needs:        [x] None identified  [] Identified - Not appropriate at this time  []  Identified and addressed - refer to education log  Learning Limitations:   [x] None identified  [] Identified    Cultural, Taoist & ethnic food preferences:  [x] None identified    [] Identified and addressed     ESTIMATED NUTRITION NEEDS:     Calories: 0445-4018 kcal (HBEx1.2-1.4) based on   [] Actual BW      [x] IBW: 59 kg  Protein: 59-77 gm (1-1.3 gm/kg) based on   [] Actual BW      [x] IBW:   Fluid: 1 mL/kcal     MONITORING & EVALUATION:     Nutrition Goals:   1. Patient will receive nutrition via PN until they are able to tolerate adequate po intake/enteral nutrition. Outcome:  [x] Met    []  Not Met    [] New Goal  2. Patient will tolerate advancement of macronutrients towards meeting estimated nutrition needs within the next 7 days.  Outcome:  [] Met    [x]  Not Met    [] New Goal    Monitoring:  [x] Parenteral nutrition intake and administration  [x] Biochemical data, medical tests, and procedures   [x] Fluid intake   [x] Nutrition-focused physical findings   [x] Treatment/therapy   [] Food and beverage intake   [] Diet order      [] Weight        Previous Recommendations (for follow-up assessments only):     [x]   Implemented       []   Not Implemented (RD to address)      [] No Longer Appropriate     [] No Recommendation Made        Discharge Planning: Nutrition recommendations pending patient's ability to tolerate po intake/enteral nutrition.    [x]  Participated in care planning, discharge planning, & interdisciplinary rounds as appropriate      Julio Yoo, [address] [place] [address]   Pager: [phone]

## 2018-11-02 NOTE — ROUTINE PROCESS
Bedside and Verbal shift change report given to [address] [place] [address] (oncoming nurse) by Lashae Benavides RN (offgoing nurse). Report included the following information SBAR, Kardex, MAR and Recent Results.     SITUATION:    Code Status: Full Code   Reason for Admission: Obstruction of bowel ([address]   Post-op pain   Ileus following gastrointestinal surgery    [facility] day: 5   Problem List:       Hospital Problems  Date Reviewed: 9/27/2018          Codes Class Noted POA    Post-op pain ICD-10-CM: G89.18  ICD-9-CM: 338.18  10/28/2018 Unknown        Obstruction of bowel ([address] ICD-10-CM: Y93.106  ICD-9-CM: 560.9  10/28/2018 Unknown        Ileus following gastrointestinal surgery ICD-10-CM: K91.30  ICD-9-CM: 997.49, 560.1  10/28/2018 Unknown              BACKGROUND:    Past Medical History:   Past Medical History:   Diagnosis Date    Anxiety     Arthritis     Asbestosis ([address]     Asthma     Back problem     Baker's cyst     Balance problems     Chronic lung disease     Cigarette smoker     Clotting disorder (HCC)     COPD [facility]ch[facility] obstructive [facility] disease) ([facility])     oxygen 2/liters    Depression     History of DVT (deep vein thrombosis)     Leg pain     Right    Lung disease     Nausea & vomiting     Osteoporosis     Restless leg syndrome     Spinal stenosis     Thromboembolus ([address]     Vitamin D deficiency          Patient taking anticoagulants no     ASSESSMENT:    Changes in Assessment Throughout Shift: No     Patient has Central Line: no Reasons if yes: No   Patient has Morales Cath: no Reasons if yes: No      Last Vitals:     Vitals:    11/01/18 0716 11/01/18 0749 11/01/18 0902 11/01/18 1143   BP:  124/71  122/79   Pulse:  77  82   Resp:  16  16   Temp:  98.7 °F (37.1 °C)  97.7 °F (36.5 °C)   SpO2:  97% 96% 97%   Weight: 58.2 kg (128 lb 3.2 oz)      Height:            IV and DRAINS (will only show if present)   Nasogastric Tube 10/28/18-Site Assessment: Clean, dry, & intact  [REMOVED] Peripheral IV 10/30/18 Left Wrist-Site Assessment: Clean, dry, & intact  [REMOVED] Peripheral IV 10/28/18 Left Antecubital-Site Assessment: Clean, dry, & intact  Peripheral IV 10/31/18 Posterior Arm-Site Assessment: Clean, dry, & intact  PICC Single Lumen 16/91/78 Basilic;Right-Site Assessment: Clean, dry, & intact     WOUND (if present)   Wound Type:  none   Dressing present Dressing Present : No   Wound Concerns/Notes:  none     PAIN    Pain Assessment    Pain Intensity 1: 0 (11/01/18 2307)    Pain Location 1: Abdomen    Pain Intervention(s) 1: Medication (see MAR)    Patient Stated Pain Goal: 0  o Interventions for Pain:  none  o Intervention effective: no  o Time of last intervention: 0700   o Reassessment Completed: no      Last 3 Weights:  Last 3 Recorded Weights in this Encounter    10/29/18 1201 10/30/18 1538 11/01/18 0716   Weight: 57.6 kg (127 lb) 57.9 kg (127 lb 9.6 oz) 58.2 kg (128 lb 3.2 oz)     Weight change:      INTAKE/OUPUT    Current Shift: 11/01 1901 - 11/02 0700  In: -   Out: 750 [Urine:750]    Last three shifts: 10/31 0701 - 11/01 1900  In: 4811.3 [I.V.:4781.3]  Out: 850 [Urine:550]     LAB RESULTS     Recent Labs     11/01/18  0404 10/31/18  0303 10/30/18  0950   WBC 10.7 7.4 11.7   HGB 9.9* 10.9* 10.7*   HCT 29.9* 32.4* 32.0*    275 256        Recent Labs     11/01/18  0404 10/31/18  0303 10/30/18  0950    140 139   K 3.9 3.8 4.0   * 93 80   BUN 16 21* 26*   CREA 0.57* 0.52* 0.56*   CA 7.5* 7.8* 8.5   MG 2.1 2.0 2.2       RECOMMENDATIONS AND DISCHARGE PLANNING     1. Pending tests/procedures/ Plan of Care or Other Needs: TBD     2. Discharge plan for patient and Needs/Barriers: TBD    3. Estimated Discharge Date: TBD Posted on Whiteboard in Patients Room: no      4. The patient's care plan was reviewed with the oncoming nurse.        \"HEALS\" SAFETY CHECK      Fall Risk    Total Score: 3    Safety Measures: Safety Measures: Bed/Chair alarm on, Bed/Chair-Wheels locked, Bed in low position, Call light within reach, Fall prevention (comment)    A safety check occurred in the patient's room between off going nurse and oncoming nurse listed above. The safety check included the below items  Area Items   H  High Alert Medications - Verify all high alert medication drips (heparin, PCA, etc.)   E  Equipment - Suction is set up for ALL patients (with gloria)  - Red plugs utilized for all equipment (IV pumps, etc.)  - WOWs wiped down at end of shift.  - Room stocked with oxygen, suction, and other unit-specific supplies   A  Alarms - Bed alarm is set for fall risk patients  - Ensure chair alarm is in place and activated if patient is up in a chair   L  Lines - Check IV for any infiltration  - Morales bag is empty if patient has a Morales   - Tubing and IV bags are labeled   S  Safety   - Room is clean, patient is clean, and equipment is clean. - Hallways are clear from equipment besides carts. - Fall bracelet on for fall risk patients  - Ensure room is clear and free of clutter  - Suction is set up for ALL patients (with gloria)  - Hallways are clear from equipment besides carts.    - Isolation precautions followed, supplies available outside room, sign posted     Bennett Peña, RN

## 2018-11-02 NOTE — CDMP QUERY
Please clarify if you concur with the Registered Dietician and this patient is being treated/managed for: 
 
=> severe protein calorie malnutrition 
=> Other Explanation of clinical findings 
=> Unable to Determine (no explanation of clinical findings) The medical record reflects the following: 
 
-----> Risk: 72 yr old female with post op ileus now on TPN 
 
-----> Clinical Indicators:  11-2-18 RD note:  \"  Nutrition Diagnosis: Inadequate nutrient intake related to altered GI function and inability to consume oral diet as evidenced by pt NPO with possible ileus after GI surgery.  
 
 Patient meets criteria for Severe Protein Calorie Malnutrition as evidenced by:  
ASPEN Malnutrition Criteria Acute Illness, Chronic Illness, or Social/Enviornmental: Acute illness Energy Intake: Less than/equal to 50% est energy req for greater than/equal to 5 days Body Fat: Moderate(orbital, upper arm, thoracic and lumbar regions) Muscle Mass: Moderate(moderate-hand, scapula; severe- temple, clavicle, shoulders.) ASPEN Malnutrition Score - Acute Illness: 18 
Acute Illness - Malnutrition Diagnosis: Severe malnutrition Weight Loss: Greater than 5% x 1 mo Body Fat: Mild(orbital, thoracic regions) Muscle Mass: Mild(temple, clavicle, trapezius regions) ASPEN Malnutrition Score - Chronic Illness: 8. .. Darleen Fulton \" 
 
-----> Treatment:  Per RD:  - Plan to provide parenteral nutrition support, monitor labs and adjust electrolytes daily. - Continue RD inpatient monitoring and evaluation. Please clarify and document your clinical opinion in the progress notes and discharge summary including the definitive and/or presumptive diagnosis, (suspected or probable), related to the above clinical findings. Please include clinical findings supporting your diagnosis. If you DECLINE this query or would like to communicate with [facility], please utilize the \"[employer] message box\" at the TOP of the Progress Note on the right. Thank you, Davin Alaniz RN 
[phone]

## 2018-11-02 NOTE — PROGRESS NOTES
[facility]  [address] [place] [id number] [phone]  Colon and Rectal Surgery Progress Note      Patient: Keisha Morris MRN: [id number]  SSN: [id number]    YOB: 1945  Age: 72 y.o. Sex: female      Admit Date: 10/28/2018    LOS: 5 days     Subjective:     No bowel function. Feels better today. CT and PICC/TPN yesterday     Objective:     Vitals:    11/01/18 1143 11/02/18 0335 11/02/18 0731 11/02/18 0739   BP: 122/79 100/58 128/79    Pulse: 82 69 71    Resp: 16 18 18    Temp: 97.7 °F (36.5 °C) 97.3 °F (36.3 °C) 97 °F (36.1 °C)    SpO2: 97% 99% 94% 95%   Weight:       Height:            Intake and Output:  Current Shift: 11/02 0701 - 11/02 1900  In: -   Out: 400 [Urine:400]  Last three shifts: 10/31 1901 - 11/02 0700  In: 4871.3 [I.V.:4781.3]  Out: 2800 [Urine:2000]        Physical Exam:     abd soft, distended, non-tender, softer    Lab/Data Review:    CMP:   Lab Results   Component Value Date/Time     (L) 11/02/2018 02:20 AM    K 3.8 11/02/2018 02:20 AM    CL 99 (L) 11/02/2018 02:20 AM    CO2 32 11/02/2018 02:20 AM    AGAP 4 11/02/2018 02:20 AM     (H) 11/02/2018 02:20 AM    BUN 10 11/02/2018 02:20 AM    CREA 0.48 (L) 11/02/2018 02:20 AM    GFRAA >60 11/02/2018 02:20 AM    GFRNA >60 11/02/2018 02:20 AM    CA 7.8 (L) 11/02/2018 02:20 AM    MG 2.1 11/02/2018 02:20 AM    PHOS 2.7 11/02/2018 02:20 AM     CBC:   Lab Results   Component Value Date/Time    WBC 12.2 11/02/2018 02:20 AM    HGB 10.3 (L) 11/02/2018 02:20 AM    HCT 30.7 (L) 11/02/2018 02:20 AM     11/02/2018 02:20 AM      CT by my read shows diffuse sb disension. There is stool at and distal to anastomosis, but decompressed in distal transverse and left colon. Air and stool in rectum. PSBO vs. Ileus. Official read pending. No evidence of abscess.      Assessment:     S/P robotic R colectomy, ileus    Plan:     Continue NG, IVF  Await bowel function  IS  TPN      Signed By: Blanca Keating MD November 2, 2018

## 2018-11-03 LAB
ANION GAP SERPL CALC-SCNC: 8 MMOL/L (ref 3–18)
BACTERIA SPEC CULT: NORMAL
BACTERIA SPEC CULT: NORMAL
BUN SERPL-MCNC: 20 MG/DL (ref 7–18)
BUN/CREAT SERPL: 47 (ref 12–20)
CA-I SERPL-SCNC: 1.13 MMOL/L (ref 1.12–1.32)
CALCIUM SERPL-MCNC: 8.1 MG/DL (ref 8.5–10.1)
CHLORIDE SERPL-SCNC: 101 MMOL/L (ref 100–108)
CO2 SERPL-SCNC: 32 MMOL/L (ref 21–32)
CREAT SERPL-MCNC: 0.43 MG/DL (ref 0.6–1.3)
ERYTHROCYTE [DISTWIDTH] IN BLOOD BY AUTOMATED COUNT: 14 % (ref 11.6–14.5)
GLUCOSE BLD STRIP.AUTO-MCNC: 119 MG/DL (ref 70–110)
GLUCOSE BLD STRIP.AUTO-MCNC: 134 MG/DL (ref 70–110)
GLUCOSE BLD STRIP.AUTO-MCNC: 140 MG/DL (ref 70–110)
GLUCOSE BLD STRIP.AUTO-MCNC: 149 MG/DL (ref 70–110)
GLUCOSE BLD STRIP.AUTO-MCNC: 181 MG/DL (ref 70–110)
GLUCOSE SERPL-MCNC: 140 MG/DL (ref 74–99)
HCT VFR BLD AUTO: 27.7 % (ref 35–45)
HGB BLD-MCNC: 9.4 G/DL (ref 12–16)
MAGNESIUM SERPL-MCNC: 2.1 MG/DL (ref 1.6–2.6)
MCH RBC QN AUTO: 33.2 PG (ref 24–34)
MCHC RBC AUTO-ENTMCNC: 33.9 G/DL (ref 31–37)
MCV RBC AUTO: 97.9 FL (ref 74–97)
PHOSPHATE SERPL-MCNC: 2.8 MG/DL (ref 2.5–4.9)
PLATELET # BLD AUTO: 297 K/UL (ref 135–420)
PMV BLD AUTO: 10.2 FL (ref 9.2–11.8)
POTASSIUM SERPL-SCNC: 3.5 MMOL/L (ref 3.5–5.5)
RBC # BLD AUTO: 2.83 M/UL (ref 4.2–5.3)
SERVICE CMNT-IMP: NORMAL
SERVICE CMNT-IMP: NORMAL
SODIUM SERPL-SCNC: 141 MMOL/L (ref 136–145)
WBC # BLD AUTO: 10.9 K/UL (ref 4.6–13.2)

## 2018-11-03 PROCEDURE — 80048 BASIC METABOLIC PNL TOTAL CA: CPT | Performed by: COLON & RECTAL SURGERY

## 2018-11-03 PROCEDURE — 74011636637 HC RX REV CODE- 636/637: Performed by: COLON & RECTAL SURGERY

## 2018-11-03 PROCEDURE — 65270000029 HC RM PRIVATE

## 2018-11-03 PROCEDURE — 36415 COLL VENOUS BLD VENIPUNCTURE: CPT | Performed by: COLON & RECTAL SURGERY

## 2018-11-03 PROCEDURE — 83735 ASSAY OF MAGNESIUM: CPT | Performed by: COLON & RECTAL SURGERY

## 2018-11-03 PROCEDURE — 82330 ASSAY OF CALCIUM: CPT | Performed by: COLON & RECTAL SURGERY

## 2018-11-03 PROCEDURE — 94640 AIRWAY INHALATION TREATMENT: CPT

## 2018-11-03 PROCEDURE — 77010033678 HC OXYGEN DAILY

## 2018-11-03 PROCEDURE — 74011250636 HC RX REV CODE- 250/636: Performed by: SURGERY

## 2018-11-03 PROCEDURE — 82962 GLUCOSE BLOOD TEST: CPT

## 2018-11-03 PROCEDURE — 74011000250 HC RX REV CODE- 250: Performed by: COLON & RECTAL SURGERY

## 2018-11-03 PROCEDURE — 74011000258 HC RX REV CODE- 258

## 2018-11-03 PROCEDURE — 74011250637 HC RX REV CODE- 250/637: Performed by: COLON & RECTAL SURGERY

## 2018-11-03 PROCEDURE — 94760 N-INVAS EAR/PLS OXIMETRY 1: CPT

## 2018-11-03 PROCEDURE — 84100 ASSAY OF PHOSPHORUS: CPT | Performed by: COLON & RECTAL SURGERY

## 2018-11-03 PROCEDURE — 74011250636 HC RX REV CODE- 250/636: Performed by: COLON & RECTAL SURGERY

## 2018-11-03 PROCEDURE — 85027 COMPLETE CBC AUTOMATED: CPT | Performed by: COLON & RECTAL SURGERY

## 2018-11-03 PROCEDURE — 74011000250 HC RX REV CODE- 250

## 2018-11-03 PROCEDURE — 74011250636 HC RX REV CODE- 250/636

## 2018-11-03 RX ORDER — HYDROMORPHONE HYDROCHLORIDE 2 MG/ML
1 INJECTION, SOLUTION INTRAMUSCULAR; INTRAVENOUS; SUBCUTANEOUS
Status: DISCONTINUED | OUTPATIENT
Start: 2018-11-03 | End: 2018-11-06

## 2018-11-03 RX ORDER — ALBUTEROL SULFATE 0.83 MG/ML
2.5 SOLUTION RESPIRATORY (INHALATION)
Status: DISCONTINUED | OUTPATIENT
Start: 2018-11-03 | End: 2018-11-15 | Stop reason: HOSPADM

## 2018-11-03 RX ADMIN — Medication 10 ML: at 22:11

## 2018-11-03 RX ADMIN — HYDROCORTISONE SODIUM SUCCINATE 15 MG: 100 INJECTION, POWDER, FOR SOLUTION INTRAMUSCULAR; INTRAVENOUS at 22:11

## 2018-11-03 RX ADMIN — KETOROLAC TROMETHAMINE 15 MG: 30 INJECTION, SOLUTION INTRAMUSCULAR at 00:49

## 2018-11-03 RX ADMIN — LORAZEPAM 2 MG: 2 INJECTION INTRAMUSCULAR; INTRAVENOUS at 22:11

## 2018-11-03 RX ADMIN — ALBUTEROL SULFATE 2.5 MG: 2.5 SOLUTION RESPIRATORY (INHALATION) at 14:08

## 2018-11-03 RX ADMIN — Medication 6 ML: at 14:00

## 2018-11-03 RX ADMIN — SODIUM CHLORIDE: 234 INJECTION INTRAMUSCULAR; INTRAVENOUS; SUBCUTANEOUS at 19:16

## 2018-11-03 RX ADMIN — ROPINIROLE 1 MG: 1 TABLET, FILM COATED ORAL at 09:38

## 2018-11-03 RX ADMIN — HYDROCORTISONE SODIUM SUCCINATE 15 MG: 100 INJECTION, POWDER, FOR SOLUTION INTRAMUSCULAR; INTRAVENOUS at 06:47

## 2018-11-03 RX ADMIN — HYDROCORTISONE SODIUM SUCCINATE 15 MG: 100 INJECTION, POWDER, FOR SOLUTION INTRAMUSCULAR; INTRAVENOUS at 14:19

## 2018-11-03 RX ADMIN — Medication 10 ML: at 06:48

## 2018-11-03 RX ADMIN — INSULIN LISPRO 2 UNITS: 100 INJECTION, SOLUTION INTRAVENOUS; SUBCUTANEOUS at 00:53

## 2018-11-03 RX ADMIN — HYDROMORPHONE HYDROCHLORIDE 1 MG: 2 INJECTION INTRAMUSCULAR; INTRAVENOUS; SUBCUTANEOUS at 16:38

## 2018-11-03 RX ADMIN — ENOXAPARIN SODIUM 40 MG: 100 INJECTION SUBCUTANEOUS at 22:10

## 2018-11-03 RX ADMIN — KETOROLAC TROMETHAMINE 15 MG: 30 INJECTION, SOLUTION INTRAMUSCULAR at 06:47

## 2018-11-03 RX ADMIN — ROPINIROLE 1 MG: 1 TABLET, FILM COATED ORAL at 17:16

## 2018-11-03 RX ADMIN — ALBUTEROL SULFATE 2.5 MG: 2.5 SOLUTION RESPIRATORY (INHALATION) at 08:33

## 2018-11-03 RX ADMIN — HYDROMORPHONE HYDROCHLORIDE 0.5 MG: 1 INJECTION, SOLUTION INTRAMUSCULAR; INTRAVENOUS; SUBCUTANEOUS at 10:49

## 2018-11-03 RX ADMIN — ALBUTEROL SULFATE 2.5 MG: 2.5 SOLUTION RESPIRATORY (INHALATION) at 19:47

## 2018-11-03 RX ADMIN — HYDROMORPHONE HYDROCHLORIDE 0.5 MG: 1 INJECTION, SOLUTION INTRAMUSCULAR; INTRAVENOUS; SUBCUTANEOUS at 04:26

## 2018-11-03 NOTE — PROGRESS NOTES
RESPIRATORY MEDICATION PROTOCOL ASSESSMENT      Patient  Deanna Christian     72 y.o.   female     11/3/2018  7:20 AM    Breath Sounds Pre Procedure:  Breath Sounds Bilateral: Clear, Diminished                                            Breath Sounds Post Procedure: Breath Sounds Bilateral: Expiratory wheezing, Diminished                                               Breathing pattern: Pre procedure  Breathing Pattern: Regular          Post procedure  Breathing Pattern: Regular    Cough: Pre procedure  Cough: (none)               Post procedure Cough: Non-productive    Heart Rate: Pre procedure Pulse: 68           Post procedure Pulse: 70    Resp Rate: Pre procedure  Respirations: 19           Post procedure          Nebulizer Therapy: Current medications Aerosolized Medications: Albuterol       Problem List:   Patient Active Problem List   Diagnosis Code    COPD, severe ([facility]) J44.9    Nicotine addiction F17.200    Hemoptysis R04.2    Lumbar spinal stenosis M48.061    Facet arthritis of lumbar region ([facility] M46.96    Neuritis of lower extremity G57.90    Emphysema of lung ([facility] J43.9    COPD with acute exacerbation ([facility]) J44.1    Muscle spasm of back M62.830    Sacroiliac joint pain M53.3    Current chronic use of systemic steroids Z79.52    Baker's cyst of knee M71.20    Acute exacerbation of chronic obstructive pulmonary disease (COPD) ([facility]) J44.1    Degenerative disc disease, lumbar M51.36    Left-sided low back pain with sciatica M54.42    COPD with exacerbation ([facility]) J44.1    COPD (chronic obstructive pulmonary disease) ([facility]) J44.9    Pneumonia J18.9    Abdominal pain R10.9    Abdominal mass R19.00    Colon cancer without distant metastasis (HCC) C18.9    Colon polyps K63.5    Pre-operative cardiovascular examination Z01.810    Cecal cancer (HCC) C18.0    Post-op pain G89.18    Obstruction of bowel ([facility] K56.609    Ileus following gastrointestinal surgery K91.30       Patient alert and cooperative to use MDI: Yes    Home Respiratory Therapy Regimen/Frequency:  YES  Medication Proair, Advair, Spiriva  Device MDI  Frequency PRN, BID, Daily    SEVERITY INDEX:    ITEM 0 1 2 3 4 Score   Respiratory Pattern and or Rate Regular  10-19 Regular  20-24   24-30    30-34 Severe SOB or   Greater than 35 0   Breath Sounds Clear Occasional Wheeze Mild Wheezing Moderate Wheezing  wheezing/Absent breath sounds 1   Shortness of Breath None Dyspnea on Exertion Dyspnea at Rest Moderate Shortness of Breath at Rest Severe Shortness of Breath - Limited Speech 1       Total Score:  2    * Scoring Guidelines  0-4 pts:  PRN-BID   5-7 pts:  BID, TID, QID  8-9 pts:  TID, QID, Q6  10-12 pts:  Q4-Q6  * - Guidelines used with clinical judgement. PRN Treatments can be ordered to supplement scheduled treatments. Regardless of score, frequency should not be less than normal home regimen.     Recommended Order/Frequency:  PRN nebulizer, continue MDI's     Comments:          Respiratory Therapist: Juliana Carpenter, RRT

## 2018-11-03 NOTE — PROGRESS NOTES
Problem: Pressure Injury - Risk of  Goal: *Prevention of pressure injury  Document Roman Scale and appropriate interventions in the flowsheet.   Outcome: Progressing Towards Goal  Pressure Injury Interventions:  Sensory Interventions: Avoid rigorous massage over bony prominences, Float heels, Keep linens dry and wrinkle-free    Moisture Interventions: Absorbent underpads    Activity Interventions: Increase time out of bed, Pressure redistribution bed/mattress(bed type)    Mobility Interventions: HOB 30 degrees or less, Float heels, Pressure redistribution bed/mattress (bed type)    Nutrition Interventions: Document food/fluid/supplement intake    Friction and Shear Interventions: Foam dressings/transparent film/skin sealants, HOB 30 degrees or less, Minimize layers

## 2018-11-03 NOTE — PROGRESS NOTES
2001  Received pt in stable condition,   General: lying in bed in supine, not apparent distress   Neuro: AOx4  Cardio: denies chest pain  Respiratory: denies shortness of breath  Skin: Steri strips to abdomen clean, dry and intact  GI: voiding  : hypoactive BS, denies nausea and vomiting; NGT (continous moderate suction)  Mus: ambulates w/o assistance  Bed in low position and call bell within reach. [phone]  Alert, NAD, stable. No changes in condition.

## 2018-11-03 NOTE — PROGRESS NOTES
Bedside and Verbal shift change report given to SADI Hernandez (oncoming nurse) by Michele Mcneill RN (offgoing nurse). Report included the following information SBAR, Kardex, Procedure Summary, Intake/Output and MAR.

## 2018-11-03 NOTE — PROGRESS NOTES
Pt refused Symbicort. States that she takes Advair at home, has that with her, and will take that. I advised to have the dr review her chart and put in the [facility] that she is taking her home Advair. She stated she will take her home meds but wants the nebulizer treatment. Documented pt taking home Spiriva, did not document in [facility] pt taking Advair as it is not listed.

## 2018-11-03 NOTE — ROUTINE PROCESS
Bedside and Verbal shift change report given to Tere Reyes (oncoming nurse) by Maximo Youssef (offgoing nurse). Report included the following information SBAR, Kardex, Intake/Output, MAR and Recent Results.

## 2018-11-03 NOTE — PROGRESS NOTES
Surgery  72year-old status post uncomplicated laparoscopic right hemicolectomy with postoperative ileus/bowel obstruction. In good spirits  Afebrile stable vital signs NG tube 400 cc. No bowel movement.   Soft minimally tender abdomen with healed incisions  Labs reviewed WBC 10,000 electrolytes normal  Conservative management ongoing for postoperative ileus

## 2018-11-03 NOTE — PROGRESS NOTES
Problem: Falls - Risk of  Goal: *Absence of Falls  Document Nimco Fall Risk and appropriate interventions in the flowsheet.   Outcome: Progressing Towards Goal  Fall Risk Interventions:  Mobility Interventions: Communicate number of staff needed for ambulation/transfer, Utilize walker, cane, or other assistive device    Mentation Interventions: Door open when patient unattended, Family/sitter at bedside, More frequent rounding    Medication Interventions: Patient to call before getting OOB, Teach patient to arise slowly    Elimination Interventions: Call light in reach, Toilet paper/wipes in reach, Toileting schedule/hourly rounds

## 2018-11-03 NOTE — PROGRESS NOTES
NUTRITION consult    Nursing Referral: [facility]  Nutrition Consult: TPN: RD to Manage     RECOMMENDATIONS / PLAN:     - Plan to provide parenteral nutrition support, monitor labs and adjust electrolytes daily. - Continue RD inpatient monitoring and evaluation. Please Note:   Parenteral nutrition support to be provided using custom product, allowing for modification of electrolyte and macronutrient content day to day. Calcium and magnesium will be left out due to component shortages and must be replaced outside of TPN as needed. Lipids and MVI included in PN on MWF. Macronutrient Goal: 71 gm amino acids, 325 gm dextrose, 250 mL lipids (1603 kcal weekly average). PARENTERAL NUTRITION ORDER:     Electrolyte Adjustments: Na decreased, K increased, rate increased      Day 3 PN to provide: 125 mEq Na, 78 mEq K, 25 mmol Phos, 1134 kcal, 71 gm amino acids, 250 gm dextrose, 2.5 mL trace elements     PN Rate: 80 mL/hr   Glucose Infusion Rate: 2.95 mg/kg/min    Osmolarity: 1232 mOsm   Parenteral Nutrition Access Device: PICC placed 11/1/18  Indication for PN: unable to tolerate oral diet following GI surgery with malnutrition   Refeeding Risk:      [x]  Yes (risk diminished, pt receiving dextrose infusion since 10/30) [] No     NUTRITION DIAGNOSIS & INTERVENTIONS:     [x] Parenteral nutrition: continue   [x] Collaboration and referral of nutrition care: MD to review PN order and note daily; will not call to verify content and changes, per MD request.    Nutrition Diagnosis: Inadequate nutrient intake related to altered GI function and inability to consume oral diet as evidenced by pt NPO with possible ileus after GI surgery.      Patient meets criteria for Severe Protein Calorie Malnutrition as evidenced by:   ASPEN Malnutrition Criteria  Acute Illness, Chronic Illness, or Social/Enviornmental: Acute illness  Energy Intake: Less than/equal to 50% est energy req for greater than/equal to 5 days  Body Fat: Moderate(orbital, upper arm, thoracic and lumbar regions)  Muscle Mass: Moderate(moderate-hand, scapula; severe- temple, clavicle, shoulders.)  ASPEN Malnutrition Score - Acute Illness: 18  Acute Illness - Malnutrition Diagnosis: Severe malnutrition  Weight Loss: Greater than 5% x 1 mo  Body Fat: Mild(orbital, thoracic regions)  Muscle Mass: Mild(temple, clavicle, trapezius regions)  ASPEN Malnutrition Score - Chronic Illness: 8  Chronic Illness - Malnutrition Diagnosis: Moderate malnutrition. ASSESSMENT:     11/3: Ileus per CT, no BM and NGT remains in place to suction. Ionized calcium WNL today. 11/2: Recent BG elevated 161-183 mg/dL and receiving steroids, remains NPO with moderate dark green/brown output from NGT and no findings of abscess per CT. Symptoms improving and OOB, ambulating but no bowel function. RD completed NFPE.   11/1: Remains NPO with plan for PICC placement to start PN per MD, awaiting return of bowel function and CT abdomen/pelvis ordered. 10/30: In pain today. Episode of nausea last night, resolved with medication. One episode of flatus. Some abdominal distension. Pt weighed on standing scale yesterday, now with weight gain; question accuracy of weights and possible fluid masking weight change. 10/29: Readmitted with n/v after recent colectomy on 10/23 for cecal cancer. NGT placed to suction, minimal output noted, however pt's son report canister was emptied last night and pt report a lot of output yesterday. Pt weaker, with significant weight loss noted since last admission weight. Plan to obtain standing weight when appropriate to verify current weight. Pt continues to have muscle and fat loss, currently meets criteria for moderate malnutrition, but has had <50% of energy intake x 3 days, pt at high risk for becoming severely malnourished.      PN infusion adequate to meet patients estimated nutritional needs:   [] Yes     [x] No   [] Unable to determine at this time    Diet: DIET NPO  TPN ADULT - CENTRAL  TPN ADULT - CENTRAL      Food Allergies: shellfish   Current Appetite:   [] Good     [] Fair     [] Poor     [x] Other: NPO x 4 days on admission prior to starting PN and first PN bag met <50% of energy needs   Appetite/meal intake prior to admission:   [] Good     [] Fair     [] Poor     [x] Other: Good appetite, but poor-fair intake after surgery  Feeding Limitations:  [] Swallowing difficulty    [] Chewing difficulty    [] Other:  Current Meal Intake:   Patient Vitals for the past 100 hrs:   % Diet Eaten   11/03/18 1032 0 %     NGT to intermittent suction, output x last 24 hours: 400 mL  BM: 10/27 (PTA)   Skin Integrity: abdominal surgical incision   Edema:  [x] No     [] Yes   Pertinent Medications: Reviewed: zofran, steroid, SSI     Labs: BMP, MG, Phos ordered daily; CMP, Triglyceride, Prealbumin ordered initially and weekly  Recent Labs     11/03/18  0140 11/02/18  0220 11/01/18  0404    135* 137   K 3.5 3.8 3.9    99* 100   CO2 32 32 33*   * 177* 112*   BUN 20* 10 16   CREA 0.43* 0.48* 0.57*   CA 8.1* 7.8* 7.5*   MG 2.1 2.1 2.1   PHOS 2.8 2.7 2.9     Prealbumin: 7.6 mg/dL (11/2/18)  Triglyceride: 84 mg/dL (11/2/18)  Ionized Calcium: 1.13 mmol/L (11/3/18), 1.09 mmol/L (11/2/18)   Recent Labs     11/03/18  0140 11/02/18  0220 11/01/18  0404   WBC 10.9 12.2 10.7   HGB 9.4* 10.3* 9.9*   HCT 27.7* 30.7* 29.9*    283 251         Intake/Output Summary (Last 24 hours) at 11/3/2018 1156  Last data filed at 11/3/2018 1124  Gross per 24 hour   Intake 582.5 ml   Output 600 ml   Net -17.5 ml       Anthropometrics:   Ht Readings from Last 1 Encounters:   10/28/18 5' 6\" (1.676 m)       Last 3 Recorded Weights in this Encounter    10/30/18 1538 11/01/18 0716 11/02/18 0907   Weight: 57.9 kg (127 lb 9.6 oz) 58.2 kg (128 lb 3.2 oz) 58.9 kg (129 lb 12.8 oz)      Body mass index is 20.95 kg/m².      Weight History:  -11 lbs (9%) x 5 days, -16 lbs (13%) x 1 month   Weight Metrics 11/2/2018 10/23/2018 10/4/2018 9/27/2018 9/23/2018 8/28/2018 8/16/2018   Weight 129 lb 12.8 oz 119 lb 118 lb 124 lb 117 lb 116 lb 122 lb 4.8 oz   BMI 20.95 kg/m2 19.21 kg/m2 19.05 kg/m2 20.01 kg/m2 18.88 kg/m2 19.3 kg/m2 20.35 kg/m2          Admitting Diagnosis: Obstruction of bowel (HCC)  Post-op pain  Ileus following gastrointestinal surgery  Pertinent PMHx: COPD, depression, nausea & vomiting, osteoporosis, vitamin D deficiency    Education Needs:        [x] None identified  [] Identified - Not appropriate at this time  []  Identified and addressed - refer to education log  Learning Limitations:   [x] None identified  [] Identified    Cultural, Catholic & ethnic food preferences:  [x] None identified    [] Identified and addressed     ESTIMATED NUTRITION NEEDS:     Calories: 2469-7844 kcal (HBEx1.2-1.4) based on   [] Actual BW      [x] IBW: 59 kg  Protein: 59-77 gm (1-1.3 gm/kg) based on   [] Actual BW      [x] IBW:   Fluid: 1 mL/kcal     MONITORING & EVALUATION:     Nutrition Goals:   1. Patient will receive nutrition via PN until they are able to tolerate adequate po intake/enteral nutrition. Outcome:  [x] Met    []  Not Met    [] New Goal  2. Patient will tolerate advancement of macronutrients towards meeting estimated nutrition needs within the next 7 days. Outcome:  [] Met    [x]  Not Met    [] New Goal    Monitoring:  [x] Parenteral nutrition intake and administration  [x] Biochemical data, medical tests, and procedures   [x] Fluid intake   [x] Nutrition-focused physical findings   [x] Treatment/therapy   [] Food and beverage intake   [] Diet order      [] Weight        Previous Recommendations (for follow-up assessments only):     [x]   Implemented       []   Not Implemented (RD to address)      [] No Longer Appropriate     [] No Recommendation Made        Discharge Planning: Nutrition recommendations pending patient's ability to tolerate po intake/enteral nutrition.    [x]  Participated in care planning, discharge planning, & interdisciplinary rounds as appropriate      Jyothi Cadet, [address] [place] [address], [address] [place]   Pager: [phone]

## 2018-11-04 LAB
ANION GAP SERPL CALC-SCNC: 5 MMOL/L (ref 3–18)
BUN SERPL-MCNC: 19 MG/DL (ref 7–18)
BUN/CREAT SERPL: 48 (ref 12–20)
CALCIUM SERPL-MCNC: 7.9 MG/DL (ref 8.5–10.1)
CHLORIDE SERPL-SCNC: 103 MMOL/L (ref 100–108)
CO2 SERPL-SCNC: 31 MMOL/L (ref 21–32)
CREAT SERPL-MCNC: 0.4 MG/DL (ref 0.6–1.3)
ERYTHROCYTE [DISTWIDTH] IN BLOOD BY AUTOMATED COUNT: 14.2 % (ref 11.6–14.5)
GLUCOSE BLD STRIP.AUTO-MCNC: 137 MG/DL (ref 70–110)
GLUCOSE BLD STRIP.AUTO-MCNC: 140 MG/DL (ref 70–110)
GLUCOSE BLD STRIP.AUTO-MCNC: 142 MG/DL (ref 70–110)
GLUCOSE BLD STRIP.AUTO-MCNC: 99 MG/DL (ref 70–110)
GLUCOSE SERPL-MCNC: 117 MG/DL (ref 74–99)
HCT VFR BLD AUTO: 27.9 % (ref 35–45)
HGB BLD-MCNC: 9.3 G/DL (ref 12–16)
MAGNESIUM SERPL-MCNC: 1.9 MG/DL (ref 1.6–2.6)
MCH RBC QN AUTO: 32.9 PG (ref 24–34)
MCHC RBC AUTO-ENTMCNC: 33.3 G/DL (ref 31–37)
MCV RBC AUTO: 98.6 FL (ref 74–97)
PHOSPHATE SERPL-MCNC: 2.8 MG/DL (ref 2.5–4.9)
PLATELET # BLD AUTO: 330 K/UL (ref 135–420)
PMV BLD AUTO: 10.3 FL (ref 9.2–11.8)
POTASSIUM SERPL-SCNC: 3.5 MMOL/L (ref 3.5–5.5)
RBC # BLD AUTO: 2.83 M/UL (ref 4.2–5.3)
SODIUM SERPL-SCNC: 139 MMOL/L (ref 136–145)
WBC # BLD AUTO: 9.6 K/UL (ref 4.6–13.2)

## 2018-11-04 PROCEDURE — 74011250636 HC RX REV CODE- 250/636

## 2018-11-04 PROCEDURE — 83735 ASSAY OF MAGNESIUM: CPT | Performed by: COLON & RECTAL SURGERY

## 2018-11-04 PROCEDURE — 65270000029 HC RM PRIVATE

## 2018-11-04 PROCEDURE — 80048 BASIC METABOLIC PNL TOTAL CA: CPT | Performed by: COLON & RECTAL SURGERY

## 2018-11-04 PROCEDURE — 74011000258 HC RX REV CODE- 258

## 2018-11-04 PROCEDURE — 74011250636 HC RX REV CODE- 250/636: Performed by: COLON & RECTAL SURGERY

## 2018-11-04 PROCEDURE — 74011250636 HC RX REV CODE- 250/636: Performed by: SURGERY

## 2018-11-04 PROCEDURE — 74011250637 HC RX REV CODE- 250/637: Performed by: COLON & RECTAL SURGERY

## 2018-11-04 PROCEDURE — 82962 GLUCOSE BLOOD TEST: CPT

## 2018-11-04 PROCEDURE — 85027 COMPLETE CBC AUTOMATED: CPT | Performed by: COLON & RECTAL SURGERY

## 2018-11-04 PROCEDURE — 84100 ASSAY OF PHOSPHORUS: CPT | Performed by: COLON & RECTAL SURGERY

## 2018-11-04 PROCEDURE — 94640 AIRWAY INHALATION TREATMENT: CPT

## 2018-11-04 PROCEDURE — 74011000250 HC RX REV CODE- 250

## 2018-11-04 PROCEDURE — 36415 COLL VENOUS BLD VENIPUNCTURE: CPT | Performed by: COLON & RECTAL SURGERY

## 2018-11-04 PROCEDURE — 74011000250 HC RX REV CODE- 250: Performed by: COLON & RECTAL SURGERY

## 2018-11-04 RX ADMIN — ALBUTEROL SULFATE 2.5 MG: 2.5 SOLUTION RESPIRATORY (INHALATION) at 20:14

## 2018-11-04 RX ADMIN — HYDROCORTISONE SODIUM SUCCINATE 15 MG: 100 INJECTION, POWDER, FOR SOLUTION INTRAMUSCULAR; INTRAVENOUS at 05:42

## 2018-11-04 RX ADMIN — HYDROCORTISONE SODIUM SUCCINATE 15 MG: 100 INJECTION, POWDER, FOR SOLUTION INTRAMUSCULAR; INTRAVENOUS at 21:49

## 2018-11-04 RX ADMIN — ROPINIROLE 1 MG: 1 TABLET, FILM COATED ORAL at 09:10

## 2018-11-04 RX ADMIN — Medication 10 ML: at 20:20

## 2018-11-04 RX ADMIN — HYDROMORPHONE HYDROCHLORIDE 1 MG: 2 INJECTION INTRAMUSCULAR; INTRAVENOUS; SUBCUTANEOUS at 18:06

## 2018-11-04 RX ADMIN — ALBUTEROL SULFATE 2.5 MG: 2.5 SOLUTION RESPIRATORY (INHALATION) at 01:17

## 2018-11-04 RX ADMIN — ALBUTEROL SULFATE 2.5 MG: 2.5 SOLUTION RESPIRATORY (INHALATION) at 13:57

## 2018-11-04 RX ADMIN — HYDROMORPHONE HYDROCHLORIDE 1 MG: 2 INJECTION INTRAMUSCULAR; INTRAVENOUS; SUBCUTANEOUS at 14:00

## 2018-11-04 RX ADMIN — SODIUM CHLORIDE: 234 INJECTION INTRAMUSCULAR; INTRAVENOUS; SUBCUTANEOUS at 20:13

## 2018-11-04 RX ADMIN — LORAZEPAM 2 MG: 2 INJECTION INTRAMUSCULAR; INTRAVENOUS at 21:49

## 2018-11-04 RX ADMIN — BUDESONIDE AND FORMOTEROL FUMARATE DIHYDRATE 2 PUFF: 160; 4.5 AEROSOL RESPIRATORY (INHALATION) at 08:00

## 2018-11-04 RX ADMIN — ROPINIROLE 1 MG: 1 TABLET, FILM COATED ORAL at 18:06

## 2018-11-04 RX ADMIN — HYDROCORTISONE SODIUM SUCCINATE 15 MG: 100 INJECTION, POWDER, FOR SOLUTION INTRAMUSCULAR; INTRAVENOUS at 14:00

## 2018-11-04 RX ADMIN — HYDROMORPHONE HYDROCHLORIDE 1 MG: 2 INJECTION INTRAMUSCULAR; INTRAVENOUS; SUBCUTANEOUS at 02:17

## 2018-11-04 RX ADMIN — ENOXAPARIN SODIUM 40 MG: 100 INJECTION SUBCUTANEOUS at 21:49

## 2018-11-04 RX ADMIN — ALBUTEROL SULFATE 2.5 MG: 2.5 SOLUTION RESPIRATORY (INHALATION) at 09:09

## 2018-11-04 RX ADMIN — HYDROMORPHONE HYDROCHLORIDE 1 MG: 2 INJECTION INTRAMUSCULAR; INTRAVENOUS; SUBCUTANEOUS at 09:10

## 2018-11-04 RX ADMIN — Medication 5 ML: at 14:00

## 2018-11-04 RX ADMIN — Medication 10 ML: at 05:43

## 2018-11-04 NOTE — PROGRESS NOTES
Bedside and Verbal shift change report given to SADI Hernandez (oncoming nurse) by Ashok Martínez RN (offgoing nurse). Report included the following information SBAR, Kardex, Procedure Summary, Intake/Output and MAR.

## 2018-11-04 NOTE — PROGRESS NOTES
Problem: Falls - Risk of  Goal: *Absence of Falls  Document Nimco Fall Risk and appropriate interventions in the flowsheet.   Outcome: Progressing Towards Goal  Fall Risk Interventions:  Mobility Interventions: Communicate number of staff needed for ambulation/transfer, Patient to call before getting OOB    Mentation Interventions: Bed/chair exit alarm, Door open when patient unattended    Medication Interventions: Patient to call before getting OOB, Teach patient to arise slowly    Elimination Interventions: Call light in reach, Toileting schedule/hourly rounds, Toilet paper/wipes in reach, Patient to call for help with toileting needs

## 2018-11-04 NOTE — PROGRESS NOTES
AOx3, NAD, denies nausea, stable. No BM nor gas. No changes in condition.  Total NGT output: 600cc (night shift)

## 2018-11-04 NOTE — PROGRESS NOTES
Problem: Pressure Injury - Risk of  Goal: *Prevention of pressure injury  Document Roman Scale and appropriate interventions in the flowsheet.   Outcome: Progressing Towards Goal  Pressure Injury Interventions:  Sensory Interventions: Avoid rigorous massage over bony prominences, Float heels, Keep linens dry and wrinkle-free, Monitor skin under medical devices, Minimize linen layers    Moisture Interventions: Absorbent underpads, Check for incontinence Q2 hours and as needed, Contain wound drainage    Activity Interventions: Pressure redistribution bed/mattress(bed type)    Mobility Interventions: Pressure redistribution bed/mattress (bed type)    Nutrition Interventions: Document food/fluid/supplement intake    Friction and Shear Interventions: HOB 30 degrees or less, Sit at 90-degree angle

## 2018-11-04 NOTE — PROGRESS NOTES
NUTRITION consult    Nursing Referral: [facility]  Nutrition Consult: TPN: RD to Manage     RECOMMENDATIONS / PLAN:     - Plan to provide parenteral nutrition support, monitor labs and adjust electrolytes daily. - Continue RD inpatient monitoring and evaluation. Please Note:   Parenteral nutrition support to be provided using custom product, allowing for modification of electrolyte and macronutrient content day to day. Calcium and magnesium will be left out due to component shortages and must be replaced outside of TPN as needed. Lipids and MVI included in PN on MWF. Macronutrient Goal: 71 gm amino acids, 325 gm dextrose, 250 mL lipids (1603 kcal weekly average). PARENTERAL NUTRITION ORDER:     Electrolyte Adjustments: K increased, Phos increased      Day 4 PN to provide: 125 mEq Na, 85 mEq K, 28 mmol Phos, 1389 kcal, 71 gm amino acids, 325 gm dextrose, 2.5 mL trace elements     PN Rate: 80 mL/hr   Glucose Infusion Rate: 4.57 mg/kg/min    Osmolarity: 1435 mOsm   Parenteral Nutrition Access Device: PICC placed 11/1/18  Indication for PN: unable to tolerate oral diet following GI surgery with malnutrition   Refeeding Risk:      [x]  Yes (risk diminished, pt receiving dextrose infusion since 10/30) [] No     NUTRITION DIAGNOSIS & INTERVENTIONS:     [x] Parenteral nutrition: continue   [x] Collaboration and referral of nutrition care: MD to review PN order and note daily; will not call to verify content and changes, per MD request.    Nutrition Diagnosis: Inadequate nutrient intake related to altered GI function and inability to consume oral diet as evidenced by pt NPO with possible ileus after GI surgery.      Patient meets criteria for Severe Protein Calorie Malnutrition as evidenced by:   ASPEN Malnutrition Criteria  Acute Illness, Chronic Illness, or Social/Enviornmental: Acute illness  Energy Intake: Less than/equal to 50% est energy req for greater than/equal to 5 days  Body Fat: Moderate(orbital, upper arm, thoracic and lumbar regions)  Muscle Mass: Moderate(moderate-hand, scapula; severe- temple, clavicle, shoulders.)  ASPEN Malnutrition Score - Acute Illness: 18  Acute Illness - Malnutrition Diagnosis: Severe malnutrition  Weight Loss: Greater than 5% x 1 mo  Body Fat: Mild(orbital, thoracic regions)  Muscle Mass: Mild(temple, clavicle, trapezius regions)  ASPEN Malnutrition Score - Chronic Illness: 8  Chronic Illness - Malnutrition Diagnosis: Moderate malnutrition. ASSESSMENT:     11/3: Ileus per CT, no BM and NGT remains in place to suction. Ionized calcium WNL today. 11/2: Recent BG elevated 161-183 mg/dL and receiving steroids, remains NPO with moderate dark green/brown output from NGT and no findings of abscess per CT. Symptoms improving and OOB, ambulating but no bowel function. RD completed NFPE.   11/1: Remains NPO with plan for PICC placement to start PN per MD, awaiting return of bowel function and CT abdomen/pelvis ordered. 10/30: In pain today. Episode of nausea last night, resolved with medication. One episode of flatus. Some abdominal distension. Pt weighed on standing scale yesterday, now with weight gain; question accuracy of weights and possible fluid masking weight change. 10/29: Readmitted with n/v after recent colectomy on 10/23 for cecal cancer. NGT placed to suction, minimal output noted, however pt's son report canister was emptied last night and pt report a lot of output yesterday. Pt weaker, with significant weight loss noted since last admission weight. Plan to obtain standing weight when appropriate to verify current weight. Pt continues to have muscle and fat loss, currently meets criteria for moderate malnutrition, but has had <50% of energy intake x 3 days, pt at high risk for becoming severely malnourished.      PN infusion adequate to meet patients estimated nutritional needs:   [] Yes     [x] No   [] Unable to determine at this time    Diet: DIET NPO  TPN ADULT - CENTRAL  TPN ADULT - CENTRAL      Food Allergies: shellfish   Current Appetite:   [] Good     [] Fair     [] Poor     [x] Other: NPO x 4 days on admission prior to starting PN and first PN bag met <50% of energy needs   Appetite/meal intake prior to admission:   [] Good     [] Fair     [] Poor     [x] Other: Good appetite, but poor-fair intake after surgery  Feeding Limitations:  [] Swallowing difficulty    [] Chewing difficulty    [] Other:  Current Meal Intake:   Patient Vitals for the past 100 hrs:   % Diet Eaten   11/03/18 1756 0 %   11/03/18 1032 0 %     NGT to intermittent suction, output x last 24 hours: 900 mL  BM: 10/27 (PTA)   Skin Integrity: abdominal surgical incision   Edema:  [x] No     [] Yes   Pertinent Medications: Reviewed: zofran, steroid, SSI     Labs: BMP, MG, Phos ordered daily; CMP, Triglyceride, Prealbumin ordered initially and weekly  Recent Labs     11/04/18  0249 11/03/18  0140 11/02/18  0220    141 135*   K 3.5 3.5 3.8    101 99*   CO2 31 32 32   * 140* 177*   BUN 19* 20* 10   CREA 0.40* 0.43* 0.48*   CA 7.9* 8.1* 7.8*   MG 1.9 2.1 2.1   PHOS 2.8 2.8 2.7     Prealbumin: 7.6 mg/dL (11/2/18)  Triglyceride: 84 mg/dL (11/2/18)  Ionized Calcium: 1.13 mmol/L (11/3/18), 1.09 mmol/L (11/2/18)   Recent Labs     11/04/18  0249 11/03/18  0140 11/02/18  0220   WBC 9.6 10.9 12.2   HGB 9.3* 9.4* 10.3*   HCT 27.9* 27.7* 30.7*    297 283         Intake/Output Summary (Last 24 hours) at 11/4/2018 1153  Last data filed at 11/4/2018 0434  Gross per 24 hour   Intake 794 ml   Output 1300 ml   Net -506 ml       Anthropometrics:   Ht Readings from Last 1 Encounters:   10/28/18 5' 6\" (1.676 m)       Last 3 Recorded Weights in this Encounter    11/01/18 0716 11/02/18 0907 11/03/18 5345   Weight: 58.2 kg (128 lb 3.2 oz) 58.9 kg (129 lb 12.8 oz) 49.4 kg (109 lb)      Body mass index is 17.59 kg/m².      Weight History:  -11 lbs (9%) x 5 days, -16 lbs (13%) x 1 month   Weight Metrics 11/3/2018 10/23/2018 10/4/2018 9/27/2018 9/23/2018 8/28/2018 8/16/2018   Weight 109 lb 119 lb 118 lb 124 lb 117 lb 116 lb 122 lb 4.8 oz   BMI 17.59 kg/m2 19.21 kg/m2 19.05 kg/m2 20.01 kg/m2 18.88 kg/m2 19.3 kg/m2 20.35 kg/m2          Admitting Diagnosis: Obstruction of bowel (HCC)  Post-op pain  Ileus following gastrointestinal surgery  Pertinent PMHx: COPD, depression, nausea & vomiting, osteoporosis, vitamin D deficiency    Education Needs:        [x] None identified  [] Identified - Not appropriate at this time  []  Identified and addressed - refer to education log  Learning Limitations:   [x] None identified  [] Identified    Cultural, Christianity & ethnic food preferences:  [x] None identified    [] Identified and addressed     ESTIMATED NUTRITION NEEDS:     Calories: 9426-5119 kcal (HBEx1.2-1.4) based on   [] Actual BW      [x] IBW: 59 kg  Protein: 59-77 gm (1-1.3 gm/kg) based on   [] Actual BW      [x] IBW:   Fluid: 1 mL/kcal     MONITORING & EVALUATION:     Nutrition Goals:   1. Patient will receive nutrition via PN until they are able to tolerate adequate po intake/enteral nutrition. Outcome:  [x] Met    []  Not Met    [] New Goal  2. Patient will tolerate advancement of macronutrients towards meeting estimated nutrition needs within the next 7 days. Outcome:  [] Met    [x]  Not Met    [] New Goal    Monitoring:  [x] Parenteral nutrition intake and administration  [x] Biochemical data, medical tests, and procedures   [x] Fluid intake   [x] Nutrition-focused physical findings   [x] Treatment/therapy   [] Food and beverage intake   [] Diet order      [] Weight        Previous Recommendations (for follow-up assessments only):     [x]   Implemented       []   Not Implemented (RD to address)      [] No Longer Appropriate     [] No Recommendation Made        Discharge Planning: Nutrition recommendations pending patient's ability to tolerate po intake/enteral nutrition.    [x]  Participated in care planning, discharge planning, & interdisciplinary rounds as appropriate      Gearline Steve Romo, [address] [place]   Pager: [phone]

## 2018-11-04 NOTE — PROGRESS NOTES
1945  Received pt in stable condition,   General: lying in bed in supine, not apparent distress   Neuro: AOx4  Cardio: denies chest pain  Respiratory: denies shortness of breath  Skin: Steri strip to lower abdomen clean, dry and intact  GI: voiding  : NPO; hypoactive BS, denies nausea and vomiting; NGT (continous moderate suction)  Mus: ambulates w/ minimal assistance  Bed in low position and call bell within reach.

## 2018-11-04 NOTE — PROGRESS NOTES
Surgery  Feels ok, feels like she is going to have a BM  AF VSS  NG 900cc  abd soft min tender  Labs normal  Post op ileus vs SBO,  stable

## 2018-11-05 ENCOUNTER — APPOINTMENT (OUTPATIENT)
Dept: GENERAL RADIOLOGY | Age: 73
DRG: 329 | End: 2018-11-05
Attending: COLON & RECTAL SURGERY
Payer: MEDICARE

## 2018-11-05 LAB
ANION GAP SERPL CALC-SCNC: 7 MMOL/L (ref 3–18)
BUN SERPL-MCNC: 16 MG/DL (ref 7–18)
BUN/CREAT SERPL: 46 (ref 12–20)
CALCIUM SERPL-MCNC: 8 MG/DL (ref 8.5–10.1)
CHLORIDE SERPL-SCNC: 101 MMOL/L (ref 100–108)
CO2 SERPL-SCNC: 32 MMOL/L (ref 21–32)
CREAT SERPL-MCNC: 0.35 MG/DL (ref 0.6–1.3)
ERYTHROCYTE [DISTWIDTH] IN BLOOD BY AUTOMATED COUNT: 14.2 % (ref 11.6–14.5)
GLUCOSE BLD STRIP.AUTO-MCNC: 106 MG/DL (ref 70–110)
GLUCOSE BLD STRIP.AUTO-MCNC: 141 MG/DL (ref 70–110)
GLUCOSE BLD STRIP.AUTO-MCNC: 150 MG/DL (ref 70–110)
GLUCOSE SERPL-MCNC: 125 MG/DL (ref 74–99)
HCT VFR BLD AUTO: 30.6 % (ref 35–45)
HGB BLD-MCNC: 10.1 G/DL (ref 12–16)
MAGNESIUM SERPL-MCNC: 1.8 MG/DL (ref 1.6–2.6)
MCH RBC QN AUTO: 32.7 PG (ref 24–34)
MCHC RBC AUTO-ENTMCNC: 33 G/DL (ref 31–37)
MCV RBC AUTO: 99 FL (ref 74–97)
PHOSPHATE SERPL-MCNC: 3.2 MG/DL (ref 2.5–4.9)
PLATELET # BLD AUTO: 385 K/UL (ref 135–420)
PMV BLD AUTO: 10.1 FL (ref 9.2–11.8)
POTASSIUM SERPL-SCNC: 3.6 MMOL/L (ref 3.5–5.5)
RBC # BLD AUTO: 3.09 M/UL (ref 4.2–5.3)
SODIUM SERPL-SCNC: 140 MMOL/L (ref 136–145)
WBC # BLD AUTO: 10.5 K/UL (ref 4.6–13.2)

## 2018-11-05 PROCEDURE — 74011636320 HC RX REV CODE- 636/320: Performed by: COLON & RECTAL SURGERY

## 2018-11-05 PROCEDURE — 84100 ASSAY OF PHOSPHORUS: CPT | Performed by: COLON & RECTAL SURGERY

## 2018-11-05 PROCEDURE — 74011000258 HC RX REV CODE- 258: Performed by: COLON & RECTAL SURGERY

## 2018-11-05 PROCEDURE — 85027 COMPLETE CBC AUTOMATED: CPT | Performed by: COLON & RECTAL SURGERY

## 2018-11-05 PROCEDURE — 74011636637 HC RX REV CODE- 636/637: Performed by: COLON & RECTAL SURGERY

## 2018-11-05 PROCEDURE — 74011000250 HC RX REV CODE- 250: Performed by: COLON & RECTAL SURGERY

## 2018-11-05 PROCEDURE — 65270000029 HC RM PRIVATE

## 2018-11-05 PROCEDURE — 74011250636 HC RX REV CODE- 250/636

## 2018-11-05 PROCEDURE — 80048 BASIC METABOLIC PNL TOTAL CA: CPT | Performed by: COLON & RECTAL SURGERY

## 2018-11-05 PROCEDURE — 36415 COLL VENOUS BLD VENIPUNCTURE: CPT | Performed by: COLON & RECTAL SURGERY

## 2018-11-05 PROCEDURE — 82962 GLUCOSE BLOOD TEST: CPT

## 2018-11-05 PROCEDURE — 94640 AIRWAY INHALATION TREATMENT: CPT

## 2018-11-05 PROCEDURE — 83735 ASSAY OF MAGNESIUM: CPT | Performed by: COLON & RECTAL SURGERY

## 2018-11-05 PROCEDURE — 74011250637 HC RX REV CODE- 250/637: Performed by: COLON & RECTAL SURGERY

## 2018-11-05 PROCEDURE — 74270 X-RAY XM COLON 1CNTRST STD: CPT

## 2018-11-05 PROCEDURE — 74011250636 HC RX REV CODE- 250/636: Performed by: COLON & RECTAL SURGERY

## 2018-11-05 PROCEDURE — 77010033678 HC OXYGEN DAILY: Performed by: COLON & RECTAL SURGERY

## 2018-11-05 RX ORDER — CEFAZOLIN SODIUM 2 G/50ML
2 SOLUTION INTRAVENOUS
Status: DISCONTINUED | OUTPATIENT
Start: 2018-11-05 | End: 2018-11-05

## 2018-11-05 RX ORDER — METRONIDAZOLE 500 MG/100ML
500 INJECTION, SOLUTION INTRAVENOUS
Status: DISCONTINUED | OUTPATIENT
Start: 2018-11-06 | End: 2018-11-06

## 2018-11-05 RX ORDER — CEFAZOLIN SODIUM 2 G/50ML
2 SOLUTION INTRAVENOUS
Status: DISCONTINUED | OUTPATIENT
Start: 2018-11-06 | End: 2018-11-06

## 2018-11-05 RX ORDER — METRONIDAZOLE 500 MG/100ML
500 INJECTION, SOLUTION INTRAVENOUS
Status: DISCONTINUED | OUTPATIENT
Start: 2018-11-05 | End: 2018-11-05

## 2018-11-05 RX ADMIN — ALBUTEROL SULFATE 2.5 MG: 2.5 SOLUTION RESPIRATORY (INHALATION) at 08:11

## 2018-11-05 RX ADMIN — HYDROMORPHONE HYDROCHLORIDE 1 MG: 2 INJECTION INTRAMUSCULAR; INTRAVENOUS; SUBCUTANEOUS at 23:28

## 2018-11-05 RX ADMIN — ROPINIROLE 1 MG: 1 TABLET, FILM COATED ORAL at 17:50

## 2018-11-05 RX ADMIN — SODIUM CHLORIDE: 234 INJECTION INTRAMUSCULAR; INTRAVENOUS; SUBCUTANEOUS at 20:08

## 2018-11-05 RX ADMIN — HYDROMORPHONE HYDROCHLORIDE 1 MG: 2 INJECTION INTRAMUSCULAR; INTRAVENOUS; SUBCUTANEOUS at 01:51

## 2018-11-05 RX ADMIN — HYDROCORTISONE SODIUM SUCCINATE 15 MG: 100 INJECTION, POWDER, FOR SOLUTION INTRAMUSCULAR; INTRAVENOUS at 13:11

## 2018-11-05 RX ADMIN — HYDROMORPHONE HYDROCHLORIDE 1 MG: 2 INJECTION INTRAMUSCULAR; INTRAVENOUS; SUBCUTANEOUS at 10:24

## 2018-11-05 RX ADMIN — Medication 10 ML: at 23:28

## 2018-11-05 RX ADMIN — HYDROCORTISONE SODIUM SUCCINATE 15 MG: 100 INJECTION, POWDER, FOR SOLUTION INTRAMUSCULAR; INTRAVENOUS at 21:12

## 2018-11-05 RX ADMIN — HYDROCORTISONE SODIUM SUCCINATE 15 MG: 100 INJECTION, POWDER, FOR SOLUTION INTRAMUSCULAR; INTRAVENOUS at 05:11

## 2018-11-05 RX ADMIN — ALBUTEROL SULFATE 2.5 MG: 2.5 SOLUTION RESPIRATORY (INHALATION) at 01:26

## 2018-11-05 RX ADMIN — LORAZEPAM 2 MG: 2 INJECTION INTRAMUSCULAR; INTRAVENOUS at 21:11

## 2018-11-05 RX ADMIN — HYDROMORPHONE HYDROCHLORIDE 1 MG: 2 INJECTION INTRAMUSCULAR; INTRAVENOUS; SUBCUTANEOUS at 18:50

## 2018-11-05 RX ADMIN — INSULIN LISPRO 2 UNITS: 100 INJECTION, SOLUTION INTRAVENOUS; SUBCUTANEOUS at 17:56

## 2018-11-05 RX ADMIN — Medication 10 ML: at 05:11

## 2018-11-05 RX ADMIN — ALBUTEROL SULFATE 2.5 MG: 2.5 SOLUTION RESPIRATORY (INHALATION) at 14:32

## 2018-11-05 RX ADMIN — ROPINIROLE 1 MG: 1 TABLET, FILM COATED ORAL at 08:53

## 2018-11-05 RX ADMIN — Medication 10 ML: at 13:12

## 2018-11-05 RX ADMIN — DIATRIZOATE MEGLUMINE AND DIATRIZOATE SODIUM 600 ML: 660; 100 LIQUID ORAL; RECTAL at 11:30

## 2018-11-05 RX ADMIN — HYDROMORPHONE HYDROCHLORIDE 1 MG: 2 INJECTION INTRAMUSCULAR; INTRAVENOUS; SUBCUTANEOUS at 14:36

## 2018-11-05 RX ADMIN — HYDROMORPHONE HYDROCHLORIDE 1 MG: 2 INJECTION INTRAMUSCULAR; INTRAVENOUS; SUBCUTANEOUS at 05:12

## 2018-11-05 NOTE — PROGRESS NOTES
Received pt in stable condition,   General: lying in bed in supine, not apparent distress   Neuro: AOx4  Cardio: denies chest pain  Respiratory: denies shortness of breath  Skin: Steri strip to lower abdomen clean, dry and intact  GI: voiding  : NPO; hypoactive BS, denies nausea and vomiting; NGT (continous moderate suction)  Mus: ambulates w/ minimal assistance  Bed in low position and call bell within reach.

## 2018-11-05 NOTE — PROGRESS NOTES
NUTRITION consult    Nursing Referral: [facility]  Nutrition Consult: TPN: RD to Manage     RECOMMENDATIONS / PLAN:     - Plan to provide parenteral nutrition support, monitor labs and adjust electrolytes daily. Check CMP. - Continue RD inpatient monitoring and evaluation. Please Note:   Parenteral nutrition support to be provided using custom product, allowing for modification of electrolyte and macronutrient content day to day. Calcium and magnesium will be left out due to component shortages and must be replaced outside of TPN as needed. Lipids and MVI included in PN on MWF. Macronutrient Goal: 71 gm amino acids, 325 gm dextrose, 250 mL lipids (1603 kcal weekly average). PARENTERAL NUTRITION ORDER:     Electrolyte Adjustments: K increased    Day 5 PN to provide: 125 mEq Na, 93 mEq K, 28 mmol Phos, 1889 kcal, 71 gm amino acids, 325 gm dextrose, 250 mL lipids, 10 mL MVI, 2.5 mL trace elements     PN Rate: 75 mL/hr   Glucose Infusion Rate: 5.04 mg/kg/min    Osmolarity: 1558 mOsm   Parenteral Nutrition Access Device: PICC placed 11/1/18  Indication for PN: unable to tolerate oral diet following GI surgery with malnutrition   Refeeding Risk:      [x]  Yes (risk diminished, pt receiving dextrose infusion since 10/30) [] No     NUTRITION DIAGNOSIS & INTERVENTIONS:     [x] Parenteral nutrition: continue   [x] Collaboration and referral of nutrition care: MD to review PN order and note daily; will not call to verify content and changes, per MD request.    Nutrition Diagnosis: Inadequate nutrient intake related to altered GI function and inability to consume oral diet as evidenced by pt NPO with possible ileus after GI surgery.      Patient meets criteria for Severe Protein Calorie Malnutrition as evidenced by:   ASPEN Malnutrition Criteria  Acute Illness, Chronic Illness, or Social/Enviornmental: Acute illness  Energy Intake: Less than/equal to 50% est energy req for greater than/equal to 5 days  Body Fat: Moderate(orbital, upper arm, thoracic and lumbar regions)  Muscle Mass: Moderate(moderate-hand, scapula; severe- temple, clavicle, shoulders.)  ASPEN Malnutrition Score - Acute Illness: 18  Acute Illness - Malnutrition Diagnosis: Severe malnutrition    ASSESSMENT:     11/5: Remains NPO with still no bowel function GGE to evaluate for anastomotic obstruction per MD.   11/3: Ileus per CT, no BM and NGT remains in place to suction. Ionized calcium WNL today. 11/2: Recent BG elevated 161-183 mg/dL and receiving steroids, remains NPO with moderate dark green/brown output from NGT and no findings of abscess per CT. Symptoms improving and OOB, ambulating but no bowel function. RD completed NFPE.   11/1: Remains NPO with plan for PICC placement to start PN per MD, awaiting return of bowel function and CT abdomen/pelvis ordered. 10/30: In pain today. Episode of nausea last night, resolved with medication. One episode of flatus. Some abdominal distension. Pt weighed on standing scale yesterday, now with weight gain; question accuracy of weights and possible fluid masking weight change. 10/29: Readmitted with n/v after recent colectomy on 10/23 for cecal cancer. NGT placed to suction, minimal output noted, however pt's son report canister was emptied last night and pt report a lot of output yesterday. Pt weaker, with significant weight loss noted since last admission weight. Plan to obtain standing weight when appropriate to verify current weight. Pt continues to have muscle and fat loss, currently meets criteria for moderate malnutrition, but has had <50% of energy intake x 3 days, pt at high risk for becoming severely malnourished.      PN infusion adequate to meet patients estimated nutritional needs:   [x] Yes     [] No   [] Unable to determine at this time    Diet: DIET NPO  TPN ADULT - CENTRAL  TPN ADULT - CENTRAL      Food Allergies: shellfish    Current Appetite:   [] Good     [] Fair     [] Poor     [x] Other: NPO x 4 days on admission prior to starting PN and first PN bag met <50% of energy needs   Appetite/meal intake prior to admission:   [] Good     [] Fair     [] Poor     [x] Other: Good appetite, but poor-fair intake after surgery  Feeding Limitations:  [] Swallowing difficulty    [] Chewing difficulty    [] Other:  Current Meal Intake:   Patient Vitals for the past 100 hrs:   % Diet Eaten   11/04/18 1934 0 %   11/04/18 1249 0 %   11/03/18 1756 0 %   11/03/18 1032 0 %     NGT to intermittent suction, output x last 24 hours: 600 mL  BM: 10/27 (PTA)   Skin Integrity: abdominal surgical incision   Edema:  [x] No     [] Yes   Pertinent Medications: Reviewed: zofran, steroid, SSI     Labs: BMP, MG, Phos ordered daily; CMP, Triglyceride, Prealbumin ordered initially and weekly  Recent Labs     11/05/18  0249 11/04/18  0249 11/03/18  0140    139 141   K 3.6 3.5 3.5    103 101   CO2 32 31 32   * 117* 140*   BUN 16 19* 20*   CREA 0.35* 0.40* 0.43*   CA 8.0* 7.9* 8.1*   MG 1.8 1.9 2.1   PHOS 3.2 2.8 2.8     Prealbumin: 7.6 mg/dL (11/2/18)  Triglyceride: 84 mg/dL (11/2/18)  Ionized Calcium: 1.13 mmol/L (11/3/18), 1.09 mmol/L (11/2/18)   Recent Labs     11/05/18  0249 11/04/18  0249 11/03/18  0140   WBC 10.5 9.6 10.9   HGB 10.1* 9.3* 9.4*   HCT 30.6* 27.9* 27.7*    330 297         Intake/Output Summary (Last 24 hours) at 11/5/2018 1225  Last data filed at 11/5/2018 0851  Gross per 24 hour   Intake 948.67 ml   Output 2425 ml   Net -1476.33 ml       Anthropometrics:   Ht Readings from Last 1 Encounters:   10/28/18 5' 6\" (1.676 m)       Last 3 Recorded Weights in this Encounter    11/02/18 0907 11/03/18 1715 11/05/18 0408   Weight: 58.9 kg (129 lb 12.8 oz) 49.4 kg (109 lb) 44.8 kg (98 lb 12.8 oz)      Body mass index is 15.95 kg/m².   Underweight     Weight History:  -11 lbs (9%) x 5 days, -16 lbs (13%) x 1 month   Weight Metrics 11/5/2018 10/23/2018 10/4/2018 9/27/2018 9/23/2018 8/28/2018 8/16/2018 Weight 98 lb 12.8 oz 119 lb 118 lb 124 lb 117 lb 116 lb 122 lb 4.8 oz   BMI 15.95 kg/m2 19.21 kg/m2 19.05 kg/m2 20.01 kg/m2 18.88 kg/m2 19.3 kg/m2 20.35 kg/m2          Admitting Diagnosis: Obstruction of bowel (HCC)  Post-op pain  Ileus following gastrointestinal surgery  Pertinent PMHx: COPD, depression, nausea & vomiting, osteoporosis, vitamin D deficiency    Education Needs:        [x] None identified  [] Identified - Not appropriate at this time  []  Identified and addressed - refer to education log  Learning Limitations:   [x] None identified  [] Identified    Cultural, Quaker & ethnic food preferences:  [x] None identified    [] Identified and addressed     ESTIMATED NUTRITION NEEDS:     Calories: 5698-4160 kcal (HBEx1.2-1.4) based on   [] Actual BW      [x] IBW: 59 kg  Protein: 59-77 gm (1-1.3 gm/kg) based on   [] Actual BW      [x] IBW:   Fluid: 1 mL/kcal     MONITORING & EVALUATION:     Nutrition Goals:   1. Patient will receive nutrition via PN until they are able to tolerate adequate po intake/enteral nutrition. Outcome:  [x] Met    []  Not Met    [] New Goal  2. Patient will tolerate advancement of macronutrients towards meeting estimated nutrition needs within the next 7 days. Outcome:  [x] Met    []  Not Met    [] New Goal    Monitoring:  [x] Parenteral nutrition intake and administration  [x] Biochemical data, medical tests, and procedures   [x] Fluid intake   [x] Nutrition-focused physical findings   [x] Treatment/therapy   [] Food and beverage intake   [] Diet order      [] Weight        Previous Recommendations (for follow-up assessments only):     [x]   Implemented       []   Not Implemented (RD to address)      [] No Longer Appropriate     [] No Recommendation Made        Discharge Planning: Nutrition recommendations pending patient's ability to tolerate po intake/enteral nutrition.    [x]  Participated in care planning, discharge planning, & interdisciplinary rounds as appropriate      Cayetano Meneses, 68 [address], [address] [place]   Pager: [phone]

## 2018-11-05 NOTE — PROGRESS NOTES
[facility]  [address] [place] [id number]  [phone]  Colon and Rectal Surgery Progress Note      Patient: Casper Villegas MRN: [id number]  SSN: [id number]    YOB: 1945  Age: 72 y.o. Sex: female      Admit Date: 10/28/2018    LOS: 8 days     Subjective:     No bowel function. Objective:     Vitals:    11/04/18 1644 11/04/18 2014 11/05/18 0126 11/05/18 0408   BP: 122/71 128/79  120/75   Pulse: 84 74  73   Resp: 17 18  16   Temp: 97.3 °F (36.3 °C) 98 °F (36.7 °C)  97.7 °F (36.5 °C)   SpO2: 98% 98% 99% 97%   Weight:    44.8 kg (98 lb 12.8 oz)   Height:            Intake and Output:  Current Shift: No intake/output data recorded.   Last three shifts: 11/03 1901 - 11/05 0700  In: 1712.7 [I.V.:1602.7]  Out: 3225 [Urine:2125]        Physical Exam:     abd soft, distended, non-tender    Lab/Data Review:    CMP:   Lab Results   Component Value Date/Time     11/05/2018 02:49 AM    K 3.6 11/05/2018 02:49 AM     11/05/2018 02:49 AM    CO2 32 11/05/2018 02:49 AM    AGAP 7 11/05/2018 02:49 AM     (H) 11/05/2018 02:49 AM    BUN 16 11/05/2018 02:49 AM    CREA 0.35 (L) 11/05/2018 02:49 AM    GFRAA >60 11/05/2018 02:49 AM    GFRNA >60 11/05/2018 02:49 AM    CA 8.0 (L) 11/05/2018 02:49 AM    MG 1.8 11/05/2018 02:49 AM    PHOS 3.2 11/05/2018 02:49 AM     CBC:   Lab Results   Component Value Date/Time    WBC 10.5 11/05/2018 02:49 AM    HGB 10.1 (L) 11/05/2018 02:49 AM    HCT 30.6 (L) 11/05/2018 02:49 AM     11/05/2018 02:49 AM        Assessment:     S/P robotic R colectomy, ileus    Plan:     Continue NG tube,  Severe protein calorie malnutrition: continue TPN  GGE study today to evaluate for anastomotic obstruction    Signed By: Juliane Roberts MD        November 5, 2018

## 2018-11-05 NOTE — PROGRESS NOTES
Bedside and Verbal shift change report given to Trevor Pratt RN (oncoming nurse) by Conrado Owens RN (offgoing nurse). Report included the following information SBAR, Kardex, ntake/Output and MAR.

## 2018-11-06 ENCOUNTER — ANESTHESIA EVENT (OUTPATIENT)
Dept: SURGERY | Age: 73
DRG: 329 | End: 2018-11-06
Payer: MEDICARE

## 2018-11-06 ENCOUNTER — ANESTHESIA (OUTPATIENT)
Dept: SURGERY | Age: 73
DRG: 329 | End: 2018-11-06
Payer: MEDICARE

## 2018-11-06 LAB
ABO + RH BLD: NORMAL
ALBUMIN SERPL-MCNC: 2.4 G/DL (ref 3.4–5)
ALBUMIN/GLOB SERPL: 0.8 {RATIO} (ref 0.8–1.7)
ALP SERPL-CCNC: 98 U/L (ref 45–117)
ALT SERPL-CCNC: 24 U/L (ref 13–56)
ANION GAP SERPL CALC-SCNC: 6 MMOL/L (ref 3–18)
APTT PPP: 34.5 SEC (ref 23–36.4)
AST SERPL-CCNC: 15 U/L (ref 15–37)
BILIRUB SERPL-MCNC: 0.4 MG/DL (ref 0.2–1)
BLOOD GROUP ANTIBODIES SERPL: NORMAL
BUN SERPL-MCNC: 17 MG/DL (ref 7–18)
BUN/CREAT SERPL: 46 (ref 12–20)
CALCIUM SERPL-MCNC: 7.9 MG/DL (ref 8.5–10.1)
CHLORIDE SERPL-SCNC: 100 MMOL/L (ref 100–108)
CO2 SERPL-SCNC: 34 MMOL/L (ref 21–32)
CREAT SERPL-MCNC: 0.37 MG/DL (ref 0.6–1.3)
ERYTHROCYTE [DISTWIDTH] IN BLOOD BY AUTOMATED COUNT: 14 % (ref 11.6–14.5)
GLOBULIN SER CALC-MCNC: 3.2 G/DL (ref 2–4)
GLUCOSE BLD STRIP.AUTO-MCNC: 136 MG/DL (ref 70–110)
GLUCOSE BLD STRIP.AUTO-MCNC: 139 MG/DL (ref 70–110)
GLUCOSE SERPL-MCNC: 135 MG/DL (ref 74–99)
HCT VFR BLD AUTO: 30.6 % (ref 35–45)
HGB BLD-MCNC: 10 G/DL (ref 12–16)
INR PPP: 1 (ref 0.8–1.2)
MAGNESIUM SERPL-MCNC: 1.7 MG/DL (ref 1.6–2.6)
MCH RBC QN AUTO: 32.2 PG (ref 24–34)
MCHC RBC AUTO-ENTMCNC: 32.7 G/DL (ref 31–37)
MCV RBC AUTO: 98.4 FL (ref 74–97)
PHOSPHATE SERPL-MCNC: 3.2 MG/DL (ref 2.5–4.9)
PLATELET # BLD AUTO: 394 K/UL (ref 135–420)
PMV BLD AUTO: 9.8 FL (ref 9.2–11.8)
POTASSIUM SERPL-SCNC: 3.6 MMOL/L (ref 3.5–5.5)
PROT SERPL-MCNC: 5.6 G/DL (ref 6.4–8.2)
PROTHROMBIN TIME: 13.1 SEC (ref 11.5–15.2)
RBC # BLD AUTO: 3.11 M/UL (ref 4.2–5.3)
SODIUM SERPL-SCNC: 140 MMOL/L (ref 136–145)
SPECIMEN EXP DATE BLD: NORMAL
WBC # BLD AUTO: 10.8 K/UL (ref 4.6–13.2)

## 2018-11-06 PROCEDURE — 76210000016 HC OR PH I REC 1 TO 1.5 HR: Performed by: COLON & RECTAL SURGERY

## 2018-11-06 PROCEDURE — 36592 COLLECT BLOOD FROM PICC: CPT

## 2018-11-06 PROCEDURE — 77030026438 HC STYL ET INTUB CARD -A: Performed by: ANESTHESIOLOGY

## 2018-11-06 PROCEDURE — 85610 PROTHROMBIN TIME: CPT | Performed by: COLON & RECTAL SURGERY

## 2018-11-06 PROCEDURE — 74011250636 HC RX REV CODE- 250/636: Performed by: COLON & RECTAL SURGERY

## 2018-11-06 PROCEDURE — 77030012012 HC AIRWY OP SFT TELE -A: Performed by: ANESTHESIOLOGY

## 2018-11-06 PROCEDURE — 74011250636 HC RX REV CODE- 250/636

## 2018-11-06 PROCEDURE — 77030025303 HC STPLR ENDOSC J&J -G: Performed by: COLON & RECTAL SURGERY

## 2018-11-06 PROCEDURE — 77030018836 HC SOL IRR NACL ICUM -A: Performed by: COLON & RECTAL SURGERY

## 2018-11-06 PROCEDURE — 77030020782 HC GWN BAIR PAWS FLX 3M -B: Performed by: COLON & RECTAL SURGERY

## 2018-11-06 PROCEDURE — 0DBL0ZZ EXCISION OF TRANSVERSE COLON, OPEN APPROACH: ICD-10-PCS | Performed by: COLON & RECTAL SURGERY

## 2018-11-06 PROCEDURE — 85730 THROMBOPLASTIN TIME PARTIAL: CPT | Performed by: COLON & RECTAL SURGERY

## 2018-11-06 PROCEDURE — 74011250637 HC RX REV CODE- 250/637: Performed by: COLON & RECTAL SURGERY

## 2018-11-06 PROCEDURE — 85027 COMPLETE CBC AUTOMATED: CPT | Performed by: COLON & RECTAL SURGERY

## 2018-11-06 PROCEDURE — 77030010541: Performed by: COLON & RECTAL SURGERY

## 2018-11-06 PROCEDURE — 74011000250 HC RX REV CODE- 250

## 2018-11-06 PROCEDURE — 88307 TISSUE EXAM BY PATHOLOGIST: CPT

## 2018-11-06 PROCEDURE — P9045 ALBUMIN (HUMAN), 5%, 250 ML: HCPCS

## 2018-11-06 PROCEDURE — 77030003028 HC SUT VCRL J&J -A: Performed by: COLON & RECTAL SURGERY

## 2018-11-06 PROCEDURE — 82962 GLUCOSE BLOOD TEST: CPT

## 2018-11-06 PROCEDURE — 83735 ASSAY OF MAGNESIUM: CPT | Performed by: COLON & RECTAL SURGERY

## 2018-11-06 PROCEDURE — 76010000132 HC OR TIME 2.5 TO 3 HR: Performed by: COLON & RECTAL SURGERY

## 2018-11-06 PROCEDURE — 77030013079 HC BLNKT BAIR HGGR 3M -A: Performed by: ANESTHESIOLOGY

## 2018-11-06 PROCEDURE — 84100 ASSAY OF PHOSPHORUS: CPT | Performed by: COLON & RECTAL SURGERY

## 2018-11-06 PROCEDURE — 94640 AIRWAY INHALATION TREATMENT: CPT

## 2018-11-06 PROCEDURE — 0DJD8ZZ INSPECTION OF LOWER INTESTINAL TRACT, VIA NATURAL OR ARTIFICIAL OPENING ENDOSCOPIC: ICD-10-PCS | Performed by: COLON & RECTAL SURGERY

## 2018-11-06 PROCEDURE — 74011250636 HC RX REV CODE- 250/636: Performed by: SURGERY

## 2018-11-06 PROCEDURE — 77010033678 HC OXYGEN DAILY

## 2018-11-06 PROCEDURE — 77030008683 HC TU ET CUF COVD -A: Performed by: ANESTHESIOLOGY

## 2018-11-06 PROCEDURE — 77030002966 HC SUT PDS J&J -A: Performed by: COLON & RECTAL SURGERY

## 2018-11-06 PROCEDURE — 74011000258 HC RX REV CODE- 258: Performed by: COLON & RECTAL SURGERY

## 2018-11-06 PROCEDURE — 94760 N-INVAS EAR/PLS OXIMETRY 1: CPT

## 2018-11-06 PROCEDURE — 77030031139 HC SUT VCRL2 J&J -A: Performed by: COLON & RECTAL SURGERY

## 2018-11-06 PROCEDURE — 0DBB0ZZ EXCISION OF ILEUM, OPEN APPROACH: ICD-10-PCS | Performed by: COLON & RECTAL SURGERY

## 2018-11-06 PROCEDURE — 88309 TISSUE EXAM BY PATHOLOGIST: CPT | Performed by: COLON & RECTAL SURGERY

## 2018-11-06 PROCEDURE — 77030016154: Performed by: COLON & RECTAL SURGERY

## 2018-11-06 PROCEDURE — 65270000029 HC RM PRIVATE

## 2018-11-06 PROCEDURE — 76060000036 HC ANESTHESIA 2.5 TO 3 HR: Performed by: COLON & RECTAL SURGERY

## 2018-11-06 PROCEDURE — 86900 BLOOD TYPING SEROLOGIC ABO: CPT | Performed by: COLON & RECTAL SURGERY

## 2018-11-06 PROCEDURE — 77030009978 HC RELD STPLR TCR J&J -B: Performed by: COLON & RECTAL SURGERY

## 2018-11-06 PROCEDURE — 0D1B0Z4 BYPASS ILEUM TO CUTANEOUS, OPEN APPROACH: ICD-10-PCS | Performed by: COLON & RECTAL SURGERY

## 2018-11-06 PROCEDURE — 74011000250 HC RX REV CODE- 250: Performed by: COLON & RECTAL SURGERY

## 2018-11-06 PROCEDURE — 80053 COMPREHEN METABOLIC PANEL: CPT | Performed by: COLON & RECTAL SURGERY

## 2018-11-06 PROCEDURE — C1765 ADHESION BARRIER: HCPCS | Performed by: COLON & RECTAL SURGERY

## 2018-11-06 RX ORDER — CEFAZOLIN SODIUM 2 G/50ML
2 SOLUTION INTRAVENOUS EVERY 8 HOURS
Status: DISCONTINUED | OUTPATIENT
Start: 2018-11-07 | End: 2018-11-09

## 2018-11-06 RX ORDER — PROPOFOL 10 MG/ML
INJECTION, EMULSION INTRAVENOUS AS NEEDED
Status: DISCONTINUED | OUTPATIENT
Start: 2018-11-06 | End: 2018-11-06 | Stop reason: HOSPADM

## 2018-11-06 RX ORDER — KETAMINE HYDROCHLORIDE 50 MG/ML
50 INJECTION, SOLUTION INTRAMUSCULAR; INTRAVENOUS ONCE
Status: DISPENSED | OUTPATIENT
Start: 2018-11-06 | End: 2018-11-07

## 2018-11-06 RX ORDER — LIDOCAINE HYDROCHLORIDE 20 MG/ML
INJECTION, SOLUTION EPIDURAL; INFILTRATION; INTRACAUDAL; PERINEURAL AS NEEDED
Status: DISCONTINUED | OUTPATIENT
Start: 2018-11-06 | End: 2018-11-06 | Stop reason: HOSPADM

## 2018-11-06 RX ORDER — ESMOLOL HYDROCHLORIDE 10 MG/ML
INJECTION INTRAVENOUS AS NEEDED
Status: DISCONTINUED | OUTPATIENT
Start: 2018-11-06 | End: 2018-11-06 | Stop reason: HOSPADM

## 2018-11-06 RX ORDER — SODIUM CHLORIDE 0.9 % (FLUSH) 0.9 %
5-10 SYRINGE (ML) INJECTION AS NEEDED
Status: DISCONTINUED | OUTPATIENT
Start: 2018-11-06 | End: 2018-11-06 | Stop reason: HOSPADM

## 2018-11-06 RX ORDER — FENTANYL CITRATE 50 UG/ML
INJECTION, SOLUTION INTRAMUSCULAR; INTRAVENOUS
Status: COMPLETED
Start: 2018-11-06 | End: 2018-11-06

## 2018-11-06 RX ORDER — INSULIN LISPRO 100 [IU]/ML
INJECTION, SOLUTION INTRAVENOUS; SUBCUTANEOUS ONCE
Status: DISCONTINUED | OUTPATIENT
Start: 2018-11-06 | End: 2018-11-06 | Stop reason: HOSPADM

## 2018-11-06 RX ORDER — MAGNESIUM SULFATE 100 %
4 CRYSTALS MISCELLANEOUS AS NEEDED
Status: DISCONTINUED | OUTPATIENT
Start: 2018-11-06 | End: 2018-11-06 | Stop reason: HOSPADM

## 2018-11-06 RX ORDER — ONDANSETRON 2 MG/ML
INJECTION INTRAMUSCULAR; INTRAVENOUS AS NEEDED
Status: DISCONTINUED | OUTPATIENT
Start: 2018-11-06 | End: 2018-11-06 | Stop reason: HOSPADM

## 2018-11-06 RX ORDER — FENTANYL CITRATE 50 UG/ML
50 INJECTION, SOLUTION INTRAMUSCULAR; INTRAVENOUS
Status: COMPLETED | OUTPATIENT
Start: 2018-11-06 | End: 2018-11-06

## 2018-11-06 RX ORDER — CEFAZOLIN SODIUM 2 G/50ML
2 SOLUTION INTRAVENOUS
Status: COMPLETED | OUTPATIENT
Start: 2018-11-06 | End: 2018-11-06

## 2018-11-06 RX ORDER — VECURONIUM BROMIDE FOR INJECTION 1 MG/ML
INJECTION, POWDER, LYOPHILIZED, FOR SOLUTION INTRAVENOUS AS NEEDED
Status: DISCONTINUED | OUTPATIENT
Start: 2018-11-06 | End: 2018-11-06 | Stop reason: HOSPADM

## 2018-11-06 RX ORDER — KETAMINE HYDROCHLORIDE 50 MG/ML
INJECTION, SOLUTION INTRAMUSCULAR; INTRAVENOUS AS NEEDED
Status: DISCONTINUED | OUTPATIENT
Start: 2018-11-06 | End: 2018-11-06 | Stop reason: HOSPADM

## 2018-11-06 RX ORDER — SUCCINYLCHOLINE CHLORIDE 20 MG/ML
INJECTION INTRAMUSCULAR; INTRAVENOUS AS NEEDED
Status: DISCONTINUED | OUTPATIENT
Start: 2018-11-06 | End: 2018-11-06 | Stop reason: HOSPADM

## 2018-11-06 RX ORDER — HYDROMORPHONE HYDROCHLORIDE 2 MG/ML
0.5 INJECTION, SOLUTION INTRAMUSCULAR; INTRAVENOUS; SUBCUTANEOUS
Status: DISCONTINUED | OUTPATIENT
Start: 2018-11-06 | End: 2018-11-07

## 2018-11-06 RX ORDER — ALBUMIN HUMAN 50 G/1000ML
SOLUTION INTRAVENOUS AS NEEDED
Status: DISCONTINUED | OUTPATIENT
Start: 2018-11-06 | End: 2018-11-06 | Stop reason: HOSPADM

## 2018-11-06 RX ORDER — METRONIDAZOLE 500 MG/100ML
500 INJECTION, SOLUTION INTRAVENOUS EVERY 8 HOURS
Status: DISCONTINUED | OUTPATIENT
Start: 2018-11-07 | End: 2018-11-09

## 2018-11-06 RX ORDER — SODIUM CHLORIDE, SODIUM LACTATE, POTASSIUM CHLORIDE, CALCIUM CHLORIDE 600; 310; 30; 20 MG/100ML; MG/100ML; MG/100ML; MG/100ML
INJECTION, SOLUTION INTRAVENOUS
Status: DISCONTINUED | OUTPATIENT
Start: 2018-11-06 | End: 2018-11-06 | Stop reason: HOSPADM

## 2018-11-06 RX ORDER — EPHEDRINE SULFATE/0.9% NACL/PF 25 MG/5 ML
SYRINGE (ML) INTRAVENOUS AS NEEDED
Status: DISCONTINUED | OUTPATIENT
Start: 2018-11-06 | End: 2018-11-06 | Stop reason: HOSPADM

## 2018-11-06 RX ORDER — MAGNESIUM SULFATE 1 G/100ML
1 INJECTION INTRAVENOUS ONCE
Status: COMPLETED | OUTPATIENT
Start: 2018-11-06 | End: 2018-11-06

## 2018-11-06 RX ORDER — ONDANSETRON 2 MG/ML
4 INJECTION INTRAMUSCULAR; INTRAVENOUS ONCE
Status: DISCONTINUED | OUTPATIENT
Start: 2018-11-06 | End: 2018-11-06 | Stop reason: HOSPADM

## 2018-11-06 RX ORDER — MIDAZOLAM HYDROCHLORIDE 1 MG/ML
INJECTION, SOLUTION INTRAMUSCULAR; INTRAVENOUS AS NEEDED
Status: DISCONTINUED | OUTPATIENT
Start: 2018-11-06 | End: 2018-11-06 | Stop reason: HOSPADM

## 2018-11-06 RX ORDER — FENTANYL CITRATE 50 UG/ML
INJECTION, SOLUTION INTRAMUSCULAR; INTRAVENOUS AS NEEDED
Status: DISCONTINUED | OUTPATIENT
Start: 2018-11-06 | End: 2018-11-06 | Stop reason: HOSPADM

## 2018-11-06 RX ORDER — DEXTROSE 50 % IN WATER (D50W) INTRAVENOUS SYRINGE
25-50 AS NEEDED
Status: DISCONTINUED | OUTPATIENT
Start: 2018-11-06 | End: 2018-11-06 | Stop reason: HOSPADM

## 2018-11-06 RX ORDER — METRONIDAZOLE 500 MG/100ML
500 INJECTION, SOLUTION INTRAVENOUS
Status: DISCONTINUED | OUTPATIENT
Start: 2018-11-06 | End: 2018-11-06

## 2018-11-06 RX ADMIN — ALBUTEROL SULFATE 2.5 MG: 2.5 SOLUTION RESPIRATORY (INHALATION) at 13:50

## 2018-11-06 RX ADMIN — HYDROCORTISONE SODIUM SUCCINATE 15 MG: 100 INJECTION, POWDER, FOR SOLUTION INTRAMUSCULAR; INTRAVENOUS at 14:04

## 2018-11-06 RX ADMIN — CEFAZOLIN SODIUM 2 G: 2 SOLUTION INTRAVENOUS at 16:49

## 2018-11-06 RX ADMIN — MIDAZOLAM HYDROCHLORIDE 1 MG: 1 INJECTION, SOLUTION INTRAMUSCULAR; INTRAVENOUS at 15:45

## 2018-11-06 RX ADMIN — ALBUTEROL SULFATE 2.5 MG: 2.5 SOLUTION RESPIRATORY (INHALATION) at 07:57

## 2018-11-06 RX ADMIN — KETAMINE HYDROCHLORIDE 10 MG: 50 INJECTION, SOLUTION INTRAMUSCULAR; INTRAVENOUS at 15:58

## 2018-11-06 RX ADMIN — SODIUM CHLORIDE, SODIUM LACTATE, POTASSIUM CHLORIDE, CALCIUM CHLORIDE: 600; 310; 30; 20 INJECTION, SOLUTION INTRAVENOUS at 15:41

## 2018-11-06 RX ADMIN — ROPINIROLE 1 MG: 1 TABLET, FILM COATED ORAL at 08:28

## 2018-11-06 RX ADMIN — KETAMINE HYDROCHLORIDE 10 MG: 50 INJECTION, SOLUTION INTRAMUSCULAR; INTRAVENOUS at 16:05

## 2018-11-06 RX ADMIN — FENTANYL CITRATE 50 MCG: 50 INJECTION, SOLUTION INTRAMUSCULAR; INTRAVENOUS at 16:36

## 2018-11-06 RX ADMIN — KETAMINE HYDROCHLORIDE 10 MG: 50 INJECTION, SOLUTION INTRAMUSCULAR; INTRAVENOUS at 16:15

## 2018-11-06 RX ADMIN — VECURONIUM BROMIDE FOR INJECTION 1 MG: 1 INJECTION, POWDER, LYOPHILIZED, FOR SOLUTION INTRAVENOUS at 17:40

## 2018-11-06 RX ADMIN — HYDROMORPHONE HYDROCHLORIDE 0.5 MG: 2 INJECTION INTRAMUSCULAR; INTRAVENOUS; SUBCUTANEOUS at 20:25

## 2018-11-06 RX ADMIN — Medication 10 ML: at 14:07

## 2018-11-06 RX ADMIN — LIDOCAINE HYDROCHLORIDE 60 MG: 20 INJECTION, SOLUTION EPIDURAL; INFILTRATION; INTRACAUDAL; PERINEURAL at 16:36

## 2018-11-06 RX ADMIN — LORAZEPAM 2 MG: 2 INJECTION INTRAMUSCULAR; INTRAVENOUS at 22:33

## 2018-11-06 RX ADMIN — ONDANSETRON 4 MG: 2 INJECTION INTRAMUSCULAR; INTRAVENOUS at 18:00

## 2018-11-06 RX ADMIN — Medication 10 ML: at 22:00

## 2018-11-06 RX ADMIN — ENOXAPARIN SODIUM 40 MG: 100 INJECTION SUBCUTANEOUS at 20:25

## 2018-11-06 RX ADMIN — MIDAZOLAM HYDROCHLORIDE 1 MG: 1 INJECTION, SOLUTION INTRAMUSCULAR; INTRAVENOUS at 15:51

## 2018-11-06 RX ADMIN — MAGNESIUM SULFATE HEPTAHYDRATE 1 G: 1 INJECTION, SOLUTION INTRAVENOUS at 14:07

## 2018-11-06 RX ADMIN — FENTANYL CITRATE 50 MCG: 50 INJECTION INTRAMUSCULAR; INTRAVENOUS at 18:50

## 2018-11-06 RX ADMIN — ESMOLOL HYDROCHLORIDE 10 MG: 10 INJECTION INTRAVENOUS at 16:03

## 2018-11-06 RX ADMIN — POTASSIUM CHLORIDE: 2 INJECTION, SOLUTION, CONCENTRATE INTRAVENOUS at 20:35

## 2018-11-06 RX ADMIN — FENTANYL CITRATE 50 MCG: 50 INJECTION, SOLUTION INTRAMUSCULAR; INTRAVENOUS at 17:23

## 2018-11-06 RX ADMIN — HYDROMORPHONE HYDROCHLORIDE 1 MG: 2 INJECTION INTRAMUSCULAR; INTRAVENOUS; SUBCUTANEOUS at 09:54

## 2018-11-06 RX ADMIN — BUDESONIDE AND FORMOTEROL FUMARATE DIHYDRATE 2 PUFF: 160; 4.5 AEROSOL RESPIRATORY (INHALATION) at 08:11

## 2018-11-06 RX ADMIN — ALBUMIN HUMAN 250 ML: 50 SOLUTION INTRAVENOUS at 17:03

## 2018-11-06 RX ADMIN — ONDANSETRON 4 MG: 2 INJECTION INTRAMUSCULAR; INTRAVENOUS at 19:20

## 2018-11-06 RX ADMIN — ALBUTEROL SULFATE 2.5 MG: 2.5 SOLUTION RESPIRATORY (INHALATION) at 01:48

## 2018-11-06 RX ADMIN — HYDROMORPHONE HYDROCHLORIDE 0.5 MG: 2 INJECTION INTRAMUSCULAR; INTRAVENOUS; SUBCUTANEOUS at 23:18

## 2018-11-06 RX ADMIN — KETAMINE HYDROCHLORIDE 10 MG: 50 INJECTION, SOLUTION INTRAMUSCULAR; INTRAVENOUS at 15:50

## 2018-11-06 RX ADMIN — HYDROCORTISONE SODIUM SUCCINATE 15 MG: 100 INJECTION, POWDER, FOR SOLUTION INTRAMUSCULAR; INTRAVENOUS at 22:28

## 2018-11-06 RX ADMIN — HYDROMORPHONE HYDROCHLORIDE 1 MG: 2 INJECTION INTRAMUSCULAR; INTRAVENOUS; SUBCUTANEOUS at 13:51

## 2018-11-06 RX ADMIN — HYDROCORTISONE SODIUM SUCCINATE 15 MG: 100 INJECTION, POWDER, FOR SOLUTION INTRAMUSCULAR; INTRAVENOUS at 05:20

## 2018-11-06 RX ADMIN — KETAMINE HYDROCHLORIDE 10 MG: 50 INJECTION, SOLUTION INTRAMUSCULAR; INTRAVENOUS at 15:46

## 2018-11-06 RX ADMIN — Medication 10 ML: at 05:20

## 2018-11-06 RX ADMIN — FENTANYL CITRATE 50 MCG: 50 INJECTION, SOLUTION INTRAMUSCULAR; INTRAVENOUS at 18:50

## 2018-11-06 RX ADMIN — PROPOFOL 100 MG: 10 INJECTION, EMULSION INTRAVENOUS at 16:36

## 2018-11-06 RX ADMIN — FENTANYL CITRATE 50 MCG: 50 INJECTION, SOLUTION INTRAMUSCULAR; INTRAVENOUS at 19:10

## 2018-11-06 RX ADMIN — VECURONIUM BROMIDE FOR INJECTION 1 MG: 1 INJECTION, POWDER, LYOPHILIZED, FOR SOLUTION INTRAVENOUS at 17:10

## 2018-11-06 RX ADMIN — Medication 10 MG: at 16:41

## 2018-11-06 RX ADMIN — SUCCINYLCHOLINE CHLORIDE 80 MG: 20 INJECTION INTRAMUSCULAR; INTRAVENOUS at 16:37

## 2018-11-06 RX ADMIN — ESMOLOL HYDROCHLORIDE 30 MG: 10 INJECTION INTRAVENOUS at 16:00

## 2018-11-06 RX ADMIN — VECURONIUM BROMIDE FOR INJECTION 3 MG: 1 INJECTION, POWDER, LYOPHILIZED, FOR SOLUTION INTRAVENOUS at 16:45

## 2018-11-06 RX ADMIN — HYDROMORPHONE HYDROCHLORIDE 1 MG: 2 INJECTION INTRAMUSCULAR; INTRAVENOUS; SUBCUTANEOUS at 05:20

## 2018-11-06 NOTE — PROGRESS NOTES
[facility]  [address] [place] [address]  [phone]  Colon and Rectal Surgery Progress Note      Patient: Boni Hamilton MRN: [id number]  SSN: [id number]    YOB: 1945  Age: 72 y.o. Sex: female      Admit Date: 10/28/2018    LOS: 9 days     Subjective:     Passed contrast, possibly small amount of stool and air. Objective:     Vitals:    11/05/18 1949 11/06/18 0155 11/06/18 0404 11/06/18 0410   BP: 128/78  125/74    Pulse: 78  82    Resp: 17  14    Temp: 99.4 °F (37.4 °C)  98.4 °F (36.9 °C)    SpO2: 96% 96% 96%    Weight:    47.6 kg (105 lb)   Height:            Intake and Output:  Current Shift: No intake/output data recorded.   Last three shifts: 11/04 1901 - 11/06 0700  In: 1173.7 [I.V.:1143.7]  Out: 3225 [JESEB:3185]        Physical Exam:     abd soft, less distended, non-tender    Lab/Data Review:    CMP:   Lab Results   Component Value Date/Time     11/06/2018 04:00 AM    K 3.6 11/06/2018 04:00 AM     11/06/2018 04:00 AM    CO2 34 (H) 11/06/2018 04:00 AM    AGAP 6 11/06/2018 04:00 AM     (H) 11/06/2018 04:00 AM    BUN 17 11/06/2018 04:00 AM    CREA 0.37 (L) 11/06/2018 04:00 AM    GFRAA >60 11/06/2018 04:00 AM    GFRNA >60 11/06/2018 04:00 AM    CA 7.9 (L) 11/06/2018 04:00 AM    MG 1.7 11/06/2018 04:00 AM    PHOS 3.2 11/06/2018 04:00 AM    ALB 2.4 (L) 11/06/2018 04:00 AM    TP 5.6 (L) 11/06/2018 04:00 AM    GLOB 3.2 11/06/2018 04:00 AM    AGRAT 0.8 11/06/2018 04:00 AM    SGOT 15 11/06/2018 04:00 AM    ALT 24 11/06/2018 04:00 AM     CBC:   Lab Results   Component Value Date/Time    WBC 10.8 11/06/2018 04:00 AM    HGB 10.0 (L) 11/06/2018 04:00 AM    HCT 30.6 (L) 11/06/2018 04:00 AM     11/06/2018 04:00 AM        Assessment:     S/P robotic R colectomy, ileus    Plan:     OR today  Will do colonoscopy first  If anastomotic obstruction observed, will proceed with laparotomy    Signed By: Lala Garcia MD        November 6, 2018

## 2018-11-06 NOTE — PROGRESS NOTES
NUTRITION consult    Nursing Referral: [facility]  Nutrition Consult: TPN: RD to Manage     RECOMMENDATIONS / PLAN:     - Plan to provide parenteral nutrition support, monitor labs and adjust electrolytes daily.  - Provide 1 gm Mg IV replacement. - Continue RD inpatient monitoring and evaluation. Please Note:   Parenteral nutrition support to be provided using custom product, allowing for modification of electrolyte and macronutrient content day to day. Calcium and magnesium will be left out due to component shortages and must be replaced outside of TPN as needed. Lipids and MVI included in PN on MWF. Macronutrient Goal: 71 gm amino acids, 325 gm dextrose, 250 mL lipids (1603 kcal weekly average). PARENTERAL NUTRITION ORDER:     Electrolyte Adjustments: Na increased, K increased  Additional Replacement today: 1 gm Mg     Day 6 PN to provide: 145 mEq Na, 103 mEq K, 28 mmol Phos, 1389 kcal, 71 gm amino acids, 325 gm dextrose, 2.5 mL trace elements     PN Rate: 75 mL/hr   Glucose Infusion Rate: 4.74 mg/kg/min    Osmolarity: 1573 mOsm   Parenteral Nutrition Access Device: PICC placed 11/1/18  Indication for PN: unable to tolerate oral diet following GI surgery with malnutrition   Refeeding Risk:      [x]  Yes (risk diminished, pt receiving dextrose infusion since 10/30) [] No     NUTRITION DIAGNOSIS & INTERVENTIONS:     [x] Parenteral nutrition: continue   [x] Vitamin and mineral supplement therapy: 1 gm Mg   [x] Collaboration and referral of nutrition care: MD to review PN order and note daily; will not call to verify content and changes, per MD request. Obtained verbal order for Mg replacement from MD    Nutrition Diagnosis: Inadequate nutrient intake related to altered GI function and inability to consume oral diet as evidenced by pt NPO with possible ileus after GI surgery.      Patient meets criteria for Severe Protein Calorie Malnutrition as evidenced by:   ASPEN Malnutrition Criteria  Acute Illness, Chronic Illness, or Social/Enviornmental: Acute illness  Energy Intake: Less than/equal to 50% est energy req for greater than/equal to 5 days  Body Fat: Moderate(orbital, upper arm, thoracic and lumbar regions)  Muscle Mass: Moderate(moderate-hand, scapula; severe- temple, clavicle, shoulders.)  ASPEN Malnutrition Score - Acute Illness: 18  Acute Illness - Malnutrition Diagnosis: Severe malnutrition    ASSESSMENT:     11/6: High grade obstruction at entero-colonic anastomosis per GGE yesterday and plan OR today for colonoscopy and possible laparotomy. Mg trending down over the past several days. 11/5: Remains NPO with still no bowel function GGE to evaluate for anastomotic obstruction per MD.   11/3: Ileus per CT, no BM and NGT remains in place to suction. Ionized calcium WNL today. 11/2: Recent BG elevated 161-183 mg/dL and receiving steroids, remains NPO with moderate dark green/brown output from NGT and no findings of abscess per CT. Symptoms improving and OOB, ambulating but no bowel function. RD completed NFPE.   11/1: Remains NPO with plan for PICC placement to start PN per MD, awaiting return of bowel function and CT abdomen/pelvis ordered. 10/30: In pain today. Episode of nausea last night, resolved with medication. One episode of flatus. Some abdominal distension. Pt weighed on standing scale yesterday, now with weight gain; question accuracy of weights and possible fluid masking weight change. 10/29: Readmitted with n/v after recent colectomy on 10/23 for cecal cancer. NGT placed to suction, minimal output noted, however pt's son report canister was emptied last night and pt report a lot of output yesterday. Pt weaker, with significant weight loss noted since last admission weight. Plan to obtain standing weight when appropriate to verify current weight.   Pt continues to have muscle and fat loss, currently meets criteria for moderate malnutrition, but has had <50% of energy intake x 3 days, pt at high risk for becoming severely malnourished.      PN infusion adequate to meet patients estimated nutritional needs:   [x] Yes     [] No   [] Unable to determine at this time    Diet: DIET NPO  TPN ADULT - CENTRAL  TPN ADULT - CENTRAL      Food Allergies: shellfish    Current Appetite:   [] Good     [] Fair     [] Poor     [x] Other: NPO x 4 days on admission prior to starting PN and first PN bag met <50% of energy needs   Appetite/meal intake prior to admission:   [] Good     [] Fair     [] Poor     [x] Other: Good appetite, but poor-fair intake after surgery  Feeding Limitations:  [] Swallowing difficulty    [] Chewing difficulty    [] Other:  Current Meal Intake:   Patient Vitals for the past 100 hrs:   % Diet Eaten   11/04/18 1934 0 %   11/04/18 1249 0 %   11/03/18 1756 0 %   11/03/18 1032 0 %     NGT to intermittent suction, output x last 24 hours: 800 mL  BM: 10/27 (PTA)   Skin Integrity: abdominal surgical incision    Edema:  [x] No     [] Yes   Pertinent Medications: Reviewed: zofran, steroid, SSI     Labs: BMP, MG, Phos ordered daily; CMP, Triglyceride, Prealbumin ordered initially and weekly  Recent Labs     11/06/18  0400 11/05/18  0249 11/04/18  0249    140 139   K 3.6 3.6 3.5    101 103   CO2 34* 32 31   * 125* 117*   BUN 17 16 19*   CREA 0.37* 0.35* 0.40*   CA 7.9* 8.0* 7.9*   MG 1.7 1.8 1.9   PHOS 3.2 3.2 2.8   ALB 2.4*  --   --    SGOT 15  --   --    ALT 24  --   --      Prealbumin: 7.6 mg/dL (11/2/18)  Triglyceride: 84 mg/dL (11/2/18)  Ionized Calcium: 1.13 mmol/L (11/3/18), 1.09 mmol/L (11/2/18)   Recent Labs     11/06/18  0400 11/05/18  0249 11/04/18  0249   WBC 10.8 10.5 9.6   HGB 10.0* 10.1* 9.3*   HCT 30.6* 30.6* 27.9*    385 330         Intake/Output Summary (Last 24 hours) at 11/6/2018 1111  Last data filed at 11/6/2018 0417  Gross per 24 hour   Intake 285 ml   Output 1400 ml   Net -1115 ml       Anthropometrics:   Ht Readings from Last 1 Encounters:   10/28/18 5' 6\" (1.676 m)       Last 3 Recorded Weights in this Encounter    11/03/18 1715 11/05/18 0408 11/06/18 0410   Weight: 49.4 kg (109 lb) 44.8 kg (98 lb 12.8 oz) 47.6 kg (105 lb)      Body mass index is 16.95 kg/m². Underweight     Weight History:  -11 lbs (9%) x 5 days, -16 lbs (13%) x 1 month   Weight Metrics 11/6/2018 10/23/2018 10/4/2018 9/27/2018 9/23/2018 8/28/2018 8/16/2018   Weight 105 lb 119 lb 118 lb 124 lb 117 lb 116 lb 122 lb 4.8 oz   BMI 16.95 kg/m2 19.21 kg/m2 19.05 kg/m2 20.01 kg/m2 18.88 kg/m2 19.3 kg/m2 20.35 kg/m2          Admitting Diagnosis: Obstruction of bowel (HCC)  Post-op pain  Ileus following gastrointestinal surgery  Pertinent PMHx: COPD, depression, nausea & vomiting, osteoporosis, vitamin D deficiency    Education Needs:        [x] None identified  [] Identified - Not appropriate at this time  []  Identified and addressed - refer to education log  Learning Limitations:   [x] None identified  [] Identified    Cultural, Buddhism & ethnic food preferences:  [x] None identified    [] Identified and addressed     ESTIMATED NUTRITION NEEDS:     Calories: 8727-4607 kcal (HBEx1.2-1.4) based on   [] Actual BW      [x] IBW: 59 kg  Protein: 59-77 gm (1-1.3 gm/kg) based on   [] Actual BW      [x] IBW:   Fluid: 1 mL/kcal     MONITORING & EVALUATION:     Nutrition Goals:   1. Patient will receive nutrition via PN until they are able to tolerate adequate po intake/enteral nutrition.  Outcome:  [x] Met    []  Not Met    [] New Goal    Monitoring:  [x] Parenteral nutrition intake and administration  [x] Biochemical data, medical tests, and procedures   [x] Fluid intake   [x] Nutrition-focused physical findings   [x] Treatment/therapy   [] Food and beverage intake   [] Diet order      [] Weight        Previous Recommendations (for follow-up assessments only):     [x]   Implemented       []   Not Implemented (RD to address)      [] No Longer Appropriate     [] No Recommendation Made        Discharge Planning: Nutrition recommendations pending patient's ability to tolerate po intake/enteral nutrition.    [x]  Participated in care planning, discharge planning, & interdisciplinary rounds as appropriate      Simeon Briceno, [address], [address] [place] [address]   Pager: [phone]

## 2018-11-06 NOTE — BRIEF OP NOTE
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, SBO  Postoperative Diagnosis: Contained anastomotic leak      Procedure(s):  LAPAROTOMY EXPLORATORY with resection of ileocolic anastomosis and end ileostomy  DIAGNOSTIC COLONOSCOPY    Surgeon(s) and Role:     Jessie Tavarez MD - Primary         Surgical Assistant: none    Surgical Staff:  Circ-1: Cameron Broad: Blima Goodell, RN  Endoscopy Technician-1: Zaire Dan  Scrub Tech-1: Augustina Larsen  Surg Asst-1: Danii Guerra  Surg Asst-Relief: Sin Coulter  Endoscopy RN-1: Elsie Kulkarni RN  Event Time In Time Out   Incision Start 11/06/2018 1549    Incision Close 11/06/2018 1824      Anesthesia: General   Estimated Blood Loss: 50 ml  Specimens:   ID Type Source Tests Collected by Time Destination   1 : IleoColonic Anastamosis and additional Ileum Preservative Ileum  Mj Chavez MD 11/6/2018 1801 Pathology      Findings: contained leak with anastomotic stenosis  Complications: none  Implants: * No implants in log *    [id number]

## 2018-11-06 NOTE — ROUTINE PROCESS
[facility] Report consisted of patients Situation, Background, Assessment and   Recommendations(SBAR). Information from the following report(s) SBAR, Kardex and MAR was reviewed with the offgoing nurse. Opportunity for questions and clarification was provided. Assessment completed and care assumed.

## 2018-11-06 NOTE — ANESTHESIA PREPROCEDURE EVALUATION
Anesthetic History     PONV          Review of Systems / Medical History  Patient summary reviewed and pertinent labs reviewed    Pulmonary    COPD: severe        Asthma : poorly controlled       Neuro/Psych   Within defined limits           Cardiovascular  Within defined limits                Exercise tolerance: >4 METS     GI/Hepatic/Renal  Within defined limits              Endo/Other  Within defined limits           Other Findings   Comments:                  Physical Exam    Airway  Mallampati: II  TM Distance: 4 - 6 cm  Neck ROM: normal range of motion   Mouth opening: Normal     Cardiovascular  Regular rate and rhythm,  S1 and S2 normal,  no murmur, click, rub, or gallop  Rhythm: regular  Rate: normal         Dental  No notable dental hx       Pulmonary        Wheezes:bilateral  Rales:bilateral  Prolonged expiration     Abdominal  GI exam deferred       Other Findings            Anesthetic Plan    ASA: 4  Anesthesia type: general and MAC          Induction: Intravenous  Anesthetic plan and risks discussed with: Patient      Surgeon would like to avoid GA for colonoscopy due to severe lung dz. I explained that the only way possible would be with the smallest dose of sedation. I am concerned for bowel obstruction and aspiration. If she requires a level of anesthesia that would involve loss of conciousness (loss of airway reflexes) she needs GETA.

## 2018-11-06 NOTE — ROUTINE PROCESS
Bedside and Verbal shift change report given to Jordyn Mistry RN (oncoming nurse) by Ciera Pritchard RN (offgoing nurse). Report included the following information SBAR, Kardex, MAR and Recent Results.     SITUATION:    Code Status: Full Code   Reason for Admission: Obstruction of bowel ([address]   Post-op pain   Ileus following gastrointestinal surgery    [facility] day: 5   Problem List:       Hospital Problems  Date Reviewed: 9/27/2018          Codes Class Noted POA    Post-op pain ICD-10-CM: G89.18  ICD-9-CM: 338.18  10/28/2018 Unknown        Obstruction of bowel ([address] ICD-10-CM: R65.415  ICD-9-CM: 560.9  10/28/2018 Unknown        Ileus following gastrointestinal surgery ICD-10-CM: K91.30  ICD-9-CM: 997.49, 560.1  10/28/2018 Unknown              BACKGROUND:    Past Medical History:   Past Medical History:   Diagnosis Date    Anxiety     Arthritis     Asbestosis ([address]     Asthma     Back problem     Baker's cyst     Balance problems     Chronic lung disease     Cigarette smoker     Clotting disorder (HCC)     COPD [facility]ch[facility] obstructive pulmonary disease[facility])     oxygen 2/liters    Depression     History of DVT (deep vein thrombosis)     Leg pain     Right    Lung disease     Nausea & vomiting     Osteoporosis     Restless leg syndrome     Spinal stenosis     Thromboembolus ([address]     Vitamin D deficiency          Patient taking anticoagulants yes    ASSESSMENT:    Changes in Assessment Throughout Shift: pre-op     Patient has Central Line: yes Reasons if yes: TPN   Patient has Morales Cath: no Reasons if yes:       Last Vitals:     Vitals:    11/05/18 1949 11/06/18 0155 11/06/18 0404 11/06/18 0410   BP: 128/78  125/74    Pulse: 78  82    Resp: 17  14    Temp: 99.4 °F (37.4 °C)  98.4 °F (36.9 °C)    SpO2: 96% 96% 96%    Weight:    47.6 kg (105 lb)   Height:            IV and DRAINS (will only show if present)   Nasogastric Tube 10/28/18-Site Assessment: Clean, dry, & intact  [REMOVED] Peripheral IV 10/30/18 Left Wrist-Site Assessment: Clean, dry, & intact  [REMOVED] Peripheral IV 10/28/18 Left Antecubital-Site Assessment: Clean, dry, & intact  [REMOVED] Peripheral IV 10/31/18 Posterior Arm-Site Assessment: Clean, dry, & intact  PICC Single Lumen 14/33/29 Basilic;Right-Site Assessment: Clean, dry, & intact  Peripheral IV 11/02/18 Left;Mid Forearm-Site Assessment: Clean, dry, & intact     WOUND (if present)   Wound Type:  none   Dressing present Dressing Present : Yes   Wound Concerns/Notes:  none     PAIN    Pain Assessment    Pain Intensity 1: 0 (11/06/18 0612)    Pain Location 1: Abdomen    Pain Intervention(s) 1: Medication (see MAR)    Patient Stated Pain Goal: 2  o Interventions for Pain:  See mar  o Intervention effective: yes  o Time of last intervention: see mar   o Reassessment Completed: yes      Last 3 Weights:  Last 3 Recorded Weights in this Encounter    11/03/18 1715 11/05/18 0408 11/06/18 0410   Weight: 49.4 kg (109 lb) 44.8 kg (98 lb 12.8 oz) 47.6 kg (105 lb)     Weight change: 2.812 kg (6 lb 3.2 oz)     INTAKE/OUPUT    Current Shift: 11/05 1901 - 11/06 0700  In: 285 [I.V.:285]  Out: 1400 [Urine:600]    Last three shifts: 11/04 0701 - 11/05 1900  In: 948.7 [I.V.:858.7]  Out: 2425 [Urine:1825]     LAB RESULTS     Recent Labs     11/06/18  0400 11/05/18  0249 11/04/18  0249   WBC 10.8 10.5 9.6   HGB 10.0* 10.1* 9.3*   HCT 30.6* 30.6* 27.9*    385 330        Recent Labs     11/06/18  0400 11/05/18  0249 11/04/18  0249    140 139   K 3.6 3.6 3.5   * 125* 117*   BUN 17 16 19*   CREA 0.37* 0.35* 0.40*   CA 7.9* 8.0* 7.9*   MG 1.7 1.8 1.9   INR 1.0  --   --        RECOMMENDATIONS AND DISCHARGE PLANNING     1. Pending tests/procedures/ Plan of Care or Other Needs: surg today     2. Discharge plan for patient and Needs/Barriers: home    3. Estimated Discharge Date: unknown Posted on Whiteboard in Patients Room: no      4.  The patient's care plan was reviewed with the oncoming nurse.       \"HEALS\" SAFETY CHECK      Fall Risk    Total Score: 4    Safety Measures: Safety Measures: Bed/Chair-Wheels locked, Bed in low position, Caregiver at bedside, Call light within reach, Gripper socks, Side rails X 3    A safety check occurred in the patient's room between off going nurse and oncoming nurse listed above. The safety check included the below items  Area Items   H  High Alert Medications - Verify all high alert medication drips (heparin, PCA, etc.)   E  Equipment - Suction is set up for ALL patients (with gloria)  - Red plugs utilized for all equipment (IV pumps, etc.)  - WOWs wiped down at end of shift.  - Room stocked with oxygen, suction, and other unit-specific supplies   A  Alarms - Bed alarm is set for fall risk patients  - Ensure chair alarm is in place and activated if patient is up in a chair   L  Lines - Check IV for any infiltration  - Morales bag is empty if patient has a Morales   - Tubing and IV bags are labeled   S  Safety   - Room is clean, patient is clean, and equipment is clean. - Hallways are clear from equipment besides carts. - Fall bracelet on for fall risk patients  - Ensure room is clear and free of clutter  - Suction is set up for ALL patients (with gloria)  - Hallways are clear from equipment besides carts.    - Isolation precautions followed, supplies available outside room, sign posted     Debbrah Fleischer, RN

## 2018-11-07 LAB
ANION GAP SERPL CALC-SCNC: 9 MMOL/L (ref 3–18)
BUN SERPL-MCNC: 22 MG/DL (ref 7–18)
BUN/CREAT SERPL: 55 (ref 12–20)
CALCIUM SERPL-MCNC: 7.9 MG/DL (ref 8.5–10.1)
CHLORIDE SERPL-SCNC: 100 MMOL/L (ref 100–108)
CO2 SERPL-SCNC: 32 MMOL/L (ref 21–32)
CREAT SERPL-MCNC: 0.4 MG/DL (ref 0.6–1.3)
ERYTHROCYTE [DISTWIDTH] IN BLOOD BY AUTOMATED COUNT: 14 % (ref 11.6–14.5)
GLUCOSE BLD STRIP.AUTO-MCNC: 139 MG/DL (ref 70–110)
GLUCOSE BLD STRIP.AUTO-MCNC: 140 MG/DL (ref 70–110)
GLUCOSE BLD STRIP.AUTO-MCNC: 142 MG/DL (ref 70–110)
GLUCOSE BLD STRIP.AUTO-MCNC: 154 MG/DL (ref 70–110)
GLUCOSE BLD STRIP.AUTO-MCNC: 154 MG/DL (ref 70–110)
GLUCOSE BLD STRIP.AUTO-MCNC: 158 MG/DL (ref 70–110)
GLUCOSE SERPL-MCNC: 148 MG/DL (ref 74–99)
HCT VFR BLD AUTO: 36.7 % (ref 35–45)
HGB BLD-MCNC: 12 G/DL (ref 12–16)
MAGNESIUM SERPL-MCNC: 1.7 MG/DL (ref 1.6–2.6)
MCH RBC QN AUTO: 32.3 PG (ref 24–34)
MCHC RBC AUTO-ENTMCNC: 32.7 G/DL (ref 31–37)
MCV RBC AUTO: 98.7 FL (ref 74–97)
PHOSPHATE SERPL-MCNC: 2.7 MG/DL (ref 2.5–4.9)
PLATELET # BLD AUTO: 450 K/UL (ref 135–420)
PMV BLD AUTO: 10.4 FL (ref 9.2–11.8)
POTASSIUM SERPL-SCNC: 3.8 MMOL/L (ref 3.5–5.5)
RBC # BLD AUTO: 3.72 M/UL (ref 4.2–5.3)
SODIUM SERPL-SCNC: 141 MMOL/L (ref 136–145)
WBC # BLD AUTO: 17.4 K/UL (ref 4.6–13.2)

## 2018-11-07 PROCEDURE — 65270000029 HC RM PRIVATE

## 2018-11-07 PROCEDURE — 74011000258 HC RX REV CODE- 258: Performed by: COLON & RECTAL SURGERY

## 2018-11-07 PROCEDURE — 83735 ASSAY OF MAGNESIUM: CPT | Performed by: COLON & RECTAL SURGERY

## 2018-11-07 PROCEDURE — 94640 AIRWAY INHALATION TREATMENT: CPT

## 2018-11-07 PROCEDURE — 74011000250 HC RX REV CODE- 250: Performed by: COLON & RECTAL SURGERY

## 2018-11-07 PROCEDURE — 80048 BASIC METABOLIC PNL TOTAL CA: CPT | Performed by: COLON & RECTAL SURGERY

## 2018-11-07 PROCEDURE — 84100 ASSAY OF PHOSPHORUS: CPT | Performed by: COLON & RECTAL SURGERY

## 2018-11-07 PROCEDURE — 74011250636 HC RX REV CODE- 250/636: Performed by: SURGERY

## 2018-11-07 PROCEDURE — 74011250637 HC RX REV CODE- 250/637: Performed by: COLON & RECTAL SURGERY

## 2018-11-07 PROCEDURE — 74011636637 HC RX REV CODE- 636/637: Performed by: COLON & RECTAL SURGERY

## 2018-11-07 PROCEDURE — 85027 COMPLETE CBC AUTOMATED: CPT | Performed by: COLON & RECTAL SURGERY

## 2018-11-07 PROCEDURE — 74011250636 HC RX REV CODE- 250/636: Performed by: COLON & RECTAL SURGERY

## 2018-11-07 RX ORDER — HYDROMORPHONE HYDROCHLORIDE 2 MG/ML
1 INJECTION, SOLUTION INTRAMUSCULAR; INTRAVENOUS; SUBCUTANEOUS
Status: DISCONTINUED | OUTPATIENT
Start: 2018-11-07 | End: 2018-11-09

## 2018-11-07 RX ORDER — HYDROMORPHONE HYDROCHLORIDE 2 MG/ML
0.5 INJECTION, SOLUTION INTRAMUSCULAR; INTRAVENOUS; SUBCUTANEOUS
Status: COMPLETED | OUTPATIENT
Start: 2018-11-07 | End: 2018-11-07

## 2018-11-07 RX ADMIN — HYDROCORTISONE SODIUM SUCCINATE 15 MG: 100 INJECTION, POWDER, FOR SOLUTION INTRAMUSCULAR; INTRAVENOUS at 22:00

## 2018-11-07 RX ADMIN — Medication 10 ML: at 22:06

## 2018-11-07 RX ADMIN — HYDROMORPHONE HYDROCHLORIDE 0.5 MG: 2 INJECTION INTRAMUSCULAR; INTRAVENOUS; SUBCUTANEOUS at 13:56

## 2018-11-07 RX ADMIN — METRONIDAZOLE 500 MG: 500 INJECTION, SOLUTION INTRAVENOUS at 00:49

## 2018-11-07 RX ADMIN — HYDROMORPHONE HYDROCHLORIDE 0.5 MG: 2 INJECTION INTRAMUSCULAR; INTRAVENOUS; SUBCUTANEOUS at 15:00

## 2018-11-07 RX ADMIN — ENOXAPARIN SODIUM 40 MG: 100 INJECTION SUBCUTANEOUS at 20:51

## 2018-11-07 RX ADMIN — METRONIDAZOLE 500 MG: 500 INJECTION, SOLUTION INTRAVENOUS at 18:16

## 2018-11-07 RX ADMIN — ALBUTEROL SULFATE 2.5 MG: 2.5 SOLUTION RESPIRATORY (INHALATION) at 07:59

## 2018-11-07 RX ADMIN — Medication 10 ML: at 14:00

## 2018-11-07 RX ADMIN — METRONIDAZOLE 500 MG: 500 INJECTION, SOLUTION INTRAVENOUS at 09:00

## 2018-11-07 RX ADMIN — CEFAZOLIN SODIUM 2 G: 2 SOLUTION INTRAVENOUS at 00:49

## 2018-11-07 RX ADMIN — HYDROMORPHONE HYDROCHLORIDE 1 MG: 2 INJECTION INTRAMUSCULAR; INTRAVENOUS; SUBCUTANEOUS at 18:11

## 2018-11-07 RX ADMIN — CEFAZOLIN SODIUM 2 G: 2 SOLUTION INTRAVENOUS at 09:00

## 2018-11-07 RX ADMIN — ONDANSETRON 4 MG: 2 INJECTION INTRAMUSCULAR; INTRAVENOUS at 07:47

## 2018-11-07 RX ADMIN — CEFAZOLIN SODIUM 2 G: 2 SOLUTION INTRAVENOUS at 18:16

## 2018-11-07 RX ADMIN — INSULIN LISPRO 2 UNITS: 100 INJECTION, SOLUTION INTRAVENOUS; SUBCUTANEOUS at 20:43

## 2018-11-07 RX ADMIN — HYDROMORPHONE HYDROCHLORIDE 0.5 MG: 2 INJECTION INTRAMUSCULAR; INTRAVENOUS; SUBCUTANEOUS at 05:20

## 2018-11-07 RX ADMIN — ROPINIROLE 1 MG: 1 TABLET, FILM COATED ORAL at 18:16

## 2018-11-07 RX ADMIN — POTASSIUM CHLORIDE: 2 INJECTION, SOLUTION, CONCENTRATE INTRAVENOUS at 20:57

## 2018-11-07 RX ADMIN — HYDROCORTISONE SODIUM SUCCINATE 15 MG: 100 INJECTION, POWDER, FOR SOLUTION INTRAMUSCULAR; INTRAVENOUS at 13:50

## 2018-11-07 RX ADMIN — HYDROCORTISONE SODIUM SUCCINATE 15 MG: 100 INJECTION, POWDER, FOR SOLUTION INTRAMUSCULAR; INTRAVENOUS at 05:20

## 2018-11-07 RX ADMIN — Medication 10 ML: at 06:00

## 2018-11-07 RX ADMIN — HYDROMORPHONE HYDROCHLORIDE 0.5 MG: 2 INJECTION INTRAMUSCULAR; INTRAVENOUS; SUBCUTANEOUS at 11:40

## 2018-11-07 RX ADMIN — HYDROMORPHONE HYDROCHLORIDE 0.5 MG: 2 INJECTION INTRAMUSCULAR; INTRAVENOUS; SUBCUTANEOUS at 08:49

## 2018-11-07 RX ADMIN — HYDROMORPHONE HYDROCHLORIDE 0.5 MG: 2 INJECTION, SOLUTION INTRAMUSCULAR; INTRAVENOUS; SUBCUTANEOUS at 15:00

## 2018-11-07 RX ADMIN — ROPINIROLE 1 MG: 1 TABLET, FILM COATED ORAL at 08:50

## 2018-11-07 RX ADMIN — BUDESONIDE AND FORMOTEROL FUMARATE DIHYDRATE 2 PUFF: 160; 4.5 AEROSOL RESPIRATORY (INHALATION) at 07:59

## 2018-11-07 RX ADMIN — HYDROMORPHONE HYDROCHLORIDE 1 MG: 2 INJECTION INTRAMUSCULAR; INTRAVENOUS; SUBCUTANEOUS at 21:52

## 2018-11-07 RX ADMIN — ALBUTEROL SULFATE 2.5 MG: 2.5 SOLUTION RESPIRATORY (INHALATION) at 21:28

## 2018-11-07 NOTE — PROGRESS NOTES
Problem: Pressure Injury - Risk of  Goal: *Prevention of pressure injury  Document Roman Scale and appropriate interventions in the flowsheet. Outcome: Progressing Towards Goal  Pressure Injury Interventions:  Sensory Interventions: Avoid rigorous massage over bony prominences    Moisture Interventions: Absorbent underpads    Activity Interventions: Pressure redistribution bed/mattress(bed type)    Mobility Interventions: PT/OT evaluation, Pressure redistribution bed/mattress (bed type)    Nutrition Interventions: Document food/fluid/supplement intake    Friction and Shear Interventions: HOB 30 degrees or less               Problem: Falls - Risk of  Goal: *Absence of Falls  Document Inmco Fall Risk and appropriate interventions in the flowsheet.   Outcome: Progressing Towards Goal  Fall Risk Interventions:  Mobility Interventions: Utilize walker, cane, or other assistive device, Patient to call before getting OOB    Mentation Interventions: Door open when patient unattended, Family/sitter at bedside    Medication Interventions: Teach patient to arise slowly, Patient to call before getting OOB    Elimination Interventions: Call light in reach    History of Falls Interventions: Door open when patient unattended

## 2018-11-07 NOTE — OP NOTES
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 POSTOPERATIVE DIAGNOSES:  Contained anastomotic leak. PROCEDURES PERFORMED:  Exploratory laparotomy with resection of ileocolonic anastomosis and end ileostomy, diagnostic colonoscopy, placement of Seprafilm. SURGEON:  Tato Fonseca MD    ANESTHESIA:  General.    ESTIMATED BLOOD LOSS:  50 mL. SPECIMENS REMOVED:  ileocolonic anastomosis and additional small-bowel pathology. COMPLICATIONS:  None. IMPLANTS:  Seprafilm. FINDINGS:  Contained perforation, contained anastomotic leak, anastomotic stenosis. ASSISTANTS:  None. INDICATIONS:  The patient is a 68-year-old woman who underwent a robotic right colectomy for adenocarcinoma of the cecum 2 weeks ago. The patient initially progressed well and was discharged. Returned to the hospital with what appeared to be an ileus or small-bowel obstruction. Two CT imaging studies did not show any evidence of inflammatory process or abscess. She continued to fail to progress. Gastrografin enema study was performed and was significant for what appeared to be an obstruction at the anastomosis. She was brought to the operating room for colonoscopy and, if there were abnormalities identified, exploration. I explained the risks of the procedure to the patient including bleeding, infection, injury to surrounding structures. I told her that if the anastomosis had not healed properly, we would likely give her an end ileostomy. The patient had multiple comorbidities including chronic oral prednisone therapy for severe COPD, smoking history and weight loss from her cancer. She understood all the risks, including all of the above and death, and wished to proceed.     DESCRIPTION OF PROCEDURE:  After the patient was properly identified in the holding area, brought to the operating room. She was placed in the left lateral decubitus position. Sedation was administered by Anesthesia. A colonoscope was placed per anus and advanced to the ileocolonic anastomosis. We were not able to intubate the ileum. There was an area of fibrosis and inflammation which we could not pass. It was not clear whether this was entry into the small intestine or a blind end. Nevertheless, as we could not identify the ileum at this point and there was evidence of infection, we decided to proceed with exploratory laparotomy. I discussed this with her family in the waiting room prior to proceeding. The patient was then placed supine on the operating room table. Morales catheter was placed. General anesthesia was administered. Abdomen was prepped and draped in the usual sterile fashion. We made a midline incision with a 10 blade, carried this down through subcutaneous tissues, incised the fascia and entered the abdominal cavity. There was no evidence of adhesive disease. A wound protector was placed. We explored the abdomen. We identified the anastomosis and brought this into the midline wound. There was an area of fibrinous tissue on the exterior portion of the anastomosis. This corresponded to an area of fibrinous adhesion in the right lower quadrant sidewall. This appeared to be a very small anastomotic leak that had walled itself off. We stapled across ileum and transverse colon proximal and distal to the anastomosis with single firings of a 75 mm blue load MARK stapler. Took the mesentery with 0 Vicryl ties and passed the anastomosis off the field. We opened this specimen at the bedside and it did appear that there was a leak of the anastomosis, which was small and had been walled off. This did appear to have significantly narrowed the ileal entry into the anastomosis as well.   We oversewed the transverse colon staple line, which was to be left as a blind end. This was placed in the center of the abdominal cavity during closure. We then created a skin disc excision in the right lower quadrant, excised subcutaneous fat, incised the abdominal wall fascia and brought the ileum through this ileostomy site without any twisting of the mesentery. We irrigated the abdominal cavity with copious amounts of warm normal saline. We ran the entirety of the intestine back to the ligament of Treitz. There were no other areas of inflammation or injury. We closed the midline wound after placing several sheets of Seprafilm with #1 PDS suture in running fashion. Subcutaneous tissues were irrigated. Skin incision was closed with skin staples. A towel was placed over the wound and then we matured the ileostomy in standard fashion with a 3-0 Vicryl suture. Following this, we placed a dry sterile dressing and tape over the midline wound and an ileostomy appliance. The patient tolerated the procedure well. All instrument, sponge and needle counts were correct at the end of the case x2. The patient awoke from anesthesia, was extubated and transported to the PACU in stable condition. MD Pawel Hernandez / Jr  D: 11/06/2018 18:43     T: 11/07/2018 07:58  JOB #: [id number]

## 2018-11-07 NOTE — ANESTHESIA POSTPROCEDURE EVALUATION
Procedure(s):  LAPAROTOMY EXPLORATORY  COLONOSCOPY.     Anesthesia Post Evaluation      Multimodal analgesia: multimodal analgesia used between 6 hours prior to anesthesia start to PACU discharge  Patient location during evaluation: bedside  Patient participation: complete - patient participated  Level of consciousness: awake  Pain management: adequate  Airway patency: patent  Anesthetic complications: no  Cardiovascular status: acceptable  Respiratory status: acceptable  Hydration status: acceptable        Visit Vitals  /65   Pulse 82   Temp 36.2 °C (97.1 °F)   Resp 13   Ht 5' 6\" (1.676 m)   Wt 47.6 kg (105 lb)   SpO2 90%   BMI 16.95 kg/m²

## 2018-11-07 NOTE — PROGRESS NOTES
Reviewed chart. Met with pt and pt's sister and brother with consent. and discussed discharge plan. Pt is adamant about returning home with family. Pt stated \" I'm going home with my family. \" CM will continue to monitor for transitional needs.  Imani Arechiga BSN, [phone]

## 2018-11-07 NOTE — ROUTINE PROCESS
2010 Patient received from PACU via stretcher drowsy but easily aroused. No respiratory distress noted O2sat 91-92% in 3 liter NC. NGT to CLWS, draining light green gastric fluids, flushed with 30 ml of water. TPN infusing well. Mid line incision dressing dry and clean, Ileostomy to right quadrant intact draining       . Patient medicated with dilaudid 0.5 mg for cramping pain , denies N/V. Will continues to monitor. 2305 Patient Turned in her left side ,gave dilaudid 0.5mg for incisional pain.

## 2018-11-07 NOTE — PROGRESS NOTES
NUTRITION consult    Nursing Referral: [facility]  Nutrition Consult: TPN: RD to Manage     RECOMMENDATIONS / PLAN:     - Plan to provide parenteral nutrition support, monitor labs and adjust electrolytes daily.  - Recommend replacing magnesium.   - Monitor GI symptoms and readiness for diet initiation.  - Continue RD inpatient monitoring and evaluation. Please Note:   Parenteral nutrition support to be provided using custom product, allowing for modification of electrolyte and macronutrient content day to day. Calcium and magnesium will be left out due to component shortages and must be replaced outside of TPN as needed. Lipids and MVI included in PN on MWF. Macronutrient Goal: 71 gm amino acids, 325 gm dextrose, 250 mL lipids (1603 kcal weekly average). PARENTERAL NUTRITION ORDER:     Electrolyte Adjustments: Na decreased, K decreased, Phos increased     Day 7 PN to provide: 140 mEq Na, 94 mEq K, 30 mmol Phos, 1889 kcal, 71 gm amino acids, 325 gm dextrose, 250 mL lipids, 10 mL MVI, 2.5 mL trace elements     PN Rate: 85 mL/hr   Glucose Infusion Rate: 4.74 mg/kg/min    Osmolarity: 1390 mOsm   Parenteral Nutrition Access Device: PICC placed 11/1/18  Indication for PN: unable to tolerate oral diet following GI surgery with malnutrition   Refeeding Risk:      [x]  Yes (risk diminished, pt receiving dextrose infusion since 10/30) [] No     NUTRITION DIAGNOSIS & INTERVENTIONS:     [x] Parenteral nutrition: continue   [x] Vitamin and mineral supplement therapy: recommended  [x] Collaboration and referral of nutrition care: MD to review PN order and note daily; will not call to verify content and changes, per MD request.    Nutrition Diagnosis: Inadequate nutrient intake related to altered GI function and inability to consume oral diet as evidenced by pt NPO with possible ileus after GI surgery s/p ex lap and resection of ileocolonic anastomosis.      Patient meets criteria for Severe Protein Calorie Malnutrition as evidenced by:   ASPEN Malnutrition Criteria  Acute Illness, Chronic Illness, or Social/Enviornmental: Acute illness  Energy Intake: Less than/equal to 50% est energy req for greater than/equal to 5 days  Body Fat: Moderate(orbital, upper arm, thoracic and lumbar regions)  Muscle Mass: Moderate(moderate-hand, scapula; severe- temple, clavicle, shoulders.)  ASPEN Malnutrition Score - Acute Illness: 18  Acute Illness - Malnutrition Diagnosis: Severe malnutrition    ASSESSMENT:     11/7: S/p ex lap with resection of ileocolonic anastomosis and end ileosotomy yesterday. Drowsy this morning, treated with zofran for nausea with c/o of abdominal pain, +ileostomy output and NGT draining dark green/brown output- 675 mL x last 24 hours. 11/6: High grade obstruction at entero-colonic anastomosis per GGE yesterday and plan OR today for colonoscopy and possible laparotomy. Mg trending down over the past several days. 11/5: Remains NPO with still no bowel function GGE to evaluate for anastomotic obstruction per MD.   11/3: Ileus per CT, no BM and NGT remains in place to suction. Ionized calcium WNL today. 11/2: Recent BG elevated 161-183 mg/dL and receiving steroids, remains NPO with moderate dark green/brown output from NGT and no findings of abscess per CT. Symptoms improving and OOB, ambulating but no bowel function. RD completed NFPE.   11/1: Remains NPO with plan for PICC placement to start PN per MD, awaiting return of bowel function and CT abdomen/pelvis ordered. 10/30: In pain today. Episode of nausea last night, resolved with medication. One episode of flatus. Some abdominal distension. Pt weighed on standing scale yesterday, now with weight gain; question accuracy of weights and possible fluid masking weight change. 10/29: Readmitted with n/v after recent colectomy on 10/23 for cecal cancer.  NGT placed to suction, minimal output noted, however pt's son report canister was emptied last night and pt report a lot of output yesterday. Pt weaker, with significant weight loss noted since last admission weight. Plan to obtain standing weight when appropriate to verify current weight. Pt continues to have muscle and fat loss, currently meets criteria for moderate malnutrition, but has had <50% of energy intake x 3 days, pt at high risk for becoming severely malnourished.      PN infusion adequate to meet patients estimated nutritional needs:   [x] Yes     [] No   [] Unable to determine at this time    Diet: DIET NPO  TPN ADULT - CENTRAL  TPN ADULT - CENTRAL      Food Allergies: shellfish    Current Appetite:   [] Good     [] Fair     [] Poor     [x] Other: NPO x 4 days on admission prior to starting PN and first PN bag met <50% of energy needs   Appetite/meal intake prior to admission:   [] Good     [] Fair     [] Poor     [x] Other: Good appetite, but poor-fair intake after surgery  Feeding Limitations:  [] Swallowing difficulty    [] Chewing difficulty    [] Other:  Current Meal Intake:   Patient Vitals for the past 100 hrs:   % Diet Eaten   11/04/18 1934 0 %   11/04/18 1249 0 %   11/03/18 1756 0 %   11/03/18 1032 0 %     NGT to intermittent suction, output x last 24 hours: 675 mL  BM: 11/7; ileostomy output of 100 mL   Skin Integrity: abdominal surgical incisions     Edema:  [x] No     [] Yes   Pertinent Medications: Reviewed: zofran, steroid, SSI     Labs: BMP, MG, Phos ordered daily; CMP, Triglyceride, Prealbumin ordered initially and weekly  Recent Labs     11/07/18  0630 11/06/18  0400 11/05/18  0249    140 140   K 3.8 3.6 3.6    100 101   CO2 32 34* 32   * 135* 125*   BUN 22* 17 16   CREA 0.40* 0.37* 0.35*   CA 7.9* 7.9* 8.0*   MG 1.7 1.7 1.8   PHOS 2.7 3.2 3.2   ALB  --  2.4*  --    SGOT  --  15  --    ALT  --  24  --      Prealbumin: 7.6 mg/dL (11/2/18)  Triglyceride: 84 mg/dL (11/2/18)  Ionized Calcium: 1.13 mmol/L (11/3/18), 1.09 mmol/L (11/2/18)   Recent Labs     11/07/18  0630 11/06/18  0407 11/05/18  0249   WBC 17.4* 10.8 10.5   HGB 12.0 10.0* 10.1*   HCT 36.7 30.6* 30.6*   * 394 385         Intake/Output Summary (Last 24 hours) at 11/7/2018 1122  Last data filed at 11/7/2018 0559  Gross per 24 hour   Intake 2301 ml   Output 2250 ml   Net 51 ml       Anthropometrics:   Ht Readings from Last 1 Encounters:   10/28/18 5' 6\" (1.676 m)       Last 3 Recorded Weights in this Encounter    11/03/18 1715 11/05/18 0408 11/06/18 0410   Weight: 49.4 kg (109 lb) 44.8 kg (98 lb 12.8 oz) 47.6 kg (105 lb)      Body mass index is 16.95 kg/m². Underweight     Weight History:  -11 lbs (9%) x 5 days, -16 lbs (13%) x 1 month   Weight Metrics 11/6/2018 10/23/2018 10/4/2018 9/27/2018 9/23/2018 8/28/2018 8/16/2018   Weight 105 lb 119 lb 118 lb 124 lb 117 lb 116 lb 122 lb 4.8 oz   BMI 16.95 kg/m2 19.21 kg/m2 19.05 kg/m2 20.01 kg/m2 18.88 kg/m2 19.3 kg/m2 20.35 kg/m2          Admitting Diagnosis: Obstruction of bowel (HCC)  Post-op pain  Ileus following gastrointestinal surgery  Pertinent PMHx: COPD, depression, nausea & vomiting, osteoporosis, vitamin D deficiency    Education Needs:        [x] None identified  [] Identified - Not appropriate at this time  []  Identified and addressed - refer to education log  Learning Limitations:   [x] None identified  [] Identified    Cultural, Mosque & ethnic food preferences:  [x] None identified    [] Identified and addressed     ESTIMATED NUTRITION NEEDS:     Calories: 3890-6471 kcal (HBEx1.2-1.4) based on   [] Actual BW      [x] IBW: 59 kg  Protein: 59-77 gm (1-1.3 gm/kg) based on   [] Actual BW      [x] IBW:   Fluid: 1 mL/kcal     MONITORING & EVALUATION:     Nutrition Goals:   1. Patient will receive nutrition via PN until they are able to tolerate adequate po intake/enteral nutrition.  Outcome:  [x] Met    []  Not Met    [] New Goal    Monitoring:  [x] Parenteral nutrition intake and administration  [x] Biochemical data, medical tests, and procedures   [x] Fluid intake [x] Nutrition-focused physical findings   [x] Treatment/therapy   [] Food and beverage intake   [] Diet order      [] Weight        Previous Recommendations (for follow-up assessments only):     [x]   Implemented       []   Not Implemented (RD to address)      [] No Longer Appropriate     [] No Recommendation Made        Discharge Planning: Nutrition recommendations pending patient's ability to tolerate po intake/enteral nutrition.    [x]  Participated in care planning, discharge planning, & interdisciplinary rounds as appropriate      Pippa Narayan, 68 [address]   Pager: [phone]

## 2018-11-07 NOTE — PROGRESS NOTES
[facility]  [address] [place] [address]  [phone]  Colon and Rectal Surgery Progress Note      Patient: Karina Beckwith MRN: [id number]  SSN: [id number]    YOB: 1945  Age: 72 y.o. Sex: female      Admit Date: 10/28/2018    LOS: 10 days     Subjective:     Recovering. No overnigh events.       Objective:     Vitals:    11/07/18 0529 11/07/18 0727 11/07/18 1127 11/07/18 1134   BP: 104/62 124/77  127/70   Pulse: (!) 102 (!) 101  (!) 104   Resp: 20 20  18   Temp: 97 °F (36.1 °C) 96.7 °F (35.9 °C)  97.3 °F (36.3 °C)   SpO2: 93% 96%  98%   Weight:   48.9 kg (107 lb 11.2 oz)    Height:            Intake and Output:  Current Shift: 11/07 0701 - 11/07 1900  In: -   Out: 250 [Urine:250]  Last three shifts: 11/05 1901 - 11/07 0700  In: 2586 [I.V.:2556]  Out: 3650 [Urine:1975]      Physical Exam:     abd soft, appr tender  Stoma pink    Lab/Data Review:    CMP:   Lab Results   Component Value Date/Time     11/07/2018 06:30 AM    K 3.8 11/07/2018 06:30 AM     11/07/2018 06:30 AM    CO2 32 11/07/2018 06:30 AM    AGAP 9 11/07/2018 06:30 AM     (H) 11/07/2018 06:30 AM    BUN 22 (H) 11/07/2018 06:30 AM    CREA 0.40 (L) 11/07/2018 06:30 AM    GFRAA >60 11/07/2018 06:30 AM    GFRNA >60 11/07/2018 06:30 AM    CA 7.9 (L) 11/07/2018 06:30 AM    MG 1.7 11/07/2018 06:30 AM    PHOS 2.7 11/07/2018 06:30 AM     CBC:   Lab Results   Component Value Date/Time    WBC 17.4 (H) 11/07/2018 06:30 AM    HGB 12.0 11/07/2018 06:30 AM    HCT 36.7 11/07/2018 06:30 AM     (H) 11/07/2018 06:30 AM        Assessment:     POD 1 s/p resection ileocolic anastomosis with end ileostomy for sbo and anastomotic leak    Plan:     OOB to chair  Pain control  Keep gutierres until tomorrow  Continue abx for intra-abdominal infection    Signed By: Femi Cordova MD        November 7, 2018

## 2018-11-07 NOTE — PROGRESS NOTES
Patients is lying comfortable alert awake and oriented with NG tube in place anf patent draining small amount of greenish drainage, Ileostomy bag  is draining greenish semi soft liquid. Explained the use of ICS while awake, patients demonstrated back family members at bed side. No s/s of distress or sob.

## 2018-11-08 LAB
ANION GAP SERPL CALC-SCNC: 8 MMOL/L (ref 3–18)
BUN SERPL-MCNC: 22 MG/DL (ref 7–18)
BUN/CREAT SERPL: 46 (ref 12–20)
CALCIUM SERPL-MCNC: 7.8 MG/DL (ref 8.5–10.1)
CHLORIDE SERPL-SCNC: 100 MMOL/L (ref 100–108)
CO2 SERPL-SCNC: 31 MMOL/L (ref 21–32)
CREAT SERPL-MCNC: 0.48 MG/DL (ref 0.6–1.3)
ERYTHROCYTE [DISTWIDTH] IN BLOOD BY AUTOMATED COUNT: 14.1 % (ref 11.6–14.5)
GLUCOSE BLD STRIP.AUTO-MCNC: 126 MG/DL (ref 70–110)
GLUCOSE BLD STRIP.AUTO-MCNC: 131 MG/DL (ref 70–110)
GLUCOSE BLD STRIP.AUTO-MCNC: 136 MG/DL (ref 70–110)
GLUCOSE BLD STRIP.AUTO-MCNC: 144 MG/DL (ref 70–110)
GLUCOSE SERPL-MCNC: 162 MG/DL (ref 74–99)
HCT VFR BLD AUTO: 33.8 % (ref 35–45)
HGB BLD-MCNC: 11 G/DL (ref 12–16)
MAGNESIUM SERPL-MCNC: 1.7 MG/DL (ref 1.6–2.6)
MCH RBC QN AUTO: 32.1 PG (ref 24–34)
MCHC RBC AUTO-ENTMCNC: 32.5 G/DL (ref 31–37)
MCV RBC AUTO: 98.5 FL (ref 74–97)
PHOSPHATE SERPL-MCNC: 2.5 MG/DL (ref 2.5–4.9)
PLATELET # BLD AUTO: 374 K/UL (ref 135–420)
PMV BLD AUTO: 10.1 FL (ref 9.2–11.8)
POTASSIUM SERPL-SCNC: 3.7 MMOL/L (ref 3.5–5.5)
RBC # BLD AUTO: 3.43 M/UL (ref 4.2–5.3)
SODIUM SERPL-SCNC: 139 MMOL/L (ref 136–145)
WBC # BLD AUTO: 16.7 K/UL (ref 4.6–13.2)

## 2018-11-08 PROCEDURE — C1751 CATH, INF, PER/CENT/MIDLINE: HCPCS

## 2018-11-08 PROCEDURE — 65270000029 HC RM PRIVATE

## 2018-11-08 PROCEDURE — 77030011641 HC PASTE OST ADH BMS -A

## 2018-11-08 PROCEDURE — 74011000250 HC RX REV CODE- 250: Performed by: COLON & RECTAL SURGERY

## 2018-11-08 PROCEDURE — 36415 COLL VENOUS BLD VENIPUNCTURE: CPT

## 2018-11-08 PROCEDURE — 82962 GLUCOSE BLOOD TEST: CPT

## 2018-11-08 PROCEDURE — 74011250636 HC RX REV CODE- 250/636: Performed by: SURGERY

## 2018-11-08 PROCEDURE — 77030010522

## 2018-11-08 PROCEDURE — 84100 ASSAY OF PHOSPHORUS: CPT

## 2018-11-08 PROCEDURE — 74011250636 HC RX REV CODE- 250/636: Performed by: COLON & RECTAL SURGERY

## 2018-11-08 PROCEDURE — 83735 ASSAY OF MAGNESIUM: CPT

## 2018-11-08 PROCEDURE — 80048 BASIC METABOLIC PNL TOTAL CA: CPT

## 2018-11-08 PROCEDURE — 74011000258 HC RX REV CODE- 258: Performed by: COLON & RECTAL SURGERY

## 2018-11-08 PROCEDURE — 77010033678 HC OXYGEN DAILY: Performed by: COLON & RECTAL SURGERY

## 2018-11-08 PROCEDURE — 94640 AIRWAY INHALATION TREATMENT: CPT

## 2018-11-08 PROCEDURE — 74011250637 HC RX REV CODE- 250/637: Performed by: COLON & RECTAL SURGERY

## 2018-11-08 PROCEDURE — 77030010541

## 2018-11-08 PROCEDURE — 85027 COMPLETE CBC AUTOMATED: CPT

## 2018-11-08 RX ADMIN — ENOXAPARIN SODIUM 40 MG: 100 INJECTION SUBCUTANEOUS at 20:27

## 2018-11-08 RX ADMIN — METRONIDAZOLE 500 MG: 500 INJECTION, SOLUTION INTRAVENOUS at 08:48

## 2018-11-08 RX ADMIN — HYDROMORPHONE HYDROCHLORIDE 1 MG: 2 INJECTION INTRAMUSCULAR; INTRAVENOUS; SUBCUTANEOUS at 12:28

## 2018-11-08 RX ADMIN — ALBUTEROL SULFATE 2.5 MG: 2.5 SOLUTION RESPIRATORY (INHALATION) at 01:27

## 2018-11-08 RX ADMIN — HYDROMORPHONE HYDROCHLORIDE 1 MG: 2 INJECTION INTRAMUSCULAR; INTRAVENOUS; SUBCUTANEOUS at 21:32

## 2018-11-08 RX ADMIN — CEFAZOLIN SODIUM 2 G: 2 SOLUTION INTRAVENOUS at 08:48

## 2018-11-08 RX ADMIN — Medication 10 ML: at 06:54

## 2018-11-08 RX ADMIN — HYDROMORPHONE HYDROCHLORIDE 1 MG: 2 INJECTION INTRAMUSCULAR; INTRAVENOUS; SUBCUTANEOUS at 18:19

## 2018-11-08 RX ADMIN — ALBUTEROL SULFATE 2.5 MG: 2.5 SOLUTION RESPIRATORY (INHALATION) at 07:40

## 2018-11-08 RX ADMIN — HYDROCORTISONE SODIUM SUCCINATE 15 MG: 100 INJECTION, POWDER, FOR SOLUTION INTRAMUSCULAR; INTRAVENOUS at 06:49

## 2018-11-08 RX ADMIN — POTASSIUM CHLORIDE: 2 INJECTION, SOLUTION, CONCENTRATE INTRAVENOUS at 19:58

## 2018-11-08 RX ADMIN — CEFAZOLIN SODIUM 2 G: 2 SOLUTION INTRAVENOUS at 18:20

## 2018-11-08 RX ADMIN — ROPINIROLE 1 MG: 1 TABLET, FILM COATED ORAL at 08:32

## 2018-11-08 RX ADMIN — METRONIDAZOLE 500 MG: 500 INJECTION, SOLUTION INTRAVENOUS at 01:01

## 2018-11-08 RX ADMIN — HYDROCORTISONE SODIUM SUCCINATE 15 MG: 100 INJECTION, POWDER, FOR SOLUTION INTRAMUSCULAR; INTRAVENOUS at 15:45

## 2018-11-08 RX ADMIN — HYDROMORPHONE HYDROCHLORIDE 1 MG: 2 INJECTION INTRAMUSCULAR; INTRAVENOUS; SUBCUTANEOUS at 04:54

## 2018-11-08 RX ADMIN — HYDROMORPHONE HYDROCHLORIDE 1 MG: 2 INJECTION INTRAMUSCULAR; INTRAVENOUS; SUBCUTANEOUS at 08:32

## 2018-11-08 RX ADMIN — Medication 10 ML: at 15:46

## 2018-11-08 RX ADMIN — METRONIDAZOLE 500 MG: 500 INJECTION, SOLUTION INTRAVENOUS at 18:24

## 2018-11-08 RX ADMIN — ALBUTEROL SULFATE 2.5 MG: 2.5 SOLUTION RESPIRATORY (INHALATION) at 20:27

## 2018-11-08 RX ADMIN — HYDROMORPHONE HYDROCHLORIDE 1 MG: 2 INJECTION INTRAMUSCULAR; INTRAVENOUS; SUBCUTANEOUS at 15:37

## 2018-11-08 RX ADMIN — Medication 10 ML: at 20:28

## 2018-11-08 RX ADMIN — ONDANSETRON 4 MG: 2 INJECTION INTRAMUSCULAR; INTRAVENOUS at 18:19

## 2018-11-08 RX ADMIN — HYDROMORPHONE HYDROCHLORIDE 1 MG: 2 INJECTION INTRAMUSCULAR; INTRAVENOUS; SUBCUTANEOUS at 02:09

## 2018-11-08 RX ADMIN — HYDROCORTISONE SODIUM SUCCINATE 15 MG: 100 INJECTION, POWDER, FOR SOLUTION INTRAMUSCULAR; INTRAVENOUS at 21:33

## 2018-11-08 RX ADMIN — ALBUTEROL SULFATE 2.5 MG: 2.5 SOLUTION RESPIRATORY (INHALATION) at 14:14

## 2018-11-08 RX ADMIN — CEFAZOLIN SODIUM 2 G: 2 SOLUTION INTRAVENOUS at 01:01

## 2018-11-08 NOTE — PROGRESS NOTES
Bedside and Verbal shift change report given to Taco Boudreaux RN (oncoming nurse) by Mahnaz Kimball RN (offgoing nurse). Report included the following information SBAR, Kardex, Intake/Output and MAR.

## 2018-11-08 NOTE — ROUTINE PROCESS
2130 Patient ambulated around the hallway, tolerated well,back in bed. TPN restarted via PICC line. No respiratory distress noted, abdominal dressing with all small drainage, Ileostomy intact draining greenish stool. Pain controlled with dilaudid 1mg iv as needed. No acute distress noted.

## 2018-11-08 NOTE — PROGRESS NOTES
Ileostomy appliance removed and area cleaned. New appliance applied. Daughter present and observed procedure. Pt tolerated well. Small amount of bleeding noted around stoma site. Skin surrounding stoma reddened but intact. Skin prep applied to area before new appliance applied.

## 2018-11-08 NOTE — PROGRESS NOTES
NUTRITION consult    Nursing Referral: [facility]  Nutrition Consult: TPN: RD to Manage     RECOMMENDATIONS / PLAN:     - Plan to provide parenteral nutrition support, monitor labs and adjust electrolytes daily. Check prealbumin and triglyceride.   - Monitor GI symptoms and readiness for diet initiation.  - Continue RD inpatient monitoring and evaluation. Please Note:   Parenteral nutrition support to be provided using custom product, allowing for modification of electrolyte and macronutrient content day to day. Calcium and magnesium included in next PN bag. Lipids and MVI included in PN on MWF. Macronutrient Goal: 71 gm amino acids, 325 gm dextrose, 250 mL lipids (1603 kcal weekly average). PARENTERAL NUTRITION ORDER:     Electrolyte Adjustments: Na increased, Ca and Mg added, Phos increased     Day 8 PN to provide: 149 mEq Na, 94 mEq K, 7 mEq Ca, 14 mEq Mg, 37 mmol Phos, 1389 kcal, 71 gm amino acids, 325 gm dextrose, 2.5 mL trace elements     PN Rate: 75 mL/hr   Glucose Infusion Rate: 4.62 mg/kg/min    Osmolarity: 1591 mOsm   Parenteral Nutrition Access Device: PICC placed 11/1/18  Indication for PN: unable to tolerate oral diet following GI surgery with malnutrition   Refeeding Risk:      [x]  Yes (risk diminished, pt receiving dextrose infusion since 10/30) [] No     NUTRITION DIAGNOSIS & INTERVENTIONS:     [x] Parenteral nutrition: continue   [x] Collaboration and referral of nutrition care: MD to review PN order and note daily; will not call to verify content and changes, per MD request.    Nutrition Diagnosis: Inadequate nutrient intake related to altered GI function and inability to consume oral diet as evidenced by pt NPO with possible ileus after GI surgery s/p ex lap and resection of ileocolonic anastomosis.      Patient meets criteria for Severe Protein Calorie Malnutrition as evidenced by:   ASPEN Malnutrition Criteria  Acute Illness, Chronic Illness, or Social/Enviornmental: Acute illness  Energy Intake: Less than/equal to 50% est energy req for greater than/equal to 5 days  Body Fat: Moderate(orbital, upper arm, thoracic and lumbar regions)  Muscle Mass: Moderate(moderate-hand, scapula; severe- temple, clavicle, shoulders.)  ASPEN Malnutrition Score - Acute Illness: 18  Acute Illness - Malnutrition Diagnosis: Severe malnutrition    ASSESSMENT:     11/8: NGT dislodged overnight and left out, no c/o nausea/vomiting and ileostomy with green +output. OOB, ambulating. Remains NPO.  11/7: S/p ex lap with resection of ileocolonic anastomosis and end ileosotomy yesterday. Drowsy this morning, treated with zofran for nausea with c/o of abdominal pain, +ileostomy output and NGT draining dark green/brown output- 675 mL x last 24 hours. 11/6: High grade obstruction at entero-colonic anastomosis per GGE yesterday and plan OR today for colonoscopy and possible laparotomy. Mg trending down over the past several days. 11/5: Remains NPO with still no bowel function GGE to evaluate for anastomotic obstruction per MD.   11/3: Ileus per CT, no BM and NGT remains in place to suction. Ionized calcium WNL today. 11/2: Recent BG elevated 161-183 mg/dL and receiving steroids, remains NPO with moderate dark green/brown output from NGT and no findings of abscess per CT. Symptoms improving and OOB, ambulating but no bowel function. RD completed NFPE.   11/1: Remains NPO with plan for PICC placement to start PN per MD, awaiting return of bowel function and CT abdomen/pelvis ordered. 10/30: In pain today. Episode of nausea last night, resolved with medication. One episode of flatus. Some abdominal distension. Pt weighed on standing scale yesterday, now with weight gain; question accuracy of weights and possible fluid masking weight change. 10/29: Readmitted with n/v after recent colectomy on 10/23 for cecal cancer.  NGT placed to suction, minimal output noted, however pt's son report canister was emptied last night and pt report a lot of output yesterday. Pt weaker, with significant weight loss noted since last admission weight. Plan to obtain standing weight when appropriate to verify current weight. Pt continues to have muscle and fat loss, currently meets criteria for moderate malnutrition, but has had <50% of energy intake x 3 days, pt at high risk for becoming severely malnourished.      PN infusion adequate to meet patients estimated nutritional needs:   [x] Yes     [] No   [] Unable to determine at this time    Diet: DIET NPO  TPN ADULT - CENTRAL  TPN ADULT - CENTRAL      Food Allergies: shellfish    Current Appetite:   [] Good     [] Fair     [] Poor     [x] Other: NPO x 4 days on admission prior to starting PN and first PN bag met <50% of energy needs   Appetite/meal intake prior to admission:   [] Good     [] Fair     [] Poor     [x] Other: Good appetite, but poor-fair intake after surgery  Feeding Limitations:  [] Swallowing difficulty    [] Chewing difficulty    [] Other:  Current Meal Intake:   Patient Vitals for the past 100 hrs:   % Diet Eaten   11/04/18 1934 0 %   11/04/18 1249 0 %     NGT removed 11/7  BM: 11/8; ileostomy (total output x last 24 hours: 150 mL)   Skin Integrity: abdominal surgical incisions     Edema:  [x] No     [] Yes   Pertinent Medications: Reviewed: zofran, steroid, SSI     Labs: BMP, MG, Phos ordered daily; CMP, Triglyceride, Prealbumin ordered initially and weekly  Recent Labs     11/08/18 0427 11/07/18  0630 11/06/18  0400    141 140   K 3.7 3.8 3.6    100 100   CO2 31 32 34*   * 148* 135*   BUN 22* 22* 17   CREA 0.48* 0.40* 0.37*   CA 7.8* 7.9* 7.9*   MG 1.7 1.7 1.7   PHOS 2.5 2.7 3.2   ALB  --   --  2.4*   SGOT  --   --  15   ALT  --   --  24     Prealbumin: 7.6 mg/dL (11/2/18)  Triglyceride: 84 mg/dL (11/2/18)  Ionized Calcium: 1.13 mmol/L (11/3/18), 1.09 mmol/L (11/2/18)   Recent Labs     11/08/18 0427 11/07/18  0630 11/06/18  0400   WBC 16.7* 17.4* 10.8   HGB 11.0* 12.0 10.0*   HCT 33.8* 36.7 30.6*    450* 394         Intake/Output Summary (Last 24 hours) at 11/8/2018 1126  Last data filed at 11/8/2018 0459  Gross per 24 hour   Intake 682.83 ml   Output 1100 ml   Net -417.17 ml       Anthropometrics:   Ht Readings from Last 1 Encounters:   10/28/18 5' 6\" (1.676 m)       Last 3 Recorded Weights in this Encounter    11/05/18 0408 11/06/18 0410 11/07/18 1127   Weight: 44.8 kg (98 lb 12.8 oz) 47.6 kg (105 lb) 48.9 kg (107 lb 11.2 oz)      Body mass index is 17.38 kg/m². Underweight     Weight History:  -11 lbs (9%) x 5 days, -16 lbs (13%) x 1 month   Weight Metrics 11/7/2018 10/23/2018 10/4/2018 9/27/2018 9/23/2018 8/28/2018 8/16/2018   Weight 107 lb 11.2 oz 119 lb 118 lb 124 lb 117 lb 116 lb 122 lb 4.8 oz   BMI 17.38 kg/m2 19.21 kg/m2 19.05 kg/m2 20.01 kg/m2 18.88 kg/m2 19.3 kg/m2 20.35 kg/m2          Admitting Diagnosis: Obstruction of bowel (HCC)  Post-op pain  Ileus following gastrointestinal surgery  Pertinent PMHx: COPD, depression, nausea & vomiting, osteoporosis, vitamin D deficiency    Education Needs:        [x] None identified  [] Identified - Not appropriate at this time  []  Identified and addressed - refer to education log  Learning Limitations:   [x] None identified  [] Identified    Cultural, Rastafari & ethnic food preferences:  [x] None identified    [] Identified and addressed     ESTIMATED NUTRITION NEEDS:     Calories: 8122-1578 kcal (HBEx1.2-1.4) based on   [] Actual BW      [x] IBW: 59 kg  Protein: 59-77 gm (1-1.3 gm/kg) based on   [] Actual BW      [x] IBW:   Fluid: 1 mL/kcal     MONITORING & EVALUATION:     Nutrition Goals:   1. Patient will receive nutrition via PN until they are able to tolerate adequate po intake/enteral nutrition.  Outcome:  [x] Met    []  Not Met    [] New Goal    Monitoring:  [x] Parenteral nutrition intake and administration  [x] Biochemical data, medical tests, and procedures   [x] Fluid intake   [x] Nutrition-focused physical findings   [x] Treatment/therapy   [] Food and beverage intake   [] Diet order      [] Weight        Previous Recommendations (for follow-up assessments only):     [x]   Implemented       []   Not Implemented (RD to address)      [] No Longer Appropriate     [] No Recommendation Made        Discharge Planning: Nutrition recommendations pending patient's ability to tolerate po intake/enteral nutrition.    [x]  Participated in care planning, discharge planning, & interdisciplinary rounds as appropriate      Debbie Moran, [address] [place] [address]   Pager: [phone]

## 2018-11-08 NOTE — PROGRESS NOTES
Problem: Pressure Injury - Risk of  Goal: *Prevention of pressure injury  Document Roman Scale and appropriate interventions in the flowsheet. Outcome: Progressing Towards Goal  Pressure Injury Interventions:  Sensory Interventions: Pressure redistribution bed/mattress (bed type)    Moisture Interventions: Absorbent underpads    Activity Interventions: Pressure redistribution bed/mattress(bed type)    Mobility Interventions: Pressure redistribution bed/mattress (bed type)    Nutrition Interventions: Document food/fluid/supplement intake    Friction and Shear Interventions: HOB 30 degrees or less               Problem: Falls - Risk of  Goal: *Absence of Falls  Document Nimco Fall Risk and appropriate interventions in the flowsheet.   Outcome: Progressing Towards Goal  Fall Risk Interventions:  Mobility Interventions: Utilize walker, cane, or other assistive device    Mentation Interventions: Door open when patient unattended    Medication Interventions: Patient to call before getting OOB    Elimination Interventions: Call light in reach    History of Falls Interventions: Door open when patient unattended

## 2018-11-08 NOTE — ROUTINE PROCESS

## 2018-11-08 NOTE — PROGRESS NOTES
[facility]  [address] [place] [id number]  [phone]  Colon and Rectal Surgery Progress Note      Patient: Rosy Power MRN: [id number]  SSN: [id number]    YOB: 1945  Age: 72 y.o. Sex: female      Admit Date: 10/28/2018    LOS: 11 days     Subjective:     NG fell out. Denies nausea. Objective:     Vitals:    11/07/18 1134 11/07/18 1511 11/07/18 2141 11/08/18 0459   BP: 127/70 110/62 108/69 102/65   Pulse: (!) 104 71 98 92   Resp: 18 18 18 16   Temp: 97.3 °F (36.3 °C) 97.1 °F (36.2 °C) 97 °F (36.1 °C) 97.8 °F (36.6 °C)   SpO2: 98% 96% 95% 94%   Weight:       Height:            Intake and Output:  Current Shift: No intake/output data recorded.   Last three shifts: 11/06 1901 - 11/08 0700  In: 2283.8 [I.V.:2253.8]  Out: 1925 [Urine:1475]      Physical Exam:     abd soft, appr tender  Stoma pink  Incision healthy    Lab/Data Review:    CMP:   Lab Results   Component Value Date/Time     11/08/2018 04:27 AM    K 3.7 11/08/2018 04:27 AM     11/08/2018 04:27 AM    CO2 31 11/08/2018 04:27 AM    AGAP 8 11/08/2018 04:27 AM     (H) 11/08/2018 04:27 AM    BUN 22 (H) 11/08/2018 04:27 AM    CREA 0.48 (L) 11/08/2018 04:27 AM    GFRAA >60 11/08/2018 04:27 AM    GFRNA >60 11/08/2018 04:27 AM    CA 7.8 (L) 11/08/2018 04:27 AM    MG 1.7 11/08/2018 04:27 AM    PHOS 2.5 11/08/2018 04:27 AM     CBC:   Lab Results   Component Value Date/Time    WBC 16.7 (H) 11/08/2018 04:27 AM    HGB 11.0 (L) 11/08/2018 04:27 AM    HCT 33.8 (L) 11/08/2018 04:27 AM     11/08/2018 04:27 AM        Assessment:     POD 2 s/p resection ileocolic anastomosis with end ileostomy for sbo and anastomotic leak    Plan:     NPO  Ambulate  Continue abx for intra-abdominal infection    Signed By: Maykel Almonte MD        November 8, 2018

## 2018-11-08 NOTE — ROUTINE PROCESS
Bedside and Verbal shift change report given to [address] (oncoming nurse) by Drena Epley, RN (offgoing nurse). Report included the following information SBAR, Kardex, Procedure Summary, Intake/Output, MAR, Accordion and Recent Results.

## 2018-11-08 NOTE — PROGRESS NOTES
Page Dr. Stewart Chain and ordered given to discontinue NG tube at this time due to NG tube dislodged. No complain of n/v

## 2018-11-08 NOTE — PROGRESS NOTES
conducted a Follow up consultation and Spiritual Assessment for Faby Johnson, who is a 72 y.o.,female. The  provided the following Interventions:  Continued the relationship of care and support. Listened empathically. Offered prayer and assurance of continued prayer on patients behalf. Chart reviewed. The following outcomes were achieved:  Patient expressed gratitude for 's visit. Assessment:  There are no further spiritual or [employer]ybeterian issues which require Spiritual Care Services interventions at this time. Plan:  Chaplains will continue to follow and will provide pastoral care on an as needed/requested basis.  recommends bedside caregivers page  on duty if patient shows signs of acute spiritual or emotional distress.        [address]   [phone]

## 2018-11-09 ENCOUNTER — APPOINTMENT (OUTPATIENT)
Dept: GENERAL RADIOLOGY | Age: 73
DRG: 329 | End: 2018-11-09
Attending: COLON & RECTAL SURGERY
Payer: MEDICARE

## 2018-11-09 LAB
ANION GAP SERPL CALC-SCNC: 5 MMOL/L (ref 3–18)
BUN SERPL-MCNC: 19 MG/DL (ref 7–18)
BUN/CREAT SERPL: 58 (ref 12–20)
CALCIUM SERPL-MCNC: 7.3 MG/DL (ref 8.5–10.1)
CHLORIDE SERPL-SCNC: 104 MMOL/L (ref 100–108)
CO2 SERPL-SCNC: 32 MMOL/L (ref 21–32)
CREAT SERPL-MCNC: 0.33 MG/DL (ref 0.6–1.3)
ERYTHROCYTE [DISTWIDTH] IN BLOOD BY AUTOMATED COUNT: 14.2 % (ref 11.6–14.5)
GLUCOSE BLD STRIP.AUTO-MCNC: 134 MG/DL (ref 70–110)
GLUCOSE BLD STRIP.AUTO-MCNC: 138 MG/DL (ref 70–110)
GLUCOSE BLD STRIP.AUTO-MCNC: 150 MG/DL (ref 70–110)
GLUCOSE SERPL-MCNC: 129 MG/DL (ref 74–99)
HCT VFR BLD AUTO: 31.2 % (ref 35–45)
HGB BLD-MCNC: 10.2 G/DL (ref 12–16)
MAGNESIUM SERPL-MCNC: 1.7 MG/DL (ref 1.6–2.6)
MCH RBC QN AUTO: 32.1 PG (ref 24–34)
MCHC RBC AUTO-ENTMCNC: 32.7 G/DL (ref 31–37)
MCV RBC AUTO: 98.1 FL (ref 74–97)
PHOSPHATE SERPL-MCNC: 2.3 MG/DL (ref 2.5–4.9)
PLATELET # BLD AUTO: 394 K/UL (ref 135–420)
PMV BLD AUTO: 10.5 FL (ref 9.2–11.8)
POTASSIUM SERPL-SCNC: 3.3 MMOL/L (ref 3.5–5.5)
PREALB SERPL-MCNC: 9.46 MG/DL (ref 20–40)
RBC # BLD AUTO: 3.18 M/UL (ref 4.2–5.3)
SODIUM SERPL-SCNC: 141 MMOL/L (ref 136–145)
TRIGL SERPL-MCNC: 38 MG/DL (ref ?–150)
WBC # BLD AUTO: 14.3 K/UL (ref 4.6–13.2)

## 2018-11-09 PROCEDURE — 85027 COMPLETE CBC AUTOMATED: CPT

## 2018-11-09 PROCEDURE — 80048 BASIC METABOLIC PNL TOTAL CA: CPT

## 2018-11-09 PROCEDURE — 83735 ASSAY OF MAGNESIUM: CPT

## 2018-11-09 PROCEDURE — 74011000250 HC RX REV CODE- 250: Performed by: COLON & RECTAL SURGERY

## 2018-11-09 PROCEDURE — 77030010522

## 2018-11-09 PROCEDURE — 74011250636 HC RX REV CODE- 250/636

## 2018-11-09 PROCEDURE — 65270000029 HC RM PRIVATE

## 2018-11-09 PROCEDURE — 84100 ASSAY OF PHOSPHORUS: CPT

## 2018-11-09 PROCEDURE — 74011250637 HC RX REV CODE- 250/637: Performed by: COLON & RECTAL SURGERY

## 2018-11-09 PROCEDURE — 74011000258 HC RX REV CODE- 258: Performed by: COLON & RECTAL SURGERY

## 2018-11-09 PROCEDURE — 77030010520

## 2018-11-09 PROCEDURE — 84478 ASSAY OF TRIGLYCERIDES: CPT

## 2018-11-09 PROCEDURE — 71046 X-RAY EXAM CHEST 2 VIEWS: CPT

## 2018-11-09 PROCEDURE — 84134 ASSAY OF PREALBUMIN: CPT

## 2018-11-09 PROCEDURE — 82962 GLUCOSE BLOOD TEST: CPT

## 2018-11-09 PROCEDURE — 74011250636 HC RX REV CODE- 250/636: Performed by: COLON & RECTAL SURGERY

## 2018-11-09 PROCEDURE — 77030010541

## 2018-11-09 PROCEDURE — 74011250636 HC RX REV CODE- 250/636: Performed by: SURGERY

## 2018-11-09 PROCEDURE — 74011636637 HC RX REV CODE- 636/637: Performed by: COLON & RECTAL SURGERY

## 2018-11-09 PROCEDURE — 94640 AIRWAY INHALATION TREATMENT: CPT

## 2018-11-09 RX ORDER — PREDNISONE 10 MG/1
10 TABLET ORAL
Status: DISCONTINUED | OUTPATIENT
Start: 2018-11-09 | End: 2018-11-10

## 2018-11-09 RX ORDER — ONDANSETRON 2 MG/ML
INJECTION INTRAMUSCULAR; INTRAVENOUS
Status: COMPLETED
Start: 2018-11-09 | End: 2018-11-09

## 2018-11-09 RX ORDER — LORAZEPAM 1 MG/1
1 TABLET ORAL DAILY PRN
Status: DISCONTINUED | OUTPATIENT
Start: 2018-11-09 | End: 2018-11-11

## 2018-11-09 RX ORDER — ONDANSETRON 2 MG/ML
4 INJECTION INTRAMUSCULAR; INTRAVENOUS
Status: DISCONTINUED | OUTPATIENT
Start: 2018-11-09 | End: 2018-11-15 | Stop reason: HOSPADM

## 2018-11-09 RX ORDER — CIPROFLOXACIN 500 MG/1
500 TABLET ORAL EVERY 12 HOURS
Status: DISCONTINUED | OUTPATIENT
Start: 2018-11-09 | End: 2018-11-10

## 2018-11-09 RX ORDER — METRONIDAZOLE 500 MG/1
500 TABLET ORAL EVERY 8 HOURS
Status: DISCONTINUED | OUTPATIENT
Start: 2018-11-09 | End: 2018-11-10

## 2018-11-09 RX ADMIN — Medication 10 ML: at 15:31

## 2018-11-09 RX ADMIN — MEPERIDINE HYDROCHLORIDE 50 MG: 50 INJECTION, SOLUTION INTRAMUSCULAR; INTRAVENOUS; SUBCUTANEOUS at 19:11

## 2018-11-09 RX ADMIN — ALBUTEROL SULFATE 2.5 MG: 2.5 SOLUTION RESPIRATORY (INHALATION) at 02:35

## 2018-11-09 RX ADMIN — METRONIDAZOLE 500 MG: 500 TABLET ORAL at 21:20

## 2018-11-09 RX ADMIN — ALBUTEROL SULFATE 2.5 MG: 2.5 SOLUTION RESPIRATORY (INHALATION) at 08:09

## 2018-11-09 RX ADMIN — HYDROMORPHONE HYDROCHLORIDE 1 MG: 2 INJECTION INTRAMUSCULAR; INTRAVENOUS; SUBCUTANEOUS at 12:11

## 2018-11-09 RX ADMIN — Medication 10 ML: at 05:43

## 2018-11-09 RX ADMIN — MEPERIDINE HYDROCHLORIDE 50 MG: 50 INJECTION, SOLUTION INTRAMUSCULAR; INTRAVENOUS; SUBCUTANEOUS at 15:29

## 2018-11-09 RX ADMIN — HYDROMORPHONE HYDROCHLORIDE 1 MG: 2 INJECTION INTRAMUSCULAR; INTRAVENOUS; SUBCUTANEOUS at 01:00

## 2018-11-09 RX ADMIN — POTASSIUM CHLORIDE: 2 INJECTION, SOLUTION, CONCENTRATE INTRAVENOUS at 21:07

## 2018-11-09 RX ADMIN — ALBUTEROL SULFATE 2.5 MG: 2.5 SOLUTION RESPIRATORY (INHALATION) at 20:53

## 2018-11-09 RX ADMIN — HYDROMORPHONE HYDROCHLORIDE 1 MG: 2 INJECTION INTRAMUSCULAR; INTRAVENOUS; SUBCUTANEOUS at 07:04

## 2018-11-09 RX ADMIN — ONDANSETRON HYDROCHLORIDE 4 MG: 2 SOLUTION INTRAMUSCULAR; INTRAVENOUS at 22:38

## 2018-11-09 RX ADMIN — CEFAZOLIN SODIUM 2 G: 2 SOLUTION INTRAVENOUS at 01:00

## 2018-11-09 RX ADMIN — CIPROFLOXACIN 500 MG: 500 TABLET, FILM COATED ORAL at 14:28

## 2018-11-09 RX ADMIN — HYDROCORTISONE SODIUM SUCCINATE 15 MG: 100 INJECTION, POWDER, FOR SOLUTION INTRAMUSCULAR; INTRAVENOUS at 05:42

## 2018-11-09 RX ADMIN — CEFAZOLIN SODIUM 2 G: 2 SOLUTION INTRAVENOUS at 08:52

## 2018-11-09 RX ADMIN — PREDNISONE 10 MG: 10 TABLET ORAL at 14:29

## 2018-11-09 RX ADMIN — CIPROFLOXACIN 500 MG: 500 TABLET, FILM COATED ORAL at 21:20

## 2018-11-09 RX ADMIN — METRONIDAZOLE 500 MG: 500 INJECTION, SOLUTION INTRAVENOUS at 01:36

## 2018-11-09 RX ADMIN — INSULIN LISPRO 2 UNITS: 100 INJECTION, SOLUTION INTRAVENOUS; SUBCUTANEOUS at 05:43

## 2018-11-09 RX ADMIN — ENOXAPARIN SODIUM 40 MG: 100 INJECTION SUBCUTANEOUS at 21:20

## 2018-11-09 RX ADMIN — ALBUTEROL SULFATE 2.5 MG: 2.5 SOLUTION RESPIRATORY (INHALATION) at 14:28

## 2018-11-09 RX ADMIN — METRONIDAZOLE 500 MG: 500 TABLET ORAL at 14:29

## 2018-11-09 RX ADMIN — HYDROMORPHONE HYDROCHLORIDE 1 MG: 2 INJECTION INTRAMUSCULAR; INTRAVENOUS; SUBCUTANEOUS at 03:52

## 2018-11-09 RX ADMIN — Medication 10 ML: at 22:00

## 2018-11-09 RX ADMIN — METRONIDAZOLE 500 MG: 500 INJECTION, SOLUTION INTRAVENOUS at 08:52

## 2018-11-09 RX ADMIN — LORAZEPAM 1 MG: 1 TABLET ORAL at 21:20

## 2018-11-09 NOTE — PROGRESS NOTES
NUTRITION consult    Nursing Referral: [facility]  Nutrition Consult: TPN: RD to Manage     RECOMMENDATIONS / PLAN:     - Plan to provide parenteral nutrition support, monitor labs and adjust electrolytes daily.   - Advance diet as tolerated per surgery. - Monitor GI symptoms and readiness for diet initiation.  - Continue RD inpatient monitoring and evaluation. Please Note:   Parenteral nutrition support to be provided using custom product, allowing for modification of electrolyte and macronutrient content day to day. Calcium and magnesium included in next PN bag. Lipids and MVI included in PN on MWF. Macronutrient Goal: 71 gm amino acids, 325 gm dextrose, 250 mL lipids (1603 kcal weekly average). PARENTERAL NUTRITION ORDER:     Electrolyte Adjustments: K increased, Ca increased, Mg increased, Phos increased     Day 9 PN to provide: 149 mEq Na, 109 mEq K, 7 mEq Ca, 17 mEq Mg, 47 mmol Phos, 1889 kcal, 71 gm amino acids, 325 gm dextrose, 250 mL lipids, 2.5 mL trace elements, 10 mL MVI    PN Rate: 75 mL/hr   Glucose Infusion Rate:3.84 mg/kg/min    Osmolarity: 1629 mOsm   Parenteral Nutrition Access Device: PICC placed 11/1/18  Indication for PN: unable to tolerate oral diet following GI surgery with malnutrition   Refeeding Risk:      [x]  Yes (risk diminished, pt receiving dextrose infusion since 10/30) [] No     NUTRITION DIAGNOSIS & INTERVENTIONS:     [x] Parenteral nutrition: continue   [x] Meals/Snacks: modified diet  [x] Collaboration and referral of nutrition care: MD to review PN order and note daily; will not call to verify content and changes, per MD request.    Nutrition Diagnosis: Inadequate nutrient intake related to altered GI function and inability to consume oral diet as evidenced by pt NPO with possible ileus after GI surgery s/p ex lap and resection of ileocolonic anastomosis.      Patient meets criteria for Severe Protein Calorie Malnutrition as evidenced by:   ASPEN Malnutrition Criteria  Acute Illness, Chronic Illness, or Social/Enviornmental: Acute illness  Energy Intake: Less than/equal to 50% est energy req for greater than/equal to 5 days  Body Fat: Moderate(orbital, upper arm, thoracic and lumbar regions)  Muscle Mass: Moderate(moderate-hand, scapula; severe- temple, clavicle, shoulders.)  ASPEN Malnutrition Score - Acute Illness: 18  Acute Illness - Malnutrition Diagnosis: Severe malnutrition    ASSESSMENT:     11/9: Clear liquid diet started this AM.  11/8: NGT dislodged overnight and left out, no c/o nausea/vomiting and ileostomy with green +output. OOB, ambulating. Remains NPO.  11/7: S/p ex lap with resection of ileocolonic anastomosis and end ileosotomy yesterday. Drowsy this morning, treated with zofran for nausea with c/o of abdominal pain, +ileostomy output and NGT draining dark green/brown output- 675 mL x last 24 hours. 11/6: High grade obstruction at entero-colonic anastomosis per GGE yesterday and plan OR today for colonoscopy and possible laparotomy. Mg trending down over the past several days. 11/5: Remains NPO with still no bowel function GGE to evaluate for anastomotic obstruction per MD.   11/3: Ileus per CT, no BM and NGT remains in place to suction. Ionized calcium WNL today. 11/2: Recent BG elevated 161-183 mg/dL and receiving steroids, remains NPO with moderate dark green/brown output from NGT and no findings of abscess per CT. Symptoms improving and OOB, ambulating but no bowel function. RD completed NFPE.   11/1: Remains NPO with plan for PICC placement to start PN per MD, awaiting return of bowel function and CT abdomen/pelvis ordered. 10/30: In pain today. Episode of nausea last night, resolved with medication. One episode of flatus. Some abdominal distension. Pt weighed on standing scale yesterday, now with weight gain; question accuracy of weights and possible fluid masking weight change.   10/29: Readmitted with n/v after recent colectomy on 10/23 for cecal cancer. NGT placed to suction, minimal output noted, however pt's son report canister was emptied last night and pt report a lot of output yesterday. Pt weaker, with significant weight loss noted since last admission weight. Plan to obtain standing weight when appropriate to verify current weight. Pt continues to have muscle and fat loss, currently meets criteria for moderate malnutrition, but has had <50% of energy intake x 3 days, pt at high risk for becoming severely malnourished. PN infusion adequate to meet patients estimated nutritional needs:   [x] Yes     [] No   [] Unable to determine at this time    Diet: TPN ADULT - CENTRAL  DIET CLEAR LIQUID      Food Allergies: shellfish    Current Appetite:   [] Good     [] Fair     [x] Poor     [x] Other: NPO x 4 days on admission prior to starting PN and first PN bag met <50% of energy needs   Appetite/meal intake prior to admission:   [] Good     [] Fair     [] Poor     [x] Other: Good appetite, but poor-fair intake after surgery  Feeding Limitations:  [] Swallowing difficulty    [] Chewing difficulty    [] Other:  Current Meal Intake: No data found.     NGT removed 11/7  BM: 11/8; ileostomy (total output x last 24 hours: 150 mL)   Skin Integrity: abdominal surgical incisions     Edema:  [x] No     [] Yes   Pertinent Medications: Reviewed: zofran, steroid, SSI     Labs: BMP, MG, Phos ordered daily; CMP, Triglyceride, Prealbumin ordered initially and weekly  Recent Labs     11/09/18  0300 11/08/18  0427 11/07/18  0630    139 141   K 3.3* 3.7 3.8    100 100   CO2 32 31 32   * 162* 148*   BUN 19* 22* 22*   CREA 0.33* 0.48* 0.40*   CA 7.3* 7.8* 7.9*   MG 1.7 1.7 1.7   PHOS 2.3* 2.5 2.7   Prealbumin: 7.6 mg/dL (11/2/18)  Triglyceride: 84 mg/dL (11/2/18)  Ionized Calcium: 1.13 mmol/L (11/3/18), 1.09 mmol/L (11/2/18)   Recent Labs     11/09/18  0300 11/08/18  0427 11/07/18  0630   WBC 14.3* 16.7* 17.4*   HGB 10.2* 11.0* 12.0   HCT 31.2* 33.8* 36.7  374 450*         Intake/Output Summary (Last 24 hours) at 11/9/2018 1231  Last data filed at 11/9/2018 0114  Gross per 24 hour   Intake 0 ml   Output 850 ml   Net -850 ml       Anthropometrics:   Ht Readings from Last 1 Encounters:   10/28/18 5' 6\" (1.676 m)       Last 3 Recorded Weights in this Encounter    11/06/18 0410 11/07/18 1127 11/08/18 1846   Weight: 47.6 kg (105 lb) 48.9 kg (107 lb 11.2 oz) 58.8 kg (129 lb 9.6 oz)      Body mass index is 20.92 kg/m². Underweight     Weight History:  -11 lbs (9%) x 5 days, -16 lbs (13%) x 1 month   Weight Metrics 11/8/2018 10/23/2018 10/4/2018 9/27/2018 9/23/2018 8/28/2018 8/16/2018   Weight 129 lb 9.6 oz 119 lb 118 lb 124 lb 117 lb 116 lb 122 lb 4.8 oz   BMI 20.92 kg/m2 19.21 kg/m2 19.05 kg/m2 20.01 kg/m2 18.88 kg/m2 19.3 kg/m2 20.35 kg/m2          Admitting Diagnosis: Obstruction of bowel (HCC)  Post-op pain  Ileus following gastrointestinal surgery  Pertinent PMHx: COPD, depression, nausea & vomiting, osteoporosis, vitamin D deficiency    Education Needs:        [x] None identified  [] Identified - Not appropriate at this time  []  Identified and addressed - refer to education log  Learning Limitations:   [x] None identified  [] Identified    Cultural, Oriental orthodox & ethnic food preferences:  [x] None identified    [] Identified and addressed     ESTIMATED NUTRITION NEEDS:     Calories: 8250-8877 kcal (HBEx1.2-1.4) based on   [] Actual BW      [x] IBW: 59 kg  Protein: 59-77 gm (1-1.3 gm/kg) based on   [] Actual BW      [x] IBW:   Fluid: 1 mL/kcal     MONITORING & EVALUATION:     Nutrition Goals:   1. Patient will receive nutrition via PN until they are able to tolerate adequate po intake/enteral nutrition.  Outcome:  [x] Met    []  Not Met    [] New Goal    Monitoring:  [x] Parenteral nutrition intake and administration  [x] Biochemical data, medical tests, and procedures   [x] Fluid intake   [x] Nutrition-focused physical findings   [x] Treatment/therapy   [] Food and beverage intake   [] Diet order      [] Weight        Previous Recommendations (for follow-up assessments only):     [x]   Implemented       []   Not Implemented (RD to address)      [] No Longer Appropriate     [] No Recommendation Made        Discharge Planning: Nutrition recommendations pending patient's ability to tolerate po intake/enteral nutrition.    [x]  Participated in care planning, discharge planning, & interdisciplinary rounds as appropriate      Pietro Mai RD, [address]   Pager: [phone]

## 2018-11-09 NOTE — PROGRESS NOTES
Received pt in stable condition,   General: lying in bed in supine, not apparent distress  Neuro: AOx4  Cardio:  denies chest pain  Respiratory:  denies shortness of breath  Skin: staples to abdomen clean, dry and intact; ileostomy drain intact; ostomy site pink in color. GI: voiding  : denies nausea and vomiting; hypoactive BS  Mus: ambulates c assistance  Bed in low position and call bell within reach.

## 2018-11-09 NOTE — WOUND CARE
Physical Exam   Room 515: introduced myself & services to pt/family. Pt has strong family support, emotional support & encouragement provided to all by this writer. Taught pt how to change appliance, stoma is 1 1/2\" almost round, budded nicely, eakins ring used  Around stoma to even out topography. Taught pt how to measure stoma, apply the system, & utilize the   velcro closure. Pt allowed to shed a few tears as this has been a trying experience for her. Gave Portia booklet & my business card, will plan to follow up on Monday.   Quince Gilford BSN, RN, [facility], [address]

## 2018-11-09 NOTE — PROGRESS NOTES
Problem: Falls - Risk of  Goal: *Absence of Falls  Document Nimco Fall Risk and appropriate interventions in the flowsheet. Outcome: Progressing Towards Goal  Fall Risk Interventions:  Mobility Interventions: Patient to call before getting OOB    Mentation Interventions: Door open when patient unattended    Medication Interventions: Patient to call before getting OOB    Elimination Interventions: Call light in reach, Patient to call for help with toileting needs, Toileting schedule/hourly rounds    History of Falls Interventions:  Investigate reason for fall, Door open when patient unattended

## 2018-11-09 NOTE — PROGRESS NOTES
[facility]  [address] [place] [id number]  [phone]  Colon and Rectal Surgery Progress Note      Patient: Reyna Dawson MRN: [id number]  SSN: [id number]    YOB: 1945  Age: 72 y.o. Sex: female      Admit Date: 10/28/2018    LOS: 12 days     Subjective:     Taking small amt clears. Minimal stoma output. SOB. Objective:     Vitals:    11/09/18 0318 11/09/18 0743 11/09/18 0811 11/09/18 1150   BP: 111/68 96/62  118/60   Pulse: 83 84  75   Resp: 16 16  18   Temp: 98.1 °F (36.7 °C) 97.2 °F (36.2 °C)  97.8 °F (36.6 °C)   SpO2: 99% 96% 95% 98%   Weight:       Height:            Intake and Output:  Current Shift: No intake/output data recorded.   Last three shifts: 11/07 1901 - 11/09 0700  In: 682.8 [I.V.:682.8]  Out: 1450 [Urine:1000]      Physical Exam:     abd soft, appr tender  Stoma pink  Incision healthy    Lab/Data Review:    CMP:   Lab Results   Component Value Date/Time     11/09/2018 03:00 AM    K 3.3 (L) 11/09/2018 03:00 AM     11/09/2018 03:00 AM    CO2 32 11/09/2018 03:00 AM    AGAP 5 11/09/2018 03:00 AM     (H) 11/09/2018 03:00 AM    BUN 19 (H) 11/09/2018 03:00 AM    CREA 0.33 (L) 11/09/2018 03:00 AM    GFRAA >60 11/09/2018 03:00 AM    GFRNA >60 11/09/2018 03:00 AM    CA 7.3 (L) 11/09/2018 03:00 AM    MG 1.7 11/09/2018 03:00 AM    PHOS 2.3 (L) 11/09/2018 03:00 AM     CBC:   Lab Results   Component Value Date/Time    WBC 14.3 (H) 11/09/2018 03:00 AM    HGB 10.2 (L) 11/09/2018 03:00 AM    HCT 31.2 (L) 11/09/2018 03:00 AM     11/09/2018 03:00 AM      CXR no pulm edema, small L pleural effusion, official read pending    Assessment:     POD 3 s/p resection ileocolic anastomosis with end ileostomy for sbo and anastomotic leak    Plan:     Continue clears  Ambulate  Continue abx for intra-abdominal infection  Stoma nurse consult    Signed By: Be Winters MD        November 9, 2018

## 2018-11-09 NOTE — PROGRESS NOTES
Problem: Pressure Injury - Risk of  Goal: *Prevention of pressure injury  Document Roman Scale and appropriate interventions in the flowsheet.   Outcome: Progressing Towards Goal  Pressure Injury Interventions:  Sensory Interventions: Pressure redistribution bed/mattress (bed type)    Moisture Interventions: Minimize layers, Moisture barrier    Activity Interventions: Increase time out of bed    Mobility Interventions: HOB 30 degrees or less    Nutrition Interventions: Document food/fluid/supplement intake    Friction and Shear Interventions: HOB 30 degrees or less

## 2018-11-09 NOTE — PROGRESS NOTES
Bedside and Verbal shift change report given to Cliff Lieberman (oncoming nurse) by Flaquita Hopkins RN (offgoing nurse). Report included the following information SBAR, Kardex, OR Summary, Procedure Summary, Intake/Output and MAR.

## 2018-11-09 NOTE — ROUTINE PROCESS
Bedside report given to [facility]. Reviewed Kardex, MAR, TPN, advancing of diet and medications.

## 2018-11-10 LAB
ANION GAP SERPL CALC-SCNC: 6 MMOL/L (ref 3–18)
BUN SERPL-MCNC: 19 MG/DL (ref 7–18)
BUN/CREAT SERPL: 56 (ref 12–20)
CALCIUM SERPL-MCNC: 7.8 MG/DL (ref 8.5–10.1)
CHLORIDE SERPL-SCNC: 101 MMOL/L (ref 100–108)
CO2 SERPL-SCNC: 34 MMOL/L (ref 21–32)
CREAT SERPL-MCNC: 0.34 MG/DL (ref 0.6–1.3)
ERYTHROCYTE [DISTWIDTH] IN BLOOD BY AUTOMATED COUNT: 14 % (ref 11.6–14.5)
GLUCOSE BLD STRIP.AUTO-MCNC: 122 MG/DL (ref 70–110)
GLUCOSE BLD STRIP.AUTO-MCNC: 128 MG/DL (ref 70–110)
GLUCOSE BLD STRIP.AUTO-MCNC: 135 MG/DL (ref 70–110)
GLUCOSE BLD STRIP.AUTO-MCNC: 141 MG/DL (ref 70–110)
GLUCOSE SERPL-MCNC: 115 MG/DL (ref 74–99)
HCT VFR BLD AUTO: 34.7 % (ref 35–45)
HGB BLD-MCNC: 11.4 G/DL (ref 12–16)
MAGNESIUM SERPL-MCNC: 2 MG/DL (ref 1.6–2.6)
MCH RBC QN AUTO: 31.9 PG (ref 24–34)
MCHC RBC AUTO-ENTMCNC: 32.9 G/DL (ref 31–37)
MCV RBC AUTO: 97.2 FL (ref 74–97)
PHOSPHATE SERPL-MCNC: 3.3 MG/DL (ref 2.5–4.9)
PLATELET # BLD AUTO: 322 K/UL (ref 135–420)
PMV BLD AUTO: 10.5 FL (ref 9.2–11.8)
POTASSIUM SERPL-SCNC: 3.6 MMOL/L (ref 3.5–5.5)
RBC # BLD AUTO: 3.57 M/UL (ref 4.2–5.3)
SODIUM SERPL-SCNC: 141 MMOL/L (ref 136–145)
WBC # BLD AUTO: 12.1 K/UL (ref 4.6–13.2)

## 2018-11-10 PROCEDURE — 77010033678 HC OXYGEN DAILY

## 2018-11-10 PROCEDURE — 74011250637 HC RX REV CODE- 250/637: Performed by: COLON & RECTAL SURGERY

## 2018-11-10 PROCEDURE — 74011000250 HC RX REV CODE- 250: Performed by: COLON & RECTAL SURGERY

## 2018-11-10 PROCEDURE — 74011250636 HC RX REV CODE- 250/636: Performed by: COLON & RECTAL SURGERY

## 2018-11-10 PROCEDURE — 84100 ASSAY OF PHOSPHORUS: CPT

## 2018-11-10 PROCEDURE — 74011250636 HC RX REV CODE- 250/636: Performed by: SURGERY

## 2018-11-10 PROCEDURE — 82962 GLUCOSE BLOOD TEST: CPT

## 2018-11-10 PROCEDURE — 94640 AIRWAY INHALATION TREATMENT: CPT

## 2018-11-10 PROCEDURE — 77030010541

## 2018-11-10 PROCEDURE — 80048 BASIC METABOLIC PNL TOTAL CA: CPT

## 2018-11-10 PROCEDURE — 85027 COMPLETE CBC AUTOMATED: CPT

## 2018-11-10 PROCEDURE — 83735 ASSAY OF MAGNESIUM: CPT

## 2018-11-10 PROCEDURE — 74011000258 HC RX REV CODE- 258: Performed by: COLON & RECTAL SURGERY

## 2018-11-10 PROCEDURE — 65270000029 HC RM PRIVATE

## 2018-11-10 RX ORDER — HYDROMORPHONE HYDROCHLORIDE 2 MG/ML
1 INJECTION, SOLUTION INTRAMUSCULAR; INTRAVENOUS; SUBCUTANEOUS
Status: DISCONTINUED | OUTPATIENT
Start: 2018-11-10 | End: 2018-11-15 | Stop reason: HOSPADM

## 2018-11-10 RX ORDER — HYDROCORTISONE SODIUM SUCCINATE 100 MG/2ML
15 INJECTION, POWDER, FOR SOLUTION INTRAMUSCULAR; INTRAVENOUS EVERY 8 HOURS
Status: DISCONTINUED | OUTPATIENT
Start: 2018-11-10 | End: 2018-11-12

## 2018-11-10 RX ADMIN — HYDROCORTISONE SODIUM SUCCINATE 15 MG: 100 INJECTION, POWDER, FOR SOLUTION INTRAMUSCULAR; INTRAVENOUS at 21:51

## 2018-11-10 RX ADMIN — HYDROMORPHONE HYDROCHLORIDE 1 MG: 2 INJECTION INTRAMUSCULAR; INTRAVENOUS; SUBCUTANEOUS at 10:08

## 2018-11-10 RX ADMIN — Medication 10 ML: at 21:52

## 2018-11-10 RX ADMIN — LORAZEPAM 1 MG: 1 TABLET ORAL at 21:51

## 2018-11-10 RX ADMIN — ONDANSETRON 4 MG: 2 INJECTION INTRAMUSCULAR; INTRAVENOUS at 04:39

## 2018-11-10 RX ADMIN — ENOXAPARIN SODIUM 40 MG: 100 INJECTION SUBCUTANEOUS at 20:08

## 2018-11-10 RX ADMIN — Medication 10 ML: at 14:04

## 2018-11-10 RX ADMIN — ALBUTEROL SULFATE 2.5 MG: 2.5 SOLUTION RESPIRATORY (INHALATION) at 08:41

## 2018-11-10 RX ADMIN — MEPERIDINE HYDROCHLORIDE 50 MG: 50 INJECTION, SOLUTION INTRAMUSCULAR; INTRAVENOUS; SUBCUTANEOUS at 02:27

## 2018-11-10 RX ADMIN — HYDROMORPHONE HYDROCHLORIDE 1 MG: 2 INJECTION INTRAMUSCULAR; INTRAVENOUS; SUBCUTANEOUS at 13:59

## 2018-11-10 RX ADMIN — ALBUTEROL SULFATE 2.5 MG: 2.5 SOLUTION RESPIRATORY (INHALATION) at 15:11

## 2018-11-10 RX ADMIN — HYDROMORPHONE HYDROCHLORIDE 1 MG: 2 INJECTION INTRAMUSCULAR; INTRAVENOUS; SUBCUTANEOUS at 21:52

## 2018-11-10 RX ADMIN — HYDROMORPHONE HYDROCHLORIDE 1 MG: 2 INJECTION INTRAMUSCULAR; INTRAVENOUS; SUBCUTANEOUS at 17:59

## 2018-11-10 RX ADMIN — HYDROCORTISONE SODIUM SUCCINATE 15 MG: 100 INJECTION, POWDER, FOR SOLUTION INTRAMUSCULAR; INTRAVENOUS at 10:08

## 2018-11-10 RX ADMIN — Medication 10 ML: at 06:00

## 2018-11-10 RX ADMIN — CALCIUM GLUCONATE: 94 INJECTION, SOLUTION INTRAVENOUS at 20:10

## 2018-11-10 RX ADMIN — ALBUTEROL SULFATE 2.5 MG: 2.5 SOLUTION RESPIRATORY (INHALATION) at 19:12

## 2018-11-10 NOTE — PROGRESS NOTES
[facility]  [address] [place] [id number] [phone]  Colon and Rectal Surgery Progress Note      Patient: Kassy Burton MRN: [id number]  SSN: [id number]    YOB: 1945  Age: 72 y.o. Sex: female      Admit Date: 10/28/2018    LOS: 13 days     Subjective:     Vomiting. May have been after flagyl. Excellent stoma output. Objective:     Vitals:    11/09/18 2245 11/10/18 0239 11/10/18 0429 11/10/18 0740   BP: 125/65  114/70 119/72   Pulse: 85  76 79   Resp: 20  15 16   Temp: 98.1 °F (36.7 °C)  98 °F (36.7 °C) 98.1 °F (36.7 °C)   SpO2: 99%  96% 97%   Weight:  59.1 kg (130 lb 3.2 oz)     Height:            Intake and Output:  Current Shift: No intake/output data recorded. Last three shifts: 11/08 1901 - 11/10 0700  In: 620 [P.O.:120;  I.V.:500]  Out: 1950 [Urine:1500]      Physical Exam:     abd soft, appr tender  Stoma pink  Incision healthy    Lab/Data Review:    CMP:   Lab Results   Component Value Date/Time     11/10/2018 05:11 AM    K 3.6 11/10/2018 05:11 AM     11/10/2018 05:11 AM    CO2 34 (H) 11/10/2018 05:11 AM    AGAP 6 11/10/2018 05:11 AM     (H) 11/10/2018 05:11 AM    BUN 19 (H) 11/10/2018 05:11 AM    CREA 0.34 (L) 11/10/2018 05:11 AM    GFRAA >60 11/10/2018 05:11 AM    GFRNA >60 11/10/2018 05:11 AM    CA 7.8 (L) 11/10/2018 05:11 AM    MG 2.0 11/10/2018 05:11 AM    PHOS 3.3 11/10/2018 05:11 AM     CBC:   Lab Results   Component Value Date/Time    WBC 12.1 11/10/2018 05:11 AM    HGB 11.4 (L) 11/10/2018 05:11 AM    HCT 34.7 (L) 11/10/2018 05:11 AM     11/10/2018 05:11 AM          Assessment:     POD 4 s/p resection ileocolic anastomosis with end ileostomy for sbo and anastomotic leak    Plan:     NPO for the day given vomiting last night  Hold abx as afebrile and wbc now normal  OOB    Signed By: Elom Pozo MD        November 10, 2018

## 2018-11-10 NOTE — ROUTINE PROCESS
Assumed care of patient. Bedside verbal report received from Anthony Haddad RN  including SBAR, MAR, and Kardex. Vitals within normal limits. Assessment completed, patient in no apparent distress.

## 2018-11-10 NOTE — ROUTINE PROCESS
1900-Bedside shift change report given to Ml Marcos RN (oncoming nurse) by Patricio Santiago (offgoing nurse). Report included the following information SBAR, Kardex and MAR. Patient alert and oriented with family at bedside. No distress noted. Incision well approximated with sutures and opta , Will continue to monitor. 2300-Patient N/V, paged Dr. Cornell Liu and orders given to make patient Npo and give Zofran 4 mg Iv q hrs prn. Will continue to monitor. 0030-Patient sleeping at this time. 0700-Bedside shift change report given to Neil Boone (oncoming nurse) by Ml Marcos RN (offgoing nurse). Report included the following information SBAR, Kardex and MAR. Patient had some N/V throughout the night, Family remained at the bedside. Pain medication given and zofran for N/V. Patient ambulated to restroom with assistance .

## 2018-11-10 NOTE — ROUTINE PROCESS
Bedside and Verbal shift change report given to [id number] Car[address] Sepulveda (oncoming nurse) by Dennie Diego (offgoing nurse). Report included the following information SBAR, Kardex, ED Summary, Procedure Summary, Intake/Output, MAR and Recent Results.

## 2018-11-10 NOTE — ROUTINE PROCESS
Assumed care of patient. Bedside verbal report received from Anthony Haddad RN including SBAR, MAR, and Kardex. Vitals within normal limits. Patient asleep, states she is comfortable at this time. Assessment completed, patient in no apparent distress.

## 2018-11-10 NOTE — PROGRESS NOTES
Patient refused AM pills, stated they made her nauseous overnight. Discussed with Dr. Michael Pennington, who stated okay to attempt a new peripheral IV in order to resume IV medications. 22g inserted in left forearm, IV patent and patient denies complaints of pain. Will monitor.

## 2018-11-10 NOTE — PROGRESS NOTES
NUTRITION consult    Nursing Referral: [facility]  Nutrition Consult: TPN: RD to Manage     RECOMMENDATIONS / PLAN:     - Plan to provide parenteral nutrition support, monitor labs and adjust electrolytes daily.   - Restart po diet when medically appropriate per surgery  - Continue RD inpatient monitoring and evaluation. Please Note:   Parenteral nutrition support to be provided using custom product, allowing for modification of electrolyte and macronutrient content day to day. Calcium and magnesium included in next PN bag. Lipids and MVI included in PN on MWF. Macronutrient Goal: 71 gm amino acids, 325 gm dextrose, 250 mL lipids (1603 kcal weekly average). PARENTERAL NUTRITION ORDER:     Electrolyte Adjustments: Phos decreased     Day 10 PN to provide: 149 mEq Na, 109 mEq K, 7 mEq Ca, 17 mEq Mg, 37 mmol Phos, 1389 kcal, 71 gm amino acids, 325 gm dextrose, 2.5 mL trace elements    PN Rate: 75 mL/hr   Glucose Infusion Rate:3.82 mg/kg/min    Osmolarity: 1610 mOsm   Parenteral Nutrition Access Device: PICC placed 11/1/18  Indication for PN: unable to tolerate oral diet following GI surgery with malnutrition   Refeeding Risk:      [x]  Yes (risk diminished, pt receiving dextrose infusion since 10/30) [] No     NUTRITION DIAGNOSIS & INTERVENTIONS:     [x] Parenteral nutrition: continue   [x] Meals/Snacks: NPO  [x] Collaboration and referral of nutrition care: MD to review PN order and note daily; will not call to verify content and changes, per MD request.    Nutrition Diagnosis: Inadequate nutrient intake related to altered GI function and inability to consume oral diet as evidenced by pt NPO with possible ileus after GI surgery s/p ex lap and resection of ileocolonic anastomosis.      Patient meets criteria for Severe Protein Calorie Malnutrition as evidenced by:   ASPEN Malnutrition Criteria  Acute Illness, Chronic Illness, or Social/Enviornmental: Acute illness  Energy Intake: Less than/equal to 50% est energy req for greater than/equal to 5 days  Body Fat: Moderate(orbital, upper arm, thoracic and lumbar regions)  Muscle Mass: Moderate(moderate-hand, scapula; severe- temple, clavicle, shoulders.)  ASPEN Malnutrition Score - Acute Illness: 18  Acute Illness - Malnutrition Diagnosis: Severe malnutrition    ASSESSMENT:     110/10: Episodes of nausea and vomiting last night, made NPO. Per pt, believe it was related to medication. Good ostomy output. 11/9: Clear liquid diet started this AM.  11/8: NGT dislodged overnight and left out, no c/o nausea/vomiting and ileostomy with green +output. OOB, ambulating. Remains NPO.  11/7: S/p ex lap with resection of ileocolonic anastomosis and end ileosotomy yesterday. Drowsy this morning, treated with zofran for nausea with c/o of abdominal pain, +ileostomy output and NGT draining dark green/brown output- 675 mL x last 24 hours. 11/6: High grade obstruction at entero-colonic anastomosis per GGE yesterday and plan OR today for colonoscopy and possible laparotomy. Mg trending down over the past several days. 11/5: Remains NPO with still no bowel function GGE to evaluate for anastomotic obstruction per MD.   11/3: Ileus per CT, no BM and NGT remains in place to suction. Ionized calcium WNL today. 11/2: Recent BG elevated 161-183 mg/dL and receiving steroids, remains NPO with moderate dark green/brown output from NGT and no findings of abscess per CT. Symptoms improving and OOB, ambulating but no bowel function. RD completed NFPE.   11/1: Remains NPO with plan for PICC placement to start PN per MD, awaiting return of bowel function and CT abdomen/pelvis ordered. 10/30: In pain today. Episode of nausea last night, resolved with medication. One episode of flatus. Some abdominal distension. Pt weighed on standing scale yesterday, now with weight gain; question accuracy of weights and possible fluid masking weight change.   10/29: Readmitted with n/v after recent colectomy on 10/23 for cecal cancer. NGT placed to suction, minimal output noted, however pt's son report canister was emptied last night and pt report a lot of output yesterday. Pt weaker, with significant weight loss noted since last admission weight. Plan to obtain standing weight when appropriate to verify current weight. Pt continues to have muscle and fat loss, currently meets criteria for moderate malnutrition, but has had <50% of energy intake x 3 days, pt at high risk for becoming severely malnourished. PN infusion adequate to meet patients estimated nutritional needs:   [x] Yes     [] No   [] Unable to determine at this time    Diet: TPN ADULT - CENTRAL  DIET NPO Except Meds      Food Allergies: shellfish    Current Appetite:   [] Good     [] Fair     [] Poor     [x] Other: NPO x 4 days on admission prior to starting PN and first PN bag met <50% of energy needs   Appetite/meal intake prior to admission:   [] Good     [] Fair     [] Poor     [x] Other: Good appetite, but poor-fair intake after surgery  Feeding Limitations:  [] Swallowing difficulty    [] Chewing difficulty    [] Other:  Current Meal Intake: No data found.     NGT removed 11/7  BM: ileostomy (total output x last 24 hours: 350 mL)   Skin Integrity: abdominal surgical incisions     Edema:  [x] No     [] Yes   Pertinent Medications: Reviewed: zofran, steroid, SSI     Labs: BMP, MG, Phos ordered daily; CMP, Triglyceride, Prealbumin ordered initially and weekly  Recent Labs     11/10/18  0511 11/09/18  0300 11/08/18  0427    141 139   K 3.6 3.3* 3.7    104 100   CO2 34* 32 31   * 129* 162*   BUN 19* 19* 22*   CREA 0.34* 0.33* 0.48*   CA 7.8* 7.3* 7.8*   MG 2.0 1.7 1.7   PHOS 3.3 2.3* 2.5   Prealbumin: 9.46 mg/dL (11/9/18), 7.6 mg/dL (11/2/18)  Triglyceride: 38 mg/dL (11/9/18), 84 mg/dL (11/2/18)  Ionized Calcium: 1.13 mmol/L (11/3/18), 1.09 mmol/L (11/2/18)   Recent Labs     11/10/18  0511 11/09/18  0300 11/08/18  0427   WBC 12.1 14.3* 16.7*   HGB 11.4* 10.2* 11.0*   HCT 34.7* 31.2* 33.8*    394 374         Intake/Output Summary (Last 24 hours) at 11/10/2018 1341  Last data filed at 11/10/2018 0228  Gross per 24 hour   Intake 620 ml   Output 1300 ml   Net -680 ml       Anthropometrics:   Ht Readings from Last 1 Encounters:   10/28/18 5' 6\" (1.676 m)       Last 3 Recorded Weights in this Encounter    11/07/18 1127 11/08/18 1846 11/10/18 0239   Weight: 48.9 kg (107 lb 11.2 oz) 58.8 kg (129 lb 9.6 oz) 59.1 kg (130 lb 3.2 oz)      Body mass index is 21.01 kg/m². Underweight     Weight History:  -11 lbs (9%) x 5 days, -16 lbs (13%) x 1 month   Weight Metrics 11/10/2018 10/23/2018 10/4/2018 9/27/2018 9/23/2018 8/28/2018 8/16/2018   Weight 130 lb 3.2 oz 119 lb 118 lb 124 lb 117 lb 116 lb 122 lb 4.8 oz   BMI 21.01 kg/m2 19.21 kg/m2 19.05 kg/m2 20.01 kg/m2 18.88 kg/m2 19.3 kg/m2 20.35 kg/m2          Admitting Diagnosis: Obstruction of bowel (HCC)  Post-op pain  Ileus following gastrointestinal surgery  Pertinent PMHx: COPD, depression, nausea & vomiting, osteoporosis, vitamin D deficiency    Education Needs:        [x] None identified  [] Identified - Not appropriate at this time  []  Identified and addressed - refer to education log  Learning Limitations:   [x] None identified  [] Identified    Cultural, Anabaptism & ethnic food preferences:  [x] None identified    [] Identified and addressed     ESTIMATED NUTRITION NEEDS:     Calories: 7028-3069 kcal (HBEx1.2-1.4) based on   [] Actual BW      [x] IBW: 59 kg  Protein: 59-77 gm (1-1.3 gm/kg) based on   [] Actual BW      [x] IBW:   Fluid: 1 mL/kcal     MONITORING & EVALUATION:     Nutrition Goals:   1. Patient will receive nutrition via PN until they are able to tolerate adequate po intake/enteral nutrition.  Outcome:  [x] Met    []  Not Met    [] New Goal    Monitoring:  [x] Parenteral nutrition intake and administration  [x] Biochemical data, medical tests, and procedures   [x] Fluid intake   [x] Nutrition-focused physical findings   [x] Treatment/therapy   [] Food and beverage intake   [] Diet order      [] Weight        Previous Recommendations (for follow-up assessments only):     [x]   Implemented       []   Not Implemented (RD to address)      [] No Longer Appropriate     [] No Recommendation Made        Discharge Planning: Nutrition recommendations pending patient's ability to tolerate po intake/enteral nutrition.    [x]  Participated in care planning, discharge planning, & interdisciplinary rounds as appropriate      Raynaldo Blizzard, RD, [address]   Pager: [phone]

## 2018-11-11 LAB
ANION GAP SERPL CALC-SCNC: 4 MMOL/L (ref 3–18)
BUN SERPL-MCNC: 19 MG/DL (ref 7–18)
BUN/CREAT SERPL: 58 (ref 12–20)
CALCIUM SERPL-MCNC: 7.8 MG/DL (ref 8.5–10.1)
CHLORIDE SERPL-SCNC: 102 MMOL/L (ref 100–108)
CO2 SERPL-SCNC: 33 MMOL/L (ref 21–32)
CREAT SERPL-MCNC: 0.33 MG/DL (ref 0.6–1.3)
ERYTHROCYTE [DISTWIDTH] IN BLOOD BY AUTOMATED COUNT: 14 % (ref 11.6–14.5)
GLUCOSE BLD STRIP.AUTO-MCNC: 103 MG/DL (ref 70–110)
GLUCOSE BLD STRIP.AUTO-MCNC: 121 MG/DL (ref 70–110)
GLUCOSE BLD STRIP.AUTO-MCNC: 146 MG/DL (ref 70–110)
GLUCOSE BLD STRIP.AUTO-MCNC: 93 MG/DL (ref 70–110)
GLUCOSE SERPL-MCNC: 133 MG/DL (ref 74–99)
HCT VFR BLD AUTO: 28.3 % (ref 35–45)
HGB BLD-MCNC: 9.1 G/DL (ref 12–16)
MAGNESIUM SERPL-MCNC: 2.1 MG/DL (ref 1.6–2.6)
MCH RBC QN AUTO: 31.6 PG (ref 24–34)
MCHC RBC AUTO-ENTMCNC: 32.2 G/DL (ref 31–37)
MCV RBC AUTO: 98.3 FL (ref 74–97)
PHOSPHATE SERPL-MCNC: 3.2 MG/DL (ref 2.5–4.9)
PLATELET # BLD AUTO: 365 K/UL (ref 135–420)
PMV BLD AUTO: 10.6 FL (ref 9.2–11.8)
POTASSIUM SERPL-SCNC: 4.3 MMOL/L (ref 3.5–5.5)
RBC # BLD AUTO: 2.88 M/UL (ref 4.2–5.3)
SODIUM SERPL-SCNC: 139 MMOL/L (ref 136–145)
WBC # BLD AUTO: 14.5 K/UL (ref 4.6–13.2)

## 2018-11-11 PROCEDURE — 77010033678 HC OXYGEN DAILY

## 2018-11-11 PROCEDURE — 74011000258 HC RX REV CODE- 258: Performed by: COLON & RECTAL SURGERY

## 2018-11-11 PROCEDURE — 74011250636 HC RX REV CODE- 250/636: Performed by: SURGERY

## 2018-11-11 PROCEDURE — 80048 BASIC METABOLIC PNL TOTAL CA: CPT

## 2018-11-11 PROCEDURE — 84100 ASSAY OF PHOSPHORUS: CPT

## 2018-11-11 PROCEDURE — 85027 COMPLETE CBC AUTOMATED: CPT

## 2018-11-11 PROCEDURE — 94640 AIRWAY INHALATION TREATMENT: CPT

## 2018-11-11 PROCEDURE — 65270000029 HC RM PRIVATE

## 2018-11-11 PROCEDURE — 74011250637 HC RX REV CODE- 250/637: Performed by: COLON & RECTAL SURGERY

## 2018-11-11 PROCEDURE — 36592 COLLECT BLOOD FROM PICC: CPT

## 2018-11-11 PROCEDURE — 74011250636 HC RX REV CODE- 250/636: Performed by: COLON & RECTAL SURGERY

## 2018-11-11 PROCEDURE — 74011000250 HC RX REV CODE- 250: Performed by: COLON & RECTAL SURGERY

## 2018-11-11 PROCEDURE — 83735 ASSAY OF MAGNESIUM: CPT

## 2018-11-11 PROCEDURE — 82962 GLUCOSE BLOOD TEST: CPT

## 2018-11-11 RX ORDER — LORAZEPAM 1 MG/1
2 TABLET ORAL DAILY PRN
Status: DISCONTINUED | OUTPATIENT
Start: 2018-11-11 | End: 2018-11-11

## 2018-11-11 RX ORDER — LORAZEPAM 1 MG/1
2 TABLET ORAL
Status: DISCONTINUED | OUTPATIENT
Start: 2018-11-11 | End: 2018-11-15 | Stop reason: HOSPADM

## 2018-11-11 RX ADMIN — HYDROCORTISONE SODIUM SUCCINATE 15 MG: 100 INJECTION, POWDER, FOR SOLUTION INTRAMUSCULAR; INTRAVENOUS at 14:20

## 2018-11-11 RX ADMIN — HYDROMORPHONE HYDROCHLORIDE 1 MG: 2 INJECTION INTRAMUSCULAR; INTRAVENOUS; SUBCUTANEOUS at 01:37

## 2018-11-11 RX ADMIN — ENOXAPARIN SODIUM 40 MG: 100 INJECTION SUBCUTANEOUS at 21:26

## 2018-11-11 RX ADMIN — LORAZEPAM 2 MG: 1 TABLET ORAL at 21:25

## 2018-11-11 RX ADMIN — Medication 10 ML: at 21:26

## 2018-11-11 RX ADMIN — HYDROCORTISONE SODIUM SUCCINATE 15 MG: 100 INJECTION, POWDER, FOR SOLUTION INTRAMUSCULAR; INTRAVENOUS at 05:57

## 2018-11-11 RX ADMIN — ALBUTEROL SULFATE 2.5 MG: 2.5 SOLUTION RESPIRATORY (INHALATION) at 08:39

## 2018-11-11 RX ADMIN — HYDROMORPHONE HYDROCHLORIDE 1 MG: 2 INJECTION INTRAMUSCULAR; INTRAVENOUS; SUBCUTANEOUS at 14:15

## 2018-11-11 RX ADMIN — Medication 10 ML: at 05:57

## 2018-11-11 RX ADMIN — CALCIUM GLUCONATE: 94 INJECTION, SOLUTION INTRAVENOUS at 19:35

## 2018-11-11 RX ADMIN — HYDROMORPHONE HYDROCHLORIDE 1 MG: 2 INJECTION INTRAMUSCULAR; INTRAVENOUS; SUBCUTANEOUS at 05:57

## 2018-11-11 RX ADMIN — HYDROCORTISONE SODIUM SUCCINATE 15 MG: 100 INJECTION, POWDER, FOR SOLUTION INTRAMUSCULAR; INTRAVENOUS at 21:26

## 2018-11-11 RX ADMIN — ALBUTEROL SULFATE 2.5 MG: 2.5 SOLUTION RESPIRATORY (INHALATION) at 19:08

## 2018-11-11 RX ADMIN — HYDROMORPHONE HYDROCHLORIDE 1 MG: 2 INJECTION INTRAMUSCULAR; INTRAVENOUS; SUBCUTANEOUS at 10:07

## 2018-11-11 RX ADMIN — HYDROMORPHONE HYDROCHLORIDE 1 MG: 2 INJECTION INTRAMUSCULAR; INTRAVENOUS; SUBCUTANEOUS at 17:53

## 2018-11-11 RX ADMIN — Medication 10 ML: at 14:00

## 2018-11-11 RX ADMIN — ALBUTEROL SULFATE 2.5 MG: 2.5 SOLUTION RESPIRATORY (INHALATION) at 13:30

## 2018-11-11 RX ADMIN — LORAZEPAM 2 MG: 1 TABLET ORAL at 11:40

## 2018-11-11 RX ADMIN — HYDROMORPHONE HYDROCHLORIDE 1 MG: 2 INJECTION INTRAMUSCULAR; INTRAVENOUS; SUBCUTANEOUS at 22:05

## 2018-11-11 NOTE — ROUTINE PROCESS
Bedside and Verbal shift change report given to SADI Dowd (oncoming nurse) by Amarilys Willis RN (offgoing nurse). Report included the following information SBAR, Kardex, MAR and Recent Results.     SITUATION:    Code Status: Full Code   Reason for Admission: Obstruction of bowel ([address]   Post-op pain   Ileus following gastrointestinal surgery    [facility] day: 15   Problem List:       Hospital Problems  Date Reviewed: 11/6/2018          Codes Class Noted POA    Post-op pain ICD-10-CM: G89.18  ICD-9-CM: 338.18  10/28/2018 Unknown        Obstruction of bowel ([address] ICD-10-CM: L04.432  ICD-9-CM: 560.9  10/28/2018 Unknown        Ileus following gastrointestinal surgery ICD-10-CM: K91.30  ICD-9-CM: 997.49, 560.1  10/28/2018 Unknown              BACKGROUND:    Past Medical History:   Past Medical History:   Diagnosis Date    Anxiety     Arthritis     Asbestosis ([address]     Asthma     Back problem     Baker's cyst     Balance problems     Chronic lung disease     Cigarette smoker     Clotting disorder (HCC)     COPD [facility]chronic obstructive pulmonary disease[facility])     oxygen 2/liters    Depression     History of DVT (deep vein thrombosis)     Leg pain     Right    Lung disease     Nausea & vomiting     Osteoporosis     Restless leg syndrome     Spinal stenosis     Thromboembolus ([address]     Vitamin D deficiency          Patient taking anticoagulants yes     ASSESSMENT:    Changes in Assessment Throughout Shift: none     Patient has Central Line: yes Reasons if yes: TPN   Patient has Morales Cath: no Reasons if yes:       Last Vitals:     Vitals:    11/10/18 1522 11/10/18 1913 11/10/18 2007 11/11/18 0412   BP: 93/52  129/62 109/69   Pulse: 80  84 80   Resp: 18 18 17   Temp: 97.3 °F (36.3 °C)  98.8 °F (37.1 °C) 97.8 °F (36.6 °C)   SpO2: 97% 97% 96% 98%   Weight:    49.5 kg (109 lb 3.2 oz)   Height:            IV and DRAINS (will only show if present)   Peripheral IV 11/10/18 Left;Posterior Forearm-Site Assessment: Clean, dry, & intact  [REMOVED] Nasogastric Tube 10/28/18-Site Assessment: Clean, dry, & intact  [REMOVED] Peripheral IV 10/30/18 Left Wrist-Site Assessment: Clean, dry, & intact  [REMOVED] Peripheral IV 10/28/18 Left Antecubital-Site Assessment: Clean, dry, & intact  [REMOVED] Peripheral IV 10/31/18 Posterior Arm-Site Assessment: Clean, dry, & intact  PICC Single Lumen 62/60/55 Basilic;Right-Site Assessment: Clean, dry, & intact  [REMOVED] Peripheral IV 11/02/18 Left;Mid Forearm-Site Assessment: Clean, dry, & intact  [REMOVED] Peripheral IV 11/06/18 Left;Posterior Hand-Site Assessment: Clean, dry, & intact  [REMOVED] Peripheral IV Left Forearm-Site Assessment: Clean, dry, & intact     WOUND (if present)   Wound Type:  Staples midline    Dressing present Dressing Present : No   Wound Concerns/Notes:  none     PAIN    Pain Assessment    Pain Intensity 1: 5 (11/11/18 0601)    Pain Location 1: Abdomen    Pain Intervention(s) 1: Medication (see MAR)    Patient Stated Pain Goal: 2  o Interventions for Pain:  See mar  o Intervention effective: yes  o Time of last intervention: see mar   o Reassessment Completed: yes      Last 3 Weights:  Last 3 Recorded Weights in this Encounter    11/08/18 1846 11/10/18 0239 11/11/18 0412   Weight: 58.8 kg (129 lb 9.6 oz) 59.1 kg (130 lb 3.2 oz) 49.5 kg (109 lb 3.2 oz)     Weight change: -9.526 kg ()     INTAKE/OUPUT    Current Shift: 11/10 1901 - 11/11 0700  In: 202.5 [P.O.:60; I.V.:142.5]  Out: 800 [Urine:750]    Last three shifts: 11/09 0701 - 11/10 1900  In: 1423.8 [P.O.:120;  I.V.:1303.8]  Out: 2050 [Urine:1000]     LAB RESULTS     Recent Labs     11/11/18  0200 11/10/18  0511 11/09/18  0300   WBC 14.5* 12.1 14.3*   HGB 9.1* 11.4* 10.2*   HCT 28.3* 34.7* 31.2*    322 394        Recent Labs     11/11/18  0200 11/10/18  0511 11/09/18  0300    141 141   K 4.3 3.6 3.3*   * 115* 129*   BUN 19* 19* 19*   CREA 0.33* 0.34* 0.33*   CA 7.8* 7.8* 7.3*   MG 2.1 2.0 1.7       RECOMMENDATIONS AND DISCHARGE PLANNING     1. Pending tests/procedures/ Plan of Care or Other Needs: none     2. Discharge plan for patient and Needs/Barriers: home    3. Estimated Discharge Date: unknown Posted on Whiteboard in Patients Room: no      4. The patient's care plan was reviewed with the oncoming nurse. \"HEALS\" SAFETY CHECK      Fall Risk    Total Score: 4    Safety Measures: Safety Measures: Bed/Chair-Wheels locked, Bed in low position, Call light within reach, Family at bedside, Gripper socks, Side rails X 3    A safety check occurred in the patient's room between off going nurse and oncoming nurse listed above. The safety check included the below items  Area Items   H  High Alert Medications - Verify all high alert medication drips (heparin, PCA, etc.)   E  Equipment - Suction is set up for ALL patients (with gloria)  - Red plugs utilized for all equipment (IV pumps, etc.)  - WOWs wiped down at end of shift.  - Room stocked with oxygen, suction, and other unit-specific supplies   A  Alarms - Bed alarm is set for fall risk patients  - Ensure chair alarm is in place and activated if patient is up in a chair   L  Lines - Check IV for any infiltration  - Morales bag is empty if patient has a Morales   - Tubing and IV bags are labeled   S  Safety   - Room is clean, patient is clean, and equipment is clean. - Hallways are clear from equipment besides carts. - Fall bracelet on for fall risk patients  - Ensure room is clear and free of clutter  - Suction is set up for ALL patients (with gloria)  - Hallways are clear from equipment besides carts.    - Isolation precautions followed, supplies available outside room, sign posted     Toshia Baez RN

## 2018-11-11 NOTE — PROGRESS NOTES
NUTRITION consult    Nursing Referral: [facility]  Nutrition Consult: TPN: RD to Manage     RECOMMENDATIONS / PLAN:     - Plan to provide parenteral nutrition support, monitor labs and adjust electrolytes daily.   - Advance po diet as tolerated and as medically appropriate per surgery. Continue PN until pt consuming close to 75% of nutritional needs orally. - Continue RD inpatient monitoring and evaluation. Please Note:   Parenteral nutrition support to be provided using custom product, allowing for modification of electrolyte and macronutrient content day to day. Calcium and magnesium included in next PN bag. Lipids and MVI included in PN on MWF. Macronutrient Goal: 71 gm amino acids, 325 gm dextrose, 250 mL lipids (1603 kcal weekly average). PARENTERAL NUTRITION ORDER:     Electrolyte Adjustments: K decreased     Day 11 PN to provide: 149 mEq Na, 65 mEq K, 7 mEq Ca, 17 mEq Mg, 37 mmol Phos, 1389 kcal, 71 gm amino acids, 325 gm dextrose, 2.5 mL trace elements    PN Rate: 75 mL/hr   Glucose Infusion Rate: 4.56 mg/kg/min    Osmolarity: 1554 mOsm   Parenteral Nutrition Access Device: PICC placed 11/1/18  Indication for PN: unable to tolerate oral diet following GI surgery with malnutrition   Refeeding Risk:      [x]  Yes (risk diminished, pt receiving dextrose infusion since 10/30) [] No     NUTRITION DIAGNOSIS & INTERVENTIONS:     [x] Parenteral nutrition: continue   [x] Meals/Snacks: modified consistency  [x] Collaboration and referral of nutrition care: MD to review PN order and note daily; will not call to verify content and changes, per MD request.    Nutrition Diagnosis: Inadequate nutrient intake related to altered GI function and inability to consume oral diet as evidenced by pt with possible ileus after GI surgery s/p ex lap and resection of ileocolonic anastomosis.      Patient meets criteria for Severe Protein Calorie Malnutrition as evidenced by:   ASPEN Malnutrition Criteria  Acute Illness, Chronic Illness, or Social/Enviornmental: Acute illness  Energy Intake: Less than/equal to 50% est energy req for greater than/equal to 5 days  Body Fat: Moderate(orbital, upper arm, thoracic and lumbar regions)  Muscle Mass: Moderate(moderate-hand, scapula; severe- temple, clavicle, shoulders.)  ASPEN Malnutrition Score - Acute Illness: 18  Acute Illness - Malnutrition Diagnosis: Severe malnutrition    ASSESSMENT:     11/11: No more episodes of emesis. Diet restarted. Pt consumed coffee at breakfast.    11/10: Episodes of nausea and vomiting last night, made NPO. Per pt, believe it was related to medication. Good ostomy output. 11/9: Clear liquid diet started this AM.  11/8: NGT dislodged overnight and left out, no c/o nausea/vomiting and ileostomy with green +output. OOB, ambulating. Remains NPO.  11/7: S/p ex lap with resection of ileocolonic anastomosis and end ileosotomy yesterday. Drowsy this morning, treated with zofran for nausea with c/o of abdominal pain, +ileostomy output and NGT draining dark green/brown output- 675 mL x last 24 hours. 11/6: High grade obstruction at entero-colonic anastomosis per GGE yesterday and plan OR today for colonoscopy and possible laparotomy. Mg trending down over the past several days. 11/5: Remains NPO with still no bowel function GGE to evaluate for anastomotic obstruction per MD.   11/3: Ileus per CT, no BM and NGT remains in place to suction. Ionized calcium WNL today. 11/2: Recent BG elevated 161-183 mg/dL and receiving steroids, remains NPO with moderate dark green/brown output from NGT and no findings of abscess per CT. Symptoms improving and OOB, ambulating but no bowel function. RD completed NFPE.   11/1: Remains NPO with plan for PICC placement to start PN per MD, awaiting return of bowel function and CT abdomen/pelvis ordered. 10/30: In pain today. Episode of nausea last night, resolved with medication. One episode of flatus. Some abdominal distension.   Pt weighed on standing scale yesterday, now with weight gain; question accuracy of weights and possible fluid masking weight change. 10/29: Readmitted with n/v after recent colectomy on 10/23 for cecal cancer. NGT placed to suction, minimal output noted, however pt's son report canister was emptied last night and pt report a lot of output yesterday. Pt weaker, with significant weight loss noted since last admission weight. Plan to obtain standing weight when appropriate to verify current weight. Pt continues to have muscle and fat loss, currently meets criteria for moderate malnutrition, but has had <50% of energy intake x 3 days, pt at high risk for becoming severely malnourished. PN infusion adequate to meet patients estimated nutritional needs:   [x] Yes     [] No   [] Unable to determine at this time  Average po intake adequate to meet patients estimated nutritional needs:   [] Yes     [x] No   [] Unable to determine at this time    Diet: TPN ADULT - CENTRAL  DIET CLEAR LIQUID      Food Allergies: shellfish    Current Appetite:   [] Good     [] Fair     [x] Poor     [x] Other: NPO x 4 days on admission prior to starting PN and first PN bag met <50% of energy needs   Appetite/meal intake prior to admission:   [] Good     [] Fair     [] Poor     [x] Other: Good appetite, but poor-fair intake after surgery  Feeding Limitations:  [] Swallowing difficulty    [] Chewing difficulty    [] Other:  Current Meal Intake: No data found.     NGT removed 11/7  BM: ileostomy (total output x last 24 hours: 800 mL)   Skin Integrity: abdominal surgical incisions     Edema:  [x] No     [] Yes   Pertinent Medications: Reviewed: zofran, steroid, SSI     Labs: BMP, MG, Phos ordered daily; CMP, Triglyceride, Prealbumin ordered initially and weekly  Recent Labs     11/11/18  0200 11/10/18  0511 11/09/18  0300    141 141   K 4.3 3.6 3.3*    101 104   CO2 33* 34* 32   * 115* 129*   BUN 19* 19* 19*   CREA 0.33* 0.34* 0.33*   CA 7.8* 7.8* 7.3*   MG 2.1 2.0 1.7   PHOS 3.2 3.3 2.3*   Prealbumin: 9.46 mg/dL (11/9/18), 7.6 mg/dL (11/2/18)  Triglyceride: 38 mg/dL (11/9/18), 84 mg/dL (11/2/18)  Ionized Calcium: 1.13 mmol/L (11/3/18), 1.09 mmol/L (11/2/18)   Recent Labs     11/11/18  0200 11/10/18  0511 11/09/18  0300   WBC 14.5* 12.1 14.3*   HGB 9.1* 11.4* 10.2*   HCT 28.3* 34.7* 31.2*    322 394         Intake/Output Summary (Last 24 hours) at 11/11/2018 1401  Last data filed at 11/11/2018 1233  Gross per 24 hour   Intake 202.5 ml   Output 1890 ml   Net -1687.5 ml       Anthropometrics:   Ht Readings from Last 1 Encounters:   10/28/18 5' 6\" (1.676 m)       Last 3 Recorded Weights in this Encounter    11/08/18 1846 11/10/18 0239 11/11/18 0412   Weight: 58.8 kg (129 lb 9.6 oz) 59.1 kg (130 lb 3.2 oz) 49.5 kg (109 lb 3.2 oz)      Body mass index is 17.63 kg/m².   Underweight     Weight History:  -11 lbs (9%) x 5 days, -16 lbs (13%) x 1 month   Weight Metrics 11/11/2018 10/23/2018 10/4/2018 9/27/2018 9/23/2018 8/28/2018 8/16/2018   Weight 109 lb 3.2 oz 119 lb 118 lb 124 lb 117 lb 116 lb 122 lb 4.8 oz   BMI 17.63 kg/m2 19.21 kg/m2 19.05 kg/m2 20.01 kg/m2 18.88 kg/m2 19.3 kg/m2 20.35 kg/m2          Admitting Diagnosis: Obstruction of bowel (HCC)  Post-op pain  Ileus following gastrointestinal surgery  Pertinent PMHx: COPD, depression, nausea & vomiting, osteoporosis, vitamin D deficiency    Education Needs:        [x] None identified  [] Identified - Not appropriate at this time  []  Identified and addressed - refer to education log  Learning Limitations:   [x] None identified  [] Identified    Cultural, Bahai & ethnic food preferences:  [x] None identified    [] Identified and addressed     ESTIMATED NUTRITION NEEDS:     Calories: 8999-7186 kcal (HBEx1.2-1.4) based on   [] Actual BW      [x] IBW: 59 kg  Protein: 59-77 gm (1-1.3 gm/kg) based on   [] Actual BW      [x] IBW:   Fluid: 1 mL/kcal     MONITORING & EVALUATION: Nutrition Goals:   1. Patient will receive nutrition via PN until they are able to tolerate adequate po intake/enteral nutrition. Outcome:  [x] Met    []  Not Met    [] New Goal    Monitoring:  [x] Parenteral nutrition intake and administration  [x] Biochemical data, medical tests, and procedures   [x] Fluid intake   [x] Nutrition-focused physical findings   [x] Treatment/therapy   [] Food and beverage intake   [] Diet order      [] Weight        Previous Recommendations (for follow-up assessments only):     [x]   Implemented       []   Not Implemented (RD to address)      [] No Longer Appropriate     [] No Recommendation Made        Discharge Planning: Nutrition recommendations pending patient's ability to tolerate po intake/enteral nutrition.    [x]  Participated in care planning, discharge planning, & interdisciplinary rounds as appropriate      Zain Beaulieu RD, [address]   Pager: [phone]

## 2018-11-11 NOTE — PROGRESS NOTES
Patients is alert awake and oriented sitting up in bed with family members at bed side. Area to abdomen dry and intact open to air with ileostomy patent draining greenish semi soft drainage. No s/s of distress or sob.

## 2018-11-11 NOTE — PROGRESS NOTES
[facility]  [address] [place] [id number] [phone]  Colon and Rectal Surgery Progress Note      Patient: Yelena Osborn MRN: [id number]  SSN: [id number]    YOB: 1945  Age: 72 y.o. Sex: female      Admit Date: 10/28/2018    LOS: 14 days     Subjective:     No further vomiting. Good stoma output. Objective:     Vitals:    11/10/18 1913 11/10/18 2007 11/11/18 0412 11/11/18 0751   BP:  129/62 109/69 101/66   Pulse:  84 80 78   Resp:  18 17 18   Temp:  98.8 °F (37.1 °C) 97.8 °F (36.6 °C) 98.1 °F (36.7 °C)   SpO2: 97% 96% 98% 98%   Weight:   49.5 kg (109 lb 3.2 oz)    Height:            Intake and Output:  Current Shift: No intake/output data recorded.   Last three shifts: 11/09 1901 - 11/11 0700  In: 1506.3 [P.O.:60; I.V.:1446.3]  Out: 2150 [Urine:1350]      Physical Exam:     abd soft, appr tender  Stoma pink  Incision healthy    Lab/Data Review:    CMP:   Lab Results   Component Value Date/Time     11/11/2018 02:00 AM    K 4.3 11/11/2018 02:00 AM     11/11/2018 02:00 AM    CO2 33 (H) 11/11/2018 02:00 AM    AGAP 4 11/11/2018 02:00 AM     (H) 11/11/2018 02:00 AM    BUN 19 (H) 11/11/2018 02:00 AM    CREA 0.33 (L) 11/11/2018 02:00 AM    GFRAA >60 11/11/2018 02:00 AM    GFRNA >60 11/11/2018 02:00 AM    CA 7.8 (L) 11/11/2018 02:00 AM    MG 2.1 11/11/2018 02:00 AM    PHOS 3.2 11/11/2018 02:00 AM     CBC:   Lab Results   Component Value Date/Time    WBC 14.5 (H) 11/11/2018 02:00 AM    HGB 9.1 (L) 11/11/2018 02:00 AM    HCT 28.3 (L) 11/11/2018 02:00 AM     11/11/2018 02:00 AM          Assessment:     POD 5 s/p resection ileocolic anastomosis with end ileostomy for sbo and anastomotic leak    Plan:     Start clears    Signed By: Abena Pressley MD        November 11, 2018

## 2018-11-11 NOTE — ROUTINE PROCESS
Report given to Esther Montes RN, including SBAR, MAR, and Kardex. Patient alert and oriented, vitals within normal limits, no apparent distress.

## 2018-11-12 LAB
ANION GAP SERPL CALC-SCNC: 7 MMOL/L (ref 3–18)
ATRIAL RATE: 87 BPM
BUN SERPL-MCNC: 13 MG/DL (ref 7–18)
BUN/CREAT SERPL: 39 (ref 12–20)
CALCIUM SERPL-MCNC: 7.8 MG/DL (ref 8.5–10.1)
CALCULATED P AXIS, ECG09: 67 DEGREES
CALCULATED R AXIS, ECG10: 90 DEGREES
CALCULATED T AXIS, ECG11: 72 DEGREES
CHLORIDE SERPL-SCNC: 99 MMOL/L (ref 100–108)
CO2 SERPL-SCNC: 32 MMOL/L (ref 21–32)
CREAT SERPL-MCNC: 0.33 MG/DL (ref 0.6–1.3)
DIAGNOSIS, 93000: NORMAL
ERYTHROCYTE [DISTWIDTH] IN BLOOD BY AUTOMATED COUNT: 13.7 % (ref 11.6–14.5)
GLUCOSE BLD STRIP.AUTO-MCNC: 109 MG/DL (ref 70–110)
GLUCOSE BLD STRIP.AUTO-MCNC: 118 MG/DL (ref 70–110)
GLUCOSE BLD STRIP.AUTO-MCNC: 129 MG/DL (ref 70–110)
GLUCOSE BLD STRIP.AUTO-MCNC: 143 MG/DL (ref 70–110)
GLUCOSE BLD STRIP.AUTO-MCNC: 167 MG/DL (ref 70–110)
GLUCOSE SERPL-MCNC: 144 MG/DL (ref 74–99)
HCT VFR BLD AUTO: 26.1 % (ref 35–45)
HGB BLD-MCNC: 8.8 G/DL (ref 12–16)
MAGNESIUM SERPL-MCNC: 2 MG/DL (ref 1.6–2.6)
MCH RBC QN AUTO: 32.5 PG (ref 24–34)
MCHC RBC AUTO-ENTMCNC: 33.7 G/DL (ref 31–37)
MCV RBC AUTO: 96.3 FL (ref 74–97)
P-R INTERVAL, ECG05: 118 MS
PHOSPHATE SERPL-MCNC: 3.5 MG/DL (ref 2.5–4.9)
PLATELET # BLD AUTO: 318 K/UL (ref 135–420)
PMV BLD AUTO: 10.5 FL (ref 9.2–11.8)
POTASSIUM SERPL-SCNC: 3.8 MMOL/L (ref 3.5–5.5)
Q-T INTERVAL, ECG07: 360 MS
QRS DURATION, ECG06: 82 MS
QTC CALCULATION (BEZET), ECG08: 433 MS
RBC # BLD AUTO: 2.71 M/UL (ref 4.2–5.3)
SODIUM SERPL-SCNC: 138 MMOL/L (ref 136–145)
VENTRICULAR RATE, ECG03: 87 BPM
WBC # BLD AUTO: 13.9 K/UL (ref 4.6–13.2)

## 2018-11-12 PROCEDURE — 77030010520

## 2018-11-12 PROCEDURE — 74011250636 HC RX REV CODE- 250/636: Performed by: SURGERY

## 2018-11-12 PROCEDURE — 84100 ASSAY OF PHOSPHORUS: CPT

## 2018-11-12 PROCEDURE — 65270000029 HC RM PRIVATE

## 2018-11-12 PROCEDURE — 74011000250 HC RX REV CODE- 250: Performed by: COLON & RECTAL SURGERY

## 2018-11-12 PROCEDURE — 77030010522

## 2018-11-12 PROCEDURE — 85027 COMPLETE CBC AUTOMATED: CPT

## 2018-11-12 PROCEDURE — 80048 BASIC METABOLIC PNL TOTAL CA: CPT

## 2018-11-12 PROCEDURE — 93005 ELECTROCARDIOGRAM TRACING: CPT

## 2018-11-12 PROCEDURE — 77010033678 HC OXYGEN DAILY

## 2018-11-12 PROCEDURE — 74011250636 HC RX REV CODE- 250/636: Performed by: COLON & RECTAL SURGERY

## 2018-11-12 PROCEDURE — 77030011641 HC PASTE OST ADH BMS -A

## 2018-11-12 PROCEDURE — 77030010811

## 2018-11-12 PROCEDURE — 83735 ASSAY OF MAGNESIUM: CPT

## 2018-11-12 PROCEDURE — 77030010541

## 2018-11-12 PROCEDURE — 82962 GLUCOSE BLOOD TEST: CPT

## 2018-11-12 PROCEDURE — 94640 AIRWAY INHALATION TREATMENT: CPT

## 2018-11-12 PROCEDURE — 74011000258 HC RX REV CODE- 258: Performed by: COLON & RECTAL SURGERY

## 2018-11-12 PROCEDURE — 36592 COLLECT BLOOD FROM PICC: CPT

## 2018-11-12 RX ORDER — HYDROCORTISONE SODIUM SUCCINATE 100 MG/2ML
15 INJECTION, POWDER, FOR SOLUTION INTRAMUSCULAR; INTRAVENOUS EVERY 8 HOURS
Status: COMPLETED | OUTPATIENT
Start: 2018-11-12 | End: 2018-11-12

## 2018-11-12 RX ORDER — PREDNISONE 10 MG/1
10 TABLET ORAL
Status: DISCONTINUED | OUTPATIENT
Start: 2018-11-13 | End: 2018-11-15 | Stop reason: HOSPADM

## 2018-11-12 RX ADMIN — HYDROMORPHONE HYDROCHLORIDE 1 MG: 2 INJECTION INTRAMUSCULAR; INTRAVENOUS; SUBCUTANEOUS at 23:20

## 2018-11-12 RX ADMIN — HYDROMORPHONE HYDROCHLORIDE 1 MG: 2 INJECTION INTRAMUSCULAR; INTRAVENOUS; SUBCUTANEOUS at 08:07

## 2018-11-12 RX ADMIN — ENOXAPARIN SODIUM 40 MG: 100 INJECTION SUBCUTANEOUS at 20:28

## 2018-11-12 RX ADMIN — Medication 10 ML: at 13:59

## 2018-11-12 RX ADMIN — HYDROCORTISONE SODIUM SUCCINATE 15 MG: 100 INJECTION, POWDER, FOR SOLUTION INTRAMUSCULAR; INTRAVENOUS at 13:53

## 2018-11-12 RX ADMIN — Medication 10 ML: at 06:07

## 2018-11-12 RX ADMIN — CALCIUM GLUCONATE: 94 INJECTION, SOLUTION INTRAVENOUS at 20:31

## 2018-11-12 RX ADMIN — HYDROCORTISONE SODIUM SUCCINATE 15 MG: 100 INJECTION, POWDER, FOR SOLUTION INTRAMUSCULAR; INTRAVENOUS at 23:23

## 2018-11-12 RX ADMIN — ALBUTEROL SULFATE 2.5 MG: 2.5 SOLUTION RESPIRATORY (INHALATION) at 14:04

## 2018-11-12 RX ADMIN — Medication 10 ML: at 22:00

## 2018-11-12 RX ADMIN — HYDROMORPHONE HYDROCHLORIDE 1 MG: 2 INJECTION INTRAMUSCULAR; INTRAVENOUS; SUBCUTANEOUS at 16:01

## 2018-11-12 RX ADMIN — HYDROCORTISONE SODIUM SUCCINATE 15 MG: 100 INJECTION, POWDER, FOR SOLUTION INTRAMUSCULAR; INTRAVENOUS at 06:07

## 2018-11-12 RX ADMIN — HYDROMORPHONE HYDROCHLORIDE 1 MG: 2 INJECTION INTRAMUSCULAR; INTRAVENOUS; SUBCUTANEOUS at 20:18

## 2018-11-12 RX ADMIN — HYDROMORPHONE HYDROCHLORIDE 1 MG: 2 INJECTION INTRAMUSCULAR; INTRAVENOUS; SUBCUTANEOUS at 04:36

## 2018-11-12 RX ADMIN — HYDROMORPHONE HYDROCHLORIDE 1 MG: 2 INJECTION INTRAMUSCULAR; INTRAVENOUS; SUBCUTANEOUS at 12:00

## 2018-11-12 NOTE — ROUTINE PROCESS
Bedside and Verbal shift change report given to Tori Schmid, RN, RN (oncoming nurse) by Yanna Henriquez, SADI (offgoing nurse). Report included the following information SBAR, Kardex, MAR and Recent Results.     SITUATION:    Code Status: Full Code  Reason for Admission: Obstruction of bowel ([address]  Post-op pain   Ileus following gastrointestinal surgery    [facility] day: 15   Problem List:       Hospital Problems  Date Reviewed: 11/6/2018          Codes Class Noted POA    Post-op pain ICD-10-CM: G89.18  ICD-9-CM: 338.18  10/28/2018 Unknown        Obstruction of bowel ([address] ICD-10-CM: Y11.193  ICD-9-CM: 560.9  10/28/2018 Unknown        Ileus following gastrointestinal surgery ICD-10-CM: K91.30  ICD-9-CM: 997.49, 560.1  10/28/2018 Unknown              BACKGROUND:    Past Medical History:   Past Medical History:   Diagnosis Date    Anxiety     Arthritis     Asbestosis ([address]     Asthma     Back problem     Baker's cyst     Balance problems     Chronic lung disease     Cigarette smoker     Clotting disorder (HCC)     COPD [facility]ch[facility] obstructive pulmonary disease) ([facility])     oxygen 2/liters    Depression     History of DVT (deep vein thrombosis)     Leg pain     Right    Lung disease     Nausea & vomiting     Osteoporosis     Restless leg syndrome     Spinal stenosis     Thromboembolus ([address]     Vitamin D deficiency          Patient taking anticoagulants yes     ASSESSMENT:    Changes in Assessment Throughout Shift: leaking at staples     Patient has Central Line: yes Reasons if yes: TPN   Patient has Morales Cath: no Reasons if yes:       Last Vitals:     Vitals:    11/11/18 1523 11/11/18 1909 11/11/18 1943 11/12/18 0447   BP: 118/72  117/70 109/64   Pulse: 87  87 72   Resp: 16  20 24   Temp: 98 °F (36.7 °C)  98.9 °F (37.2 °C) 98.9 °F (37.2 °C)   SpO2: 99% 99% 95% 96%   Weight:       Height:            IV and DRAINS (will only show if present)   Peripheral IV 11/10/18 Left;Posterior Forearm-Site Assessment: Clean, dry, & intact  [REMOVED] Nasogastric Tube 10/28/18-Site Assessment: Clean, dry, & intact  [REMOVED] Peripheral IV 10/30/18 Left Wrist-Site Assessment: Clean, dry, & intact  [REMOVED] Peripheral IV 10/28/18 Left Antecubital-Site Assessment: Clean, dry, & intact  [REMOVED] Peripheral IV 10/31/18 Posterior Arm-Site Assessment: Clean, dry, & intact  PICC Single Lumen 76/34/50 Basilic;Right-Site Assessment: Clean, dry, & intact  [REMOVED] Peripheral IV 11/02/18 Left;Mid Forearm-Site Assessment: Clean, dry, & intact  [REMOVED] Peripheral IV 11/06/18 Left;Posterior Hand-Site Assessment: Clean, dry, & intact  [REMOVED] Peripheral IV Left Forearm-Site Assessment: Clean, dry, & intact     WOUND (if present)   Wound Type:  Staples midline    Dressing present yes   Wound Concerns/Notes:  drainage     PAIN    Pain Assessment    Pain Intensity 1: 2 (11/12/18 0447)    Pain Location 1: Abdomen    Pain Intervention(s) 1: Medication (see MAR)    Patient Stated Pain Goal: 2  o Interventions for Pain:  See mar  o Intervention effective: yes  o Time of last intervention: see mar   o Reassessment Completed: yes      Last 3 Weights:  Last 3 Recorded Weights in this Encounter    11/08/18 1846 11/10/18 0239 11/11/18 0412   Weight: 58.8 kg (129 lb 9.6 oz) 59.1 kg (130 lb 3.2 oz) 49.5 kg (109 lb 3.2 oz)     Weight change:      INTAKE/OUPUT    Current Shift: 11/11 1901 - 11/12 0700  In: 1772.5 [P.O.:120; I.V.:1652.5]  Out: 1570 [Urine:1500]    Last three shifts: 11/10 0701 - 11/11 1900  In: 1046.3 [P.O.:100;  I.V.:946.3]  Out: 2690 [Urine:1850]     LAB RESULTS     Recent Labs     11/12/18  0430 11/11/18  0200 11/10/18  0511   WBC 13.9* 14.5* 12.1   HGB 8.8* 9.1* 11.4*   HCT 26.1* 28.3* 34.7*    365 322        Recent Labs     11/12/18  0430 11/11/18  0200 11/10/18  0511    139 141   K 3.8 4.3 3.6   * 133* 115*   BUN 13 19* 19*   CREA 0.33* 0.33* 0.34*   CA 7.8* 7.8* 7.8*   MG 2.0 2.1 2.0 RECOMMENDATIONS AND DISCHARGE PLANNING     1. Pending tests/procedures/ Plan of Care or Other Needs: none     2. Discharge plan for patient and Needs/Barriers: home    3. Estimated Discharge Date: unknown Posted on Whiteboard in Patients Room: no      4. The patient's care plan was reviewed with the oncoming nurse. \"HEALS\" SAFETY CHECK      Fall Risk    Total Score: 4    Safety Measures: Safety Measures: Bed/Chair-Wheels locked, Bed in low position, Call light within reach, Family at bedside, Gripper socks, Side rails X 3    A safety check occurred in the patient's room between off going nurse and oncoming nurse listed above. The safety check included the below items  Area Items   H  High Alert Medications - Verify all high alert medication drips (heparin, PCA, etc.)   E  Equipment - Suction is set up for ALL patients (with gloria)  - Red plugs utilized for all equipment (IV pumps, etc.)  - WOWs wiped down at end of shift.  - Room stocked with oxygen, suction, and other unit-specific supplies   A  Alarms - Bed alarm is set for fall risk patients  - Ensure chair alarm is in place and activated if patient is up in a chair   L  Lines - Check IV for any infiltration  - Morales bag is empty if patient has a Morales   - Tubing and IV bags are labeled   S  Safety   - Room is clean, patient is clean, and equipment is clean. - Hallways are clear from equipment besides carts. - Fall bracelet on for fall risk patients  - Ensure room is clear and free of clutter  - Suction is set up for ALL patients (with gloria)  - Hallways are clear from equipment besides carts.    - Isolation precautions followed, supplies available outside room, sign posted     Gabriel Clemente RN

## 2018-11-12 NOTE — PROGRESS NOTES
0530-Pt having increased serosanguinous drainage from staple line. Thin blood tinged fluid. Drainage interfering with ostomy appliance tape. Steri-strips applied to staple line. New ostomy appliance on. Stoma is  Beefy red with thin greenish stool and air emptying.

## 2018-11-12 NOTE — ROUTINE PROCESS
Assumed care of patient. Bedside verbal report received from Anh Romero RN including SBAR, MAR, and Kardex. Vitals within normal limits. Assessment completed, irregular heart rhythm noted on auscultation, will monitor and notify MD. Patient in no apparent distress, breathing easy and nonlabored.

## 2018-11-12 NOTE — PROGRESS NOTES
[facility]  [address] [place] [address]  [phone]  Colon and Rectal Surgery Progress Note      Patient: Nikolai Stovall MRN: [id number]  SSN: [id number]    YOB: 1945  Age: 72 y.o. Sex: female      Admit Date: 10/28/2018    LOS: 15 days     Subjective: Tolerating some clears. Clear fluid from wound. Objective:     Vitals:    11/11/18 1523 11/11/18 1909 11/11/18 1943 11/12/18 0447   BP: 118/72  117/70 109/64   Pulse: 87  87 72   Resp: 16  20 24   Temp: 98 °F (36.7 °C)  98.9 °F (37.2 °C) 98.9 °F (37.2 °C)   SpO2: 99% 99% 95% 96%   Weight:       Height:            Intake and Output:  Current Shift: 11/11 1901 - 11/12 0700  In: 1772.5 [P.O.:120; I.V.:1652.5]  Out: 1570 [Urine:1500]  Last three shifts: 11/10 0701 - 11/11 1900  In: 1046.3 [P.O.:100;  I.V.:946.3]  Out: 2690 [Urine:1850]      Physical Exam:     abd soft, appr tender  Stoma pink  Incision healthy    Lab/Data Review:    CMP:   Lab Results   Component Value Date/Time     11/12/2018 04:30 AM    K 3.8 11/12/2018 04:30 AM    CL 99 (L) 11/12/2018 04:30 AM    CO2 32 11/12/2018 04:30 AM    AGAP 7 11/12/2018 04:30 AM     (H) 11/12/2018 04:30 AM    BUN 13 11/12/2018 04:30 AM    CREA 0.33 (L) 11/12/2018 04:30 AM    GFRAA >60 11/12/2018 04:30 AM    GFRNA >60 11/12/2018 04:30 AM    CA 7.8 (L) 11/12/2018 04:30 AM    MG 2.0 11/12/2018 04:30 AM    PHOS 3.5 11/12/2018 04:30 AM     CBC:   Lab Results   Component Value Date/Time    WBC 13.9 (H) 11/12/2018 04:30 AM    HGB 8.8 (L) 11/12/2018 04:30 AM    HCT 26.1 (L) 11/12/2018 04:30 AM     11/12/2018 04:30 AM          Assessment:     POD 6 s/p resection ileocolic anastomosis with end ileostomy for sbo and anastomotic leak, possible fascial dehiscence    Plan:     Advance to low fiber  Restart antibiotics    Signed By: Rosaura Mclean MD        November 12, 2018

## 2018-11-12 NOTE — ROUTINE PROCESS
Bedside and Verbal shift change report given to Kyle Wall RN (oncoming nurse) by Naima Clemons (offgoing nurse). Report included the following information SBAR, Kardex, Intake/Output and MAR.

## 2018-11-12 NOTE — ROUTINE PROCESS
Alerted Dr. Fabian Cesar to new irregular heart rhythm noted on auscultation, EKG order obtained. Will monitor.

## 2018-11-12 NOTE — PROGRESS NOTES
Abdominal dressing saturated. Dressing changed, ostomy wafer and bag changed due to saturation. Ostomy site clean, dry, and intact, beefy red and passing stool and flatus. 4x4 gauze x6 and ABD pad applied. Discussed with Dr. Michael Pennington, who instructed to keep reinforcing and changing the dressing as needed, no new dressing orders.

## 2018-11-12 NOTE — PROGRESS NOTES
Review chart. Met with pt and family with consent and discussed discharge plan. Pt still adamant about going home with family. CM will continue to monitor for any transitional needs.   KRISTYN AlexanderN, [phone]

## 2018-11-12 NOTE — PROGRESS NOTES
Dressing changed to abdominal staple line, large amount of drainage from wound. 4x4 gauze x6 and ABD pad applied, will monitor.

## 2018-11-12 NOTE — ROUTINE PROCESS
Abdominal dressing changed, old dressing saturated with serosanguinous drainage, small amount of green/tan purulent drainage noted as well. 4x4 gauze and ABD pad applied to suture line. Ostomy dressing dry and intact.

## 2018-11-12 NOTE — WOUND CARE
Physical Exam   Room 515: Continued ostomy teaching:  Pt & family present for ostomy education. Appliance just changed by primary nurse. Went over supplies, how they are packaged, how they function/are used, & how to obtain from [address] when home health care services are completed. Discussed steps in appliance change & color photos in educational folder. Discussed adaptation to new changes in body image & how bowels function. Pt is conversive, calm, & pleasant. Extra supplies at bedside. Will get [address] to send pt a starter kit to her house address; signed privacy statement completed & placed in [facility]. Plan to continue teaching tomorrow.    Cammie SIMONSN, RN, [facility] [employer], [address]

## 2018-11-12 NOTE — PROGRESS NOTES
NUTRITION consult    Nursing Referral: [facility]  Nutrition Consult: TPN: RD to Manage     RECOMMENDATIONS / PLAN:     - Plan to provide parenteral nutrition support, monitor labs and adjust electrolytes daily. - Continue PN until pt consuming close to 75% of nutritional needs orally. - Continue RD inpatient monitoring and evaluation. Please Note:   Parenteral nutrition support to be provided using custom product, allowing for modification of electrolyte and macronutrient content day to day. Lipids and MVI included in PN on MWF. Macronutrient Goal: 71 gm amino acids, 325 gm dextrose, 250 mL lipids (1603 kcal weekly average). PARENTERAL NUTRITION ORDER:     Electrolyte Adjustments: no changes     Day 12 PN to provide: 149 mEq Na, 65 mEq K, 7 mEq Ca, 17 mEq Mg, 37 mmol Phos, 1889 kcal, 71 gm amino acids, 325 gm dextrose, 250 mL lipids, 10 mL MVI, 2.5 mL trace elements    PN Rate: 75 mL/hr   Glucose Infusion Rate: 3.9 mg/kg/min    Osmolarity: 1581 mOsm   Parenteral Nutrition Access Device: PICC placed 11/1/18  Indication for PN: unable to tolerate oral diet following GI surgery with malnutrition   Refeeding Risk:      [x]  Yes (risk diminished, pt receiving dextrose infusion since 10/30) [] No     NUTRITION DIAGNOSIS & INTERVENTIONS:     [x] Parenteral nutrition: continue   [x] Meals/Snacks: modified consistency  [x] Collaboration and referral of nutrition care: MD to review PN order and note daily; will not call to verify content and changes, per MD request. Continue PN per Dr Ward Doe     Nutrition Diagnosis: Inadequate nutrient intake related to altered GI function and inability to consume oral diet as evidenced by pt with possible ileus after GI surgery s/p ex lap and resection of ileocolonic anastomosis.      Patient meets criteria for Severe Protein Calorie Malnutrition as evidenced by:   ASPEN Malnutrition Criteria  Acute Illness, Chronic Illness, or Social/Enviornmental: Acute illness  Energy Intake: Less than/equal to 50% est energy req for greater than/equal to 5 days  Body Fat: Moderate(orbital, upper arm, thoracic and lumbar regions)  Muscle Mass: Moderate(moderate-hand, scapula; severe- temple, clavicle, shoulders.)  ASPEN Malnutrition Score - Acute Illness: 18  Acute Illness - Malnutrition Diagnosis: Severe malnutrition    ASSESSMENT:     11/12: Tolerating solid diet, advanced this morning and consuming 25% of meals. No nausea/vomiting. 11/11: No more episodes of emesis. Diet restarted. Pt consumed coffee at breakfast.    11/10: Episodes of nausea and vomiting last night, made NPO. Per pt, believe it was related to medication. Good ostomy output. 11/9: Clear liquid diet started this AM.  11/8: NGT dislodged overnight and left out, no c/o nausea/vomiting and ileostomy with green +output. OOB, ambulating. Remains NPO.  11/7: S/p ex lap with resection of ileocolonic anastomosis and end ileosotomy yesterday. Drowsy this morning, treated with zofran for nausea with c/o of abdominal pain, +ileostomy output and NGT draining dark green/brown output- 675 mL x last 24 hours. 11/6: High grade obstruction at entero-colonic anastomosis per GGE yesterday and plan OR today for colonoscopy and possible laparotomy. Mg trending down over the past several days. 11/5: Remains NPO with still no bowel function GGE to evaluate for anastomotic obstruction per MD.   11/3: Ileus per CT, no BM and NGT remains in place to suction. Ionized calcium WNL today. 11/2: Recent BG elevated 161-183 mg/dL and receiving steroids, remains NPO with moderate dark green/brown output from NGT and no findings of abscess per CT. Symptoms improving and OOB, ambulating but no bowel function. RD completed NFPE.   11/1: Remains NPO with plan for PICC placement to start PN per MD, awaiting return of bowel function and CT abdomen/pelvis ordered. 10/30: In pain today. Episode of nausea last night, resolved with medication. One episode of flatus. Some abdominal distension. Pt weighed on standing scale yesterday, now with weight gain; question accuracy of weights and possible fluid masking weight change. 10/29: Readmitted with n/v after recent colectomy on 10/23 for cecal cancer. NGT placed to suction, minimal output noted, however pt's son report canister was emptied last night and pt report a lot of output yesterday. Pt weaker, with significant weight loss noted since last admission weight. Plan to obtain standing weight when appropriate to verify current weight. Pt continues to have muscle and fat loss, currently meets criteria for moderate malnutrition, but has had <50% of energy intake x 3 days, pt at high risk for becoming severely malnourished.      PN infusion adequate to meet patients estimated nutritional needs:   [x] Yes     [] No   [] Unable to determine at this time  Average po intake adequate to meet patients estimated nutritional needs:   [] Yes     [x] No   [] Unable to determine at this time    Diet: TPN ADULT - CENTRAL  DIET REGULAR Low Fiber  TPN ADULT - CENTRAL      Food Allergies: shellfish    Current Appetite:   [] Good     [] Fair     [x] Poor     [x] Other: NPO x 4 days on admission prior to starting PN and first PN bag met <50% of energy needs   Appetite/meal intake prior to admission:   [] Good     [] Fair     [] Poor     [x] Other: Good appetite, but poor-fair intake after surgery  Feeding Limitations:  [] Swallowing difficulty    [] Chewing difficulty    [] Other:  Current Meal Intake:   Patient Vitals for the past 100 hrs:   % Diet Eaten   11/12/18 1005 25 %       BM: 11/12; ileostomy (total output x last 24 hours: 110 mL)   Skin Integrity: abdominal surgical incisions     Edema:  [x] No     [] Yes   Pertinent Medications: Reviewed: zofran, steroid, SSI     Labs: BMP, MG, Phos ordered daily; CMP, Triglyceride, Prealbumin ordered initially and weekly  Recent Labs     11/12/18  0430 11/11/18  0200 11/10/18  0511    139 141 K 3.8 4.3 3.6   CL 99* 102 101   CO2 32 33* 34*   * 133* 115*   BUN 13 19* 19*   CREA 0.33* 0.33* 0.34*   CA 7.8* 7.8* 7.8*   MG 2.0 2.1 2.0   PHOS 3.5 3.2 3.3   Prealbumin: 9.46 mg/dL (11/9/18), 7.6 mg/dL (11/2/18)  Triglyceride: 38 mg/dL (11/9/18), 84 mg/dL (11/2/18)  Ionized Calcium: 1.13 mmol/L (11/3/18), 1.09 mmol/L (11/2/18)   Recent Labs     11/12/18  0430 11/11/18  0200 11/10/18  0511   WBC 13.9* 14.5* 12.1   HGB 8.8* 9.1* 11.4*   HCT 26.1* 28.3* 34.7*    365 322         Intake/Output Summary (Last 24 hours) at 11/12/2018 1326  Last data filed at 11/12/2018 1005  Gross per 24 hour   Intake 2983.75 ml   Output 1970 ml   Net 1013.75 ml       Anthropometrics:   Ht Readings from Last 1 Encounters:   10/28/18 5' 6\" (1.676 m)       Last 3 Recorded Weights in this Encounter    11/10/18 0239 11/11/18 0412 11/12/18 0715   Weight: 59.1 kg (130 lb 3.2 oz) 49.5 kg (109 lb 3.2 oz) 57.9 kg (127 lb 9.6 oz)      Body mass index is 20.6 kg/m².   Underweight     Weight History:  -11 lbs (9%) x 5 days, -16 lbs (13%) x 1 month   Weight Metrics 11/12/2018 10/23/2018 10/4/2018 9/27/2018 9/23/2018 8/28/2018 8/16/2018   Weight 127 lb 9.6 oz 119 lb 118 lb 124 lb 117 lb 116 lb 122 lb 4.8 oz   BMI 20.6 kg/m2 19.21 kg/m2 19.05 kg/m2 20.01 kg/m2 18.88 kg/m2 19.3 kg/m2 20.35 kg/m2          Admitting Diagnosis: Obstruction of bowel (HCC)  Post-op pain  Ileus following gastrointestinal surgery  Pertinent PMHx: COPD, depression, nausea & vomiting, osteoporosis, vitamin D deficiency    Education Needs:        [x] None identified  [] Identified - Not appropriate at this time  []  Identified and addressed - refer to education log  Learning Limitations:   [x] None identified  [] Identified    Cultural, Restorationism & ethnic food preferences:  [x] None identified    [] Identified and addressed     ESTIMATED NUTRITION NEEDS:     Calories: 0023-9731 kcal (HBEx1.2-1.4) based on   [] Actual BW      [x] IBW: 59 kg  Protein: 59-77 gm (1-1.3 gm/kg) based on   [] Actual BW      [x] IBW:   Fluid: 1 mL/kcal     MONITORING & EVALUATION:     Nutrition Goals:   1. Patient will receive nutrition via PN until they are able to tolerate adequate po intake/enteral nutrition. Outcome:  [x] Met    []  Not Met    [] New Goal  2. Po intake of meals will meet >75% of patient estimated nutritional needs within the next 7 days. Outcome:  [] Met/Ongoing    []  Not Met    [x] New/Initial Goal      Monitoring:  [x] Parenteral nutrition intake and administration  [x] Biochemical data, medical tests, and procedures   [x] Fluid intake   [x] Nutrition-focused physical findings   [x] Treatment/therapy   [x] Food and beverage intake   [x] Diet order      [] Weight        Previous Recommendations (for follow-up assessments only):     [x]   Implemented       []   Not Implemented (RD to address)      [] No Longer Appropriate     [] No Recommendation Made        Discharge Planning: Nutrition recommendations pending patient's ability to tolerate po intake/enteral nutrition.    [x]  Participated in care planning, discharge planning, & interdisciplinary rounds as appropriate      Humza De La O, [address]   Pager: [phone]

## 2018-11-13 LAB
ANION GAP SERPL CALC-SCNC: 7 MMOL/L (ref 3–18)
BUN SERPL-MCNC: 15 MG/DL (ref 7–18)
BUN/CREAT SERPL: 54 (ref 12–20)
CALCIUM SERPL-MCNC: 7.8 MG/DL (ref 8.5–10.1)
CHLORIDE SERPL-SCNC: 100 MMOL/L (ref 100–108)
CO2 SERPL-SCNC: 31 MMOL/L (ref 21–32)
CREAT SERPL-MCNC: 0.28 MG/DL (ref 0.6–1.3)
ERYTHROCYTE [DISTWIDTH] IN BLOOD BY AUTOMATED COUNT: 14.1 % (ref 11.6–14.5)
GLUCOSE BLD STRIP.AUTO-MCNC: 130 MG/DL (ref 70–110)
GLUCOSE BLD STRIP.AUTO-MCNC: 158 MG/DL (ref 70–110)
GLUCOSE BLD STRIP.AUTO-MCNC: 183 MG/DL (ref 70–110)
GLUCOSE SERPL-MCNC: 156 MG/DL (ref 74–99)
HCT VFR BLD AUTO: 25.8 % (ref 35–45)
HGB BLD-MCNC: 8.5 G/DL (ref 12–16)
MAGNESIUM SERPL-MCNC: 2.1 MG/DL (ref 1.6–2.6)
MCH RBC QN AUTO: 31.7 PG (ref 24–34)
MCHC RBC AUTO-ENTMCNC: 32.9 G/DL (ref 31–37)
MCV RBC AUTO: 96.3 FL (ref 74–97)
PHOSPHATE SERPL-MCNC: 3.5 MG/DL (ref 2.5–4.9)
PLATELET # BLD AUTO: 326 K/UL (ref 135–420)
PMV BLD AUTO: 10.3 FL (ref 9.2–11.8)
POTASSIUM SERPL-SCNC: 3.5 MMOL/L (ref 3.5–5.5)
RBC # BLD AUTO: 2.68 M/UL (ref 4.2–5.3)
SODIUM SERPL-SCNC: 138 MMOL/L (ref 136–145)
WBC # BLD AUTO: 15.8 K/UL (ref 4.6–13.2)

## 2018-11-13 PROCEDURE — 83735 ASSAY OF MAGNESIUM: CPT

## 2018-11-13 PROCEDURE — 77030010522

## 2018-11-13 PROCEDURE — 84100 ASSAY OF PHOSPHORUS: CPT

## 2018-11-13 PROCEDURE — 74011636637 HC RX REV CODE- 636/637: Performed by: COLON & RECTAL SURGERY

## 2018-11-13 PROCEDURE — 74011000250 HC RX REV CODE- 250: Performed by: COLON & RECTAL SURGERY

## 2018-11-13 PROCEDURE — 85027 COMPLETE CBC AUTOMATED: CPT

## 2018-11-13 PROCEDURE — 77030010520

## 2018-11-13 PROCEDURE — 74011250636 HC RX REV CODE- 250/636: Performed by: COLON & RECTAL SURGERY

## 2018-11-13 PROCEDURE — 80048 BASIC METABOLIC PNL TOTAL CA: CPT

## 2018-11-13 PROCEDURE — 74011250637 HC RX REV CODE- 250/637: Performed by: COLON & RECTAL SURGERY

## 2018-11-13 PROCEDURE — 36415 COLL VENOUS BLD VENIPUNCTURE: CPT

## 2018-11-13 PROCEDURE — 74011250636 HC RX REV CODE- 250/636: Performed by: SURGERY

## 2018-11-13 PROCEDURE — 82962 GLUCOSE BLOOD TEST: CPT

## 2018-11-13 PROCEDURE — 65270000029 HC RM PRIVATE

## 2018-11-13 PROCEDURE — 94640 AIRWAY INHALATION TREATMENT: CPT

## 2018-11-13 PROCEDURE — 77030010541

## 2018-11-13 RX ADMIN — Medication 10 ML: at 22:39

## 2018-11-13 RX ADMIN — HYDROMORPHONE HYDROCHLORIDE 1 MG: 2 INJECTION INTRAMUSCULAR; INTRAVENOUS; SUBCUTANEOUS at 03:14

## 2018-11-13 RX ADMIN — Medication 10 ML: at 15:21

## 2018-11-13 RX ADMIN — INSULIN LISPRO 2 UNITS: 100 INJECTION, SOLUTION INTRAVENOUS; SUBCUTANEOUS at 06:56

## 2018-11-13 RX ADMIN — ALBUTEROL SULFATE 2.5 MG: 2.5 SOLUTION RESPIRATORY (INHALATION) at 09:02

## 2018-11-13 RX ADMIN — LORAZEPAM 2 MG: 1 TABLET ORAL at 12:41

## 2018-11-13 RX ADMIN — HYDROMORPHONE HYDROCHLORIDE 1 MG: 2 INJECTION INTRAMUSCULAR; INTRAVENOUS; SUBCUTANEOUS at 15:20

## 2018-11-13 RX ADMIN — PREDNISONE 10 MG: 10 TABLET ORAL at 09:30

## 2018-11-13 RX ADMIN — HYDROMORPHONE HYDROCHLORIDE 1 MG: 2 INJECTION INTRAMUSCULAR; INTRAVENOUS; SUBCUTANEOUS at 19:40

## 2018-11-13 RX ADMIN — Medication 10 ML: at 07:01

## 2018-11-13 RX ADMIN — ENOXAPARIN SODIUM 40 MG: 100 INJECTION SUBCUTANEOUS at 21:00

## 2018-11-13 RX ADMIN — HYDROMORPHONE HYDROCHLORIDE 1 MG: 2 INJECTION INTRAMUSCULAR; INTRAVENOUS; SUBCUTANEOUS at 10:52

## 2018-11-13 RX ADMIN — ALBUTEROL SULFATE 2.5 MG: 2.5 SOLUTION RESPIRATORY (INHALATION) at 14:21

## 2018-11-13 RX ADMIN — HYDROMORPHONE HYDROCHLORIDE 1 MG: 2 INJECTION INTRAMUSCULAR; INTRAVENOUS; SUBCUTANEOUS at 06:57

## 2018-11-13 RX ADMIN — INSULIN LISPRO 2 UNITS: 100 INJECTION, SOLUTION INTRAVENOUS; SUBCUTANEOUS at 18:52

## 2018-11-13 NOTE — WOUND CARE
Physical Exam   Room 515: Continued ileostomy education for pt & family. Discussed skin care, showering, & fluid intake to reduce dehydration. Discussed foods & their affect on the bowels. Went over her [employer] for teaching. Extra supplies in ziplock bag at bedside.    Blanche SIMONSN, RN, [facility], [address]

## 2018-11-13 NOTE — PROGRESS NOTES
NUTRITION consult    Nursing Referral: [facility]  Nutrition Consult: TPN: RD to Manage     RECOMMENDATIONS / PLAN:     - Discontinue PN once current bag ends at 8 pm tonight. - Monitor and encourage po intake. - Continue RD inpatient monitoring and evaluation. NUTRITION DIAGNOSIS & INTERVENTIONS:     [x] Parenteral nutrition: discontinue   [x] Meals/Snacks: modified consistency  [x] Collaboration and referral of nutrition care: Will discontinue PN after current bag ends per MD    Nutrition Diagnosis: Inadequate nutrient intake related to altered GI function and inability to consume oral diet as evidenced by pt with possible ileus after GI surgery s/p ex lap and resection of ileocolonic anastomosis with ileostomy. Patient meets criteria for Severe Protein Calorie Malnutrition as evidenced by:   ASPEN Malnutrition Criteria  Acute Illness, Chronic Illness, or Social/Enviornmental: Acute illness  Energy Intake: Less than/equal to 50% est energy req for greater than/equal to 5 days  Body Fat: Moderate(orbital, upper arm, thoracic and lumbar regions)  Muscle Mass: Moderate(moderate-hand, scapula; severe- temple, clavicle, shoulders.)  ASPEN Malnutrition Score - Acute Illness: 18  Acute Illness - Malnutrition Diagnosis: Severe malnutrition    ASSESSMENT:     11/13: Tolerating diet with fair appetite, consuming 50% of recent meals and 100% of premier protein supplement brought from home by family yesterday. Will stop TPN tonight. Provided education on diet modifications following ileostomy, handout and contact information provided to family. 11/12: Tolerating solid diet, advanced this morning and consuming 25% of meals. No nausea/vomiting. 11/11: No more episodes of emesis. Diet restarted. Pt consumed coffee at breakfast.    11/10: Episodes of nausea and vomiting last night, made NPO. Per pt, believe it was related to medication. Good ostomy output.    11/9: Clear liquid diet started this AM.  11/8: NGT dislodged overnight and left out, no c/o nausea/vomiting and ileostomy with green +output. OOB, ambulating. Remains NPO.  11/7: S/p ex lap with resection of ileocolonic anastomosis and end ileosotomy yesterday. Drowsy this morning, treated with zofran for nausea with c/o of abdominal pain, +ileostomy output and NGT draining dark green/brown output- 675 mL x last 24 hours. 11/6: High grade obstruction at entero-colonic anastomosis per GGE yesterday and plan OR today for colonoscopy and possible laparotomy. Mg trending down over the past several days. 11/5: Remains NPO with still no bowel function GGE to evaluate for anastomotic obstruction per MD.   11/3: Ileus per CT, no BM and NGT remains in place to suction. Ionized calcium WNL today. 11/2: Recent BG elevated 161-183 mg/dL and receiving steroids, remains NPO with moderate dark green/brown output from NGT and no findings of abscess per CT. Symptoms improving and OOB, ambulating but no bowel function. RD completed NFPE.   11/1: Remains NPO with plan for PICC placement to start PN per MD, awaiting return of bowel function and CT abdomen/pelvis ordered. 10/30: In pain today. Episode of nausea last night, resolved with medication. One episode of flatus. Some abdominal distension. Pt weighed on standing scale yesterday, now with weight gain; question accuracy of weights and possible fluid masking weight change. 10/29: Readmitted with n/v after recent colectomy on 10/23 for cecal cancer. NGT placed to suction, minimal output noted, however pt's son report canister was emptied last night and pt report a lot of output yesterday. Pt weaker, with significant weight loss noted since last admission weight. Plan to obtain standing weight when appropriate to verify current weight. Pt continues to have muscle and fat loss, currently meets criteria for moderate malnutrition, but has had <50% of energy intake x 3 days, pt at high risk for becoming severely malnourished. Average po intake adequate to meet patients estimated nutritional needs:   [] Yes     [] No   [x] Unable to determine at this time    Diet: TPN ADULT - CENTRAL  DIET REGULAR Low Fiber      Food Allergies: shellfish    Current Appetite:   [] Good     [x] Fair, improving     [] Poor     [x] Other: NPO x 4 days on admission prior to starting PN and first PN bag met <50% of energy needs   Appetite/meal intake prior to admission:   [] Good     [] Fair     [] Poor     [x] Other: Good appetite, but poor-fair intake after surgery  Feeding Limitations:  [] Swallowing difficulty    [] Chewing difficulty    [] Other:  Current Meal Intake:   Patient Vitals for the past 100 hrs:   % Diet Eaten   11/13/18 1211 30 %   11/13/18 0908 25 %   11/12/18 1859 25 %   11/12/18 1435 35 %   11/12/18 1005 25 %       BM: 11/13; ileostomy  Skin Integrity: abdominal surgical incisions     Edema:  [x] No     [] Yes   Pertinent Medications: Reviewed: zofran, steroid, SSI     Labs:   Recent Labs     11/13/18  0600 11/12/18  0430 11/11/18  0200    138 139   K 3.5 3.8 4.3    99* 102   CO2 31 32 33*   * 144* 133*   BUN 15 13 19*   CREA 0.28* 0.33* 0.33*   CA 7.8* 7.8* 7.8*   MG 2.1 2.0 2.1   PHOS 3.5 3.5 3.2   Prealbumin: 9.46 mg/dL (11/9/18), 7.6 mg/dL (11/2/18)  Triglyceride: 38 mg/dL (11/9/18), 84 mg/dL (11/2/18)  Ionized Calcium: 1.13 mmol/L (11/3/18), 1.09 mmol/L (11/2/18)   Recent Labs     11/13/18  0600 11/12/18  0430 11/11/18  0200   WBC 15.8* 13.9* 14.5*   HGB 8.5* 8.8* 9.1*   HCT 25.8* 26.1* 28.3*    318 365         Intake/Output Summary (Last 24 hours) at 11/13/2018 1404  Last data filed at 11/13/2018 1211  Gross per 24 hour   Intake 1398.75 ml   Output 2525 ml   Net -1126.25 ml       Anthropometrics:   Ht Readings from Last 1 Encounters:   10/28/18 5' 6\" (1.676 m)       Last 3 Recorded Weights in this Encounter    11/11/18 0412 11/12/18 0715 11/13/18 0853   Weight: 49.5 kg (109 lb 3.2 oz) 57.9 kg (127 lb 9.6 oz) 57.4 kg (126 lb 9.6 oz)      Body mass index is 20.43 kg/m². Weight History:  -11 lbs (9%) x 5 days, -16 lbs (13%) x 1 month   Weight Metrics 11/13/2018 10/23/2018 10/4/2018 9/27/2018 9/23/2018 8/28/2018 8/16/2018   Weight 126 lb 9.6 oz 119 lb 118 lb 124 lb 117 lb 116 lb 122 lb 4.8 oz   BMI 20.43 kg/m2 19.21 kg/m2 19.05 kg/m2 20.01 kg/m2 18.88 kg/m2 19.3 kg/m2 20.35 kg/m2          Admitting Diagnosis: Obstruction of bowel (HCC)  Post-op pain  Ileus following gastrointestinal surgery  Pertinent PMHx: COPD, depression, nausea & vomiting, osteoporosis, vitamin D deficiency    Education Needs:        [] None identified  [] Identified - Not appropriate at this time  [x]  Identified and addressed - refer to education log  Learning Limitations:   [x] None identified  [] Identified    Cultural, Worship & ethnic food preferences:  [x] None identified    [] Identified and addressed     ESTIMATED NUTRITION NEEDS:     Calories: 6882-4683 kcal (HBEx1.2-1.4) based on   [] Actual BW      [x] IBW: 59 kg  Protein: 59-77 gm (1-1.3 gm/kg) based on   [] Actual BW      [x] IBW:   Fluid: 1 mL/kcal     MONITORING & EVALUATION:     Nutrition Goals:   1. Po intake of meals will meet >75% of patient estimated nutritional needs within the next 7 days.   Outcome:  [] Met/Ongoing    [x]  Not Met/Progressing    [] New/Initial Goal      Monitoring:  [x] Nutrition-focused physical findings   [x] Treatment/therapy   [x] Food and beverage intake   [x] Diet order      [] Weight        Previous Recommendations (for follow-up assessments only):     [x]   Implemented       []   Not Implemented (RD to address)      [] No Longer Appropriate     [] No Recommendation Made        Discharge Planning: regular, low fiber diet    [x]  Participated in care planning, discharge planning, & interdisciplinary rounds as appropriate      Steve Solo, [address]   Pager: [phone]

## 2018-11-13 NOTE — PROGRESS NOTES
[facility]  [address] [place] [address]  [phone]  Colon and Rectal Surgery Progress Note      Patient: Willie Isabel MRN: [id number]  SSN: [id number]    YOB: 1945  Age: 72 y.o. Sex: female      Admit Date: 10/28/2018    LOS: 16 days     Subjective: Tolerating solids, mild bloating. Wound drainage much less. Objective:     Vitals:    11/13/18 0316 11/13/18 0800 11/13/18 0853 11/13/18 1149   BP: 111/65 94/53  104/55   Pulse: 96 82  85   Resp: 18 16 18   Temp: 98.9 °F (37.2 °C) 98.2 °F (36.8 °C)  98.5 °F (36.9 °C)   SpO2: 97% 96%  98%   Weight:   57.4 kg (126 lb 9.6 oz)    Height:            Intake and Output:  Current Shift: 11/13 0701 - 11/13 1900  In: 480 [P.O.:480]  Out: 900 [Urine:800]  Last three shifts: 11/11 1901 - 11/13 0700  In: 3862.5 [P.O.:760;  I.V.:3102.5]  Out: 3720 [Urine:3225]      Physical Exam:     abd soft, appr tender  Stoma pink  Incision healthy    Lab/Data Review:    CMP:   Lab Results   Component Value Date/Time     11/13/2018 06:00 AM    K 3.5 11/13/2018 06:00 AM     11/13/2018 06:00 AM    CO2 31 11/13/2018 06:00 AM    AGAP 7 11/13/2018 06:00 AM     (H) 11/13/2018 06:00 AM    BUN 15 11/13/2018 06:00 AM    CREA 0.28 (L) 11/13/2018 06:00 AM    GFRAA >60 11/13/2018 06:00 AM    GFRNA >60 11/13/2018 06:00 AM    CA 7.8 (L) 11/13/2018 06:00 AM    MG 2.1 11/13/2018 06:00 AM    PHOS 3.5 11/13/2018 06:00 AM     CBC:   Lab Results   Component Value Date/Time    WBC 15.8 (H) 11/13/2018 06:00 AM    HGB 8.5 (L) 11/13/2018 06:00 AM    HCT 25.8 (L) 11/13/2018 06:00 AM     11/13/2018 06:00 AM          Assessment:     POD 7 s/p resection ileocolic anastomosis with end ileostomy for sbo and anastomotic leak, possible fascial dehiscence    Plan:     Continue solid diet  PT eval for discharge    Signed By: Raquel Flaherty MD        November 13, 2018

## 2018-11-13 NOTE — ROUTINE PROCESS
Report given to [place], RN, including SBAR, [facility], and Kardex. Patient alert and oriented, vitals within normal limits, no apparent distress.

## 2018-11-13 NOTE — ROUTINE PROCESS
Bedside and Verbal shift change report given to Alisha Carty (oncoming nurse) by Mayra Glover RN (offgoing nurse). Report included the following information SBAR, Kardex, Procedure Summary, Intake/Output, MAR and Recent Results.

## 2018-11-13 NOTE — ROUTINE PROCESS
Abdominal dressing changed, drainage decreased significantly since this morning. Thicker, slightly purulent drainage noted, however dressing was not completely saturated. 4x4 gauze and ABD pad applied.

## 2018-11-13 NOTE — PROGRESS NOTES

## 2018-11-13 NOTE — PROGRESS NOTES
Mobility Intervention:       [] Pt dangled at edge of bed    [] Pt assisted OOB to bedside commode    [] Pt assisted OOB to chair    [] Pt ambulated to bathroom    [x] Patient was ambulated in room/hallway    Assistive Device Utilized:       [] Rolling walker   [] Crutches   [] Straight Cane   [] Knee immobilizer   [x] IV pole    After Mobilization:     [] Patient left in no apparent distress sitting up in chair  [x] Patient left in no apparent distress in bed  [x] Call bell left within reach  [x] SCDs on & machine turned on  [] Ice applied  [] RN notified  [] Caregiver present  [] Bed alarm activated    Reason patient not mobilized:      [] Patient refused   [] Nausea/vomiting   [] Low blood pressure   [] Drowsy/lethargic    Pain Rating:     [] 0  [] 1  Assistive Device:        [] 2  [] 3  [] 4  [] 5  [] 6  Assistive Device:        [] 7  [x] 8  [] 9  [] 10    Comments:

## 2018-11-13 NOTE — ROUTINE PROCESS
Patient  Ambulated to bathroom, voided 300 ml of darshana urine. Back in bed , pain rating 8/10 medicated with dilaudid 1mg IV. Ileostomy bag intact emptied 250 of semi liquid greenish stool. Will continues to monitor.

## 2018-11-14 ENCOUNTER — HOME HEALTH ADMISSION (OUTPATIENT)
Dept: HOME HEALTH SERVICES | Facility: HOME HEALTH | Age: 73
End: 2018-11-14
Payer: MEDICARE

## 2018-11-14 ENCOUNTER — HOSPITAL ENCOUNTER (OUTPATIENT)
Dept: LAB | Age: 73
Discharge: HOME | DRG: 329 | End: 2018-11-14
Payer: MEDICARE

## 2018-11-14 LAB
ANION GAP SERPL CALC-SCNC: 5 MMOL/L (ref 3–18)
BUN SERPL-MCNC: 13 MG/DL (ref 7–18)
BUN/CREAT SERPL: 35 (ref 12–20)
CALCIUM SERPL-MCNC: 8 MG/DL (ref 8.5–10.1)
CHLORIDE SERPL-SCNC: 102 MMOL/L (ref 100–108)
CO2 SERPL-SCNC: 33 MMOL/L (ref 21–32)
CREAT SERPL-MCNC: 0.37 MG/DL (ref 0.6–1.3)
ERYTHROCYTE [DISTWIDTH] IN BLOOD BY AUTOMATED COUNT: 14.2 % (ref 11.6–14.5)
GLUCOSE BLD STRIP.AUTO-MCNC: 113 MG/DL (ref 70–110)
GLUCOSE BLD STRIP.AUTO-MCNC: 119 MG/DL (ref 70–110)
GLUCOSE BLD STRIP.AUTO-MCNC: 94 MG/DL (ref 70–110)
GLUCOSE BLD STRIP.AUTO-MCNC: 97 MG/DL (ref 70–110)
GLUCOSE SERPL-MCNC: 96 MG/DL (ref 74–99)
HCT VFR BLD AUTO: 25.1 % (ref 35–45)
HGB BLD-MCNC: 8.5 G/DL (ref 12–16)
MAGNESIUM SERPL-MCNC: 2.2 MG/DL (ref 1.6–2.6)
MCH RBC QN AUTO: 32.7 PG (ref 24–34)
MCHC RBC AUTO-ENTMCNC: 33.9 G/DL (ref 31–37)
MCV RBC AUTO: 96.5 FL (ref 74–97)
PHOSPHATE SERPL-MCNC: 3.5 MG/DL (ref 2.5–4.9)
PLATELET # BLD AUTO: 325 K/UL (ref 135–420)
PMV BLD AUTO: 10.8 FL (ref 9.2–11.8)
POTASSIUM SERPL-SCNC: 3.8 MMOL/L (ref 3.5–5.5)
RBC # BLD AUTO: 2.6 M/UL (ref 4.2–5.3)
SODIUM SERPL-SCNC: 140 MMOL/L (ref 136–145)
WBC # BLD AUTO: 11.3 K/UL (ref 4.6–13.2)

## 2018-11-14 PROCEDURE — 74011000250 HC RX REV CODE- 250: Performed by: COLON & RECTAL SURGERY

## 2018-11-14 PROCEDURE — 83735 ASSAY OF MAGNESIUM: CPT

## 2018-11-14 PROCEDURE — 77010033678 HC OXYGEN DAILY: Performed by: COLON & RECTAL SURGERY

## 2018-11-14 PROCEDURE — 85027 COMPLETE CBC AUTOMATED: CPT

## 2018-11-14 PROCEDURE — 74011636637 HC RX REV CODE- 636/637: Performed by: COLON & RECTAL SURGERY

## 2018-11-14 PROCEDURE — 80048 BASIC METABOLIC PNL TOTAL CA: CPT

## 2018-11-14 PROCEDURE — 74011250636 HC RX REV CODE- 250/636: Performed by: SURGERY

## 2018-11-14 PROCEDURE — 84100 ASSAY OF PHOSPHORUS: CPT

## 2018-11-14 PROCEDURE — 74011250637 HC RX REV CODE- 250/637: Performed by: COLON & RECTAL SURGERY

## 2018-11-14 PROCEDURE — 94640 AIRWAY INHALATION TREATMENT: CPT

## 2018-11-14 PROCEDURE — 65270000029 HC RM PRIVATE

## 2018-11-14 PROCEDURE — 94761 N-INVAS EAR/PLS OXIMETRY MLT: CPT

## 2018-11-14 PROCEDURE — 82962 GLUCOSE BLOOD TEST: CPT

## 2018-11-14 PROCEDURE — 74011250636 HC RX REV CODE- 250/636: Performed by: COLON & RECTAL SURGERY

## 2018-11-14 PROCEDURE — 97161 PT EVAL LOW COMPLEX 20 MIN: CPT

## 2018-11-14 RX ORDER — CIPROFLOXACIN 500 MG/1
500 TABLET ORAL EVERY 12 HOURS
Qty: 14 TAB | Refills: 0 | Status: SHIPPED | OUTPATIENT
Start: 2018-11-14 | End: 2018-11-15

## 2018-11-14 RX ORDER — HYDROCODONE BITARTRATE AND ACETAMINOPHEN 5; 325 MG/1; MG/1
1 TABLET ORAL
Qty: 40 TAB | Refills: 0 | Status: SHIPPED | OUTPATIENT
Start: 2018-11-14 | End: 2019-03-05

## 2018-11-14 RX ORDER — CIPROFLOXACIN 500 MG/1
500 TABLET ORAL EVERY 12 HOURS
Status: DISCONTINUED | OUTPATIENT
Start: 2018-11-14 | End: 2018-11-15 | Stop reason: HOSPADM

## 2018-11-14 RX ORDER — AMOXICILLIN AND CLAVULANATE POTASSIUM 875; 125 MG/1; MG/1
1 TABLET, FILM COATED ORAL 2 TIMES DAILY WITH MEALS
Status: DISCONTINUED | OUTPATIENT
Start: 2018-11-14 | End: 2018-11-14

## 2018-11-14 RX ADMIN — HYDROMORPHONE HYDROCHLORIDE 1 MG: 2 INJECTION INTRAMUSCULAR; INTRAVENOUS; SUBCUTANEOUS at 22:39

## 2018-11-14 RX ADMIN — ALBUTEROL SULFATE 2.5 MG: 2.5 SOLUTION RESPIRATORY (INHALATION) at 20:07

## 2018-11-14 RX ADMIN — PREDNISONE 10 MG: 10 TABLET ORAL at 09:31

## 2018-11-14 RX ADMIN — Medication 10 ML: at 15:01

## 2018-11-14 RX ADMIN — HYDROMORPHONE HYDROCHLORIDE 1 MG: 2 INJECTION INTRAMUSCULAR; INTRAVENOUS; SUBCUTANEOUS at 10:58

## 2018-11-14 RX ADMIN — CIPROFLOXACIN 500 MG: 500 TABLET, FILM COATED ORAL at 15:23

## 2018-11-14 RX ADMIN — BUDESONIDE AND FORMOTEROL FUMARATE DIHYDRATE 2 PUFF: 160; 4.5 AEROSOL RESPIRATORY (INHALATION) at 08:15

## 2018-11-14 RX ADMIN — CIPROFLOXACIN 500 MG: 500 TABLET, FILM COATED ORAL at 21:05

## 2018-11-14 RX ADMIN — HYDROMORPHONE HYDROCHLORIDE 1 MG: 2 INJECTION INTRAMUSCULAR; INTRAVENOUS; SUBCUTANEOUS at 02:49

## 2018-11-14 RX ADMIN — ALBUTEROL SULFATE 2.5 MG: 2.5 SOLUTION RESPIRATORY (INHALATION) at 08:14

## 2018-11-14 RX ADMIN — Medication 10 ML: at 22:39

## 2018-11-14 RX ADMIN — ENOXAPARIN SODIUM 40 MG: 100 INJECTION SUBCUTANEOUS at 21:05

## 2018-11-14 RX ADMIN — LORAZEPAM 2 MG: 1 TABLET ORAL at 22:39

## 2018-11-14 RX ADMIN — HYDROMORPHONE HYDROCHLORIDE 1 MG: 2 INJECTION INTRAMUSCULAR; INTRAVENOUS; SUBCUTANEOUS at 15:01

## 2018-11-14 RX ADMIN — HYDROMORPHONE HYDROCHLORIDE 1 MG: 2 INJECTION INTRAMUSCULAR; INTRAVENOUS; SUBCUTANEOUS at 18:33

## 2018-11-14 RX ADMIN — HYDROMORPHONE HYDROCHLORIDE 1 MG: 2 INJECTION INTRAMUSCULAR; INTRAVENOUS; SUBCUTANEOUS at 06:49

## 2018-11-14 NOTE — PROGRESS NOTES
Problem: Mobility Impaired (Adult and Pediatric)  Goal: *Acute Goals and Plan of Care (Insert Text)  Outcome: Resolved/Met Date Met: 11/14/18  physical Therapy EVALUATION & Discharge    Patient: Dru Garcia (76 y.o. female)  Date: 11/14/2018  Primary Diagnosis: Obstruction of bowel (HCC)  Post-op pain  Ileus following gastrointestinal surgery  Procedure(s) (LRB):  LAPAROTOMY EXPLORATORY (N/A)  COLONOSCOPY (N/A) 8 Days Post-Op   Precautions: fall, O2       ASSESSMENT AND RECOMMENDATIONS:  Based on the objective data described below, the patient presents at independent/mod I level with ambulation and transfers using a rolling walker which is patients baseline. Skilled physical therapy is not indicated at this time. Discharge Recommendations: Home Health  Further Equipment Recommendations for Discharge: N/A      G-CODES:     Mobility  Current  CI= 1-19%   Goal  CI= 1-19%  D/C  CI= 1-19%. The severity rating is based on the Level of Assistance required for Functional Mobility and ADLs. Eval Complexity: History: MEDIUM  Complexity : 1-2 comorbidities / personal factors will impact the outcome/ POC Exam:LOW Complexity : 1-2 Standardized tests and measures addressing body structure, function, activity limitation and / or participation in recreation  Presentation: LOW Complexity : Stable, uncomplicated  Clinical Decision Making:Low Complexity , Overall Complexity:LOW     SUBJECTIVE:   Patient stated I'm doing better. I walked in the smiley 8 times yesterday    OBJECTIVE DATA SUMMARY:     Past Medical History:   Diagnosis Date    Anxiety     Arthritis     [facility]bestosis [facility]     Asthma     Back problem     Baker's cyst     Balance problems     Chronic lung disease     Cigarette smoker     Clotting disorder ([facility]     COPD [facility]chronic obstructive pulmonary disease) ([facility])     oxygen 2/liters    Depression     History of DVT (deep vein thrombosis)     Leg pain     Right    Lung disease     Nausea & vomiting     Osteoporosis     Restless leg syndrome     Spinal stenosis     Thromboembolus (HCC)     Vitamin D deficiency      Past Surgical History:   Procedure Laterality Date    HX APPENDECTOMY      HX BACK SURGERY      x4    HX BREAST BIOPSY      HX HEENT      cataract right    HX HYSTERECTOMY      HX LUMBAR LAMINECTOMY      HX MENISCUS REPAIR      HX ORTHOPAEDIC      Knee bakers cyst    HX POLYPECTOMY      HX TONSILLECTOMY      HX VEIN STRIPPING      LAP,SURG,COLECTOMY, PARTIAL, W/ANAST N/A 10/23/2018    Dr. Carol Horton N/A 11/06/2018    Dr. Trevor Ordoñez      vein stripping     Barriers to Learning/Limitations: None  Compensate with: visual, verbal, tactile, kinesthetic cues/model  Prior Level of Function/Home Situation: ambulates with a rolling walker  Home Situation  Home Environment: Private residence  # Steps to Enter: 3  One/Two Story Residence: One story  Living Alone: No  Support Systems: Child(sarah)  Patient Expects to be Discharged to[de-identified] Private residence  Current DME Used/Available at Home: Cane, quad, Commode, bedside, Walker  Critical Behavior:   calm and cooperative, motivated   Strength:    Strength: Generally decreased, functional(B LEs)   Tone & Sensation:   Tone: Normal(B LEs)   Range Of Motion:  AROM: Within functional limits(B LEs)   Functional Mobility:  Bed Mobility:  Supine to Sit: Independent  Sit to Supine: Independent     Transfers:  Sit to Stand: Independent  Stand to Sit: Independent  Balance:   Sitting: Intact  Standing: Intact; With support  Ambulation/Gait Training:  Distance (ft): 350 Feet (ft)  Assistive Device: Walker, rolling  Ambulation - Level of Assistance: Modified independent  Pain:  Pt reports 2/10 pain or discomfort prior to treatment.    Pt reports 2/10 pain or discomfort post treatment.      Activity Tolerance:   Fair+  Please refer to the flowsheet for vital signs taken during this treatment. After treatment:   [x]         Patient left in no apparent distress sitting up in chair  []         Patient left in no apparent distress in bed  [x]         Call bell left within reach  [x]         Nursing notified  [x]         Caregiver present  []         Bed alarm activated    COMMUNICATION/EDUCATION:   [x]         Fall prevention education was provided and the patient/caregiver indicated understanding. [x]         Patient/family have participated as able in goal setting and plan of care.-eval only  []         Patient/family agree to work toward stated goals and plan of care. []         Patient understands intent and goals of therapy, but is neutral about his/her participation. []         Patient is unable to participate in goal setting and plan of care. Educated patient on ambulating in the hallway at least 4 times a day with nursing or family assistance, pt verbalized understanding.     Thank you for this referral.  Heaven Ragland, PT   Time Calculation: 19 mins

## 2018-11-14 NOTE — ROUTINE PROCESS
2000 Patient ambulated in the hallway. Patient c/o incisional pain gave dilaudid 1mg iv. Abdominal dressing changed small amount of all yellow drainage noted, incision line slightly reddened and swollen around it noted no drainage. 4x4 guaze and medipore tape applied. Ileostomy bag intact draining greenish stool. Patient denies N/V. Will continues to monitor.

## 2018-11-14 NOTE — PROGRESS NOTES
Notified MD that family was concerned because patient has a temp of 99.8, and abdominal wound appears to be infected. MD states patient is on antibiotics and he assessed her wound 4 hours ago, so there is no need for more labs at this time.

## 2018-11-14 NOTE — PROGRESS NOTES
Bedside and Verbal shift change report given to Goldy Gray RN (oncoming nurse) by Sunil Thomas (offgoing nurse). Report included the following information SBAR, Kardex, Procedure Summary, Intake/Output and Recent Results.

## 2018-11-14 NOTE — PROGRESS NOTES
Freedom of choice (FOC) given for home health, [facility] was chosen. [facility] [place] signed, copy given to patient original put in chart. Kami from [facility] was informed. Order in cc link. Family in room and will be transporting Ms. [address] home.

## 2018-11-14 NOTE — WOUND CARE
Physical Exam   Room 515: pt doing well, sitting in bedside chair, family reports she walked 8 times around the unit. Provided & went over informational sheet on ostomy support group from [facility] encouraged to attend. Notified Biju Jimenez RN from [facility] for continued teaching & follow up.    Alexsandra SIMONSN, RN, [facility], [address]

## 2018-11-14 NOTE — PROGRESS NOTES
visited with the family of Kiko Fletcher, who is a 72 y.o.,female. The  provided the following Interventions:  Initiated a relationship of care and support. Offered prayer and assurance of continued prayers on patients behalf. Plan:  Chaplains will continue to follow and will provide pastoral care on an as needed/requested basis.  recommends bedside caregivers page  on duty if patient shows signs of acute spiritual or emotional distress.

## 2018-11-14 NOTE — ROUTINE PROCESS
Bedside and Verbal shift change report given to Parul (oncoming nurse) by Shawn Ely RN (offgoing nurse). Report included the following information Kardex, ED Summary, Intake/Output and MAR.

## 2018-11-14 NOTE — PROGRESS NOTES
[facility]  [address] [place] [address] [id number] [phone]  Colon and Rectal Surgery Progress Note      Patient: Rosy Power MRN: [id number]  SSN: [id number]    YOB: 1945  Age: 72 y.o. Sex: female      Admit Date: 10/28/2018    LOS: 17 days     Subjective: Tolerating solids. Drainage from wound stopped. Objective:     Vitals:    11/14/18 0322 11/14/18 0852 11/14/18 0932 11/14/18 1224   BP: 90/53 93/54 105/70 100/60   Pulse: 83 91  87   Resp: 16 17 18   Temp: 97.3 °F (36.3 °C) 97.4 °F (36.3 °C)  97.1 °F (36.2 °C)   SpO2: 97%   100%   Weight:       Height:            Intake and Output:  Current Shift: 11/14 0701 - 11/14 1900  In: -   Out: 200   Last three shifts: 11/12 1901 - 11/14 0700  In: 1338.8 [P.O.:820; I.V.:518.8]  Out: 3600 [Urine:2400]      Physical Exam:     abd soft, appr tender  Stoma pink  Incision healthy, very small amount of pink around a few staples, ?  Staple cellulitis  No evidence of wound infection at present    Lab/Data Review:    CMP:   Lab Results   Component Value Date/Time     11/14/2018 04:15 AM    K 3.8 11/14/2018 04:15 AM     11/14/2018 04:15 AM    CO2 33 (H) 11/14/2018 04:15 AM    AGAP 5 11/14/2018 04:15 AM    GLU 96 11/14/2018 04:15 AM    BUN 13 11/14/2018 04:15 AM    CREA 0.37 (L) 11/14/2018 04:15 AM    GFRAA >60 11/14/2018 04:15 AM    GFRNA >60 11/14/2018 04:15 AM    CA 8.0 (L) 11/14/2018 04:15 AM    MG 2.2 11/14/2018 04:15 AM    PHOS 3.5 11/14/2018 04:15 AM     CBC:   Lab Results   Component Value Date/Time    WBC 11.3 11/14/2018 04:15 AM    HGB 8.5 (L) 11/14/2018 04:15 AM    HCT 25.1 (L) 11/14/2018 04:15 AM     11/14/2018 04:15 AM          Assessment:     POD 8 s/p resection ileocolic anastomosis with end ileostomy for sbo and anastomotic leak, possible fascial dehiscence    Plan:     Continue solid diet  PT eval for discharge  cipro for possible cellulitis around wound    Signed By: Maykel Almonte MD        November 14, 2018

## 2018-11-14 NOTE — DISCHARGE SUMMARY
Colon and Rectal Surgery Discharge Summary     Patient: Anay Maldonado MRN: [id number]  SSN: [id number]    YOB: 1945  Age: 72 y.o. Sex: female       Admit Date: 10/28/2018    Discharge Date: 11/15/2018    Admission Diagnoses: Obstruction of bowel ([facility] Post-op pain;Ileus following gastrointestinal surgery, ascending colon cancer    Discharge Diagnoses: SBO and contained anastomotic leak, ascending colon cancer    Procedure: Exploratory laparotomy with resection anastomosis and end ileostomy    Problem List as of 11/14/2018 Date Reviewed: 11/6/2018          Codes Class Noted - Resolved    Post-op pain ICD-10-CM: G89.18  ICD-9-CM: 338.18  10/28/2018 - Present        Obstruction of bowel ([facility] ICD-10-CM: F88.416  ICD-9-CM: 560.9  10/28/2018 - Present        Ileus following gastrointestinal surgery ICD-10-CM: K91.30  ICD-9-CM: 997.49, 560.1  10/28/2018 - Present        Cecal cancer ([facility] ICD-10-CM: C18.0  ICD-9-CM: 153.4  10/23/2018 - Present        Pre-operative cardiovascular examination ICD-10-CM: Z01.810  ICD-9-CM: V72.81  9/23/2018 - Present        Colon cancer without distant metastasis ([facility] ICD-10-CM: C18.9  ICD-9-CM: 153.9  9/22/2018 - Present        Colon polyps ICD-10-CM: K63.5  ICD-9-CM: 211.3  9/22/2018 - Present        COPD (chronic obstructive pulmonary disease) ([facility] ICD-10-CM: J44.9  ICD-9-CM: 100  9/20/2018 - Present        Pneumonia ICD-10-CM: J18.9  ICD-9-CM: 152  9/20/2018 - Present        Abdominal pain ICD-10-CM: R10.9  ICD-9-CM: 789.00  9/20/2018 - Present        Abdominal mass ICD-10-CM: R19.00  ICD-9-CM: 789.30  9/20/2018 - Present        Acute exacerbation of chronic obstructive pulmonary disease (COPD) ([facility] ICD-10-CM: J44.1  ICD-9-CM: 491.21  8/14/2018 - Present        Degenerative disc disease, lumbar ICD-10-CM: M51.36  ICD-9-CM: 722.52  8/14/2018 - Present        Left-sided low back pain with sciatica ICD-10-CM: M54.42  ICD-9-CM: 724.3  8/14/2018 - Present        COPD with exacerbation ([facility] ICD-10-CM: J44.1  ICD-9-CM: 491.21  8/14/2018 - Present        Muscle spasm of back ICD-10-CM: M62.830  ICD-9-CM: 724.8  9/12/2016 - Present        Sacroiliac joint pain ICD-10-CM: M53.3  ICD-9-CM: 724.6  9/12/2016 - Present        Current chronic use of systemic steroids ICD-10-CM: Z79.52  ICD-9-CM: V58.65  9/12/2016 - Present        Baker's cyst of knee ICD-10-CM: M71.20  ICD-9-CM: 727.51  9/12/2016 - Present        COPD with acute exacerbation ([facility] ICD-10-CM: J44.1  ICD-9-CM: 491.21  12/15/2015 - Present        Neuritis of lower extremity ICD-10-CM: G57.90  ICD-9-CM: 355.8  11/18/2015 - Present        Lumbar spinal stenosis ICD-10-CM: M48.061  ICD-9-CM: 724.02  11/13/2015 - Present        Facet arthritis of lumbar region [facility]) ICD-10-CM: M46.96  ICD-9-CM: 721.3  11/13/2015 - Present        Emphysema of lung ([facility] ICD-10-CM: J43.9  ICD-9-CM: 492.8  4/15/2015 - Present        Hemoptysis (Chronic) ICD-10-CM: R04.2  ICD-9-CM: 786.30  2/5/2014 - Present        Nicotine addiction (Chronic) ICD-10-CM: F17.200  ICD-9-CM: 305.1  5/16/2013 - Present        COPD, severe ([facility] ICD-10-CM: J44.9  ICD-9-CM: 844  Unknown - Present        RESOLVED: Sepsis due to pneumonia [facility]) ICD-10-CM: J18.9, A41.9  ICD-9-CM: 625, 995.91  9/20/2018 - 9/24/2018               Discharge Condition: Good    Hospital Course: Admitted with SBO after robotic R colectomy for ascending colon cancer. Did not resolve with conservative measures. CT abd/pelvis consistent with ileus vs. PSBO. GGE showed possible obstruction at anastomosis. Taken to OR after failure of conservative measures with NG and TPN, contained small leak identified with edematous anastomosis. Anastomosis resected and end ileostomy created. Post-operatively, pt recovered gradually. Diet advanced. Remained afebrile. Did not become septic. Did have significant drainage from wound 2 days prior to discharge, likely due to wound dehiscence.  Very mild cellulitis around wound prior to discharge. Given poor immune function on chronic steroids cipro started. Consults: None    Significant Diagnostic Studies: CT abd/pelvis and GGE as above    Disposition: home    Discharge Medications:   Current Discharge Medication List      START taking these medications    Details   ciprofloxacin HCl (CIPRO) 500 mg tablet Take 1 Tab by mouth every twelve (12) hours for 7 days. Qty: 14 Tab, Refills: 0         CONTINUE these medications which have CHANGED    Details   HYDROcodone-acetaminophen (NORCO) 5-325 mg per tablet Take 1 Tab by mouth every four (4) hours as needed for Pain. Max Daily Amount: 6 Tabs. Qty: 40 Tab, Refills: 0    Associated Diagnoses: Colon cancer without distant metastasis ([facility]         CONTINUE these medications which have NOT CHANGED    Details   predniSONE (DELTASONE) 10 mg tablet daily. Refills: 0      fluconazole (DIFLUCAN) 200 mg tablet Take 200 mg by mouth daily as needed. FDA advises cautious prescribing of oral fluconazole in pregnancy. therapeutic multivitamin (THERAGRAN) tablet Take 1 Tab by mouth daily. Qty: 30 Tab, Refills: 11      acetaminophen (TYLENOL EXTRA STRENGTH) 500 mg tablet Take  by mouth every six (6) hours as needed for Pain. albuterol (PROVENTIL VENTOLIN) 2.5 mg /3 mL (0.083 %) nebulizer solution 3 mL by Nebulization route every four (4) hours as needed for Wheezing or Shortness of Breath. Diag. Code: J44.9, R09.02  Qty: 375 Each, Refills: 3      albuterol (PROAIR HFA) 90 mcg/actuation inhaler INHALE 2 PUFFS BY MOUTH EVERY 4 HOURS AS NEEDED FOR WHEEZING OR SHORTNESS OF BREATH  Qty: 1 Inhaler, Refills: 6    Associated Diagnoses: Chronic obstructive pulmonary disease, unspecified COPD type ([facility]      fluticasone-salmeterol (ADVAIR) 250-50 mcg/dose diskus inhaler Take 1 Puff by inhalation every twelve (12) hours.   Qty: 1 Inhaler, Refills: 3      tiotropium bromide (SPIRIVA RESPIMAT) 2.5 mcg/actuation inhaler Take 2 Puffs by inhalation daily. Qty: 3 Inhaler, Refills: 3      LORazepam (ATIVAN) 1 mg tablet Take  by mouth two (2) times daily as needed for Anxiety. rOPINIRole (REQUIP) 1 mg tablet Take  by mouth two (2) times a day. OXYGEN-AIR DELIVERY SYSTEMS 2 L by Does Not Apply route. O2 via nasal cannula with bedtime and activity. O2 Company: [employer]         STOP taking these medications       celecoxib (CELEBREX) 200 mg capsule Comments:   Reason for Stopping:               Activity: No heavy lifting for 6 weeks  Diet: Low fiber  Wound Care: Stoma care    Follow-up Appointments   Procedures    FOLLOW UP VISIT Appointment in: Two Weeks     Standing Status:   Standing     Number of Occurrences:   1     Order Specific Question:   Appointment in     Answer: Two Weeks       Signed By: Lilibeth Easton MD     November 14, 2018       .

## 2018-11-15 ENCOUNTER — PATIENT OUTREACH (OUTPATIENT)
Dept: PULMONOLOGY | Age: 73
End: 2018-11-15

## 2018-11-15 VITALS
OXYGEN SATURATION: 92 % | HEIGHT: 66 IN | DIASTOLIC BLOOD PRESSURE: 57 MMHG | SYSTOLIC BLOOD PRESSURE: 97 MMHG | HEART RATE: 79 BPM | BODY MASS INDEX: 19.7 KG/M2 | WEIGHT: 122.6 LBS | RESPIRATION RATE: 18 BRPM | TEMPERATURE: 96.9 F

## 2018-11-15 LAB
ANION GAP SERPL CALC-SCNC: 6 MMOL/L (ref 3–18)
BUN SERPL-MCNC: 10 MG/DL (ref 7–18)
BUN/CREAT SERPL: 24 (ref 12–20)
CALCIUM SERPL-MCNC: 8.2 MG/DL (ref 8.5–10.1)
CHLORIDE SERPL-SCNC: 100 MMOL/L (ref 100–108)
CO2 SERPL-SCNC: 33 MMOL/L (ref 21–32)
CREAT SERPL-MCNC: 0.42 MG/DL (ref 0.6–1.3)
ERYTHROCYTE [DISTWIDTH] IN BLOOD BY AUTOMATED COUNT: 14 % (ref 11.6–14.5)
GLUCOSE BLD STRIP.AUTO-MCNC: 86 MG/DL (ref 70–110)
GLUCOSE SERPL-MCNC: 90 MG/DL (ref 74–99)
HCT VFR BLD AUTO: 26 % (ref 35–45)
HGB BLD-MCNC: 8.7 G/DL (ref 12–16)
MAGNESIUM SERPL-MCNC: 1.9 MG/DL (ref 1.6–2.6)
MCH RBC QN AUTO: 32.7 PG (ref 24–34)
MCHC RBC AUTO-ENTMCNC: 33.5 G/DL (ref 31–37)
MCV RBC AUTO: 97.7 FL (ref 74–97)
PHOSPHATE SERPL-MCNC: 3.4 MG/DL (ref 2.5–4.9)
PLATELET # BLD AUTO: 403 K/UL (ref 135–420)
PMV BLD AUTO: 10.4 FL (ref 9.2–11.8)
POTASSIUM SERPL-SCNC: 3.7 MMOL/L (ref 3.5–5.5)
RBC # BLD AUTO: 2.66 M/UL (ref 4.2–5.3)
SODIUM SERPL-SCNC: 139 MMOL/L (ref 136–145)
WBC # BLD AUTO: 11.5 K/UL (ref 4.6–13.2)

## 2018-11-15 PROCEDURE — 36592 COLLECT BLOOD FROM PICC: CPT

## 2018-11-15 PROCEDURE — 80048 BASIC METABOLIC PNL TOTAL CA: CPT

## 2018-11-15 PROCEDURE — 74011250636 HC RX REV CODE- 250/636: Performed by: COLON & RECTAL SURGERY

## 2018-11-15 PROCEDURE — 83735 ASSAY OF MAGNESIUM: CPT

## 2018-11-15 PROCEDURE — 82962 GLUCOSE BLOOD TEST: CPT

## 2018-11-15 PROCEDURE — 74011000250 HC RX REV CODE- 250: Performed by: COLON & RECTAL SURGERY

## 2018-11-15 PROCEDURE — 74011636637 HC RX REV CODE- 636/637: Performed by: COLON & RECTAL SURGERY

## 2018-11-15 PROCEDURE — 84100 ASSAY OF PHOSPHORUS: CPT

## 2018-11-15 PROCEDURE — 94640 AIRWAY INHALATION TREATMENT: CPT

## 2018-11-15 PROCEDURE — 85027 COMPLETE CBC AUTOMATED: CPT

## 2018-11-15 PROCEDURE — 74011250637 HC RX REV CODE- 250/637: Performed by: COLON & RECTAL SURGERY

## 2018-11-15 PROCEDURE — 94761 N-INVAS EAR/PLS OXIMETRY MLT: CPT

## 2018-11-15 RX ORDER — CLINDAMYCIN HYDROCHLORIDE 300 MG/1
300 CAPSULE ORAL 3 TIMES DAILY
Qty: 42 CAP | Refills: 0 | Status: SHIPPED | OUTPATIENT
Start: 2018-11-15 | End: 2018-11-29

## 2018-11-15 RX ORDER — CIPROFLOXACIN 500 MG/1
500 TABLET ORAL EVERY 12 HOURS
Qty: 28 TAB | Refills: 0 | Status: SHIPPED | OUTPATIENT
Start: 2018-11-15 | End: 2018-11-29

## 2018-11-15 RX ADMIN — ROPINIROLE 1 MG: 1 TABLET, FILM COATED ORAL at 08:00

## 2018-11-15 RX ADMIN — CIPROFLOXACIN 500 MG: 500 TABLET, FILM COATED ORAL at 08:00

## 2018-11-15 RX ADMIN — Medication 10 ML: at 06:10

## 2018-11-15 RX ADMIN — PREDNISONE 10 MG: 10 TABLET ORAL at 08:00

## 2018-11-15 RX ADMIN — HYDROMORPHONE HYDROCHLORIDE 1 MG: 2 INJECTION INTRAMUSCULAR; INTRAVENOUS; SUBCUTANEOUS at 03:15

## 2018-11-15 RX ADMIN — ALBUTEROL SULFATE 2.5 MG: 2.5 SOLUTION RESPIRATORY (INHALATION) at 01:40

## 2018-11-15 RX ADMIN — ALBUTEROL SULFATE 2.5 MG: 2.5 SOLUTION RESPIRATORY (INHALATION) at 08:30

## 2018-11-15 RX ADMIN — HYDROMORPHONE HYDROCHLORIDE 1 MG: 2 INJECTION INTRAMUSCULAR; INTRAVENOUS; SUBCUTANEOUS at 08:11

## 2018-11-15 NOTE — PROGRESS NOTES
Nutrition    Pt reported fair appetite, poor/fair meal intake. Ate toast and flores this morning. Tolerating diet. Has only consumed one Premier Protein drink provided by family; plans to consume when return home. Noted plan for discharge today, but agreeable to consume nutrition supplement, Magic Cup, prior to discharge. Dislikes other nutrition supplements. Po intake meals/supplements encouraged. Discussed nutrition recommendations following discharge. Pt progressing towards nutrition goals. Recommendation/Plan:  - Add Magic Cup BID  - Continue low fiber diet.   - Nutrition recommendations following discharge discussed.   - Continue RD inpatient monitoring and evaluation    Augustin Gruber RD

## 2018-11-15 NOTE — HOME CARE
D/c noted for today [facility] will follow for new Ileostomy care - SAMANTHA King RN

## 2018-11-15 NOTE — PROGRESS NOTES
Nurse Navigator ( NN )  contacted Patient on home and mobile number for follow up. No answer. Left message introducing self, role and reason for call. Requested return call.  Contact information provided

## 2018-11-15 NOTE — PROGRESS NOTES
Discharge order noted for today. Pt has been accepted to [facility]. Met with pt, daughter, and sister are agreeable to the transition plan today. Transport has been arranged with family. P'ts discharge [facility] orders have been forwarded to cc/Link. \"Important Message from [employer]" reviewed and explained with the patient and/or representative at bedside and signature was obtained. A signed copy provided to patient/representative. Original signed document placed in patient's chart.      KRISTYN FosterN, RN   Pager # [phone]  Care Manager

## 2018-11-15 NOTE — ROUTINE PROCESS
Bedside and Verbal shift change report given to Shari Harris RN (oncoming nurse) by Mable Beach RN (offgoing nurse). Report included the following information SBAR, Kardex, MAR and Recent Results.     SITUATION:    Code Status: Full Code   Reason for Admission: Obstruction of bowel ([facility]   Post-op pain   Ileus following gastrointestinal surgery    [facility] day: 25   Problem List:       Hospital Problems  Date Reviewed: 11/6/2018          Codes Class Noted POA    Post-op pain ICD-10-CM: G89.18  ICD-9-CM: 338.18  10/28/2018 Unknown        Obstruction of bowel ([address] ICD-10-CM: U37.950  ICD-9-CM: 560.9  10/28/2018 Unknown        Ileus following gastrointestinal surgery ICD-10-CM: K91.30  ICD-9-CM: 997.49, 560.1  10/28/2018 Unknown              BACKGROUND:    Past Medical History:   Past Medical History:   Diagnosis Date    Anxiety     Arthritis     Asbestosis ([facility] [address]     Asthma     Back problem     Baker's cyst     Balance problems     Chronic lung disease     Cigarette smoker     Clotting disorder ([address]     COPD (chronic obstructive pulmonary disease) ([facility]     oxygen 2/liters    Depression     History of DVT (deep vein thrombosis)     Leg pain     Right    Lung disease     Nausea & vomiting     Osteoporosis     Restless leg syndrome     Spinal stenosis     Thromboembolus ([facility] [address]     Vitamin D deficiency          Patient taking anticoagulants yes     ASSESSMENT:    Changes in Assessment Throughout Shift: none     Patient has Central Line: yes Reasons if yes: TPN   Patient has Morales Cath: no Reasons if yes:       Last Vitals:     Vitals:    11/14/18 2007 11/15/18 0001 11/15/18 0140 11/15/18 0412   BP:  98/63  97/57   Pulse:  95  88   Resp:  20  20   Temp:  98.1 °F (36.7 °C)  98.2 °F (36.8 °C)   SpO2: 98% 93% 96% 95%   Weight:       Height:            IV and DRAINS (will only show if present)   Peripheral IV 11/10/18 Left;Posterior Forearm-Site Assessment: Clean, dry, & intact  [REMOVED] Nasogastric Tube 10/28/18-Site Assessment: Clean, dry, & intact  [REMOVED] Peripheral IV 10/30/18 Left Wrist-Site Assessment: Clean, dry, & intact  [REMOVED] Peripheral IV 10/28/18 Left Antecubital-Site Assessment: Clean, dry, & intact  [REMOVED] Peripheral IV 10/31/18 Posterior Arm-Site Assessment: Clean, dry, & intact  PICC Single Lumen 33/60/74 Basilic;Right-Site Assessment: Clean, dry, & intact  [REMOVED] Peripheral IV 11/02/18 Left;Mid Forearm-Site Assessment: Clean, dry, & intact  [REMOVED] Peripheral IV 11/06/18 Left;Posterior Hand-Site Assessment: Clean, dry, & intact  [REMOVED] Peripheral IV Left Forearm-Site Assessment: Clean, dry, & intact     WOUND (if present)   Wound Type:  Midline staples   Dressing present Dressing Present : No   Wound Concerns/Notes:  none     PAIN    Pain Assessment    Pain Intensity 1: 0 (11/15/18 0102)    Pain Location 1: Abdomen    Pain Intervention(s) 1: Medication (see MAR)    Patient Stated Pain Goal: 2  o Interventions for Pain:  See mar  o Intervention effective: yes  o Time of last intervention: see mar   o Reassessment Completed: yes      Last 3 Weights:  Last 3 Recorded Weights in this Encounter    11/12/18 0715 11/13/18 0853 11/14/18 1507   Weight: 57.9 kg (127 lb 9.6 oz) 57.4 kg (126 lb 9.6 oz) 55.6 kg (122 lb 9.6 oz)     Weight change: -1.814 kg ()     INTAKE/OUPUT    Current Shift: 11/14 1901 - 11/15 0700  In: 240 [P.O.:240]  Out: 1400 [Urine:1200]    Last three shifts: 11/13 0701 - 11/14 1900  In: 1000 [P.O.:1000]  Out: 3100 [Urine:2125]     LAB RESULTS     Recent Labs     11/15/18  0315 11/14/18  0415 11/13/18  0600   WBC 11.5 11.3 15.8*   HGB 8.7* 8.5* 8.5*   HCT 26.0* 25.1* 25.8*    325 326        Recent Labs     11/15/18  0315 11/14/18  0415 11/13/18  0600    140 138   K 3.7 3.8 3.5   GLU 90 96 156*   BUN 10 13 15   CREA 0.42* 0.37* 0.28*   CA 8.2* 8.0* 7.8*   MG 1.9 2.2 2.1       RECOMMENDATIONS AND DISCHARGE PLANNING     1.  Pending tests/procedures/ Plan of Care or Other Needs: none     2. Discharge plan for patient and Needs/Barriers: home    3. Estimated Discharge Date: unknown Posted on Whiteboard in Patients Room: no      4. The patient's care plan was reviewed with the oncoming nurse. \"HEALS\" SAFETY CHECK      Fall Risk    Total Score: 3    Safety Measures: Safety Measures: Bed/Chair-Wheels locked, Bed in low position, Call light within reach, Family at bedside, Gripper socks, Side rails X 3    A safety check occurred in the patient's room between off going nurse and oncoming nurse listed above. The safety check included the below items  Area Items   H  High Alert Medications - Verify all high alert medication drips (heparin, PCA, etc.)   E  Equipment - Suction is set up for ALL patients (with gloria)  - Red plugs utilized for all equipment (IV pumps, etc.)  - WOWs wiped down at end of shift.  - Room stocked with oxygen, suction, and other unit-specific supplies   A  Alarms - Bed alarm is set for fall risk patients  - Ensure chair alarm is in place and activated if patient is up in a chair   L  Lines - Check IV for any infiltration  - Morales bag is empty if patient has a Morales   - Tubing and IV bags are labeled   S  Safety   - Room is clean, patient is clean, and equipment is clean. - Hallways are clear from equipment besides carts. - Fall bracelet on for fall risk patients  - Ensure room is clear and free of clutter  - Suction is set up for ALL patients (with gloria)  - Hallways are clear from equipment besides carts.    - Isolation precautions followed, supplies available outside room, sign posted     Ciera Pelayo RN

## 2018-11-16 ENCOUNTER — HOME CARE VISIT (OUTPATIENT)
Dept: SCHEDULING | Facility: HOME HEALTH | Age: 73
End: 2018-11-16
Payer: MEDICARE

## 2018-11-16 VITALS
HEART RATE: 82 BPM | RESPIRATION RATE: 20 BRPM | TEMPERATURE: 98.8 F | DIASTOLIC BLOOD PRESSURE: 60 MMHG | OXYGEN SATURATION: 96 % | SYSTOLIC BLOOD PRESSURE: 100 MMHG

## 2018-11-16 PROCEDURE — G0299 HHS/HOSPICE OF RN EA 15 MIN: HCPCS

## 2018-11-16 PROCEDURE — 400013 HH SOC

## 2018-11-16 PROCEDURE — 3331090001 HH PPS REVENUE CREDIT

## 2018-11-16 PROCEDURE — 3331090002 HH PPS REVENUE DEBIT

## 2018-11-17 PROCEDURE — 3331090002 HH PPS REVENUE DEBIT

## 2018-11-17 PROCEDURE — 3331090001 HH PPS REVENUE CREDIT

## 2018-11-18 PROCEDURE — 3331090002 HH PPS REVENUE DEBIT

## 2018-11-18 PROCEDURE — 3331090001 HH PPS REVENUE CREDIT

## 2018-11-19 ENCOUNTER — PATIENT OUTREACH (OUTPATIENT)
Dept: PULMONOLOGY | Age: 73
End: 2018-11-19

## 2018-11-19 ENCOUNTER — HOME CARE VISIT (OUTPATIENT)
Dept: SCHEDULING | Facility: HOME HEALTH | Age: 73
End: 2018-11-19
Payer: MEDICARE

## 2018-11-19 ENCOUNTER — HOME CARE VISIT (OUTPATIENT)
Dept: HOME HEALTH SERVICES | Facility: HOME HEALTH | Age: 73
End: 2018-11-19
Payer: MEDICARE

## 2018-11-19 ENCOUNTER — HOSPITAL ENCOUNTER (OUTPATIENT)
Dept: LAB | Age: 73
Discharge: HOME | End: 2018-11-19
Payer: MEDICARE

## 2018-11-19 VITALS
RESPIRATION RATE: 18 BRPM | DIASTOLIC BLOOD PRESSURE: 60 MMHG | SYSTOLIC BLOOD PRESSURE: 100 MMHG | OXYGEN SATURATION: 100 % | HEART RATE: 87 BPM

## 2018-11-19 LAB
ANION GAP SERPL CALC-SCNC: 11 MMOL/L (ref 3–18)
BUN SERPL-MCNC: 16 MG/DL (ref 7–18)
BUN/CREAT SERPL: 25 (ref 12–20)
CALCIUM SERPL-MCNC: 9.1 MG/DL (ref 8.5–10.1)
CHLORIDE SERPL-SCNC: 101 MMOL/L (ref 100–108)
CO2 SERPL-SCNC: 25 MMOL/L (ref 21–32)
CREAT SERPL-MCNC: 0.64 MG/DL (ref 0.6–1.3)
GLUCOSE SERPL-MCNC: 90 MG/DL (ref 74–99)
POTASSIUM SERPL-SCNC: 4.4 MMOL/L (ref 3.5–5.5)
SODIUM SERPL-SCNC: 137 MMOL/L (ref 136–145)

## 2018-11-19 PROCEDURE — 3331090002 HH PPS REVENUE DEBIT

## 2018-11-19 PROCEDURE — 80048 BASIC METABOLIC PNL TOTAL CA: CPT

## 2018-11-19 PROCEDURE — G0299 HHS/HOSPICE OF RN EA 15 MIN: HCPCS

## 2018-11-19 PROCEDURE — 3331090001 HH PPS REVENUE CREDIT

## 2018-11-19 NOTE — Clinical Note
Nurse Navigator ( NN )  spoke with patient's daughter today due to patient had just gone back to bed after being seen by Home health nurse. Daughter reported that patient is still coughing up thick green phlegm. Writer  didn't see a temp recorded by [facility] and her daughter didn't report her having a temperature today. Patient is currently taking Cleocin and Cipro until around the 29th of Nov. .   I think the antibiotics was ordered by Dr. Maximilian Proctor. She is taking her pulmonary meds as directed and I encouraged pulmonary toileting as tolerated due to recent surgery and increase fluids. No edema and using oxygen at 2 L NC with SpO2 100% per [facility] notes today. Thanks.

## 2018-11-19 NOTE — PROGRESS NOTES
Hospital Discharge Follow-Up      Date/Time:  2018 3:03 PM    Patient was admitted [facility] [place] on 10/28/18 and discharged on 11/15/18 for:      SBO and contained anastomotic leak, ascending colon cancer     Procedure: Exploratory laparotomy with resection anastomosis and end ileostomy     Patient has h/o of :  COPD with exacerbations  and PNA      The physician discharge summary was available at the time of outreach. Patient was contacted within 1 business days of discharge; however, no answer to NN call and no return call from patient. Top Challenges reviewed with the provider   Thick Green Phlegm reported by her daughter Monique Ordoñez          Method of communication with provider :chart routing, staff message    Inpatient RRAT score: 28  Was this a readmission? yes   Patient stated reason for the readmission: Daughter, Monique Ordoñez, reported that patient begin to have projectile vomiting. Nurse Navigator (NN) contacted the patient by telephone to perform post hospital discharge assessment. Spoke with her Daughter, Monique Ordoñez, who stated, \" we just put her back to bed after the Home health checked her. \"  Verified name and  with Monique Ordoñez, as identifiers. Provided introduction to self, and explanation of the Nurse Navigator role. Monique Ordoñez reported:      [facility] and put back into bed    Has fair appetite ( NN instructed Monique Ordoñez to have patient eat small frequent meals high in protein, ie. Meats , peanut butter, yogurt, ensure or equivalent.)  She voiced understanding and reported that her mom loved peanut butter on toast and they were giving her Premier supplement drink containing 30 G of protein )      Current wt. 110 lbs. Coughing up greenish thick phlegm ( NN encouraged Monique Ordoñez to have patient perform pulmonary toileting as tolerated and increase fluids to help thin secretions )  She voiced understanding. Colostomy bag to rt. Side that is still red but improved.   ( Per [facility] notes NN noted liquid stool in ileostomy but no temp found on today's note )     Wheezing but using inhalers and nebs as directed     Dylon Steele denies patient having :      Hemoptysis     Chest pain    Fever/chills    Increase HR/palpitations    N/V    Edema    Reviewed discharge instructions and red flags with Dylon Steele, who verbalized understanding. Dylon Steele given an opportunity to ask questions. Dylon Steele asked NN where she could retrieve information re:  Patient's dx of asbestos exposure. NN encouraged Dylon Steele to f/u with either PCP or Pulmonologist. She voiced understanding. The Dylon Steele agrees to contact the PCP office for questions related to their healthcare. NN provided contact information for future reference. Disease Specific:   Recent PNA and COPD exacerbation     Summary of patient's top problems:  1. Infection of incision site   2. Green phlegm    3. Decrease appetite     Home Health orders at discharge: [address]: [employer] @ [phone]  Date of initial visit: 11/16/18    Durable Medical Equipment ordered/company: None   Durable Medical Equipment received: None     Barriers to care? depression    Advance Care Planning:   Does patient have an Advance Directive:  reviewed and current     Medication(s):   New Medications at Discharge:     ciprofloxacin HCl (CIPRO) 500 mg tablet Take 1 Tab by mouth every twelve (12) hours for 7 days. Qty: 14 Tab, Refills: 0             Changed Medications at Discharge:     HYDROcodone-acetaminophen (NORCO) 5-325 mg per tablet Take 1 Tab by mouth every four (4) hours as needed for Pain. Max Daily Amount: 6 Tabs. Qty: 40 Tab, Refills: 0     Associated Diagnoses: Colon cancer without distant metastasis ([facility] [address]             Discontinued Medications at Discharge:   celecoxib (CELEBREX) 200 mg capsule Comments:   Reason for Stopping:             Medication reconciliation was performed with Dylon Steele , who verbalizes understanding of administration of home medications.   There were no barriers to obtaining medications identified at this time. Vinicius Monterroso also verified that patient is also currently taking cleocin 100 mg by mouth three times a day x 14 days due to infection of incision site. Referral to Pharm D needed: no     Current Outpatient Medications   Medication Sig    ciprofloxacin HCl (CIPRO) 500 mg tablet Take 1 Tab by mouth every twelve (12) hours for 14 days.  clindamycin (CLEOCIN) 300 mg capsule Take 1 Cap by mouth three (3) times daily for 14 days.  HYDROcodone-acetaminophen (NORCO) 5-325 mg per tablet Take 1 Tab by mouth every four (4) hours as needed for Pain. Max Daily Amount: 6 Tabs.  predniSONE (DELTASONE) 10 mg tablet daily.  therapeutic multivitamin (THERAGRAN) tablet Take 1 Tab by mouth daily.  acetaminophen (TYLENOL EXTRA STRENGTH) 500 mg tablet Take  by mouth every six (6) hours as needed for Pain.  albuterol (PROVENTIL VENTOLIN) 2.5 mg /3 mL (0.083 %) nebulizer solution 3 mL by Nebulization route every four (4) hours as needed for Wheezing or Shortness of Breath. Diag. Code: J44.9, R09.02    albuterol (PROAIR HFA) 90 mcg/actuation inhaler INHALE 2 PUFFS BY MOUTH EVERY 4 HOURS AS NEEDED FOR WHEEZING OR SHORTNESS OF BREATH    fluticasone-salmeterol (ADVAIR) 250-50 mcg/dose diskus inhaler Take 1 Puff by inhalation every twelve (12) hours.  tiotropium bromide (SPIRIVA RESPIMAT) 2.5 mcg/actuation inhaler Take 2 Puffs by inhalation daily.  LORazepam (ATIVAN) 1 mg tablet Take  by mouth two (2) times daily as needed for Anxiety.  rOPINIRole (REQUIP) 1 mg tablet Take  by mouth two (2) times a day.  OXYGEN-AIR DELIVERY SYSTEMS 2 L by Does Not Apply route. O2 via nasal cannula with bedtime and activity. O2 Company: [employer]    fluconazole (DIFLUCAN) 200 mg tablet Take 200 mg by mouth daily as needed. FDA advises cautious prescribing of oral fluconazole in pregnancy. No current facility-administered medications for this visit.         There are no discontinued medications. [facility] follow up appointment(s):   Future Appointments   Date Time Provider Aubrie Mills   11/22/2018 To Be Determined S[facility]I Ventura   11/26/2018 To Be Determined Chelle Collins RN [id number] [facility]   11/29/2018 To Be Determined S[facility]I [facility] [address]   11/29/2018 10:15 AM Denise Valdez  [address]      Non-[facility] follow up appointment(s):     Dr. Lucie Haley on 11/21/18 if patient agrees per [facility]:  out of service area       Goals      Attends follow-up appointments as directed. Target Date:  11/30/18      Plan:  Patient will attend scheduled f/u appointments     Future appointments:      11/21/18 with Dr. Emerson Prado ( PCP )     11/29/2018 10:15 AM MD Nina [address]. Identification of barriers to adherence to a plan of care such as inability to afford medications, lack of insurance, lack of transportation, etc.Target Date:  1/19/19      Plan:  NN will monitor patient for barriers to care. 11/19/18  NN spoke with patient's daughter, Patience Mari who reported that patient is depressed due to recent ileostomy procedure . ( NN will continue to monitor and attempt to speak with patient on next outreach. )        Improve activity tolerance and quality of life (ie. identify issue such as depression) Target Date:  changed to 1/19/19 8/31/18  NN discuss with patient to alternate her rest and activity, Use of medications as directed  and to stay out of the heat. Plan as of 11/19/18 :      Patient to continue to alternate rest and activity , consume small frequent meals high in protein and NN to monitor patient for s/s of depression or worsening depression     11/19/18 NN educated patient's daughter, Patience Mari on all above plans/         Understands red flags post discharge.  Target Date  1 19/19       Plan:  NN will monitor and educate patient and caregivers on Red flags to report to MD immediately. 11/19/18 NN discussed Red Flags with patient's daughter, Jennifer Lantigua. Jennifer Lantigua was able to  teach back given calling MD for temp. . Drainage around stoma site jeanen . if with foul odor, spitting up blood , worsen of lung status. .             NN routed chart and staff message to Dr. Jorden Marroquin.

## 2018-11-20 PROCEDURE — 3331090001 HH PPS REVENUE CREDIT

## 2018-11-20 PROCEDURE — 3331090002 HH PPS REVENUE DEBIT

## 2018-11-21 PROCEDURE — 3331090001 HH PPS REVENUE CREDIT

## 2018-11-21 PROCEDURE — 3331090002 HH PPS REVENUE DEBIT

## 2018-11-22 ENCOUNTER — HOME CARE VISIT (OUTPATIENT)
Dept: SCHEDULING | Facility: HOME HEALTH | Age: 73
End: 2018-11-22
Payer: MEDICARE

## 2018-11-22 VITALS
RESPIRATION RATE: 16 BRPM | OXYGEN SATURATION: 98 % | HEART RATE: 104 BPM | SYSTOLIC BLOOD PRESSURE: 100 MMHG | DIASTOLIC BLOOD PRESSURE: 60 MMHG

## 2018-11-22 PROCEDURE — 3331090001 HH PPS REVENUE CREDIT

## 2018-11-22 PROCEDURE — G0299 HHS/HOSPICE OF RN EA 15 MIN: HCPCS

## 2018-11-22 PROCEDURE — 3331090002 HH PPS REVENUE DEBIT

## 2018-11-23 PROCEDURE — 3331090001 HH PPS REVENUE CREDIT

## 2018-11-23 PROCEDURE — 3331090002 HH PPS REVENUE DEBIT

## 2018-11-24 PROCEDURE — 3331090001 HH PPS REVENUE CREDIT

## 2018-11-24 PROCEDURE — 3331090002 HH PPS REVENUE DEBIT

## 2018-11-25 ENCOUNTER — HOME CARE VISIT (OUTPATIENT)
Dept: SCHEDULING | Facility: HOME HEALTH | Age: 73
End: 2018-11-25
Payer: MEDICARE

## 2018-11-25 VITALS
HEART RATE: 89 BPM | SYSTOLIC BLOOD PRESSURE: 110 MMHG | RESPIRATION RATE: 16 BRPM | DIASTOLIC BLOOD PRESSURE: 60 MMHG | OXYGEN SATURATION: 98 %

## 2018-11-25 PROCEDURE — 3331090002 HH PPS REVENUE DEBIT

## 2018-11-25 PROCEDURE — 3331090001 HH PPS REVENUE CREDIT

## 2018-11-25 PROCEDURE — G0299 HHS/HOSPICE OF RN EA 15 MIN: HCPCS

## 2018-11-26 ENCOUNTER — HOSPITAL ENCOUNTER (OUTPATIENT)
Dept: LAB | Age: 73
Discharge: HOME | End: 2018-11-26

## 2018-11-26 PROCEDURE — 3331090002 HH PPS REVENUE DEBIT

## 2018-11-26 PROCEDURE — 3331090001 HH PPS REVENUE CREDIT

## 2018-11-26 RX ORDER — ALBUTEROL SULFATE 0.83 MG/ML
2.5 SOLUTION RESPIRATORY (INHALATION)
Qty: 375 EACH | Refills: 3 | Status: SHIPPED | OUTPATIENT
Start: 2018-11-26 | End: 2019-04-16 | Stop reason: SDUPTHER

## 2018-11-27 PROCEDURE — 3331090001 HH PPS REVENUE CREDIT

## 2018-11-27 PROCEDURE — 3331090002 HH PPS REVENUE DEBIT

## 2018-11-28 ENCOUNTER — HOME CARE VISIT (OUTPATIENT)
Dept: SCHEDULING | Facility: HOME HEALTH | Age: 73
End: 2018-11-28
Payer: MEDICARE

## 2018-11-28 PROCEDURE — G0299 HHS/HOSPICE OF RN EA 15 MIN: HCPCS

## 2018-11-28 PROCEDURE — 3331090002 HH PPS REVENUE DEBIT

## 2018-11-28 PROCEDURE — 3331090001 HH PPS REVENUE CREDIT

## 2018-11-29 ENCOUNTER — OFFICE VISIT (OUTPATIENT)
Dept: SURGERY | Age: 73
End: 2018-11-29

## 2018-11-29 ENCOUNTER — DOCUMENTATION ONLY (OUTPATIENT)
Dept: SURGERY | Age: 73
End: 2018-11-29

## 2018-11-29 VITALS
TEMPERATURE: 97.5 F | BODY MASS INDEX: 18.32 KG/M2 | DIASTOLIC BLOOD PRESSURE: 67 MMHG | WEIGHT: 114 LBS | HEART RATE: 86 BPM | HEIGHT: 66 IN | SYSTOLIC BLOOD PRESSURE: 104 MMHG

## 2018-11-29 DIAGNOSIS — C18.0 CECAL CANCER (HCC): Primary | ICD-10-CM

## 2018-11-29 PROCEDURE — 3331090001 HH PPS REVENUE CREDIT

## 2018-11-29 PROCEDURE — 3331090002 HH PPS REVENUE DEBIT

## 2018-11-30 ENCOUNTER — TELEPHONE (OUTPATIENT)
Dept: PULMONOLOGY | Age: 73
End: 2018-11-30

## 2018-11-30 ENCOUNTER — PATIENT OUTREACH (OUTPATIENT)
Dept: PULMONOLOGY | Age: 73
End: 2018-11-30

## 2018-11-30 PROCEDURE — 3331090002 HH PPS REVENUE DEBIT

## 2018-11-30 PROCEDURE — 3331090001 HH PPS REVENUE CREDIT

## 2018-11-30 PROCEDURE — A4388 DRAINABLE PCH W EX WEAR BARR: HCPCS

## 2018-11-30 NOTE — PROGRESS NOTES
Nurse Navigator ( NN )  contacted Patient's sister, Junior Hinds ( listed on PHI ) for complex case management follow up. Verified 2 patient identifiers   Introduced self, role and reason for call. Junior Hinds also notified that [facility] will contact patient today as well but calling her for general update and to address her call re: decreasing patient's prednisone to 5 mg vs. 10 mg daily. Junior Hinds reported:      Patient has been having a rough go of it. ( trying to accept the fact that she has the bag ( colostomy ) . She is eating better. Junior Hinds stated, \" she has her breakfast/lunch /dinner/snacks and Premier drinks. \"    She has gained 4 lbs. . She weights 114 lbs now and was 98 lbs leaving the hospital. ( NN asked if patient has any swelling and Junior Hinds reported no signs of swelling.)     Home Health  nurse is coming out to see her but the last visit wasn't good due to her colostomy bag placed on by nurse and patient ( first patient teaching to change bag ) came off and stool was everywhere. ( NN encouraged Junior Hinds to contact [facility] when incidents like that occur and Junior Hinds verbalized being dissatisfied with the care her sister receive by nurse that day and the comments during the visit and after calling her back after the incident. NN performed active listening and then encouraged Junior Hinds to f/u with the [facility] manager if needed     Junior Hinds reported that her niece who is a nurse came over to change the ostomy bag and it has remain in place . Also issues with her supplies ordered by Home health. Bags are to large ( Coloplast ) but they have a few Portia bags left over from the hospital.) Junior Hinds reports that the nurse will not be back until Monday. ( NN encouraged Junior Hinds to call the [facility] agency if needed due to nurse should be on call for issues.)  She voiced understanding. She saw Dr. Maximilian Proctor on yesterday.   Binaflakoian Ramirezs verbalized her concerns re: visit with him and his discussion with them re:  Reversal of colostomy /removal of staples, new hernia and referrals. She reported that he really didn't give any concrete answers to when or if the bag would be reversed ( maybe 6 mos to a year. )  NN performed active listening. She reports that he has arranged for patient to see Dr. Bret Braxton on 12/10/18 and f/u visit with him again on 12/27/18. Porsche Oliveira reported that Dr. Brian Alicea would like patient to decrease her prednisone to 5 mg daily vs. 10 mg in preparation for further surgery which is is unsure about. NN informed Porsche Oliveira that writer did speak with Dr. Ricky Chavez about the request of decreasing patient's prednisone and that she is agreeable but concerned about her having difficutly breathing on the decrease dosage but it is up to them to decide. NN discussed with Porsche Oliveira on waiting to make the prednisone change until they have seen Dr. Bret Braxton for the evaluation. Porsche Oliveira agreed and stated, \" she  Normally gets into breathing trouble during this time of year. \"  Porsche Oliveira reports that she will f/u with Dr. Ricky Chavez or NN  if they decide to attempt the decrease in dosage after seeing Dr. Bret Braxton. Porsche Oliveira Denies:      Patient smoking x 5 weeks. NN contacted patient for complex case management. Spoke with her daughter, Bonnie Serna. 2 patient identifier given by Bonnie Serna. Bonnie Serna reported:      I put her to bed 30 mins ago due to her being tired. Some wheezing    Using nebulizer 3-4 times a day    Uses rescue inhaler one time today that she knows of     Patient visit with Dr. Brian Alicea on yesterday     Using oxygen at 2L NC     [address] denies:      Temp     Hemoptysis     Chest pain    Fever/chills    Increase HR/palpitations    N/V    Edema      NN routed staff and chart message to Dr. Ricky Chavez. NN also spoke with Dr. Ricky Chavez re:  Patient's sister reports patient will continue her prednisone 10 mg daily until she is seen by Dr. Bret Braxton on 12/10/18 and will call office with update at that time.         Future appointments:      Dr. Bret Braxton on 12/10/18 12/27/2018 9:00 [place] [id number] [facility] Attends follow-up appointments as directed. Target Date:  11/30/18      Plan:  Patient will attend scheduled f/u appointments     Future appointments:      11/21/18 with Dr. Crow Wray ( PCP )     11/29/2018 10:15 AM Amada Bennett MD [address] Identification of barriers to adherence to a plan of care such as inability to afford medications, lack of insurance, lack of transportation, etc.Target Date:  1/19/19      Plan:  NN will monitor patient for barriers to care. 11/19/18  NN spoke with patient's daughter, Molina Mccain who reported that patient is depressed due to recent ileostomy procedure . ( NN will continue to monitor and attempt to speak with patient on next outreach. )        Improve activity tolerance and quality of life (ie. identify issue such as depression) Target Date:  changed to 1/19/19 8/31/18  NN discuss with patient to alternate her rest and activity, Use of medications as directed  and to stay out of the heat. Plan as of 11/19/18 :      Patient to continue to alternate rest and activity , consume small frequent meals high in protein and NN to monitor patient for s/s of depression or worsening depression     11/19/18 NN educated patient's daughter, Molina Mccain on all above plans/         Understands red flags post discharge. Target Date  1 19/19       Plan:  NN will monitor and educate patient and caregivers on Red flags to report to MD immediately. 11/19/18 NN discussed Red Flags with patient's daughter, Molina Mccain. Molina Mccain was able to  teach back given calling MD for temp. . Drainage around stoma site jeanne . if with foul odor, spitting up blood , worsen of lung status. Dion De

## 2018-11-30 NOTE — TELEPHONE ENCOUNTER
Per sister, pt saw Dr. Mckinley Valdez yesterday and he wants to see if Dr. Vicki Duverney will decrease pt's prednisone to 5mg instead of 10. He thinks this will be better for when she has her next surgery. She also wants her to know that he is referring her to Dr. Jam Oliva to be seen.

## 2018-11-30 NOTE — Clinical Note
FYI:  Re: Decrease of prednisone from 10 mg to 5 mg daily: Patient has a consult with Dr. Aury Mirza on 12/10/18. She will continue her prednisone 10 mg daily and will let us know if she will decrease to 5 mg. Thanks.

## 2018-12-01 PROCEDURE — 3331090001 HH PPS REVENUE CREDIT

## 2018-12-01 PROCEDURE — 3331090002 HH PPS REVENUE DEBIT

## 2018-12-02 PROCEDURE — 3331090001 HH PPS REVENUE CREDIT

## 2018-12-02 PROCEDURE — 3331090002 HH PPS REVENUE DEBIT

## 2018-12-03 ENCOUNTER — HOME CARE VISIT (OUTPATIENT)
Dept: SCHEDULING | Facility: HOME HEALTH | Age: 73
End: 2018-12-03
Payer: MEDICARE

## 2018-12-03 VITALS
HEART RATE: 88 BPM | OXYGEN SATURATION: 98 % | TEMPERATURE: 98.4 F | RESPIRATION RATE: 16 BRPM | DIASTOLIC BLOOD PRESSURE: 72 MMHG | SYSTOLIC BLOOD PRESSURE: 120 MMHG

## 2018-12-03 PROCEDURE — 3331090002 HH PPS REVENUE DEBIT

## 2018-12-03 PROCEDURE — G0299 HHS/HOSPICE OF RN EA 15 MIN: HCPCS

## 2018-12-03 PROCEDURE — 3331090001 HH PPS REVENUE CREDIT

## 2018-12-04 PROCEDURE — 3331090002 HH PPS REVENUE DEBIT

## 2018-12-04 PROCEDURE — 3331090001 HH PPS REVENUE CREDIT

## 2018-12-05 PROCEDURE — 3331090001 HH PPS REVENUE CREDIT

## 2018-12-05 PROCEDURE — 3331090002 HH PPS REVENUE DEBIT

## 2018-12-06 ENCOUNTER — HOME CARE VISIT (OUTPATIENT)
Dept: SCHEDULING | Facility: HOME HEALTH | Age: 73
End: 2018-12-06
Payer: MEDICARE

## 2018-12-06 PROCEDURE — 3331090002 HH PPS REVENUE DEBIT

## 2018-12-06 PROCEDURE — G0299 HHS/HOSPICE OF RN EA 15 MIN: HCPCS

## 2018-12-06 PROCEDURE — 3331090001 HH PPS REVENUE CREDIT

## 2018-12-07 ENCOUNTER — PATIENT OUTREACH (OUTPATIENT)
Dept: PULMONOLOGY | Age: 73
End: 2018-12-07

## 2018-12-07 VITALS
OXYGEN SATURATION: 99 % | HEART RATE: 102 BPM | RESPIRATION RATE: 16 BRPM | DIASTOLIC BLOOD PRESSURE: 62 MMHG | SYSTOLIC BLOOD PRESSURE: 120 MMHG

## 2018-12-07 PROCEDURE — 3331090001 HH PPS REVENUE CREDIT

## 2018-12-07 PROCEDURE — 3331090002 HH PPS REVENUE DEBIT

## 2018-12-07 NOTE — PROGRESS NOTES
Complex Case  Management  Follow-Up     Date/Time: 12/7/18 @ 1:29 PM         NN contacted patient for Complex Case Management  follow up. Spoke to patient. Introduced self/role and reason for call.     Patient reported:     Not having a good day     Problems with this bag leaking or coming off. Patient stated, \" when my daughter or niece changes it , it doen't leak but leaks after the nurse changes it. I told her that and the last time she brought and used this paste on it. \"    Patient proceeded to speak about her last appt. With Dr. Tahmina Miller and how he made her feel. NN performed active listening. Will see Dr. Kylie Mack on 12/11/18 and have much respect for her. Breathing has been a little short today . Used nebulizer several times today  ( NN encouraged patient to relax and not focus on her last appt. , to alternate rest and activity and purse lip breath as needed.)  She voiced understanding. Being uncomfortable /belly and lt. Side of back and shoulder hurts . She stated, \" I don't like to take a lot of medicine so I am taking Tylenol for the discomfort. \"   She reported that due to her hernia and the amount of stool in bed , the bag hurts her when it gets full so she has to change it every 2 hrs and [address] is aware. NN encouraged patient that if pain continues or intensifies to contact her PCP. She voiced understanding. Using oxygen at 2L NC     Wt. Goes up and down but I trying to eat and drink her premier drink but almost afraid to eat because it goes straight through her. ( NN encouraged patient to continue to eat for needed nutrition.)   She voiced understanding. Occasional wheezing and productive Cough.      ONEAL       Pertinent negatives:  Dizziness   CP/pressure  Palpitation  Edema   Fever/chills  Hemoptysis      Medications:   New medications since last outreach: none   Does patient need refills on any medications: no   Medication changes since last outreach (dose adjustments or discontinued meds): none      Home Health company: [facility]     Date of discharge: Continue to receive SN      Barriers to care? None noted      Specialist appointments since last outreach? none   If so, specialist and date: N/A      Patient reminded that there are physicians on call 24 hours a day / 7 days a week (M-F 5pm to 8am and from Friday 5pm until Monday 8a for the weekend) should the patient have questions or concerns.             Future Appointments      Dr. Grabiel Dinh on 12/10/18     12/27/2018 9:00 AM Rakesh Saeed MD [facility]       Goals      Identification of barriers to adherence to a plan of care such as inability to afford medications, lack of insurance, lack of transportation, etc.Target Date:  1/19/19      Plan:  NN will monitor patient for barriers to care. 11/19/18  NN spoke with patient's daughter, Anjelica Santana who reported that patient is depressed due to recent ileostomy procedure . ( NN will continue to monitor and attempt to speak with patient on next outreach. )        Improve activity tolerance and quality of life (ie. identify issue such as depression) Target Date:  changed to 1/19/19 8/31/18  NN discuss with patient to alternate her rest and activity, Use of medications as directed  and to stay out of the heat. Plan as of 11/19/18 :      Patient to continue to alternate rest and activity , consume small frequent meals high in protein and NN to monitor patient for s/s of depression or worsening depression     11/19/18 NN educated patient's daughter, Anjelica Santana on all above plans    12/7/18  Patient reports having good days and bad days.  Understands red flags post discharge. Target Date  1 19/19       Plan:  NN will monitor and educate patient and caregivers on Red flags to report to MD immediately. 11/19/18 NN discussed Red Flags with patient's daughter, Anjelica Santana.   Anjelicazachary Santana was able to  teach back given calling MD for temp.. Drainage around stoma site jeanne . if with foul odor, spitting up blood , worsen of lung status. .      12/7/18 NN discussed Red Flags to report to MD with patient. NN will continue to follow patient weekly x 2 or as needed.   Patient aware of plan of care.

## 2018-12-08 PROCEDURE — 3331090002 HH PPS REVENUE DEBIT

## 2018-12-08 PROCEDURE — 3331090001 HH PPS REVENUE CREDIT

## 2018-12-09 PROCEDURE — 3331090001 HH PPS REVENUE CREDIT

## 2018-12-09 PROCEDURE — 3331090002 HH PPS REVENUE DEBIT

## 2018-12-10 ENCOUNTER — HOME CARE VISIT (OUTPATIENT)
Dept: SCHEDULING | Facility: HOME HEALTH | Age: 73
End: 2018-12-10
Payer: MEDICARE

## 2018-12-10 VITALS
SYSTOLIC BLOOD PRESSURE: 120 MMHG | DIASTOLIC BLOOD PRESSURE: 72 MMHG | OXYGEN SATURATION: 98 % | HEART RATE: 99 BPM | RESPIRATION RATE: 16 BRPM

## 2018-12-10 PROCEDURE — A4456 ADHESIVE REMOVER, WIPES: HCPCS

## 2018-12-10 PROCEDURE — A4406 PECTIN BASED OSTOMY PASTE: HCPCS

## 2018-12-10 PROCEDURE — A4371 SKIN BARRIER POWDER PER OZ: HCPCS

## 2018-12-10 PROCEDURE — 3331090002 HH PPS REVENUE DEBIT

## 2018-12-10 PROCEDURE — G0299 HHS/HOSPICE OF RN EA 15 MIN: HCPCS

## 2018-12-10 PROCEDURE — A4389 DRAINABLE PCH W ST WEAR BARR: HCPCS

## 2018-12-10 PROCEDURE — 3331090001 HH PPS REVENUE CREDIT

## 2018-12-11 PROCEDURE — 3331090001 HH PPS REVENUE CREDIT

## 2018-12-11 PROCEDURE — 3331090002 HH PPS REVENUE DEBIT

## 2018-12-12 PROCEDURE — 3331090001 HH PPS REVENUE CREDIT

## 2018-12-12 PROCEDURE — 3331090002 HH PPS REVENUE DEBIT

## 2018-12-13 ENCOUNTER — HOME CARE VISIT (OUTPATIENT)
Dept: SCHEDULING | Facility: HOME HEALTH | Age: 73
End: 2018-12-13
Payer: MEDICARE

## 2018-12-13 VITALS
OXYGEN SATURATION: 97 % | HEART RATE: 83 BPM | SYSTOLIC BLOOD PRESSURE: 83 MMHG | TEMPERATURE: 97.5 F | DIASTOLIC BLOOD PRESSURE: 62 MMHG

## 2018-12-13 PROCEDURE — G0299 HHS/HOSPICE OF RN EA 15 MIN: HCPCS

## 2018-12-13 PROCEDURE — 3331090001 HH PPS REVENUE CREDIT

## 2018-12-13 PROCEDURE — 3331090002 HH PPS REVENUE DEBIT

## 2018-12-14 ENCOUNTER — PATIENT OUTREACH (OUTPATIENT)
Dept: PULMONOLOGY | Age: 73
End: 2018-12-14

## 2018-12-14 PROCEDURE — 3331090001 HH PPS REVENUE CREDIT

## 2018-12-14 PROCEDURE — 3331090002 HH PPS REVENUE DEBIT

## 2018-12-14 RX ORDER — RANITIDINE 150 MG/1
150 TABLET, FILM COATED ORAL 2 TIMES DAILY
COMMUNITY
End: 2019-10-23

## 2018-12-14 NOTE — PROGRESS NOTES
Complex Case  Management  Follow-Up     Date/Time: 12/14/18 @ 1:43 PM         NN contacted patient for Complex Case Management  follow up. Spoke to patient. Introduced self/role and reason for call. 2 patient identifiers given.       Patient reported:     Doing better today     Saw Dr. Alveda Hashimoto on 12/11/18 and had a good visit with her    Will wait until spring to further test for Dr. Alveda Hashimoto     My nose is pouring blood. Patient stated, \" I think it is from the gas heat but I also not using my water bottle my oxygen. No matter what I try the water sometimes get into the tubing. The bleeding is mostly from the rt. Side of my nose. NN asked patient if she had notified DME company re:  Water issue in tubing and she reported that they would be out today. NN encouraged her to let them know about the water in tubing. She stated, \" oh I will . \"      Still has some pain over the lt. Side of her back and stomach discomfort. Taking otc tylenol with relief. Taking OTC zantac for acid reflux and Dr. Alveda Hashimoto approved of me taking it. Productive cough of yellow stuff    [facility] has discharged her but her daughter, Nichole Castillo changed her colostomy bag today and it hasn't dumped out. Smell from bag is embarassing to her. ( NN informed her that there is a solution she can purchase to pour in bag to decrease the odor.)  Patient stated, \" yea I know and you can also put a tic tac in the bag and that helps but you have to empty the bag before putting another one in to help with odor. \"      Dr. Alveda Hashimoto told me that my iron was 3 and the norm is 16. Patient stated, \" I might have to have iron transfusion but she told me to take gummy bears  multivitamins for now. \"  Now I know why I stay so cold and tremble.      ONEAL     I am a little better than 4 weeks ago    Stays dizzy all the time ( Not new for her )       Pertinent negatives:    CP/pressure  Palpitation  Edema   Fever/chills  Hemoptysis ( does cough up a little tinge of blood due to dry nose and nose bleeds.)      Medications:   New medications since last outreach: multivitamin gummy bear and OTC Zantac   Does patient need refills on any medications: no   Medication changes since last outreach (dose adjustments or discontinued meds): none      [address]: [facility]     Date of discharge: 12/13/18     Barriers to care? None noted      Specialist appointments since last outreach? none   If so, specialist and date: N/A     NN discussed patient's B/p documented by [facility] nurse on 12/13/18 83/62. Patient stated, \" that is good for me. I am usually lower. \"      Patient reminded that there are physicians on call 24 hours a day / 7 days a week (M-F 5pm to 8am and from Friday 5pm until Monday 8a for the weekend) should the patient have questions or concerns.             Future Appointments      12/27/2018 9:00 AM Kyra Ferguson MD [facility]       Goals      Identification of barriers to adherence to a plan of care such as inability to afford medications, lack of insurance, lack of transportation, etc.Target Date:  1/19/19      Plan:  NN will monitor patient for barriers to care. 11/19/18  NN spoke with patient's daughter, Yousif Lima who reported that patient is depressed due to recent ileostomy procedure . ( NN will continue to monitor and attempt to speak with patient on next outreach. )        Improve activity tolerance and quality of life (ie. identify issue such as depression) Target Date:  changed to 1/19/19 8/31/18  NN discuss with patient to alternate her rest and activity, Use of medications as directed  and to stay out of the heat.       Plan as of 11/19/18 :      Patient to continue to alternate rest and activity , consume small frequent meals high in protein and NN to monitor patient for s/s of depression or worsening depression     11/19/18 NN educated patient's daughter, Yousif Lima on all above plans    12/7/18  Patient reports having good days and bad days. 12/14/18  Patient reported that she feels a little better than 4 weeks ago. NN discussed iron being low and patient instructed by MD to take multivitamin daily.  Understands red flags post discharge. Target Date  1 19/19       Plan:  NN will monitor and educate patient and caregivers on Red flags to report to MD immediately. 11/19/18 NN discussed Red Flags with patient's daughter, Pawan Purdy. Libbyjamaica Rajwinder was able to  teach back given calling MD for temp. . Drainage around stoma site jeanne . if with foul odor, spitting up blood , worsen of lung status. .      12/7/18 NN discussed Red Flags to report to MD with patient. NN will continue to follow patient weekly x 1or as needed.   Patient aware of plan of care.

## 2018-12-15 PROCEDURE — 3331090001 HH PPS REVENUE CREDIT

## 2018-12-15 PROCEDURE — 3331090002 HH PPS REVENUE DEBIT

## 2018-12-16 PROCEDURE — 3331090001 HH PPS REVENUE CREDIT

## 2018-12-16 PROCEDURE — 3331090002 HH PPS REVENUE DEBIT

## 2018-12-17 PROCEDURE — 3331090001 HH PPS REVENUE CREDIT

## 2018-12-17 PROCEDURE — 3331090002 HH PPS REVENUE DEBIT

## 2018-12-18 PROCEDURE — 3331090001 HH PPS REVENUE CREDIT

## 2018-12-18 PROCEDURE — 3331090002 HH PPS REVENUE DEBIT

## 2018-12-19 PROCEDURE — 3331090001 HH PPS REVENUE CREDIT

## 2018-12-19 PROCEDURE — 3331090002 HH PPS REVENUE DEBIT

## 2018-12-21 ENCOUNTER — PATIENT OUTREACH (OUTPATIENT)
Dept: PULMONOLOGY | Age: 73
End: 2018-12-21

## 2018-12-21 NOTE — PROGRESS NOTES
Complex Case  Management  Follow-Up     Date/Time:  12/21/18 @ 1:30 PM     NN contacted patient for Disease Specific Care Management  follow up week 4. Spoke with patient's daughter, Yousif Lima. Introduced self/role and reason for call. 2 patient identifiers given by Shelly Roberts reports Patient:      Is in the bed resting     Her coughing hasn't changed but she has used her rescue inhaler and nebulizer less often this week. She has been coughing up /yellow phlegm    Occasional wheezing     Her bag leaks at times and she gets upset due to the smell and bag  fills with gas. ( NN encouraged  Yousif Lima to open bag for flatulence to escape when getting full )  Yousif Lima stated, \" they called that burping the baby and we will change bag every 2 days to see if that stops the leaking. \"    Nose bleeds have stopped ( she denies use of water bottle on concentrator)     SpO2 levels in the 90's     Oxygen at 2 L NC continuously     Slight increase in activity. Took her to visit her sister this week. ( NN reminded her to have patient pace self )  She voiced understanding. Having occasional dizzy spells when up. ( NN instructed Yousif Lima of safety /using walker/rolator or human assist, rising from lying or sitting position slowly )     She has an appt. With Dr. Valerie eBll on 12/28/18    Yousif Lima denies Patient:      Fever/chills    Edema    Chest pain    Hemoptysis    Increase HR/Palpitations. Nose bleeds    Smoking since being in hospital     Using her IS due to c/o makes her have a headache. Blood in Stool    Medications:   New medications since last outreach: None   Does patient need refills on any medications: No  Medication changes since last outreach (dose adjustments or discontinued meds): None      [address]: [facility]   Date of discharge: 12/13/18     Barriers to care? None noted this outreach      Specialist appointments since last outreach?  Yes   If so, specialist and date: Dr. Arroyo Agent on 12/10/18      Patient reminded that there are physicians on call 24 hours a day / 7 days a week (M-F 5pm to 8am and from Friday 5pm until Monday 8a for the weekend) should the patient have questions or concerns.             Future Appointments       12/27/2018 9:00 AM MD Tim Marcial Daisy    12/28/18  Dr. Yesy [facility]     1/2/2019 10:00 AM Jose Rader MD [facility] [address]     NN reminded Dylon Steele of patient's upcoming appointments. Goals      Identification of barriers to adherence to a plan of care such as inability to afford medications, lack of insurance, lack of transportation, etc.Target Date:  1/19/19      Plan:  NN will monitor patient for barriers to care. 11/19/18  NN spoke with patient's daughter, Dylon Steele who reported that patient is depressed due to recent ileostomy procedure . ( NN will continue to monitor and attempt to speak with patient on next outreach. )        Improve activity tolerance and quality of life (ie. identify issue such as depression) Target Date:  changed to 1/19/19 8/31/18  NN discuss with patient to alternate her rest and activity, Use of medications as directed  and to stay out of the heat. Plan as of 11/19/18 :      Patient to continue to alternate rest and activity , consume small frequent meals high in protein and NN to monitor patient for s/s of depression or worsening depression     11/19/18 NN educated patient's daughter, Dylon Steele on all above plans    12/7/18  Patient reports having good days and bad days. 12/14/18  Patient reported that she feels a little better than 4 weeks ago. NN discussed iron being low and patient instructed by MD to take multivitamin daily.  Understands red flags post discharge. Target Date  1 19/19       Plan:  NN will monitor and educate patient and caregivers on Red flags to report to MD immediately. 11/19/18 NN discussed Red Flags with patient's daughter, Ny Salazar. Ny Salazar was able to  teach back given calling MD for temp. . Drainage around stoma site jeanne . if with foul odor, spitting up blood , worsen of lung status. .      12/7/18 NN discussed Red Flags to report to MD with patient. Opportunity to ask questions was provided. Sangeetafabiooniel Shannanwa asked about what office was patient to go to for Appt. With Dr. Lisa Vieyra. NN encouraged her to call PCP for appt. Location. She stated that she would call them. Patient and C/G  has contact information for future reference or further questions.

## 2018-12-27 ENCOUNTER — OFFICE VISIT (OUTPATIENT)
Dept: SURGERY | Age: 73
End: 2018-12-27

## 2018-12-27 VITALS
BODY MASS INDEX: 18.32 KG/M2 | HEIGHT: 66 IN | HEART RATE: 64 BPM | OXYGEN SATURATION: 92 % | TEMPERATURE: 97.6 F | WEIGHT: 114 LBS

## 2018-12-27 DIAGNOSIS — C18.0 CECAL CANCER (HCC): Primary | ICD-10-CM

## 2018-12-27 NOTE — PROGRESS NOTES
Subjective: She is tolerating diet moving her bowels. Her stoma. Her appetite is excellent. She saw Viji Crouch who did not recommend chemotherapy at this point. Past medical history and ROS were reviewed and unchanged. Abdomen: Soft, nontender nondistended  Is healed, no hernia  Ileostomy pink    Assessment / Plan    Status post robotic right colectomy for stage II cecal cancer  Follow-up in 3 months  Mediport if patient is recommended and decides to proceed with chemotherapy  Stressed the importance of smoking cessation, discussing prednisone reduction with her pulmonologist and good nutrition  To consider ileostomy reversal next summer    The diagnoses and plan were discussed with patient. All questions answered. Plan of care agreed to by all concerned.

## 2018-12-27 NOTE — PROGRESS NOTES
1. Have you been to the ER, urgent care clinic since your last visit? Hospitalized since your last visit? No    2. Have you seen or consulted any other health care providers outside of the [address] [place] [address] since your last visit? Include any pap smears or colon screening. Yes When: Dr. Grabiel Dinh and Dr. Arturo Padilla. Chief Complaint   Patient presents with    Post OP Follow Up     11/6/18 Exploratory laparotomy with resection of ileocolonic anastomosis and end ileostomy     Patient eating well. Has skin irritation on right leg from ankle to thigh, itchy, red rash but no raised bumps. Bag working ok but some irritation around the stoma. Patient trying to get used to this new change and working daily on accepting it. Has a very good support system.

## 2019-01-02 ENCOUNTER — OFFICE VISIT (OUTPATIENT)
Dept: PULMONOLOGY | Age: 74
End: 2019-01-02

## 2019-01-02 VITALS
DIASTOLIC BLOOD PRESSURE: 76 MMHG | RESPIRATION RATE: 18 BRPM | OXYGEN SATURATION: 99 % | HEART RATE: 81 BPM | WEIGHT: 116 LBS | HEIGHT: 66 IN | SYSTOLIC BLOOD PRESSURE: 104 MMHG | BODY MASS INDEX: 18.64 KG/M2 | TEMPERATURE: 98.2 F

## 2019-01-02 DIAGNOSIS — F32.9 REACTIVE DEPRESSION: ICD-10-CM

## 2019-01-02 DIAGNOSIS — Z99.81 HYPOXEMIA REQUIRING SUPPLEMENTAL OXYGEN: ICD-10-CM

## 2019-01-02 DIAGNOSIS — C18.2 MALIGNANT NEOPLASM OF ASCENDING COLON (HCC): ICD-10-CM

## 2019-01-02 DIAGNOSIS — F17.200 TOBACCO USE DISORDER: ICD-10-CM

## 2019-01-02 DIAGNOSIS — R09.02 HYPOXEMIA REQUIRING SUPPLEMENTAL OXYGEN: ICD-10-CM

## 2019-01-02 DIAGNOSIS — J44.9 COPD, SEVERE (HCC): Primary | ICD-10-CM

## 2019-01-02 RX ORDER — PERPHENAZINE 8 MG
500 TABLET ORAL 2 TIMES DAILY
Qty: 60 CAP | Refills: 3 | Status: SHIPPED | OUTPATIENT
Start: 2019-01-02 | End: 2020-07-16

## 2019-01-02 RX ORDER — SIMETHICONE 80 MG
80 TABLET,CHEWABLE ORAL
COMMUNITY

## 2019-01-02 RX ORDER — AZITHROMYCIN 250 MG/1
250 TABLET, FILM COATED ORAL
Qty: 20 TAB | Refills: 5 | Status: SHIPPED | OUTPATIENT
Start: 2019-01-02 | End: 2019-01-02 | Stop reason: CLARIF

## 2019-01-02 NOTE — TELEPHONE ENCOUNTER
ARNOLD FROM [facility]([phone]). NEEDS TO VERIFY IF SHE CAN SWITCH DOSAGE FOR ACETYLSISTINE TO 600MG. PLEASE CHECK AND CALL HER BACK.

## 2019-01-02 NOTE — PROGRESS NOTES
Chief Complaint   Patient presents with    COPD     follow up from 10/4/2018; CT 10/28/2018; CXR 11/9/2018    Other     Tobacco Use Disorder    Cough       1. Have you been to the ER, urgent care clinic since your last visit? Hospitalized since your last visit? Yes When: 10/23-10/27/2018 & 10/28-11/15/2018 Where: [facility] Reason for visit: (R) Colectomy & Post-op Pain    2. Have you seen or consulted any other health care providers outside of the [address] [place] [address] since your last visit? Include any pap smears or colon screening. Yes Where: Dr. Mark Washington, PCP     Patient reported coughing up yellow sputum.

## 2019-01-02 NOTE — PROGRESS NOTES
HISTORY OF PRESENT ILLNESS  Julien Jackson is a 72 y.o. female with COPD. KUNAL visit after discharge for surgery for invasive Adenocarcinoma of the ascending colon. Admission was complicated by anastomotic leak, SBO and post op ileus requiring re-op. Pt has been discharged home and is now back to baseline level of comfort. Still complains of baseline SOB with minimal exertion, wheezing and tight cough with chest congestion and tenacious phlegm. Pt complains of intermittent hemoptysis, known since 2013, no fever or chills. Pt is on home O2, last FEV1 was 30% in Jan 2015. Pt has quit smoking since hospital admission in 2018. Pt notes no apparent response to maintenance Azithromycin with no change in amount and character of phlegm. Jordyn Mariner also did not improve symptoms and resulted in side effects. COPD   The history is provided by the patient. This is a chronic problem. The problem has been gradually worsening. Associated symptoms include shortness of breath. Pertinent negatives include no chest pain, no abdominal pain and no headaches. The symptoms are aggravated by exertion. Treatments tried: see list. The treatment provided moderate relief. Other   Associated symptoms include shortness of breath. Pertinent negatives include no chest pain, no abdominal pain and no headaches. Review of Systems   Constitutional: Positive for malaise/fatigue. Negative for chills, diaphoresis and fever. Weight loss: improving. HENT: Negative for congestion, ear discharge, ear pain, hearing loss, nosebleeds, sinus pain, sore throat and tinnitus. Eyes: Negative for blurred vision, double vision, photophobia, pain, discharge and redness. Respiratory: Positive for cough, hemoptysis, sputum production, shortness of breath and wheezing. Negative for stridor. Cardiovascular: Negative for chest pain, palpitations, orthopnea, claudication, leg swelling and PND.    Gastrointestinal: Negative for abdominal pain, blood in stool, constipation, diarrhea, heartburn, melena, nausea and vomiting. Genitourinary: Negative for dysuria, flank pain, frequency, hematuria and urgency. Musculoskeletal: Positive for joint pain. Negative for back pain, falls and neck pain. Myalgias: resolved. Skin: Negative for itching and rash. Neurological: Negative for dizziness, tingling, tremors, sensory change, speech change, focal weakness, seizures, loss of consciousness, weakness and headaches. Endo/Heme/Allergies: Negative for environmental allergies and polydipsia. Bruises/bleeds easily. Psychiatric/Behavioral: Positive for depression. Negative for hallucinations, memory loss, substance abuse and suicidal ideas. The patient is nervous/anxious and has insomnia. Past Medical History:   Diagnosis Date    Anxiety     Arthritis     Asbestosis ([address]     Asthma     Back problem     Baker's cyst     Balance problems     Chronic lung disease     Cigarette smoker     Clotting disorder ([facility])     COPD (chronic obstructive pulmonary disease) ([facility])     oxygen 2/liters asbestosis    Depression     History of DVT (deep vein thrombosis)     Leg pain     Right    Lung disease     Nausea & vomiting     Osteoporosis     Restless leg syndrome     Spinal stenosis     Thromboembolus ([facility])     Vitamin D deficiency      Current Outpatient Medications on File Prior to Visit   Medication Sig Dispense Refill    simethicone (GAS-X) 80 mg chewable tablet Take 80 mg by mouth every six (6) hours as needed for Flatulence.  raNITIdine (ZANTAC) 150 mg tablet Take 150 mg by mouth two (2) times a day.  albuterol (PROVENTIL VENTOLIN) 2.5 mg /3 mL (0.083 %) nebulizer solution 3 mL by Nebulization route every four (4) hours as needed for Wheezing or Shortness of Breath. Diag. Code: J44.9, R09.02 375 Each 3    predniSONE (DELTASONE) 10 mg tablet Take 10 mg by mouth daily.   0    therapeutic multivitamin (THERAGRAN) tablet Take 1 Tab by mouth daily. 30 Tab 11    acetaminophen (TYLENOL EXTRA STRENGTH) 500 mg tablet Take  by mouth every six (6) hours as needed for Pain.  albuterol (PROAIR HFA) 90 mcg/actuation inhaler INHALE 2 PUFFS BY MOUTH EVERY 4 HOURS AS NEEDED FOR WHEEZING OR SHORTNESS OF BREATH 1 Inhaler 6    fluticasone-salmeterol (ADVAIR) 250-50 mcg/dose diskus inhaler Take 1 Puff by inhalation every twelve (12) hours. 1 Inhaler 3    tiotropium bromide (SPIRIVA RESPIMAT) 2.5 mcg/actuation inhaler Take 2 Puffs by inhalation daily. 3 Inhaler 3    LORazepam (ATIVAN) 1 mg tablet Take  by mouth two (2) times daily as needed for Anxiety.  rOPINIRole (REQUIP) 1 mg tablet Take  by mouth two (2) times a day.  OXYGEN-AIR DELIVERY SYSTEMS 2 L by Does Not Apply route. O2 via nasal cannula with bedtime and activity. O2 Company: [employer]      HYDROcodone-acetaminophen (NORCO) 5-325 mg per tablet Take 1 Tab by mouth every four (4) hours as needed for Pain. Max Daily Amount: 6 Tabs. 40 Tab 0     No current facility-administered medications on file prior to visit.       Allergies   Allergen Reactions    Anoro Ellipta [Umeclidinium-Vilanterol] Rash and Other (comments)     Muscle cramps in arms    Aspirin Other (comments)     GI upset and bleeding    Augmentin [Amoxicillin-Pot Clavulanate] Not Reported This Time     Possible cramping    Doxycycline Nausea and Vomiting    Morphine Other (comments)     Neurologic symptoms - severe agitation      Shellfish Derived Unknown (comments)     Pt able to eat small amounts per report     Sulfa (Sulfonamide Antibiotics) Rash     Patient relates no longer allergic     Social History     Socioeconomic History    Marital status:      Spouse name: Not on file    Number of children: Not on file    Years of education: Not on file    Highest education level: Not on file   Social Needs    Financial resource strain: Not on file    Food insecurity - worry: Not on file   Serge Food insecurity - inability: Not on file    Transportation needs - medical: Not on file   [employer] needs - non-medical: Not on file   Occupational History    Not on file   Tobacco Use    Smoking status: Former Smoker     Packs/day: 0.50     Years: 50.00     Pack years: 25.00     Types: Cigarettes     Start date: 10/21/1964     Last attempt to quit: 10/20/2018     Years since quittin.2    Smokeless tobacco: Never Used   Substance and Sexual Activity    Alcohol use: No    Drug use: No    Sexual activity: Not on file   Other Topics Concern    Not on file   Social History Narrative    Not on file     Blood pressure 104/76, pulse 81, temperature 98.2 °F (36.8 °C), temperature source Oral, resp. rate 18, height 5' 6\" (1.676 m), weight 52.6 kg (116 lb), SpO2 99 %. Physical Exam   Constitutional: She is oriented to person, place, and time. She appears well-developed. No distress. Cachectic   HENT:   Head: Normocephalic and atraumatic. Nose: Nose normal.   Mouth/Throat: No oropharyngeal exudate. Eyes: Conjunctivae and EOM are normal. Pupils are equal, round, and reactive to light. Right eye exhibits no discharge. Left eye exhibits no discharge. No scleral icterus. Neck: No JVD present. No tracheal deviation present. No thyromegaly present. Cardiovascular: Normal rate, regular rhythm, normal heart sounds and intact distal pulses. Exam reveals no gallop. No murmur heard. Pulmonary/Chest: Effort normal. No stridor. No respiratory distress. Wheezes: lft mid lung field  She has no rales. She exhibits no tenderness. Distant breath sounds ,  Transmitted upper airway sounds   Abdominal: Soft. She exhibits no mass. There is no tenderness. There is no rebound. Ileostomy    Musculoskeletal: She exhibits no edema, tenderness or deformity. Lymphadenopathy:     She has no cervical adenopathy. Neurological: She is alert and oriented to person, place, and time. Skin: Skin is warm and dry. No rash noted. She is not diaphoretic. No erythema. No pallor. Small ecchymoses on both upper and lower extremities   Psychiatric: She has a normal mood and affect. Her behavior is normal. Judgment and thought content normal.     CT Results (most recent):  Results from Hospital Encounter encounter on 10/28/18   CT ABD PELV W CONT    Narrative EXAM: CT ABDOMEN/PELVIS  CPT code: 99502, 60598    CLINICAL INDICATION/HISTORY: Status post robotic right colectomy; abdominal  pain, possible abscess. COMPARISON: 20 and October 2018. TECHNIQUE: Helical axial images of the abdomen and pelvis were obtained from the  domes of the diaphragm to the ischia following the administration of intravenous  and oral contrast material.  Contrast: Isovue-300 - 80 mL IV; uneventful  administration. FINDINGS:   CT Abdomen: There is total atelectasis of the right lower lobe and a small  amount of posterior base peripheral and subsegmental atelectasis in the left  lower lobe. Is a mild-to-moderate right and mild left pleural effusion. The  remainder the visualized lung is grossly clear. There is a nasogastric tube  with the tip in the distal mid stomach. There is normal homogeneous enhancement  of the solid abdominal viscera. No biliary abnormalities are seen. The  tomographic nephrogram is unremarkable, bilaterally. There is a right colectomy  and ileo-ascending colostomy with anastomosis in the right lower quadrant. The  bowel distal to the hepatic flexure is completely decompressed. There is  dilatation of the proximal to mid small bowel without involvement of the  duodenum. There is no specific transition point contrast extends into the more  distal nondilated ileum but no contrast is seen into the colon. There is no  significant retroperitoneal lymphadenopathy. No significant vascular  abnormalities are seen. There has been a probable kyphoplasty L5. There is a  subtle anterolisthesis at L3.   There is been posterior fusion L3-4 through  L5-S1. There is mild canal stenosis at L3-4. CT Pelvis: Again noted is prominent dilatation of the mid small bowel with  contrast extending into progressively less dilated distal small bowel. The  visualized colon is completely decompressed. There is a mild amount of free  fluid in the posterior cul-de-sac, similar to that seen previously. No  significant adenopathy is seen. The uterus is surgically absent. No gross  adnexal abnormalities are seen. The bladder is unremarkable. The bones and  soft tissues are unremarkable. Impression IMPRESSION:     Status post right colectomy and ileal ascending colostomy, as described. There  is persistent dilatation of the proximal to mid small bowel. Contrast extends  into the progressively less dilated distal small bowel without evidence of a  transition point. No contrast is seen in the colon. The colon was completely  decompressed distal to the hepatic flexure. This is more suggestive of an  ileus. Complete atelectasis of the right lower lobe with some posterior peripheral and  subsegmental atelectasis in the left lower lobe. Mild-to-moderate right and  mild left pleural effusions. Subcutaneous emphysema has resolved. Hysterectomy; no gross adnexal abnormalities. Small amount of posterior  cul-de-sac free fluid, unchanged.      ===================  Note: [facility] maintains that all CT scans at their facilities  are performed by using dose optimization technique as appropriate to a performed  examination, to include automated exposure control, adjustment of the mAs and/or  kVp according to patient size (including appropriate matching for site specific  examinations) or use of iterative reconstruction technique. ASSESSMENT and PLAN  Encounter Diagnoses   Name Primary?     COPD, severe ([address] Yes    Hypoxemia requiring supplemental oxygen     Malignant neoplasm of ascending colon ([address]     Tobacco use disorder     Reactive depression        Pt with severe COPD on LTOT but is back to baseline post surgery. Discussed need to wean off steroids in light of concerns for wound healing. Pt is reluctant to do this but agreed to hold steroids on MWF and consider further weaning after that. Will start pt on NAC tabs bid, see orders. She has failed prior trials of Azithromycin suppression and Daliresp. Pt refused flu vaccination previously. Continue maintenance therapy and rescue Albuterol. Continue  Tessalon perles prn.   RTC 3 months or sooner prn

## 2019-01-04 ENCOUNTER — PATIENT OUTREACH (OUTPATIENT)
Dept: PULMONOLOGY | Age: 74
End: 2019-01-04

## 2019-01-04 NOTE — PROGRESS NOTES
Complex Case  Management  Follow-Up      Date/Time:  01/04/019 @ 1:00 PM     NN contacted patient for Disease Specific Care Management  follow up week 6           NN contacted patient for Complex Case Management  follow up. Spoke to patient. Introduced self/role and reason for call. 2 patient identifiers given.       Patient reported:     Not doing well     Congestion    Family picked up new Rx on yesterday and have taken 2nd dose    Feeling depressed/ NN performed active listening; Taking medicine as recommended    Patient denies :    CP/pressure  Palpitation  Edema   Fever/chills  Hemoptysis ( does cough up a little tinge of blood due to dry nose and nose bleeds.)      Medications:   New medications since last outreach: acetylcysteine 500 mg cap [[id number]]   Order Details   Dose: 500 mg Route: Oral Frequency: 2 TIMES DAILY   Dispense Quantity: 60 Cap Refills: 3 Fills remaining: --                  Does patient need refills on any medications: No  Medication changes since last outreach (dose adjustments or discontinued meds): None      [address] [place] [facility]   Date of discharge: 12/13/18     Barriers to care? None noted this outreach      Specialist appointments since last outreach? Yes   If so, specialist and date: Dr. Borrero Leader on 12/10/18      Patient reminded that there are physicians on call 24 hours a day / 7 days a week (M-F 5pm to 8am and from Friday 5pm until Monday 8a for the weekend) should the patient have questions or concerns.             Future Appointments     3/28/2019 9:30 AM Rina Alston MD PSYCHIATRIC [facility] [phone] [place]   NN reminded Kiley Rosenbaum of patient's upcoming appointments. Goals      Identification of barriers to adherence to a plan of care such as inability to afford medications, lack of insurance, lack of transportation, etc.Target Date:  1/19/19      Plan:  NN will monitor patient for barriers to care.       11/19/18  NN spoke with patient's daughter, Kiley Rosenbaum who reported that patient is depressed due to recent ileostomy procedure . ( NN will continue to monitor and attempt to speak with patient on next outreach. )        Improve activity tolerance and quality of life (ie. identify issue such as depression) Target Date:  changed to 1/19/19 8/31/18  NN discuss with patient to alternate her rest and activity, Use of medications as directed  and to stay out of the heat. Plan as of 11/19/18 :      Patient to continue to alternate rest and activity , consume small frequent meals high in protein and NN to monitor patient for s/s of depression or worsening depression     11/19/18 NN educated patient's daughter, Fartun Alegre on all above plans    12/7/18  Patient reports having good days and bad days. 12/14/18  Patient reported that she feels a little better than 4 weeks ago. NN discussed iron being low and patient instructed by MD to take multivitamin daily. acetylcysteine 500 mg cap [[id number]]   Order Details   Dose: 500 mg Route: Oral Frequency: 2 TIMES DAILY   Dispense Quantity: 60 Cap Refills: 3 Fills remaining: --                  1/4/18  Patient now on new medication,        Understands red flags post discharge. Target Date  1 19/19       Plan:  NN will monitor and educate patient and caregivers on Red flags to report to MD immediately. 11/19/18 NN discussed Red Flags with patient's daughter, Fartun Alegre. Fartun Alegre was able to  teach back given calling MD for temp. . Drainage around stoma site jeanne . if with foul odor, spitting up blood , worsen of lung status. .      12/7/18 NN discussed Red Flags to report to MD with patient. Opportunity to ask questions was provided. Fartun Alegre asked about what office was patient to go to for Appt. With Dr. Tevin Paul. NN encouraged her to call PCP for appt. Location. She stated that she would call them. Patient and C/G  has contact information for future reference or further questions.

## 2019-01-07 ENCOUNTER — PATIENT OUTREACH (OUTPATIENT)
Dept: PULMONOLOGY | Age: 74
End: 2019-01-07

## 2019-01-08 ENCOUNTER — PATIENT OUTREACH (OUTPATIENT)
Dept: PULMONOLOGY | Age: 74
End: 2019-01-08

## 2019-01-08 RX ORDER — FLUCONAZOLE 200 MG/1
200 TABLET ORAL DAILY
Qty: 5 TAB | Refills: 0 | Status: SHIPPED | OUTPATIENT
Start: 2019-01-08 | End: 2019-01-13

## 2019-01-08 NOTE — Clinical Note
Spoke with patient today and she has discontinue her Z pack due to stoma irritation /burning and started starting diflucan she had ( only 2 tabs. )  1 st tab today . I discuss her taking her medication completely ie. Diflucan. She also reported only taking her prednisone 10 mg every sat/tues and Thursday. Her sister reports a low grade temp but patient doesn't confirm. Please advise. Thanks .

## 2019-01-10 ENCOUNTER — DOCUMENTATION ONLY (OUTPATIENT)
Dept: SURGERY | Age: 74
End: 2019-01-10

## 2019-01-10 NOTE — PROGRESS NOTES
Patient called to report an episode of abdominal pain that lasted about 3 hours. She described pain that was so intense, she was praying for death. She reports she walked around, put her butt up in the air, and finally felt a release of stool into her bag, and the pain subsided. She had eaten fried oysters earlier in the day. I explained she probably had a food plug and needs to chew her food thoroughly prior to swallowing. She said she would, but that chewing used up a lot of her oxygen.

## 2019-01-10 NOTE — PROGRESS NOTES
Complex Case  Management  Follow-Up      Date/Time:  01/08/019 @ 1:55 PM      NN contacted patient for Complex Case Management  follow up. Spoke to patient. Introduced self/role and reason for call. 2 patient identifiers given.       Patient reported:     Not doing well     Stoma irriation since being on abt. Had 2 diflucan tab and started them today    Stopped abt    Taking all other  medicine as recommended    Patient denies :    CP/pressure  Palpitation  Edema   Fever/chills  Hemoptysis ( does cough up a little tinge of blood due to dry nose and nose bleeds.)      Chart and staff message routed to. Dr. Girish Jarquin    From: Tran Gould MD   Sent: 1/8/2019   2:21 PM   To: Donn Oakley RN     Will write for Diflucan. Pred should be T-TH-[facility]    NN contacted patient of Dr Alo Guan: [facility]   Date of discharge: 12/13/18     Barriers to care? None noted this outreach        Patient reminded that there are physicians on call 24 hours a day / 7 days a week (M-F 5pm to 8am and from Friday 5pm until Monday 8a for the weekend) should the patient have questions or concerns.             Future Appointments     3/28/2019 9:30 AM Yelena Christie MD PSYCHIATRIC [facility] [phone] [place]   NN reminded Angela Peterson of patient's upcoming appointments. Goals      Identification of barriers to adherence to a plan of care such as inability to afford medications, lack of insurance, lack of transportation, etc.Target Date:  1/19/19      Plan:  NN will monitor patient for barriers to care. 11/19/18  NN spoke with patient's daughter, Angela Peterson who reported that patient is depressed due to recent ileostomy procedure .   ( NN will continue to monitor and attempt to speak with patient on next outreach. )        Improve activity tolerance and quality of life (ie. identify issue such as depression) Target Date:  changed to 1/19/19 8/31/18  NN discuss with patient to alternate her rest and activity, Use of medications as directed  and to stay out of the heat. Plan as of 11/19/18 :      Patient to continue to alternate rest and activity , consume small frequent meals high in protein and NN to monitor patient for s/s of depression or worsening depression     11/19/18 NN educated patient's daughter, Edgardo Mitchell on all above plans    12/7/18  Patient reports having good days and bad days. 12/14/18  Patient reported that she feels a little better than 4 weeks ago. NN discussed iron being low and patient instructed by MD to take multivitamin daily. acetylcysteine 500 mg cap [[id number]]   Order Details   Dose: 500 mg Route: Oral Frequency: 2 TIMES DAILY   Dispense Quantity: 60 Cap Refills: 3 Fills remaining: --                  1/4/18  Patient now on new medication,        Understands red flags post discharge. Target Date  1 19/19       Plan:  NN will monitor and educate patient and caregivers on Red flags to report to MD immediately. 11/19/18 NN discussed Red Flags with patient's daughter, Edgardo Mitchell. Edgardo Mitchell was able to  teach back given calling MD for temp. . Drainage around stoma site jeanne . if with foul odor, spitting up blood , worsen of lung status. .      12/7/18 NN discussed Red Flags to report to MD with patient. Opportunity to ask questions was provided. Edgardo Mitchell asked about what office was patient to go to for Appt. With Dr. Aimee Jasmine. NN encouraged her to call PCP for appt. Location. She stated that she would call them. Patient and C/G  has contact information for future reference or further questions.

## 2019-01-23 ENCOUNTER — PATIENT OUTREACH (OUTPATIENT)
Dept: PULMONOLOGY | Age: 74
End: 2019-01-23

## 2019-01-23 NOTE — PROGRESS NOTES
Disease Specific Care Management  Follow-Up     Date/Time:   1/23/19 @ 3: 23      Nurse Navigator (NN) contacted patient for Disease Specific Care Management  follow up. Spoke to patient's daughter Ramandeep Ortiz. Introduced self/role and reason for call. Ramandeep Ortiz reported that patient was checking out at store and requested NN to give patient a return call. Nurse Navigator ( NN ) returned call to Patient. Verified 2 patient identifiers   Introduced self, role and reason for call.       Patient reported:     Not doing well     Continue to have some irritation of Stoma. Patient reports when she pats it dry after showering she noticed genie blood on cloth but no oozing of blood. ( NN encouraged patient to make an earlier appt. With Dr. Nohelia Puente and to start back using her stoma adhesive powder which absorbs moisture. The stoma powder can help the ostomy barrier/flange to adhere better while allowing the skin around the stoma to heal. Ostomy stoma powder is not meant as a skin protectant and it will not prevent irritation. It is only meant to be used to absorb moisture from skin that is moist to the touch due to irritation. ) She voiced understanding but reported that she was afraid to see Dr. Nohelia Puente for fear of having to go back into the hospital.  NN performed active listening.       Having a lot of gas but has begun taking OTC Zantac and Gas X which has helped      Having foamy stuff coming up in her mouth     Using oxygen at 2 L NC continuously     Taking all other  medicine as recommended    Using nebulizer and rescue inhaler as needed     Occasional wheezing /cough    Patient denies :    CP/pressure    Palpitation    Edema     Fever/chills    Hemoptysis ( does cough up a little tinge of blood due to dry nose and nose bleeds.)      Medications:   New medications since last outreach: Yes OTC Zantac and GAS X   Does patient need refills on any medications: No   Medication changes since last outreach (dose adjustments or discontinued meds): NO      Home Health company:N/A   Date of discharge: N/A      Barriers to care? None      Specialist appointments since last outreach? No  If so, specialist and date: N/A      Patient reminded that there are physicians on call 24 hours a day / 7 days a week (M-F 5pm to 8am and from Friday 5pm until Monday 8a for the weekend) should the patient have questions or concerns.      Future Appointments      3/28/2019 9:30 AM Mayco Negron MD [facility]       Goals      Identification of barriers to adherence to a plan of care such as inability to afford medications, lack of insurance, lack of transportation, etc.Target Date:  2/22/19      Plan:  NN will monitor patient for barriers to care. 11/19/18  NN spoke with patient's daughter, Chloe Mensah who reported that patient is depressed due to recent ileostomy procedure .   ( NN will continue to monitor and attempt to speak with patient on next outreach. )

## 2019-02-04 ENCOUNTER — OFFICE VISIT (OUTPATIENT)
Dept: SURGERY | Age: 74
End: 2019-02-04

## 2019-02-04 VITALS
BODY MASS INDEX: 18.16 KG/M2 | WEIGHT: 113 LBS | HEIGHT: 66 IN | SYSTOLIC BLOOD PRESSURE: 96 MMHG | DIASTOLIC BLOOD PRESSURE: 72 MMHG | RESPIRATION RATE: 17 BRPM | HEART RATE: 61 BPM | TEMPERATURE: 96.9 F

## 2019-02-04 DIAGNOSIS — C18.0 CECAL CANCER (HCC): Primary | ICD-10-CM

## 2019-02-04 NOTE — PROGRESS NOTES
1. Have you been to the ER, urgent care clinic since your last visit? Hospitalized since your last visit? No    2. Have you seen or consulted any other health care providers outside of the [address] [place] [address] since your last visit? Include any pap smears or colon screening.  Yes Where: Dr. [facility]

## 2019-02-04 NOTE — PROGRESS NOTES
Subjective: Tolerating diet. Ileostomy is functional.  Taking nutritional supplements. Has decreased the amount of weekly prednisone with Dr. Piedad Avilez. Has some mild left-sided pain. Past medical history and ROS were reviewed and unchanged. Abdomen: Soft, nontender nondistended  Incision all healed, ileostomy pink    Assessment / Plan    Status post robotic right colectomy for stage II adenocarcinoma, complicated by anastomotic leak  Status post anastomotic resection with end ileostomy  Currently no plan for chemo  Continue attempts at good nutrition, reduction of prednisone and smoking cessation  Asked to talk to her PCP about smoking cessation adjuncts  Follow-up in 3 months, to consider ileostomy reversal over the summer    The diagnoses and plan were discussed with patient. All questions answered. Plan of care agreed to by all concerned.

## 2019-02-28 ENCOUNTER — APPOINTMENT (OUTPATIENT)
Dept: GENERAL RADIOLOGY | Age: 74
DRG: 190 | End: 2019-02-28
Attending: EMERGENCY MEDICINE
Payer: MEDICARE

## 2019-02-28 ENCOUNTER — HOSPITAL ENCOUNTER (INPATIENT)
Age: 74
LOS: 5 days | Discharge: HOME HEALTH | DRG: 190 | End: 2019-03-05
Attending: EMERGENCY MEDICINE | Admitting: HOSPITALIST
Payer: MEDICARE

## 2019-02-28 DIAGNOSIS — J44.1 COPD EXACERBATION (HCC): Primary | ICD-10-CM

## 2019-02-28 DIAGNOSIS — I95.9 HYPOTENSION, UNSPECIFIED HYPOTENSION TYPE: ICD-10-CM

## 2019-02-28 LAB
ALBUMIN SERPL-MCNC: 3.7 G/DL (ref 3.4–5)
ALBUMIN/GLOB SERPL: 1.1 {RATIO} (ref 0.8–1.7)
ALP SERPL-CCNC: 85 U/L (ref 45–117)
ALT SERPL-CCNC: 19 U/L (ref 13–56)
ANION GAP SERPL CALC-SCNC: 7 MMOL/L (ref 3–18)
APPEARANCE UR: CLEAR
AST SERPL-CCNC: 13 U/L (ref 15–37)
ATRIAL RATE: 93 BPM
BASOPHILS # BLD: 0 K/UL (ref 0–0.1)
BASOPHILS NFR BLD: 0 % (ref 0–2)
BILIRUB SERPL-MCNC: 0.3 MG/DL (ref 0.2–1)
BILIRUB UR QL: NEGATIVE
BUN SERPL-MCNC: 30 MG/DL (ref 7–18)
BUN/CREAT SERPL: 29 (ref 12–20)
CALCIUM SERPL-MCNC: 8.8 MG/DL (ref 8.5–10.1)
CALCULATED P AXIS, ECG09: 92 DEGREES
CALCULATED R AXIS, ECG10: 94 DEGREES
CALCULATED T AXIS, ECG11: 66 DEGREES
CHLORIDE SERPL-SCNC: 107 MMOL/L (ref 100–108)
CO2 SERPL-SCNC: 28 MMOL/L (ref 21–32)
COLOR UR: YELLOW
CREAT SERPL-MCNC: 1.02 MG/DL (ref 0.6–1.3)
DIAGNOSIS, 93000: NORMAL
DIFFERENTIAL METHOD BLD: ABNORMAL
EOSINOPHIL # BLD: 0.2 K/UL (ref 0–0.4)
EOSINOPHIL NFR BLD: 2 % (ref 0–5)
ERYTHROCYTE [DISTWIDTH] IN BLOOD BY AUTOMATED COUNT: 13.2 % (ref 11.6–14.5)
FLUAV AG NPH QL IA: NEGATIVE
FLUBV AG NOSE QL IA: NEGATIVE
GLOBULIN SER CALC-MCNC: 3.3 G/DL (ref 2–4)
GLUCOSE SERPL-MCNC: 102 MG/DL (ref 74–99)
GLUCOSE UR STRIP.AUTO-MCNC: NEGATIVE MG/DL
HCT VFR BLD AUTO: 40.2 % (ref 35–45)
HGB BLD-MCNC: 13.5 G/DL (ref 12–16)
HGB UR QL STRIP: NEGATIVE
KETONES UR QL STRIP.AUTO: NEGATIVE MG/DL
LACTATE BLD-SCNC: 1.04 MMOL/L (ref 0.4–2)
LEUKOCYTE ESTERASE UR QL STRIP.AUTO: NEGATIVE
LYMPHOCYTES # BLD: 3.1 K/UL (ref 0.9–3.6)
LYMPHOCYTES NFR BLD: 28 % (ref 21–52)
MCH RBC QN AUTO: 32.5 PG (ref 24–34)
MCHC RBC AUTO-ENTMCNC: 33.6 G/DL (ref 31–37)
MCV RBC AUTO: 96.9 FL (ref 74–97)
MONOCYTES # BLD: 1.3 K/UL (ref 0.05–1.2)
MONOCYTES NFR BLD: 12 % (ref 3–10)
NEUTS SEG # BLD: 6.4 K/UL (ref 1.8–8)
NEUTS SEG NFR BLD: 58 % (ref 40–73)
NITRITE UR QL STRIP.AUTO: NEGATIVE
P-R INTERVAL, ECG05: 116 MS
PH UR STRIP: 6 [PH] (ref 5–8)
PLATELET # BLD AUTO: 334 K/UL (ref 135–420)
PMV BLD AUTO: 9.5 FL (ref 9.2–11.8)
POTASSIUM SERPL-SCNC: 3.8 MMOL/L (ref 3.5–5.5)
PROT SERPL-MCNC: 7 G/DL (ref 6.4–8.2)
PROT UR STRIP-MCNC: NEGATIVE MG/DL
Q-T INTERVAL, ECG07: 370 MS
QRS DURATION, ECG06: 78 MS
QTC CALCULATION (BEZET), ECG08: 460 MS
RBC # BLD AUTO: 4.15 M/UL (ref 4.2–5.3)
SODIUM SERPL-SCNC: 142 MMOL/L (ref 136–145)
SP GR UR REFRACTOMETRY: 1.01 (ref 1–1.03)
UROBILINOGEN UR QL STRIP.AUTO: 0.2 EU/DL (ref 0.2–1)
VENTRICULAR RATE, ECG03: 93 BPM
WBC # BLD AUTO: 10.9 K/UL (ref 4.6–13.2)

## 2019-02-28 PROCEDURE — 99285 EMERGENCY DEPT VISIT HI MDM: CPT

## 2019-02-28 PROCEDURE — 87804 INFLUENZA ASSAY W/OPTIC: CPT

## 2019-02-28 PROCEDURE — 74011000250 HC RX REV CODE- 250: Performed by: EMERGENCY MEDICINE

## 2019-02-28 PROCEDURE — 96365 THER/PROPH/DIAG IV INF INIT: CPT

## 2019-02-28 PROCEDURE — 93005 ELECTROCARDIOGRAM TRACING: CPT

## 2019-02-28 PROCEDURE — 77030029684 HC NEB SM VOL KT MONA -A

## 2019-02-28 PROCEDURE — 81003 URINALYSIS AUTO W/O SCOPE: CPT

## 2019-02-28 PROCEDURE — 85025 COMPLETE CBC W/AUTO DIFF WBC: CPT

## 2019-02-28 PROCEDURE — 94640 AIRWAY INHALATION TREATMENT: CPT

## 2019-02-28 PROCEDURE — 71045 X-RAY EXAM CHEST 1 VIEW: CPT

## 2019-02-28 PROCEDURE — 83605 ASSAY OF LACTIC ACID: CPT

## 2019-02-28 PROCEDURE — 74011250636 HC RX REV CODE- 250/636: Performed by: HOSPITALIST

## 2019-02-28 PROCEDURE — 74011250636 HC RX REV CODE- 250/636: Performed by: EMERGENCY MEDICINE

## 2019-02-28 PROCEDURE — 87086 URINE CULTURE/COLONY COUNT: CPT

## 2019-02-28 PROCEDURE — 74011250637 HC RX REV CODE- 250/637: Performed by: HOSPITALIST

## 2019-02-28 PROCEDURE — 80053 COMPREHEN METABOLIC PANEL: CPT

## 2019-02-28 PROCEDURE — 87040 BLOOD CULTURE FOR BACTERIA: CPT

## 2019-02-28 PROCEDURE — 94762 N-INVAS EAR/PLS OXIMTRY CONT: CPT

## 2019-02-28 PROCEDURE — 74011000250 HC RX REV CODE- 250: Performed by: HOSPITALIST

## 2019-02-28 PROCEDURE — 65660000000 HC RM CCU STEPDOWN

## 2019-02-28 RX ORDER — SODIUM CHLORIDE 0.9 % (FLUSH) 0.9 %
5-40 SYRINGE (ML) INJECTION EVERY 8 HOURS
Status: DISCONTINUED | OUTPATIENT
Start: 2019-02-28 | End: 2019-03-05 | Stop reason: HOSPADM

## 2019-02-28 RX ORDER — THERA TABS 400 MCG
1 TAB ORAL DAILY
Status: DISCONTINUED | OUTPATIENT
Start: 2019-03-01 | End: 2019-03-05 | Stop reason: HOSPADM

## 2019-02-28 RX ORDER — ALBUTEROL SULFATE 0.83 MG/ML
2.5 SOLUTION RESPIRATORY (INHALATION)
Status: COMPLETED | OUTPATIENT
Start: 2019-02-28 | End: 2019-02-28

## 2019-02-28 RX ORDER — ACETAMINOPHEN 325 MG/1
650 TABLET ORAL
Status: DISCONTINUED | OUTPATIENT
Start: 2019-02-28 | End: 2019-03-05 | Stop reason: HOSPADM

## 2019-02-28 RX ORDER — ROPINIROLE 1 MG/1
1 TABLET, FILM COATED ORAL 2 TIMES DAILY
Status: DISCONTINUED | OUTPATIENT
Start: 2019-02-28 | End: 2019-03-05 | Stop reason: HOSPADM

## 2019-02-28 RX ORDER — FAMOTIDINE 20 MG/1
20 TABLET, FILM COATED ORAL DAILY
Status: DISCONTINUED | OUTPATIENT
Start: 2019-03-01 | End: 2019-03-01

## 2019-02-28 RX ORDER — SODIUM CHLORIDE 0.9 % (FLUSH) 0.9 %
5-40 SYRINGE (ML) INJECTION AS NEEDED
Status: DISCONTINUED | OUTPATIENT
Start: 2019-02-28 | End: 2019-03-05 | Stop reason: HOSPADM

## 2019-02-28 RX ORDER — LORAZEPAM 1 MG/1
1 TABLET ORAL
Status: DISCONTINUED | OUTPATIENT
Start: 2019-02-28 | End: 2019-03-05 | Stop reason: HOSPADM

## 2019-02-28 RX ORDER — BUDESONIDE AND FORMOTEROL FUMARATE DIHYDRATE 160; 4.5 UG/1; UG/1
2 AEROSOL RESPIRATORY (INHALATION)
Status: DISCONTINUED | OUTPATIENT
Start: 2019-02-28 | End: 2019-03-05 | Stop reason: HOSPADM

## 2019-02-28 RX ORDER — ALBUTEROL SULFATE 0.83 MG/ML
5 SOLUTION RESPIRATORY (INHALATION)
Status: COMPLETED | OUTPATIENT
Start: 2019-02-28 | End: 2019-02-28

## 2019-02-28 RX ORDER — ONDANSETRON 2 MG/ML
4 INJECTION INTRAMUSCULAR; INTRAVENOUS
Status: DISCONTINUED | OUTPATIENT
Start: 2019-02-28 | End: 2019-03-05 | Stop reason: HOSPADM

## 2019-02-28 RX ORDER — HEPARIN SODIUM 5000 [USP'U]/ML
5000 INJECTION, SOLUTION INTRAVENOUS; SUBCUTANEOUS EVERY 8 HOURS
Status: DISCONTINUED | OUTPATIENT
Start: 2019-02-28 | End: 2019-03-03

## 2019-02-28 RX ORDER — LEVOFLOXACIN 5 MG/ML
500 INJECTION, SOLUTION INTRAVENOUS ONCE
Status: COMPLETED | OUTPATIENT
Start: 2019-02-28 | End: 2019-02-28

## 2019-02-28 RX ORDER — MAGNESIUM SULFATE HEPTAHYDRATE 40 MG/ML
2 INJECTION, SOLUTION INTRAVENOUS ONCE
Status: COMPLETED | OUTPATIENT
Start: 2019-02-28 | End: 2019-02-28

## 2019-02-28 RX ORDER — LEVOFLOXACIN 5 MG/ML
500 INJECTION, SOLUTION INTRAVENOUS
Status: DISCONTINUED | OUTPATIENT
Start: 2019-02-28 | End: 2019-02-28 | Stop reason: SDUPTHER

## 2019-02-28 RX ORDER — HYDROCODONE BITARTRATE AND ACETAMINOPHEN 5; 325 MG/1; MG/1
1 TABLET ORAL
Status: DISCONTINUED | OUTPATIENT
Start: 2019-02-28 | End: 2019-03-05 | Stop reason: HOSPADM

## 2019-02-28 RX ORDER — IPRATROPIUM BROMIDE AND ALBUTEROL SULFATE 2.5; .5 MG/3ML; MG/3ML
3 SOLUTION RESPIRATORY (INHALATION)
Status: DISCONTINUED | OUTPATIENT
Start: 2019-02-28 | End: 2019-03-01

## 2019-02-28 RX ORDER — SIMETHICONE 80 MG
80 TABLET,CHEWABLE ORAL
Status: DISCONTINUED | OUTPATIENT
Start: 2019-02-28 | End: 2019-03-05 | Stop reason: HOSPADM

## 2019-02-28 RX ADMIN — LEVOFLOXACIN 500 MG: 5 INJECTION, SOLUTION INTRAVENOUS at 09:16

## 2019-02-28 RX ADMIN — METHYLPREDNISOLONE SODIUM SUCCINATE 60 MG: 40 INJECTION, POWDER, FOR SOLUTION INTRAMUSCULAR; INTRAVENOUS at 16:50

## 2019-02-28 RX ADMIN — BUDESONIDE AND FORMOTEROL FUMARATE DIHYDRATE 2 PUFF: 160; 4.5 AEROSOL RESPIRATORY (INHALATION) at 21:00

## 2019-02-28 RX ADMIN — ROPINIROLE HYDROCHLORIDE 1 MG: 1 TABLET, FILM COATED ORAL at 20:41

## 2019-02-28 RX ADMIN — IPRATROPIUM BROMIDE AND ALBUTEROL SULFATE 3 ML: .5; 3 SOLUTION RESPIRATORY (INHALATION) at 16:51

## 2019-02-28 RX ADMIN — MAGNESIUM SULFATE HEPTAHYDRATE 2 G: 40 INJECTION, SOLUTION INTRAVENOUS at 08:09

## 2019-02-28 RX ADMIN — SODIUM CHLORIDE 1000 ML: 900 INJECTION, SOLUTION INTRAVENOUS at 08:09

## 2019-02-28 RX ADMIN — ALBUTEROL SULFATE 2.5 MG: 2.5 SOLUTION RESPIRATORY (INHALATION) at 08:09

## 2019-02-28 RX ADMIN — ACETAMINOPHEN 650 MG: 325 TABLET ORAL at 16:51

## 2019-02-28 RX ADMIN — METHYLPREDNISOLONE SODIUM SUCCINATE 60 MG: 40 INJECTION, POWDER, FOR SOLUTION INTRAMUSCULAR; INTRAVENOUS at 22:17

## 2019-02-28 RX ADMIN — HEPARIN SODIUM 5000 UNITS: 5000 INJECTION INTRAVENOUS; SUBCUTANEOUS at 22:17

## 2019-02-28 RX ADMIN — ALBUTEROL SULFATE 5 MG: 2.5 SOLUTION RESPIRATORY (INHALATION) at 05:58

## 2019-02-28 RX ADMIN — HEPARIN SODIUM 5000 UNITS: 5000 INJECTION INTRAVENOUS; SUBCUTANEOUS at 16:51

## 2019-02-28 RX ADMIN — ALBUTEROL SULFATE 5 MG: 2.5 SOLUTION RESPIRATORY (INHALATION) at 03:43

## 2019-02-28 RX ADMIN — Medication 10 ML: at 16:55

## 2019-02-28 RX ADMIN — LORAZEPAM 1 MG: 1 TABLET ORAL at 20:41

## 2019-02-28 NOTE — PROGRESS NOTES
conducted an initial consultation and Spiritual Assessment for Gabby Terrell, who is a 72 y.o.,female. Patients Primary Language is: [place]. According to the patients EMR Faith Affiliation is: Non Bahai.     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    Pre-operative cardiovascular examination 09/23/2018    Colon cancer without distant metastasis ([address] 09/22/2018    Colon polyps 09/22/2018    COPD (chronic obstructive pulmonary disease) ([address] 09/20/2018    Pneumonia 09/20/2018    Abdominal pain 09/20/2018    Abdominal mass 09/20/2018    Acute exacerbation of chronic obstructive pulmonary disease (COPD) ([address] 08/14/2018    Degenerative disc disease, lumbar 08/14/2018    Left-sided low back pain with sciatica 08/14/2018    COPD with exacerbation ([address] 08/14/2018    Muscle spasm of back 09/12/2016    Sacroiliac joint pain 09/12/2016    Current chronic use of systemic steroids 09/12/2016    Baker's cyst of knee 09/12/2016    COPD with acute exacerbation ([address] 12/15/2015    Neuritis of lower extremity 11/18/2015    Lumbar spinal stenosis 11/13/2015    Facet arthritis of lumbar region ([address] 11/13/2015    Emphysema of lung ([address] 04/15/2015    Hemoptysis 02/05/2014    Nicotine addiction 05/16/2013    COPD, severe ([address]         The  provided the following Interventions:  Initiated a relationship of care and support. Explored issues of janelle, spirituality and/or Baptism needs while hospitalized. Listened empathically. Provided information about Spiritual Care Services. Offered prayer and assurance of continued prayers on patient's behalf. Chart reviewed.     The following outcomes were achieved:  Patient shared some information about their medical narrative and spiritual journey/beliefs. Patient processed feeling about current hospitalization. Patient expressed gratitude for the 's visit. Assessment:  Patient did not indicate any spiritual or Hindu issues which require Spiritual Care Services interventions at this time. Patient does not have any Hindu/cultural needs that will affect patients preferences in health care. Plan:  Chaplains will continue to follow and will provide pastoral care on an as needed or requested basis.  recommends bedside caregivers page  on duty if patient shows signs of acute spiritual or emotional distress.     [facility] Department  [phone]

## 2019-02-28 NOTE — PROGRESS NOTES
Reason for Admission:   Acute exacerbation of chronic obstructive pulmonary disease (COPD) ([facility] [J44.1]  Hypotension [I95.9]  COPD exacerbation ([facility] [J44.1]               RRAT Score:     29             Resources/supports as identified by patient/family:   [place] Medicare part A&B, [employer] facing patient (as identified by patient/family and CM): Finances/Medication cost?     No   Transportation      Children and nieces   Support system or lack thereof? Children, nieces  Living arrangements? Lives alone in a story house  Self-care/ADLs/Cognition? Self care, AOx4       Current Advanced Directive/Advance Care Plan:   yes                          Plan for utilizing home health: To be determined                      Likelihood of readmission:   HIGH    Transition of Care Plan:                    Initial assessment completed with patient. Cognitive status of patient: oriented to time, place, person and situation. Face sheet information confirmed:  yes. The patient designates her children and niece Cele Higgins [phone] and her sister Ricky Weaver [phone] to participate in his/her discharge plan and to receive any needed information. This patient lives in a single family home alone but family lives nearby Patient is able to navigate steps as needed. Prior to hospitalization, patient was considered to be independent with ADLs/IADLS : yes . Patient has a current ACP document on file: yes  The patient and sister will be available to transport patient home upon discharge. The patient already has walker, oxygen 2L with [employer] and nebulizer medical equipment available in the home. Patient is not currently active with home health. Patient has not stayed in a skilled nursing facility or rehab.        This patient is on dialysis :no   Currently, the discharge plan is home     The patient states that she can obtain her medications from the pharmacy, and take her medications as directed. Patient's current insurance is VA Medicare part A&B, [facility]  Pt's pcp is Dr. Michelle Rodgers Management Interventions  PCP Verified by CM: Yes  Palliative Care Criteria Met (RRAT>21 & CHF Dx)?: No  Mode of Transport at Discharge:  Other (see comment)(family)  Transition of Care Consult (CM Consult): Discharge Planning  Physical Therapy Consult: No  Occupational Therapy Consult: No  Speech Therapy Consult: No  Current Support Network: Lives Alone, Own Home, Family Lives Nearby  Confirm Follow Up Transport: Family  Plan discussed with Pt/Family/Caregiver: Yes    ISAURA Bardales RN  Care Management  Pager: [phone]

## 2019-02-28 NOTE — ED NOTES
Bedside and Verbal shift change report given to [place] R.N.(oncoming nurse) by Zay Ochoa (offgoing nurse). Report included the following information SBAR, ED Summary, MAR and Recent Results.

## 2019-02-28 NOTE — ED NOTES
7:20 AM :Pt care assumed from Dr. Magalie Boudreaux, ED provider. Pt complaint(s), current treatment plan, progression and available diagnostic results have been discussed thoroughly. Rounding occurred: yes  Intended Disposition: TBD   Pending diagnostic reports and/or labs (please list): Monitor, reassessment. 7:37 AM: RN notified patient's BP is dropping to SBP 83, HR in the mid 90's, slightly tachypnic with respirations of 23. I reassessed pt and she is in significant respiratory distress with diffuse wheezing. Will obtain lactic acid and blood cultures. Pt on 2L O2 at home. Pt states \"my doctor says I cant take duoneb. I can only take albuterol. \" She doesn't know why this is. I Will administer IV fluid bolus, more nebs, add magnesium. Will need admission. Clinically, does not currently need bipap, will monitor. I discussed dx', results and plan to admit and pt agrees. Consult:  Discussed care with Dr. Jose Grant, hospitalist. Standard discussion; including history of patients chief complaint, available diagnostic results, and treatment course. Accepts admit.  States she will discuss patient with partner coming on.  8:28 AM, 2/28/2019     Patient Vitals for the past 8 hrs:   Temp Pulse Resp BP SpO2   02/28/19 0700 -- 95 22 107/62 94 %   02/28/19 0654 -- 98 21 106/57 96 %   02/28/19 0615 -- 89 21 103/68 99 %   02/28/19 0610 -- 82 14 98/61 99 %   02/28/19 0545 -- 84 14 104/63 97 %   02/28/19 0530 -- 87 16 109/50 98 %   02/28/19 0500 -- 91 21 94/60 96 %   02/28/19 0445 -- 87 21 92/59 95 %   02/28/19 0430 -- 91 17 (!) 115/99 94 %   02/28/19 0415 -- 85 21 99/65 95 %   02/28/19 0400 -- 92 15 90/61 96 %   02/28/19 0349 -- 91 15 102/65 98 %   02/28/19 0317 97.7 °F (36.5 °C) 96 22 96/64 96 %         Recent Results (from the past 12 hour(s))   CBC WITH AUTOMATED DIFF    Collection Time: 02/28/19  3:00 AM   Result Value Ref Range    WBC 10.9 4.6 - 13.2 K/uL    RBC 4.15 (L) 4.20 - 5.30 M/uL    HGB 13.5 12.0 - 16.0 g/dL HCT 40.2 35.0 - 45.0 %    MCV 96.9 74.0 - 97.0 FL    MCH 32.5 24.0 - 34.0 PG    MCHC 33.6 31.0 - 37.0 g/dL    RDW 13.2 11.6 - 14.5 %    PLATELET 053 418 - 593 K/uL    MPV 9.5 9.2 - 11.8 FL    NEUTROPHILS 58 40 - 73 %    LYMPHOCYTES 28 21 - 52 %    MONOCYTES 12 (H) 3 - 10 %    EOSINOPHILS 2 0 - 5 %    BASOPHILS 0 0 - 2 %    ABS. NEUTROPHILS 6.4 1.8 - 8.0 K/UL    ABS. LYMPHOCYTES 3.1 0.9 - 3.6 K/UL    ABS. MONOCYTES 1.3 (H) 0.05 - 1.2 K/UL    ABS. EOSINOPHILS 0.2 0.0 - 0.4 K/UL    ABS. BASOPHILS 0.0 0.0 - 0.1 K/UL    DF AUTOMATED     METABOLIC PANEL, COMPREHENSIVE    Collection Time: 02/28/19  3:00 AM   Result Value Ref Range    Sodium 142 136 - 145 mmol/L    Potassium 3.8 3.5 - 5.5 mmol/L    Chloride 107 100 - 108 mmol/L    CO2 28 21 - 32 mmol/L    Anion gap 7 3.0 - 18 mmol/L    Glucose 102 (H) 74 - 99 mg/dL    BUN 30 (H) 7.0 - 18 MG/DL    Creatinine 1.02 0.6 - 1.3 MG/DL    BUN/Creatinine ratio 29 (H) 12 - 20      GFR est AA >60 >60 ml/min/1.73m2    GFR est non-AA 53 (L) >60 ml/min/1.73m2    Calcium 8.8 8.5 - 10.1 MG/DL    Bilirubin, total 0.3 0.2 - 1.0 MG/DL    ALT (SGPT) 19 13 - 56 U/L    AST (SGOT) 13 (L) 15 - 37 U/L    Alk.  phosphatase 85 45 - 117 U/L    Protein, total 7.0 6.4 - 8.2 g/dL    Albumin 3.7 3.4 - 5.0 g/dL    Globulin 3.3 2.0 - 4.0 g/dL    A-G Ratio 1.1 0.8 - 1.7     EKG, 12 LEAD, INITIAL    Collection Time: 02/28/19  3:05 AM   Result Value Ref Range    Ventricular Rate 93 BPM    Atrial Rate 93 BPM    P-R Interval 116 ms    QRS Duration 78 ms    Q-T Interval 370 ms    QTC Calculation (Bezet) 460 ms    Calculated P Axis 92 degrees    Calculated R Axis 94 degrees    Calculated T Axis 66 degrees    Diagnosis       Sinus rhythm with premature supraventricular complexes  Rightward axis  Pulmonary disease pattern  Abnormal ECG  When compared with ECG of 12-NOV-2018 11:02,  premature supraventricular complexes are now present  Confirmed by Sergio Miles MD, [place] ([id number]) on 2/28/2019 8:08:43 AM     POC LACTIC ACID    Collection Time: 02/28/19  8:08 AM   Result Value Ref Range    Lactic Acid (POC) 1.04 0.40 - 2.00 mmol/L       WBC wnl  LA wnl      ICD-10-CM ICD-9-CM   1. COPD exacerbation ([facility] J44.1 491.21   2. Hypotension, unspecified hypotension type I95.9 458.9       Scribe Attestation     Sara acting as a scribe for and in the presence of Emily Desai DO      February 28, 2019 at 7:20 AM       Provider Attestation:      I personally performed the services described in the documentation, reviewed the documentation, as recorded by the scribe in my presence, and it accurately and completely records my words and actions.  February 28, 2019 at 7:20 AM - Emily Desai DO

## 2019-02-28 NOTE — H&P
HISTORY & PHYSICAL            Patient: Cheri Elias MRN: [id number]  CSN: [id number]    YOB: 1945  Age: 72 y.o. Sex: female    DOA: 2/28/2019 LOS:  LOS: 0 days        DOA: 2/28/2019        Assessment/Plan     Active Problems:    Acute exacerbation of chronic obstructive pulmonary disease (COPD) ([facility] (8/14/2018)      Hypotension (2/28/2019)      COPD exacerbation ([facility] (2/28/2019)        Plan:  1. COPD exac - IV Abx , IV steroids, continue BD   2. Abnormal weight loss - likely from poor appetite - Nutrition consult - Moderate to severe malnutrition   3. H/o colon cancer - has a colon surg in Sept 2018 followed by repeat surg Nov 2018 - has a colostomy bag   4. Chronic resp failure - on Home oxygen 2lt NC   5.  H/o PAF   DVT Px - heparin   FC           Severity of Signs & Symptoms -- Moderate  Risk of adverse events -- Moderate  Current Medical Rx Plan - As Above   Patient history & comorbidities - Per HPI  Discharge Plan -- Home with home health   Risk for Readmission -- High          HPI:     Cheri Elias is a 72 y.o. female who is being admitted with COPDE   Pt mentions that she has Emphysema with chronic resp failure & is on home oxygen 2lt NC -- she mentions she has not been eating well , has been growing weaker, developed a chronic cough which is slowly getting worse - decided to come to the ER for further eval --   ER eval - pt noted to be in COPD exac - is being admitted for further eval & Rx     Past Medical History:   Diagnosis Date    Anxiety     Arthritis     Asbestosis ([facility]     Asthma     Back problem     Baker's cyst     Balance problems     Chronic lung disease     Cigarette smoker     Clotting disorder ([facility]     COPD (chronic obstructive pulmonary disease) (HCC)     oxygen 2/liters asbestosis    Depression     History of DVT (deep vein thrombosis)     Leg pain     Right    Lung disease     Nausea & vomiting     Osteoporosis     Restless leg syndrome     Spinal stenosis     Thromboembolus ([facility]     Vitamin D deficiency        Past Surgical History:   Procedure Laterality Date    COLONOSCOPY N/A 2018    COLONOSCOPY w bx w polypectonies performed by Barry Martinez MD at [address] COLONOSCOPY N/A 2018    COLONOSCOPY performed by Keshia Contreras MD at [facility] HX APPENDECTOMY      HX BACK SURGERY      x4    HX BREAST BIOPSY      left    HX GI      exp lap and ileostomy    HX HEENT      cataract right    HX HYSTERECTOMY      HX LUMBAR LAMINECTOMY      HX MENISCUS REPAIR      HX ORTHOPAEDIC      Knee bakers cyst    HX POLYPECTOMY      right colectomy    HX TONSILLECTOMY      HX VEIN STRIPPING      LAP,SURG,COLECTOMY, PARTIAL, W/ANAST N/A 10/23/2018    Dr. Bell Few N/A 2018    Dr. Trey Frank      vein stripping       Family History   Problem Relation Age of Onset    Cancer Father     Heart Disease Mother     Hypertension Mother     Cancer Brother     Cancer Sister     Diabetes Other     Hypertension Other     Stroke Other     Heart Disease Sister     Hypertension Sister     Heart Disease Brother        Social History     Socioeconomic History    Marital status:      Spouse name: Not on file    Number of children: Not on file    Years of education: Not on file    Highest education level: Not on file   Tobacco Use    Smoking status: Former Smoker     Packs/day: 0.50     Years: 50.00     Pack years: 25.00     Types: Cigarettes     Start date: 10/21/1964     Last attempt to quit: 10/20/2018     Years since quittin.3    Smokeless tobacco: Never Used   Substance and Sexual Activity    Alcohol use: No    Drug use: No       Prior to Admission medications    Medication Sig Start Date End Date Taking? Authorizing Provider   simethicone (GAS-X) 80 mg chewable tablet Take 80 mg by mouth every six (6) hours as needed for Flatulence. Provider, Historical   acetylcysteine 500 mg cap Take 500 mg by mouth two (2) times a day. 1/2/19   Piedad Saunders MD   raNITIdine (ZANTAC) 150 mg tablet Take 150 mg by mouth two (2) times a day. Provider, Historical   albuterol (PROVENTIL VENTOLIN) 2.5 mg /3 mL (0.083 %) nebulizer solution 3 mL by Nebulization route every four (4) hours as needed for Wheezing or Shortness of Breath. Diag. Code: J44.9, R09.02 11/26/18   Piedad Saunders MD   HYDROcodone-acetaminophen [facility]) 5-325 mg per tablet Take 1 Tab by mouth every four (4) hours as needed for Pain. Max Daily Amount: 6 Tabs. 11/14/18   Roberto Frias MD   therapeutic multivitamin [facility]) tablet Take 1 Tab by mouth daily. 9/24/18   Jessica Rivera MD   acetaminophen (TYLENOL EXTRA STRENGTH) 500 mg tablet Take  by mouth every six (6) hours as needed for Pain. Provider, Historical   albuterol (PROAIR HFA) 90 mcg/actuation inhaler INHALE 2 PUFFS BY MOUTH EVERY 4 HOURS AS NEEDED FOR WHEEZING OR SHORTNESS OF BREATH 12/9/16   Bryant Whaley MD   fluticasone-salmeterol (ADVAIR) 250-50 mcg/dose diskus inhaler Take 1 Puff by inhalation every twelve (12) hours. 12/9/16   Dieter RENNER MD   tiotropium bromide (SPIRIVA RESPIMAT) 2.5 mcg/actuation inhaler Take 2 Puffs by inhalation daily. 9/22/16   Piedad Saunders MD   LORazepam (ATIVAN) 1 mg tablet Take  by mouth two (2) times daily as needed for Anxiety. Provider, Historical   rOPINIRole (REQUIP) 1 mg tablet Take  by mouth two (2) times a day. Provider, Historical   OXYGEN-AIR DELIVERY SYSTEMS 2 L by Does Not Apply route. O2 via nasal cannula with bedtime and activity.  O2 Company: [employer]    Provider, Historical       Allergies   Allergen Reactions    Anoro Ellipta [Umeclidinium-Vilanterol] Rash and Other (comments)     Muscle cramps in arms    Aspirin Other (comments)     GI upset and bleeding    Augmentin [Amoxicillin-Pot Clavulanate] Not Reported This Time     Possible cramping    Doxycycline Nausea and Vomiting    Morphine Other (comments)     Neurologic symptoms - severe agitation      Shellfish Derived Unknown (comments)     Pt able to eat small amounts per report     Sulfa (Sulfonamide Antibiotics) Rash     Patient relates no longer allergic       Review of Systems  A comprehensive review of systems was negative except for that written in the History of Present Illness. Physical Exam:      Visit Vitals  /68 (BP 1 Location: Left arm, BP Patient Position: During activity)   Pulse 81   Temp 97.3 °F (36.3 °C)   Resp 20   Ht 5' 6\" (1.676 m)   Wt 55.8 kg (123 lb)   SpO2 96%   BMI 19.85 kg/m²       Physical Exam:    Gen: In general, this is a thinly built female in no acute distress  HEENT: Sclerae nonicteric. Oral mucous membranes moist. Dentition poor  Neck: Supple with midline trachea. CV: RRR without murmur or rub appreciated. Resp:Respirations are unlabored without use of accessory muscles. Lung fields B/L with expiratory wheezing   Abd: Soft, nontender, nondistended. Extrem: Extremities are warm, without cyanosis or clubbing. No pitting pretibial edema. Skin: Warm, no visible rashes. Neuro: Patient is alert, oriented, and cooperative. No obvious focal defects. Moves all 4 extremities. Labs Reviewed:    Recent Results (from the past 24 hour(s))   CBC WITH AUTOMATED DIFF    Collection Time: 02/28/19  3:00 AM   Result Value Ref Range    WBC 10.9 4.6 - 13.2 K/uL    RBC 4.15 (L) 4.20 - 5.30 M/uL    HGB 13.5 12.0 - 16.0 g/dL    HCT 40.2 35.0 - 45.0 %    MCV 96.9 74.0 - 97.0 FL    MCH 32.5 24.0 - 34.0 PG    MCHC 33.6 31.0 - 37.0 g/dL    RDW 13.2 11.6 - 14.5 %    PLATELET 371 031 - 533 K/uL    MPV 9.5 9.2 - 11.8 FL    NEUTROPHILS 58 40 - 73 %    LYMPHOCYTES 28 21 - 52 %    MONOCYTES 12 (H) 3 - 10 %    EOSINOPHILS 2 0 - 5 %    BASOPHILS 0 0 - 2 %    ABS. NEUTROPHILS 6.4 1.8 - 8.0 K/UL    ABS. LYMPHOCYTES 3.1 0.9 - 3.6 K/UL    ABS.  MONOCYTES 1.3 (H) 0.05 - 1.2 K/UL ABS. EOSINOPHILS 0.2 0.0 - 0.4 K/UL    ABS. BASOPHILS 0.0 0.0 - 0.1 K/UL    DF AUTOMATED     METABOLIC PANEL, COMPREHENSIVE    Collection Time: 02/28/19  3:00 AM   Result Value Ref Range    Sodium 142 136 - 145 mmol/L    Potassium 3.8 3.5 - 5.5 mmol/L    Chloride 107 100 - 108 mmol/L    CO2 28 21 - 32 mmol/L    Anion gap 7 3.0 - 18 mmol/L    Glucose 102 (H) 74 - 99 mg/dL    BUN 30 (H) 7.0 - 18 MG/DL    Creatinine 1.02 0.6 - 1.3 MG/DL    BUN/Creatinine ratio 29 (H) 12 - 20      GFR est AA >60 >60 ml/min/1.73m2    GFR est non-AA 53 (L) >60 ml/min/1.73m2    Calcium 8.8 8.5 - 10.1 MG/DL    Bilirubin, total 0.3 0.2 - 1.0 MG/DL    ALT (SGPT) 19 13 - 56 U/L    AST (SGOT) 13 (L) 15 - 37 U/L    Alk. phosphatase 85 45 - 117 U/L    Protein, total 7.0 6.4 - 8.2 g/dL    Albumin 3.7 3.4 - 5.0 g/dL    Globulin 3.3 2.0 - 4.0 g/dL    A-G Ratio 1.1 0.8 - 1.7     EKG, 12 LEAD, INITIAL    Collection Time: 02/28/19  3:05 AM   Result Value Ref Range    Ventricular Rate 93 BPM    Atrial Rate 93 BPM    P-R Interval 116 ms    QRS Duration 78 ms    Q-T Interval 370 ms    QTC Calculation (Bezet) 460 ms    Calculated P Axis 92 degrees    Calculated R Axis 94 degrees    Calculated T Axis 66 degrees    Diagnosis       Sinus rhythm with premature supraventricular complexes  Rightward axis  Pulmonary disease pattern  Abnormal ECG  When compared with ECG of 12-NOV-2018 11:02,  premature supraventricular complexes are now present  Confirmed by Stevan Vallecillo MD, Henry Herrera ([id number]) on 2/28/2019 8:08:43 AM     POC LACTIC ACID    Collection Time: 02/28/19  8:08 AM   Result Value Ref Range    Lactic Acid (POC) 1.04 0.40 - 2.00 mmol/L   INFLUENZA A & B AG (RAPID TEST)    Collection Time: 02/28/19  8:15 AM   Result Value Ref Range    Influenza A Antigen NEGATIVE  NEG      Influenza B Antigen NEGATIVE  NEG         Imaging Reviewed:    PCXR     IMPRESSION:     Severe hyperinflation.    Bibasal interstitial prominence also stable consistent with chronic interstitial  disease.       Daryle Kennel, MD  2/28/2019, 12:14 PM

## 2019-02-28 NOTE — ED NOTES
Called report to [place], RN clinical Coordinator. TRANSFER - OUT REPORT:    Verbal report given to SADI Benavides on Osiel Aleman  being transferred to [address] for Shortness of Breath        Report consisted of patients Situation, Background, Assessment and   Recommendations(SBAR). Information from the following report(s) SBAR, ED Summary and Cardiac Rhythm 87 was reviewed with the receiving nurse. Lines:   Peripheral IV 02/28/19 Left Antecubital (Active)   Site Assessment Clean, dry, & intact 2/28/2019  3:24 AM   Phlebitis Assessment 0 2/28/2019  3:24 AM   Infiltration Assessment 0 2/28/2019  3:24 AM   Dressing Status Clean, dry, & intact 2/28/2019  3:24 AM   Dressing Type Transparent 2/28/2019  3:24 AM   Hub Color/Line Status Pink 2/28/2019  3:24 AM        Opportunity for questions and clarification was provided.       Patient transported with: Ramy Croft

## 2019-02-28 NOTE — ED NOTES
Talked to Dr. DEAL about patient hypotension, and how the patient feels \"her wasting away\". New orders to follow.

## 2019-02-28 NOTE — ROUTINE PROCESS
Primary Nurse Alycia Lee RN and Francoise Aguilera RN performed a dual skin assessment on this patient No impairment noted  Roman score is 20

## 2019-02-28 NOTE — ED PROVIDER NOTES
EMERGENCY DEPARTMENT HISTORY AND PHYSICAL EXAM    3:07 AM      Date: 2/28/2019  Patient Name: Gabby Terrell    History of Presenting Illness     Chief Complaint   Patient presents with    Shortness of Breath         History Provided By: Patient      3:10 Flash De La Torre is a 72 y.o. female with h/o COPD, Asbestosis, DVT, and other PMHx who presents to ED complaining of SOB for the past few days. The patient has a productive cough. She used to smoke tobacco, but she stopped when she was diagnosed with colon cancer. The patient uses oxygen at home. Her provider cut her back to 10 mg of albuterol on MWF. No other concerns or symptoms at this time. PCP: Hamilton Cottrell MD    Chief Complaint: SOB  Duration:  Few Days  Timing:  Constant  Location: Respiratory  Severity: Moderate  Associated Symptoms: Cough    Current Outpatient Medications   Medication Sig Dispense Refill    simethicone (GAS-X) 80 mg chewable tablet Take 80 mg by mouth every six (6) hours as needed for Flatulence.  acetylcysteine 500 mg cap Take 500 mg by mouth two (2) times a day. 60 Cap 3    raNITIdine (ZANTAC) 150 mg tablet Take 150 mg by mouth two (2) times a day.  albuterol (PROVENTIL VENTOLIN) 2.5 mg /3 mL (0.083 %) nebulizer solution 3 mL by Nebulization route every four (4) hours as needed for Wheezing or Shortness of Breath. Diag. Code: J44.9, R09.02 375 Each 3    HYDROcodone-acetaminophen (NORCO) 5-325 mg per tablet Take 1 Tab by mouth every four (4) hours as needed for Pain. Max Daily Amount: 6 Tabs. 40 Tab 0    predniSONE (DELTASONE) 10 mg tablet Take 10 mg by mouth daily. Mon, wed, fri  0    therapeutic multivitamin (THERAGRAN) tablet Take 1 Tab by mouth daily. 30 Tab 11    acetaminophen (TYLENOL EXTRA STRENGTH) 500 mg tablet Take  by mouth every six (6) hours as needed for Pain.       albuterol (PROAIR HFA) 90 mcg/actuation inhaler INHALE 2 PUFFS BY MOUTH EVERY 4 HOURS AS NEEDED FOR WHEEZING OR SHORTNESS OF BREATH 1 Inhaler 6    fluticasone-salmeterol (ADVAIR) 250-50 mcg/dose diskus inhaler Take 1 Puff by inhalation every twelve (12) hours. 1 Inhaler 3    tiotropium bromide (SPIRIVA RESPIMAT) 2.5 mcg/actuation inhaler Take 2 Puffs by inhalation daily. 3 Inhaler 3    LORazepam (ATIVAN) 1 mg tablet Take  by mouth two (2) times daily as needed for Anxiety.  rOPINIRole (REQUIP) 1 mg tablet Take  by mouth two (2) times a day.  OXYGEN-AIR DELIVERY SYSTEMS 2 L by Does Not Apply route. O2 via nasal cannula with bedtime and activity.  [employer]: [id number]         Past History     Past Medical History:  Past Medical History:   Diagnosis Date    Anxiety     Arthritis     [facility]     Asthma     Back problem     Baker's cyst     Balance problems     Chronic lung disease     Cigarette smoker     [facility]otting disorder ([facility]     [facility]     oxygen 2/liters asbestosis    Depression     History of DVT (deep vein thrombosis)     Leg pain     Right    Lung disease     Nausea & vomiting     Osteoporosis     Restless leg syndrome     Spinal stenosis     Thromboembolus ([facility]     Vitamin D deficiency        Past Surgical History:  Past Surgical History:   Procedure Laterality Date    COLONOSCOPY N/A 9/22/2018    COLONOSCOPY w bx w polypectonies performed by Deana Rodgers MD at [facility] COLONOSCOPY N/A 11/6/2018    COLONOSCOPY performed by Suzi Jaramillo MD at [address] [place] [address] HX APPENDECTOMY      HX BACK SURGERY      x4    HX BREAST BIOPSY      left    HX GI      exp lap and ileostomy    HX HEENT      cataract right    HX HYSTERECTOMY      HX LUMBAR LAMINECTOMY      HX MENISCUS REPAIR      HX ORTHOPAEDIC      Knee bakers cyst    HX POLYPECTOMY      right colectomy    HX TONSILLECTOMY      HX VEIN STRIPPING      LAP,SURG,COLECTOMY, PARTIAL, W/ANAST N/A 10/23/2018    Dr. Ole Stephenson N/A 11/06/2018 Dr. Norm Tran      vein stripping       Family History:  Family History   Problem Relation Age of Onset    Cancer Father     Heart Disease Mother     Hypertension Mother     Cancer Brother     Cancer Sister     Diabetes Other     Hypertension Other     Stroke Other     Heart Disease Sister     Hypertension Sister     Heart Disease Brother        Social History:  Social History     Tobacco Use    Smoking status: Former Smoker     Packs/day: 0.50     Years: 50.00     Pack years: 25.00     Types: Cigarettes     Start date: 10/21/1964     Last attempt to quit: 10/20/2018     Years since quittin.3    Smokeless tobacco: Never Used   Substance Use Topics    Alcohol use: No    Drug use: No       Allergies: Allergies   Allergen Reactions    Anoro Ellipta [Umeclidinium-Vilanterol] Rash and Other (comments)     Muscle cramps in arms    Aspirin Other (comments)     GI upset and bleeding    Augmentin [Amoxicillin-Pot Clavulanate] Not Reported This Time     Possible cramping    Doxycycline Nausea and Vomiting    Morphine Other (comments)     Neurologic symptoms - severe agitation      Shellfish Derived Unknown (comments)     Pt able to eat small amounts per report     Sulfa (Sulfonamide Antibiotics) Rash     Patient relates no longer allergic         Review of Systems       Review of Systems   Constitutional: Negative for chills and fever. Respiratory: Positive for cough and shortness of breath. All other systems reviewed and are negative. Physical Exam     Visit Vitals  /63   Pulse 84   Temp 97.7 °F (36.5 °C)   Resp 14   Ht 5' 6\" (1.676 m)   Wt 55.8 kg (123 lb)   SpO2 97%   BMI 19.85 kg/m²       Physical Exam:  .   Patient Vitals for the past 12 hrs:   Temp Pulse Resp BP SpO2   19 0545 -- 84 14 104/63 97 %   19 0530 -- 87 16 109/50 98 %   19 0500 -- 91 21 94/60 96 %   19 0445 -- 87 21 92/59 95 %   19 0430 -- 91 17 (!) 115/99 94 %   02/28/19 0415 -- 85 21 99/65 95 %   02/28/19 0400 -- 92 15 90/61 96 %   02/28/19 0349 -- 91 15 102/65 98 %   02/28/19 0317 97.7 °F (36.5 °C) 96 22 96/64 96 %     Gen: Well developed, well nourished 72 y.o. female  HEENT: Normocephalic, atraumatic, no scleral icterus  Respiratory: No accessory muscle use. No rales, No rhonchi. Normal chest wall excursion. No subcutaneous air, no rib crepitus. Expiratory wheezes throughout. Cardiovascular: Regular rhythm and rate, Normal pulses, Normal perfusion. No edema  Gastrointestinal: Non distended, Non tender, No masses. No ascites. No organomegaly. No evidence of trauma  Musculoskeletal: Full range of motion at all other tested joints. No joint effusions. No spinal tenderness. Neuro: Normal strength, Normal sensation. Normal speech. No ataxia. Cranial Nerves II-XII normal as tested. Skin: No rash, No lesions, petechia or purpura. Warm and dry  Psyche: No suicidal ideation, No homicidal ideation. No hallucinations. Organized thoughts. Heme: Normal  : Deferred      Diagnostic Study Results     Labs -  Recent Results (from the past 12 hour(s))   CBC WITH AUTOMATED DIFF    Collection Time: 02/28/19  3:00 AM   Result Value Ref Range    WBC 10.9 4.6 - 13.2 K/uL    RBC 4.15 (L) 4.20 - 5.30 M/uL    HGB 13.5 12.0 - 16.0 g/dL    HCT 40.2 35.0 - 45.0 %    MCV 96.9 74.0 - 97.0 FL    MCH 32.5 24.0 - 34.0 PG    MCHC 33.6 31.0 - 37.0 g/dL    RDW 13.2 11.6 - 14.5 %    PLATELET 112 138 - 305 K/uL    MPV 9.5 9.2 - 11.8 FL    NEUTROPHILS 58 40 - 73 %    LYMPHOCYTES 28 21 - 52 %    MONOCYTES 12 (H) 3 - 10 %    EOSINOPHILS 2 0 - 5 %    BASOPHILS 0 0 - 2 %    ABS. NEUTROPHILS 6.4 1.8 - 8.0 K/UL    ABS. LYMPHOCYTES 3.1 0.9 - 3.6 K/UL    ABS. MONOCYTES 1.3 (H) 0.05 - 1.2 K/UL    ABS. EOSINOPHILS 0.2 0.0 - 0.4 K/UL    ABS.  BASOPHILS 0.0 0.0 - 0.1 K/UL    DF AUTOMATED     METABOLIC PANEL, COMPREHENSIVE    Collection Time: 02/28/19  3:00 AM   Result Value Ref Range    Sodium 142 136 - 145 mmol/L    Potassium 3.8 3.5 - 5.5 mmol/L    Chloride 107 100 - 108 mmol/L    CO2 28 21 - 32 mmol/L    Anion gap 7 3.0 - 18 mmol/L    Glucose 102 (H) 74 - 99 mg/dL    BUN 30 (H) 7.0 - 18 MG/DL    Creatinine 1.02 0.6 - 1.3 MG/DL    BUN/Creatinine ratio 29 (H) 12 - 20      GFR est AA >60 >60 ml/min/1.73m2    GFR est non-AA 53 (L) >60 ml/min/1.73m2    Calcium 8.8 8.5 - 10.1 MG/DL    Bilirubin, total 0.3 0.2 - 1.0 MG/DL    ALT (SGPT) 19 13 - 56 U/L    AST (SGOT) 13 (L) 15 - 37 U/L    Alk. phosphatase 85 45 - 117 U/L    Protein, total 7.0 6.4 - 8.2 g/dL    Albumin 3.7 3.4 - 5.0 g/dL    Globulin 3.3 2.0 - 4.0 g/dL    A-G Ratio 1.1 0.8 - 1.7     EKG, 12 LEAD, INITIAL    Collection Time: 02/28/19  3:05 AM   Result Value Ref Range    Ventricular Rate 93 BPM    Atrial Rate 93 BPM    P-R Interval 116 ms    QRS Duration 78 ms    Q-T Interval 370 ms    QTC Calculation (Bezet) 460 ms    Calculated P Axis 92 degrees    Calculated R Axis 94 degrees    Calculated T Axis 66 degrees    Diagnosis       Suspect arm lead reversal, interpretation assumes no reversal  Sinus rhythm with premature supraventricular complexes  Rightward axis  Pulmonary disease pattern  Abnormal ECG  When compared with ECG of 12-NOV-2018 11:02,  premature supraventricular complexes are now present         Radiologic Studies -   XR CHEST PORT    (Results Pending)     No pneumonia and no pneumothorax no acute disease    Medical Decision Making     It should be noted that I, Slade Walter MD will be the provider of record for this patient. I reviewed the vital signs, available nursing notes, past medical history, past surgical history, family history and social history. Vital Signs-Reviewed the patient's vital signs.     Pulse Oximetry Analysis -  2L of O2 via NC (Interpretation)Normal          Records Reviewed: Nursing Notes (Time of Review: 3:07 AM)    ED Course: Progress Notes, Reevaluation, and Consults:  EKG was done at 3:05 AM shows a normal sinus rhythm, rate of 93, QRS duration 78 ms, QTc of 460 ms, no STEMI. MEDICAL DECISION MAKING  5:52 AM    The patient was observed for a period of time in the emergency department. There was no clinically significant deterioration in the patient's condition. Laboratory data, and imaging were reviewed. I had a lengthy discussion with the patient regarding the risks and benefits of further treatment, observation, and admission to the hospital for further testing. Ultimately, the patient decided to follow-up as an outpatient. All of their questions were answered. They are low risk for outpatient management. 7:07 AM  The patient felt like she became more short of breath. She feels like she needs another treatment in the emergency department prior to discharge home. Plan is for repeat bronchodilators, and observation. Care was endorsed to Dr. DEAL. Diagnosis     Clinical Impression:   1. COPD exacerbation ([address]        Disposition: Discharge home    Follow-up Information    None             Medication List      ASK your doctor about these medications    acetylcysteine 500 mg Cap  Take 500 mg by mouth two (2) times a day. * albuterol 90 mcg/actuation inhaler  Commonly known as:  PROAIR HFA  INHALE 2 PUFFS BY MOUTH EVERY 4 HOURS AS NEEDED FOR WHEEZING OR SHORTNESS OF BREATH     * albuterol 2.5 mg /3 mL (0.083 %) nebulizer solution  Commonly known as:  PROVENTIL VENTOLIN  3 mL by Nebulization route every four (4) hours as needed for Wheezing or Shortness of Breath. Diag. Code: J44.9, R09.02     fluticasone-salmeterol 250-50 mcg/dose diskus inhaler  Commonly known as:  ADVAIR  Take 1 Puff by inhalation every twelve (12) hours. GAS-X 80 mg chewable tablet  Generic drug:  simethicone     HYDROcodone-acetaminophen 5-325 mg per tablet  Commonly known as:  NORCO  Take 1 Tab by mouth every four (4) hours as needed for Pain. Max Daily Amount: 6 Tabs.      LORazepam 1 mg tablet  Commonly known as:  ATIVAN OXYGEN-AIR DELIVERY SYSTEMS     predniSONE 10 mg tablet  Commonly known as:  DELTASONE     rOPINIRole 1 mg tablet  Commonly known as:  REQUIP     therapeutic multivitamin tablet  Commonly known as:  THERAGRAN  Take 1 Tab by mouth daily. tiotropium bromide 2.5 mcg/actuation inhaler  Commonly known as:  SPIRIVA RESPIMAT  Take 2 Puffs by inhalation daily. TYLENOL EXTRA STRENGTH 500 mg tablet  Generic drug:  acetaminophen     ZANTAC 150 mg tablet  Generic drug:  raNITIdine         * This list has 2 medication(s) that are the same as other medications prescribed for you. Read the directions carefully, and ask your doctor or other care provider to review them with you.              _______________________________       Scribe Attestation     Phoenix Brooke acting as a scribe for and in the presence of Giovanna Walter MD      February 28, 2019 at 3:07 AM       Provider Attestation:      I personally performed the services described in the documentation, reviewed the documentation, as recorded by the scribe in my presence, and it accurately and completely records my words and actions.  February 28, 2019 at 3:07 AM - Giovanna Walter MD        _______________________________

## 2019-02-28 NOTE — ED NOTES
Talked to Dr Walter about patient feeling short of breath, Dr. Hill Records in the room.  Stated \" we are going to watch her for a bit\"

## 2019-02-28 NOTE — ED NOTES
Received report from Dhara Ornelas RN at this time. Patient alert, awake and able to verbalize needs known. Denies any shortness of breath at this time. Family at patients bedside at this time.

## 2019-02-28 NOTE — ED NOTES
Called [place] to give report on the patient and the [place] states that the nurse was in an emergency at this time will call.  Made charge aware at this rime

## 2019-03-01 LAB
ALBUMIN SERPL-MCNC: 3.2 G/DL (ref 3.4–5)
ALBUMIN/GLOB SERPL: 1.1 {RATIO} (ref 0.8–1.7)
ALP SERPL-CCNC: 70 U/L (ref 45–117)
ALT SERPL-CCNC: 16 U/L (ref 13–56)
ANION GAP SERPL CALC-SCNC: 8 MMOL/L (ref 3–18)
APTT PPP: 35.5 SEC (ref 23–36.4)
AST SERPL-CCNC: 15 U/L (ref 15–37)
BACTERIA SPEC CULT: NORMAL
BILIRUB SERPL-MCNC: 0.3 MG/DL (ref 0.2–1)
BUN SERPL-MCNC: 25 MG/DL (ref 7–18)
BUN/CREAT SERPL: 32 (ref 12–20)
CALCIUM SERPL-MCNC: 8.3 MG/DL (ref 8.5–10.1)
CHLORIDE SERPL-SCNC: 108 MMOL/L (ref 100–108)
CHOLEST SERPL-MCNC: 150 MG/DL
CO2 SERPL-SCNC: 24 MMOL/L (ref 21–32)
CREAT SERPL-MCNC: 0.79 MG/DL (ref 0.6–1.3)
ERYTHROCYTE [DISTWIDTH] IN BLOOD BY AUTOMATED COUNT: 13.2 % (ref 11.6–14.5)
GLOBULIN SER CALC-MCNC: 3 G/DL (ref 2–4)
GLUCOSE SERPL-MCNC: 146 MG/DL (ref 74–99)
HCT VFR BLD AUTO: 35.6 % (ref 35–45)
HDLC SERPL-MCNC: 79 MG/DL (ref 40–60)
HDLC SERPL: 1.9 {RATIO} (ref 0–5)
HGB BLD-MCNC: 11.9 G/DL (ref 12–16)
LDLC SERPL CALC-MCNC: 61 MG/DL (ref 0–100)
LIPID PROFILE,FLP: ABNORMAL
MCH RBC QN AUTO: 32 PG (ref 24–34)
MCHC RBC AUTO-ENTMCNC: 33.4 G/DL (ref 31–37)
MCV RBC AUTO: 95.7 FL (ref 74–97)
PLATELET # BLD AUTO: 324 K/UL (ref 135–420)
PMV BLD AUTO: 10.3 FL (ref 9.2–11.8)
POTASSIUM SERPL-SCNC: 4.1 MMOL/L (ref 3.5–5.5)
PROT SERPL-MCNC: 6.2 G/DL (ref 6.4–8.2)
RBC # BLD AUTO: 3.72 M/UL (ref 4.2–5.3)
SERVICE CMNT-IMP: NORMAL
SODIUM SERPL-SCNC: 140 MMOL/L (ref 136–145)
TRIGL SERPL-MCNC: 50 MG/DL (ref ?–150)
VLDLC SERPL CALC-MCNC: 10 MG/DL
WBC # BLD AUTO: 6.4 K/UL (ref 4.6–13.2)

## 2019-03-01 PROCEDURE — 94761 N-INVAS EAR/PLS OXIMETRY MLT: CPT

## 2019-03-01 PROCEDURE — 85027 COMPLETE CBC AUTOMATED: CPT

## 2019-03-01 PROCEDURE — 80053 COMPREHEN METABOLIC PANEL: CPT

## 2019-03-01 PROCEDURE — 77030027138 HC INCENT SPIROMETER -A

## 2019-03-01 PROCEDURE — 74011250636 HC RX REV CODE- 250/636: Performed by: EMERGENCY MEDICINE

## 2019-03-01 PROCEDURE — 65660000000 HC RM CCU STEPDOWN

## 2019-03-01 PROCEDURE — 94760 N-INVAS EAR/PLS OXIMETRY 1: CPT

## 2019-03-01 PROCEDURE — 36415 COLL VENOUS BLD VENIPUNCTURE: CPT

## 2019-03-01 PROCEDURE — 94640 AIRWAY INHALATION TREATMENT: CPT

## 2019-03-01 PROCEDURE — 85730 THROMBOPLASTIN TIME PARTIAL: CPT

## 2019-03-01 PROCEDURE — 74011250636 HC RX REV CODE- 250/636: Performed by: HOSPITALIST

## 2019-03-01 PROCEDURE — 74011250637 HC RX REV CODE- 250/637: Performed by: HOSPITALIST

## 2019-03-01 PROCEDURE — 80061 LIPID PANEL: CPT

## 2019-03-01 PROCEDURE — 74011000250 HC RX REV CODE- 250: Performed by: EMERGENCY MEDICINE

## 2019-03-01 PROCEDURE — 77010033678 HC OXYGEN DAILY

## 2019-03-01 RX ORDER — FAMOTIDINE 20 MG/1
20 TABLET, FILM COATED ORAL 2 TIMES DAILY
Status: DISCONTINUED | OUTPATIENT
Start: 2019-03-01 | End: 2019-03-05 | Stop reason: HOSPADM

## 2019-03-01 RX ORDER — IPRATROPIUM BROMIDE AND ALBUTEROL SULFATE 2.5; .5 MG/3ML; MG/3ML
3 SOLUTION RESPIRATORY (INHALATION)
Status: DISCONTINUED | OUTPATIENT
Start: 2019-03-01 | End: 2019-03-05 | Stop reason: HOSPADM

## 2019-03-01 RX ADMIN — HEPARIN SODIUM 5000 UNITS: 5000 INJECTION INTRAVENOUS; SUBCUTANEOUS at 05:26

## 2019-03-01 RX ADMIN — HEPARIN SODIUM 5000 UNITS: 5000 INJECTION INTRAVENOUS; SUBCUTANEOUS at 21:50

## 2019-03-01 RX ADMIN — Medication 10 ML: at 13:51

## 2019-03-01 RX ADMIN — METHYLPREDNISOLONE SODIUM SUCCINATE 60 MG: 40 INJECTION, POWDER, FOR SOLUTION INTRAMUSCULAR; INTRAVENOUS at 09:07

## 2019-03-01 RX ADMIN — Medication 10 ML: at 21:00

## 2019-03-01 RX ADMIN — BUDESONIDE AND FORMOTEROL FUMARATE DIHYDRATE 2 PUFF: 160; 4.5 AEROSOL RESPIRATORY (INHALATION) at 08:01

## 2019-03-01 RX ADMIN — Medication 10 ML: at 05:28

## 2019-03-01 RX ADMIN — IPRATROPIUM BROMIDE AND ALBUTEROL SULFATE 3 ML: .5; 3 SOLUTION RESPIRATORY (INHALATION) at 22:10

## 2019-03-01 RX ADMIN — FAMOTIDINE 20 MG: 20 TABLET, FILM COATED ORAL at 09:05

## 2019-03-01 RX ADMIN — ACETAMINOPHEN 650 MG: 325 TABLET ORAL at 13:50

## 2019-03-01 RX ADMIN — METHYLPREDNISOLONE SODIUM SUCCINATE 60 MG: 40 INJECTION, POWDER, FOR SOLUTION INTRAMUSCULAR; INTRAVENOUS at 05:25

## 2019-03-01 RX ADMIN — LORAZEPAM 1 MG: 1 TABLET ORAL at 20:58

## 2019-03-01 RX ADMIN — THERA TABS 1 TABLET: TAB at 09:05

## 2019-03-01 RX ADMIN — METHYLPREDNISOLONE SODIUM SUCCINATE 40 MG: 40 INJECTION, POWDER, FOR SOLUTION INTRAMUSCULAR; INTRAVENOUS at 17:38

## 2019-03-01 RX ADMIN — ROPINIROLE HYDROCHLORIDE 1 MG: 1 TABLET, FILM COATED ORAL at 09:04

## 2019-03-01 RX ADMIN — ROPINIROLE HYDROCHLORIDE 1 MG: 1 TABLET, FILM COATED ORAL at 20:57

## 2019-03-01 RX ADMIN — FAMOTIDINE 20 MG: 20 TABLET ORAL at 17:38

## 2019-03-01 RX ADMIN — HEPARIN SODIUM 5000 UNITS: 5000 INJECTION INTRAVENOUS; SUBCUTANEOUS at 13:50

## 2019-03-01 RX ADMIN — ACETAMINOPHEN 650 MG: 325 TABLET ORAL at 19:07

## 2019-03-01 NOTE — ROUTINE PROCESS
Pt generated mews score of 3, pt in no signs of distress and resting in bed talking with family and nutritionist. Will continue to monitor pt.

## 2019-03-01 NOTE — ROUTINE PROCESS
Bedside and Verbal shift change report given to SADI Doyle (oncoming nurse) by Brittnee Cervantes (offgoing nurse). Report included the following information SBAR, Kardex, Intake/Output and MAR.

## 2019-03-01 NOTE — PROGRESS NOTES
Important Message from [facility]" reviewed and explained with the patient and/or representative at bedside and signature was obtained. A signed copy provided to patient/representative. Original signed document placed in patient's chart.      Lucho Myers RN  [phone]

## 2019-03-01 NOTE — PROGRESS NOTES
Problem: Falls - Risk of  Goal: *Absence of Falls  Document [place] Fall Risk and appropriate interventions in the flowsheet. Outcome: Progressing Towards Goal  Fall Risk Interventions:  Mobility Interventions: Communicate number of staff needed for ambulation/transfer, Patient to call before getting OOB, Utilize walker, cane, or other assistive device         Medication Interventions: Patient to call before getting OOB                  Problem: Pressure Injury - Risk of  Goal: *Prevention of pressure injury  Document Roman Scale and appropriate interventions in the flowsheet.   Outcome: Progressing Towards Goal  Pressure Injury Interventions:                      Nutrition Interventions: Document food/fluid/supplement intake    Friction and Shear Interventions: HOB 30 degrees or less, Lift sheet, Minimize layers

## 2019-03-01 NOTE — PROGRESS NOTES
Discharge Planning:  Chart reviewed. Pt has PT/OT consult. Case Management will continue to follow to assist with transitional needs.     ISAURA Epps RN  Care Management  Pager: [phone]

## 2019-03-01 NOTE — ROUTINE PROCESS
Bedside shift change report given to [employer] (oncoming nurse) by Paty Hoang (offgoing nurse). Report included the following information SBAR and Kardex.

## 2019-03-01 NOTE — PROGRESS NOTES
[facility]  Progress Note    Patient: Terence Ramires Age: 72 y.o. : 1945 MR#: [id number] SSN: [id number]  Date: 3/1/2019     Subjective:   SOB better with breathing treatments. Sister at bedside. Denies HA, CP/chest discomfort, abdominal pain, N/V. No change in stool production from ileostomy bag. Assessment/Plan:   Assessment  1. COPD exacerbation/chronic respiratory failure/oxygen dependent 2L  2. Abnormal weight loss  3. ileostomy hx of colon cancer  4. PAF    Plan  1. Bronchodilators, IV solumedrol, duoneb treatments as needed, oxygen  2. Nutrition consult for weight loss  3.  Stoma care  4. telemetry    Case discussed with:  [x]Patient  [x]Family  [x]Nursing  []Case Management  DVT Prophylaxis:  []Lovenox  []Hep SQ  []SCDs  []Coumadin   []On Heparin gtt    Objective:   VS:   Visit Vitals  BP (!) 89/48 (BP 1 Location: Left arm, BP Patient Position: At rest)   Pulse 85   Temp 98.3 °F (36.8 °C)   Resp 20   Ht 5' 6\" (1.676 m)   Wt 55.2 kg (121 lb 9.6 oz)   SpO2 96%   BMI 19.63 kg/m²      Tmax/24hrs: Temp (24hrs), Av.9 °F (36.6 °C), Min:97.3 °F (36.3 °C), Max:98.3 °F (36.8 °C)      Intake/Output Summary (Last 24 hours) at 3/1/2019 1049  Last data filed at 3/1/2019 0740  Gross per 24 hour   Intake 1900 ml   Output 1701 ml   Net 199 ml       General:  Alert, NAD  Cardiovascular:  RRR  Pulmonary:  Rhonchi and wheeze t/o  GI:  +BS in all four quadrants, soft, non-tender  Extremities:  No edema; 2+ dorsalis pedis pulses bilaterally  Neuro: alert and oriented  ileostomy      Labs:    Recent Results (from the past 24 hour(s))   URINALYSIS W/ RFLX MICROSCOPIC    Collection Time: 19  2:12 PM   Result Value Ref Range    Color YELLOW      Appearance CLEAR      Specific gravity 1.010 1.005 - 1.030      pH (UA) 6.0 5.0 - 8.0      Protein NEGATIVE  NEG mg/dL    Glucose NEGATIVE  NEG mg/dL    Ketone NEGATIVE  NEG mg/dL    Bilirubin NEGATIVE  NEG      Blood NEGATIVE NEG      Urobilinogen 0.2 0.2 - 1.0 EU/dL    Nitrites NEGATIVE  NEG      Leukocyte Esterase NEGATIVE  NEG     CULTURE, URINE    Collection Time: 02/28/19  2:12 PM   Result Value Ref Range    Special Requests: NO SPECIAL REQUESTS      Culture result: NO GROWTH 1 DAY     METABOLIC PANEL, COMPREHENSIVE    Collection Time: 03/01/19  1:49 AM   Result Value Ref Range    Sodium 140 136 - 145 mmol/L    Potassium 4.1 3.5 - 5.5 mmol/L    Chloride 108 100 - 108 mmol/L    CO2 24 21 - 32 mmol/L    Anion gap 8 3.0 - 18 mmol/L    Glucose 146 (H) 74 - 99 mg/dL    BUN 25 (H) 7.0 - 18 MG/DL    Creatinine 0.79 0.6 - 1.3 MG/DL    BUN/Creatinine ratio 32 (H) 12 - 20      GFR est AA >60 >60 ml/min/1.73m2    GFR est non-AA >60 >60 ml/min/1.73m2    Calcium 8.3 (L) 8.5 - 10.1 MG/DL    Bilirubin, total 0.3 0.2 - 1.0 MG/DL    ALT (SGPT) 16 13 - 56 U/L    AST (SGOT) 15 15 - 37 U/L    Alk.  phosphatase 70 45 - 117 U/L    Protein, total 6.2 (L) 6.4 - 8.2 g/dL    Albumin 3.2 (L) 3.4 - 5.0 g/dL    Globulin 3.0 2.0 - 4.0 g/dL    A-G Ratio 1.1 0.8 - 1.7     LIPID PANEL    Collection Time: 03/01/19  1:49 AM   Result Value Ref Range    LIPID PROFILE          Cholesterol, total 150 <200 MG/DL    Triglyceride 50 <150 MG/DL    HDL Cholesterol 79 (H) 40 - 60 MG/DL    LDL, calculated 61 0 - 100 MG/DL    VLDL, calculated 10 MG/DL    CHOL/HDL Ratio 1.9 0 - 5.0     CBC W/O DIFF    Collection Time: 03/01/19  1:49 AM   Result Value Ref Range    WBC 6.4 4.6 - 13.2 K/uL    RBC 3.72 (L) 4.20 - 5.30 M/uL    HGB 11.9 (L) 12.0 - 16.0 g/dL    HCT 35.6 35.0 - 45.0 %    MCV 95.7 74.0 - 97.0 FL    MCH 32.0 24.0 - 34.0 PG    MCHC 33.4 31.0 - 37.0 g/dL    RDW 13.2 11.6 - 14.5 %    PLATELET 745 859 - 776 K/uL    MPV 10.3 9.2 - 11.8 FL   PTT    Collection Time: 03/01/19  1:49 AM   Result Value Ref Range    aPTT 35.5 23.0 - 36.4 SEC       Signed By: Kane Saavedra NP     March 1, 2019

## 2019-03-01 NOTE — PROGRESS NOTES
NUTRITION    Nutrition Consult: General Nutrition Management & Supplements     RECOMMENDATIONS / PLAN:     - Liberalize diet; change to Regular diet  - Add food preferences  - Add nutrition supplement: Ensure Enlive once daily  - Concern for risk of refeeding syndrome. Monitor labs/electrolytes: Check BMP, Phos, Mg  - Continue RD inpatient monitoring and evaluation. NUTRITION INTERVENTIONS & DIAGNOSIS:     [x] Meals/snacks: modify composition  [x] Medical food supplement therapy: initiate  [x] Collaboration and referral of nutrition care:  Discussed with SYDNEE Wayne about liberalizing diet to encourage meal intake; NP agreed with plan. Nutrition Diagnosis:  Inadequate oral intake related to poor appetite as evidenced by poor meal intake x 1 month PTA, consuming <50% of meals    Patient meets criteria for Severe Protein Calorie Malnutrition as evidenced by:   ASPEN Malnutrition Criteria  Acute Illness, Chronic Illness, or Social/Enviornmental: Chronic illness  Energy Intake: Less than/equal to 75% of est energy req for greater than/equal to 1 month  Body Fat: Severe(triceps)  Muscle Mass: Severe(clavicle, dorsal hands, temple region)  ASPEN Malnutrition Score - Chronic Illness: 18  Chronic Illness - Malnutrition Diagnosis: Severe malnutrition. ASSESSMENT:     Pt reported poor appetite and meal intake x 1 month PTA; hungry now, especially since started on steroid therapy. Food preferences discussed. Agreeable to nutrition supplement. Sometimes consumed Premier Protein at home. Pt on cardiac diet; discussed with SYDNEE Wayne about changing to regular consistency; NP agreed with plan. Pt with ileostomy; very familiar with which foods she is not able to tolerate or what causes her flatus.  Pt reported trying to gain back the weight she lost. NFPE conducted    Average po intake adequate to meet patients estimated nutritional needs:   [] Yes     [x] No   [] Unable to determine at this time    Diet: DIET CARDIAC Regular      Food Allergies:  Shellfish       Current Appetite:   [x] Good     [x] Fair     [] Poor     [] Other:  Appetite/meal intake prior to admission:   [] Good     [] Fair     [x] Poor: consuming total of 1 meal at most daily, sometimes drinking Premier Protein supplement; poor po intake x 1 month PTA     [] Other:  Feeding Limitations:  [] Swallowing difficulty    [] Chewing difficulty    [] Other:  Current Meal Intake:   Patient Vitals for the past 100 hrs:   % Diet Eaten   03/01/19 1200 40 %   03/01/19 0740 50 %       BM: 3/1; ileostomy   Skin Integrity:  No pressure injury or wound noted  Edema:   [x] No     [] Yes   Pertinent Medications: Reviewed: pepcid, steroid, zofran, theragran    Recent Labs     03/01/19  0149 02/28/19  0300    142   K 4.1 3.8    107   CO2 24 28   * 102*   BUN 25* 30*   CREA 0.79 1.02   CA 8.3* 8.8   ALB 3.2* 3.7   SGOT 15 13*   ALT 16 19       Intake/Output Summary (Last 24 hours) at 3/1/2019 1622  Last data filed at 3/1/2019 1200  Gross per 24 hour   Intake 1000 ml   Output 801 ml   Net 199 ml       Anthropometrics:  Ht Readings from Last 1 Encounters:   02/28/19 5' 6\" (1.676 m)     Last 3 Recorded Weights in this Encounter    02/28/19 0317 03/01/19 0413   Weight: 55.8 kg (123 lb) 55.2 kg (121 lb 9.6 oz)     Body mass index is 19.63 kg/m². Weight History:   Pt reported -145 lb. Had unplanned wt loss, was at 105 lb, then has gained back some of the weight.      Weight Metrics 3/1/2019 2/4/2019 1/2/2019 12/27/2018 11/29/2018 11/14/2018 10/23/2018   Weight 121 lb 9.6 oz 113 lb 116 lb 114 lb 114 lb 122 lb 9.6 oz 119 lb   BMI 19.63 kg/m2 18.24 kg/m2 18.72 kg/m2 18.4 kg/m2 18.4 kg/m2 19.79 kg/m2 19.21 kg/m2        Admitting Diagnosis: Acute exacerbation of chronic obstructive pulmonary disease (COPD) (HCC) [J44.1]  Hypotension [I95.9]  COPD exacerbation ([facility]) [J44.1]  Pertinent PMHx: anxiety, COPD, depression, osteoporosis, s/p ileostomy    Education Needs: [x] None identified  [] Identified - Not appropriate at this time  []  Identified and addressed - refer to education log  Learning Limitations:   [x] None identified  [] Identified    Cultural, Jehovah's witness & ethnic food preferences:  [x] None identified    [] Identified and addressed     ESTIMATED NUTRITION NEEDS:     Calories: 5831-8395 kcal (30-35 kcal/kg) based on  [x] Actual BW: 55 kg      [] IBW   Protein: 55-83 gm (1-1.5 gm/kg) based on  [x] Actual BW      [] IBW   Fluid: 1 mL/kcal     MONITORING & EVALUATION:     Nutrition Goal(s):   1. Po intake of meals will meet >75% of patient estimated nutritional needs within the next 7 days.   Outcome:  [] Met/Ongoing    []  Not Met    [x] New/Initial Goal     Monitoring:   [x] Food and beverage intake   [x] Diet order   [x] Nutrition-focused physical findings   [x] Treatment/therapy   [] Weight   [] Enteral nutrition intake        Previous Recommendations (for follow-up assessments only):     []   Implemented       []   Not Implemented (RD to address)      [] No Longer Appropriate     [] No Recommendation Made     Discharge Planning: regular diet  [x] Participated in care planning, discharge planning, & interdisciplinary rounds as appropriate      Cody Mccall, [address] [place] [address]   Pager: [phone]

## 2019-03-02 LAB
ANION GAP SERPL CALC-SCNC: 8 MMOL/L (ref 3–18)
BUN SERPL-MCNC: 39 MG/DL (ref 7–18)
BUN/CREAT SERPL: 42 (ref 12–20)
CALCIUM SERPL-MCNC: 8.3 MG/DL (ref 8.5–10.1)
CHLORIDE SERPL-SCNC: 109 MMOL/L (ref 100–108)
CO2 SERPL-SCNC: 24 MMOL/L (ref 21–32)
CREAT SERPL-MCNC: 0.93 MG/DL (ref 0.6–1.3)
GLUCOSE SERPL-MCNC: 168 MG/DL (ref 74–99)
MAGNESIUM SERPL-MCNC: 2.3 MG/DL (ref 1.6–2.6)
PHOSPHATE SERPL-MCNC: 2.2 MG/DL (ref 2.5–4.9)
POTASSIUM SERPL-SCNC: 4 MMOL/L (ref 3.5–5.5)
SODIUM SERPL-SCNC: 141 MMOL/L (ref 136–145)
TSH SERPL DL<=0.05 MIU/L-ACNC: 0.18 UIU/ML (ref 0.36–3.74)

## 2019-03-02 PROCEDURE — 97535 SELF CARE MNGMENT TRAINING: CPT

## 2019-03-02 PROCEDURE — 94761 N-INVAS EAR/PLS OXIMETRY MLT: CPT

## 2019-03-02 PROCEDURE — 74011000250 HC RX REV CODE- 250: Performed by: EMERGENCY MEDICINE

## 2019-03-02 PROCEDURE — 80048 BASIC METABOLIC PNL TOTAL CA: CPT

## 2019-03-02 PROCEDURE — 84443 ASSAY THYROID STIM HORMONE: CPT

## 2019-03-02 PROCEDURE — 77010033678 HC OXYGEN DAILY

## 2019-03-02 PROCEDURE — 83735 ASSAY OF MAGNESIUM: CPT

## 2019-03-02 PROCEDURE — 94640 AIRWAY INHALATION TREATMENT: CPT

## 2019-03-02 PROCEDURE — 65660000000 HC RM CCU STEPDOWN

## 2019-03-02 PROCEDURE — 36415 COLL VENOUS BLD VENIPUNCTURE: CPT

## 2019-03-02 PROCEDURE — 74011250637 HC RX REV CODE- 250/637: Performed by: HOSPITALIST

## 2019-03-02 PROCEDURE — 84100 ASSAY OF PHOSPHORUS: CPT

## 2019-03-02 PROCEDURE — 74011250637 HC RX REV CODE- 250/637: Performed by: EMERGENCY MEDICINE

## 2019-03-02 PROCEDURE — 74011250636 HC RX REV CODE- 250/636: Performed by: HOSPITALIST

## 2019-03-02 PROCEDURE — 74011250636 HC RX REV CODE- 250/636: Performed by: EMERGENCY MEDICINE

## 2019-03-02 PROCEDURE — 97165 OT EVAL LOW COMPLEX 30 MIN: CPT

## 2019-03-02 RX ORDER — LEVOFLOXACIN 250 MG/1
500 TABLET ORAL ONCE
Status: COMPLETED | OUTPATIENT
Start: 2019-03-02 | End: 2019-03-02

## 2019-03-02 RX ORDER — LEVOFLOXACIN 5 MG/ML
250 INJECTION, SOLUTION INTRAVENOUS EVERY 24 HOURS
Status: DISCONTINUED | OUTPATIENT
Start: 2019-03-03 | End: 2019-03-03

## 2019-03-02 RX ORDER — ACETYLCYSTEINE 100 MG/ML
2 SOLUTION ORAL; RESPIRATORY (INHALATION)
Status: DISCONTINUED | OUTPATIENT
Start: 2019-03-02 | End: 2019-03-04

## 2019-03-02 RX ADMIN — ACETAMINOPHEN 650 MG: 325 TABLET ORAL at 09:38

## 2019-03-02 RX ADMIN — HEPARIN SODIUM 5000 UNITS: 5000 INJECTION INTRAVENOUS; SUBCUTANEOUS at 22:07

## 2019-03-02 RX ADMIN — METHYLPREDNISOLONE SODIUM SUCCINATE 40 MG: 40 INJECTION, POWDER, FOR SOLUTION INTRAMUSCULAR; INTRAVENOUS at 09:30

## 2019-03-02 RX ADMIN — Medication 10 ML: at 17:39

## 2019-03-02 RX ADMIN — FAMOTIDINE 20 MG: 20 TABLET ORAL at 09:27

## 2019-03-02 RX ADMIN — ACETAMINOPHEN 650 MG: 325 TABLET ORAL at 17:49

## 2019-03-02 RX ADMIN — BUDESONIDE AND FORMOTEROL FUMARATE DIHYDRATE 2 PUFF: 160; 4.5 AEROSOL RESPIRATORY (INHALATION) at 09:41

## 2019-03-02 RX ADMIN — HEPARIN SODIUM 5000 UNITS: 5000 INJECTION INTRAVENOUS; SUBCUTANEOUS at 13:42

## 2019-03-02 RX ADMIN — FAMOTIDINE 20 MG: 20 TABLET ORAL at 17:37

## 2019-03-02 RX ADMIN — THERA TABS 1 TABLET: TAB at 09:30

## 2019-03-02 RX ADMIN — METHYLPREDNISOLONE SODIUM SUCCINATE 40 MG: 40 INJECTION, POWDER, FOR SOLUTION INTRAMUSCULAR; INTRAVENOUS at 17:38

## 2019-03-02 RX ADMIN — METHYLPREDNISOLONE SODIUM SUCCINATE 40 MG: 40 INJECTION, POWDER, FOR SOLUTION INTRAMUSCULAR; INTRAVENOUS at 02:25

## 2019-03-02 RX ADMIN — Medication 10 ML: at 22:11

## 2019-03-02 RX ADMIN — HYDROCODONE BITARTRATE AND ACETAMINOPHEN 1 TABLET: 5; 325 TABLET ORAL at 20:01

## 2019-03-02 RX ADMIN — ROPINIROLE HYDROCHLORIDE 1 MG: 1 TABLET, FILM COATED ORAL at 09:29

## 2019-03-02 RX ADMIN — LORAZEPAM 1 MG: 1 TABLET ORAL at 22:07

## 2019-03-02 RX ADMIN — IPRATROPIUM BROMIDE AND ALBUTEROL SULFATE 3 ML: .5; 3 SOLUTION RESPIRATORY (INHALATION) at 08:34

## 2019-03-02 RX ADMIN — IPRATROPIUM BROMIDE AND ALBUTEROL SULFATE 3 ML: .5; 3 SOLUTION RESPIRATORY (INHALATION) at 04:05

## 2019-03-02 RX ADMIN — LEVOFLOXACIN 500 MG: 250 TABLET, FILM COATED ORAL at 20:01

## 2019-03-02 NOTE — PROGRESS NOTES
PT eval order received and chart reviewed. Unable to see patient at this time as patient has active bedrest. Please D/C to allow for therapy eval. Will follow up as patient schedule allows. Thank you for this referral. Alan Moreno, PT, DPT.

## 2019-03-02 NOTE — ROUTINE PROCESS
Bedside and Verbal shift change report given to [address] [place] [address] (oncoming nurse) by Enrrique Villatoro (offgoing nurse). Report included the following information SBAR, Kardex, Intake/Output and MAR.

## 2019-03-02 NOTE — PROGRESS NOTES
NUTRITION    Nutrition Consult: General Nutrition Management & Supplements     RECOMMENDATIONS / PLAN:     - Continue current nutrition interventions at this time. - Continue RD inpatient monitoring and evaluation. NUTRITION INTERVENTIONS & DIAGNOSIS:     [x] Meals/snacks: general, healthy diet  [x] Medical food supplement therapy: continue  [x] Collaboration and referral of nutrition care:  Discussed with NP Atlee Kimball about low Phos level. Nutrition Diagnosis:  Inadequate oral intake related to poor appetite as evidenced by poor meal intake x 1 month PTA, consuming <50% of meals    Patient meets criteria for Severe Protein Calorie Malnutrition as evidenced by:   ASPEN Malnutrition Criteria  Acute Illness, Chronic Illness, or Social/Enviornmental: Chronic illness  Energy Intake: Less than/equal to 75% of est energy req for greater than/equal to 1 month  Body Fat: Severe(triceps)  Muscle Mass: Severe(clavicle, dorsal hands, temple region)  ASPEN Malnutrition Score - Chronic Illness: 18  Chronic Illness - Malnutrition Diagnosis: Severe malnutrition. ASSESSMENT:     3/2: Tolerating diet with fair intake still. Consuming Ensure well. Discussed increasing nutritional supplements on discharge to at least 1 daily. K and Mg WNL, phos low, discussed low phos with NP, NP plans to review and decide on need for replacement. No signs of refeeding at this time. 3/1: Pt reported poor appetite and meal intake x 1 month PTA; hungry now, especially since started on steroid therapy. Food preferences discussed. Agreeable to nutrition supplement. Sometimes consumed Premier Protein at home. Pt on cardiac diet; discussed with NP Atlee Kimball about changing to regular consistency; NP agreed with plan. Pt with ileostomy; very familiar with which foods she is not able to tolerate or what causes her flatus.  Pt reported trying to gain back the weight she lost. NFPE conducted    Average po intake adequate to meet patients estimated nutritional needs:   [] Yes     [x] No   [] Unable to determine at this time    Diet: DIET NUTRITIONAL SUPPLEMENTS Dinner; ENSURE ENLIVE  DIET REGULAR      Food Allergies:  Shellfish       Current Appetite:   [] Good     [x] Fair     [] Poor     [] Other:  Appetite/meal intake prior to admission:   [] Good     [] Fair     [x] Poor: consuming total of 1 meal at most daily, sometimes drinking Premier Protein supplement; poor po intake x 1 month PTA     [] Other:  Feeding Limitations:  [] Swallowing difficulty    [] Chewing difficulty    [] Other:  Current Meal Intake: Patient Vitals for the past 100 hrs:   % Diet Eaten   03/01/19 1200 40 %   03/01/19 0740 50 %       BM: 3/2; ileostomy   Skin Integrity:  No pressure injury or wound noted  Edema:   [x] No     [] Yes   Pertinent Medications: Reviewed: pepcid, steroid, zofran, theragran    Recent Labs     03/02/19  0337 03/01/19  0149 02/28/19  0300    140 142   K 4.0 4.1 3.8   * 108 107   CO2 24 24 28   * 146* 102*   BUN 39* 25* 30*   CREA 0.93 0.79 1.02   CA 8.3* 8.3* 8.8   MG 2.3  --   --    PHOS 2.2*  --   --    ALB  --  3.2* 3.7   SGOT  --  15 13*   ALT  --  16 19       Intake/Output Summary (Last 24 hours) at 3/2/2019 1142  Last data filed at 3/1/2019 1200  Gross per 24 hour   Intake 250 ml   Output --   Net 250 ml       Anthropometrics:  Ht Readings from Last 1 Encounters:   02/28/19 5' 6\" (1.676 m)     Last 3 Recorded Weights in this Encounter    02/28/19 0317 03/01/19 0413   Weight: 55.8 kg (123 lb) 55.2 kg (121 lb 9.6 oz)     Body mass index is 19.63 kg/m². Weight History:   Pt reported -145 lb. Had unplanned wt loss, was at 105 lb, then has gained back some of the weight.      Weight Metrics 3/1/2019 2/4/2019 1/2/2019 12/27/2018 11/29/2018 11/14/2018 10/23/2018   Weight 121 lb 9.6 oz 113 lb 116 lb 114 lb 114 lb 122 lb 9.6 oz 119 lb   BMI 19.63 kg/m2 18.24 kg/m2 18.72 kg/m2 18.4 kg/m2 18.4 kg/m2 19.79 kg/m2 19.21 kg/m2 Admitting Diagnosis: Acute exacerbation of chronic obstructive pulmonary disease (COPD) ([facility]) [J44.1]  Hypotension [I95.9]  COPD exacerbation ([facility]) [J44.1]  Pertinent PMHx: anxiety, COPD, depression, osteoporosis, s/p ileostomy    Education Needs:        [x] None identified  [] Identified - Not appropriate at this time  []  Identified and addressed - refer to education log  Learning Limitations:   [x] None identified  [] Identified    Cultural, Yazidism & ethnic food preferences:  [x] None identified    [] Identified and addressed     ESTIMATED NUTRITION NEEDS:     Calories: 8905-8260 kcal (30-35 kcal/kg) based on  [x] Actual BW: 55 kg      [] IBW   Protein: 55-83 gm (1-1.5 gm/kg) based on  [x] Actual BW      [] IBW   Fluid: 1 mL/kcal     MONITORING & EVALUATION:     Nutrition Goal(s):   1. Po intake of meals will meet >75% of patient estimated nutritional needs within the next 7 days.   Outcome:  [] Met/Ongoing    [x]  Not Met    [] New/Initial Goal     Monitoring:   [x] Food and beverage intake   [x] Diet order   [x] Nutrition-focused physical findings   [x] Treatment/therapy   [] Weight   [] Enteral nutrition intake        Previous Recommendations (for follow-up assessments only):     [x]   Implemented       []   Not Implemented (RD to address)      [] No Longer Appropriate     [] No Recommendation Made     Discharge Planning: regular diet + Premiere Protein or Ensure Enlive at least once daily  [x] Participated in care planning, discharge planning, & interdisciplinary rounds as appropriate      Kandice Wheeler, [address], [address] [place] [address]    Pager: [phone]

## 2019-03-02 NOTE — PROGRESS NOTES
Problem: Self Care Deficits Care Plan (Adult)  Goal: *Acute Goals and Plan of Care (Insert Text)  Occupational Therapy Goals  Initiated 3/2/2019 within 7 day(s). 1.  Patient will perform bathing with modified independence and no increased SOB  2. Patient will perform lower body dressing with modified independence and no increased SOB  3. Patient will perform pacing and modified proper breathing techniques to her self care routine  4. Patient will associate energy conservation techniques to her home situation  5. Patient will demonstrate independence with scapula setting and scapular strengthening program in preparation for her efficient completion of her self care routine  Occupational Therapy EVALUATION    Patient: Dori Cagle (58 y.o. female)  Date: 3/2/2019  Primary Diagnosis: Acute exacerbation of chronic obstructive pulmonary disease (COPD) ([facility] [J44.1]  Hypotension [I95.9]  COPD exacerbation ([facility] [J44.1]  Precautions: none listed      ASSESSMENT :  Based on the objective data described below, the patient presents with SOB at rest that increases with all activity. Following training, she increased to independent with pacing and modified proper breathing techniques although she continued to require verbal cues for applying it during her functional tasks. Additionally, she increased to demonstrating independence with scapular strengthening and alignment program and applying these energy conservation components to her home situation. Her sister is present and reports she will assist her. Feel she would benefit from adaptive equipment for LB bathing and dressing (or training to bring her lower body up to her instead of bending down towards her lower body) in order to promote optimal upper trunk alignment during her self care routine. She reports an understanding and is pleased that these techniques are helping her thus far.      Patient will benefit from skilled intervention to address the above impairments. Patients rehabilitation potential is considered to be Good  Factors which may influence rehabilitation potential include:   []             None noted  []             Mental ability/status  [x]             Medical condition  []             Home/family situation and support systems  []             Safety awareness  []             Pain tolerance/management  []             Other:      PLAN :  Recommendations and Planned Interventions:   [x]               Self Care Training                  [x]        Therapeutic Activities  []               Functional Mobility Training    []        Cognitive Retraining  []               Therapeutic Exercises           []        Endurance Activities  []               Balance Training                   []        Neuromuscular Re-Education  []               Visual/Perceptual Training     []   Home Safety Training  [x]               Patient Education                 [x]        Family Training/Education  [x]               Other (comment):energy conservation training    Frequency/Duration: Patient will be followed by occupational therapy 1-2 times per day/4-7 days per week to address goals. Discharge Recommendations: Home Health  Further Equipment Recommendations for Discharge: N/A     Barriers to Learning/Limitations: None    PATIENT COMPLEXITY      Eval Complexity: History: LOW Complexity : Brief history review ; Examination: LOW Complexity : 1-3 performance deficits relating to physical, cognitive , or psychosocial skils that result in activity limitations and / or participation restrictions ; Decision Making:LOW Complexity : No comorbidities that affect functional and no verbal or physical assistance needed to complete eval tasks  Assessment: LOW Complexity     SUBJECTIVE:   Patient stated this is very helpful.  she is referring to the energy conservation training and home recommendations made thus far    OBJECTIVE DATA SUMMARY:     Past Medical History:   Diagnosis Date  Anxiety     Arthritis     [facility]bestosis ([facility]     Asthma     Back problem     Baker's cyst     Balance problems     Chronic lung disease     Cigarette smoker     Clotting disorder (HCC)     COPD (chronic obstructive pulmonary disease) (HCC)     oxygen 2/liters asbestosis    Depression     History of DVT (deep vein thrombosis)     Leg pain     Right    Lung disease     Nausea & vomiting     Osteoporosis     Restless leg syndrome     Spinal stenosis     Thromboembolus ([facility]     Vitamin D deficiency      Past Surgical History:   Procedure Laterality Date    COLONOSCOPY N/A 9/22/2018    COLONOSCOPY w bx w polypectonies performed by Tre Otoole MD at [address] COLONOSCOPY N/A 11/6/2018    COLONOSCOPY performed by Vamsi Ferrari MD at [address] [place] [address] HX APPENDECTOMY      HX BACK SURGERY      x4    HX BREAST BIOPSY      left    HX GI      exp lap and ileostomy    HX HEENT      cataract right    HX HYSTERECTOMY      HX LUMBAR LAMINECTOMY      HX MENISCUS REPAIR      HX ORTHOPAEDIC      Knee bakers cyst    HX POLYPECTOMY      right colectomy    HX TONSILLECTOMY      HX VEIN STRIPPING      LAP,SURG,COLECTOMY, PARTIAL, W/ANAST N/A 10/23/2018    Dr. Lorelei Mendez N/A 11/06/2018    Dr. Liset Francisco      vein stripping     Prior Level of Function/Home Situation: she reports she has a set for her shower and that her sister is present while she showers as she is very nervous  Home Situation  Home Environment: Private residence  One/Two Story Residence: One story  Living Alone: Yes  Support Systems: Family member(s), Friends \ neighbors, [facility]  Patient Expects to be Discharged to[de-identified] Private residence  Current DME Used/Available at Home: Cathleen Goldberg, [employer]  [x]  Right hand dominant   []  Left hand dominant  Cognitive/Behavioral Status:   she is alert, oriented X 4   Skin: no skin integrity issues noted during OT evaluation  Edema: no UE edema noted during OT evaluation  Vision/Perceptual:     tracking is [facility]     Coordination:   PATRICIA's WFL    Balance:   fluctuates with her level of SOB; is able to sit for LB dressing  Strength:  BUE's: 3+/5 proximally and 4-/5 distally   Tone & Sensation:  BUE's WFL   Range of Motion:  BUVIBHA's AROM is [facility]   Functional Mobility and Transfers for ADLs:  Bed Mobility:   modified independent (following training in modified proper breathing techniques)   Transfers:   Supervision (for modified proper breathing and pacing techniques)  ADL Assessment:   self feeding: independent  Grooming: verbal cues for pacing and modified proper breathing techniques  UB bathing/dressing: verbal cues  LB bathing/dressing: supervision with moderate verbal cues for pacing and modified proper breathing techniques  ADL Intervention:   as noted above  Therapeutic Exercise:  As noted above  Pain:  Pt reports 0/10 pain or discomfort prior to treatment.    Pt reports 0/10 pain or discomfort post treatment. Activity Tolerance:   She has SOB at rest and this increases with activity  Please refer to the flowsheet for vital signs taken during this treatment. After treatment:   [] Patient left in no apparent distress sitting up in chair  [x] Patient left in no apparent distress in bed  [x] Call bell left within reach  [] Nursing notified  [x] multiple visitors present  [] Bed alarm activated    COMMUNICATION/EDUCATION:   [] Home safety education was provided and the patient/caregiver indicated understanding. [] Patient/family have participated as able in goal setting and plan of care. [] Patient/family agree to work toward stated goals and plan of care. [x] Patient understands intent and goals of therapy, but is neutral about his/her participation. [] Patient is unable to participate in goal setting and plan of care.     Thank you for this referral.  Unique Saez, OTR/L  Time Calculation: 39 mins

## 2019-03-02 NOTE — PROGRESS NOTES
PT eval order received and chart reviewed. Unable to see patient at this time as she is requesting to eat lunch tray that's arrived. Will follow up as patient schedule allows. Thank you for this referral. Val Mendez, PT, DPT.

## 2019-03-02 NOTE — PROGRESS NOTES
[facility]  Progress Note    Patient: Emily Estrada Age: 72 y.o. : 1945 MR#: [id number] SSN: [id number]  Date: 3/2/2019     Subjective:   SOBOE while performing IADLS today. Cough. Overall small improvement. Denies CP/chest discomfort, abdominal pain, N/V    Assessment/Plan:   Assessment  1. COPD exacerbation/chronic respiratory failure/oxygen dependent 2L  2. Abnormal weight loss  3. ileostomy hx of colon cancer  4. PAF     Plan  1. Bronchodilators, IV solumedrol, duoneb treatments as needed, oxygen  2. Nutrition consult for weight loss  3. Stoma care  4. telemetry         Case discussed with:  [x]Patient  []Family  [x]Nursing  [x]Case Management  DVT Prophylaxis:  []Lovenox  []Hep SQ  []SCDs  []Coumadin   []On Heparin gtt    Objective:   VS:   Visit Vitals  /67 (BP 1 Location: Left arm, BP Patient Position: Sitting)   Pulse 76   Temp 98.3 °F (36.8 °C)   Resp 20   Ht 5' 6\" (1.676 m)   Wt 55.2 kg (121 lb 9.6 oz)   SpO2 95%   BMI 19.63 kg/m²      Tmax/24hrs: Temp (24hrs), Av.3 °F (36.8 °C), Min:97.6 °F (36.4 °C), Max:99 °F (37.2 °C)  No intake or output data in the 24 hours ending 19 1205    General:  Alert, NAD  Cardiovascular:  RRR  Pulmonary:  Right side rhonchi and wheeze t/o.  Left side clear to diminished t/o  GI:  +BS in all four quadrants, soft, non-tender  Extremities:  No edema; 2+ dorsalis pedis pulses bilaterally  Neuro: alert and oriented      Labs:    Recent Results (from the past 24 hour(s))   TSH 3RD GENERATION    Collection Time: 19  3:37 AM   Result Value Ref Range    TSH 0.18 (L) 0.36 - 4.55 uIU/mL   METABOLIC PANEL, BASIC    Collection Time: 19  3:37 AM   Result Value Ref Range    Sodium 141 136 - 145 mmol/L    Potassium 4.0 3.5 - 5.5 mmol/L    Chloride 109 (H) 100 - 108 mmol/L    CO2 24 21 - 32 mmol/L    Anion gap 8 3.0 - 18 mmol/L    Glucose 168 (H) 74 - 99 mg/dL    BUN 39 (H) 7.0 - 18 MG/DL    Creatinine 0.93 0.6 - 1.3 MG/DL    BUN/Creatinine ratio 42 (H) 12 - 20      GFR est AA >60 >60 ml/min/1.73m2    GFR est non-AA 59 (L) >60 ml/min/1.73m2    Calcium 8.3 (L) 8.5 - 10.1 MG/DL   PHOSPHORUS    Collection Time: 03/02/19  3:37 AM   Result Value Ref Range    Phosphorus 2.2 (L) 2.5 - 4.9 MG/DL   MAGNESIUM    Collection Time: 03/02/19  3:37 AM   Result Value Ref Range    Magnesium 2.3 1.6 - 2.6 mg/dL       Signed By: Garrick Livingston NP     March 2, 2019

## 2019-03-02 NOTE — PROGRESS NOTES
PT eval order received and chart reviewed. Unable to see patient at this time as patient with dietary staff. Will follow up as patient schedule allows. Thank you for this referral. Preeti Capellan, PT, DPT.

## 2019-03-02 NOTE — PROGRESS NOTES
Occupational Therapy Note:  Orders received, chart reviewed and this patient is on continuous bed rest. Please update activity perimters in order for this patient to have the most thorough OT evaluation and treatment. This is per departmental protocol.  Thank you for this referral. Yolanda Soto OTR/L

## 2019-03-03 LAB
ANION GAP SERPL CALC-SCNC: 7 MMOL/L (ref 3–18)
BUN SERPL-MCNC: 32 MG/DL (ref 7–18)
BUN/CREAT SERPL: 42 (ref 12–20)
CALCIUM SERPL-MCNC: 8.4 MG/DL (ref 8.5–10.1)
CHLORIDE SERPL-SCNC: 107 MMOL/L (ref 100–108)
CO2 SERPL-SCNC: 27 MMOL/L (ref 21–32)
CREAT SERPL-MCNC: 0.77 MG/DL (ref 0.6–1.3)
GLUCOSE SERPL-MCNC: 108 MG/DL (ref 74–99)
MAGNESIUM SERPL-MCNC: 2.3 MG/DL (ref 1.6–2.6)
PHOSPHATE SERPL-MCNC: 2.8 MG/DL (ref 2.5–4.9)
POTASSIUM SERPL-SCNC: 4.6 MMOL/L (ref 3.5–5.5)
SODIUM SERPL-SCNC: 141 MMOL/L (ref 136–145)
T3FREE SERPL-MCNC: 1.6 PG/ML (ref 2.18–3.98)
T4 FREE SERPL-MCNC: 1 NG/DL (ref 0.7–1.5)

## 2019-03-03 PROCEDURE — 74011250637 HC RX REV CODE- 250/637: Performed by: HOSPITALIST

## 2019-03-03 PROCEDURE — 97116 GAIT TRAINING THERAPY: CPT

## 2019-03-03 PROCEDURE — 84480 ASSAY TRIIODOTHYRONINE (T3): CPT

## 2019-03-03 PROCEDURE — 36415 COLL VENOUS BLD VENIPUNCTURE: CPT

## 2019-03-03 PROCEDURE — 97162 PT EVAL MOD COMPLEX 30 MIN: CPT

## 2019-03-03 PROCEDURE — 80048 BASIC METABOLIC PNL TOTAL CA: CPT

## 2019-03-03 PROCEDURE — 74011250636 HC RX REV CODE- 250/636: Performed by: HOSPITALIST

## 2019-03-03 PROCEDURE — 65660000000 HC RM CCU STEPDOWN

## 2019-03-03 PROCEDURE — 74011000250 HC RX REV CODE- 250: Performed by: INTERNAL MEDICINE

## 2019-03-03 PROCEDURE — 74011000250 HC RX REV CODE- 250: Performed by: EMERGENCY MEDICINE

## 2019-03-03 PROCEDURE — 84100 ASSAY OF PHOSPHORUS: CPT

## 2019-03-03 PROCEDURE — 84481 FREE ASSAY (FT-3): CPT

## 2019-03-03 PROCEDURE — 83735 ASSAY OF MAGNESIUM: CPT

## 2019-03-03 PROCEDURE — 97530 THERAPEUTIC ACTIVITIES: CPT

## 2019-03-03 PROCEDURE — 74011250636 HC RX REV CODE- 250/636: Performed by: EMERGENCY MEDICINE

## 2019-03-03 PROCEDURE — 74011250637 HC RX REV CODE- 250/637: Performed by: EMERGENCY MEDICINE

## 2019-03-03 PROCEDURE — 94640 AIRWAY INHALATION TREATMENT: CPT

## 2019-03-03 PROCEDURE — 84439 ASSAY OF FREE THYROXINE: CPT

## 2019-03-03 RX ORDER — LEVOFLOXACIN 5 MG/ML
500 INJECTION, SOLUTION INTRAVENOUS EVERY 24 HOURS
Status: DISCONTINUED | OUTPATIENT
Start: 2019-03-03 | End: 2019-03-03

## 2019-03-03 RX ORDER — LEVOFLOXACIN 250 MG/1
500 TABLET ORAL EVERY 24 HOURS
Status: DISCONTINUED | OUTPATIENT
Start: 2019-03-03 | End: 2019-03-05 | Stop reason: HOSPADM

## 2019-03-03 RX ADMIN — LEVOFLOXACIN 500 MG: 250 TABLET, FILM COATED ORAL at 20:51

## 2019-03-03 RX ADMIN — ROPINIROLE HYDROCHLORIDE 1 MG: 1 TABLET, FILM COATED ORAL at 08:41

## 2019-03-03 RX ADMIN — FAMOTIDINE 20 MG: 20 TABLET ORAL at 08:42

## 2019-03-03 RX ADMIN — FAMOTIDINE 20 MG: 20 TABLET ORAL at 17:49

## 2019-03-03 RX ADMIN — LORAZEPAM 1 MG: 1 TABLET ORAL at 20:52

## 2019-03-03 RX ADMIN — IPRATROPIUM BROMIDE AND ALBUTEROL SULFATE 6 ML: .5; 3 SOLUTION RESPIRATORY (INHALATION) at 21:12

## 2019-03-03 RX ADMIN — THERA TABS 1 TABLET: TAB at 08:42

## 2019-03-03 RX ADMIN — METHYLPREDNISOLONE SODIUM SUCCINATE 30 MG: 40 INJECTION, POWDER, FOR SOLUTION INTRAMUSCULAR; INTRAVENOUS at 10:18

## 2019-03-03 RX ADMIN — HYDROCODONE BITARTRATE AND ACETAMINOPHEN 1 TABLET: 5; 325 TABLET ORAL at 18:07

## 2019-03-03 RX ADMIN — METHYLPREDNISOLONE SODIUM SUCCINATE 30 MG: 40 INJECTION, POWDER, FOR SOLUTION INTRAMUSCULAR; INTRAVENOUS at 03:38

## 2019-03-03 RX ADMIN — Medication 10 ML: at 21:00

## 2019-03-03 RX ADMIN — ACETYLCYSTEINE 200 MG: 100 INHALANT RESPIRATORY (INHALATION) at 21:13

## 2019-03-03 RX ADMIN — ROPINIROLE HYDROCHLORIDE 1 MG: 1 TABLET, FILM COATED ORAL at 20:52

## 2019-03-03 RX ADMIN — HEPARIN SODIUM 5000 UNITS: 5000 INJECTION INTRAVENOUS; SUBCUTANEOUS at 06:37

## 2019-03-03 RX ADMIN — Medication 10 ML: at 08:46

## 2019-03-03 RX ADMIN — METHYLPREDNISOLONE SODIUM SUCCINATE 30 MG: 40 INJECTION, POWDER, FOR SOLUTION INTRAMUSCULAR; INTRAVENOUS at 17:51

## 2019-03-03 NOTE — PROGRESS NOTES
Problem: Mobility Impaired (Adult and Pediatric)  Goal: *Acute Goals and Plan of Care (Insert Text)  Outcome: Resolved/Met Date Met: 03/03/19  physical Therapy EVALUATION and Discharge    Patient: Damari Simpson (65 y.o. female)  Date: 3/3/2019  Primary Diagnosis: Acute exacerbation of chronic obstructive pulmonary disease (COPD) ([facility] [J44.1]  Hypotension [I95.9]  COPD exacerbation (HCC) [J44.1]       Precautions:   (universal, O2)    ASSESSMENT AND RECOMMENDATIONS:  PT orders received and patient cleared by nursing to participate with therapy. Patient is a 72 y.o. female admitted to the hospital due to COPD exacerbation. Patient consents to PT evaluation and treatment. Pt performing bed mobility and transfers modified independent. Pt has her own portal O2 to use during gait activities. Gait in hallway 250 feet RW supervision. Pt has narrow ELEUTERIO. Cues at times to increase ELEUTERIO for safety. Stair training 3 steps 1 HRA reciprocal movement supervision. Pt's SpO2 >94% throughout therapy. Pt is safe ambulating with family or nursing assistance. Pt ended therapy supine in bed with HOB raised and all needs met. Skilled physical therapy is not indicated at this time. Discharge Recommendations: None  Further Equipment Recommendations for Discharge: N/A      SUBJECTIVE:   Patient stated I do exercises all the time (pt sitting showing therapist LE exercises).     OBJECTIVE DATA SUMMARY:     Past Medical History:   Diagnosis Date    Anxiety     Arthritis     Asbestosis ([facility]     Asthma     Back problem     Baker's cyst     Balance problems     Chronic lung disease     Cigarette smoker     Clotting disorder (HCC)     COPD [facility]chronic obstructive pulmonary disease) ([facility]     oxygen 2/liters asbestosis    Depression     History of DVT (deep vein thrombosis)     Leg pain     Right    Lung disease     Nausea & vomiting     Osteoporosis     Restless leg syndrome     Spinal stenosis     Thromboembolus ([facility]     Vitamin D deficiency      Past Surgical History:   Procedure Laterality Date    COLONOSCOPY N/A 9/22/2018    COLONOSCOPY w bx w polypectonies performed by Tre Otoole MD at [address] COLONOSCOPY N/A 11/6/2018    COLONOSCOPY performed by Vamsi Ferrari MD at [facility] HX APPENDECTOMY      HX BACK SURGERY      x4    HX BREAST BIOPSY      left    HX GI      exp lap and ileostomy    HX HEENT      cataract right    HX HYSTERECTOMY      HX LUMBAR LAMINECTOMY      HX MENISCUS REPAIR      HX ORTHOPAEDIC      Knee bakers cyst    HX POLYPECTOMY      right colectomy    HX TONSILLECTOMY      HX VEIN STRIPPING      LAP,SURG,COLECTOMY, PARTIAL, W/ANAST N/A 10/23/2018    Dr. Lorelei Mendez N/A 11/06/2018    Dr. Liset Francisco      vein stripping     Barriers to Learning/Limitations: None  Compensate with: N/A  Prior Level of Function/Home Situation: Independent with mobility including gait using a RW. Pt prefers the RW instead of the rollator. Family lives nearby and will assist at home as needed. Home Situation  Home Environment: Private residence  # Steps to Enter: 3  Rails to Enter: Yes  Hand Rails : Bilateral  One/Two Story Residence: One story  Living Alone: Yes  Support Systems: (has family members who can assist)  Patient Expects to be Discharged to[de-identified] Private residence  Current DME Used/Available at Home: Cathleen Goldberg, beau, Walker, rollator, Oxygen, portable  Critical Behavior:  Neurologic State: Alert  Psychosocial  Patient Behaviors: Calm; Cooperative  Family  Behaviors: Calm; Cooperative  Strength:    Strength:  Within functional limits(B LE)  Tone & Sensation:   Tone: Normal(B LE)  Sensation: Intact(B LE)   Range Of Motion:  AROM: Within functional limits(B LE)  Functional Mobility:  Bed Mobility:  Supine to Sit: Modified independent  Sit to Supine: Modified independent  Scooting: Modified independent  Transfers:  Sit to Stand: Modified independent  Stand to Sit: Modified independent  Balance:   Sitting: Intact  Standing: Intact; With support  Ambulation/Gait Training:  Distance (ft): 250 Feet (ft)  Assistive Device: Walker, rolling  Ambulation - Level of Assistance: Supervision  Base of Support: Narrowed  Speed/Marybeth: Pace decreased (<100 feet/min)  Stairs:   3 steps with 1 HRA, reciprocal movement, supervision        Therapeutic Exercises:   Performed and reviewed ankle pumps, LAQ, and marching. Pain:  Pre: 0/10    Post: 0/10    Activity Tolerance:   good  Please refer to the flowsheet for vital signs taken during this treatment. After treatment:   [] Patient left in no apparent distress sitting up in chair  [] Patient left sitting on EOB  [x] Patient left in no apparent distress in bed  [] Patient declined to be OOB at this time due to    [x] Call bell left within reach  [x] Nursing notified(Andra)  [x] Caregiver present  [] Bed alarm activated  [x] Personal items in reach  COMMUNICATION/EDUCATION:   [x]         Fall prevention education was provided and the patient/caregiver indicated understanding. [x]         Patient/family have participated as able in goal setting and plan of care. [x]         Patient/family agree to work toward stated goals and plan of care. []         Patient understands intent and goals of therapy, but is neutral about his/her participation. []         Patient is unable to participate in goal setting and plan of care. [x]         Role of physical therapy. [x]         Out of bed with nursing assistance or family assistance 3-5 times a day.     Thank you for this referral.  Agusto Hall, PT, DPT   Time Calculation: 23 mins    Eval Complexity: History: MEDIUM  Complexity : 1-2 comorbidities / personal factors will impact the outcome/ POC Exam:HIGH Complexity : 4+ Standardized tests and measures addressing body structure, function, activity limitation and / or participation in recreation Presentation: MEDIUM Complexity : Evolving with changing characteristics  Clinical Decision Making:Medium Complexity   Overall Complexity:MEDIUM

## 2019-03-03 NOTE — PROGRESS NOTES
Bedside and Verbal shift change report given to Minor (oncoming nurse) by Ellyn Boo RN   (offgoing nurse). Report given with SBAR, Kardex, MAR and Recent Results.

## 2019-03-03 NOTE — PROGRESS NOTES
[facility]  Progress Note    Patient: Darren Dela Cruz Age: 72 y.o. : 1945 MR#: [id number] SSN: [id number]  Date: 3/3/2019     Subjective:   Patient sitting in bed in NAD, feels better but has hernandez. Sister at bedside    Assessment/Plan:   Assessment  1. COPD exacerbation/chronic respiratory failure/oxygen dependent 2L  2. Abnormal weight loss  3. ileostomy hx of colon cancer  4 Low TSh       Plan  O2, nebs steroids, abx  2. Nutrition consult for weight loss  3.  Stoma care  Endocrine consult  Mobilize, PT, OT   D/w patient and sister  Advanced care planning: full code           Case discussed with:  [x]Patient  [x]Family  [x]Nursing  [x]Case Management  DVT Prophylaxis:  []Lovenox  []Hep SQ  []SCDs  []Coumadin   []On Heparin gtt    Objective:   VS:   Visit Vitals  /79 (BP 1 Location: Left arm, BP Patient Position: Sitting)   Pulse 63   Temp 97.7 °F (36.5 °C)   Resp 22   Ht 5' 6\" (1.676 m)   Wt 52.5 kg (115 lb 11.2 oz)   SpO2 99%   BMI 18.67 kg/m²      Tmax/24hrs: Temp (24hrs), Av.2 °F (36.8 °C), Min:97.4 °F (36.3 °C), Max:99.9 °F (37.7 °C)      Intake/Output Summary (Last 24 hours) at 3/3/2019 1558  Last data filed at 3/2/2019 1645  Gross per 24 hour   Intake 260 ml   Output --   Net 260 ml       General:  Awake, alert  Cardiovascular:  S1S2+, RRR  Pulmonary:  Some wheezing b/l  GI:  Soft, BS+, NT, ND  Extremities:  No edema        Labs:    Recent Results (from the past 24 hour(s))   PHOSPHORUS    Collection Time: 19  5:35 AM   Result Value Ref Range    Phosphorus 2.8 2.5 - 4.9 MG/DL   MAGNESIUM    Collection Time: 19  5:35 AM   Result Value Ref Range    Magnesium 2.3 1.6 - 2.6 mg/dL   METABOLIC PANEL, BASIC    Collection Time: 19  5:35 AM   Result Value Ref Range    Sodium 141 136 - 145 mmol/L    Potassium 4.6 3.5 - 5.5 mmol/L    Chloride 107 100 - 108 mmol/L    CO2 27 21 - 32 mmol/L    Anion gap 7 3.0 - 18 mmol/L    Glucose 108 (H) 74 - 99 mg/dL    BUN 32 (H) 7.0 - 18 MG/DL    Creatinine 0.77 0.6 - 1.3 MG/DL    BUN/Creatinine ratio 42 (H) 12 - 20      GFR est AA >60 >60 ml/min/1.73m2    GFR est non-AA >60 >60 ml/min/1.73m2    Calcium 8.4 (L) 8.5 - 10.1 MG/DL   T3, FREE    Collection Time: 03/03/19  5:35 AM   Result Value Ref Range    Triiodothyronine (T3), free 1.6 (L) 2.18 - 3.98 PG/ML   T4, FREE    Collection Time: 03/03/19  5:35 AM   Result Value Ref Range    T4, Free 1.0 0.7 - 1.5 NG/DL       Signed By: Shon Peguero MD     March 3, 2019

## 2019-03-03 NOTE — PROGRESS NOTES
Progress Note  Pulmonary, Critical Care, and Sleep Medicine    Name: Slim Yuan MRN: [id number]   : 1945 Hospital: [facility]   Date: 3/3/2019        IMPRESSION:   · Acute on chronic hypoxic respiratory failure    · COPD exacerbation slowly improving, possibly aggravated by mucus plugging  · COPD severe last FEV1 30% on LTOT, steroid dependent  · Adenocarcinoma of the ascending colon. S/p resection and Ileostomy    · Intermittent hemoptysis none this admission  · Tobacco use disorder recently quit       PLAN:   · Bronchodilators  · Empiric antibiotics  · Taper steroids  · Bronchial hygiene  · Continue NAC nebs x 1 more day  · Prophylaxis issues per primary team  · Will follow with you     Subjective/Interval History:   I have reviewed the flowsheet and previous days notes. Reviewed interval history. Discussed management with nursing staff. Patient is a 72 y.o. female well known to me with severe COPD on LTOT, last FEV1 30%. Pt was in her USOH with baseline dyspnea however a few days ago started complaining of worsening shortness of breath and wet sounding but non productive cough. Pt also complained of malaise without fever but resisted family efforts to take her to the hospital until . She denies chest pain or hemoptysis. No orthopnea or pedal edema. Pt did not tolerate previous trials of Daliresp or Anoro but seems to be maintained on current LABA/ICS and LAMA combination. Breathing much improved, pt walked around unit with assistance. Denies chest pain. Afebrile overnight           ROS:Pertinent items are noted in HPI. Orders reviewed including medications. Changes made if indicated. Telemetry monitor reviewed at the bedside.   Objective:   Vital Signs:    Visit Vitals  /79 (BP 1 Location: Left arm, BP Patient Position: Sitting)   Pulse 63   Temp 97.7 °F (36.5 °C)   Resp 22   Ht 5' 6\" (1.676 m)   Wt 52.5 kg (115 lb 11.2 oz)   SpO2 99%   BMI 18.67 kg/m²       O2 Device: Nasal cannula   O2 Flow Rate (L/min): 2 l/min   Temp (24hrs), Av.2 °F (36.8 °C), Min:97.4 °F (36.3 °C), Max:99.9 °F (37.7 °C)       Intake/Output:   Last shift:      No intake/output data recorded. Last 3 shifts:  1901 -  0700  In: 260 [P.O.:260]  Out: -     Intake/Output Summary (Last 24 hours) at 3/3/2019 1512  Last data filed at 3/2/2019 1645  Gross per 24 hour   Intake 260 ml   Output --   Net 260 ml        Physical Exam:    General:  Alert, cooperative, in no distress, appears stated age. Head:  Normocephalic, without obvious abnormality, atraumatic. Eyes:  ANicteric, PERRL,   Nose: Nares normal. Mucosa normal. No drainage or sinus tenderness. Throat: Lips, mucosa, and tongue normal.  NO Thrush   Neck: Supple, symmetrical, trachea midline, no adenopathy, thyroid: no enlargment/tenderness/nodules    Back:   Symmetric    Lungs:   Bilateral auscultation distant breath sounds, no rales or wheezes   Chest wall:  No tenderness or deformity. NO intercostal retractions   Heart:  Regular rate and rhythm, S1, S2 normal, no murmur, click, rub or gallop. Abdomen:   Soft, non-tender. Bowel sounds normal. No masses,  No organomegaly. NO paradoxical motion   Extremities: normal, atraumatic, no cyanosis or edema. Pulses: 2+ and symmetric all extremities. Skin: Skin color, texture, turgor normal. No rashes or lesions.  NO clubbing   Lymph nodes: Cervical, supraclavicular  nodes normal.   Neurologic: Grossly nonfocal      :        Devices:             Drips:    DATA:  Labs:  Recent Labs     19  0149   WBC 6.4   HGB 11.9*   HCT 35.6        Recent Labs     19  0535 19  0337 19  0149    141 140   K 4.6 4.0 4.1    109* 108   CO2 27 24 24   * 168* 146*   BUN 32* 39* 25*   CREA 0.77 0.93 0.79   CA 8.4* 8.3* 8.3*   MG 2.3 2.3  --    PHOS 2.8 2.2*  --    ALB  --   --  3.2*   SGOT  --   --  15   ALT  --   --  16     No results for input(s): PH, PCO2, PO2, HCO3, FIO2 in the last 72 hours. Imaging:  []        I have personally reviewed the patients radiographs and reports  []         []        No change from prior, tubes and lines in adequate position  []        Improved   []        Worsening  High complexity decision making was performed during this consultation and evaluation.      Ed MD Jorge

## 2019-03-03 NOTE — PROGRESS NOTES
Problem: Self Care Deficits Care Plan (Adult)  Goal: *Acute Goals and Plan of Care (Insert Text)  Occupational Therapy Goals  Initiated 3/2/2019 within 7 day(s). 1.  Patient will perform bathing with modified independence and no increased SOB  2. Patient will perform lower body dressing with modified independence and no increased SOB  3. Patient will perform pacing and modified proper breathing techniques to her self care routine  4. Patient will associate energy conservation techniques to her home situation  5. Patient will demonstrate independence with scapula setting and scapular strengthening program in preparation for her efficient completion of her self care routine   Outcome: Progressing Towards Goal  Occupational Therapy TREATMENT    Patient: Yunior Garnica (45 y.o. female)  Date: 3/3/2019  Diagnosis: Acute exacerbation of chronic obstructive pulmonary disease (COPD) ([facility] [J44.1]  Hypotension [I95.9]  COPD exacerbation ([facility] [J44.1] <principal problem not specified>      Precautions:    Chart, occupational therapy assessment, plan of care, and goals were reviewed. ASSESSMENT:  Pt presented supine in bed with HOB elevated agreeable to skilled OT services this date. Pt demonstrated good recall of energy conservation techniques provided in previous treatment session and how to carryover to her home situation. Educated pt on pacing techniques to perform in the home to prevent SOB and fatigue. Pt Mod I with scapular strengthening program. Pt educated on EC techniques to utilize with LB dressing. Pt doffed/donned socks long sitting in bed with supervision. Pt was left comfortable in bed and call bell within reach. Supportive brother and sister present in the room.   Progression toward goals:  [x]          Improving appropriately and progressing toward goals  []          Improving slowly and progressing toward goals  []          Not making progress toward goals and plan of care will be adjusted PLAN:  Patient continues to benefit from skilled intervention to address the above impairments. Continue treatment per established plan of care. Discharge Recommendations:  Home Health  Further Equipment Recommendations for Discharge:  N/A        SUBJECTIVE:   Patient stated I use oxygen at home.     OBJECTIVE DATA SUMMARY:   Cognitive/Behavioral Status:  Neurologic State: Alert  Orientation Level: Oriented X4  Cognition: Follows commands       Functional Mobility and Transfers for ADLs:      ADL Intervention:  Lower Body Dressing Assistance  Socks: Modified independent  Position Performed: Long sitting on bed  Cues: Verbal cues provided         Pain:  Pt reports 0/10 pain or discomfort prior to treatment.    Pt reports 0/10 pain or discomfort post treatment. Activity Tolerance:    Fair    Please refer to the flowsheet for vital signs taken during this treatment.   After treatment:   []  Patient left in no apparent distress sitting up in chair  [x]  Patient left in no apparent distress in bed  [x]  Call bell left within reach  []  Nursing notified  [x]  Caregiver present (supportive brother and sister)  []  Bed alarm activated    [employer] MELA Peña/TANVI  Time Calculation: 19 mins

## 2019-03-04 LAB
ANION GAP SERPL CALC-SCNC: 6 MMOL/L (ref 3–18)
BUN SERPL-MCNC: 26 MG/DL (ref 7–18)
BUN/CREAT SERPL: 32 (ref 12–20)
CALCIUM SERPL-MCNC: 8.2 MG/DL (ref 8.5–10.1)
CHLORIDE SERPL-SCNC: 104 MMOL/L (ref 100–108)
CK MB CFR SERPL CALC: 3.1 % (ref 0–4)
CK MB SERPL-MCNC: 1.5 NG/ML (ref 5–25)
CK SERPL-CCNC: 49 U/L (ref 26–192)
CO2 SERPL-SCNC: 29 MMOL/L (ref 21–32)
CREAT SERPL-MCNC: 0.82 MG/DL (ref 0.6–1.3)
GLUCOSE BLD STRIP.AUTO-MCNC: 133 MG/DL (ref 70–110)
GLUCOSE SERPL-MCNC: 123 MG/DL (ref 74–99)
POTASSIUM SERPL-SCNC: 4.4 MMOL/L (ref 3.5–5.5)
SODIUM SERPL-SCNC: 139 MMOL/L (ref 136–145)
T4 FREE SERPL-MCNC: 0.9 NG/DL (ref 0.7–1.5)
TROPONIN I SERPL-MCNC: <0.02 NG/ML (ref 0–0.04)
TSH SERPL DL<=0.05 MIU/L-ACNC: 0.43 UIU/ML (ref 0.36–3.74)

## 2019-03-04 PROCEDURE — 94640 AIRWAY INHALATION TREATMENT: CPT

## 2019-03-04 PROCEDURE — 65660000000 HC RM CCU STEPDOWN

## 2019-03-04 PROCEDURE — 74011000250 HC RX REV CODE- 250: Performed by: EMERGENCY MEDICINE

## 2019-03-04 PROCEDURE — 74011250637 HC RX REV CODE- 250/637: Performed by: EMERGENCY MEDICINE

## 2019-03-04 PROCEDURE — 97535 SELF CARE MNGMENT TRAINING: CPT

## 2019-03-04 PROCEDURE — 84439 ASSAY OF FREE THYROXINE: CPT

## 2019-03-04 PROCEDURE — 77010033678 HC OXYGEN DAILY: Performed by: HOSPITALIST

## 2019-03-04 PROCEDURE — 36415 COLL VENOUS BLD VENIPUNCTURE: CPT

## 2019-03-04 PROCEDURE — 74011250637 HC RX REV CODE- 250/637: Performed by: HOSPITALIST

## 2019-03-04 PROCEDURE — 84443 ASSAY THYROID STIM HORMONE: CPT

## 2019-03-04 PROCEDURE — 74011250636 HC RX REV CODE- 250/636: Performed by: NURSE PRACTITIONER

## 2019-03-04 PROCEDURE — 80048 BASIC METABOLIC PNL TOTAL CA: CPT

## 2019-03-04 PROCEDURE — 74011250636 HC RX REV CODE- 250/636: Performed by: EMERGENCY MEDICINE

## 2019-03-04 PROCEDURE — 82550 ASSAY OF CK (CPK): CPT

## 2019-03-04 PROCEDURE — 82962 GLUCOSE BLOOD TEST: CPT

## 2019-03-04 RX ORDER — ACETYLCYSTEINE 100 MG/ML
2 SOLUTION ORAL; RESPIRATORY (INHALATION)
Status: DISCONTINUED | OUTPATIENT
Start: 2019-03-04 | End: 2019-03-05 | Stop reason: HOSPADM

## 2019-03-04 RX ADMIN — FAMOTIDINE 20 MG: 20 TABLET ORAL at 10:49

## 2019-03-04 RX ADMIN — ACETYLCYSTEINE 200 MG: 100 INHALANT RESPIRATORY (INHALATION) at 14:46

## 2019-03-04 RX ADMIN — Medication 10 ML: at 19:10

## 2019-03-04 RX ADMIN — METHYLPREDNISOLONE SODIUM SUCCINATE 30 MG: 40 INJECTION, POWDER, FOR SOLUTION INTRAMUSCULAR; INTRAVENOUS at 10:49

## 2019-03-04 RX ADMIN — Medication 5 ML: at 22:00

## 2019-03-04 RX ADMIN — ROPINIROLE HYDROCHLORIDE 1 MG: 1 TABLET, FILM COATED ORAL at 22:18

## 2019-03-04 RX ADMIN — IPRATROPIUM BROMIDE AND ALBUTEROL SULFATE 3 ML: .5; 3 SOLUTION RESPIRATORY (INHALATION) at 01:51

## 2019-03-04 RX ADMIN — BUDESONIDE AND FORMOTEROL FUMARATE DIHYDRATE 2 PUFF: 160; 4.5 AEROSOL RESPIRATORY (INHALATION) at 20:10

## 2019-03-04 RX ADMIN — LORAZEPAM 1 MG: 1 TABLET ORAL at 10:54

## 2019-03-04 RX ADMIN — IPRATROPIUM BROMIDE AND ALBUTEROL SULFATE 3 ML: .5; 3 SOLUTION RESPIRATORY (INHALATION) at 20:09

## 2019-03-04 RX ADMIN — LEVOFLOXACIN 500 MG: 250 TABLET, FILM COATED ORAL at 22:18

## 2019-03-04 RX ADMIN — ROPINIROLE HYDROCHLORIDE 1 MG: 1 TABLET, FILM COATED ORAL at 10:49

## 2019-03-04 RX ADMIN — FAMOTIDINE 20 MG: 20 TABLET ORAL at 19:08

## 2019-03-04 RX ADMIN — ACETYLCYSTEINE 200 MG: 100 INHALANT RESPIRATORY (INHALATION) at 20:09

## 2019-03-04 RX ADMIN — THERA TABS 1 TABLET: TAB at 10:49

## 2019-03-04 RX ADMIN — METHYLPREDNISOLONE SODIUM SUCCINATE 30 MG: 40 INJECTION, POWDER, FOR SOLUTION INTRAMUSCULAR; INTRAVENOUS at 02:01

## 2019-03-04 RX ADMIN — ACETAMINOPHEN 650 MG: 325 TABLET ORAL at 19:16

## 2019-03-04 RX ADMIN — IPRATROPIUM BROMIDE AND ALBUTEROL SULFATE 3 ML: .5; 3 SOLUTION RESPIRATORY (INHALATION) at 14:46

## 2019-03-04 RX ADMIN — LORAZEPAM 1 MG: 1 TABLET ORAL at 22:23

## 2019-03-04 RX ADMIN — METHYLPREDNISOLONE SODIUM SUCCINATE 20 MG: 40 INJECTION, POWDER, FOR SOLUTION INTRAMUSCULAR; INTRAVENOUS at 19:08

## 2019-03-04 NOTE — PROGRESS NOTES
[facility]  Progress Note    Patient: Lalo Dale Age: 72 y.o. : 1945 MR#: [id number] SSN: [id number]  Date: 3/4/2019     Subjective:   Alert and oriented X 3. NAD. Patient states respiratory status has improved, states would like to go home tomorrow with [facility] services. No overnight events. No new complaints. Anticipate discharge to home tomorrow. Assessment/Plan:     1. Acute on chronic hypoxic respiratory failure- now at baseline  2. COPD with exacerbation - improving   3. New onset paroxysmal A. Fib. poa. Now in nsr. 4. Abnormal weight loss   5. Ileostomy in patient with hx of colon cancer - followed by [facility] Mohan, last office note 2019  6. Abnormal TSH on admission. Repeat values wnl  7. Tobacco abuse. Recently quit . PLAN  1. Pulm following. IV steroid tapering down. Continue current regimen- nebs, Symbicort, levaquin. 2. Dietician following, continue dietary supplement, multivitamin, requip. OP f/u    3. Continue ileostomy care. 4. Endocrine input appreciated, repeat thyroid panel values wnl. OP f/u with endocrine. 5. Cardiology consulted. Continue tele.        Additional Notes:      Case discussed with:  [x]Patient  [x]Family  [x]Nursing  [x]Case Management  DVT Prophylaxis:  []Lovenox  []Hep SQ  [x]SCDs  []Coumadin   []On Heparin gtt    Objective:   VS:   Visit Vitals  /82 (BP 1 Location: Left arm, BP Patient Position: At rest)   Pulse 79   Temp 98.5 °F (36.9 °C)   Resp 20   Ht 5' 6\" (1.676 m)   Wt 52.5 kg (115 lb 11.2 oz)   SpO2 95%   BMI 18.67 kg/m²      Tmax/24hrs: Temp (24hrs), Av °F (36.7 °C), Min:97.7 °F (36.5 °C), Max:98.5 °F (36.9 °C)      Intake/Output Summary (Last 24 hours) at 3/4/2019 1308  Last data filed at 3/4/2019 0745  Gross per 24 hour   Intake 0 ml   Output 0 ml   Net 0 ml       General:  Alert, NAD  Cardiovascular:  RRR  Pulmonary:  Diminished BLL; respiratory effort WNL  GI:  +BS in all four quadrants, soft, non-tender  Extremities:  No edema; 2+ dorsalis pedis pulses bilaterally  Neuro: alert and oriented X 3.      Labs:    Recent Results (from the past 24 hour(s))   METABOLIC PANEL, BASIC    Collection Time: 03/04/19  4:50 AM   Result Value Ref Range    Sodium 139 136 - 145 mmol/L    Potassium 4.4 3.5 - 5.5 mmol/L    Chloride 104 100 - 108 mmol/L    CO2 29 21 - 32 mmol/L    Anion gap 6 3.0 - 18 mmol/L    Glucose 123 (H) 74 - 99 mg/dL    BUN 26 (H) 7.0 - 18 MG/DL    Creatinine 0.82 0.6 - 1.3 MG/DL    BUN/Creatinine ratio 32 (H) 12 - 20      GFR est AA >60 >60 ml/min/1.73m2    GFR est non-AA >60 >60 ml/min/1.73m2    Calcium 8.2 (L) 8.5 - 10.1 MG/DL   TSH 3RD GENERATION    Collection Time: 03/04/19  4:50 AM   Result Value Ref Range    TSH 0.43 0.36 - 3.74 uIU/mL   T4, FREE    Collection Time: 03/04/19  4:50 AM   Result Value Ref Range    T4, Free 0.9 0.7 - 1.5 NG/DL       Signed By: Alee Duarte NP     March 4, 2019

## 2019-03-04 NOTE — NURSE NAVIGATOR
Transitional Care Team: Initial HUG Note    Date of Assessment: 03/04/19  Time of Assessment:  3:40 PM  Hospital Nurse Navigator: Mode SOLOMON, RN, [facility]    Simran Koroma is a 72 y.o. female [facility] with Acute exacerbation of chronic obstructive pulmonary disease (COPD) ([facility] [address] [J44.1]  Hypotension [I95.9]  COPD exacerbation ([facility] [address] [J44.1] on  2/28/2019. Primary Diagnosis  COPD exacerbation    Assessment and Plan as below:  1. Social: the patient lives alone. However, she has family nearby, and she reports having a good support system. She states that her nieces help her care for her ileostomy. She uses 2LPM vis [place] of oxygen ([employer]) at home, and she also has a nebulizer. She reports smoking cessation recently; she states that no one smokes inside her home; they smoke outside. She reports that she purposely reduced her PO intake due to her ileostomy bag \"getting full and leaking. \" Her niece (at bedside) stated that the patient told her that she reduced her PO intake to avoid bag filling and leaking. This NN asked the patient if she knew how to burp bag and empty it, and the patient stated \"when I left here I didn't really know how to do any of that stuff; I called my children or nieces to help me; I know how to do it now, but I still call them, I know they're getting tired of me; hopefully Dr. Radha Madrigal will get rid of this (ileostomy) this spring. \" Her adult children and/or nieces help with her transportation needs. Note: this NN was unable to ask the patient if she had her PNA and influenza vaccines since she was leaving her room for a test.    2. Labs: TSH decreased; WNL now. Albumin & protein slightly decreased. HDL elevated at 79. Calcium slightly decreased at 8.2.  See labs for complete lab/ tests list.      This Nurse Navigator accessed the patient's chart to offer support and placement in the [facility]. The Transitional Care Team bridges the gaps in care and education surrounding discharge from the acute care facility. The objective is to empower the patient and family in taking a proactive role in the task of preventing readmission within the first thirty days after discharge from the acute care setting. The Transitional Care (TC) Team is also involved in the efforts to reduce readmission to the acute care setting after stabilization and discharge from the acute care environment either to the skilled nursing facilities or community. EDUCATION / INTERVENTIONS OFFERED:    1. Patient / Family was instructed on specific signs/symptoms to look for with regards to worsening of their medical conditions. Learning was assessed using teach back. 2. Patient / Family were educated about the HUG Program with services identified for complete wrap around services during their 30 day recovery phase. The patient and I discussed wrap around services including nurse navigation, care coordination, home health, transitional care appointments with their primary care provider and specialist team.      Patient education focused on readmission zones as described as: The Red Zone: High risk for readmission, days 1-21    The Yellow Zone: Moderate risk for readmission, days 22-29    The Green Zone: Lower risk for readmission, days 30 and after    3. Advance Care Planning:  reviewed and current     BARRIERS IDENTIFIED:    Princess Cao expressed the following barriers to her discharge:   no barriers were reported by the patient or family    PLAN:     The patient will be discharged home with [facility] : agency to be determined. The TC Team will follow the patient from a distance while inpatient as well as be available for further transition disposition as needed. The TC Team will continue to offer support 30- 90 days post discharge from the acute care setting. The appropriate TC Team members were notified.     Past Medical History:   Diagnosis Date    Anxiety     Arthritis     Asbestosis ([facility] [address]     Asthma     Back problem     Baker's cyst     Balance problems     Chronic lung disease     Cigarette smoker     Clotting disorder (HCC)     COPD (chronic obstructive pulmonary disease) (HCC)     oxygen 2/liters asbestosis    Depression     History of DVT (deep vein thrombosis)     Leg pain     Right    Lung disease     Nausea & vomiting     Osteoporosis     Restless leg syndrome     Spinal stenosis     Thromboembolus ([facility] [address]     Vitamin D deficiency        Advance Care Planning 2/28/2019   Patient's Healthcare Decision Maker is: -   Primary Decision Maker Name -   Primary Decision Maker Phone Number -   Primary Decision Maker Relationship to Patient -   Secondary Decision [facility] Name -   Secondary Decision [facility] Phone Number -   Secondary Decision Maker Relationship to Patient -   Confirm Advance Directive None   Patient Would Like to Complete Advance Directive -   Does the patient have other document types -       Helga  MSN, RN, AGCNS-BC  Nurse Navigator  [phone]

## 2019-03-04 NOTE — PROGRESS NOTES
Progress Note  Pulmonary, Critical Care, and Sleep Medicine    Name: Luisa Serna MRN: [id number]   : 1945 Hospital: [facility]   Date: 3/4/2019        IMPRESSION:   · Acute on chronic hypoxic respiratory failure    · COPD exacerbation slowly improving, possibly aggravated by mucus plugging  · COPD severe last FEV1 30% on LTOT, steroid dependent  · Adenocarcinoma of the ascending colon. S/p resection and Ileostomy    · Intermittent hemoptysis none this admission  · Tobacco use disorder recently quit       PLAN:   · Bronchodilators  · Taper steroids  · Bronchial hygiene  · Prophylaxis issues per primary team  · Will follow with you     Subjective/Interval History:   I have reviewed the flowsheet and previous days notes. Reviewed interval history. Discussed management with nursing staff. Patient is a 72 y.o. female well known to me with severe COPD on LTOT, last FEV1 30%. Pt was in her USOH with baseline dyspnea however a few days ago started complaining of worsening shortness of breath and wet sounding but non productive cough. Pt also complained of malaise without fever but resisted family efforts to take her to the hospital until . She denies chest pain or hemoptysis. No orthopnea or pedal edema. Pt did not tolerate previous trials of Daliresp or Anoro but seems to be maintained on current LABA/ICS and LAMA combination. 3/4/2019: Follow-up - no complaints this am.  Breathing slowly improving. ROS:Pertinent items are noted in HPI. Orders reviewed including medications. Changes made if indicated. Telemetry monitor reviewed at the bedside.   Objective:   Vital Signs:    Visit Vitals  /82 (BP 1 Location: Left arm, BP Patient Position: At rest)   Pulse 79   Temp 98.5 °F (36.9 °C)   Resp 20   Ht 5' 6\" (1.676 m)   Wt 52.5 kg (115 lb 11.2 oz)   SpO2 95%   BMI 18.67 kg/m²       O2 Device: Nasal cannula   O2 Flow Rate (L/min): 2 l/min   Temp (24hrs), Av °F (36.7 °C), Min:97.7 °F (36.5 °C), Max:98.5 °F (36.9 °C)       Intake/Output:   Last shift:      No intake/output data recorded. Last 3 shifts: No intake/output data recorded. No intake or output data in the 24 hours ending 03/04/19 1213     Physical Exam:    General:  Alert, cooperative, in no distress, appears stated age. Head:  Normocephalic, without obvious abnormality, atraumatic. Eyes:  ANicteric, PERRL,   Nose: Nares normal. Mucosa normal. No drainage or sinus tenderness. Throat: Lips, mucosa, and tongue normal.  NO Thrush   Neck: Supple, symmetrical, trachea midline, no adenopathy, thyroid: no enlargment/tenderness/nodules    Back:   Symmetric    Lungs:   Bilateral auscultation distant breath sounds, no rales or wheezes   Chest wall:  No tenderness or deformity. NO intercostal retractions   Heart:  Regular rate and rhythm, S1, S2 normal, no murmur, click, rub or gallop. Abdomen:   Soft, non-tender. Bowel sounds normal. No masses,  No organomegaly. NO paradoxical motion   Extremities: normal, atraumatic, no cyanosis or edema. Pulses: 2+ and symmetric all extremities. Skin: Skin color, texture, turgor normal. No rashes or lesions. NO clubbing   Lymph nodes: Cervical, supraclavicular  nodes normal.   Neurologic: Grossly nonfocal      :        Devices:             Drips:    DATA:  Labs:  No results for input(s): WBC, HGB, HCT, PLT, HGBEXT, HCTEXT, PLTEXT, HGBEXT, HCTEXT, PLTEXT in the last 72 hours. Recent Labs     03/04/19  0450 03/03/19  0535 03/02/19  0337    141 141   K 4.4 4.6 4.0    107 109*   CO2 29 27 24   * 108* 168*   BUN 26* 32* 39*   CREA 0.82 0.77 0.93   CA 8.2* 8.4* 8.3*   MG  --  2.3 2.3   PHOS  --  2.8 2.2*     No results for input(s): PH, PCO2, PO2, HCO3, FIO2 in the last 72 hours.     Imaging:  []        I have personally reviewed the patients radiographs and reports  []         []        No change from prior, tubes and lines in adequate position  []        Improved []        Worsening  High complexity decision making was performed during this consultation and evaluation.      Kristopher Devine MD

## 2019-03-04 NOTE — PROGRESS NOTES
Bedside and Verbal shift change report given to Anabell Ramos RN (oncoming nurse) by Margarita Boland RN   (offgoing nurse). Report given with SBAR, Kardex, MAR and Recent Results.

## 2019-03-04 NOTE — CONSULTS
[facility] - Consult Note    Date of  Admission: 2/28/2019  2:52 AM     Primary Care Physician:  Hamilton Cottrell MD     Plan:         1. COPD exacerbation- on antibiotics, breathing tx and inhalers  managed by [facility] and medical team  2. Atrial fibrillation? ?  - seen in Telemetry on admission. Patient' s EKG showing NSR with PAC's no afib seen today. Echo on 9/18 showed  Preserved EF%   3. Atypical chest pain- report chest wall pain that last few seconds and sometimes triggered  by anxiety   4. Hypotension- on admission. BP is stable will monitor. 5. 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 S/p Ileostomy. 7. Tobacco abuse- Patient advised to stop smoking to reduce future cardiovascular events. per patient quit 3 weeks ago  8. Abnormal TSH- seen today by Dr Sonido Rodriguez. No objective evidence of A. fib so far on EKGs or telemetry as we reviewed. Check cardiac enzymes x1 set. Echo in September 2018 did not show any wall motion abnormality and ejection fraction was normal.  Follow-up EKGs and continue telemetry for today. Will follow briefly with you. Thank you for the kind referral.  Discussed with patient and family. Assessment:     Hospital Problems  Date Reviewed: 2/4/2019          Codes Class Noted POA    Hypotension ICD-10-CM: I95.9  ICD-9-CM: 458.9  2/28/2019 Unknown        COPD exacerbation ([facility] ICD-10-CM: J44.1  ICD-9-CM: 491.21  2/28/2019 Unknown        Acute exacerbation of chronic obstructive pulmonary disease (COPD) ([facility] ICD-10-CM: J44.1  ICD-9-CM: 491.21  8/14/2018 Unknown                   History of Present Illness: This is a 72 y.o. female admitted for Acute exacerbation of chronic obstructive pulmonary disease (COPD) ([facility] [place]. 1]Hypotension [I95.9] COPD exacerbation ([facility] [J44.1]. Patient with PMHx of COPD, Asbestosis, DVT, stage II adenocarcinoma, s/p ileostomy.  The patient came to the ED  complaining of SOB. The patient has a productive cough. She used to smoke tobacco. Reports she quit 3 weeks ago. No alcohol use. Per Nursing staff on admission patient was on Atrial fib rate controlled. According to patient this is new, unable to obtain telemetry strips. Patient currently on NSR. She feels better SOB slowly improving. Denies active chest pain. She reports anxiety and chest pain that last few seconds. No diaphoresis or nausea associate with CP.            Past Medical History:     Past Medical History:   Diagnosis Date    Anxiety     Arthritis     Asbestosis ([address]     Asthma     Back problem     Baker's cyst     Balance problems     Chronic lung disease     Cigarette smoker     Clotting disorder (HCC)     COPD (chronic obstructive pulmonary disease) ([facility])     oxygen 2/liters asbestosis    Depression     History of DVT (deep vein thrombosis)     Leg pain     Right    Lung disease     Nausea & vomiting     Osteoporosis     Restless leg syndrome     Spinal stenosis     Thromboembolus ([facility])     Vitamin D deficiency          Social History:     Social History     Socioeconomic History    Marital status:      Spouse name: Not on file    Number of children: Not on file    Years of education: Not on file    Highest education level: Not on file   Tobacco Use    Smoking status: Former Smoker     Packs/day: 0.50     Years: 50.00     Pack years: 25.00     Types: Cigarettes     Start date: 10/21/1964     Last attempt to quit: 10/20/2018     Years since quittin.3    Smokeless tobacco: Never Used   Substance and Sexual Activity    Alcohol use: No    Drug use: No        Family History:     Family History   Problem Relation Age of Onset    Cancer Father     Heart Disease Mother     Hypertension Mother     Cancer Brother     Cancer Sister     Diabetes Other     Hypertension Other     Stroke Other     Heart Disease Sister     Hypertension Sister    Serge Heart Disease Brother         Medications:      Allergies   Allergen Reactions    Anoro Ellipta [Umeclidinium-Vilanterol] Rash and Other (comments)     Muscle cramps in arms    Aspirin Other (comments)     GI upset and bleeding    Augmentin [Amoxicillin-Pot Clavulanate] Not Reported This Time     Possible cramping    Doxycycline Nausea and Vomiting    Morphine Other (comments)     Neurologic symptoms - severe agitation      Shellfish Derived Unknown (comments)     Pt able to eat small amounts per report     Sulfa (Sulfonamide Antibiotics) Rash     Patient relates no longer allergic        Current Facility-Administered Medications   Medication Dose Route Frequency    acetylcysteine (MUCOMYST) 100 mg/mL (10 %) nebulizer solution 200 mg  2 mL Nebulization Q6H RT    methylPREDNISolone (PF) (SOLU-MEDROL) injection 20 mg  20 mg IntraVENous Q8H    levoFLOXacin (LEVAQUIN) tablet 500 mg  500 mg Oral Q24H    famotidine (PEPCID) tablet 20 mg  20 mg Oral BID    albuterol-ipratropium (DUO-NEB) 2.5 MG-0.5 MG/3 ML  3 mL Nebulization Q6H RT    rOPINIRole (REQUIP) tablet 1 mg  1 mg Oral BID    LORazepam (ATIVAN) tablet 1 mg  1 mg Oral BID PRN    budesonide-formoterol (SYMBICORT) 160-4.5 mcg/actuation HFA inhaler 2 Puff  2 Puff Inhalation BID RT    therapeutic multivitamin (THERAGRAN) tablet 1 Tab  1 Tab Oral DAILY    HYDROcodone-acetaminophen (NORCO) 5-325 mg per tablet 1 Tab  1 Tab Oral Q6H PRN    simethicone (MYLICON) tablet 80 mg  80 mg Oral QID PRN    acetaminophen (TYLENOL) tablet 650 mg  650 mg Oral Q6H PRN    ondansetron (ZOFRAN) injection 4 mg  4 mg IntraVENous Q6H PRN    sodium chloride (NS) flush 5-40 mL  5-40 mL IntraVENous Q8H    sodium chloride (NS) flush 5-40 mL  5-40 mL IntraVENous PRN        Review Of Systems:           Constitutional: No fever, no chills, no weight loss, no night sweats   HEENT: No epistaxis, no nasal drainage, no difficulty in swallowing, no redness in eyes  Respiratory: positive for cough, sputum,  dyspnea on exertion  Cardiovascular: atypical chest pain, SOB. Gastrointestinal: no abd pain, no vomiting, no diarrhea, no bleeding symptoms  Genitourinary: No urinary symptoms or hematuria  Integument/breast: No ulcers or rashes  Musculoskeletal: no muscle pain, no weakness  Neurological: No focal weakness, no seizures, no headaches  Behvioral/Psych: No anxiety, no depression       Physical Exam:     Visit Vitals  /82 (BP 1 Location: Left arm, BP Patient Position: At rest)   Pulse 79   Temp 98.5 °F (36.9 °C)   Resp 20   Ht 5' 6\" (1.676 m)   Wt 52.5 kg (115 lb 11.2 oz)   SpO2 95%   BMI 18.67 kg/m²     BP Readings from Last 3 Encounters:   03/04/19 133/82   02/04/19 96/72   01/02/19 104/76     Pulse Readings from Last 3 Encounters:   03/04/19 79   02/04/19 61   01/02/19 81     Wt Readings from Last 3 Encounters:   03/03/19 52.5 kg (115 lb 11.2 oz)   02/04/19 51.3 kg (113 lb)   01/02/19 52.6 kg (116 lb)       General:  alert, cooperative, no distress, appears stated age  Skin: Warm and dry, acyanotic, normal color. Head: Normocephalic, atraumatic. Eyes: Sclerae anicteric, conjunctivae without injection. Neck:  nontender, no nuchal rigidity, no masses, no stridor, no carotid bruit, no JVD  Lungs: diminished breath sounds. Barren  rhonchi and wheezes all over lung fields   Heart:  regular rate and rhythm, S1, S2 normal, no click, no rub, V2-Y8 were reduced  Abdomen:  abdomen is soft without significant tenderness, masses, organomegaly or guarding  Extremities:  extremities normal, atraumatic, no cyanosis or edema. Peripheral pulses   Neurological: grossly intact. No focal abnormalities, moves all extremities well. Psychiatric Affect: The patient is awake, alert and oriented x3. [employer] is interactive and appropriate.      Data Review:     Recent Results (from the past 48 hour(s))   PHOSPHORUS    Collection Time: 03/03/19  5:35 AM   Result Value Ref Range    Phosphorus 2.8 2.5 - 4.9 MG/DL   MAGNESIUM    Collection Time: 19  5:35 AM   Result Value Ref Range    Magnesium 2.3 1.6 - 2.6 mg/dL   METABOLIC PANEL, BASIC    Collection Time: 19  5:35 AM   Result Value Ref Range    Sodium 141 136 - 145 mmol/L    Potassium 4.6 3.5 - 5.5 mmol/L    Chloride 107 100 - 108 mmol/L    CO2 27 21 - 32 mmol/L    Anion gap 7 3.0 - 18 mmol/L    Glucose 108 (H) 74 - 99 mg/dL    BUN 32 (H) 7.0 - 18 MG/DL    Creatinine 0.77 0.6 - 1.3 MG/DL    BUN/Creatinine ratio 42 (H) 12 - 20      GFR est AA >60 >60 ml/min/1.73m2    GFR est non-AA >60 >60 ml/min/1.73m2    Calcium 8.4 (L) 8.5 - 10.1 MG/DL   T3, FREE    Collection Time: 19  5:35 AM   Result Value Ref Range    Triiodothyronine (T3), free 1.6 (L) 2.18 - 3.98 PG/ML   T4, FREE    Collection Time: 19  5:35 AM   Result Value Ref Range    T4, Free 1.0 0.7 - 1.5 NG/DL   METABOLIC PANEL, BASIC    Collection Time: 19  4:50 AM   Result Value Ref Range    Sodium 139 136 - 145 mmol/L    Potassium 4.4 3.5 - 5.5 mmol/L    Chloride 104 100 - 108 mmol/L    CO2 29 21 - 32 mmol/L    Anion gap 6 3.0 - 18 mmol/L    Glucose 123 (H) 74 - 99 mg/dL    BUN 26 (H) 7.0 - 18 MG/DL    Creatinine 0.82 0.6 - 1.3 MG/DL    BUN/Creatinine ratio 32 (H) 12 - 20      GFR est AA >60 >60 ml/min/1.73m2    GFR est non-AA >60 >60 ml/min/1.73m2    Calcium 8.2 (L) 8.5 - 10.1 MG/DL   TSH 3RD GENERATION    Collection Time: 19  4:50 AM   Result Value Ref Range    TSH 0.43 0.36 - 3.74 uIU/mL   T4, FREE    Collection Time: 19  4:50 AM   Result Value Ref Range    T4, Free 0.9 0.7 - 1.5 NG/DL         Intake/Output Summary (Last 24 hours) at 3/4/2019 1547  Last data filed at 3/4/2019 0745  Gross per 24 hour   Intake 0 ml   Output 0 ml   Net 0 ml       Cardiographics:     EC/28 NSR with PAC's       Signed By: Char Del Cid NP-C Phone [phone]    2019    I have independently evaluated and examined the patient.   All relevant labs and testing data's are reviewed. Care plan discussed and updated after review.   Tab Boothe MD

## 2019-03-04 NOTE — CONSULTS
Pt examined. Chart reviewed. Presented with COPD exacerbation, A-fib. Has no prior history of thyroid disease. Strong family history of it, however. exam shows rhythm of a-fib, with rate of about 76. Thyroid is normal in size. TSH was suppressed to 0.18 two days ago, , but has returned to normal at 0.43 this morning. Free T4 is 1.0 and 0.9. Free  T3 is normal at 1.6. Assessment: abnormal thyroid function test due to non-thyroid illness. Plan: No further evaluation or treatment is needed at the present time.

## 2019-03-04 NOTE — PROGRESS NOTES
Problem: Self Care Deficits Care Plan (Adult)  Goal: *Acute Goals and Plan of Care (Insert Text)  Occupational Therapy Goals  Initiated 3/2/2019 within 7 day(s). 1.  Patient will perform bathing with modified independence and no increased SOB  2. Patient will perform lower body dressing with modified independence and no increased SOB  3. Patient will perform pacing and modified proper breathing techniques to her self care routine  4. Patient will associate energy conservation techniques to her home situation  5. Patient will demonstrate independence with scapula setting and scapular strengthening program in preparation for her efficient completion of her self care routine   Outcome: Progressing Towards Goal  Occupational Therapy TREATMENT    Patient: Julien Jackson (32 y.o. female)  Date: 3/4/2019  Diagnosis: Acute exacerbation of chronic obstructive pulmonary disease (COPD) ([facility] [J44.1]  Hypotension [I95.9]  [facility] [J44.1] <principal problem not specified>      Precautions: (universal, O2)  Chart, occupational therapy assessment, plan of care, and goals were reviewed. PLOF: Independent    ASSESSMENT:  Pt initially refusing c/o fatigue, then requesting to use bathroom. Pt requires vc's for safety w/functional mobility and O2 tubing mgt. Pt requires increase time w/toileting ADL 2/2 to ostomy care. Pt tolerates standing sinkside performing hand hygiene and appears SOB. Pt reports not SOB, SpO2% 97 on 2L O2 nc s/p activity. Pt anxious for d/c home. EDUCATION Pt educated on benefits of shower chair and adaptive equipment, long handled sponge w/bathing ADLs. Progression toward goals:  [x]          Improving appropriately and progressing toward goals  []          Improving slowly and progressing toward goals  []          Not making progress toward goals and plan of care will be adjusted     PLAN:  Patient continues to benefit from skilled intervention to address the above impairments.   Continue treatment per established plan of care. Discharge Recommendations:  Home Health for safety check  Further Equipment Recommendations for Discharge:  N/A, pt reports she has DME     SUBJECTIVE:   Patient stated I am just so tired from last night.     OBJECTIVE DATA SUMMARY:       Cognitive/Behavioral Status:  Neurologic State: Alert  Orientation Level: Oriented X4  Cognition: Follows commands  Safety/Judgement: Fall prevention, Awareness of environment, Home safety, Good awareness of safety precautions  Functional Mobility and Transfers for ADLs:   Bed Mobility:  Supine to Sit: Modified independent  Sit to Supine: Modified independent   Transfers:  Sit to Stand: Modified independent   Toilet Transfer : Modified independent   Bathroom Mobility: Supervision/set up   Balance:  Sitting: Intact  Standing: Intact; Without support  ADL Intervention:  Grooming  Washing Hands: Supervision/set-up    Toileting  Toileting Assistance: Stand-by assistance  Clothing Management: Stand-by assistance    Pain:  Pre Treatment:0  Post Treatment:0  Pain Scale 1: Numeric (0 - 10)  Pain Intensity 1: 0    Activity Tolerance:    Fair, pt fatigues easily    Please refer to the flowsheet for vital signs taken during this treatment.   After treatment:   []  Patient left in no apparent distress sitting up in chair  [x]  Patient left in no apparent distress in bed  [x]  Call bell left within reach  [x]  Nursing notified  [x]  Family present  []  Bed alarm activated    MELA Billingsley  Time Calculation: 24 mins

## 2019-03-04 NOTE — ROUTINE PROCESS
Bedside shift change report given to [employer] (oncoming nurse) by Chana Castañeda (offgoing nurse). Report included the following information SBAR, Intake/Output, MAR, Recent Results and Cardiac Rhythm NSR.

## 2019-03-05 ENCOUNTER — HOME HEALTH ADMISSION (OUTPATIENT)
Dept: HOME HEALTH SERVICES | Facility: HOME HEALTH | Age: 74
End: 2019-03-05
Payer: MEDICARE

## 2019-03-05 ENCOUNTER — PATIENT OUTREACH (OUTPATIENT)
Dept: CASE MANAGEMENT | Age: 74
End: 2019-03-05

## 2019-03-05 VITALS
WEIGHT: 115.7 LBS | HEIGHT: 66 IN | RESPIRATION RATE: 20 BRPM | DIASTOLIC BLOOD PRESSURE: 67 MMHG | BODY MASS INDEX: 18.59 KG/M2 | SYSTOLIC BLOOD PRESSURE: 116 MMHG | HEART RATE: 98 BPM | TEMPERATURE: 97.9 F | OXYGEN SATURATION: 98 %

## 2019-03-05 LAB
ANION GAP SERPL CALC-SCNC: 4 MMOL/L (ref 3–18)
ATRIAL RATE: 75 BPM
BASOPHILS # BLD: 0 K/UL (ref 0–0.1)
BASOPHILS NFR BLD: 0 % (ref 0–2)
BUN SERPL-MCNC: 28 MG/DL (ref 7–18)
BUN/CREAT SERPL: 33 (ref 12–20)
CALCIUM SERPL-MCNC: 8.6 MG/DL (ref 8.5–10.1)
CALCULATED P AXIS, ECG09: 84 DEGREES
CALCULATED R AXIS, ECG10: 90 DEGREES
CALCULATED T AXIS, ECG11: 46 DEGREES
CHLORIDE SERPL-SCNC: 97 MMOL/L (ref 100–108)
CK MB CFR SERPL CALC: 2.9 % (ref 0–4)
CK MB SERPL-MCNC: 1.1 NG/ML (ref 5–25)
CK SERPL-CCNC: 38 U/L (ref 26–192)
CO2 SERPL-SCNC: 31 MMOL/L (ref 21–32)
CREAT SERPL-MCNC: 0.86 MG/DL (ref 0.6–1.3)
DIAGNOSIS, 93000: NORMAL
DIFFERENTIAL METHOD BLD: ABNORMAL
EOSINOPHIL # BLD: 0 K/UL (ref 0–0.4)
EOSINOPHIL NFR BLD: 0 % (ref 0–5)
ERYTHROCYTE [DISTWIDTH] IN BLOOD BY AUTOMATED COUNT: 13.3 % (ref 11.6–14.5)
GLUCOSE SERPL-MCNC: 117 MG/DL (ref 74–99)
HCT VFR BLD AUTO: 39.6 % (ref 35–45)
HGB BLD-MCNC: 13.1 G/DL (ref 12–16)
LYMPHOCYTES # BLD: 0.5 K/UL (ref 0.9–3.6)
LYMPHOCYTES NFR BLD: 6 % (ref 21–52)
MCH RBC QN AUTO: 32.1 PG (ref 24–34)
MCHC RBC AUTO-ENTMCNC: 33.1 G/DL (ref 31–37)
MCV RBC AUTO: 97.1 FL (ref 74–97)
MONOCYTES # BLD: 0.8 K/UL (ref 0.05–1.2)
MONOCYTES NFR BLD: 9 % (ref 3–10)
NEUTS SEG # BLD: 8 K/UL (ref 1.8–8)
NEUTS SEG NFR BLD: 85 % (ref 40–73)
P-R INTERVAL, ECG05: 110 MS
PLATELET # BLD AUTO: 315 K/UL (ref 135–420)
PMV BLD AUTO: 10.1 FL (ref 9.2–11.8)
POTASSIUM SERPL-SCNC: 4.1 MMOL/L (ref 3.5–5.5)
Q-T INTERVAL, ECG07: 368 MS
QRS DURATION, ECG06: 80 MS
QTC CALCULATION (BEZET), ECG08: 410 MS
RBC # BLD AUTO: 4.08 M/UL (ref 4.2–5.3)
SODIUM SERPL-SCNC: 132 MMOL/L (ref 136–145)
T3 SERPL-MCNC: 50 NG/DL (ref 71–180)
TROPONIN I SERPL-MCNC: <0.02 NG/ML (ref 0–0.04)
VENTRICULAR RATE, ECG03: 75 BPM
WBC # BLD AUTO: 9.4 K/UL (ref 4.6–13.2)

## 2019-03-05 PROCEDURE — 74011000250 HC RX REV CODE- 250: Performed by: EMERGENCY MEDICINE

## 2019-03-05 PROCEDURE — 74011250637 HC RX REV CODE- 250/637: Performed by: HOSPITALIST

## 2019-03-05 PROCEDURE — 85025 COMPLETE CBC W/AUTO DIFF WBC: CPT

## 2019-03-05 PROCEDURE — 74011250636 HC RX REV CODE- 250/636: Performed by: NURSE PRACTITIONER

## 2019-03-05 PROCEDURE — 36415 COLL VENOUS BLD VENIPUNCTURE: CPT

## 2019-03-05 PROCEDURE — 74011636637 HC RX REV CODE- 636/637: Performed by: EMERGENCY MEDICINE

## 2019-03-05 PROCEDURE — 82550 ASSAY OF CK (CPK): CPT

## 2019-03-05 PROCEDURE — 94640 AIRWAY INHALATION TREATMENT: CPT

## 2019-03-05 PROCEDURE — 80048 BASIC METABOLIC PNL TOTAL CA: CPT

## 2019-03-05 PROCEDURE — 93005 ELECTROCARDIOGRAM TRACING: CPT

## 2019-03-05 RX ORDER — LEVOFLOXACIN 500 MG/1
500 TABLET, FILM COATED ORAL EVERY 24 HOURS
Qty: 4 TAB | Refills: 0 | Status: SHIPPED | OUTPATIENT
Start: 2019-03-05 | End: 2019-03-09

## 2019-03-05 RX ORDER — PREDNISONE 10 MG/1
TABLET ORAL
Qty: 35 TAB | Refills: 0 | Status: SHIPPED | OUTPATIENT
Start: 2019-03-05 | End: 2019-05-16 | Stop reason: SDUPTHER

## 2019-03-05 RX ORDER — PREDNISONE 20 MG/1
40 TABLET ORAL
Status: DISCONTINUED | OUTPATIENT
Start: 2019-03-05 | End: 2019-03-05 | Stop reason: HOSPADM

## 2019-03-05 RX ADMIN — THERA TABS 1 TABLET: TAB at 09:08

## 2019-03-05 RX ADMIN — Medication 10 ML: at 14:37

## 2019-03-05 RX ADMIN — ACETYLCYSTEINE 200 MG: 100 INHALANT RESPIRATORY (INHALATION) at 01:32

## 2019-03-05 RX ADMIN — LORAZEPAM 1 MG: 1 TABLET ORAL at 10:08

## 2019-03-05 RX ADMIN — FAMOTIDINE 20 MG: 20 TABLET ORAL at 09:07

## 2019-03-05 RX ADMIN — ROPINIROLE HYDROCHLORIDE 1 MG: 1 TABLET, FILM COATED ORAL at 09:07

## 2019-03-05 RX ADMIN — IPRATROPIUM BROMIDE AND ALBUTEROL SULFATE 3 ML: .5; 3 SOLUTION RESPIRATORY (INHALATION) at 01:32

## 2019-03-05 RX ADMIN — Medication 10 ML: at 05:15

## 2019-03-05 RX ADMIN — METHYLPREDNISOLONE SODIUM SUCCINATE 20 MG: 40 INJECTION, POWDER, FOR SOLUTION INTRAMUSCULAR; INTRAVENOUS at 09:08

## 2019-03-05 RX ADMIN — PREDNISONE 40 MG: 20 TABLET ORAL at 14:36

## 2019-03-05 RX ADMIN — METHYLPREDNISOLONE SODIUM SUCCINATE 20 MG: 40 INJECTION, POWDER, FOR SOLUTION INTRAMUSCULAR; INTRAVENOUS at 01:03

## 2019-03-05 NOTE — HOME CARE
Received HH referral, Discharge noted for today, [facility] will follow for SN,PT,OT ; pt states she already has RW,cane,Home O2 with [employer] and shower chair. LEX MARQUIS.

## 2019-03-05 NOTE — ROUTINE PROCESS
Bedside and Verbal shift change report given to SADI Avila (oncoming nurse) by Rodger De (offgoing nurse). Report included the following information SBAR, Kardex and MAR.

## 2019-03-05 NOTE — PROGRESS NOTES
[facility] [place]      CARDIOLOGY PROGRESS NOTE  RECS:        1. COPD exacerbation- on antibiotics, breathing tx and inhalers  managed by [facility] and medical team  2. Atrial fibrillation? ?  -No evidence of A. fib during this admission or previous admissions as seen in EKGs. Telemetry this admission does not show any A. fib. PACs are frequent. No workup or treatment is needed for PACs. Negative troponin. Echo on 9/18 showed  Preserved EF%   3. Atypical chest pain- report chest wall pain that last few seconds and sometimes triggered  by anxiety- resolved. 4. Hypotension- on admission. BP is stable will monitor. 5. 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 S/p Ileostomy. 7. Tobacco abuse- Patient advised to stop smoking to reduce future cardiovascular events. per patient quit 3 weeks ago  8. Abnormal TSH- seen today by Dr Jaqui Roach      No further cardiac workup is needed. Little more to add from the [facility] . We will sign off. Should the clinical stituation change or there be any need for further cardiac input please do not hesitate to contact us.     ASSESSMENT:  Hospital Problems  Date Reviewed: 2/4/2019          Codes Class Noted POA    Hypotension ICD-10-CM: I95.9  ICD-9-CM: 458.9  2/28/2019 Unknown        COPD exacerbation ([facility] ICD-10-CM: J44.1  ICD-9-CM: 491.21  2/28/2019 Unknown        Acute exacerbation of chronic obstructive pulmonary disease (COPD) ([facility] ICD-10-CM: J44.1  ICD-9-CM: 491.21  8/14/2018 Unknown                SUBJECTIVE:    No CP  Improving  SOB    OBJECTIVE:    VS:   Visit Vitals  /74 (BP 1 Location: Left arm, BP Patient Position: Sitting)   Pulse 100   Temp 98.7 °F (37.1 °C)   Resp 22   Ht 5' 6\" (1.676 m)   Wt 52.5 kg (115 lb 11.2 oz)   SpO2 98%   BMI 18.67 kg/m²       No intake or output data in the 24 hours ending 03/05/19 1113  TELE: normal sinus rhythm    General: alert, well developed, pleasant and in no apparent distress  HENT: Normocephalic, atraumatic. Normal external eye. Neck :  no bruit, no JVD  Cardiac:  regular rate and rhythm, S1, S2 normal, no S3 or S4, no click, no rub  Lungs: wheezes R base, L base, diminished breath sounds b/l. Abdomen: Soft, nontender, no masses  Extremities:  No c/c/e, peripheral pulses present      Labs: Results:       Chemistry Recent Labs     03/05/19  0928 03/04/19  0450 03/03/19  0535   * 123* 108*   * 139 141   K 4.1 4.4 4.6   CL 97* 104 107   CO2 31 29 27   BUN 28* 26* 32*   CREA 0.86 0.82 0.77   CA 8.6 8.2* 8.4*   AGAP 4 6 7   BUCR 33* 32* 42*      CBC w/Diff Recent Labs     03/05/19  0928   WBC 9.4   RBC 4.08*   HGB 13.1   HCT 39.6      GRANS 85*   LYMPH 6*   EOS 0      Cardiac Enzymes Recent Labs     03/05/19  0928 03/04/19  1832   CPK 38 49   CKND1 2.9 3.1      Coagulation No results for input(s): PTP, INR, APTT in the last 72 hours. No lab exists for component: INREXT    Lipid Panel Lab Results   Component Value Date/Time    Cholesterol, total 150 03/01/2019 01:49 AM    HDL Cholesterol 79 (H) 03/01/2019 01:49 AM    LDL, calculated 61 03/01/2019 01:49 AM    VLDL, calculated 10 03/01/2019 01:49 AM    Triglyceride 50 03/01/2019 01:49 AM    CHOL/HDL Ratio 1.9 03/01/2019 01:49 AM      BNP No results for input(s): BNPP in the last 72 hours. Liver Enzymes No results for input(s): TP, ALB, TBIL, AP, SGOT, GPT in the last 72 hours. No lab exists for component: DBIL   Thyroid Studies Lab Results   Component Value Date/Time    T4, Total 10.3 03/04/2010 01:00 AM    TSH 0.43 03/04/2019 04:50 AM              Sujey HUGGINSC Felicita:[phone]  I have independently evaluated and examined the patient. All relevant labs and testing data's are reviewed. Care plan discussed and updated after review.   Shannon Spence MD

## 2019-03-05 NOTE — PROGRESS NOTES
NUTRITION    Nutrition Consult: General Nutrition Management & Supplements     RECOMMENDATIONS / PLAN:     - Continue current nutrition interventions at this time.   - Okay for family to provide food if pt desires, staying consistent with diet order  - Continue RD inpatient monitoring and evaluation. NUTRITION INTERVENTIONS & DIAGNOSIS:     [x] Meals/snacks: general, healthy diet  [x] Medical food supplement therapy: continue    Nutrition Diagnosis:  Inadequate oral intake related to poor appetite as evidenced by poor meal intake x 1 month PTA, consuming <50% of meals    Patient meets criteria for Severe Protein Calorie Malnutrition as evidenced by:   ASPEN Malnutrition Criteria  Acute Illness, Chronic Illness, or Social/Enviornmental: Chronic illness  Energy Intake: Less than/equal to 75% of est energy req for greater than/equal to 1 month  Body Fat: Severe(triceps)  Muscle Mass: Severe(clavicle, dorsal hands, temple region)  ASPEN Malnutrition Score - Chronic Illness: 18  Chronic Illness - Malnutrition Diagnosis: Severe malnutrition. ASSESSMENT:     3/5: Pt reported fair/good appetite and meal intake. Tolerating diet. Consuming Ensure supplement. Also ate some food and drank a Premier Protein drink that family provided from the previous day. Discussed increasing Ensure supplement to BID; pt refused, wants to continue with only once daily. Discussed other nutrition supplements; pt declined options     3/2: Tolerating diet with fair intake still. Consuming Ensure well. Discussed increasing nutritional supplements on discharge to at least 1 daily. K and Mg WNL, phos low, discussed low phos with NP, NP plans to review and decide on need for replacement. No signs of refeeding at this time. 3/1: Pt reported poor appetite and meal intake x 1 month PTA; hungry now, especially since started on steroid therapy. Food preferences discussed. Agreeable to nutrition supplement.  Sometimes consumed Premier Protein at home. Pt on cardiac diet; discussed with NP [facility] about changing to regular consistency; NP agreed with plan. Pt with ileostomy; very familiar with which foods she is not able to tolerate or what causes her flatus. Pt reported trying to gain back the weight she lost. NFPE conducted    Average po intake adequate to meet patients estimated nutritional needs:   [] Yes     [x] No   [] Unable to determine at this time    Diet: DIET NUTRITIONAL SUPPLEMENTS Dinner; ENSURE ENLIVE  DIET REGULAR      Food Allergies:  Shellfish       Current Appetite:   [] Good     [x] Fair     [] Poor     [] Other:  Appetite/meal intake prior to admission:   [] Good     [] Fair     [x] Poor: consuming total of 1 meal at most daily, sometimes drinking Premier Protein supplement; poor po intake x 1 month PTA     [] Other:  Feeding Limitations:  [] Swallowing difficulty    [] Chewing difficulty    [] Other:  Current Meal Intake:   Patient Vitals for the past 100 hrs:   % Diet Eaten   03/04/19 0745 0 %   03/02/19 1645 100 %   03/01/19 1200 40 %       BM: 3/4; ileostomy   Skin Integrity:  No pressure injury or wound noted  Edema:   [x] No     [] Yes   Pertinent Medications: Reviewed: pepcid, steroid, zofran, theragran    Recent Labs     03/05/19  0928 03/04/19  0450 03/03/19  0535   * 139 141   K 4.1 4.4 4.6   CL 97* 104 107   CO2 31 29 27   * 123* 108*   BUN 28* 26* 32*   CREA 0.86 0.82 0.77   CA 8.6 8.2* 8.4*   MG  --   --  2.3   PHOS  --   --  2.8     No intake or output data in the 24 hours ending 03/05/19 1148    Anthropometrics:  Ht Readings from Last 1 Encounters:   02/28/19 5' 6\" (1.676 m)     Last 3 Recorded Weights in this Encounter    02/28/19 0317 03/01/19 0413 03/03/19 0640   Weight: 55.8 kg (123 lb) 55.2 kg (121 lb 9.6 oz) 52.5 kg (115 lb 11.2 oz)     Body mass index is 18.67 kg/m². Weight History:   Pt reported -145 lb. Had unplanned wt loss, was at 105 lb, then has gained back some of the weight. Weight Metrics 3/3/2019 2/4/2019 1/2/2019 12/27/2018 11/29/2018 11/14/2018 10/23/2018   Weight 115 lb 11.2 oz 113 lb 116 lb 114 lb 114 lb 122 lb 9.6 oz 119 lb   BMI 18.67 kg/m2 18.24 kg/m2 18.72 kg/m2 18.4 kg/m2 18.4 kg/m2 19.79 kg/m2 19.21 kg/m2        Admitting Diagnosis: Acute exacerbation of chronic obstructive pulmonary disease (COPD) ([facility]) [J44.1]  Hypotension [I95.9]  COPD exacerbation ([facility]) [J44.1]  Pertinent PMHx: anxiety, COPD, depression, osteoporosis, s/p ileostomy    Education Needs:        [x] None identified  [] Identified - Not appropriate at this time  []  Identified and addressed - refer to education log  Learning Limitations:   [x] None identified  [] Identified    Cultural, Muslim & ethnic food preferences:  [x] None identified    [] Identified and addressed     ESTIMATED NUTRITION NEEDS:     Calories: 4128-2289 kcal (30-35 kcal/kg) based on  [x] Actual BW: 55 kg      [] IBW   Protein: 55-83 gm (1-1.5 gm/kg) based on  [x] Actual BW      [] IBW   Fluid: 1 mL/kcal     MONITORING & EVALUATION:     Nutrition Goal(s):   1. Po intake of meals will meet >75% of patient estimated nutritional needs within the next 7 days.   Outcome:  [] Met/Ongoing    [x]  Not Met    [] New/Initial Goal     Monitoring:   [x] Food and beverage intake   [x] Diet order   [x] Nutrition-focused physical findings   [x] Treatment/therapy   [] Weight   [] Enteral nutrition intake        Previous Recommendations (for follow-up assessments only):     [x]   Implemented       []   Not Implemented (RD to address)      [] No Longer Appropriate     [] No Recommendation Made     Discharge Planning: regular diet + Premiere Protein or Ensure Enlive at least once daily  [x] Participated in care planning, discharge planning, & interdisciplinary rounds as appropriate      Jurgen Polk, [address] [place] [address]  Pager: [phone]

## 2019-03-05 NOTE — PROGRESS NOTES
Transitional Care Team: YOSI Post Discharge Review    Date: 03/05/19  Time:  3:55 PM  Hospital Nurse Navigator: Helga Sánchez MSN, RN, [facility]    Molly Sierra is a 72 y.o. [facility] with Acute exacerbation of chronic obstructive pulmonary disease (COPD) ([facility] [J44.1]  Hypotension [I95.9]  COPD exacerbation ([facility] [J44.1] on  2/28/2019. Primary Diagnosis  COPD exacerbation    Review and Plan as below:  1. Labs needed: CBC, CMP, & Mag in 4 days as per AVS.     2. Encourage incentive spirometer use. 3. Follow up: Dr. Michael Mckinley, PCP on 3/7/19 at 3pm; Dr. Thierry Aceves, pulmonary on 3/13/19 at 3:45pm; Dr. Torrey High on 4/11/19 at 2pm; Dr. Jatinder Fu on 5/2/19 at 2:15pm; and Dr. Declan Madrid, endocrine on 5/23/19 at 10:30am.       This Nurse Navigator accessed the patient's chart to offer support and placement in the [facility]. The Transitional Care Team bridges the gaps in care and education surrounding discharge from the acute care facility. The objective is to empower the patient and family in taking a proactive role in the task of preventing readmission within the first thirty days after discharge from the acute care setting. The Transitional Care Team is also involved in the efforts to reduce readmission to the acute care setting after stabilization and discharge from the acute care environment either to the skilled nursing facilities or community. The Red Zone: High risk for readmission, days 1-21    The Yellow Zone: Moderate risk for readmission, days 22-29    The Green Zone: Lower risk for readmission, days 30 and after      PLAN:  The patient was discharged home with HH : [address] today. The TC Team will follow the patient from a distance while inpatient as well as be available for further transition disposition as needed. The TC Team will continue to offer support 30- 90 days post discharge from the acute care setting. The appropriate TC Team members were notified.     Past Medical History:   Diagnosis Date    Anxiety     Arthritis     Asbestosis ([facility]     Asthma     Back problem     Baker's cyst     Balance problems     Chronic lung disease     Cigarette smoker     Clotting disorder (HCC)     COPD (chronic obstructive pulmonary disease) (HCC)     oxygen 2/liters asbestosis    Depression     History of DVT (deep vein thrombosis)     Leg pain     Right    Lung disease     Nausea & vomiting     Osteoporosis     Restless leg syndrome     Spinal stenosis     Thromboembolus ([facility]     Vitamin D deficiency        Advance Care Planning 2/28/2019   Patient's Healthcare Decision Maker is: -   Primary Decision Maker Name -   Primary Decision Maker Phone Number -   Primary Decision Maker Relationship to Patient -   Secondary Decision [facility] Name -   Secondary Decision [facility] Phone Number -   Secondary Decision Maker Relationship to Patient -   Confirm Advance Directive None   Patient Would Like to Complete Advance Directive -   Does the patient have other document types -       Sin Briones MSN, RN, [facility]  Nurse Navigator  [phone]

## 2019-03-05 NOTE — ROUTINE PROCESS
Bedside and Verbal shift change report given to Doctor Rene Pastrana (oncoming nurse) by ERICKA Garland RN (offgoing nurse). Report given with SBAR, Johnny, MAR and Recent Results.

## 2019-03-05 NOTE — PROGRESS NOTES
Discharge order noted for today. Pt has been accepted to [facility] agency. Met with patient and she was agreeable to the transition plan today. Transport has been arranged through pt's family. Patient's discharge summary and home health  orders have been forwarded to [facility] health  agency via connect. Updated bedside RN, ,  to the transition plan. Discharge information has been documented on the AVS.   Pt stated she has all the DMEs needed at home.     KRISTYN KleinN RN  Care Management  Pager: [phone]

## 2019-03-05 NOTE — PROGRESS NOTES
Patient discharge to home with home health service. Patient giving discharge instructions, follow-up appointments, and prescriptions. Patient stated understanding of discharge instruction, follow-up appointments, and medication administration. Patient transported to the exit via wheelchair. No acute distress noted at this time.

## 2019-03-05 NOTE — ROUTINE PROCESS
Bedside and Verbal shift change report given to [facility] (oncoming nurse) by Alveta Bosworth (offgoing nurse). Report included the following information SBAR, Kardex, Intake/Output and MAR.

## 2019-03-05 NOTE — DISCHARGE INSTRUCTIONS
COPD and Asthma: Care Instructions  Your Care Instructions    Some people who have chronic obstructive pulmonary disease (COPD) also have asthma. Both of these problems can damage your lungs. This makes it very important to control them. Asthma causes the airways that lead to the lungs to swell and become narrow. This makes it hard to breathe. You may wheeze or cough. If you have a bad attack, you may need emergency care. There are two parts to treating asthma. · Controlling asthma over the long term. · Treating attacks when they occur. You and your doctor can make an asthma treatment plan that will help. This plan tells you the medicines you take every day to reduce the swelling in your airways and prevent attacks. It also tells you what to do if you have an asthma attack. Follow-up care is a key part of your treatment and safety. Be sure to make and go to all appointments, and call your doctor if you are having problems. It's also a good idea to know your test results and keep a list of the medicines you take. How can you care for yourself at home? To control asthma over the long term  Medicines  Controller medicines reduce swelling in your lungs. They also prevent asthma attacks. Take your controller medicine exactly as prescribed. Talk to your doctor if you have any problems with your medicine. · Inhaled corticosteroid is a common and effective controller medicine. Using it the right way can prevent or reduce most side effects. · Take your controller medicine every day, not just when you have symptoms. This helps prevent problems before they occur. · Always bring your asthma medicine with you when you travel. · Your doctor may prescribe long-acting medicine that combines a corticosteroid with a beta2-agonist. Follow your doctor's instructions exactly about how to take a long-acting medicine. Examples include:  ? Fluticasone and salmeterol (Advair). ? Budesonide and formoterol (Symbicort).   · Do not depend on your controller medicines to stop an asthma attack that has already started. They do not work fast enough to help. · Your doctor may also prescribe anticholinergic inhalers. These include ipratropium (Atrovent) and tiotropium (Spiriva). Education  · Learn what sets off an asthma attack. Avoid these triggers when you can. Common triggers include smoke, air pollution, pollen, animal dander, colds, stress, and cold air. · Do not smoke. Smoking can make COPD and asthma worse. If you need help quitting, talk to your doctor about stop-smoking programs and medicines. These can increase your chances of quitting for good. · Check yourself for symptoms to know which step to follow in your action plan. Watch for things like being short of breath, having chest tightness, coughing, and wheezing. Also notice if symptoms wake you up at night or if you get tired quickly when you exercise. · You may want to learn how to use a peak flow meter. This measures how open your airways are. It may help you know when you will have an asthma attack. To treat attacks when they occur  Use your asthma action plan when you have an attack. Your quick-relief medicine, such as albuterol, will stop an asthma attack. It relaxes the muscles that get tight around the airways. · Take your quick-relief medicine exactly as prescribed. Talk with your doctor if you have any problems with your medicine. · Keep this medicine with you at all times. · You may need to use this medicine before you exercise. If your doctor prescribed corticosteroid pills to use during an attack, take them as directed. They may take hours to work, but they may shorten the attack and help you breathe better. When should you call for help? Call 911 anytime you think you may need emergency care.  For example, call if:    · You have severe trouble breathing.    Call your doctor now or seek immediate medical care if:    · You have new or worse shortness of breath.     · You are coughing more deeply or more often, especially if you notice more mucus or a change in the color of your mucus.     · You cough up blood.     · You have new or increased swelling in your legs or belly.     · You have a fever.     · You have used your quick-relief medicine but you are still short of breath.    Watch closely for changes in your health, and be sure to contact your doctor if you have any problems. Where can you learn more? Go to http://robert-jorge.info/. Enter A350 in the search box to learn more about \"COPD and Asthma: Care Instructions. \"  Current as of: September 5, 2018  Content Version: 11.9  © 5403-2177 [employer]. Care instructions adapted under license by [employer] (which disclaims liability or warranty for this information). If you have questions about a medical condition or this instruction, always ask your healthcare professional. [place] [address] any warranty or liability for your use of this information. Low Blood Pressure: Care Instructions  Your Care Instructions    Blood pressure is a measurement of the force of the blood against the walls of the blood vessels during and after each beat of the heart. Low blood pressure is also called hypotension. It means that your blood pressure is much lower than normal. Some people, especially young, slim women, may have slightly low blood pressure without symptoms. But in many people, low blood pressure can cause symptoms such as feeling dizzy or lightheaded. When your blood pressure is too low, your heart, brain, and other organs do not get enough blood. Low blood pressure can be caused by many things, including heart problems and some medicines. Diabetes that is not under control can cause your blood pressure to drop. And so can a severe allergic reaction or infection.  Another cause is dehydration, which is when your body loses too much fluid.  Treatment for low blood pressure depends on the cause. Follow-up care is a key part of your treatment and safety. Be sure to make and go to all appointments, and call your doctor if you are having problems. It's also a good idea to know your test results and keep a list of the medicines you take. How can you care for yourself at home? · Drink plenty of fluids, enough so that your urine is light yellow or clear like water. If you have kidney, heart, or liver disease and have to limit fluids, talk with your doctor before you increase the amount of fluids you drink. · Be safe with medicines. Call your doctor if you think you are having a problem with your medicine. You will get more details on the specific medicines your doctor prescribes. · Stand up or get out of bed very slowly to allow your body to adjust.  · Get plenty of rest.  · Do not smoke. Smoking increases your risk of heart attack. If you need help quitting, talk to your doctor about stop-smoking programs and medicines. These can increase your chances of quitting for good. · Limit alcohol to 2 drinks a day for men and 1 drink a day for women. Alcohol may interfere with your medicine. In addition, alcohol can make your low blood pressure worse by causing your body to lose water. When should you call for help? Call 911 anytime you think you may need emergency care. For example, call if:    · You passed out (lost consciousness).    Call your doctor now or seek immediate medical care if:    · You are dizzy or lightheaded, or you feel like you may faint.    Watch closely for changes in your health, and be sure to contact your doctor if you have any problems. Where can you learn more? Go to http://robert-jorge.info/. Enter C304 in the search box to learn more about \"Low Blood Pressure: Care Instructions. \"  Current as of: July 22, 2018  Content Version: 11.9  © 5526-8934 [employer].  Care instructions adapted under license by [employer] (which disclaims liability or warranty for this information). If you have questions about a medical condition or this instruction, always ask your healthcare professional. [address] any warranty or liability for your use of this information. Chronic Obstructive Pulmonary Disease (COPD) Flare-Ups: Care Instructions  Your Care Instructions    Chronic obstructive pulmonary disease (COPD) is a lung disease that makes it hard to breathe. It is caused by damage to the lungs over many years, usually from smoking. COPD is often a mix of two diseases:  · Chronic bronchitis: The airways that carry air to the lungs (bronchial tubes) get inflamed and make a lot of mucus. This can narrow or block the airways. · Emphysema: In a healthy person, the tiny air sacs in the lungs are like balloons. As you breathe in and out, they get bigger and smaller to move air through your lungs. But with emphysema, these air sacs are damaged and lose their stretch. Less air gets in and out of the lungs. Many people with COPD have attacks called flare-ups or exacerbations. This is when your usual symptoms quickly get worse and stay worse. The doctor has checked you carefully. But problems can develop later. If you notice any problems or new symptoms, get medical treatment right away. Follow-up care is a key part of your treatment and safety. Be sure to make and go to all appointments, and call your doctor if you are having problems. It's also a good idea to know your test results and keep a list of the medicines you take. How can you care for yourself at home? · Be safe with medicines. Take your medicines exactly as prescribed. Call your doctor if you think you are having a problem with your medicine. You may be taking medicines such as:  ? Bronchodilators. These help open your airways and make breathing easier. ? Corticosteroids. These reduce airway inflammation.  They may be given as pills, in a vein, or in an inhaled form. You may go home with pills in addition to an inhaler that you already use. · A spacer may help you get more inhaled medicine to your lungs. Ask your doctor or pharmacist if a spacer is right for you. If it is, ask how to use it properly. · If your doctor prescribed antibiotics, take them as directed. Do not stop taking them just because you feel better. You need to take the full course of antibiotics. · If your doctor prescribed oxygen, use the flow rate your doctor has recommended. Do not change it without talking to your doctor first.  · Do not smoke. Smoking makes COPD worse. If you need help quitting, talk to your doctor about stop-smoking programs and medicines. These can increase your chances of quitting for good. When should you call for help? Call 911 anytime you think you may need emergency care. For example, call if:    · You have severe trouble breathing.    Call your doctor now or seek immediate medical care if:    · You have new or worse trouble breathing.     · Your coughing or wheezing gets worse.     · You cough up dark brown or bloody mucus (sputum).     · You have a new or higher fever.    Watch closely for changes in your health, and be sure to contact your doctor if:    · You notice more mucus or a change in the color of your mucus.     · You need to use your antibiotic or steroid pills.     · You do not get better as expected. Where can you learn more? Go to http://robert-jorge.info/. Enter F344 in the search box to learn more about \"Chronic Obstructive Pulmonary Disease (COPD) Flare-Ups: Care Instructions. \"  Current as of: September 5, 2018  Content Version: 11.9  © 1608-2280 [employer]. Care instructions adapted under license by [employer] (which disclaims liability or warranty for this information).  If you have questions about a medical condition or this instruction, always ask your healthcare professional. [address] any warranty or liability for your use of this information. Patient Education        Chronic Obstructive Pulmonary Disease (COPD): Care Instructions  Your Care Instructions    Chronic obstructive pulmonary disease (COPD) is a general term for a group of lung diseases, including emphysema and chronic bronchitis. People with COPD have decreased airflow in and out of the lungs, which makes it hard to breathe. The airways also can get clogged with thick mucus. Cigarette smoking is a major cause of COPD. Although there is no cure for COPD, you can slow its progress. Following your treatment plan and taking care of yourself can help you feel better and live longer. Follow-up care is a key part of your treatment and safety. Be sure to make and go to all appointments, and call your doctor if you are having problems. It's also a good idea to know your test results and keep a list of the medicines you take. How can you care for yourself at home?   Staying healthy    · Do not smoke. This is the most important step you can take to prevent more damage to your lungs. If you need help quitting, talk to your doctor about stop-smoking programs and medicines. These can increase your chances of quitting for good.     · Avoid colds and flu. Get a pneumococcal vaccine shot. If you have had one before, ask your doctor whether you need a second dose. Get the flu vaccine every fall. If you must be around people with colds or the flu, wash your hands often.     · Avoid secondhand smoke, air pollution, and high altitudes. Also avoid cold, dry air and hot, humid air. Stay at home with your windows closed when air pollution is bad.    Medicines and oxygen therapy    · Take your medicines exactly as prescribed. Call your doctor if you think you are having a problem with your medicine.     · You may be taking medicines such as:  ? Bronchodilators.  These help open your airways and make breathing easier. Bronchodilators are either short-acting (work for 6 to 9 hours) or long-acting (work for 24 hours). You inhale most bronchodilators, so they start to act quickly. Always carry your quick-relief inhaler with you in case you need it while you are away from home. ? Corticosteroids (prednisone, budesonide). These reduce airway inflammation. They come in pill or inhaled form. You must take these medicines every day for them to work well.     · A spacer may help you get more inhaled medicine to your lungs. Ask your doctor or pharmacist if a spacer is right for you. If it is, ask how to use it properly.     · Do not take any vitamins, over-the-counter medicine, or herbal products without talking to your doctor first.     · If your doctor prescribed antibiotics, take them as directed. Do not stop taking them just because you feel better. You need to take the full course of antibiotics.     · Oxygen therapy boosts the amount of oxygen in your blood and helps you breathe easier. Use the flow rate your doctor has recommended, and do not change it without talking to your doctor first.   Activity    · Get regular exercise. Walking is an easy way to get exercise. Start out slowly, and walk a little more each day.     · Pay attention to your breathing. You are exercising too hard if you cannot talk while you are exercising.     · Take short rest breaks when doing household chores and other activities.     · Learn breathing methods--such as breathing through pursed lips--to help you become less short of breath.     · If your doctor has not set you up with a pulmonary rehabilitation program, talk to him or her about whether rehab is right for you. Rehab includes exercise programs, education about your disease and how to manage it, help with diet and other changes, and emotional support. Diet    · Eat regular, healthy meals. Use bronchodilators about 1 hour before you eat to make it easier to eat.  Eat several small meals instead of three large ones. Drink beverages at the end of the meal. Avoid foods that are hard to chew.     · Eat foods that contain protein so that you do not lose muscle mass.     · Talk with your doctor if you gain too much weight or if you lose weight without trying.    Mental health    · Talk to your family, friends, or a therapist about your feelings. It is normal to feel frightened, angry, hopeless, helpless, and even guilty. Talking openly about bad feelings can help you cope. If these feelings last, talk to your doctor. When should you call for help? Call 911 anytime you think you may need emergency care. For example, call if:    · You have severe trouble breathing.    Call your doctor now or seek immediate medical care if:    · You have new or worse trouble breathing.     · You cough up blood.     · You have a fever.    Watch closely for changes in your health, and be sure to contact your doctor if:    · You cough more deeply or more often, especially if you notice more mucus or a change in the color of your mucus.     · You have new or worse swelling in your legs or belly.     · You are not getting better as expected. Where can you learn more? Go to http://robert-jorge.info/. Montse Feliciano in the search box to learn more about \"Chronic Obstructive Pulmonary Disease (COPD): Care Instructions. \"  Current as of: September 5, 2018  Content Version: 11.9  © 4037-7821 [employer]. Care instructions adapted under license by [employer] (which disclaims liability or warranty for this information). If you have questions about a medical condition or this instruction, always ask your healthcare professional. [address] any warranty or liability for your use of this information. As part of the discharge instructions, medications already given today were discussed with the patient.  The next dose due of all ordered meds was highlighted as part of the medication discharge instructions. Discussed with the patient the importance of taking medications as directed, as well as the side effects and adverse reactions to medications ordered. DISCHARGE SUMMARY from Nurse    PATIENT INSTRUCTIONS:    After general anesthesia or intravenous sedation, for 24 hours or while taking prescription Narcotics:  · Limit your activities  · Do not drive and operate hazardous machinery  · Do not make important personal or business decisions  · Do  not drink alcoholic beverages  · If you have not urinated within 8 hours after discharge, please contact your surgeon on call. Report the following to your surgeon:  · Excessive pain, swelling, redness or odor of or around the surgical area  · Temperature over 100.5  · Nausea and vomiting lasting longer than 4 hours or if unable to take medications  · Any signs of decreased circulation or nerve impairment to extremity: change in color, persistent  numbness, tingling, coldness or increase pain  · Any questions    What to do at Home:  Recommended activity: Activity as tolerated,     If you experience any of the following symptoms chest pains, shortness of breath, dizziness, increased weakness, increased temperature greater than 100.5, severe pains, nausea, vomiting, diarrhea, please follow up with PCP or call 911. *  Please give a list of your current medications to your Primary Care Provider. *  Please update this list whenever your medications are discontinued, doses are      changed, or new medications (including over-the-counter products) are added. *  Please carry medication information at all times in case of emergency situations. These are general instructions for a healthy lifestyle:    No smoking/ No tobacco products/ Avoid exposure to second hand smoke  Surgeon General's Warning:  Quitting smoking now greatly reduces serious risk to your health.     Obesity, smoking, and sedentary lifestyle greatly increases your risk for illness    A healthy diet, regular physical exercise & weight monitoring are important for maintaining a healthy lifestyle    You may be retaining fluid if you have a history of heart failure or if you experience any of the following symptoms:  Weight gain of 3 pounds or more overnight or 5 pounds in a week, increased swelling in our hands or feet or shortness of breath while lying flat in bed. Please call your doctor as soon as you notice any of these symptoms; do not wait until your next office visit. Recognize signs and symptoms of STROKE:    F-face looks uneven    A-arms unable to move or move unevenly    S-speech slurred or non-existent    T-time-call 911 as soon as signs and symptoms begin-DO NOT go       Back to bed or wait to see if you get better-TIME IS BRAIN. Warning Signs of HEART ATTACK     Call 911 if you have these symptoms:   Chest discomfort. Most heart attacks involve discomfort in the center of the chest that lasts more than a few minutes, or that goes away and comes back. It can feel like uncomfortable pressure, squeezing, fullness, or pain.  Discomfort in other areas of the upper body. Symptoms can include pain or discomfort in one or both arms, the back, neck, jaw, or stomach.  Shortness of breath with or without chest discomfort.  Other signs may include breaking out in a cold sweat, nausea, or lightheadedness. Don't wait more than five minutes to call 911 - MINUTES MATTER! Fast action can save your life. Calling 911 is almost always the fastest way to get lifesaving treatment. Emergency Medical Services staff can begin treatment when they arrive -- up to an hour sooner than if someone gets to the hospital by car. The discharge information has been reviewed with the patient. The patient verbalized understanding.   Discharge medications reviewed with the patient and appropriate educational materials and side effects teaching were provided. ___________________________________________________________________________________________________________________________________  Patient armband removed and shredded  MyChart Activation    Thank you for requesting access to xkoto. Please follow the instructions below to securely access and download your online medical record. xkoto allows you to send messages to your doctor, view your test results, renew your prescriptions, schedule appointments, and more. How Do I Sign Up? 1. In your internet browser, go to [email or web address]  2. Click on the First Time User? Click Here link in the Sign In box. You will be redirect to the New Member Sign Up page. 3. Enter your xkoto Access Code exactly as it appears below. You will not need to use this code after youve completed the sign-up process. If you do not sign up before the expiration date, you must request a new code. MyChart Access Code: Activation code not generated  Current xkoto Status: Active (This is the date your [employer] access code will )    4. Enter the last four digits of your Social Security Number (xxxx) and Date of Birth (mm/dd/yyyy) as indicated and click Submit. You will be taken to the next sign-up page. 5. Create a xkoto ID. This will be your xkoto login ID and cannot be changed, so think of one that is secure and easy to remember. 6. Create a xkoto password. You can change your password at any time. 7. Enter your Password Reset Question and Answer. This can be used at a later time if you forget your password. 8. Enter your e-mail address. You will receive e-mail notification when new information is available in [address] 9. Click Sign Up. You can now view and download portions of your medical record. 10. Click the Download Summary menu link to download a portable copy of your medical information.     Additional Information    If you have questions, please visit the Frequently Asked Questions section of the [employer] website at [email or web address] Remember, [employer] is NOT to be used for urgent needs. For medical emergencies, dial 911.

## 2019-03-06 ENCOUNTER — PATIENT OUTREACH (OUTPATIENT)
Dept: CARDIOLOGY CLINIC | Age: 74
End: 2019-03-06

## 2019-03-06 RX ORDER — BUDESONIDE AND FORMOTEROL FUMARATE DIHYDRATE 160; 4.5 UG/1; UG/1
2 AEROSOL RESPIRATORY (INHALATION) 2 TIMES DAILY
COMMUNITY
End: 2019-04-11 | Stop reason: CLARIF

## 2019-03-06 NOTE — PROGRESS NOTES
Hospital Discharge Follow-Up      Date/Time:  3/6/2019 8:58 AM    [facility] and discharged on 19 for Acute exacerbation of COPD. The physician discharge summary was available at the time of outreach. Patient was contacted within 2 business days of discharge. Method of communication with patient :phone    Inpatient RRAT score: 40  Was this a readmission? no   Patient stated reason for the readmission: n/a    Nurse Navigator (NN) contacted the patient by telephone to perform post hospital discharge assessment. Verified name and  with patient as identifiers. Provided introduction to self, and explanation of the Nurse Navigator role. Reviewed discharge instructions and red flags with patient who verbalized understanding. Patient given an opportunity to ask questions and does not have any further questions or concerns at this time. The patient agrees to contact the PCP office for questions related to their healthcare. NN provided contact information for future reference. Disease Specific:   COPD    Summary of patient's top problems:  1. No care take in home although family is nearby  2. Comorbidity: Hx of colon CA w/ surg in Sep 2018 and 2018 - has colostomy bag  3. [facility] orders at discharge: home health on D/C, prior history with non [address] [place] [address] Street: [facility]  Date of initial visit: Admitted to [facility] on 3/5/19    Durable Medical Equipment ordered/company: [employer] 8 Equipment received: pt w/ no needs upon d/c    Barriers to care? none identified at this time.     Advance Care Planning:   Does patient have an Advance Directive:  reviewed and current     Medication(s):   New Medications at Discharge: Levoquin 500 mg  Changed Medications at Discharge: Prednisone  Discontinued Medications at Discharge: Hydrocodone and acetaminophen (norco)    Medication reconciliation was performed with patient, who verbalizes understanding of administration of home medications. There were no barriers to obtaining medications identified at this time. Referral to Pharm D needed: no     Current Outpatient Medications   Medication Sig    budesonide-formoterol (SYMBICORT) 160-4.5 mcg/actuation HFAA Take 2 Puffs by inhalation two (2) times a day.  levoFLOXacin (LEVAQUIN) 500 mg tablet Take 1 Tab by mouth every twenty-four (24) hours for 4 days.  predniSONE (DELTASONE) 10 mg tablet 4 tabs ( 40 mg ) po daily for 4 days, then 2 tabs ( 20 mg ) po daily for 4 days, then 1 tab ( 10 mg ) po daily till seen by pulmonologist    simethicone (GAS-X) 80 mg chewable tablet Take 80 mg by mouth every six (6) hours as needed for Flatulence.  acetylcysteine 500 mg cap Take 500 mg by mouth two (2) times a day.  raNITIdine (ZANTAC) 150 mg tablet Take 150 mg by mouth two (2) times a day.  albuterol (PROVENTIL VENTOLIN) 2.5 mg /3 mL (0.083 %) nebulizer solution 3 mL by Nebulization route every four (4) hours as needed for Wheezing or Shortness of Breath. Diag. Code: J44.9, R09.02    therapeutic multivitamin (THERAGRAN) tablet Take 1 Tab by mouth daily.  acetaminophen (TYLENOL EXTRA STRENGTH) 500 mg tablet Take  by mouth every six (6) hours as needed for Pain.  albuterol (PROAIR HFA) 90 mcg/actuation inhaler INHALE 2 PUFFS BY MOUTH EVERY 4 HOURS AS NEEDED FOR WHEEZING OR SHORTNESS OF BREATH    tiotropium bromide (SPIRIVA RESPIMAT) 2.5 mcg/actuation inhaler Take 2 Puffs by inhalation daily.  LORazepam (ATIVAN) 1 mg tablet Take  by mouth two (2) times daily as needed for Anxiety.  rOPINIRole (REQUIP) 1 mg tablet Take  by mouth two (2) times a day.  OXYGEN-AIR DELIVERY SYSTEMS 2 L by Does Not Apply route. O2 via nasal cannula with bedtime and activity.  O2 Company: [employer]    OTHER Check CBC, CMP, Mg in 4 days, results to PCP immediately, Diagnosis- COPD    OTHER Incentive spirometry- use as directed     No current facility-administered medications for this visit. [facility] follow up appointment(s):   Future Appointments   Date Time Provider Aubrie Mills   3/13/2019  3:45 PM Frankie Horton MD [address]   4/11/2019  2:00 PM Carine Mccabe MD [facility]   5/2/2019  2:15 PM Beth Mari MD [facility]      Non-[facility] follow up appointment(s): Appt w/ PCP Satish Rogers on 3/7/19    [facility]:  n/a     COPD Note    Smoking History: Quit in 2018 after colon ca Dx    Name of Pulmonologist:  Dr Benson Ward. Last office visit w/ Pulm: 01/12/19    Next office visit w/ Pulm: 03/13/19    Do you have a home pulse ox?  yes    Are you using a rescue inhaler? yes, Type Albuterol, frequency 3  Nebulizer? yes, frequency 5  Sputum Production?  Yes, but patient reports that decreased production   Description?yellow/thin    Med Rec*   CONNOR Short acting muscarinic antagonist   []  Atrovent HFA/ipratropium bromide    OSMANY Short acting Beta2-agonist bronchodilators   [x]  Pro Air HFA / albuterol sulfate   []  Pro Air Respiclick /albuterol sulfate inhalation powder    []  Proventil HFA / albuterol sulfate    []  Ventolin HFA / albuterol sulfate    []  Xopenex HFA/ l evalbuterol tartrate     CONNOR/OSMANY Short acting muscarinic antagonist and Short acting                 Beta2-agonist bronchodilators   []  Combivent Respimat / ipratropium bromide and albuterol    LAMA Long acting muscarinic antagonist   []  Suffern Carolus Neohaler / Glycopyrrolate inhalation powder    []  Incruse Ellipta / Umeclidinum inhalation powder    [x]  Spiriva HandiHaler / tiotropium bromide inhalation powder    []  Spiriva Respimat / tiotropium bromide    []  Tudorza Pressair / aclidinium bromide inhalation powder    LABA- Long-acting beta2-agonist bronchodilators     []  Arcapta Neohaler / Indacaterol inhalation powder    []  Serevent Diskus / Salmeterol xinafoate inhalation powder    []  Stiverdi Respimat / Charito Service hydrochloride    LAMA/ LABA Long acting muscarinic antagonist and Long-acting beta2-agonist bronchodilators   []  Anoro Ellipta / Umeclidinium and vilanterol inhalation powder    []  Bevespi Aerosphere / Gycopyrrolate and formoterol fumarate                                inhalation aerosol;     []  Stiolto Respimat / Tiotropium bromide and olodaterol    []  Utibron Neohaler / Indacaterol and glycopyrrolate inhalation powder    ICS Inhaled Corticosteroids (Steroids- rinse mouth)   []   Aerospan Diskus / Fluticasone propionate and salmeterol inhalation        powder;    []  Advair HFA / Fluticasone propionate and salmeterol xinafoate  []  Arnuity Ellipta/ Fluticasone furoate inhalation powder    []  Asmanex HFA / Mometasone furoate    []  AsmanexTwisthaler / Mometasone furoate inhalation powder    []  Flovent Diskus / Fluticasone propionate inhalation powder     []  Flovent HFA / Fluticasone propionate    []  Pulmicort FlexHaler / Budesonide inhalation powder    []  QVAR (HFA) / Beclomethasome dipropionate    ICS/LABA Inhaled Corticosteroids and Long-acting beta2-agonist bronchodilators   []  Advair Diskus / Fluticasone propionate and salmeterol inhalation                           powder     []  Advair HFA / fluticasone propionate and salmeterol xinaoate    []  Breo Ellipta / fluticasone furoate and vilanterol inhalation powder    []  Dulera / mometasone furoate and formoterol fumarate dehydrate    [x]  Symbicort (HFA) / budesonide and formoterol fumarate dehydrate    PDE-4 inhibitor   []  Deliresp / Roflumilast    Long term oxygen therapy (LTOT): yes  How many liters? 2L/min    Antibiotics at time of discharge: Levaquin 500 mg every day x 4 days    [facility] Company and Contact Information: June Green [phone]    Zone: (Pt Reported)  green     Goals      Attend follow up appointments on schedule      3/6/19 Pt will attend all scheduled f/u appointments by 4/11/19       Knowledge and adherence of medication (ie. action, side effects, missed dose, etc.).      3/6/19 Pt will be able to repeat back all medications and their functions by 4/6/19         Participates in moderate exercise (ie. consider referral to pulmonary rehab)      3/6/19 Pt will report an increase in her activity to include walking greater distances.  If no progress by 3/18/19, refer to pulm rehab

## 2019-03-07 ENCOUNTER — HOME CARE VISIT (OUTPATIENT)
Dept: SCHEDULING | Facility: HOME HEALTH | Age: 74
End: 2019-03-07
Payer: MEDICARE

## 2019-03-07 VITALS
HEART RATE: 86 BPM | RESPIRATION RATE: 18 BRPM | OXYGEN SATURATION: 97 % | TEMPERATURE: 98.6 F | SYSTOLIC BLOOD PRESSURE: 126 MMHG | DIASTOLIC BLOOD PRESSURE: 74 MMHG

## 2019-03-07 PROCEDURE — 3331090001 HH PPS REVENUE CREDIT

## 2019-03-07 PROCEDURE — G0299 HHS/HOSPICE OF RN EA 15 MIN: HCPCS

## 2019-03-07 PROCEDURE — 3331090002 HH PPS REVENUE DEBIT

## 2019-03-07 PROCEDURE — 400013 HH SOC

## 2019-03-08 ENCOUNTER — PATIENT OUTREACH (OUTPATIENT)
Dept: CARDIOLOGY CLINIC | Age: 74
End: 2019-03-08

## 2019-03-08 PROCEDURE — 3331090001 HH PPS REVENUE CREDIT

## 2019-03-08 PROCEDURE — 3331090002 HH PPS REVENUE DEBIT

## 2019-03-08 NOTE — PROGRESS NOTES
[facility] Discharge Follow-Up      Date/Time:  3/8/2019 8:58 AM    Patient was [facility] on [facility] and discharged on 03/05/19 for Acute exacerbation of COPD. The physician discharge summary was available at the time of outreach. Patient was contacted within 2 business days of discharge. Pt reports: felling that she is at her baseline. Mild SOB w/ exertion, but she states she uses her energy conservation strategies to get through her day. Method of communication with patient :phone    Inpatient RRAT score: 40  Was this a readmission? no   Patient stated reason for the readmission: n/a    Disease Specific:   COPD    Summary of patient's top problems:  1. No care take in home although family is nearby  2. Comorbidity: Hx of colon CA w/ surg in Sep 2018 and Nov 2018 - has colostomy bag  3. Home O2    Home Health: Pt reports H/H visits occuring    Barriers to care? none identified at this time. Advance Care Planning:   Does patient have an Advance Directive:  reviewed and current     Medication(s):   Medication reconciliation was performed with patient, who verbalizes understanding of administration of home medications. There were no barriers to obtaining medications identified at this time. Referral to Pharm D needed: no     Current Outpatient Medications   Medication Sig    fluconazole (DIFLUCAN) 200 mg tablet Take 200 mg by mouth daily as needed for Other (mouth sores).  budesonide-formoterol (SYMBICORT) 160-4.5 mcg/actuation HFAA Take 2 Puffs by inhalation two (2) times a day.  levoFLOXacin (LEVAQUIN) 500 mg tablet Take 1 Tab by mouth every twenty-four (24) hours for 4 days.     OTHER Check CBC, CMP, Mg in 4 days, results to PCP immediately, Diagnosis- COPD    OTHER Incentive spirometry- use as directed    predniSONE (DELTASONE) 10 mg tablet 4 tabs ( 40 mg ) po daily for 4 days, then 2 tabs ( 20 mg ) po daily for 4 days, then 1 tab ( 10 mg ) po daily till seen by pulmonologist    simethicone (GAS-X) 80 mg chewable tablet Take 80 mg by mouth every six (6) hours as needed for Flatulence.  acetylcysteine 500 mg cap Take 500 mg by mouth two (2) times a day. (Patient taking differently: Take 600 mg by mouth two (2) times a day.)    raNITIdine (ZANTAC) 150 mg tablet Take 150 mg by mouth two (2) times a day.  albuterol (PROVENTIL VENTOLIN) 2.5 mg /3 mL (0.083 %) nebulizer solution 3 mL by Nebulization route every four (4) hours as needed for Wheezing or Shortness of Breath. Diag. Code: J44.9, R09.02    therapeutic multivitamin (THERAGRAN) tablet Take 1 Tab by mouth daily.  acetaminophen (TYLENOL EXTRA STRENGTH) 500 mg tablet Take  by mouth every six (6) hours as needed for Pain.  albuterol (PROAIR HFA) 90 mcg/actuation inhaler INHALE 2 PUFFS BY MOUTH EVERY 4 HOURS AS NEEDED FOR WHEEZING OR SHORTNESS OF BREATH    tiotropium bromide (SPIRIVA RESPIMAT) 2.5 mcg/actuation inhaler Take 2 Puffs by inhalation daily.  LORazepam (ATIVAN) 1 mg tablet Take  by mouth two (2) times daily as needed for Anxiety.  rOPINIRole (REQUIP) 1 mg tablet Take  by mouth two (2) times a day.  OXYGEN-AIR DELIVERY SYSTEMS 2 L by Does Not Apply route. O2 via nasal cannula with bedtime and activity. O2 Company: [employer]     No current facility-administered medications for this visit. [facility] follow up appointment(s):   Future Appointments   Date Time Provider Aubrie Mills   3/9/2019 To Be Determined Nikki Nixon RN [address] [facility] [place] [address] [facility] [address]   3/12/2019 To Be Determined Collin Jose RN [address]   3/13/2019  3:45 PM Garnette Ormond, MD [address]   3/15/2019 To Be Determined Collin Jose RN [address] [place] [address] [place] [address] [facility] [address]   4/11/2019  2:00 PM Joshua Swartz MD [facility]   5/2/2019  2:15 PM Irina Garcia MD [facility]      Non-[facility] follow up appointment(s): Appt w/ PCP Satish Cantu on 3/7/19. Pt reports making this appointment.  States no changes made at this time. Pt reports aware of all dates and times for f/u appt. [facility]:  n/a     COPD Note    Smoking History: Quit in 2018 after colon ca Dx    Name of Pulmonologist:  Dr Radha Talbert. Last office visit w/ Pulm: 01/12/19    Next office visit w/ Pulm: 03/13/19    Do you have a home pulse ox?  yes    Are you using a rescue inhaler? yes, Type Albuterol, frequency 3  Nebulizer? yes, frequency 5  Sputum Production?  Yes, but patient reports that decreased production   Description?yellow/thin    Med Rec*   CONNOR Short acting muscarinic antagonist   []  Atrovent HFA/ipratropium bromide    OSMANY Short acting Beta2-agonist bronchodilators   [x]  Pro Air HFA / albuterol sulfate   []  Pro Air Respiclick /albuterol sulfate inhalation powder    []  Proventil HFA / albuterol sulfate    []  Ventolin HFA / albuterol sulfate    []  Xopenex HFA/ l evalbuterol tartrate     CONNOR/OSMANY Short acting muscarinic antagonist and Short acting                 Beta2-agonist bronchodilators   []  Combivent Respimat / ipratropium bromide and albuterol    LAMA Long acting muscarinic antagonist   []  Zygmunt [place] Neohaler / Glycopyrrolate inhalation powder    []  Incruse Ellipta / Umeclidinum inhalation powder    [x]  Spiriva HandiHaler / tiotropium bromide inhalation powder    []  Spiriva Respimat / tiotropium bromide    []  Padmini Sathya / aclidinium bromide inhalation powder    LABA- Long-acting beta2-agonist bronchodilators     []  Arcapta Neohaler / Indacaterol inhalation powder    []  Serevent Diskus / Salmeterol xinafoate inhalation powder    []  Stiverdi Respimat / Olodaterol hydrochloride    LAMA/ LABA Long acting muscarinic antagonist and Long-acting beta2-agonist bronchodilators   []  Anoro Ellipta / Umeclidinium and vilanterol inhalation powder    []  Bevespi Aerosphere / Gycopyrrolate and formoterol fumarate                                inhalation aerosol;     []  Stiolto Respimat / Tiotropium bromide and olodaterol    []  Utibron Neohaler / Indacaterol and glycopyrrolate inhalation powder    ICS Inhaled Corticosteroids (Steroids- rinse mouth)   []   Aerospan Diskus / Fluticasone propionate and salmeterol inhalation        powder;    []  Advair HFA / Fluticasone propionate and salmeterol xinafoate  []  Arnuity Ellipta/ Fluticasone furoate inhalation powder    []  Asmanex HFA / Mometasone furoate    []  AsmanexTwisthaler / Mometasone furoate inhalation powder    []  Flovent Diskus / Fluticasone propionate inhalation powder     []  Flovent HFA / Fluticasone propionate    []  Pulmicort FlexHaler / Budesonide inhalation powder    []  QVAR (HFA) / Beclomethasome dipropionate    ICS/LABA Inhaled Corticosteroids and Long-acting beta2-agonist bronchodilators   []  Advair Diskus / Fluticasone propionate and salmeterol inhalation                           powder     []  Advair HFA / fluticasone propionate and salmeterol xinaoate    []  Breo Ellipta / fluticasone furoate and vilanterol inhalation powder    []  Dulera / mometasone furoate and formoterol fumarate dehydrate    [x]  Symbicort (HFA) / budesonide and formoterol fumarate dehydrate    PDE-4 inhibitor   []  Deliresp / Roflumilast    Long term oxygen therapy (LTOT): yes  How many liters? 2L/min    Antibiotics at time of discharge: Levaquin 500 mg every day x 4 days    [facility] Company and Contact Information: [facility] [phone]    Zone: (Pt Reported)  green     Goals      Attend follow up appointments on schedule      3/6/19 Pt will attend all scheduled f/u appointments by 4/11/19       Knowledge and adherence of medication (ie. action, side effects, missed dose, etc.).      3/6/19 Pt will be able to repeat back all medications and their functions by 4/6/19         Participates in moderate exercise (ie. consider referral to pulmonary rehab)      3/6/19 Pt will report an increase in her activity to include walking greater distances.  If no progress by 3/18/19, refer to pulm rehab

## 2019-03-09 ENCOUNTER — HOSPITAL ENCOUNTER (OUTPATIENT)
Dept: LAB | Age: 74
Discharge: HOME | End: 2019-03-09
Payer: MEDICARE

## 2019-03-09 ENCOUNTER — HOME CARE VISIT (OUTPATIENT)
Dept: SCHEDULING | Facility: HOME HEALTH | Age: 74
End: 2019-03-09
Payer: MEDICARE

## 2019-03-09 LAB
ALBUMIN SERPL-MCNC: 3.5 G/DL (ref 3.4–5)
ALBUMIN/GLOB SERPL: 1.3 {RATIO} (ref 0.8–1.7)
ALP SERPL-CCNC: 69 U/L (ref 45–117)
ALT SERPL-CCNC: 44 U/L (ref 13–56)
ANION GAP SERPL CALC-SCNC: 8 MMOL/L (ref 3–18)
AST SERPL-CCNC: 14 U/L (ref 15–37)
BILIRUB SERPL-MCNC: 0.4 MG/DL (ref 0.2–1)
BUN SERPL-MCNC: 46 MG/DL (ref 7–18)
BUN/CREAT SERPL: 55 (ref 12–20)
CALCIUM SERPL-MCNC: 8.7 MG/DL (ref 8.5–10.1)
CHLORIDE SERPL-SCNC: 101 MMOL/L (ref 100–108)
CO2 SERPL-SCNC: 29 MMOL/L (ref 21–32)
CREAT SERPL-MCNC: 0.83 MG/DL (ref 0.6–1.3)
ERYTHROCYTE [DISTWIDTH] IN BLOOD BY AUTOMATED COUNT: 13.9 % (ref 11.6–14.5)
GLOBULIN SER CALC-MCNC: 2.6 G/DL (ref 2–4)
GLUCOSE SERPL-MCNC: 85 MG/DL (ref 74–99)
HCT VFR BLD AUTO: 38.8 % (ref 35–45)
HGB BLD-MCNC: 13 G/DL (ref 12–16)
MAGNESIUM SERPL-MCNC: 2.2 MG/DL (ref 1.6–2.6)
MCH RBC QN AUTO: 32.2 PG (ref 24–34)
MCHC RBC AUTO-ENTMCNC: 33.5 G/DL (ref 31–37)
MCV RBC AUTO: 96 FL (ref 74–97)
PLATELET # BLD AUTO: 331 K/UL (ref 135–420)
PMV BLD AUTO: 9.8 FL (ref 9.2–11.8)
POTASSIUM SERPL-SCNC: 4 MMOL/L (ref 3.5–5.5)
PROT SERPL-MCNC: 6.1 G/DL (ref 6.4–8.2)
RBC # BLD AUTO: 4.04 M/UL (ref 4.2–5.3)
SODIUM SERPL-SCNC: 138 MMOL/L (ref 136–145)
WBC # BLD AUTO: 13.4 K/UL (ref 4.6–13.2)

## 2019-03-09 PROCEDURE — 3331090002 HH PPS REVENUE DEBIT

## 2019-03-09 PROCEDURE — 3331090001 HH PPS REVENUE CREDIT

## 2019-03-09 PROCEDURE — 80053 COMPREHEN METABOLIC PANEL: CPT

## 2019-03-09 PROCEDURE — G0300 HHS/HOSPICE OF LPN EA 15 MIN: HCPCS

## 2019-03-09 PROCEDURE — 85027 COMPLETE CBC AUTOMATED: CPT

## 2019-03-09 PROCEDURE — 83735 ASSAY OF MAGNESIUM: CPT

## 2019-03-10 VITALS — SYSTOLIC BLOOD PRESSURE: 108 MMHG | DIASTOLIC BLOOD PRESSURE: 60 MMHG | TEMPERATURE: 98 F

## 2019-03-10 PROCEDURE — 3331090002 HH PPS REVENUE DEBIT

## 2019-03-10 PROCEDURE — 3331090001 HH PPS REVENUE CREDIT

## 2019-03-11 ENCOUNTER — HOME CARE VISIT (OUTPATIENT)
Dept: SCHEDULING | Facility: HOME HEALTH | Age: 74
End: 2019-03-11
Payer: MEDICARE

## 2019-03-11 ENCOUNTER — PATIENT OUTREACH (OUTPATIENT)
Dept: CARDIOLOGY CLINIC | Age: 74
End: 2019-03-11

## 2019-03-11 VITALS
OXYGEN SATURATION: 98 % | HEART RATE: 81 BPM | DIASTOLIC BLOOD PRESSURE: 50 MMHG | TEMPERATURE: 97.8 F | SYSTOLIC BLOOD PRESSURE: 94 MMHG

## 2019-03-11 PROCEDURE — 3331090002 HH PPS REVENUE DEBIT

## 2019-03-11 PROCEDURE — G0151 HHCP-SERV OF PT,EA 15 MIN: HCPCS

## 2019-03-11 PROCEDURE — 3331090001 HH PPS REVENUE CREDIT

## 2019-03-12 ENCOUNTER — HOME CARE VISIT (OUTPATIENT)
Dept: HOME HEALTH SERVICES | Facility: HOME HEALTH | Age: 74
End: 2019-03-12
Payer: MEDICARE

## 2019-03-12 ENCOUNTER — HOME CARE VISIT (OUTPATIENT)
Dept: SCHEDULING | Facility: HOME HEALTH | Age: 74
End: 2019-03-12
Payer: MEDICARE

## 2019-03-12 PROCEDURE — G0152 HHCP-SERV OF OT,EA 15 MIN: HCPCS

## 2019-03-12 PROCEDURE — 3331090001 HH PPS REVENUE CREDIT

## 2019-03-12 PROCEDURE — 3331090002 HH PPS REVENUE DEBIT

## 2019-03-12 PROCEDURE — G0299 HHS/HOSPICE OF RN EA 15 MIN: HCPCS

## 2019-03-13 ENCOUNTER — HOME CARE VISIT (OUTPATIENT)
Dept: SCHEDULING | Facility: HOME HEALTH | Age: 74
End: 2019-03-13
Payer: MEDICARE

## 2019-03-13 ENCOUNTER — OFFICE VISIT (OUTPATIENT)
Dept: PULMONOLOGY | Age: 74
End: 2019-03-13

## 2019-03-13 VITALS
HEART RATE: 91 BPM | HEIGHT: 66 IN | OXYGEN SATURATION: 97 % | TEMPERATURE: 97.7 F | DIASTOLIC BLOOD PRESSURE: 60 MMHG | BODY MASS INDEX: 18.48 KG/M2 | RESPIRATION RATE: 18 BRPM | SYSTOLIC BLOOD PRESSURE: 96 MMHG | WEIGHT: 115 LBS

## 2019-03-13 DIAGNOSIS — F17.200 TOBACCO DEPENDENCE: ICD-10-CM

## 2019-03-13 DIAGNOSIS — J44.1 COPD EXACERBATION (HCC): ICD-10-CM

## 2019-03-13 DIAGNOSIS — Z99.81 HYPOXEMIA REQUIRING SUPPLEMENTAL OXYGEN: ICD-10-CM

## 2019-03-13 DIAGNOSIS — R09.02 HYPOXEMIA REQUIRING SUPPLEMENTAL OXYGEN: ICD-10-CM

## 2019-03-13 DIAGNOSIS — J44.9 COPD, SEVERE (HCC): Primary | ICD-10-CM

## 2019-03-13 DIAGNOSIS — C18.9 ADENOCARCINOMA, COLON (HCC): ICD-10-CM

## 2019-03-13 PROCEDURE — 3331090001 HH PPS REVENUE CREDIT

## 2019-03-13 PROCEDURE — 3331090002 HH PPS REVENUE DEBIT

## 2019-03-13 PROCEDURE — G0157 HHC PT ASSISTANT EA 15: HCPCS

## 2019-03-13 NOTE — PROGRESS NOTES
HISTORY OF PRESENT ILLNESS  Lalo Dale is a 72 y.o. female with COPD. KUNAL visit after discharge for COPD exacerbation. Pt was admitted and discharged on Prednisone taper and usual maintenance medications for COPD, see list. She is back to her baseline level of function and denies SOB over her usual, no fever, chills, chest pain. Pt with usual cough occasionally productive of whitish phlegm. Pt is S/p Colectomy and ileostomy in late 2018 for AdenoCa of the colon, surgery was complicated by anastomotic leak, SBO and post op ileus requiring re-op. Pt has been discharged home and is now back to baseline level of comfort. Pt complains of intermittent hemoptysis, known since 2013, no fever or chills. Pt is on home O2, last FEV1 was 30% in Jan 2015. Pt has quit smoking since hospital admission in 2018. Pt notes no apparent response to maintenance Azithromycin with no change in amount and character of phlegm. Daliresp given on prior visit also did not improve symptoms and resulted in side effects. COPD   The history is provided by the patient and relative. This is a chronic problem. Progression since onset: waxing and waning. Associated symptoms include shortness of breath. Pertinent negatives include no chest pain, no abdominal pain and no headaches. The symptoms are aggravated by exertion. Treatments tried: see list. The treatment provided moderate relief. Review of Systems   Constitutional: Positive for malaise/fatigue. Negative for chills, diaphoresis and fever. Weight loss: improving. HENT: Negative for congestion, ear discharge, ear pain, hearing loss, nosebleeds, sinus pain, sore throat and tinnitus. Eyes: Negative for blurred vision, double vision, photophobia, pain, discharge and redness. Respiratory: Positive for cough, sputum production, shortness of breath and wheezing. Negative for stridor. Hemoptysis: intermittent.     Cardiovascular: Negative for chest pain, palpitations, orthopnea, claudication, leg swelling and PND. Gastrointestinal: Negative for abdominal pain, blood in stool, constipation, diarrhea, heartburn, melena, nausea and vomiting. Genitourinary: Negative for dysuria, flank pain, frequency, hematuria and urgency. Musculoskeletal: Positive for joint pain. Negative for back pain, falls and neck pain. Myalgias: resolved. Skin: Negative for itching and rash. Neurological: Negative for dizziness, tingling, tremors, sensory change, speech change, focal weakness, seizures, loss of consciousness, weakness and headaches. Endo/Heme/Allergies: Negative for environmental allergies and polydipsia. Bruises/bleeds easily. Psychiatric/Behavioral: Positive for depression. Negative for hallucinations, memory loss, substance abuse and suicidal ideas. The patient is nervous/anxious and has insomnia. Past Medical History:   Diagnosis Date    Anxiety     Arthritis     Asbestosis ([facility] [address]     Asthma     Back problem     Baker's cyst     Balance problems     Chronic lung disease     Cigarette smoker     [facility]otting disorder ([facility])     COPD (chronic obstructive pulmonary disease) ([facility])     oxygen 2/liters asbestosis    Depression     History of DVT (deep vein thrombosis)     Leg pain     Right    Lung disease     Nausea & vomiting     Osteoporosis     Restless leg syndrome     Spinal stenosis     Thromboembolus ([facility])     Vitamin D deficiency      Current Outpatient Medications on File Prior to Visit   Medication Sig Dispense Refill    OXYGEN-AIR DELIVERY SYSTEMS Take 2 L/min by inhalation continuous.  budesonide-formoterol (SYMBICORT) 160-4.5 mcg/actuation HFAA Take 2 Puffs by inhalation two (2) times a day.       OTHER Incentive spirometry- use as directed 1 Each 0    predniSONE (DELTASONE) 10 mg tablet 4 tabs ( 40 mg ) po daily for 4 days, then 2 tabs ( 20 mg ) po daily for 4 days, then 1 tab ( 10 mg ) po daily till seen by pulmonologist 35 Tab 0    simethicone (GAS-X) 80 mg chewable tablet Take 80 mg by mouth every six (6) hours as needed for Flatulence.  acetylcysteine 500 mg cap Take 500 mg by mouth two (2) times a day. (Patient taking differently: Take 600 mg by mouth two (2) times a day.) 60 Cap 3    raNITIdine (ZANTAC) 150 mg tablet Take 150 mg by mouth two (2) times a day.  albuterol (PROVENTIL VENTOLIN) 2.5 mg /3 mL (0.083 %) nebulizer solution 3 mL by Nebulization route every four (4) hours as needed for Wheezing or Shortness of Breath. Diag. Code: J44.9, R09.02 375 Each 3    therapeutic multivitamin (THERAGRAN) tablet Take 1 Tab by mouth daily. 30 Tab 11    acetaminophen (TYLENOL EXTRA STRENGTH) 500 mg tablet Take 500 mg by mouth every six (6) hours as needed for Pain.  albuterol (PROAIR HFA) 90 mcg/actuation inhaler INHALE 2 PUFFS BY MOUTH EVERY 4 HOURS AS NEEDED FOR WHEEZING OR SHORTNESS OF BREATH 1 Inhaler 6    tiotropium bromide (SPIRIVA RESPIMAT) 2.5 mcg/actuation inhaler Take 2 Puffs by inhalation daily. 3 Inhaler 3    LORazepam (ATIVAN) 1 mg tablet Take  by mouth two (2) times daily as needed for Anxiety.  rOPINIRole (REQUIP) 1 mg tablet Take 1 mg by mouth two (2) times a day.  OXYGEN-AIR DELIVERY SYSTEMS 2 L by Does Not Apply route. O2 via nasal cannula with bedtime and activity. O2 Company: [employer]      fluconazole (DIFLUCAN) 200 mg tablet Take 200 mg by mouth daily as needed for Other (mouth sores).  OTHER Check CBC, CMP, Mg in 4 days, results to PCP immediately, Diagnosis- COPD 1 Each 0     No current facility-administered medications on file prior to visit.       Allergies   Allergen Reactions    Anoro Ellipta [Umeclidinium-Vilanterol] Rash and Other (comments)     Muscle cramps in arms    Aspirin Other (comments)     GI upset and bleeding    Augmentin [Amoxicillin-Pot Clavulanate] Not Reported This Time     Possible cramping    Doxycycline Nausea and Vomiting    Morphine Other (comments) Neurologic symptoms - severe agitation      Shellfish Derived Unknown (comments)     Pt able to eat small amounts per report     Sulfa (Sulfonamide Antibiotics) Rash     Patient relates no longer allergic     Social History     Socioeconomic History    Marital status:      Spouse name: Not on file    Number of children: Not on file    Years of education: Not on file    Highest education level: Not on file   Social Needs    Financial resource strain: Not on file    Food insecurity - worry: Not on file    Food insecurity - inability: Not on file   [employer] needs - medical: Not on file   [employer] needs - non-medical: Not on file   Occupational History    Not on file   Tobacco Use    Smoking status: Former Smoker     Packs/day: 0.50     Years: 50.00     Pack years: 25.00     Types: Cigarettes     Start date: 10/21/1964     Last attempt to quit: 10/20/2018     Years since quittin.3    Smokeless tobacco: Never Used   Substance and Sexual Activity    Alcohol use: No    Drug use: No    Sexual activity: Not on file   Other Topics Concern    Not on file   Social History Narrative    Not on file     Blood pressure 96/60, pulse 91, temperature 97.7 °F (36.5 °C), temperature source Oral, resp. rate 18, height 5' 6\" (1.676 m), weight 52.2 kg (115 lb), SpO2 97 %. Physical Exam   Constitutional: She is oriented to person, place, and time. She appears well-developed. No distress. Cachectic   HENT:   Head: Normocephalic and atraumatic. Nose: Nose normal.   Mouth/Throat: No oropharyngeal exudate. Eyes: Conjunctivae and EOM are normal. Pupils are equal, round, and reactive to light. Right eye exhibits no discharge. Left eye exhibits no discharge. No scleral icterus. Neck: No JVD present. No tracheal deviation present. No thyromegaly present. Cardiovascular: Normal rate, regular rhythm, normal heart sounds and intact distal pulses. Exam reveals no gallop.    No murmur heard.  Pulmonary/Chest: Effort normal. No stridor. No respiratory distress. Wheezes: lft upper lung field   She has no rales. She exhibits no tenderness. Distant breath sounds ,  Transmitted upper airway sounds   Abdominal: Soft. She exhibits no mass. There is no tenderness. There is no rebound. Ileostomy    Musculoskeletal: She exhibits no edema, tenderness or deformity. Lymphadenopathy:     She has no cervical adenopathy. Neurological: She is alert and oriented to person, place, and time. Coordination normal.   Skin: Skin is warm and dry. No rash noted. She is not diaphoretic. No erythema. No pallor. Small ecchymoses on both upper and lower extremities   Psychiatric: She has a normal mood and affect. Her behavior is normal. Judgment and thought content normal.     CT Results (most recent):  Results from Hospital Encounter encounter on 10/28/18   CT ABD PELV W CONT    Narrative EXAM: CT ABDOMEN/PELVIS  CPT code: 96491, 00552    CLINICAL INDICATION/HISTORY: Status post robotic right colectomy; abdominal  pain, possible abscess. COMPARISON: 20 and October 2018. TECHNIQUE: Helical axial images of the abdomen and pelvis were obtained from the  domes of the diaphragm to the ischia following the administration of intravenous  and oral contrast material.  Contrast: Isovue-300 - 80 mL IV; uneventful  administration. FINDINGS:   CT Abdomen: There is total atelectasis of the right lower lobe and a small  amount of posterior base peripheral and subsegmental atelectasis in the left  lower lobe. Is a mild-to-moderate right and mild left pleural effusion. The  remainder the visualized lung is grossly clear. There is a nasogastric tube  with the tip in the distal mid stomach. There is normal homogeneous enhancement  of the solid abdominal viscera. No biliary abnormalities are seen. The  tomographic nephrogram is unremarkable, bilaterally.   There is a right colectomy  and ileo-ascending colostomy with anastomosis in the right lower quadrant. The  bowel distal to the hepatic flexure is completely decompressed. There is  dilatation of the proximal to mid small bowel without involvement of the  duodenum. There is no specific transition point contrast extends into the more  distal nondilated ileum but no contrast is seen into the colon. There is no  significant retroperitoneal lymphadenopathy. No significant vascular  abnormalities are seen. There has been a probable kyphoplasty L5. There is a  subtle anterolisthesis at L3. There is been posterior fusion L3-4 through  L5-S1. There is mild canal stenosis at L3-4. CT Pelvis: Again noted is prominent dilatation of the mid small bowel with  contrast extending into progressively less dilated distal small bowel. The  visualized colon is completely decompressed. There is a mild amount of free  fluid in the posterior cul-de-sac, similar to that seen previously. No  significant adenopathy is seen. The uterus is surgically absent. No gross  adnexal abnormalities are seen. The bladder is unremarkable. The bones and  soft tissues are unremarkable. Impression IMPRESSION:     Status post right colectomy and ileal ascending colostomy, as described. There  is persistent dilatation of the proximal to mid small bowel. Contrast extends  into the progressively less dilated distal small bowel without evidence of a  transition point. No contrast is seen in the colon. The colon was completely  decompressed distal to the hepatic flexure. This is more suggestive of an  ileus. Complete atelectasis of the right lower lobe with some posterior peripheral and  subsegmental atelectasis in the left lower lobe. Mild-to-moderate right and  mild left pleural effusions. Subcutaneous emphysema has resolved. Hysterectomy; no gross adnexal abnormalities.   Small amount of posterior  cul-de-sac free fluid, unchanged.      ===================  Note: [facility] maintains that all CT scans at their facilities  are performed by using dose optimization technique as appropriate to a performed  examination, to include automated exposure control, adjustment of the mAs and/or  kVp according to patient size (including appropriate matching for site specific  examinations) or use of iterative reconstruction technique. ASSESSMENT and PLAN  Encounter Diagnoses   Name Primary?  COPD, severe ([facility] [address] Yes    COPD exacerbation ([facility] [address]     Hypoxemia requiring supplemental oxygen     Tobacco dependence     Adenocarcinoma, colon (HCC)        Pt with severe COPD on LTOT but is back to baseline post admission for exacerbation. Discussed need to wean off steroids gradually. Pt to wean down to 1 tab daily for 4 days then 1 tab po TIW. Continue  NAC tabs bid, see orders. She has failed prior trials of Azithromycin suppression and Daliresp. Continue maintenance therapy and rescue Albuterol. Continue  Tessalon perles prn.   RTC 3 months or sooner prn

## 2019-03-14 ENCOUNTER — HOME CARE VISIT (OUTPATIENT)
Dept: HOME HEALTH SERVICES | Facility: HOME HEALTH | Age: 74
End: 2019-03-14
Payer: MEDICARE

## 2019-03-14 VITALS — DIASTOLIC BLOOD PRESSURE: 80 MMHG | SYSTOLIC BLOOD PRESSURE: 117 MMHG

## 2019-03-14 PROCEDURE — 3331090001 HH PPS REVENUE CREDIT

## 2019-03-14 PROCEDURE — 3331090002 HH PPS REVENUE DEBIT

## 2019-03-15 ENCOUNTER — HOME CARE VISIT (OUTPATIENT)
Dept: SCHEDULING | Facility: HOME HEALTH | Age: 74
End: 2019-03-15
Payer: MEDICARE

## 2019-03-15 VITALS
DIASTOLIC BLOOD PRESSURE: 70 MMHG | HEART RATE: 72 BPM | OXYGEN SATURATION: 94 % | RESPIRATION RATE: 18 BRPM | TEMPERATURE: 98.3 F | SYSTOLIC BLOOD PRESSURE: 122 MMHG

## 2019-03-15 PROCEDURE — G0158 HHC OT ASSISTANT EA 15: HCPCS

## 2019-03-15 PROCEDURE — 3331090001 HH PPS REVENUE CREDIT

## 2019-03-15 PROCEDURE — 3331090002 HH PPS REVENUE DEBIT

## 2019-03-16 ENCOUNTER — HOME CARE VISIT (OUTPATIENT)
Dept: SCHEDULING | Facility: HOME HEALTH | Age: 74
End: 2019-03-16
Payer: MEDICARE

## 2019-03-16 VITALS
SYSTOLIC BLOOD PRESSURE: 142 MMHG | DIASTOLIC BLOOD PRESSURE: 82 MMHG | TEMPERATURE: 98.2 F | HEART RATE: 87 BPM | OXYGEN SATURATION: 98 % | RESPIRATION RATE: 14 BRPM

## 2019-03-16 PROCEDURE — 3331090001 HH PPS REVENUE CREDIT

## 2019-03-16 PROCEDURE — G0299 HHS/HOSPICE OF RN EA 15 MIN: HCPCS

## 2019-03-16 PROCEDURE — 3331090002 HH PPS REVENUE DEBIT

## 2019-03-17 VITALS
TEMPERATURE: 96.7 F | OXYGEN SATURATION: 98 % | RESPIRATION RATE: 20 BRPM | SYSTOLIC BLOOD PRESSURE: 108 MMHG | DIASTOLIC BLOOD PRESSURE: 68 MMHG | HEART RATE: 68 BPM

## 2019-03-17 PROCEDURE — 3331090002 HH PPS REVENUE DEBIT

## 2019-03-17 PROCEDURE — 3331090001 HH PPS REVENUE CREDIT

## 2019-03-18 ENCOUNTER — HOME CARE VISIT (OUTPATIENT)
Dept: SCHEDULING | Facility: HOME HEALTH | Age: 74
End: 2019-03-18
Payer: MEDICARE

## 2019-03-18 ENCOUNTER — PATIENT OUTREACH (OUTPATIENT)
Dept: CARDIOLOGY CLINIC | Age: 74
End: 2019-03-18

## 2019-03-18 PROCEDURE — 3331090001 HH PPS REVENUE CREDIT

## 2019-03-18 PROCEDURE — 3331090002 HH PPS REVENUE DEBIT

## 2019-03-18 PROCEDURE — G0158 HHC OT ASSISTANT EA 15: HCPCS

## 2019-03-18 NOTE — PROGRESS NOTES
Hospital Discharge Follow-Up      Date/Time:  3/18/19 3:17 PM    Patient was admitted to [facility] on 02/28/19 and discharged on 03/05/19 for Acute exacerbation of COPD. The physician discharge summary was available at the time of outreach. Patient was contacted within 2 business days of discharge. Pt reports: Pt states that she feels that she is at her baseline. Reports occasional mild SOB w/ exertion, but she states she uses her energy conservation strategies to get through her day. Method of communication with patient :phone    Inpatient RRAT score: 40  Was this a readmission? no   Patient stated reason for the readmission: n/a    Disease Specific:   COPD    Summary of patient's top problems:  1. No care taker in home although family is nearby  2. Comorbidity: Hx of colon CA w/ surg in Sep 2018 and Nov 2018 - has colostomy bag  3. Home O2    Home Health: Pt reports H/H visits occuring    Barriers to care? none identified at this time. Advance Care Planning:   Does patient have an Advance Directive:  reviewed and current     Medication(s):   Medication reconciliation was performed with patient, who verbalizes understanding of administration of home medications. There were no barriers to obtaining medications identified at this time. Referral to Pharm D needed: no     Current Outpatient Medications   Medication Sig    OXYGEN-AIR DELIVERY SYSTEMS Take 2 L/min by inhalation continuous.  fluconazole (DIFLUCAN) 200 mg tablet Take 200 mg by mouth daily as needed for Other (mouth sores).  budesonide-formoterol (SYMBICORT) 160-4.5 mcg/actuation HFAA Take 2 Puffs by inhalation two (2) times a day.     OTHER Check CBC, CMP, Mg in 4 days, results to PCP immediately, Diagnosis- COPD    OTHER Incentive spirometry- use as directed    predniSONE (DELTASONE) 10 mg tablet 4 tabs ( 40 mg ) po daily for 4 days, then 2 tabs ( 20 mg ) po daily for 4 days, then 1 tab ( 10 mg ) po daily till seen by pulmonologist    simethicone (GAS-X) 80 mg chewable tablet Take 80 mg by mouth every six (6) hours as needed for Flatulence.  acetylcysteine 500 mg cap Take 500 mg by mouth two (2) times a day. (Patient taking differently: Take 600 mg by mouth two (2) times a day.)    raNITIdine (ZANTAC) 150 mg tablet Take 150 mg by mouth two (2) times a day.  albuterol (PROVENTIL VENTOLIN) 2.5 mg /3 mL (0.083 %) nebulizer solution 3 mL by Nebulization route every four (4) hours as needed for Wheezing or Shortness of Breath. Diag. Code: J44.9, R09.02    therapeutic multivitamin (THERAGRAN) tablet Take 1 Tab by mouth daily.  acetaminophen (TYLENOL EXTRA STRENGTH) 500 mg tablet Take 500 mg by mouth every six (6) hours as needed for Pain.  albuterol (PROAIR HFA) 90 mcg/actuation inhaler INHALE 2 PUFFS BY MOUTH EVERY 4 HOURS AS NEEDED FOR WHEEZING OR SHORTNESS OF BREATH    tiotropium bromide (SPIRIVA RESPIMAT) 2.5 mcg/actuation inhaler Take 2 Puffs by inhalation daily.  LORazepam (ATIVAN) 1 mg tablet Take  by mouth two (2) times daily as needed for Anxiety.  rOPINIRole (REQUIP) 1 mg tablet Take 1 mg by mouth two (2) times a day.  OXYGEN-AIR DELIVERY SYSTEMS 2 L by Does Not Apply route. O2 via nasal cannula with bedtime and activity. O2 Company: [facility]     No current facility-administered medications for this visit.       [facility] follow up appointment(s):   Future Appointments   Date Time Provider Aubrie Mills   3/19/2019 11:00 AM Tyrel Gusman [facility]   3/20/2019 To Be Determined Kat Slim [address]   3/21/2019 11:00 AM Tyrel Gusman [facility]   3/25/2019 To Be Determined Kat Slim [address]   3/26/2019 To Be Determined Cornell Ritchie [facility]   3/27/2019 To Be Determined Kat Slim [address]   3/28/2019 To Be Determined Cornell [address]   4/2/2019 To Be Determined Kerri Summers, PT [address]   4/11/2019  2:00 PM Tara De Oliveira MD [facility]   5/2/2019  2:15 PM Corrie Tam  [address]      Pt reports aware of all dates and times for f/u appt. [facility]:  n/a     COPD Note    Smoking History: Quit in 2018 after colon ca Dx    Name of Pulmonologist:  Dr Moreno Tan. Last office visit w/ Pulm: 01/12/19    Next office visit w/ Pulm: 03/13/19    Do you have a home pulse ox?  yes    Are you using a rescue inhaler? yes, Type Albuterol, frequency 3  Nebulizer? yes, frequency 5  Sputum Production?  Yes, but patient reports that decreased production   Description?yellow/thin    Med Rec*   CONNOR Short acting muscarinic antagonist   []  Atrovent HFA/ipratropium bromide    OSMANY Short acting Beta2-agonist bronchodilators   [x]  Pro Air HFA / albuterol sulfate   []  Pro Air Respiclick /albuterol sulfate inhalation powder    []  Proventil HFA / albuterol sulfate    []  Ventolin HFA / albuterol sulfate    []  Xopenex HFA/ l evalbuterol tartrate     CONNOR/OSMANY Short acting muscarinic antagonist and Short acting                 Beta2-agonist bronchodilators   []  Combivent Respimat / ipratropium bromide and albuterol    LAMA Long acting muscarinic antagonist   []  Rebekah Raring Neohaler / Glycopyrrolate inhalation powder    []  Incruse Ellipta / Umeclidinum inhalation powder    [x]  Spiriva HandiHaler / tiotropium bromide inhalation powder    []  Spiriva Respimat / tiotropium bromide    []  Tamy Risa / aclidinium bromide inhalation powder    LABA- Long-acting beta2-agonist bronchodilators     []  Arcapta Neohaler / Indacaterol inhalation powder    []  Serevent Diskus / Salmeterol xinafoate inhalation powder    []  Stiverdi Respimat / Olodaterol hydrochloride    LAMA/ LABA Long acting muscarinic antagonist and Long-acting beta2-agonist bronchodilators   []  Anoro Ellipta / Umeclidinium and vilanterol inhalation powder    []  Bevespi Aerosphere / Gycopyrrolate and formoterol fumarate inhalation aerosol;     []  Stiolto Respimat / Tiotropium bromide and olodaterol    []  Utibron Neohaler / Indacaterol and glycopyrrolate inhalation powder    ICS Inhaled Corticosteroids (Steroids- rinse mouth)   []   Aerospan Diskus / Fluticasone propionate and salmeterol inhalation        powder;    []  Advair HFA / Fluticasone propionate and salmeterol xinafoate  []  Arnuity Ellipta/ Fluticasone furoate inhalation powder    []  Asmanex HFA / Mometasone furoate    []  AsmanexTwisthaler / Mometasone furoate inhalation powder    []  Flovent Diskus / Fluticasone propionate inhalation powder     []  Flovent HFA / Fluticasone propionate    []  Pulmicort FlexHaler / Budesonide inhalation powder    []  QVAR (HFA) / Beclomethasome dipropionate    ICS/LABA Inhaled Corticosteroids and Long-acting beta2-agonist bronchodilators   []  Advair Diskus / Fluticasone propionate and salmeterol inhalation                           powder     []  Advair HFA / fluticasone propionate and salmeterol xinaoate    []  Breo Ellipta / fluticasone furoate and vilanterol inhalation powder    []  Dulera / mometasone furoate and formoterol fumarate dehydrate    [x]  Symbicort (HFA) / budesonide and formoterol fumarate dehydrate    PDE-4 inhibitor   []  Deliresp / Roflumilast    Long term oxygen therapy (LTOT): yes  How many liters?  2L/min    Antibiotics at time of discharge: Levaquin 500 mg every day x 4 days    [facility] and Contact Information: [address] [phone]    Zone: (Pt Reported)  green     Goals      Attend follow up appointments on schedule      3/6/19 Pt will attend all scheduled f/u appointments by 4/11/19       Knowledge and adherence of medication (ie. action, side effects, missed dose, etc.).      3/6/19 Pt will be able to repeat back all medications and their functions by 4/6/19         Participates in moderate exercise (ie. consider referral to pulmonary rehab)      3/6/19 Pt will report an increase in her activity to include walking greater distances. If no progress by 3/18/19, refer to pul rehab         3/11/19 Pt reports doing exercises w/ PT in home and doing well.

## 2019-03-19 VITALS
DIASTOLIC BLOOD PRESSURE: 84 MMHG | SYSTOLIC BLOOD PRESSURE: 126 MMHG | TEMPERATURE: 98.2 F | HEART RATE: 87 BPM | OXYGEN SATURATION: 98 %

## 2019-03-19 PROCEDURE — 3331090002 HH PPS REVENUE DEBIT

## 2019-03-19 PROCEDURE — 3331090001 HH PPS REVENUE CREDIT

## 2019-03-19 NOTE — DISCHARGE SUMMARY
[address] [place] [address]    Name:  Munira Cornejo  MR#:   [id number]  :  1945  ACCOUNT #:  [de-identified]  ADMIT DATE:  2019  DISCHARGE DATE:  2019    DIAGNOSES:  Include  1. Chronic obstructive pulmonary disease with acute exacerbation. 2.  Chronic respiratory failure on home oxygen 2 L via nasal cannula. 3.  History of colon cancer. The patient has ileostomy. 4.  Abnormal thyroid function test in the setting of a non-thyroid illness. 5.  Some history of weight loss. 6.  Ongoing tobacco abuse. 7.  Some concern for atrial fibrillation at the time of admission but Cardiology saw the patient and no objective evidence of atrial fibrillation was noted. HOSPITAL COURSE:  This is a 79-year-old female presented to the ED with worsening shortness of breath. The patient was evaluated. It was felt that the patient had a COPD exacerbation. The patient was started on IV steroids and bronchodilators. Pulmonary was also consulted. The patient was continued on oxygen. The patient showed slow improvement. The patient was noted to have a low TSH. Endocrinology was consulted and they felt that the patient had abnormal thyroid function tests secondary to non-thyroid illness and did not recommend any further inpatient workup. There was also concern for atrial fibrillation at the time of admission. Cardiology was consulted. No objective evidence of atrial fibrillation was noted. Cardiology did not recommend any further interventions. The patient was mobilized, started to feel better. The patient was counseled to quit smoking. Discharge plans were discussed with the patient and sister. Case management was involved. The patient was discharged to home. DISPOSITION:  Home. CONSULTATIONS:  With Cardiology, Pulmonary, and Endocrinology. PROCEDURES:  None. The patient was hemodynamically stable at the time of discharge.   The patient was advised to follow up with PCP in 1 week, with Pulmonary in 7 days, with Dr. Rizwana Nevarez in 4 weeks, and with Cardiology in 6 weeks. DISCHARGE MEDICATIONS:  Included  1. Levofloxacin 500 mg p.o. every 24 hours for 4 days. 2.  Check a CBC, CMP, magnesium in 4 days. Results to PCP immediately. 3.  Incentive spirometry, use as directed. 4.  Prednisone taper as directed. 5.  Simethicone 80 mg tablet every 6 hours as needed. 6.  Acetylcysteine 500 mg p.o. twice daily. 7.  Zantac 150 mg p.o. twice daily. 8.  Albuterol 3 mL nebulized every 4 hours as needed. 9.  Multivitamin 1 tablet p.o. daily. 10.  Tylenol 500 mg p.o. every 6 hours p.r.n. pain. 11.  Albuterol inhaler as needed. 12.  Advair Diskus 250/50 one puff inhaled twice daily. 13.  Spiriva Respimat 2 puffs inhaled daily. 14.  Ativan 1 mg p.o. twice daily as needed for anxiety. 15.  Requip 1 mg p.o. twice daily. 16.  Oxygen 2 L via nasal cannula continuously. Total time for the discharge is more than 35 minutes.         Kerline Colon MD      [id number]  D:  03/19/2019 9:26  T:  03/19/2019 16:01  JOB #:  [id number]  CC:  Michael Garcia MD

## 2019-03-20 ENCOUNTER — HOME CARE VISIT (OUTPATIENT)
Dept: HOME HEALTH SERVICES | Facility: HOME HEALTH | Age: 74
End: 2019-03-20
Payer: MEDICARE

## 2019-03-20 ENCOUNTER — HOME CARE VISIT (OUTPATIENT)
Dept: SCHEDULING | Facility: HOME HEALTH | Age: 74
End: 2019-03-20
Payer: MEDICARE

## 2019-03-20 VITALS
HEART RATE: 87 BPM | OXYGEN SATURATION: 98 % | RESPIRATION RATE: 16 BRPM | SYSTOLIC BLOOD PRESSURE: 132 MMHG | DIASTOLIC BLOOD PRESSURE: 82 MMHG | TEMPERATURE: 98.2 F

## 2019-03-20 PROCEDURE — 3331090002 HH PPS REVENUE DEBIT

## 2019-03-20 PROCEDURE — G0158 HHC OT ASSISTANT EA 15: HCPCS

## 2019-03-20 PROCEDURE — 3331090001 HH PPS REVENUE CREDIT

## 2019-03-21 PROCEDURE — 3331090001 HH PPS REVENUE CREDIT

## 2019-03-21 PROCEDURE — 3331090002 HH PPS REVENUE DEBIT

## 2019-03-22 PROCEDURE — 3331090002 HH PPS REVENUE DEBIT

## 2019-03-22 PROCEDURE — 3331090001 HH PPS REVENUE CREDIT

## 2019-03-23 PROCEDURE — 3331090001 HH PPS REVENUE CREDIT

## 2019-03-23 PROCEDURE — 3331090002 HH PPS REVENUE DEBIT

## 2019-03-24 PROCEDURE — 3331090002 HH PPS REVENUE DEBIT

## 2019-03-24 PROCEDURE — 3331090001 HH PPS REVENUE CREDIT

## 2019-03-25 ENCOUNTER — HOME CARE VISIT (OUTPATIENT)
Dept: SCHEDULING | Facility: HOME HEALTH | Age: 74
End: 2019-03-25
Payer: MEDICARE

## 2019-03-25 ENCOUNTER — PATIENT OUTREACH (OUTPATIENT)
Dept: CARDIOLOGY CLINIC | Age: 74
End: 2019-03-25

## 2019-03-25 VITALS
OXYGEN SATURATION: 98 % | HEART RATE: 89 BPM | TEMPERATURE: 98.2 F | DIASTOLIC BLOOD PRESSURE: 78 MMHG | SYSTOLIC BLOOD PRESSURE: 130 MMHG

## 2019-03-25 PROCEDURE — G0158 HHC OT ASSISTANT EA 15: HCPCS

## 2019-03-25 PROCEDURE — 3331090001 HH PPS REVENUE CREDIT

## 2019-03-25 PROCEDURE — 3331090002 HH PPS REVENUE DEBIT

## 2019-03-25 NOTE — PROGRESS NOTES
Hospital Discharge Follow-Up    Patient was admitted to [facility] on 02/28/19 and discharged on 03/05/19 for Acute exacerbation of COPD. Pt reports: Pt states that she feels that she is at her baseline. Reports occasional mild SOB w/ exertion, but she states she uses her energy conservation strategies to get through her day. Pt reports that she is looking forward to the possibility of having the ileostomy removed. More to come on that after next gi appt coming up in mid April. Method of communication with patient :phone    Inpatient RRAT score: 40  Was this a readmission? no   Patient stated reason for the readmission: n/a    Disease Specific:   COPD    Summary of patient's top problems:  1. No care taker in home although family is nearby  2. Comorbidity: Hx of colon CA w/ surg in Sep 2018 and Nov 2018 - has colostomy bag  3. Home O2    Home Health: Pt reports H/H visits occuring    Barriers to care? none identified at this time. Advance Care Planning:   Does patient have an Advance Directive:  reviewed and current     Medication(s):   Medication reconciliation was performed with patient, who verbalizes understanding of administration of home medications. There were no barriers to obtaining medications identified at this time. Referral to Pharm D needed: no     Current Outpatient Medications   Medication Sig    OXYGEN-AIR DELIVERY SYSTEMS Take 2 L/min by inhalation continuous.  fluconazole (DIFLUCAN) 200 mg tablet Take 200 mg by mouth daily as needed for Other (mouth sores).  budesonide-formoterol (SYMBICORT) 160-4.5 mcg/actuation HFAA Take 2 Puffs by inhalation two (2) times a day.     OTHER Check CBC, CMP, Mg in 4 days, results to PCP immediately, Diagnosis- COPD    OTHER Incentive spirometry- use as directed    predniSONE (DELTASONE) 10 mg tablet 4 tabs ( 40 mg ) po daily for 4 days, then 2 tabs ( 20 mg ) po daily for 4 days, then 1 tab ( 10 mg ) po daily till seen by pulmonologist    simethicone (GAS-X) 80 mg chewable tablet Take 80 mg by mouth every six (6) hours as needed for Flatulence.  acetylcysteine 500 mg cap Take 500 mg by mouth two (2) times a day. (Patient taking differently: Take 600 mg by mouth two (2) times a day.)    raNITIdine (ZANTAC) 150 mg tablet Take 150 mg by mouth two (2) times a day.  albuterol (PROVENTIL VENTOLIN) 2.5 mg /3 mL (0.083 %) nebulizer solution 3 mL by Nebulization route every four (4) hours as needed for Wheezing or Shortness of Breath. Diag. Code: J44.9, R09.02    therapeutic multivitamin (THERAGRAN) tablet Take 1 Tab by mouth daily.  acetaminophen (TYLENOL EXTRA STRENGTH) 500 mg tablet Take 500 mg by mouth every six (6) hours as needed for Pain.  albuterol (PROAIR HFA) 90 mcg/actuation inhaler INHALE 2 PUFFS BY MOUTH EVERY 4 HOURS AS NEEDED FOR WHEEZING OR SHORTNESS OF BREATH    tiotropium bromide (SPIRIVA RESPIMAT) 2.5 mcg/actuation inhaler Take 2 Puffs by inhalation daily.  LORazepam (ATIVAN) 1 mg tablet Take  by mouth two (2) times daily as needed for Anxiety.  rOPINIRole (REQUIP) 1 mg tablet Take 1 mg by mouth two (2) times a day.  OXYGEN-AIR DELIVERY SYSTEMS 2 L by Does Not Apply route. O2 via nasal cannula with bedtime and activity. O2 Company: [employer]     No current facility-administered medications for this visit. [facility] follow up appointment(s):   Future Appointments   Date Time Provider Department Center   3/26/2019 To Be Determined Tosha [address]   3/26/2019 To Be Determined Onesimo Acosta [facility]   3/28/2019 To Be Determined [facility]   4/2/2019 To Be Determined Monica Rico PT [address]   4/11/2019  2:00 PM Gerardo Martínez MD [facility]   5/2/2019  2:15 PM Mayco Negron  [address]      Pt reports aware of all dates and times for f/u appt.     [facility]:  n/a      COPD Note    Smoking History: Quit in 2018 after colon ca Dx    Name of Pulmonologist:  Dr Benson Ward. Last office visit w/ Pulm: 01/12/19    Next office visit w/ Pulm: 03/13/19    Do you have a home pulse ox?  yes    Are you using a rescue inhaler? yes, Type Albuterol, frequency 3  Nebulizer? yes, frequency 5  Sputum Production?  Yes, but patient reports that decreased production   Description?yellow/thin    Med Rec*   CONNOR Short acting muscarinic antagonist   []  Atrovent HFA/ipratropium bromide    OSMANY Short acting Beta2-agonist bronchodilators   [x]  Pro Air HFA / albuterol sulfate   []  Pro Air Respiclick /albuterol sulfate inhalation powder    []  Proventil HFA / albuterol sulfate    []  Ventolin HFA / albuterol sulfate    []  Xopenex HFA/ l evalbuterol tartrate     CONNOR/OSMANY Short acting muscarinic antagonist and Short acting                 Beta2-agonist bronchodilators   []  Combivent Respimat / ipratropium bromide and albuterol    LAMA Long acting muscarinic antagonist   []  [place] Neohaler / Glycopyrrolate inhalation powder    []  Incruse Ellipta / Umeclidinum inhalation powder    [x]  Spiriva HandiHaler / tiotropium bromide inhalation powder    []  Spiriva Respimat / tiotropium bromide    []  Igor Screws / aclidinium bromide inhalation powder    LABA- Long-acting beta2-agonist bronchodilators     []  Arcapta Neohaler / Indacaterol inhalation powder    []  Serevent Diskus / Salmeterol xinafoate inhalation powder    []  Stiverdi Respimat / Olodaterol hydrochloride    LAMA/ LABA Long acting muscarinic antagonist and Long-acting beta2-agonist bronchodilators   []  Anoro Ellipta / Umeclidinium and vilanterol inhalation powder    []  Bevespi Aerosphere / Gycopyrrolate and formoterol fumarate                                inhalation aerosol;     []  Stiolto Respimat / Tiotropium bromide and olodaterol    []  Utibron Neohaler / Indacaterol and glycopyrrolate inhalation powder    ICS Inhaled Corticosteroids (Steroids- rinse mouth)   [] Aerospan Diskus / Fluticasone propionate and salmeterol inhalation        powder;    []  Advair HFA / Fluticasone propionate and salmeterol xinafoate  []  Arnuity Ellipta/ Fluticasone furoate inhalation powder    []  Asmanex HFA / Mometasone furoate    []  AsmanexTwisthaler / Mometasone furoate inhalation powder    []  Flovent Diskus / Fluticasone propionate inhalation powder     []  Flovent HFA / Fluticasone propionate    []  Pulmicort FlexHaler / Budesonide inhalation powder    []  QVAR (HFA) / Beclomethasome dipropionate    ICS/LABA Inhaled Corticosteroids and Long-acting beta2-agonist bronchodilators   []  Advair Diskus / Fluticasone propionate and salmeterol inhalation                           powder     []  Advair HFA / fluticasone propionate and salmeterol xinaoate    []  Breo Ellipta / fluticasone furoate and vilanterol inhalation powder    []  Dulera / mometasone furoate and formoterol fumarate dehydrate    [x]  Symbicort (HFA) / budesonide and formoterol fumarate dehydrate    PDE-4 inhibitor   []  Deliresp / Roflumilast    Long term oxygen therapy (LTOT): yes  How many liters? 2L/min    Antibiotics at time of discharge: Levaquin 500 mg every day x 4 days    DME Company and Contact Information: Kira Ga [phone]    Zone: (Pt Reported)  green     Goals      Attend follow up appointments on schedule      3/6/19 Pt will attend all scheduled f/u appointments by 4/11/19       Knowledge and adherence of medication (ie. action, side effects, missed dose, etc.).      3/6/19 Pt will be able to repeat back all medications and their functions by 4/6/19         Participates in moderate exercise (ie. consider referral to pulmonary rehab)      3/6/19 Pt will report an increase in her activity to include walking greater distances. If no progress by 3/18/19, refer to pulm rehab         3/11/19 Pt reports doing exercises w/ PT in home and doing well.

## 2019-03-26 ENCOUNTER — HOME CARE VISIT (OUTPATIENT)
Dept: HOME HEALTH SERVICES | Facility: HOME HEALTH | Age: 74
End: 2019-03-26
Payer: MEDICARE

## 2019-03-26 PROCEDURE — 3331090003 HH PPS REVENUE ADJ

## 2019-03-26 PROCEDURE — 3331090001 HH PPS REVENUE CREDIT

## 2019-03-26 PROCEDURE — 3331090002 HH PPS REVENUE DEBIT

## 2019-03-26 PROCEDURE — G0152 HHCP-SERV OF OT,EA 15 MIN: HCPCS

## 2019-03-27 PROCEDURE — 3331090001 HH PPS REVENUE CREDIT

## 2019-03-27 PROCEDURE — 3331090002 HH PPS REVENUE DEBIT

## 2019-03-27 NOTE — TELEPHONE ENCOUNTER
Pt is on acetylcysteine 500mg 2 times a day, but [facility] does not have it and told her the distributor doesn't have it either. She also called other pharmacies and they do not have it either.

## 2019-03-27 NOTE — TELEPHONE ENCOUNTER
Called [facility] and [facility].  All suppliers are back ordered

## 2019-03-27 NOTE — TELEPHONE ENCOUNTER
Pt informed of all the pharmacies we checked. Advised per Dr. Nohelia Bernard ok to be off a bit but to keep checking to get back on as soon as it becomes available.  Pt agrees with plan

## 2019-03-28 PROCEDURE — 3331090002 HH PPS REVENUE DEBIT

## 2019-03-28 PROCEDURE — 3331090001 HH PPS REVENUE CREDIT

## 2019-03-29 PROCEDURE — 3331090002 HH PPS REVENUE DEBIT

## 2019-03-29 PROCEDURE — 3331090001 HH PPS REVENUE CREDIT

## 2019-04-02 ENCOUNTER — PATIENT OUTREACH (OUTPATIENT)
Dept: CARDIOLOGY CLINIC | Age: 74
End: 2019-04-02

## 2019-04-05 ENCOUNTER — PATIENT OUTREACH (OUTPATIENT)
Dept: CARDIOLOGY CLINIC | Age: 74
End: 2019-04-05

## 2019-04-05 NOTE — PROGRESS NOTES
Hospital Discharge Follow-Up    Patient was admitted to [facility] on 02/28/19 and discharged on 03/05/19 for Acute exacerbation of COPD. Pt reports: Pt states that she feels that she is at her baseline. Reports occasional mild SOB w/ exertion, but she states she uses her energy conservation strategies to get through her day. Pt reports that she is looking forward to the possibility of having the ileostomy removed. More to come on that after next gi appt coming up in mid April. Pt states she is ready to get back on with her life. Method of communication with patient :phone    Inpatient RRAT score: 40  Was this a readmission? no   Patient stated reason for the readmission: n/a    Disease Specific:   COPD    Summary of patient's top problems:  1. No care taker in home although family is nearby  2. Comorbidity: Hx of colon CA w/ surg in Sep 2018 and Nov 2018 - has colostomy bag  3. Home O2    Home Health: Pt reports H/H visits occuring    Barriers to care? none identified at this time. Advance Care Planning:   Does patient have an Advance Directive:  reviewed and current     Medication(s):   Medication reconciliation was performed with patient, who verbalizes understanding of administration of home medications. There were no barriers to obtaining medications identified at this time. Referral to Pharm D needed: no     Current Outpatient Medications   Medication Sig    OXYGEN-AIR DELIVERY SYSTEMS Take 2 L/min by inhalation continuous.  fluconazole (DIFLUCAN) 200 mg tablet Take 200 mg by mouth daily as needed for Other (mouth sores).  budesonide-formoterol (SYMBICORT) 160-4.5 mcg/actuation HFAA Take 2 Puffs by inhalation two (2) times a day.     OTHER Check CBC, CMP, Mg in 4 days, results to PCP immediately, Diagnosis- COPD    OTHER Incentive spirometry- use as directed    predniSONE (DELTASONE) 10 mg tablet 4 tabs ( 40 mg ) po daily for 4 days, then 2 tabs ( 20 mg ) po daily for 4 days, then 1 tab ( 10 mg ) po daily till seen by pulmonologist    simethicone (GAS-X) 80 mg chewable tablet Take 80 mg by mouth every six (6) hours as needed for Flatulence.  acetylcysteine 500 mg cap Take 500 mg by mouth two (2) times a day. (Patient taking differently: Take 600 mg by mouth two (2) times a day.)    raNITIdine (ZANTAC) 150 mg tablet Take 150 mg by mouth two (2) times a day.  albuterol (PROVENTIL VENTOLIN) 2.5 mg /3 mL (0.083 %) nebulizer solution 3 mL by Nebulization route every four (4) hours as needed for Wheezing or Shortness of Breath. Diag. Code: J44.9, R09.02    therapeutic multivitamin (THERAGRAN) tablet Take 1 Tab by mouth daily.  acetaminophen (TYLENOL EXTRA STRENGTH) 500 mg tablet Take 500 mg by mouth every six (6) hours as needed for Pain.  albuterol (PROAIR HFA) 90 mcg/actuation inhaler INHALE 2 PUFFS BY MOUTH EVERY 4 HOURS AS NEEDED FOR WHEEZING OR SHORTNESS OF BREATH    tiotropium bromide (SPIRIVA RESPIMAT) 2.5 mcg/actuation inhaler Take 2 Puffs by inhalation daily.  LORazepam (ATIVAN) 1 mg tablet Take  by mouth two (2) times daily as needed for Anxiety.  rOPINIRole (REQUIP) 1 mg tablet Take 1 mg by mouth two (2) times a day.  OXYGEN-AIR DELIVERY SYSTEMS 2 L by Does Not Apply route. O2 via nasal cannula with bedtime and activity. O2 Company: [employer]     No current facility-administered medications for this visit. [facility] follow up appointment(s):   Future Appointments   Date Time Provider [place]   4/11/2019  2:00 PM Barbar Spatz, MD [facility]   5/2/2019  2:15 PM Josh Rush  [address]      Pt reports aware of all dates and times for f/u appt. [facility]:  n/a      COPD Note    Smoking History: Quit in 2018 after colon ca Dx    Name of Pulmonologist:  Dr Coty Leary.     Last office visit w/ Pulm: 01/12/19    Next office visit w/ Pulm: 03/13/19    Do you have a home pulse ox?  yes    Are you using a rescue inhaler? yes, Type Albuterol, frequency 3  Nebulizer? yes, frequency 5  Sputum Production?  Yes, but patient reports that decreased production   Description?yellow/thin    Med Rec*   CONNOR Short acting muscarinic antagonist   []  Atrovent HFA/ipratropium bromide    OSMANY Short acting Beta2-agonist bronchodilators   [x]  Pro Air HFA / albuterol sulfate   []  Pro Air Respiclick /albuterol sulfate inhalation powder    []  Proventil HFA / albuterol sulfate    []  Ventolin HFA / albuterol sulfate    []  Xopenex HFA/ l evalbuterol tartrate     CONNOR/OSMANY Short acting muscarinic antagonist and Short acting                 Beta2-agonist bronchodilators   []  Combivent Respimat / ipratropium bromide and albuterol    LAMA Long acting muscarinic antagonist   []  [place] Neohaler / Glycopyrrolate inhalation powder    []  Incruse Ellipta / Umeclidinum inhalation powder    [x]  Spiriva HandiHaler / tiotropium bromide inhalation powder    []  Spiriva Respimat / tiotropium bromide    []  [place] Yazan / aclidinium bromide inhalation powder    LABA- Long-acting beta2-agonist bronchodilators     []  Arcapta Neohaler / Indacaterol inhalation powder    []  Serevent Diskus / Salmeterol xinafoate inhalation powder    []  Stiverdi Respimat / Olodaterol hydrochloride    LAMA/ LABA Long acting muscarinic antagonist and Long-acting beta2-agonist bronchodilators   []  Anoro Ellipta / Umeclidinium and vilanterol inhalation powder    []  Bevespi Aerosphere / Gycopyrrolate and formoterol fumarate                                inhalation aerosol;     []  Stiolto Respimat / Tiotropium bromide and olodaterol    []  Utibron Neohaler / Indacaterol and glycopyrrolate inhalation powder    ICS Inhaled Corticosteroids (Steroids- rinse mouth)   []   Aerospan Diskus / Fluticasone propionate and salmeterol inhalation        powder;    []  Advair HFA / Fluticasone propionate and salmeterol xinafoate  []  Arnuity Ellipta/ Fluticasone furoate inhalation powder []  Asmanex HFA / Mometasone furoate    []  AsmanexTwisthaler / Mometasone furoate inhalation powder    []  Flovent Diskus / Fluticasone propionate inhalation powder     []  Flovent HFA / Fluticasone propionate    []  Pulmicort FlexHaler / Budesonide inhalation powder    []  QVAR (HFA) / Beclomethasome dipropionate    ICS/LABA Inhaled Corticosteroids and Long-acting beta2-agonist bronchodilators   []  Advair Diskus / Fluticasone propionate and salmeterol inhalation                           powder     []  Advair HFA / fluticasone propionate and salmeterol xinaoate    []  Breo Ellipta / fluticasone furoate and vilanterol inhalation powder    []  Dulera / mometasone furoate and formoterol fumarate dehydrate    [x]  Symbicort (HFA) / budesonide and formoterol fumarate dehydrate    PDE-4 inhibitor   []  Deliresp / Roflumilast    Long term oxygen therapy (LTOT): yes  How many liters? 2L/min    Antibiotics at time of discharge: Levaquin 500 mg every day x 4 days    DME Company and Contact Information: Daniel Peng [phone]    Zone: (Pt Reported)  green     Goals      Attend follow up appointments on schedule      3/6/19 Pt will attend all scheduled f/u appointments by 4/11/19       Knowledge and adherence of medication (ie. action, side effects, missed dose, etc.).      3/6/19 Pt will be able to repeat back all medications and their functions by 4/6/19         Participates in moderate exercise (ie. consider referral to pulmonary rehab)      3/6/19 Pt will report an increase in her activity to include walking greater distances. If no progress by 3/18/19, refer to pulm rehab         3/11/19 Pt reports doing exercises w/ PT in home and doing well.

## 2019-04-11 ENCOUNTER — OFFICE VISIT (OUTPATIENT)
Dept: CARDIOLOGY CLINIC | Age: 74
End: 2019-04-11

## 2019-04-11 ENCOUNTER — TELEPHONE (OUTPATIENT)
Dept: PULMONOLOGY | Age: 74
End: 2019-04-11

## 2019-04-11 VITALS
HEART RATE: 82 BPM | HEIGHT: 66 IN | DIASTOLIC BLOOD PRESSURE: 66 MMHG | BODY MASS INDEX: 18.61 KG/M2 | WEIGHT: 115.8 LBS | SYSTOLIC BLOOD PRESSURE: 92 MMHG

## 2019-04-11 DIAGNOSIS — I34.0 NON-RHEUMATIC MITRAL REGURGITATION: Primary | ICD-10-CM

## 2019-04-11 DIAGNOSIS — C18.9 COLON CANCER WITHOUT DISTANT METASTASIS (HCC): ICD-10-CM

## 2019-04-11 DIAGNOSIS — R42 DIZZINESS: ICD-10-CM

## 2019-04-11 DIAGNOSIS — J44.9 COPD, SEVERE (HCC): ICD-10-CM

## 2019-04-11 DIAGNOSIS — I27.20 PULMONARY HTN (HCC): ICD-10-CM

## 2019-04-11 DIAGNOSIS — R07.9 CHEST PAIN, UNSPECIFIED TYPE: ICD-10-CM

## 2019-04-11 RX ORDER — FLUTICASONE PROPIONATE AND SALMETEROL 250; 50 UG/1; UG/1
1 POWDER RESPIRATORY (INHALATION) 2 TIMES DAILY
Qty: 3 INHALER | Refills: 3 | Status: SHIPPED | COMMUNITY
Start: 2019-04-11

## 2019-04-11 NOTE — PATIENT INSTRUCTIONS
Medications Discontinued During This Encounter   Medication Reason    OTHER      Drink plenty of water and eat more salt. Use compression stockings preferably thigh-high but at least knee-high. A Healthy Heart: Care Instructions  Your Care Instructions    Heart disease occurs when a substance called plaque builds up in the vessels that supply oxygen-rich blood to your heart. This can narrow the blood vessels and reduce blood flow. A heart attack happens when blood flow is completely blocked. A high-fat diet, smoking, and other factors increase the risk of heart disease. Your doctor has found that you have a chance of having heart disease. You can do lots of things to keep your heart healthy. It may not be easy, but you can change your diet, exercise more, and quit smoking. These steps really work to lower your chance of heart disease. Follow-up care is a key part of your treatment and safety. Be sure to make and go to all appointments, and call your doctor if you are having problems. It's also a good idea to know your test results and keep a list of the medicines you take. How can you care for yourself at home? Diet    · Use less salt when you cook and eat. This helps lower your blood pressure. Taste food before salting. Add only a little salt when you think you need it. With time, your taste buds will adjust to less salt.     · Eat fewer snack items, fast foods, canned soups, and other high-salt, high-fat, processed foods.     · Read food labels and try to avoid saturated and trans fats. They increase your risk of heart disease by raising cholesterol levels.     · Limit the amount of solid fat-butter, margarine, and shortening-you eat. Use olive, peanut, or canola oil when you cook. Bake, broil, and steam foods instead of frying them.     · Eating fish can lower your risk for heart disease. Eat at least 2 servings of fish a week.  [place], mackerel, herring, sardines, and chunk light tuna are very good choices. These fish contain omega-3 fatty acids.     · Eat a variety of fruit and vegetables every day. Dark green, deep orange, red, or yellow fruits and vegetables are especially good for you. Examples include spinach, carrots, peaches, and berries.     · Foods high in fiber can reduce your cholesterol and provide important vitamins and minerals. High-fiber foods include whole-grain cereals and breads, oatmeal, beans, brown rice, citrus fruits, and apples.     · Limit drinks and foods with added sugar. These include candy, desserts, and soda pop.    Lifestyle changes    · If your doctor recommends it, get more exercise. Walking is a good choice. Bit by bit, increase the amount you walk every day. Try for at least 30 minutes on most days of the week. You also may want to swim, bike, or do other activities.     · Do not smoke. If you need help quitting, talk to your doctor about stop-smoking programs and medicines. These can increase your chances of quitting for good. Quitting smoking may be the most important step you can take to protect your heart. It is never too late to quit. You will get health benefits right away.     · Limit alcohol to 2 drinks a day for men and 1 drink a day for women. Too much alcohol can cause health problems. Medicines    · Take your medicines exactly as prescribed. Call your doctor if you think you are having a problem with your medicine.     · If your doctor recommends aspirin, take the amount directed each day. Make sure you take aspirin and not another kind of pain reliever, such as acetaminophen (Tylenol). If you take ibuprofen (such as Advil or Motrin) for other problems, take aspirin at least 2 hours before taking ibuprofen. When should you call for help? Call 911 if you have symptoms of a heart attack.  These may include:    · Chest pain or pressure, or a strange feeling in the chest.     · Sweating.     · Shortness of breath.     · Pain, pressure, or a strange feeling in the back, neck, jaw, or upper belly or in one or both shoulders or arms.     · Lightheadedness or sudden weakness.     · A fast or irregular heartbeat.    After you call 911, the  may tell you to chew 1 adult-strength or 2 to 4 low-dose aspirin. Wait for an ambulance. Do not try to drive yourself.   Watch closely for changes in your health, and be sure to contact your doctor if you have any problems. Where can you learn more? Go to http://robert-jorge.info/. Enter X492 in the search box to learn more about \"A Healthy Heart: Care Instructions. \"  Current as of: July 22, 2018  Content Version: 11.9  © 2480-0299 [employer]. Care instructions adapted under license by [employer] (which disclaims liability or warranty for this information). If you have questions about a medical condition or this instruction, always ask your healthcare professional. [place] [address] any warranty or liability for your use of this information.

## 2019-04-11 NOTE — TELEPHONE ENCOUNTER
Pt states a rx was sent to [facility] for Advair 250-50. When it came the had changed it to the new generic Wixela. She tried using but the new device delivery of med shot the powders med towards the top of her lips and has caused sores in her month. The [facility] advised her she would need a new rx for the Advair brand sent. I spoke with [facility] and they did advise the rx came as Advair but they replaced with the generic form and will gerardo there records the generic causes mouth sores.  They need new brand name rx Advair sent

## 2019-04-11 NOTE — TELEPHONE ENCOUNTER
PT'S DAUGHTER CALLED([phone]). [facility] SENT PT GENERIC ADVAIR AND PT CANNOT TAKE DUE TO CAUSING SORE MOUTH. [facility] TOLD PT THAT DR Melyssa Davis NEEDS TO SEND LETTER STATING THAT PT CAN ONLY USE ADVAIR. ALSO PT NEEDS REFILL FOR HER NEBULIZER MEDS SENT TO [facility] [id number]. PLEASE CALL IF ANY QUESTIONS.

## 2019-04-11 NOTE — PROGRESS NOTES
HISTORY OF PRESENT ILLNESS  Elie Meng is a 72 y.o. female. Shortness of Breath   The history is provided by the patient. This is a chronic problem. The problem occurs intermittently. The current episode started more than 1 week ago (yrs). The problem has not changed since onset. Associated symptoms include chest pain and leg swelling. Pertinent negatives include no fever, no headaches, no cough, no wheezing, no PND, no orthopnea, no vomiting, no rash and no claudication. The problem's precipitants include exercise (home O2 2 LPM 1 block). Chest Pain (Angina)    The history is provided by the patient. This is a recurrent problem. The current episode started more than 1 week ago. The problem occurs constantly (Intermittent worsening). The pain is associated with breathing (when SOB). The pain is present in the lateral region and left side. The quality of the pain is described as heavy. The pain does not radiate. Associated symptoms include dizziness, lower extremity edema and shortness of breath. Pertinent negatives include no claudication, no cough, no fever, no headaches, no malaise/fatigue, no nausea, no orthopnea, no palpitations, no PND and no vomiting. Leg Swelling   The history is provided by the patient. This is a new problem. The current episode started more than 1 week ago (yrs). Associated symptoms include chest pain and shortness of breath. Pertinent negatives include no headaches. The symptoms are aggravated by standing. The symptoms are relieved by sleep. Dizziness   The history is provided by the patient. This is a new problem. The current episode started more than 1 week ago. Associated symptoms include chest pain and shortness of breath. Pertinent negatives include no headaches. The symptoms are aggravated by standing. The symptoms are relieved by rest.       Review of Systems   Constitutional: Negative for chills, fever, malaise/fatigue and weight loss. HENT: Negative for nosebleeds. Eyes: Negative for discharge. Respiratory: Positive for shortness of breath. Negative for cough and wheezing. Cardiovascular: Positive for chest pain and leg swelling. Negative for palpitations, orthopnea, claudication and PND. Gastrointestinal: Negative for diarrhea, nausea and vomiting. Genitourinary: Negative for dysuria and hematuria. Musculoskeletal: Negative for joint pain. Skin: Negative for rash. Neurological: Positive for dizziness. Negative for seizures, loss of consciousness and headaches. Endo/Heme/Allergies: Negative for polydipsia. Does not bruise/bleed easily. Psychiatric/Behavioral: Negative for depression and substance abuse. The patient does not have insomnia.       Allergies   Allergen Reactions    Anoro Ellipta [Umeclidinium-Vilanterol] Rash and Other (comments)     Muscle cramps in arms    Aspirin Other (comments)     GI upset and bleeding    Augmentin [Amoxicillin-Pot Clavulanate] Not Reported This Time     Possible cramping    Doxycycline Nausea and Vomiting    Morphine Other (comments)     Neurologic symptoms - severe agitation      Shellfish Derived Unknown (comments)     Pt able to eat small amounts per report     Sulfa (Sulfonamide Antibiotics) Rash     Patient relates no longer allergic    Daleen Kanner [Fluticasone Propion-Salmeterol] Other (comments)     Mouth Sores       Past Medical History:   Diagnosis Date    Anxiety     Arthritis     Asbestosis ([facility]     Asthma     Back problem     Baker's cyst     Balance problems     Chronic lung disease     Cigarette smoker     Clotting disorder ([facility])     COPD (chronic obstructive pulmonary disease) ([facility])     oxygen 2/liters asbestosis    Depression     History of DVT (deep vein thrombosis)     Leg pain     Right    Lung disease     Nausea & vomiting     Osteoporosis     Restless leg syndrome     Spinal stenosis     Thromboembolus ([facility])     Vitamin D deficiency        Family History   Problem Relation Age of Onset    Cancer Father     Heart Disease Mother     Hypertension Mother     Cancer Brother     Cancer Sister     Diabetes Other     Hypertension Other     Stroke Other     Heart Disease Sister     Hypertension Sister     Heart Disease Brother        Social History     Tobacco Use    Smoking status: Former Smoker     Packs/day: 0.50     Years: 50.00     Pack years: 25.00     Types: Cigarettes     Start date: 10/21/1964     Last attempt to quit: 10/20/2018     Years since quittin.4    Smokeless tobacco: Never Used   Substance Use Topics    Alcohol use: No    Drug use: No        Current Outpatient Medications   Medication Sig    fluticasone propion-salmeterol (ADVAIR DISKUS) 250-50 mcg/dose diskus inhaler Take 1 Puff by inhalation two (2) times a day. BRAND NAME ONLY    OXYGEN-AIR DELIVERY SYSTEMS Take 2 L/min by inhalation continuous.  fluconazole (DIFLUCAN) 200 mg tablet Take 200 mg by mouth daily as needed for Other (mouth sores).  OTHER Incentive spirometry- use as directed    predniSONE (DELTASONE) 10 mg tablet 4 tabs ( 40 mg ) po daily for 4 days, then 2 tabs ( 20 mg ) po daily for 4 days, then 1 tab ( 10 mg ) po daily till seen by pulmonologist (Patient taking differently: 10 mg 4 days a week)    simethicone (GAS-X) 80 mg chewable tablet Take 80 mg by mouth every six (6) hours as needed for Flatulence.  acetylcysteine 500 mg cap Take 500 mg by mouth two (2) times a day. (Patient taking differently: Take 600 mg by mouth two (2) times a day.)    raNITIdine (ZANTAC) 150 mg tablet Take 150 mg by mouth two (2) times a day.  albuterol (PROVENTIL VENTOLIN) 2.5 mg /3 mL (0.083 %) nebulizer solution 3 mL by Nebulization route every four (4) hours as needed for Wheezing or Shortness of Breath. Diag. Code: J44.9, R09.02    therapeutic multivitamin (THERAGRAN) tablet Take 1 Tab by mouth daily.     acetaminophen (TYLENOL EXTRA STRENGTH) 500 mg tablet Take 500 mg by mouth every six (6) hours as needed for Pain.  albuterol (PROAIR HFA) 90 mcg/actuation inhaler INHALE 2 PUFFS BY MOUTH EVERY 4 HOURS AS NEEDED FOR WHEEZING OR SHORTNESS OF BREATH    tiotropium bromide (SPIRIVA RESPIMAT) 2.5 mcg/actuation inhaler Take 2 Puffs by inhalation daily.  LORazepam (ATIVAN) 1 mg tablet Take  by mouth two (2) times daily as needed for Anxiety.  rOPINIRole (REQUIP) 1 mg tablet Take 1 mg by mouth two (2) times a day.  OXYGEN-AIR DELIVERY SYSTEMS 2 L by Does Not Apply route. O2 via nasal cannula with bedtime and activity. O2 Company: [employer] [facility]     No current facility-administered medications for this visit. Past Surgical History:   Procedure Laterality Date    COLONOSCOPY N/A 9/22/2018    COLONOSCOPY w bx w polypectonies performed by Barrie Curry MD at [address] [place] [address] COLONOSCOPY N/A 11/6/2018    COLONOSCOPY performed by Vashti Colón MD at [address] [place] [address] HX APPENDECTOMY      HX BACK SURGERY      x4    HX BREAST BIOPSY      left    HX GI      exp lap and ileostomy    HX HEENT      cataract right    HX HYSTERECTOMY      HX LUMBAR LAMINECTOMY      HX MENISCUS REPAIR      HX ORTHOPAEDIC      Knee bakers cyst    HX POLYPECTOMY      right colectomy    HX TONSILLECTOMY      HX VEIN STRIPPING      LAP,SURG,COLECTOMY, PARTIAL, W/ANAST N/A 10/23/2018    Dr. Bud Perez N/A 11/06/2018    Dr. Mary Jane Medina      vein stripping       Visit Vitals  BP 92/66   Pulse 82   Ht 5' 6\" (1.676 m)   Wt 52.5 kg (115 lb 12.8 oz)   BMI 18.69 kg/m²       Diagnostic Studies:  I have reviewed the relevant tests done on the patient and show as follows  EKG tracings reviewed by me today.     EKG Results     None        XR Results (most recent):  Results from Hospital Encounter encounter on 02/28/19   XR CHEST PORT    Narrative EXAM: CHEST ONE VIEW  portable 0315 hours    CLINICAL HISTORY/INDICATION: Severe shortness of breath, cough x1 week    COMPARISON: Chest x-ray November 9, 2018, October 4, 2018. TECHNIQUE: One view obtained. FINDINGS:     The cardiac and mediastinal silhouette is normal. The lungs are markedly  hyperinflated. The upper lungs are emphysematous. Mildly increased interstitial  markings at the lower third bilaterally. . Pulmonary vascularity is normal. The  costophrenic angles are sharply defined. No bony abnormalities are seen. Impression IMPRESSION:    Severe hyperinflation. Bibasal interstitial prominence also stable consistent with chronic interstitial  disease. 09/19/18   ECHO ADULT COMPLETE 09/23/2018 9/23/2018    Narrative · Estimated left ventricular ejection fraction is 61 - 65%. Visually   measured ejection fraction. Normal left ventricular wall motion, no   regional wall motion abnormality noted. Inconclusive left ventricular   diastolic function. · Moderate tricuspid valve regurgitation is present. Pulmonary arterial   systolic pressure is 43 mmHg. Mild pulmonary hypertension is present. · Mild mitral valve regurgitation. Signed by: Macarena Lara MD                 No flowsheet data found. 9/18 echo  Interpretation Summary     · Estimated left ventricular ejection fraction is 61 - 65%. Visually measured ejection fraction. Normal left ventricular wall motion, no regional wall motion abnormality noted. Inconclusive left ventricular diastolic function. · Moderate tricuspid valve regurgitation is present. Pulmonary arterial systolic pressure is 43 mmHg. Mild pulmonary hypertension is present. · Mild mitral valve regurgitation. Ms. Cheryle Wild has a reminder for a \"due or due soon\" health maintenance. I have asked that she contact her primary care provider for follow-up on this health maintenance. Physical Exam   Constitutional: She is oriented to person, place, and time. She appears well-developed and well-nourished. No distress.    Has O2 at 2LPM   HENT:   Head: Normocephalic and atraumatic. Mouth/Throat: Normal dentition. Eyes: Right eye exhibits no discharge. Left eye exhibits no discharge. No scleral icterus. Neck: Neck supple. No JVD present. Carotid bruit is not present. No thyromegaly present. Cardiovascular: Normal rate, regular rhythm, S1 normal, S2 normal, normal heart sounds and intact distal pulses. Exam reveals no gallop and no friction rub. No murmur heard. Pulmonary/Chest: Effort normal. She has wheezes (b/l mild diffuse). She has no rales. Abdominal: Soft. She exhibits no mass. There is no tenderness. Musculoskeletal: She exhibits no edema. +varicose veins   Lymphadenopathy:        Right cervical: No superficial cervical adenopathy present. Left cervical: No superficial cervical adenopathy present. Neurological: She is alert and oriented to person, place, and time. Skin: Skin is warm and dry. No rash noted. Psychiatric: She has a normal mood and affect. Her behavior is normal.       ASSESSMENT and PLAN      Atrial fibrillation? ?  - seen in Telemetry on admission. Patient' s EKG showing NSR with PAC's, no afib seen on tele. Echo on 9/18 showed  Preserved EF%   Atypical chest pain- report chest wall pain that last few seconds and sometimes triggered  by anxiety   Hypotension- on admission. BP is stable will monitor. 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 Tobacco abuse- Patient advised to stop smoking to reduce future cardiovascular events. per patient quit 3 weeks ago  Abnormal TSH- seen today by Dr Syed Lloyd plenty of water and eat more salt. Use compression stockings preferably thigh-high but at least knee-high. Chest pain is atypical and noncardiac clinically. Stress test not being done at present. Diagnoses and all orders for this visit:    1. Non-rheumatic mitral regurgitation    2.  COPD, severe ([address]    3. Pulmonary HTN ([facility]    4. Colon cancer without distant metastasis ([facility] [address]    5. Dizziness    6. Chest pain, unspecified type        Pertinent laboratory and test data reviewed and discussed with patient. See patient instructions also for other medical advice given    Medications Discontinued During This Encounter   Medication Reason    OTHER        Follow-up and Dispositions    · Return in about 3 months (around 7/11/2019), or if symptoms worsen or fail to improve, for with ekg.

## 2019-04-15 ENCOUNTER — PATIENT OUTREACH (OUTPATIENT)
Dept: CARDIOLOGY CLINIC | Age: 74
End: 2019-04-15

## 2019-04-15 NOTE — PROGRESS NOTES
Attempted to contact patient for Transition of Care follow up. Answered by pt's son, left message with contact information provided. Will attempt to contact at a later time.

## 2019-04-16 RX ORDER — ALBUTEROL SULFATE 0.83 MG/ML
2.5 SOLUTION RESPIRATORY (INHALATION)
Qty: 300 EACH | Refills: 3 | Status: SHIPPED | OUTPATIENT
Start: 2019-04-16

## 2019-04-16 NOTE — TELEPHONE ENCOUNTER
PT'S DAUGHTER CALLED([phone]). [phone]

## 2019-04-22 ENCOUNTER — HOSPITAL ENCOUNTER (OUTPATIENT)
Dept: CT IMAGING | Age: 74
Discharge: HOME | End: 2019-04-22
Attending: INTERNAL MEDICINE
Payer: MEDICARE

## 2019-04-22 DIAGNOSIS — C18.0 MALIGNANT NEOPLASM OF CECUM (HCC): ICD-10-CM

## 2019-04-22 LAB — CREAT UR-MCNC: 0.7 MG/DL (ref 0.6–1.3)

## 2019-04-22 PROCEDURE — 74011636320 HC RX REV CODE- 636/320: Performed by: INTERNAL MEDICINE

## 2019-04-22 PROCEDURE — 74177 CT ABD & PELVIS W/CONTRAST: CPT

## 2019-04-22 PROCEDURE — 82565 ASSAY OF CREATININE: CPT

## 2019-04-22 RX ADMIN — IOPAMIDOL 80 ML: 612 INJECTION, SOLUTION INTRAVENOUS at 15:51

## 2019-04-23 ENCOUNTER — PATIENT OUTREACH (OUTPATIENT)
Dept: CARDIOLOGY CLINIC | Age: 74
End: 2019-04-23

## 2019-05-02 ENCOUNTER — OFFICE VISIT (OUTPATIENT)
Dept: SURGERY | Age: 74
End: 2019-05-02

## 2019-05-02 VITALS
HEART RATE: 92 BPM | TEMPERATURE: 97.7 F | OXYGEN SATURATION: 98 % | RESPIRATION RATE: 18 BRPM | HEIGHT: 66 IN | WEIGHT: 115 LBS | DIASTOLIC BLOOD PRESSURE: 65 MMHG | SYSTOLIC BLOOD PRESSURE: 104 MMHG | BODY MASS INDEX: 18.48 KG/M2

## 2019-05-02 DIAGNOSIS — E43 SEVERE PROTEIN-CALORIE MALNUTRITION (HCC): Primary | ICD-10-CM

## 2019-05-02 NOTE — PROGRESS NOTES
Mau Pan presents today for   Chief Complaint   Patient presents with    Follow-up     colon cancer f/u     Patient anxious about getting colostomy bag off. Doing well and cleared by [facility] and cardiology for surgery. Is someone accompanying this pt? Daughter and sister    Is the patient using any DME equipment during [address]? Oxygen 2 liters    Abuse Screening:  No flowsheet data found. Fall Risk  Fall Risk Assessment, last 12 mths 3/13/2019   Able to walk? Yes   Fall in past 12 months? No   Fall with injury? -   Number of falls in past 12 months -   Fall Risk Score -       Coordination of Care:  1. Have you been to the ER, urgent care clinic since your last visit? Hospitalized since your last visit? Yes, admitted to hospital for lung issues for a week    2. Have you seen or consulted any other health care providers outside of the [address] [place] [address] Juan Antonio since your last visit? Include any pap smears or colon screening.   [facility], Dr. Suzi Travis, Dr. Harika Forrester

## 2019-05-02 NOTE — PROGRESS NOTES
Subjective: She is tolerating a diet. Her weight is up. She is down to 10 mg of prednisone every other day. She states she was recently hospitalized for COPD exacerbation. Past medical history and ROS were reviewed and unchanged. Abdomen: Soft, nontender nondistended  Ileostomy pink    Assessment / Plan    Status post robotic right colectomy for stage II cecal cancer complicated by small bowel obstruction and contained anastomotic leak  Follow-up with pulmonary to see if there is any ability to decrease prednisone further  Check nutrition labs  Ileostomy reversal at some point during the summer  Patient advised once again to stop smoking which it sounds as if she has had some success with    A total of 15 minutes was spent with the patient, with >50% of time spent on counseling and coordination of care. The diagnoses and plan were discussed with patient. All questions answered. Plan of care agreed to by all concerned.

## 2019-05-03 ENCOUNTER — HOSPITAL ENCOUNTER (OUTPATIENT)
Dept: LAB | Age: 74
Discharge: HOME | End: 2019-05-03
Payer: MEDICARE

## 2019-05-03 DIAGNOSIS — E43 SEVERE PROTEIN-CALORIE MALNUTRITION (HCC): ICD-10-CM

## 2019-05-03 LAB
ALBUMIN SERPL-MCNC: 3.8 G/DL (ref 3.4–5)
PREALB SERPL-MCNC: 18.9 MG/DL (ref 20–40)

## 2019-05-03 PROCEDURE — 84134 ASSAY OF PREALBUMIN: CPT

## 2019-05-03 PROCEDURE — 36415 COLL VENOUS BLD VENIPUNCTURE: CPT

## 2019-05-03 PROCEDURE — 82040 ASSAY OF SERUM ALBUMIN: CPT

## 2019-05-07 ENCOUNTER — PATIENT OUTREACH (OUTPATIENT)
Dept: CARDIOLOGY CLINIC | Age: 74
End: 2019-05-07

## 2019-05-07 ENCOUNTER — DOCUMENTATION ONLY (OUTPATIENT)
Dept: PULMONOLOGY | Age: 74
End: 2019-05-07

## 2019-05-07 NOTE — PROGRESS NOTES
Spoke to pt and informed pt to take Prednisone 10mg every other day for 2 weeks and then try to wean off of it. Pt to then let us know how she is doing at that point.

## 2019-05-16 ENCOUNTER — TELEPHONE (OUTPATIENT)
Dept: PULMONOLOGY | Age: 74
End: 2019-05-16

## 2019-05-16 RX ORDER — PREDNISONE 10 MG/1
TABLET ORAL
Qty: 100 TAB | Refills: 0 | Status: SHIPPED | OUTPATIENT
Start: 2019-05-16 | End: 2019-07-31 | Stop reason: SDUPTHER

## 2019-05-16 NOTE — TELEPHONE ENCOUNTER
Per sister, Dwight Jiang, pt is not doing good. Her breathing is really bad and the zpack she is on is not helping at all. Please call [phone]. Not sure if there is something else she can take or not.

## 2019-05-16 NOTE — TELEPHONE ENCOUNTER
Spoke with sister Misael Fields. Pt was having sob last week and started Zithromax. Has cut done her Prednisone 10mg every other day due to cutting dose in hopes to get the ileostomy reversal done this summer by Dr. Adry Krause. Pt using 2 L oxygen which isn't helping with the sob either. Sister tried to get her to go to er but she refused to go. Sister feels the stress she is under factors in on condition.

## 2019-05-16 NOTE — TELEPHONE ENCOUNTER
Per verbal order from Dr. Elysia Hopkins. She will send a taper dose of Prednisone and continue with 10mg daily after taper. Will send to [facility]. If sxs persist or worsen to go to ER.  Pt verbalizes understanding

## 2019-05-23 ENCOUNTER — PATIENT OUTREACH (OUTPATIENT)
Dept: CARDIOLOGY CLINIC | Age: 74
End: 2019-05-23

## 2019-05-23 NOTE — PROGRESS NOTES
Patient has graduated from the Disease Specific Care Management  program on 5/23/19. Patient's symptoms are stable at this time. Patient/family has the ability to self-manage. Care management goals have been completed at this time. No further nurse navigator follow up scheduled. Goals Addressed                 This Visit's Progress     COMPLETED: Attend follow up appointments on schedule        3/6/19 Pt will attend all scheduled f/u appointments by 4/11/19       COMPLETED: Knowledge and adherence of medication (ie. action, side effects, missed dose, etc.).        3/6/19 Pt will be able to repeat back all medications and their functions by 4/6/19         COMPLETED: Participates in moderate exercise (ie. consider referral to pulmonary rehab)        3/6/19 Pt will report an increase in her activity to include walking greater distances. If no progress by 3/18/19, refer to pulm rehab         3/11/19 Pt reports doing exercises w/ PT in home and doing well. Pt has nurse navigator's contact information for any further questions, concerns, or needs.   Patients upcoming visits:    Future Appointments   Date Time Provider Aubrie Mills   6/20/2019  1:30 PM Vashti Colón MD [id number] SCHED   7/18/2019 11:30 AM Leidy Foy MD [address] [place] [address]

## 2019-06-03 ENCOUNTER — TELEPHONE (OUTPATIENT)
Dept: PULMONOLOGY | Age: 74
End: 2019-06-03

## 2019-06-03 RX ORDER — LEVOFLOXACIN 500 MG/1
500 TABLET, FILM COATED ORAL DAILY
Qty: 7 TAB | Refills: 0 | Status: SHIPPED | OUTPATIENT
Start: 2019-06-03 | End: 2019-07-31

## 2019-06-03 NOTE — TELEPHONE ENCOUNTER
Per pt, [facility] not helping. Pt thinks she needs Levaquin. It usually helps. Please call [phone].

## 2019-06-03 NOTE — TELEPHONE ENCOUNTER
Spoke with pt. She states she has completed the Zpak and the Prednisone taper and is taking Prednisone 10mg daily. She is c/o chest congestion and cough still. Coughing up yellow sputum. Pt states she had temp 102 on Friday and took Tylenol. Today temp without any tylenol 98. 1.  Pt feels levaquin has helped in the past. [employer] [address]

## 2019-06-20 ENCOUNTER — OFFICE VISIT (OUTPATIENT)
Dept: SURGERY | Age: 74
End: 2019-06-20

## 2019-06-20 VITALS
RESPIRATION RATE: 16 BRPM | HEART RATE: 90 BPM | BODY MASS INDEX: 18.8 KG/M2 | WEIGHT: 117 LBS | OXYGEN SATURATION: 99 % | DIASTOLIC BLOOD PRESSURE: 70 MMHG | SYSTOLIC BLOOD PRESSURE: 103 MMHG | TEMPERATURE: 97.2 F | HEIGHT: 66 IN

## 2019-06-20 DIAGNOSIS — C18.9 COLON CANCER WITHOUT DISTANT METASTASIS (HCC): Primary | ICD-10-CM

## 2019-06-20 NOTE — PROGRESS NOTES
Subjective: Tolerating diet and ileostomy is functional.  Has gained weight. Is physically active. Is down to 1 to 2 cigarettes/day. Past medical history and ROS were reviewed and unchanged. Abdomen: Soft, nontender nondistended    Assessment / Plan    Status post laparoscopic right colectomy complicated by small bowel obstruction requiring end ileostomy  Check nutrition labs  Follow-up in 3 weeks  Schedule laparoscopic ileostomy reversal in approximately 6 weeks  Is down to 10 mg/day of prednisone and chronic home O2, lowest her pulmonologist states she can go    A total of 15 minutes was spent with the patient, with >50% of time spent on counseling and coordination of care. The diagnoses and plan were discussed with patient. All questions answered. Plan of care agreed to by all concerned.

## 2019-06-21 ENCOUNTER — TELEPHONE (OUTPATIENT)
Dept: PULMONOLOGY | Age: 74
End: 2019-06-21

## 2019-06-24 ENCOUNTER — OFFICE VISIT (OUTPATIENT)
Dept: PULMONOLOGY | Age: 74
End: 2019-06-24

## 2019-06-24 VITALS
WEIGHT: 115 LBS | HEIGHT: 66 IN | BODY MASS INDEX: 18.48 KG/M2 | SYSTOLIC BLOOD PRESSURE: 110 MMHG | RESPIRATION RATE: 18 BRPM | OXYGEN SATURATION: 93 % | DIASTOLIC BLOOD PRESSURE: 78 MMHG | HEART RATE: 102 BPM | TEMPERATURE: 98.1 F

## 2019-06-24 DIAGNOSIS — R06.00 DYSPNEA, UNSPECIFIED TYPE: ICD-10-CM

## 2019-06-24 DIAGNOSIS — C18.9 ADENOCARCINOMA OF COLON (HCC): ICD-10-CM

## 2019-06-24 DIAGNOSIS — J96.90 RESPIRATORY FAILURE, UNSPECIFIED CHRONICITY, UNSPECIFIED WHETHER WITH HYPOXIA OR HYPERCAPNIA (HCC): ICD-10-CM

## 2019-06-24 DIAGNOSIS — J44.1 CHRONIC OBSTRUCTIVE PULMONARY DISEASE WITH ACUTE EXACERBATION (HCC): Primary | ICD-10-CM

## 2019-06-24 RX ORDER — MONTELUKAST SODIUM 10 MG/1
10 TABLET ORAL DAILY
Qty: 30 TAB | Refills: 3 | Status: SHIPPED | OUTPATIENT
Start: 2019-06-24 | End: 2020-07-16

## 2019-06-24 NOTE — PATIENT INSTRUCTIONS
 Continue advair one inhalation twice daily, rinse mouth out after use     Continue albuterol nebulizer or MDI every 4-6 hours as needed for shortness of breath, wheezing, or cough    · Continue spiriva respimat 2 inhalations once daily    · Continue supplemental O2 @ 2 LPM and at night    · Start singulair 10 mg daily    · Start prednisone taper - 30 mg daily x 3 days, 20 mg x 3 days, 10 mg daily x 3 days, then 10 mg daily     Recommend healthy diet/weight and exercise as tolerated     Follow-up in pulmonary clinic in one month or sooner with worsening of symptom     Report to the ER with chest pain or difficulty breathing

## 2019-06-24 NOTE — PROGRESS NOTES
[facility]  Pulmonary, Critical Care, and Sleep Medicine      Pulmonary Office Progress Notes    Name: Nate Mcfadden     : 1945     Date: 2019        Subjective:     2019          HPI    Nate Mcfadden  is a 69 y.o. female with PMH of COPD and hypoxemia who presents with complaints of increased shortness of breath and wheezing. She was last seen here on 3/13/2019 by Dr. Richard Langston for hospital follow-up regarding exacerbation of COPD. She was managed on ICS/LABA/L AMA, albuterol as needed, supplemental oxygen and  low-dose prednisone every other day. Today she appears comfortable, no distress, however she reports her shortness of breath and wheezing has worsened since her last visit. She is currently using her albuterol neb treatments about 6 times per day. She has a daily, chronic, cough  productive of clear /white sputum that has not worsened. He denies chest pain or hemoptysis. No fever, chills or orthopnea; no leg/calf pain or swelling and no decreased appetite or weight loss she has no reports of nasal congestion/drainage or sinus pressure/pain. She states that she will be having a reversal of her ileostomy in July. She has trialed Daliresp in the past with no significant improvement in symptoms. Her last PFTs were on 1/15/2016 and demonstrated severe obstructive defect with reduced diffusion capacity. She is a former smoker of cigarettes with a 25-pack-year history and quit in 2018.         Past Medical History:   Diagnosis Date    Anxiety     Arthritis     Asbestosis ([address]     Asthma     Back problem     Baker's cyst     Balance problems     Chronic lung disease     Cigarette smoker     Clotting disorder (HCC)     COPD (chronic obstructive pulmonary disease) (formerly [facility])     oxygen 2/liters asbestosis    Depression     History of DVT (deep vein thrombosis)     Leg pain     Right    Lung disease     Nausea & vomiting     Osteoporosis     Restless leg syndrome     Spinal stenosis     Thromboembolus (HCC)     Vitamin D deficiency        Allergies   Allergen Reactions    Anoro Ellipta [Umeclidinium-Vilanterol] Rash and Other (comments)     Muscle cramps in arms    Aspirin Other (comments)     GI upset and bleeding    Augmentin [Amoxicillin-Pot Clavulanate] Not Reported This Time     Possible cramping    Doxycycline Nausea and Vomiting    Morphine Other (comments)     Neurologic symptoms - severe agitation      Shellfish Derived Unknown (comments)     Pt able to eat small amounts per report     Sulfa (Sulfonamide Antibiotics) Rash     Patient relates no longer allergic    Wixela Inhub [Fluticasone Propion-Salmeterol] Other (comments)     Mouth Sores       Current Outpatient Medications   Medication Sig Dispense Refill    tiotropium bromide (SPIRIVA RESPIMAT) 2.5 mcg/actuation inhaler Take 2 Puffs by inhalation daily for 30 days. 3 Inhaler 3    montelukast (SINGULAIR) 10 mg tablet Take 1 Tab by mouth daily. 30 Tab 3    tiotropium bromide (SPIRIVA RESPIMAT) 2.5 mcg/actuation inhaler Take 2 Puffs by inhalation daily. 1 Inhaler 0    predniSONE (DELTASONE) 10 mg tablet Take 3 tabs x 3 days, then 2 tabs x 3 days then 10mg daily with breakfast 100 Tab 0    albuterol (PROVENTIL VENTOLIN) 2.5 mg /3 mL (0.083 %) nebulizer solution 3 mL by Nebulization route every four (4) hours as needed for Wheezing or Shortness of Breath. ICD: J44.9, R09.02 file under Medicare Part B 300 Each 3    fluticasone propion-salmeterol (ADVAIR DISKUS) 250-50 mcg/dose diskus inhaler Take 1 Puff by inhalation two (2) times a day. BRAND NAME ONLY 3 Inhaler 3    OXYGEN-AIR DELIVERY SYSTEMS Take 2 L/min by inhalation continuous.  simethicone (GAS-X) 80 mg chewable tablet Take 80 mg by mouth every six (6) hours as needed for Flatulence.  acetylcysteine 500 mg cap Take 500 mg by mouth two (2) times a day.  (Patient taking differently: Take 600 mg by mouth two (2) times a day.) 60 Cap 3    raNITIdine (ZANTAC) 150 mg tablet Take 150 mg by mouth two (2) times a day.  therapeutic multivitamin (THERAGRAN) tablet Take 1 Tab by mouth daily. 30 Tab 11    acetaminophen (TYLENOL EXTRA STRENGTH) 500 mg tablet Take 500 mg by mouth every six (6) hours as needed for Pain.  albuterol (PROAIR HFA) 90 mcg/actuation inhaler INHALE 2 PUFFS BY MOUTH EVERY 4 HOURS AS NEEDED FOR WHEEZING OR SHORTNESS OF BREATH 1 Inhaler 6    LORazepam (ATIVAN) 1 mg tablet Take  by mouth two (2) times daily as needed for Anxiety.  rOPINIRole (REQUIP) 1 mg tablet Take 1 mg by mouth two (2) times a day.  OXYGEN-AIR DELIVERY SYSTEMS 2 L by Does Not Apply route. O2 via nasal cannula with bedtime and activity. O2 Company: [employer]      levoFLOXacin (LEVAQUIN) 500 mg tablet Take 1 Tab by mouth daily. 7 Tab 0    OTHER Incentive spirometry- use as directed 1 Each 0       Review of Systems:    HEENT: No epistaxis, no nasal drainage, no difficulty in swallowing, no redness in eyes  Respiratory: As stated above in HPI  Cardiovascular: no chest pain, no palpitations, no chronic leg edema, no syncope  Gastrointestinal: no abd pain, no vomiting,no bleeding symptoms. Genitourinary: No urinary symptoms or hematuria  Integument/breast: No ulcers or rashes  Musculoskeletal: No leg / calf pain  Neurological: No focal weakness, no seizures, no headaches  Behvioral/Psych: No anxiety, no depression  Constitutional: No fever, chills or night sweats. No decreased appetite or weight loss     Objective:     Visit Vitals  /78 (BP 1 Location: Left arm, BP Patient Position: Sitting)   Pulse (!) 102   Temp 98.1 °F (36.7 °C) (Oral)   Resp 18   Ht 5' 6\" (1.676 m)   Wt 52.2 kg (115 lb)   SpO2 93% Comment: 2 LPM NC   BMI 18.56 kg/m²        PHYSICAL EXAM      General: Oriented to person, place, and time. Well-developed, well-nourished, and in no distress      Head:   Normocephalic, without obvious abnormality, atraumatic Eyes:   Pupils reactive, conjunctivae / corneas clear. EOM's intact, no scleral icterus       Nose:   Nares normal, no drainage. Throat:    Lips, mucosa and tongue normal. Teeth and gums normal       Neck:   Supple, symmetrical, trachea midline. No adenopathy or thyroid swelling; no carotid bruit or JVD. CVS:    Regular rate and rhythm. S1S2 normal,  no murmurs       RS:      Symmetrical chest rise, diminished AE bilaterally. Diffuse expiratory wheezing throughout lung fields, no rales or rhonchi, no accessory muscle use      Abd:     Soft, non-tender. No hepatosplenomegaly. Ileostomy                                                     Neuro:   non focal, awake, alert and oriented to person, place, time and situation    Extrm:   no leg edema,  clubbing or cyanosis       Skin:   no rash    Data review:     Hospital Outpatient Visit on 05/03/2019   Component Date Value Ref Range Status    Albumin 05/03/2019 3.8  3.4 - 5.0 g/dL Final    Prealbumin 05/03/2019 18.9* 20 - 40 MG/DL Final   Hospital Outpatient Visit on 04/22/2019   Component Date Value Ref Range Status    Creatinine, POC 04/22/2019 0.7  0.6 - 1.3 MG/DL Final    GFRAA, POC 04/22/2019 >60  >60 ml/min/1.73m2 Final    GFRNA, POC 04/22/2019 >60  >60 ml/min/1.73m2 Final    Comment: Estimated GFR is calculated using the IDMS-traceable Modification of Diet in Renal Disease (MDRD) Study equation, reported for both  Americans (GFRAA) and non- Americans (GFRNA), and normalized to 1.73m2 body surface area. The physician must decide which value applies to the patient. The MDRD study equation should only be used in individuals age 25 or older. It has not been validated for the following: pregnant women, patients with serious comorbid conditions, or on certain medications, or persons with extremes of body size, muscle mass, or nutritional status.      Hospital Outpatient Visit on 03/09/2019   Component Date Value Ref Range Status  WBC 03/09/2019 13.4* 4.6 - 13.2 K/uL Final    RBC 03/09/2019 4.04* 4.20 - 5.30 M/uL Final    HGB 03/09/2019 13.0  12.0 - 16.0 g/dL Final    HCT 03/09/2019 38.8  35.0 - 45.0 % Final    MCV 03/09/2019 96.0  74.0 - 97.0 FL Final    MCH 03/09/2019 32.2  24.0 - 34.0 PG Final    MCHC 03/09/2019 33.5  31.0 - 37.0 g/dL Final    RDW 03/09/2019 13.9  11.6 - 14.5 % Final    PLATELET 70/53/3879 700  135 - 420 K/uL Final    MPV 03/09/2019 9.8  9.2 - 11.8 FL Final    Magnesium 03/09/2019 2.2  1.6 - 2.6 mg/dL Final    Sodium 03/09/2019 138  136 - 145 mmol/L Final    Potassium 03/09/2019 4.0  3.5 - 5.5 mmol/L Final    Chloride 03/09/2019 101  100 - 108 mmol/L Final    CO2 03/09/2019 29  21 - 32 mmol/L Final    Anion gap 03/09/2019 8  3.0 - 18 mmol/L Final    Glucose 03/09/2019 85  74 - 99 mg/dL Final    BUN 03/09/2019 46* 7.0 - 18 MG/DL Final    Creatinine 03/09/2019 0.83  0.6 - 1.3 MG/DL Final    BUN/Creatinine ratio 03/09/2019 55* 12 - 20   Final    GFR est AA 03/09/2019 >60  >60 ml/min/1.73m2 Final    GFR est non-AA 03/09/2019 >60  >60 ml/min/1.73m2 Final    Comment: (NOTE)  Estimated GFR is calculated using the Modification of Diet in Renal   Disease (MDRD) Study equation, reported for both  Americans   (GFRAA) and non- Americans (GFRNA), and normalized to 1.73m2   body surface area. The physician must decide which value applies to   the patient. The MDRD study equation should only be used in   individuals age 25 or older. It has not been validated for the   following: pregnant women, patients with serious comorbid conditions,   or on certain medications, or persons with extremes of body size,   muscle mass, or nutritional status.  Calcium 03/09/2019 8.7  8.5 - 10.1 MG/DL Final    Bilirubin, total 03/09/2019 0.4  0.2 - 1.0 MG/DL Final    ALT (SGPT) 03/09/2019 44  13 - 56 U/L Final    AST (SGOT) 03/09/2019 14* 15 - 37 U/L Final    Alk.  phosphatase 03/09/2019 69  45 - 117 U/L Final  Protein, total 03/09/2019 6.1* 6.4 - 8.2 g/dL Final    Albumin 03/09/2019 3.5  3.4 - 5.0 g/dL Final    Globulin 03/09/2019 2.6  2.0 - 4.0 g/dL Final    A-G Ratio 03/09/2019 1.3  0.8 - 1.7   Final   Admission on 02/28/2019, Discharged on 03/05/2019   No results displayed because visit has over 200 results. PULMONARY FUNCTION TESTS    Date FVC FEV1  FEV1/FVC [id number] TLC RV RV/TLC VC DLCO   1/15/2016  44%  30%  52  17%  98%  142%  146%  69%  9.87  42%                                             Imaging:  I have personally reviewed the patients radiographs and have reviewed the reports:  XR Results (most recent):  Results from Hospital Encounter encounter on 02/28/19   XR CHEST PORT    Narrative EXAM: CHEST ONE VIEW  portable 0315 hours    CLINICAL HISTORY/INDICATION: Severe shortness of breath, cough x1 week    COMPARISON: Chest x-ray November 9, 2018, October 4, 2018. TECHNIQUE: One view obtained. FINDINGS:     The cardiac and mediastinal silhouette is normal. The lungs are markedly  hyperinflated. The upper lungs are emphysematous. Mildly increased interstitial  markings at the lower third bilaterally. . Pulmonary vascularity is normal. The  costophrenic angles are sharply defined. No bony abnormalities are seen. Impression IMPRESSION:    Severe hyperinflation. Bibasal interstitial prominence also stable consistent with chronic interstitial  disease. CT Results (most recent):  Results from Hospital Encounter encounter on 04/22/19   CT ABD PELV W CONT    Narrative EXAM: CT ABDOMEN PELVIS WITH CONTRAST    INDICATION: Malignant neoplasm of the cecum. COMPARISON: November 1, 2018. William Tolbert CONTRAST: 80 mL of Isovue-300. TECHNIQUE:    Multislice helical CT was performed from the diaphragm to the symphysis pubis  during uneventful rapid bolus intravenous contrast administration. Oral contrast  was administered.  Contiguous 5 mm axial images were reconstructed and lung and  soft tissue windows were generated. Coronal and sagittal reformations were  generated. CT dose reduction was achieved through use of a standardized protocol  tailored for this examination and automatic exposure control for dose  modulation. FINDINGS:  Lung bases when compared with the previous study show resolution of the  bilateral pleural effusions, infiltrate, and compressive atelectasis. The  cardiac silhouette is normal in size and no pericardial effusion is seen. .    There are no focal abnormalities within the liver, spleen, pancreas, adrenal  glands or kidneys. The aorta tapers without aneurysm. There is no retroperitoneal adenopathy or  mass. There has been a prior right colectomy and positioning of the ileal colostomy. The small bowel is normal caliber on the current exam and not dilated. The colon  is of normal caliber and contains some of the ingested oral contrast. A large  amount of oral contrast has traversed the ileostomy and appears within the  collection bag. . The appendix is surgically absent. There is no ascites or free  intraperitoneal air. The uterus and ovaries are surgically absent. . No pathologic lymphadenopathy is  seen. .    Bone window images shows no evidence for an aggressive lytic or blastic lesion. Surgical changes to the lower lumbar spine are observed. .        Impression IMPRESSION: No renal calculi or obstructive uropathy. Marked decrease in the caliber of the small bowel when compared with the  previous study. Oral contrast has traversed the bowel with a large amount appearing in the  ostomy collection bag. Clearing of the previously noted bilateral pleural effusions, infiltrates and  atelectasis. No pathologic lymphadenopathy. No free fluid or free air in the peritoneum. The osseous structures appear intact. .        Patient Active Problem List   Diagnosis Code    COPD, severe ([facility] J44.9    Nicotine addiction F17.200    Hemoptysis R04.2    Lumbar spinal stenosis M48.061    Facet arthritis of lumbar region M47.816    Neuritis of lower extremity G57.90    Emphysema of lung ([facility]) J43.9    COPD with acute exacerbation ([facility]) J44.1    Muscle spasm of back M62.830    Sacroiliac joint pain M53.3    Current chronic use of systemic steroids Z79.52    Baker's cyst of knee M71.20    Acute exacerbation of chronic obstructive pulmonary disease (COPD) ([facility]) J44.1    Degenerative disc disease, lumbar M51.36    Left-sided low back pain with sciatica M54.42    COPD with exacerbation ([facility]) J44.1    COPD (chronic obstructive pulmonary disease) ([facility]) J44.9    Pneumonia J18.9    Abdominal pain R10.9    Abdominal mass R19.00    Colon cancer without distant metastasis ([facility]) C18.9    Colon polyps K63.5    Pre-operative cardiovascular examination Z01.810    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.0    Post-op pain G89.18    Obstruction of bowel ([address] K56.609    Ileus following gastrointestinal surgery ([facility]) K91.89, K56.7    Hypotension I95.9    COPD exacerbation ([facility]) J44.1    Dizziness R42    Non-rheumatic mitral regurgitation I34.0    Pulmonary HTN ([facility]) I27.20    Chest pain R07.9       IMPRESSION:   · COPD- severe, FEV1 reduced to 30%, with acute exacerbation  · Wheezing  · Cough  · Respiratory failure - currently on supplemental O2 @ 2LPM daily and HS  · History of tobacco use  · History of adenocarcinoma of colon - s/p colectomy and ileostomy ( 2018)      RECOMMENDATIONS:   · Pharmacotherapy - Continue   Advair Diskus 250/50, one  inhalation  BID  and albuterol PRN. Discussed appropriate use of respiratory maintenance and rescue  medications with patient  · Continue Spiriva Respimat 2.5 mcg, town inhalations once daily  · Complete prednisone taper - 30 mg daily x3 days, 20 mg x 3 days , 10 mg daily x 3 days. Then continue 10 mg daily  · Start singulair 10 mg daily  · Continue smoking cessation - Counseled the patient regarding cessation of smoking.   I reviewed health risks of tobacco use including increased risk of MI, stroke, cancer, etc.  We reviewed various approaches to cessation. Pt declined cessation assistance at this time. I also strongly advised patient to avoid smoking with oxygen use due to fire/explosion risk. Pt expressed understanding. · Continue supplemental O2 @ 2LPM daily and HS  · Health maintenance screens deferred to PCP  · Follow up in pulmonary clinic in one month  or sooner with worsening of symptoms  · Repeat imaging as needed  · Recommend healthy diet / wieght / exercise as tolerated  · Report to ER with chest pain or difficulty breathing.         Viral Azar, NP

## 2019-06-28 ENCOUNTER — HOSPITAL ENCOUNTER (OUTPATIENT)
Dept: LAB | Age: 74
Discharge: HOME | End: 2019-06-28
Payer: MEDICARE

## 2019-06-28 DIAGNOSIS — C18.9 COLON CANCER WITHOUT DISTANT METASTASIS (HCC): ICD-10-CM

## 2019-06-28 LAB
ALBUMIN SERPL-MCNC: 3.9 G/DL (ref 3.4–5)
PREALB SERPL-MCNC: 23.9 MG/DL (ref 20–40)

## 2019-06-28 PROCEDURE — 82040 ASSAY OF SERUM ALBUMIN: CPT

## 2019-06-28 PROCEDURE — 36415 COLL VENOUS BLD VENIPUNCTURE: CPT

## 2019-06-28 PROCEDURE — 84134 ASSAY OF PREALBUMIN: CPT

## 2019-07-02 RX ORDER — ALBUTEROL SULFATE 90 UG/1
1 AEROSOL, METERED RESPIRATORY (INHALATION)
Qty: 1 INHALER | Refills: 3 | Status: SHIPPED | OUTPATIENT
Start: 2019-07-02 | End: 2019-10-25 | Stop reason: SDUPTHER

## 2019-07-08 ENCOUNTER — OFFICE VISIT (OUTPATIENT)
Dept: SURGERY | Age: 74
End: 2019-07-08

## 2019-07-08 VITALS
OXYGEN SATURATION: 92 % | WEIGHT: 115 LBS | HEART RATE: 68 BPM | RESPIRATION RATE: 18 BRPM | BODY MASS INDEX: 18.48 KG/M2 | TEMPERATURE: 97.9 F | HEIGHT: 66 IN | SYSTOLIC BLOOD PRESSURE: 120 MMHG | DIASTOLIC BLOOD PRESSURE: 72 MMHG

## 2019-07-08 DIAGNOSIS — Z08 ENCOUNTER FOR ROUTINE CANCER FOLLOW-UP: Primary | ICD-10-CM

## 2019-07-08 DIAGNOSIS — Z85.038 PERSONAL HISTORY OF COLON CANCER: ICD-10-CM

## 2019-07-08 PROBLEM — C18.0 CECAL CANCER (HCC): Status: RESOLVED | Noted: 2018-10-23 | Resolved: 2019-07-08

## 2019-07-08 NOTE — PROGRESS NOTES
Subjective: She evidently had a COPD exacerbation requiring a steroid taper. She is here to discuss ileostomy reversal.    Past medical history and ROS were reviewed and unchanged. Abdomen: Soft, nontender nondistended    Assessment / Plan    Status post robotic right colectomy for colon cancer complicated by small bowel obstruction from small anastomotic leak  I tell patients I would recommend surgery in approximately 2 months time if she can remain off of high-dose steroids  I told her that because she has had steroids within the last 3 weeks she has high risk for complications  These complications would include most importantly anastomotic leak and pulmonary failure requiring prolonged ventilation, possible tracheostomy  She and her family would like surgery planned later this month as possible and I tell them I cannot recommend this  We discussed her obtaining a second opinion from other colorectal surgeons if she wishes  She can return to see me should she wish to wait until September  I would still think she would be very high risk surgical candidate even without further COPD exacerbations, but would be more comfortable waiting given her recent high-dose steroid use    A total of 25 minutes was spent with the patient, with > 50% of time spent in counseling and coordination of care. The diagnoses and plan were discussed with patient. All questions answered. Plan of care agreed to by all concerned.

## 2019-07-08 NOTE — LETTER
7/8/19 Patient: Flora Bruno YOB: 1945 Date of Visit: 7/8/2019 Maylin Echevarria MD 
[address] [place] [address] Drive [address] [facility] [place] [address] VIA In Basket 241 Omar Sepulveda MD 
[address] [place] [address] [address] [facility] [address] VIA Facsimile: [phone] Dear Juice Cutler Dominik is seen here in follow-up. She had a robotic right colectomy late last year for a stage II colon cancer. Her surgery was complicated by a small bowel obstruction which was due to a very small anastomotic leak which resulted in an adhesion which caused this obstruction. We have been trying for several months now to optimize her from a pulmonary standpoint for surgery given her severe COPD requiring chronic O2 therapy. She was scheduled for surgery at the end of this month for ileostomy reversal but unfortunately within the last 3 weeks developed an exacerbation again and was put on a steroid taper. I tell her I would like to wait until September to see if she can remain off high-dose steroids as this would reduce her risk of anastomotic leak following her surgery. If you have questions, please do not hesitate to call me. I look forward to following your patient along with you. Sincerely, Frida Santiago MD

## 2019-07-24 ENCOUNTER — TELEPHONE (OUTPATIENT)
Dept: CARDIOLOGY CLINIC | Age: 74
End: 2019-07-24

## 2019-07-24 NOTE — TELEPHONE ENCOUNTER
Patient is scheduled to have ileostomy reversal under 2 hours of general anesthesia it is scheduled for 8/12/19 with Dr Aleksander Martini. Last seen Dr Tiny Shi on 4/11/19. Please fax clearance to [phone].

## 2019-07-24 NOTE — LETTER
2019 Dr. Zohra Bernardo 
[address] [address] Dear Dr. Zohra Bernardo: 
 
Re: Connie Oneida : 1945 Ms. Radha Stevens is cleared from a cardiac standpoint with low risk for  surgery scheduled on 19. If you have any questions or any further assistance is needed please contact our office. Sincerely, Signed By: Sandra Potts MD  
 2019 Adi Potts M.D. 
 
cc:  Negra Edwards MD

## 2019-07-31 ENCOUNTER — OFFICE VISIT (OUTPATIENT)
Dept: PULMONOLOGY | Age: 74
End: 2019-07-31

## 2019-07-31 VITALS
HEIGHT: 66 IN | SYSTOLIC BLOOD PRESSURE: 110 MMHG | TEMPERATURE: 98 F | OXYGEN SATURATION: 93 % | BODY MASS INDEX: 18.8 KG/M2 | HEART RATE: 86 BPM | WEIGHT: 117 LBS | RESPIRATION RATE: 18 BRPM | DIASTOLIC BLOOD PRESSURE: 70 MMHG

## 2019-07-31 DIAGNOSIS — R09.02 HYPOXEMIA REQUIRING SUPPLEMENTAL OXYGEN: ICD-10-CM

## 2019-07-31 DIAGNOSIS — J44.9 COPD, GROUP C, BY GOLD 2017 CLASSIFICATION (HCC): ICD-10-CM

## 2019-07-31 DIAGNOSIS — Z99.81 HYPOXEMIA REQUIRING SUPPLEMENTAL OXYGEN: ICD-10-CM

## 2019-07-31 DIAGNOSIS — J44.9 COPD, SEVERE (HCC): Primary | ICD-10-CM

## 2019-07-31 DIAGNOSIS — Z98.890 H/O ILEOSTOMY: ICD-10-CM

## 2019-07-31 RX ORDER — PREDNISONE 10 MG/1
10 TABLET ORAL DAILY
Qty: 100 TAB | Refills: 0 | Status: SHIPPED | OUTPATIENT
Start: 2019-07-31 | End: 2019-10-31 | Stop reason: SDUPTHER

## 2019-07-31 NOTE — PROGRESS NOTES
Marilyn Arteaga presents today for   Chief Complaint   Patient presents with    Follow Up Chronic Condition    COPD       Is someone accompanying this pt? Yes - Patient Sister    Is the patient using any DME equipment during OV? Yes    -DME Company     Depression Screening:  3 most recent PHQ Screens 3/13/2019   Little interest or pleasure in doing things Not at all   Feeling down, depressed, irritable, or hopeless Not at all   Total Score PHQ 2 0   Trouble falling or staying asleep, or sleeping too much -   Feeling tired or having little energy -   Poor appetite, weight loss, or overeating -   Feeling bad about yourself - or that you are a failure or have let yourself or your family down -   Trouble concentrating on things such as school, work, reading, or watching TV -   Moving or speaking so slowly that other people could have noticed; or the opposite being so fidgety that others notice -   Thoughts of being better off dead, or hurting yourself in some way -   PHQ 9 Score -   How difficult have these problems made it for you to do your work, take care of your home and get along with others -       Learning Assessment:  Learning Assessment 9/11/2017   PRIMARY LEARNER Patient   HIGHEST LEVEL OF EDUCATION - PRIMARY LEARNER  -   BARRIERS PRIMARY LEARNER -   PRIMARY LANGUAGE ENGLISH    NEED -   LEARNER PREFERENCE PRIMARY LISTENING     DEMONSTRATION   ANSWERED BY patient, Princess Noland     bsps   RELATIONSHIP SELF       Abuse Screening:  No flowsheet data found. Fall Risk  Fall Risk Assessment, last 12 mths 6/24/2019   Able to walk? Yes   Fall in past 12 months? No   Fall with injury? -   Number of falls in past 12 months -   Fall Risk Score -         Coordination of Care:  1. Have you been to the ER, urgent care clinic since your last visit? Hospitalized since your last visit? No    2.  Have you seen or consulted any other health care providers outside of the [address] [place] [address] since your last visit? Include any pap smears or colon screening.

## 2019-07-31 NOTE — PROGRESS NOTES
HISTORY OF PRESENT Mary Anne Quinones is a 69 y.o. female with COPD. Preoperative pulmonary evaluation for possible reversal of ileostomy. Pt has severe COPD last FEV1 43% and is on long term O2. She has been on low dose Prednisone 10 mg and reports increased SOB and wheezing if taken off this. She is back to her baseline level of function and denies SOB over her usual, no fever, chills, chest pain. Pt with usual cough occasionally productive of whitish phlegm. 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 Pt complains of intermittent hemoptysis, known since 2013, no fever or chills. Pt has quit smoking since hospital admission in 2018. Pt noted no apparent response to maintenance Azithromycin with no change in amount and character of phlegm. Daliresp given on prior visit also did not improve symptoms and resulted in side effects. COPD   The history is provided by the patient and relative. This is a chronic problem. The problem occurs daily. Progression since onset: waxing and waning. Associated symptoms include shortness of breath. Pertinent negatives include no chest pain, no abdominal pain and no headaches. The symptoms are aggravated by exertion. The symptoms are relieved by medications. Treatments tried: see list. The treatment provided moderate relief. Review of Systems   Constitutional: Negative for chills, diaphoresis, fever and malaise/fatigue. Weight loss: resolved. HENT: Negative for congestion, ear discharge, ear pain, hearing loss, nosebleeds, sinus pain, sore throat and tinnitus. Eyes: Negative for blurred vision, double vision, photophobia, pain, discharge and redness. Respiratory: Positive for cough (baseline), sputum production, shortness of breath and wheezing. Negative for stridor. Hemoptysis: intermittent for years. Cardiovascular: Negative for chest pain, palpitations, orthopnea, claudication, leg swelling and PND. Gastrointestinal: Negative for abdominal pain, blood in stool, constipation, diarrhea, heartburn, melena, nausea and vomiting. Genitourinary: Negative for dysuria, flank pain, frequency, hematuria and urgency. Musculoskeletal: Positive for joint pain. Negative for back pain, falls, myalgias and neck pain. Skin: Negative for itching and rash. Neurological: Negative for dizziness, tingling, tremors, sensory change, speech change, focal weakness, seizures, loss of consciousness, weakness and headaches. Endo/Heme/Allergies: Negative for environmental allergies and polydipsia. Bruises/bleeds easily. Psychiatric/Behavioral: Positive for depression. Negative for hallucinations, memory loss, substance abuse and suicidal ideas. The patient is nervous/anxious and has insomnia. Past Medical History:   Diagnosis Date    Anxiety     Arthritis     Asbestosis ([facility] [address]     Asthma     Back problem     Baker's cyst     Balance problems     Chronic lung disease     Cigarette smoker     Clotting disorder ([facility])     COPD (chronic obstructive pulmonary disease) ([facility])     oxygen 2/liters asbestosis    Depression     History of DVT (deep vein thrombosis)     Leg pain     Right    Lung disease     Nausea & vomiting     Osteoporosis     Restless leg syndrome     Spinal stenosis     Thromboembolus ([facility])     Vitamin D deficiency      Current Outpatient Medications on File Prior to Visit   Medication Sig Dispense Refill    albuterol (PROVENTIL HFA, VENTOLIN HFA, PROAIR HFA) 90 mcg/actuation inhaler Take 1 Puff by inhalation every four (4) hours as needed for Wheezing. 1 Inhaler 3    montelukast (SINGULAIR) 10 mg tablet Take 1 Tab by mouth daily.  (Patient taking differently: Take 10 mg by mouth every other day.) 30 Tab 3    predniSONE (DELTASONE) 10 mg tablet Take 3 tabs x 3 days, then 2 tabs x 3 days then 10mg daily with breakfast 100 Tab 0    albuterol (PROVENTIL VENTOLIN) 2.5 mg /3 mL (0.083 %) nebulizer solution 3 mL by Nebulization route every four (4) hours as needed for Wheezing or Shortness of Breath. ICD: J44.9, R09.02 file under Medicare Part B 300 Each 3    fluticasone propion-salmeterol (ADVAIR DISKUS) 250-50 mcg/dose diskus inhaler Take 1 Puff by inhalation two (2) times a day. BRAND NAME ONLY 3 Inhaler 3    OXYGEN-AIR DELIVERY SYSTEMS Take 2 L/min by inhalation continuous.  simethicone (GAS-X) 80 mg chewable tablet Take 80 mg by mouth every six (6) hours as needed for Flatulence.  acetylcysteine 500 mg cap Take 500 mg by mouth two (2) times a day. (Patient taking differently: Take 600 mg by mouth two (2) times a day.) 60 Cap 3    raNITIdine (ZANTAC) 150 mg tablet Take 150 mg by mouth two (2) times a day.  therapeutic multivitamin (THERAGRAN) tablet Take 1 Tab by mouth daily. 30 Tab 11    acetaminophen (TYLENOL EXTRA STRENGTH) 500 mg tablet Take 500 mg by mouth every six (6) hours as needed for Pain.  LORazepam (ATIVAN) 1 mg tablet Take  by mouth two (2) times daily as needed for Anxiety.  rOPINIRole (REQUIP) 1 mg tablet Take 1 mg by mouth two (2) times a day.  OXYGEN-AIR DELIVERY SYSTEMS 2 L by Does Not Apply route. O2 via nasal cannula with bedtime and activity. O2 Company: [employer]       No current facility-administered medications on file prior to visit.       Allergies   Allergen Reactions    Anoro Ellipta [Umeclidinium-Vilanterol] Rash and Other (comments)     Muscle cramps in arms    Aspirin Other (comments)     GI upset and bleeding    Augmentin [Amoxicillin-Pot Clavulanate] Not Reported This Time     Possible cramping    Doxycycline Nausea and Vomiting    Morphine Other (comments)     Neurologic symptoms - severe agitation      Shellfish Derived Itching     Pt able to eat small amounts per report     Sulfa (Sulfonamide Antibiotics) Rash     Patient relates no longer allergic    Marija Lemos [Fluticasone Propion-Salmeterol] Other (comments)     Mouth Sores     Social History     Socioeconomic History    Marital status:      Spouse name: Not on file    Number of children: Not on file    Years of education: Not on file    Highest education level: Not on file   Occupational History    Not on file   Social Needs    Financial resource strain: Not on file    Food insecurity:     Worry: Not on file     Inability: Not on file    Transportation needs:     Medical: Not on file     Non-medical: Not on file   Tobacco Use    Smoking status: Former Smoker     Packs/day: 0.50     Years: 50.00     Pack years: 25.00     Types: Cigarettes     Start date: 10/21/1964     Last attempt to quit: 10/20/2018     Years since quittin.7    Smokeless tobacco: Never Used   Substance and Sexual Activity    Alcohol use: No    Drug use: No    Sexual activity: Not on file   Lifestyle    Physical activity:     Days per week: Not on file     Minutes per session: Not on file    Stress: Not on file   Relationships    Social connections:     Talks on phone: Not on file     Gets together: Not on file     Attends Scientologist service: Not on file     Active member of club or organization: Not on file     Attends meetings of clubs or organizations: Not on file     Relationship status: Not on file    Intimate partner violence:     Fear of current or ex partner: Not on file     Emotionally abused: Not on file     Physically abused: Not on file     Forced sexual activity: Not on file   Other Topics Concern    Not on file   Social History Narrative    Not on file     Blood pressure 110/70, pulse 86, temperature 98 °F (36.7 °C), temperature source Oral, resp. rate 18, height 5' 6\" (1.676 m), weight 53.1 kg (117 lb), SpO2 93 %. Physical Exam   Constitutional: She is oriented to person, place, and time. She appears well-developed. No distress.    Asthenic    HENT:   Head: Normocephalic and atraumatic. Nose: Nose normal.   Mouth/Throat: No oropharyngeal exudate. Eyes: Pupils are equal, round, and reactive to light. Conjunctivae and EOM are normal. Right eye exhibits no discharge. Left eye exhibits no discharge. No scleral icterus. Neck: No JVD present. No tracheal deviation present. No thyromegaly present. Cardiovascular: Normal rate, regular rhythm, normal heart sounds and intact distal pulses. Exam reveals no gallop. No murmur heard. Pulmonary/Chest: Effort normal. No stridor. No respiratory distress. She has no wheezes. She has no rales. She exhibits no tenderness. Distant breath sounds ,  Transmitted upper airway sounds   Abdominal: Soft. She exhibits no mass. There is no tenderness. There is no rebound. Ileostomy    Musculoskeletal: She exhibits no edema, tenderness or deformity. Lymphadenopathy:     She has no cervical adenopathy. Neurological: She is alert and oriented to person, place, and time. Coordination normal.   Skin: Skin is warm and dry. No rash noted. She is not diaphoretic. No erythema. No pallor. Small ecchymoses on both upper and lower extremities   Psychiatric: She has a normal mood and affect. Her behavior is normal. Judgment and thought content normal.     CT Results (most recent):  Results from Hospital Encounter encounter on 04/22/19   CT ABD PELV W CONT    Narrative EXAM: CT ABDOMEN PELVIS WITH CONTRAST    INDICATION: Malignant neoplasm of the cecum. COMPARISON: November 1, 2018. Allayne Sydnee CONTRAST: 80 mL of Isovue-300. TECHNIQUE:    Multislice helical CT was performed from the diaphragm to the symphysis pubis  during uneventful rapid bolus intravenous contrast administration. Oral contrast  was administered. Contiguous 5 mm axial images were reconstructed and lung and  soft tissue windows were generated. Coronal and sagittal reformations were  generated.  CT dose reduction was achieved through use of a standardized protocol  tailored for this examination and automatic exposure control for dose  modulation. FINDINGS:  Lung bases when compared with the previous study show resolution of the  bilateral pleural effusions, infiltrate, and compressive atelectasis. The  cardiac silhouette is normal in size and no pericardial effusion is seen. .    There are no focal abnormalities within the liver, spleen, pancreas, adrenal  glands or kidneys. The aorta tapers without aneurysm. There is no retroperitoneal adenopathy or  mass. There has been a prior right colectomy and positioning of the ileal colostomy. The small bowel is normal caliber on the current exam and not dilated. The colon  is of normal caliber and contains some of the ingested oral contrast. A large  amount of oral contrast has traversed the ileostomy and appears within the  collection bag. . The appendix is surgically absent. There is no ascites or free  intraperitoneal air. The uterus and ovaries are surgically absent. . No pathologic lymphadenopathy is  seen. .    Bone window images shows no evidence for an aggressive lytic or blastic lesion. Surgical changes to the lower lumbar spine are observed. .        Impression IMPRESSION: No renal calculi or obstructive uropathy. Marked decrease in the caliber of the small bowel when compared with the  previous study. Oral contrast has traversed the bowel with a large amount appearing in the  ostomy collection bag. Clearing of the previously noted bilateral pleural effusions, infiltrates and  atelectasis. No pathologic lymphadenopathy. No free fluid or free air in the peritoneum. The osseous structures appear intact. .       ASSESSMENT and PLAN  Encounter Diagnoses   Name Primary?  COPD, severe ([address] Yes    Hypoxemia requiring supplemental oxygen     COPD, group C, by GOLD 2017 classification ([address]     H/O ileostomy        Pt with severe COPD on LTOT no recent exacerbations or pulmonary infections.   She is maintained on chronic low dose Prednisone despite multiple attempts to taper off steroids. She has failed prior trials of Azithromycin suppression and Daliresp. Pt is at intermediate to high risk of post op pulmonary complications with ARSICAT score of 43, however she is well aware of her risk and to her the benefits outweigh the risks. Would recommend giving a preoperative Solucortef in stress dose amounts. Further stress dose  Steroids would depend on BP response but would anticipate returning to her usual dose of Prednisone post op. Encourage incentive spirometry and aggressive bronchial hygiene as well as early mobilization. Continue maintenance therapy as listed above and rescue Albuterol. Continue  Tessalon perles prn. RTC 3 months.

## 2019-08-01 ENCOUNTER — ANESTHESIA EVENT (OUTPATIENT)
Dept: ENDOSCOPY | Age: 74
End: 2019-08-01
Payer: MEDICARE

## 2019-08-02 ENCOUNTER — ANESTHESIA (OUTPATIENT)
Dept: ENDOSCOPY | Age: 74
End: 2019-08-02
Payer: MEDICARE

## 2019-08-02 ENCOUNTER — HOSPITAL ENCOUNTER (OUTPATIENT)
Age: 74
Setting detail: OUTPATIENT SURGERY
Discharge: HOME | End: 2019-08-02
Attending: INTERNAL MEDICINE | Admitting: INTERNAL MEDICINE
Payer: MEDICARE

## 2019-08-02 VITALS
OXYGEN SATURATION: 98 % | TEMPERATURE: 97 F | HEIGHT: 66 IN | BODY MASS INDEX: 18.8 KG/M2 | WEIGHT: 117 LBS | DIASTOLIC BLOOD PRESSURE: 75 MMHG | HEART RATE: 79 BPM | RESPIRATION RATE: 12 BRPM | SYSTOLIC BLOOD PRESSURE: 112 MMHG

## 2019-08-02 LAB
BUN BLD-MCNC: 22 MG/DL (ref 7–18)
CHLORIDE BLD-SCNC: 98 MMOL/L (ref 100–108)
GLUCOSE BLD STRIP.AUTO-MCNC: 108 MG/DL (ref 74–106)
HCT VFR BLD CALC: 37 % (ref 36–49)
HGB BLD-MCNC: 12.6 G/DL (ref 12–16)
POTASSIUM BLD-SCNC: 3.3 MMOL/L (ref 3.5–5.5)
SODIUM BLD-SCNC: 138 MMOL/L (ref 136–145)

## 2019-08-02 PROCEDURE — 88305 TISSUE EXAM BY PATHOLOGIST: CPT

## 2019-08-02 PROCEDURE — 77030018846 HC SOL IRR STRL H20 ICUM -A: Performed by: INTERNAL MEDICINE

## 2019-08-02 PROCEDURE — 76060000031 HC ANESTHESIA FIRST 0.5 HR: Performed by: INTERNAL MEDICINE

## 2019-08-02 PROCEDURE — 88342 IMHCHEM/IMCYTCHM 1ST ANTB: CPT

## 2019-08-02 PROCEDURE — 82947 ASSAY GLUCOSE BLOOD QUANT: CPT

## 2019-08-02 PROCEDURE — 77030021593 HC FCPS BIOP ENDOSC BSC -A: Performed by: INTERNAL MEDICINE

## 2019-08-02 PROCEDURE — 74011250636 HC RX REV CODE- 250/636

## 2019-08-02 PROCEDURE — 77030029384 HC SNR POLYP CAPTVR II BSC -B: Performed by: INTERNAL MEDICINE

## 2019-08-02 PROCEDURE — 74011250636 HC RX REV CODE- 250/636: Performed by: NURSE ANESTHETIST, CERTIFIED REGISTERED

## 2019-08-02 PROCEDURE — 77030008565 HC TBNG SUC IRR ERBE -B: Performed by: INTERNAL MEDICINE

## 2019-08-02 PROCEDURE — 76040000019: Performed by: INTERNAL MEDICINE

## 2019-08-02 RX ORDER — SODIUM CHLORIDE, SODIUM LACTATE, POTASSIUM CHLORIDE, CALCIUM CHLORIDE 600; 310; 30; 20 MG/100ML; MG/100ML; MG/100ML; MG/100ML
25 INJECTION, SOLUTION INTRAVENOUS CONTINUOUS
Status: DISCONTINUED | OUTPATIENT
Start: 2019-08-02 | End: 2019-08-02 | Stop reason: HOSPADM

## 2019-08-02 RX ORDER — PROPOFOL 10 MG/ML
INJECTION, EMULSION INTRAVENOUS AS NEEDED
Status: DISCONTINUED | OUTPATIENT
Start: 2019-08-02 | End: 2019-08-02 | Stop reason: HOSPADM

## 2019-08-02 RX ORDER — LIDOCAINE HYDROCHLORIDE 20 MG/ML
INJECTION, SOLUTION EPIDURAL; INFILTRATION; INTRACAUDAL; PERINEURAL AS NEEDED
Status: DISCONTINUED | OUTPATIENT
Start: 2019-08-02 | End: 2019-08-02 | Stop reason: HOSPADM

## 2019-08-02 RX ORDER — SODIUM CHLORIDE, SODIUM LACTATE, POTASSIUM CHLORIDE, CALCIUM CHLORIDE 600; 310; 30; 20 MG/100ML; MG/100ML; MG/100ML; MG/100ML
75 INJECTION, SOLUTION INTRAVENOUS CONTINUOUS
Status: DISCONTINUED | OUTPATIENT
Start: 2019-08-02 | End: 2019-08-02 | Stop reason: HOSPADM

## 2019-08-02 RX ORDER — SODIUM CHLORIDE 0.9 % (FLUSH) 0.9 %
5-40 SYRINGE (ML) INJECTION EVERY 8 HOURS
Status: DISCONTINUED | OUTPATIENT
Start: 2019-08-02 | End: 2019-08-02 | Stop reason: HOSPADM

## 2019-08-02 RX ORDER — SODIUM CHLORIDE 0.9 % (FLUSH) 0.9 %
5-40 SYRINGE (ML) INJECTION AS NEEDED
Status: DISCONTINUED | OUTPATIENT
Start: 2019-08-02 | End: 2019-08-02 | Stop reason: HOSPADM

## 2019-08-02 RX ADMIN — LIDOCAINE HYDROCHLORIDE 100 MG: 20 INJECTION, SOLUTION EPIDURAL; INFILTRATION; INTRACAUDAL; PERINEURAL at 09:44

## 2019-08-02 RX ADMIN — PROPOFOL 100 MG: 10 INJECTION, EMULSION INTRAVENOUS at 09:44

## 2019-08-02 RX ADMIN — PROPOFOL 100 MG: 10 INJECTION, EMULSION INTRAVENOUS at 09:46

## 2019-08-02 RX ADMIN — SODIUM CHLORIDE, SODIUM LACTATE, POTASSIUM CHLORIDE, AND CALCIUM CHLORIDE 75 ML/HR: 600; 310; 30; 20 INJECTION, SOLUTION INTRAVENOUS at 09:00

## 2019-08-02 RX ADMIN — PROPOFOL 50 MG: 10 INJECTION, EMULSION INTRAVENOUS at 09:53

## 2019-08-02 RX ADMIN — PROPOFOL 50 MG: 10 INJECTION, EMULSION INTRAVENOUS at 09:49

## 2019-08-02 NOTE — ANESTHESIA POSTPROCEDURE EVALUATION
Procedure(s):  COLONOSCOPY with polypectomies and biopsies. MAC    Anesthesia Post Evaluation      Multimodal analgesia: multimodal analgesia used between 6 hours prior to anesthesia start to PACU discharge  Patient location during evaluation: bedside  Patient participation: complete - patient participated  Level of consciousness: awake  Pain management: adequate  Airway patency: patent  Anesthetic complications: no  Cardiovascular status: acceptable  Respiratory status: acceptable  Hydration status: acceptable  Post anesthesia nausea and vomiting:  controlled      Vitals Value Taken Time   /71 8/2/2019 10:24 AM   Temp 36.4 °C (97.5 °F) 8/2/2019 10:07 AM   Pulse 84 8/2/2019 10:29 AM   Resp 20 8/2/2019 10:29 AM   SpO2 98 % 8/2/2019 10:29 AM   Vitals shown include unvalidated device data.

## 2019-08-02 NOTE — H&P
[facility]  [address] [facility], [address]      History and Physical reviewed; I have examined the patient and there are no pertinent changes. Madelaine Francis MD, MD   9:42 AM 8/2/2019  [facility], [address] [place] [address]  [email or web address]

## 2019-08-02 NOTE — ANESTHESIA PREPROCEDURE EVALUATION
Anesthetic History     PONV          Review of Systems / Medical History  Patient summary reviewed and pertinent labs reviewed    Pulmonary    COPD: severe        Asthma : poorly controlled       Neuro/Psych         Psychiatric history     Cardiovascular  Within defined limits                Exercise tolerance: >4 METS     GI/Hepatic/Renal  Within defined limits              Endo/Other        Blood dyscrasia and arthritis     Other Findings   Comments:                  Physical Exam    Airway  Mallampati: II  TM Distance: 4 - 6 cm  Neck ROM: normal range of motion   Mouth opening: Normal     Cardiovascular  Regular rate and rhythm,  S1 and S2 normal,  no murmur, click, rub, or gallop  Rhythm: regular  Rate: normal         Dental    Dentition: Poor dentition     Pulmonary      Decreased breath sounds  :bilateral    Prolonged expiration     Abdominal  GI exam deferred       Other Findings            Anesthetic Plan    ASA: 3  Anesthesia type: MAC            Anesthetic plan and risks discussed with: Patient

## 2019-08-02 NOTE — DISCHARGE INSTRUCTIONS
Colonoscopy: What to Expect at [address] [place] [address]  After you have a colonoscopy, you will stay at the clinic for 1 to 2 hours until the medicines wear off. Then you can go home. But you will need to arrange for a ride. Your doctor will tell you when you can eat and do your other usual activities. Your doctor will talk to you about when you will need your next colonoscopy. Your doctor can help you decide how often you need to be checked. This will depend on the results of your test and your risk for colorectal cancer. After the test, you may be bloated or have gas pains. You may need to pass gas. If a biopsy was done or a polyp was removed, you may have streaks of blood in your stool (feces) for a few days. This care sheet gives you a general idea about how long it will take for you to recover. But each person recovers at a different pace. Follow the steps below to get better as quickly as possible. How can you care for yourself at home? Activity  · Rest when you feel tired. · You can do your normal activities when it feels okay to do so. Diet  · Follow your doctor's directions for eating. · Unless your doctor has told you not to, drink plenty of fluids. This helps to replace the fluids that were lost during the colon prep. · Do not drink alcohol. Medicines  · If polyps were removed or a biopsy was done during the test, your doctor may tell you not to take aspirin or other anti-inflammatory medicines for a few days. These include ibuprofen (Advil, Motrin) and naproxen (Aleve). Other instructions  · For your safety, do not drive or operate machinery until the medicine wears off and you can think clearly. Your doctor may tell you not to drive or operate machinery until the day after your test.  · Do not sign legal documents or make major decisions until the medicine wears off and you can think clearly. The anesthesia can make it hard for you to fully understand what you are agreeing to.   Follow-up care is a key part of your treatment and safety. Be sure to make and go to all appointments, and call your doctor if you are having problems. It's also a good idea to know your test results and keep a list of the medicines you take. When should you call for help? Call 911 anytime you think you may need emergency care. For example, call if:  · You passed out (lost consciousness). · You pass maroon or bloody stools. · You have severe belly pain. Call your doctor now or seek immediate medical care if:  · Your stools are black and tarlike. · Your stools have streaks of blood, but you did not have a biopsy or any polyps removed. · You have belly pain, or your belly is swollen and firm. · You vomit. · You have a fever. · You are very dizzy. Watch closely for changes in your health, and be sure to contact your doctor if you have any problems. Where can you learn more? Go to LxDATA.be  Enter E264 in the search box to learn more about \"Colonoscopy: What to Expect at Home. \"   © 9795-6997 [employer]. Care instructions adapted under license by [employer] (which disclaims liability or warranty for this information). This care instruction is for use with your licensed healthcare professional. If you have questions about a medical condition or this instruction, always ask your healthcare professional. [address] any warranty or liability for your use of this information. Content Version: 14.1.245555; Current as of: November 14, 2014    Patient Education      Colon Polyps: Care Instructions  Your Care Instructions    Colon polyps are growths in the colon or the rectum. The cause of most colon polyps is not known, and most people who get them do not have any problems. But a certain kind can turn into cancer. For this reason, regular testing for colon polyps is important for people as they get older.  It is also important for anyone who has an increased risk for colon cancer. Polyps are usually found through routine colon cancer screening tests. Although most colon polyps are not cancerous, they are usually removed and then tested for cancer. Screening for colon cancer saves lives because the cancer can usually be cured if it is caught early. If you have a polyp that is the type that can turn into cancer, you may need more tests to examine your entire colon. The doctor will remove any other polyps that he or she finds, and you will be tested more often. Follow-up care is a key part of your treatment and safety. Be sure to make and go to all appointments, and call your doctor if you are having problems. It's also a good idea to know your test results and keep a list of the medicines you take. How can you care for yourself at home? Regular exams to look for colon polyps are the best way to prevent polyps from turning into colon cancer. These can include stool tests, sigmoidoscopy, colonoscopy, and CT colonography. Talk with your doctor about a testing schedule that is right for you. To prevent polyps  There is no home treatment that can prevent colon polyps. But these steps may help lower your risk for cancer. · Stay active. Being active can help you get to and stay at a healthy weight. Try to exercise on most days of the week. Walking is a good choice. · Eat well. Choose a variety of vegetables, fruits, legumes (such as peas and beans), fish, poultry, and whole grains. · Do not smoke. If you need help quitting, talk to your doctor about stop-smoking programs and medicines. These can increase your chances of quitting for good. · If you drink alcohol, limit how much you drink. Limit alcohol to 2 drinks a day for men and 1 drink a day for women. When should you call for help?   Call your doctor now or seek immediate medical care if:    · You have severe belly pain.     · Your stools are maroon or very bloody.    Watch closely for changes in your health, and be sure to contact your doctor if:    · You have a fever.     · You have nausea or vomiting.     · You have a change in bowel habits (new constipation or diarrhea).     · Your symptoms get worse or are not improving as expected. Where can you learn more? Go to http://robert-jorge.info/. Enter 95 [id number] in the search box to learn more about \"Colon Polyps: Care Instructions. \"  Current as of: December 19, 2018  Content Version: 12.1  © 3805-5005 [employer]. Care instructions adapted under license by [employer] (which disclaims liability or warranty for this information). If you have questions about a medical condition or this instruction, always ask your healthcare professional. [place] [address] any warranty or liability for your use of this information. DISCHARGE SUMMARY from Nurse     POST-PROCEDURE INSTRUCTIONS:    Call your Physician if you:  ? Observe any excess bleeding. ? Develop a temperature over 100.5o F.  ? Experience abdominal, shoulder or chest pain. ? Notice any signs of decreased circulation or nerve impairment to an extremity such as a change in color, persistent numbness, tingling, coldness or increase in pain. ? Vomit blood or you have nausea and vomiting lasting longer than 4 hours. ? Are unable to take medications. ? Are unable to urinate within 8 hours after discharge following general anesthesia or intravenous sedation. For the next 24 hours after receiving general anesthesia or intravenous sedation, or while taking prescription Narcotics, limit your activities:  ? Do NOT drive a motor vehicle, operate hazard machinery or power tools, or perform tasks that require coordination. The medication you received during your procedure may have some effect on your mental awareness. ? Do NOT make important personal or business decisions. The medication you received during your procedure may have some effect on your mental awareness.   ? Do NOT drink alcoholic beverages. These drinks do not mix well with the medications that have been given to you. ? Upon discharge from the hospital, you must be accompanied by a responsible adult. ? Resume your diet as directed by your physician. ? Resume medications as your physician has prescribed. ? Please give a list of your current medications to your Primary Care Provider. ? Please update this list whenever your medications are discontinued, doses are changed, or new medications (including over-the-counter products) are added. ? Please carry medication information at all times in case of emergency situations. These are general instructions for a healthy lifestyle:    No smoking/ No tobacco products/ Avoid exposure to second hand smoke.  Surgeon General's Warning:  Quitting smoking now greatly reduces serious risk to your health. Obesity, smoking, and a sedentary lifestyle greatly increase your risk for illness.  A healthy diet, regular physical exercise & weight monitoring are important for maintaining a healthy lifestyle   You may be retaining fluid if you have a history of heart failure or if you experience any of the following symptoms:  Weight gain of 3 pounds or more overnight or 5 pounds in a week, increased swelling in our hands or feet or shortness of breath while lying flat in bed. Please call your doctor as soon as you notice any of these symptoms; do not wait until your next office visit. Recognize signs and symptoms of STROKE:  F  -  Face looks uneven  A  -  Arms unable to move or move unevenly  S  -  Speech slurred or non-existent  T  -  Time to call 911 - as soon as signs and symptoms begin - DO NOT go back to bed or wait to see If you get better - TIME IS BRAIN. Colorectal Screening   Colorectal cancer almost always develops from precancerous polyps (abnormal growths) in the colon or rectum.   Screening tests can find precancerous polyps, so that they can be removed before they turn into cancer. Screening tests can also find colorectal cancer early, when treatment works best.  Mateo Chan Speak with your physician about when you should begin screening and how often you should be tested. Additional Information    If you have questions, please call [phone]. Remember, MyChart is NOT to be used for urgent needs. For medical emergencies, dial 911. Educational references and/or instructions provided during this visit included:    See attached      APPOINTMENTS:    Please make a follow-up appointment with your physician. Discharge information has been reviewed with the patient. The patient verbalized understanding.

## 2019-09-23 PROBLEM — Z01.810 PRE-OPERATIVE CARDIOVASCULAR EXAMINATION: Status: RESOLVED | Noted: 2018-09-23 | Resolved: 2019-09-23

## 2019-10-09 ENCOUNTER — HOSPITAL ENCOUNTER (OUTPATIENT)
Dept: LAB | Age: 74
Discharge: HOME | End: 2019-10-09
Payer: MEDICARE

## 2019-10-09 ENCOUNTER — OFFICE VISIT (OUTPATIENT)
Dept: ORTHOPEDIC SURGERY | Facility: CLINIC | Age: 74
End: 2019-10-09

## 2019-10-09 VITALS
DIASTOLIC BLOOD PRESSURE: 73 MMHG | HEART RATE: 78 BPM | TEMPERATURE: 96.7 F | OXYGEN SATURATION: 95 % | RESPIRATION RATE: 16 BRPM | BODY MASS INDEX: 18.32 KG/M2 | WEIGHT: 114 LBS | HEIGHT: 66 IN | SYSTOLIC BLOOD PRESSURE: 127 MMHG

## 2019-10-09 DIAGNOSIS — L53.9 ERYTHEMA: ICD-10-CM

## 2019-10-09 DIAGNOSIS — L03.111 CELLULITIS OF RIGHT AXILLA: ICD-10-CM

## 2019-10-09 DIAGNOSIS — M25.421 EFFUSION OF RIGHT ELBOW: ICD-10-CM

## 2019-10-09 DIAGNOSIS — M1A.0211 IDIOPATHIC CHRONIC GOUT OF RIGHT ELBOW WITH TOPHUS: ICD-10-CM

## 2019-10-09 DIAGNOSIS — M25.521 ELBOW PAIN, RIGHT: Primary | ICD-10-CM

## 2019-10-09 LAB
BODY FLD TYPE: NORMAL
CRYSTALS FLD MICRO: NORMAL

## 2019-10-09 PROCEDURE — 87070 CULTURE OTHR SPECIMN AEROBIC: CPT

## 2019-10-09 PROCEDURE — 87075 CULTR BACTERIA EXCEPT BLOOD: CPT

## 2019-10-09 PROCEDURE — 89060 EXAM SYNOVIAL FLUID CRYSTALS: CPT

## 2019-10-09 RX ORDER — CLINDAMYCIN HYDROCHLORIDE 300 MG/1
300 CAPSULE ORAL 3 TIMES DAILY
Qty: 30 CAP | Refills: 0 | Status: SHIPPED | OUTPATIENT
Start: 2019-10-09 | End: 2019-10-23

## 2019-10-09 NOTE — PROGRESS NOTES
HISTORY OF PRESENT ILLNESS:  Anthony Moses is a pleasant, 72-year-old, oxygen-dependent female who presents to the office with a one-week history of swelling to her right elbow. She has noticed drainage. She does not believe she has a history of gout. She has no history of injury to the right elbow. Her PCP, Dr. Sara Man sent her over this way for reevaluation and possible aspiration. REVIEW OF SYSTEMS:  No chest pain. She does have chronic shortness of breath. No fever, chills, or night sweats. No rash and no itching. No nausea or vomiting. Redness and swelling with drainage to the elbow is noted. Pain at rest is 2-3/10. With activity, the pain increases upwards of 8-10/10. FAMILY HISTORY:  Noncontributory. PHYSICAL EXAM:  She is a 72-year-old, generally fit, oxygen-dependent,  female, atraumatic, normocephalic, alert and oriented times three, sitting on the table comfortably. She is joined by her daughter today. Examination of her right elbow today reveals a 1+ effusion. She has a small, 2-mm area of opening in which fluid is expressible the through. The fluid is a cloudy, tophus-like material.  The right elbow range of motion actively is without pain at 110-0ø. Distal sensation is intact fully to the right upper extremity. Capillary refill is brisk and less than 2 seconds to the right elbow. 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 No other obvious osseous deformities are noted. IMPRESSION:      1. Right elbow effusion. 2. Right elbow cellulitis. 3. Right elbow probable gout based on the expressible material with no history reported.      PROCEDURE:  Under sterile technique, after verbal and written consent were obtained and appropriate time out performed, with the patient lying supine with the right elbow flexed and positioned to comfort at about 90ø, 3 cc of 1% Lidocaine was used to anesthetize the right elbow using the posterior approach. To follow, with a number 18 blade mounted on a 3 syringe was used to aspirate thin, bloody drainage with some tophus-like material.  The small, 2-mm opening over the inferior portion of the olecranon was debrided with a 18-gauge needle and a sterile Q-Tip was used to thoroughly swipe throughout the extensive area under the skin on the olecranon process. The area measured roughly 2 centimeters in all direction from the small opening. There was expressible tophus-like material with thin blood, which was also obtained and sent for assay. The culture was sent for aerobic, anaerobic, and Gram stain. PLAN:   I am recommending an aspiration of the right elbow and to send the aspirated material for cultures and crystals. The patient is placed on empiric Cleocin 300 mg t.i.d. #30 for 10 days. We will see her back next Wednesday. We will follow the cultures throughout the course. She will change the dressings daily.

## 2019-10-09 NOTE — PROGRESS NOTES
1. Have you been to the ER, urgent care clinic since your last visit? Hospitalized since your last visit? NO    2. Have you seen or consulted any other health care providers outside of the [address] [place] [address] since your last visit? Include any pap smears or colon screening.  NO

## 2019-10-14 LAB
BACTERIA SPEC ANAEROBE CULT: NORMAL
BACTERIA SPEC CULT: NORMAL
GRAM STN SPEC: NORMAL
GRAM STN SPEC: NORMAL
SERVICE CMNT-IMP: NORMAL
SPECIMEN SOURCE: NORMAL

## 2019-10-16 ENCOUNTER — OFFICE VISIT (OUTPATIENT)
Dept: ORTHOPEDIC SURGERY | Facility: CLINIC | Age: 74
End: 2019-10-16

## 2019-10-16 VITALS
HEIGHT: 66 IN | WEIGHT: 115.6 LBS | OXYGEN SATURATION: 97 % | TEMPERATURE: 97.1 F | RESPIRATION RATE: 20 BRPM | DIASTOLIC BLOOD PRESSURE: 58 MMHG | HEART RATE: 82 BPM | SYSTOLIC BLOOD PRESSURE: 112 MMHG | BODY MASS INDEX: 18.58 KG/M2

## 2019-10-16 DIAGNOSIS — M25.521 ELBOW PAIN, RIGHT: Primary | ICD-10-CM

## 2019-10-16 DIAGNOSIS — L03.111 CELLULITIS OF RIGHT AXILLA: ICD-10-CM

## 2019-10-16 DIAGNOSIS — M25.421 EFFUSION OF RIGHT ELBOW: ICD-10-CM

## 2019-10-16 NOTE — PROGRESS NOTES
HISTORY OF PRESENT ILLNESS:  Praveena Randhawa is a pleasant, 79-year-old, oxygen-dependent,  female who presents to the office for repeat examination of the right elbow, as well as lab review from a sample of an aspirate that was performed at her last office visit. The patient has been on antibiotics since her last office visit. PHYSICAL EXAM:  The redness has improved associated with the right olecranon; however, the swelling is still present, and she still has a small, 3-millimeter wide opening at the very tip of the olecranon that is draining, now, a clear to yellowish serous fluid. There is no purulence and no evidence of particulate or tophus. The patient's range of motion essentially is 90-10ø. She has some pain in extension to minus 10ø to full. Pronation and supination are full. There is some point tenderness directly over the olecranon. PLAN:   At this point, she may need a dedicated, formal olecranon bursectomy and closure of the small opening that is draining the serous fluid. I will have her see Dr. Marcela Guaman for consult and further recommendations to include but not limited to possible surgical intervention. Today, all her questions were answered to her satisfaction. A bulky sterile dressing was placed on her right elbow to associate drainage collection. She is going to avoid getting the site wet until she is seen by Dr. Gisele Jaramillo, and she is to change the dressing daily.

## 2019-10-22 ENCOUNTER — OFFICE VISIT (OUTPATIENT)
Dept: ORTHOPEDIC SURGERY | Facility: CLINIC | Age: 74
End: 2019-10-22

## 2019-10-22 VITALS
BODY MASS INDEX: 18.16 KG/M2 | DIASTOLIC BLOOD PRESSURE: 69 MMHG | HEIGHT: 66 IN | HEART RATE: 91 BPM | OXYGEN SATURATION: 97 % | SYSTOLIC BLOOD PRESSURE: 130 MMHG | RESPIRATION RATE: 16 BRPM | WEIGHT: 113 LBS | TEMPERATURE: 96.7 F

## 2019-10-22 DIAGNOSIS — Z01.812 PRE-OPERATIVE LABORATORY EXAMINATION: ICD-10-CM

## 2019-10-22 DIAGNOSIS — M71.021 ABSCESS OF BURSA OF RIGHT ELBOW: Primary | ICD-10-CM

## 2019-10-22 DIAGNOSIS — Z01.818 PREOPERATIVE TESTING: ICD-10-CM

## 2019-10-22 NOTE — PROGRESS NOTES
1. Have you been to the ER, urgent care clinic since your last visit? Hospitalized since your last visit? NO    2. Have you seen or consulted any other health care providers outside of the [address] [place] [address] since your last visit? Include any pap smears or colon screening.  NO

## 2019-10-22 NOTE — PROGRESS NOTES
Patient: Chichi Brady                MRN: [id number]       SSN: [id number]  YOB: 1945        AGE: 69 y.o. SEX: female  Body mass index is 18.24 kg/m². PCP: Jacki Shannon MD  10/22/19    Chief Complaint: Right elbow draining wound    HISTORY OF PRESENT ILLNESS:  Heidi Downing is a 69year-old female who presents to the office today with a draining wound in the right elbow. Several weeks ago, she had the right elbow aspirated. Since that time, she has been having persistent drainage of a serous type fluid from the aspiration site. This has failed to close. She also has some erythema around it. She is on steroids and oxygen for some chronic medical issues. Past Medical History:   Diagnosis Date    Anxiety     Arthritis     Asbestosis ([address]     Asthma     Back problem     Baker's cyst     Balance problems     Chronic lung disease     Cigarette smoker     Clotting disorder (HCC)     COPD (chronic obstructive pulmonary disease) ([facility])     oxygen 2/liters asbestosis    Depression     History of DVT (deep vein thrombosis)     Leg pain     Right    Lung disease     Nausea & vomiting     Osteoporosis     Restless leg syndrome     Spinal stenosis     Thromboembolus ([address]     Vitamin D deficiency        Family History   Problem Relation Age of Onset    Cancer Father     Heart Disease Mother     Hypertension Mother     Cancer Brother     Cancer Sister     Diabetes Other     Hypertension Other     Stroke Other     Heart Disease Sister     Hypertension Sister     Heart Disease Brother        Current Outpatient Medications   Medication Sig Dispense Refill    predniSONE (DELTASONE) 10 mg tablet Take 10 mg by mouth daily. 100 Tab 0    albuterol (PROVENTIL HFA, VENTOLIN HFA, PROAIR HFA) 90 mcg/actuation inhaler Take 1 Puff by inhalation every four (4) hours as needed for Wheezing.  1 Inhaler 3    montelukast (SINGULAIR) 10 mg tablet Take 1 Tab by mouth daily. (Patient taking differently: Take 10 mg by mouth every other day.) 30 Tab 3    albuterol (PROVENTIL VENTOLIN) 2.5 mg /3 mL (0.083 %) nebulizer solution 3 mL by Nebulization route every four (4) hours as needed for Wheezing or Shortness of Breath. ICD: J44.9, R09.02 file under Medicare Part B 300 Each 3    fluticasone propion-salmeterol (ADVAIR DISKUS) 250-50 mcg/dose diskus inhaler Take 1 Puff by inhalation two (2) times a day. BRAND NAME ONLY 3 Inhaler 3    simethicone (GAS-X) 80 mg chewable tablet Take 80 mg by mouth every six (6) hours as needed for Flatulence.  acetylcysteine 500 mg cap Take 500 mg by mouth two (2) times a day. (Patient taking differently: Take 600 mg by mouth two (2) times a day.) 60 Cap 3    raNITIdine (ZANTAC) 150 mg tablet Take 150 mg by mouth two (2) times a day.  therapeutic multivitamin (THERAGRAN) tablet Take 1 Tab by mouth daily. 30 Tab 11    acetaminophen (TYLENOL EXTRA STRENGTH) 500 mg tablet Take 500 mg by mouth every six (6) hours as needed for Pain.  LORazepam (ATIVAN) 1 mg tablet Take  by mouth two (2) times daily as needed for Anxiety.  rOPINIRole (REQUIP) 1 mg tablet Take 1 mg by mouth two (2) times a day.  OXYGEN-AIR DELIVERY SYSTEMS 2 L by Does Not Apply route. O2 via nasal cannula with bedtime and activity. O2 Company: [employer]      clindamycin (CLEOCIN) 300 mg capsule Take 1 Cap by mouth three (3) times daily.  Indications: an infection of the skin and the tissue below the skin 30 Cap 0       Allergies   Allergen Reactions    Anoro Ellipta [Umeclidinium-Vilanterol] Rash and Other (comments)     Muscle cramps in arms    Aspirin Other (comments)     GI upset and bleeding    Augmentin [Amoxicillin-Pot Clavulanate] Not Reported This Time     Possible cramping    Doxycycline Nausea and Vomiting    Morphine Other (comments)     Neurologic symptoms - severe agitation      Shellfish Derived Itching     Pt able to eat small amounts per report  Sulfa (Sulfonamide Antibiotics) Rash     Patient relates no longer allergic    Merwyn Sellers [Fluticasone Propion-Salmeterol] Other (comments)     Mouth Sores       Past Surgical History:   Procedure Laterality Date    COLONOSCOPY N/A 2018    COLONOSCOPY w bx w polypectonies performed by Rebeca Marie MD at [address] COLONOSCOPY N/A 2018    COLONOSCOPY performed by Ciara Chan MD at [address] [address] COLONOSCOPY N/A 2019    COLONOSCOPY with polypectomies and biopsies performed by Maria Luisa Marcus MD at [address] HX APPENDECTOMY      HX BACK SURGERY      x4    HX BREAST BIOPSY      left    HX GI      exp lap and ileostomy    HX HEENT      cataract right    HX HYSTERECTOMY      HX LUMBAR LAMINECTOMY      HX MENISCUS REPAIR      HX ORTHOPAEDIC      Knee bakers cyst    HX POLYPECTOMY      right colectomy    HX TONSILLECTOMY      HX VEIN STRIPPING      LAP,SURG,COLECTOMY, PARTIAL, W/ANAST N/A 10/23/2018    Dr. Tigist Collazo N/A 2018    Dr. Miller Stain      vein stripping       Social History     Socioeconomic History    Marital status:      Spouse name: Not on file    Number of children: Not on file    Years of education: Not on file    Highest education level: Not on file   Occupational History    Not on file   Social Needs    Financial resource strain: Not on file    Food insecurity:     Worry: Not on file     Inability: Not on file    Transportation needs:     Medical: Not on file     Non-medical: Not on file   Tobacco Use    Smoking status: Former Smoker     Packs/day: 0.50     Years: 50.00     Pack years: 25.00     Types: Cigarettes     Start date: 10/21/1964     Last attempt to quit: 10/20/2018     Years since quittin.0    Smokeless tobacco: Never Used   Substance and Sexual Activity    Alcohol use: No    Drug use: No    Sexual activity: Not on file Lifestyle    Physical activity:     Days per week: Not on file     Minutes per session: Not on file    Stress: Not on file   Relationships    Social connections:     Talks on phone: Not on file     Gets together: Not on file     Attends Muslim service: Not on file     Active member of club or organization: Not on file     Attends meetings of clubs or organizations: Not on file     Relationship status: Not on file    Intimate partner violence:     Fear of current or ex partner: Not on file     Emotionally abused: Not on file     Physically abused: Not on file     Forced sexual activity: Not on file   Other Topics Concern    Not on file   Social History Narrative    Not on file       REVIEW OF SYSTEMS:      CON: negative for recent weight loss/gain, fever, or chills  EYE: negative for double or blurry vision  ENT: negative for hoarseness  RS:   negative for cough, URI, SOB  CV:  negative for chest pain, palpitations  GI:    negative for blood in stool, nausea/vomiting  :  negative for blood in urine  MS: As per HPI  Other systems reviewed and noted below. PHYSICAL EXAMINATION:  Visit Vitals  /69 (BP 1 Location: Left arm, BP Patient Position: Sitting)   Pulse 91   Temp 96.7 °F (35.9 °C) (Oral)   Resp 16   Ht 5' 6\" (1.676 m)   Wt 113 lb (51.3 kg)   SpO2 97%   BMI 18.24 kg/m²     Body mass index is 18.24 kg/m². GENERAL: Alert and oriented x3, in no acute distress, well-developed, well-nourished. HEENT: Normocephalic, atraumatic. RESP: Non labored breathing with equal chest rise on inspiration. CV: Well perfused extremities. No cyanosis or clubbing noted. ABDOMEN: Soft, non-tender, non-distended. PHYSICAL EXAM:  Physical exam of the right elbow with full range of motion without pain. She has some erythema over the olecranon. She has a 0.5 cm x 0.5 cm size puncture wound that I am able to express some serous fluid from today. She is tender to palpation on palpation of the wound.   She is otherwise neurovascularly intact distally. There are no signs of sepsis. ASSESSMENT AND PLAN:   Jay Mcmahon has a draining right elbow wound over the olecranon. This is the site of a previous olecranon bursitis aspiration. She has been on a week of antibiotics without resolution of her symptoms and drainage. I think at this point it is reasonable to take her to the operating room to do a formal I&D and wound closure. We will take cultures at the time of surgery and adjust her antibiotics per that after surgery. I would like to try to get this done sooner than later, so we will see if we can get her in the operating room later this week.               Electronically signed by: Nell Mao MD

## 2019-10-22 NOTE — H&P (VIEW-ONLY)
Patient: William Ulloa                MRN: [id number]       SSN: [id number] YOB: 1945        AGE: 69 y.o. SEX: female Body mass index is 18.24 kg/m². PCP: Carlos Enrique Johnson MD 
10/22/19 Chief Complaint: Right elbow draining wound HISTORY OF PRESENT ILLNESS:  Laith Walker is a 69year-old female who presents to the office today with a draining wound in the right elbow. Several weeks ago, she had the right elbow aspirated. Since that time, she has been having persistent drainage of a serous type fluid from the aspiration site. This has failed to close. She also has some erythema around it. She is on steroids and oxygen for some chronic medical issues. Past Medical History:  
Diagnosis Date  Anxiety  Arthritis  Asbestosis ([address]  Asthma  Back problem  Baker's cyst   
 Balance problems  Chronic lung disease  Cigarette smoker  Clotting disorder ([address]  COPD (chronic obstructive pulmonary disease) (HCC) oxygen 2/liters asbestosis  Depression  History of DVT (deep vein thrombosis)  Leg pain Right  Lung disease  Nausea & vomiting  Osteoporosis  Restless leg syndrome  Spinal stenosis  Thromboembolus ([address]  Vitamin D deficiency Family History Problem Relation Age of Onset  Cancer Father  Heart Disease Mother  Hypertension Mother  Cancer Brother  Cancer Sister  Diabetes Other  Hypertension Other  Stroke Other  Heart Disease Sister  Hypertension Sister  Heart Disease Brother Current Outpatient Medications Medication Sig Dispense Refill  predniSONE (DELTASONE) 10 mg tablet Take 10 mg by mouth daily. 100 Tab 0  
 albuterol (PROVENTIL HFA, VENTOLIN HFA, PROAIR HFA) 90 mcg/actuation inhaler Take 1 Puff by inhalation every four (4) hours as needed for Wheezing.  1 Inhaler 3  
  montelukast (SINGULAIR) 10 mg tablet Take 1 Tab by mouth daily. (Patient taking differently: Take 10 mg by mouth every other day.) 30 Tab 3  
 albuterol (PROVENTIL VENTOLIN) 2.5 mg /3 mL (0.083 %) nebulizer solution 3 mL by Nebulization route every four (4) hours as needed for Wheezing or Shortness of Breath. ICD: J44.9, R09.02 file under Medicare Part B 300 Each 3  
 fluticasone propion-salmeterol (ADVAIR DISKUS) 250-50 mcg/dose diskus inhaler Take 1 Puff by inhalation two (2) times a day. BRAND NAME ONLY 3 Inhaler 3  
 simethicone (GAS-X) 80 mg chewable tablet Take 80 mg by mouth every six (6) hours as needed for Flatulence.  acetylcysteine 500 mg cap Take 500 mg by mouth two (2) times a day. (Patient taking differently: Take 600 mg by mouth two (2) times a day.) 60 Cap 3  
 raNITIdine (ZANTAC) 150 mg tablet Take 150 mg by mouth two (2) times a day.  therapeutic multivitamin (THERAGRAN) tablet Take 1 Tab by mouth daily. 30 Tab 11  
 acetaminophen (TYLENOL EXTRA STRENGTH) 500 mg tablet Take 500 mg by mouth every six (6) hours as needed for Pain.  LORazepam (ATIVAN) 1 mg tablet Take  by mouth two (2) times daily as needed for Anxiety.  rOPINIRole (REQUIP) 1 mg tablet Take 1 mg by mouth two (2) times a day.  OXYGEN-AIR DELIVERY SYSTEMS 2 L by Does Not Apply route. O2 via nasal cannula with bedtime and activity. O2 Company: [employer]  clindamycin (CLEOCIN) 300 mg capsule Take 1 Cap by mouth three (3) times daily. Indications: an infection of the skin and the tissue below the skin 30 Cap 0 Allergies Allergen Reactions  Anoro Ellipta [Umeclidinium-Vilanterol] Rash and Other (comments) Muscle cramps in arms  Aspirin Other (comments) GI upset and bleeding  Augmentin [Amoxicillin-Pot Clavulanate] Not Reported This Time Possible cramping  Doxycycline Nausea and Vomiting  Morphine Other (comments) Neurologic symptoms - severe agitation  Shellfish Derived Itching Pt able to eat small amounts per report  Sulfa (Sulfonamide Antibiotics) Rash Patient relates no longer allergic  Marie Geeta [Fluticasone Propion-Salmeterol] Other (comments) Mouth Sores Past Surgical History:  
Procedure Laterality Date  COLONOSCOPY N/A 2018 COLONOSCOPY w bx w polypectonies performed by Nat Gamino MD at [facility]  COLONOSCOPY N/A 2018 COLONOSCOPY performed by Lyric Vicente MD at [facility] COLONOSCOPY N/A 2019 COLONOSCOPY with polypectomies and biopsies performed by Gladys Sofia MD at [address] [place] [address] Ave  HX BACK SURGERY    
 x4  
 HX BREAST BIOPSY    
 left  HX GI    
 exp lap and ileostomy  HX HEENT    
 cataract right  HX HYSTERECTOMY  HX LUMBAR LAMINECTOMY  HX MENISCUS REPAIR    
 HX ORTHOPAEDIC Knee bakers cyst  
 HX POLYPECTOMY    
 right colectomy  HX TONSILLECTOMY  HX VEIN STRIPPING  LAP,SURG,COLECTOMY, PARTIAL, W/ANAST N/A 10/23/2018 Dr. Cuate Price [place] [address] N/A 2018 Dr. Cuate Price  VASCULAR SURGERY PROCEDURE UNLIST    
 vein stripping Social History Socioeconomic History  Marital status:  Spouse name: Not on file  Number of children: Not on file  Years of education: Not on file  Highest education level: Not on file Occupational History  Not on file Social Needs  Financial resource strain: Not on file  Food insecurity:  
  Worry: Not on file Inability: Not on file  Transportation needs:  
  Medical: Not on file Non-medical: Not on file Tobacco Use  Smoking status: Former Smoker Packs/day: 0.50 Years: 50.00 Pack years: 25.00 Types: Cigarettes Start date: 10/21/1964 Last attempt to quit: 10/20/2018 Years since quittin.0  Smokeless tobacco: Never Used Substance and Sexual Activity  Alcohol use: No  
 Drug use: No  
 Sexual activity: Not on file Lifestyle  Physical activity:  
  Days per week: Not on file Minutes per session: Not on file  Stress: Not on file Relationships  Social connections:  
  Talks on phone: Not on file Gets together: Not on file Attends [employer] service: Not on file Active member of club or organization: Not on file Attends meetings of clubs or organizations: Not on file Relationship status: Not on file  Intimate partner violence:  
  Fear of current or ex partner: Not on file Emotionally abused: Not on file Physically abused: Not on file Forced sexual activity: Not on file Other Topics Concern  Not on file Social History Narrative  Not on file REVIEW OF SYSTEMS:   
 
CON: negative for recent weight loss/gain, fever, or chills EYE: negative for double or blurry vision ENT: negative for hoarseness RS:   negative for cough, URI, SOB 
CV:  negative for chest pain, palpitations GI:    negative for blood in stool, nausea/vomiting :  negative for blood in urine MS: As per HPI Other systems reviewed and noted below. PHYSICAL EXAMINATION: 
Visit Vitals /69 (BP 1 Location: Left arm, BP Patient Position: Sitting) Pulse 91 Temp 96.7 °F (35.9 °C) (Oral) Resp 16 Ht 5' 6\" (1.676 m) Wt 113 lb (51.3 kg) SpO2 97% BMI 18.24 kg/m² Body mass index is 18.24 kg/m². GENERAL: Alert and oriented x3, in no acute distress, well-developed, well-nourished. HEENT: Normocephalic, atraumatic. RESP: Non labored breathing with equal chest rise on inspiration. CV: Well perfused extremities. No cyanosis or clubbing noted. ABDOMEN: Soft, non-tender, non-distended. PHYSICAL EXAM:  Physical exam of the right elbow with full range of motion without pain. She has some erythema over the olecranon.   She has a 0.5 cm x 0.5 cm size puncture wound that I am able to express some serous fluid from today. She is tender to palpation on palpation of the wound. She is otherwise neurovascularly intact distally. There are no signs of sepsis. ASSESSMENT AND PLAN:   Martha Corbin has a draining right elbow wound over the olecranon. This is the site of a previous olecranon bursitis aspiration. She has been on a week of antibiotics without resolution of her symptoms and drainage. I think at this point it is reasonable to take her to the operating room to do a formal I&D and wound closure. We will take cultures at the time of surgery and adjust her antibiotics per that after surgery. I would like to try to get this done sooner than later, so we will see if we can get her in the operating room later this week.    
 
 
 
 
 
Electronically signed by: Jimmy Gutierrez MD

## 2019-10-24 ENCOUNTER — ANESTHESIA EVENT (OUTPATIENT)
Dept: SURGERY | Age: 74
End: 2019-10-24
Payer: MEDICARE

## 2019-10-25 ENCOUNTER — ANESTHESIA (OUTPATIENT)
Dept: SURGERY | Age: 74
End: 2019-10-25
Payer: MEDICARE

## 2019-10-25 ENCOUNTER — HOSPITAL ENCOUNTER (OUTPATIENT)
Age: 74
Setting detail: OUTPATIENT SURGERY
Discharge: HOME | End: 2019-10-25
Attending: ORTHOPAEDIC SURGERY | Admitting: ORTHOPAEDIC SURGERY
Payer: MEDICARE

## 2019-10-25 VITALS
HEART RATE: 75 BPM | SYSTOLIC BLOOD PRESSURE: 98 MMHG | RESPIRATION RATE: 20 BRPM | TEMPERATURE: 97.6 F | WEIGHT: 113 LBS | HEIGHT: 67 IN | BODY MASS INDEX: 17.74 KG/M2 | DIASTOLIC BLOOD PRESSURE: 61 MMHG | OXYGEN SATURATION: 96 %

## 2019-10-25 DIAGNOSIS — G89.18 POST-OP PAIN: Primary | ICD-10-CM

## 2019-10-25 PROCEDURE — 77030018846 HC SOL IRR STRL H20 ICUM -A: Performed by: ORTHOPAEDIC SURGERY

## 2019-10-25 PROCEDURE — 77030040922 HC BLNKT HYPOTHRM STRY -A: Performed by: ORTHOPAEDIC SURGERY

## 2019-10-25 PROCEDURE — 77030002916 HC SUT ETHLN J&J -A: Performed by: ORTHOPAEDIC SURGERY

## 2019-10-25 PROCEDURE — 77030013708 HC HNDPC SUC IRR PULS STRY –B: Performed by: ORTHOPAEDIC SURGERY

## 2019-10-25 PROCEDURE — 77030002933 HC SUT MCRYL J&J -A: Performed by: ORTHOPAEDIC SURGERY

## 2019-10-25 PROCEDURE — 77030013079 HC BLNKT BAIR HGGR 3M -A: Performed by: ANESTHESIOLOGY

## 2019-10-25 PROCEDURE — 88304 TISSUE EXAM BY PATHOLOGIST: CPT

## 2019-10-25 PROCEDURE — 76210000020 HC REC RM PH II FIRST 0.5 HR: Performed by: ORTHOPAEDIC SURGERY

## 2019-10-25 PROCEDURE — L3650 SO 8 ABD RESTRAINT PRE OTS: HCPCS | Performed by: ORTHOPAEDIC SURGERY

## 2019-10-25 PROCEDURE — 87070 CULTURE OTHR SPECIMN AEROBIC: CPT

## 2019-10-25 PROCEDURE — 77030040361 HC SLV COMPR DVT MDII -B: Performed by: ORTHOPAEDIC SURGERY

## 2019-10-25 PROCEDURE — 74011000250 HC RX REV CODE- 250: Performed by: NURSE ANESTHETIST, CERTIFIED REGISTERED

## 2019-10-25 PROCEDURE — 77030013079 HC BLNKT BAIR HGGR 3M -A: Performed by: ORTHOPAEDIC SURGERY

## 2019-10-25 PROCEDURE — 77030010509 HC AIRWY LMA MSK TELE -A: Performed by: ANESTHESIOLOGY

## 2019-10-25 PROCEDURE — 77030020754 HC CUF TRNQT 2BLA STRY -B: Performed by: ORTHOPAEDIC SURGERY

## 2019-10-25 PROCEDURE — 76060000032 HC ANESTHESIA 0.5 TO 1 HR: Performed by: ORTHOPAEDIC SURGERY

## 2019-10-25 PROCEDURE — 77030031139 HC SUT VCRL2 J&J -A: Performed by: ORTHOPAEDIC SURGERY

## 2019-10-25 PROCEDURE — 77030018836 HC SOL IRR NACL ICUM -A: Performed by: ORTHOPAEDIC SURGERY

## 2019-10-25 PROCEDURE — 87075 CULTR BACTERIA EXCEPT BLOOD: CPT

## 2019-10-25 PROCEDURE — 74011250636 HC RX REV CODE- 250/636: Performed by: NURSE ANESTHETIST, CERTIFIED REGISTERED

## 2019-10-25 PROCEDURE — 74011250637 HC RX REV CODE- 250/637: Performed by: NURSE ANESTHETIST, CERTIFIED REGISTERED

## 2019-10-25 PROCEDURE — 76010000138 HC OR TIME 0.5 TO 1 HR: Performed by: ORTHOPAEDIC SURGERY

## 2019-10-25 PROCEDURE — 74011000250 HC RX REV CODE- 250: Performed by: ORTHOPAEDIC SURGERY

## 2019-10-25 PROCEDURE — 76210000063 HC OR PH I REC FIRST 0.5 HR: Performed by: ORTHOPAEDIC SURGERY

## 2019-10-25 PROCEDURE — 74011000258 HC RX REV CODE- 258: Performed by: NURSE ANESTHETIST, CERTIFIED REGISTERED

## 2019-10-25 RX ORDER — ALBUTEROL SULFATE 90 UG/1
1 AEROSOL, METERED RESPIRATORY (INHALATION)
Qty: 1 INHALER | Refills: 3 | Status: SHIPPED | OUTPATIENT
Start: 2019-10-25 | End: 2020-08-06 | Stop reason: SDUPTHER

## 2019-10-25 RX ORDER — PROPOFOL 10 MG/ML
INJECTION, EMULSION INTRAVENOUS AS NEEDED
Status: DISCONTINUED | OUTPATIENT
Start: 2019-10-25 | End: 2019-10-25 | Stop reason: HOSPADM

## 2019-10-25 RX ORDER — FAMOTIDINE 20 MG/1
20 TABLET, FILM COATED ORAL ONCE
Status: COMPLETED | OUTPATIENT
Start: 2019-10-25 | End: 2019-10-25

## 2019-10-25 RX ORDER — DEXAMETHASONE SODIUM PHOSPHATE 4 MG/ML
INJECTION, SOLUTION INTRA-ARTICULAR; INTRALESIONAL; INTRAMUSCULAR; INTRAVENOUS; SOFT TISSUE AS NEEDED
Status: DISCONTINUED | OUTPATIENT
Start: 2019-10-25 | End: 2019-10-25 | Stop reason: HOSPADM

## 2019-10-25 RX ORDER — LIDOCAINE HYDROCHLORIDE 20 MG/ML
INJECTION, SOLUTION EPIDURAL; INFILTRATION; INTRACAUDAL; PERINEURAL AS NEEDED
Status: DISCONTINUED | OUTPATIENT
Start: 2019-10-25 | End: 2019-10-25 | Stop reason: HOSPADM

## 2019-10-25 RX ORDER — SODIUM CHLORIDE 0.9 % (FLUSH) 0.9 %
5-40 SYRINGE (ML) INJECTION AS NEEDED
Status: DISCONTINUED | OUTPATIENT
Start: 2019-10-25 | End: 2019-10-25 | Stop reason: HOSPADM

## 2019-10-25 RX ORDER — LIDOCAINE HYDROCHLORIDE 10 MG/ML
0.1 INJECTION, SOLUTION EPIDURAL; INFILTRATION; INTRACAUDAL; PERINEURAL AS NEEDED
Status: DISCONTINUED | OUTPATIENT
Start: 2019-10-25 | End: 2019-10-25 | Stop reason: HOSPADM

## 2019-10-25 RX ORDER — SODIUM CHLORIDE 0.9 % (FLUSH) 0.9 %
5-40 SYRINGE (ML) INJECTION EVERY 8 HOURS
Status: DISCONTINUED | OUTPATIENT
Start: 2019-10-25 | End: 2019-10-25 | Stop reason: HOSPADM

## 2019-10-25 RX ORDER — FENTANYL CITRATE 50 UG/ML
50 INJECTION, SOLUTION INTRAMUSCULAR; INTRAVENOUS AS NEEDED
Status: DISCONTINUED | OUTPATIENT
Start: 2019-10-25 | End: 2019-10-25 | Stop reason: HOSPADM

## 2019-10-25 RX ORDER — ONDANSETRON 2 MG/ML
INJECTION INTRAMUSCULAR; INTRAVENOUS AS NEEDED
Status: DISCONTINUED | OUTPATIENT
Start: 2019-10-25 | End: 2019-10-25 | Stop reason: HOSPADM

## 2019-10-25 RX ORDER — OXYCODONE AND ACETAMINOPHEN 5; 325 MG/1; MG/1
1 TABLET ORAL
Qty: 28 TAB | Refills: 0 | Status: SHIPPED | OUTPATIENT
Start: 2019-10-25 | End: 2019-11-01

## 2019-10-25 RX ORDER — HYDROMORPHONE HYDROCHLORIDE 2 MG/ML
1 INJECTION, SOLUTION INTRAMUSCULAR; INTRAVENOUS; SUBCUTANEOUS
Status: DISCONTINUED | OUTPATIENT
Start: 2019-10-25 | End: 2019-10-25 | Stop reason: HOSPADM

## 2019-10-25 RX ORDER — BUPIVACAINE HYDROCHLORIDE 5 MG/ML
INJECTION, SOLUTION EPIDURAL; INTRACAUDAL AS NEEDED
Status: DISCONTINUED | OUTPATIENT
Start: 2019-10-25 | End: 2019-10-25 | Stop reason: HOSPADM

## 2019-10-25 RX ORDER — KETOROLAC TROMETHAMINE 15 MG/ML
INJECTION, SOLUTION INTRAMUSCULAR; INTRAVENOUS AS NEEDED
Status: DISCONTINUED | OUTPATIENT
Start: 2019-10-25 | End: 2019-10-25 | Stop reason: HOSPADM

## 2019-10-25 RX ORDER — SULFAMETHOXAZOLE AND TRIMETHOPRIM 800; 160 MG/1; MG/1
1 TABLET ORAL 2 TIMES DAILY
Qty: 20 TAB | Refills: 0 | Status: SHIPPED | OUTPATIENT
Start: 2019-10-25 | End: 2020-01-06 | Stop reason: ALTCHOICE

## 2019-10-25 RX ORDER — KETAMINE HCL 50MG/ML(1)
SYRINGE (ML) INTRAVENOUS AS NEEDED
Status: DISCONTINUED | OUTPATIENT
Start: 2019-10-25 | End: 2019-10-25 | Stop reason: HOSPADM

## 2019-10-25 RX ORDER — ONDANSETRON 2 MG/ML
4 INJECTION INTRAMUSCULAR; INTRAVENOUS ONCE
Status: DISCONTINUED | OUTPATIENT
Start: 2019-10-25 | End: 2019-10-25 | Stop reason: HOSPADM

## 2019-10-25 RX ORDER — INSULIN LISPRO 100 [IU]/ML
INJECTION, SOLUTION INTRAVENOUS; SUBCUTANEOUS ONCE
Status: DISCONTINUED | OUTPATIENT
Start: 2019-10-25 | End: 2019-10-25 | Stop reason: HOSPADM

## 2019-10-25 RX ORDER — SODIUM CHLORIDE, SODIUM LACTATE, POTASSIUM CHLORIDE, CALCIUM CHLORIDE 600; 310; 30; 20 MG/100ML; MG/100ML; MG/100ML; MG/100ML
75 INJECTION, SOLUTION INTRAVENOUS CONTINUOUS
Status: DISCONTINUED | OUTPATIENT
Start: 2019-10-25 | End: 2019-10-25 | Stop reason: HOSPADM

## 2019-10-25 RX ADMIN — Medication 50 MG: at 07:40

## 2019-10-25 RX ADMIN — FAMOTIDINE 20 MG: 20 TABLET, FILM COATED ORAL at 07:06

## 2019-10-25 RX ADMIN — SODIUM CHLORIDE 4 MCG: 900 INJECTION, SOLUTION INTRAVENOUS at 08:13

## 2019-10-25 RX ADMIN — LIDOCAINE HYDROCHLORIDE 100 MG: 20 INJECTION, SOLUTION EPIDURAL; INFILTRATION; INTRACAUDAL; PERINEURAL at 07:31

## 2019-10-25 RX ADMIN — DEXAMETHASONE SODIUM PHOSPHATE 4 MG: 4 INJECTION, SOLUTION INTRAMUSCULAR; INTRAVENOUS at 07:40

## 2019-10-25 RX ADMIN — KETOROLAC TROMETHAMINE 15 MG: 15 INJECTION, SOLUTION INTRAMUSCULAR; INTRAVENOUS at 07:41

## 2019-10-25 RX ADMIN — PROPOFOL 100 MG: 10 INJECTION, EMULSION INTRAVENOUS at 07:31

## 2019-10-25 RX ADMIN — PHENYLEPHRINE HYDROCHLORIDE 100 MCG: 10 INJECTION INTRAVENOUS at 07:58

## 2019-10-25 RX ADMIN — PHENYLEPHRINE HYDROCHLORIDE 100 MCG: 10 INJECTION INTRAVENOUS at 07:48

## 2019-10-25 RX ADMIN — SODIUM CHLORIDE 10 MCG: 900 INJECTION, SOLUTION INTRAVENOUS at 07:31

## 2019-10-25 RX ADMIN — SODIUM CHLORIDE 10 MCG: 900 INJECTION, SOLUTION INTRAVENOUS at 07:25

## 2019-10-25 RX ADMIN — VANCOMYCIN HYDROCHLORIDE 1 G: 10 INJECTION, POWDER, LYOPHILIZED, FOR SOLUTION INTRAVENOUS at 08:00

## 2019-10-25 RX ADMIN — SODIUM CHLORIDE, SODIUM LACTATE, POTASSIUM CHLORIDE, AND CALCIUM CHLORIDE 75 ML/HR: 600; 310; 30; 20 INJECTION, SOLUTION INTRAVENOUS at 07:06

## 2019-10-25 RX ADMIN — ONDANSETRON 4 MG: 2 INJECTION INTRAMUSCULAR; INTRAVENOUS at 07:40

## 2019-10-25 NOTE — ANESTHESIA POSTPROCEDURE EVALUATION
Procedure(s):  RIGHT ELBOW INCISION AND DRAINAGE SUPERFICIAL/ HANDTABLE.    general    Anesthesia Post Evaluation      Multimodal analgesia: multimodal analgesia used between 6 hours prior to anesthesia start to PACU discharge  Patient location during evaluation: bedside  Patient participation: complete - patient participated  Level of consciousness: awake  Pain management: adequate  Airway patency: patent  Anesthetic complications: no  Cardiovascular status: stable  Respiratory status: acceptable  Hydration status: acceptable  Post anesthesia nausea and vomiting:  controlled      Vitals Value Taken Time   BP 97/54 10/25/2019  8:42 AM   Temp 36.4 °C (97.6 °F) 10/25/2019  8:21 AM   Pulse 73 10/25/2019  8:42 AM   Resp 11 10/25/2019  8:42 AM   SpO2 98 % 10/25/2019  8:42 AM   Vitals shown include unvalidated device data.

## 2019-10-25 NOTE — PROGRESS NOTES
conducted a pre-surgery visit with Fanny Agarwal, who is a 69 y.o.,female. The  provided the following Interventions:  Initiated a relationship of care and support. Offered prayer and assurance of continued prayers on patient's behalf. Plan:  Chaplains will continue to follow and will provide pastoral care on an as needed/requested basis.  recommends bedside caregivers page  on duty if patient shows signs of acute spiritual or emotional distress.     [address] [place], [place] [address]  [phone]

## 2019-10-25 NOTE — DISCHARGE INSTRUCTIONS
Acute Pain After Surgery: Care Instructions  Your Care Instructions    It's common to have some pain after surgery. Pain doesn't mean that something is wrong or that the surgery didn't go well. But when the pain is severe, it's important to work with your doctor to manage it. It's also important to be aware of a few facts about pain and pain medicine. · You are the only person who knows what your pain feels like. So be sure to tell your doctor when you are in pain or when the pain changes. Then he or she will know how to adjust your medicines. · Pain is often easier to control right after it starts. So it may be better to take regular doses of pain medicine and not wait until the pain gets bad. · Medicine can help control pain. But this doesn't mean you'll have no pain. Medicine works to keep the pain at a level you can live with. With time, you will feel better. Follow-up care is a key part of your treatment and safety. Be sure to make and go to all appointments, and call your doctor if you are having problems. It's also a good idea to know your test results and keep a list of the medicines you take. How can you care for yourself at home? · Be safe with medicines. Read and follow all instructions on the label. ? If the doctor gave you a prescription medicine for pain, take it as prescribed. ? If you are not taking a prescription pain medicine, ask your doctor if you can take an over-the-counter medicine. · If you take an over-the-counter pain medicine, such as acetaminophen (Tylenol), ibuprofen (Advil, Motrin), or naproxen (Aleve), read and follow all instructions on the label. · Do not take two or more pain medicines at the same time unless the doctor told you to. · Do not drink alcohol while you are taking pain medicines. · Try to walk each day if your doctor recommends it. Start by walking a little more than you did the day before. Bit by bit, increase the amount you walk.  Walking increases blood flow. It also helps prevent pneumonia and constipation. · To prevent constipation from opioid pain medicines:  ? Talk to your doctor about a laxative. ? Include fruits, vegetables, beans, and whole grains in your diet each day. These foods are high in fiber. ? Drink plenty of fluids, enough so that your urine is light yellow or clear like water. Drink water, fruit juice, or other drinks that do not contain caffeine or alcohol. If you have kidney, heart, or liver disease and have to limit fluids, talk with your doctor before you increase the amount of fluids you drink. ? Take a fiber supplement, such as Citrucel or Metamucil, every day if needed. Read and follow all instructions on the label. If you take pain medicine for more than a few days, talk to your doctor before you take fiber. When should you call for help? Call your doctor now or seek immediate medical care if:    · Your pain gets worse.     · Your pain is not controlled by medicine.    Watch closely for changes in your health, and be sure to contact your doctor if you have any problems. Where can you learn more? Go to http://robert-jorge.info/. Enter ([phone] [phone] in the search box to learn more about \"Acute Pain After Surgery: Care Instructions. \"  Current as of: June 26, 2019  Content Version: 12.2  © 9057-0500 [employer]. Care instructions adapted under license by [employer] (which disclaims liability or warranty for this information). If you have questions about a medical condition or this instruction, always ask your healthcare professional. [place] [address] any warranty or liability for your use of this information.     DISCHARGE SUMMARY from Nurse    PATIENT INSTRUCTIONS:    After general anesthesia or intravenous sedation, for 24 hours or while taking prescription Narcotics:  · Limit your activities  · Do not drive and operate hazardous machinery  · Do not make important personal or business decisions  · Do  not drink alcoholic beverages  · If you have not urinated within 8 hours after discharge, please contact your surgeon on call. Report the following to your surgeon:  · Excessive pain, swelling, redness or odor of or around the surgical area  · Temperature over 100.5  · Nausea and vomiting lasting longer than 4 hours or if unable to take medications  · Any signs of decreased circulation or nerve impairment to extremity: change in color, persistent  numbness, tingling, coldness or increase pain  · Any questions    *  Please give a list of your current medications to your Primary Care Provider. *  Please update this list whenever your medications are discontinued, doses are      changed, or new medications (including over-the-counter products) are added. *  Please carry medication information at all times in case of emergency situations. These are general instructions for a healthy lifestyle:    No smoking/ No tobacco products/ Avoid exposure to second hand smoke  Surgeon General's Warning:  Quitting smoking now greatly reduces serious risk to your health. Obesity, smoking, and sedentary lifestyle greatly increases your risk for illness    A healthy diet, regular physical exercise & weight monitoring are important for maintaining a healthy lifestyle    You may be retaining fluid if you have a history of heart failure or if you experience any of the following symptoms:  Weight gain of 3 pounds or more overnight or 5 pounds in a week, increased swelling in our hands or feet or shortness of breath while lying flat in bed. Please call your doctor as soon as you notice any of these symptoms; do not wait until your next office visit. The discharge information has been reviewed with the patient/sister. The patient/sister verbalized understanding.   Discharge medications reviewed with the patient/sister and appropriate educational materials and side effects teaching were provided.   ___________________________________________________________________________________________________________________________________

## 2019-10-25 NOTE — INTERVAL H&P NOTE
H&P Update:  Luke Dawn was seen and examined. History and physical has been reviewed. The patient has been examined.  There have been no significant clinical changes since the completion of the originally dated History and Physical.

## 2019-10-25 NOTE — ANESTHESIA PREPROCEDURE EVALUATION
Relevant Problems   No relevant active problems       Anesthetic History     PONV          Review of Systems / Medical History  Patient summary reviewed and pertinent labs reviewed    Pulmonary    COPD (on home O2): severe               Neuro/Psych   Within defined limits           Cardiovascular  Within defined limits                Exercise tolerance: >4 METS     GI/Hepatic/Renal  Within defined limits              Endo/Other  Within defined limits           Other Findings   Comments:                  Physical Exam    Airway  Mallampati: II  TM Distance: 4 - 6 cm  Neck ROM: normal range of motion   Mouth opening: Normal     Cardiovascular  Regular rate and rhythm,  S1 and S2 normal,  no murmur, click, rub, or gallop  Rhythm: regular  Rate: normal         Dental  No notable dental hx       Pulmonary          Rales:right       Abdominal  GI exam deferred       Other Findings            Anesthetic Plan    ASA: 3  Anesthesia type: general          Induction: Intravenous  Anesthetic plan and risks discussed with: Patient

## 2019-10-25 NOTE — BRIEF OP NOTE
BRIEF OPERATIVE NOTE    Date of Procedure: 10/25/2019   Preoperative Diagnosis: M71.021 RIGHT ELBOW INFECTED OLECRANON BURSITIS  Postoperative Diagnosis: M71.021 RIGHT ELBOW INFECTED OLECRANON BURSITIS    Procedure(s):  RIGHT ELBOW INCISION AND DRAINAGE SUPERFICIAL/ HANDTABLE  Surgeon(s) and Role:     * Lloyd You MD - Primary         Surgical Assistant: Ewelina Hernandez, [facility]    Surgical Staff:  Sandi PIERRE  Circ-2: Ja Nunez RN  Physician Assistant: SLAVA Alejo  Scrub Tech-1: Jovi Newell  Scrub Tech-2: Debora Guevara  Surg Asst-1: Eliana Jaquez  Event Time In Time Out   Incision Start 3167    Incision Close 0810      Anesthesia: General   Estimated Blood Loss: Minimal  Specimens:   ID Type Source Tests Collected by Time Destination   1 : right elbow bursa Preservative Elbow  Lloyd You MD 10/25/2019 5369 Pathology   1 : right elbow Wound Elbow CULTURE, ANAEROBIC, CULTURE, WOUND W Rubén Drummond MD 10/25/2019 7823 Microbiology   2 : right elbow Wound Elbow CULTURE, ANAEROBIC, CULTURE, WOUND W Rubén Drummond MD 10/25/2019 0408 Microbiology      Findings: Right elbow infected bursitis   Complications: No surgical complications, skin tearing from drapes when drapes taken down aroudn upper arm  Implants: * No implants in log *

## 2019-10-25 NOTE — TELEPHONE ENCOUNTER
PT'S DAUGHTER CALLED([phone]). PT PREFERS VENTOLIN INHALER AND NOT PRO-AIR. SHE NEEDS IT SENT TO [facility] [id number].

## 2019-10-26 NOTE — OP NOTES
[address] [place] [address]   OPERATIVE REPORT    Name:  Sienna Randhawa  MR#:   [id number]  :  1945  ACCOUNT #:  [de-identified]  DATE OF SERVICE:  10/25/2019    PREOPERATIVE DIAGNOSIS:  Right elbow infected olecranon bursitis. POSTOPERATIVE DIAGNOSIS:  Right elbow infected olecranon bursitis. PROCEDURE PERFORMED:  Right elbow infected olecranon bursectomy, incision and drainage, and wound closure. SURGEON:  Gerald Stockton. Shikha Styles MD    ASSISTANT:  Bailey Nguyen PA-C, was medically necessary for the procedure. She assisted in patient positioning, retraction, wound closure, placement of bandages, wound washout, and transfer of the patient to PACU. ANESTHESIA:  General with local.    COMPLICATIONS:  Surgical complications, none. It was noted that there was a skin tear from taking down the drapes proximal to the surgical site from where the surgical drapes were against her skin at the end of the case, which was covered with Gelfoam and a sterile bandage. SPECIMENS REMOVED:  Culture and the olecranon bursa specimen were sent to Pathology. IMPLANTS:  None. ESTIMATED BLOOD LOSS:  Minimal.    IV FLUIDS:  500 mL of LR. INDICATIONS FOR THE PROCEDURE:  The patient is a 72-year-old female with multiple medical issues, who presented to the office with a draining elbow wound. The right elbow was aspirated in the office several weeks ago by a physician assistant in the office, and unfortunately resulted in a draining wound that continued to drain despite conservative management, and the decision was made to go to the operating room. PROCEDURE:  The patient was identified in the preoperative holding area. The right elbow was marked as the operative extremity after confirmation with the patient. After discussing the risks and benefits of the procedure, informed consent was confirmed. She was then taken to the operating room and moved from the stretcher to the OR table. General anesthesia was induced, and an LMA was placed. The right arm was prepped and draped in normal sterile fashion. A time-out was performed verifying the correct patient, procedure, and operative extremity with all in agreement. Antibiotics were held until cultures were obtained. The surgical site was marked. Around the area of the draining sinus distally for approximately 2 cm and proximally for approximately 2 cm, the area was incised. This was taken down to the level of the olecranon bursa. Cultures were taken. Antibiotics in the form of vancomycin were then given. The bursa was excised. All nonviable tissue was also excised. The wound was copiously irrigated with 1 L of normal saline through pulse lavage. The sinus tract was excised. The wound was then closed in a layered fashion. A sterile bandage was placed over the wound. The wound was injected with local anesthetic prior to placement of dressing. The arm was then splinted in extension with the elbow in approximately 30 degrees of extension to relax the posterior elbow skin. She was then awakened from anesthesia and taken to PACU in stable condition with a plan for discharge home. Again, it was noted to have some skin tearing in the upper arm at the time of the drapes being taken down, which was covered with Xeroform and a sterile bandage. She will be sent home on antibiotic, which will be changed potentially depending on the cultures.       MD RICARDO [email or web address]  D:  10/25/2019 8:31  T:  10/25/2019 22:10  JOB #:  [id number]

## 2019-10-28 LAB
BACTERIA SPEC CULT: NORMAL
BACTERIA SPEC CULT: NORMAL
GRAM STN SPEC: NORMAL
SERVICE CMNT-IMP: NORMAL
SERVICE CMNT-IMP: NORMAL

## 2019-10-29 LAB
BACTERIA SPEC ANAEROBE CULT: NORMAL
BACTERIA SPEC ANAEROBE CULT: NORMAL
SPECIMEN SOURCE: NORMAL
SPECIMEN SOURCE: NORMAL

## 2019-10-30 LAB
BACTERIA SPEC CULT: NORMAL
SOURCE, RSRC56: NORMAL

## 2019-10-31 RX ORDER — PREDNISONE 10 MG/1
10 TABLET ORAL DAILY
Qty: 100 TAB | Refills: 0 | OUTPATIENT
Start: 2019-10-31 | End: 2019-12-07

## 2019-11-01 ENCOUNTER — OFFICE VISIT (OUTPATIENT)
Dept: ORTHOPEDIC SURGERY | Facility: CLINIC | Age: 74
End: 2019-11-01

## 2019-11-01 VITALS
BODY MASS INDEX: 17.74 KG/M2 | WEIGHT: 113 LBS | DIASTOLIC BLOOD PRESSURE: 61 MMHG | HEIGHT: 67 IN | SYSTOLIC BLOOD PRESSURE: 126 MMHG | RESPIRATION RATE: 16 BRPM | TEMPERATURE: 97 F | HEART RATE: 72 BPM

## 2019-11-01 DIAGNOSIS — M71.021 ABSCESS OF BURSA OF RIGHT ELBOW: Primary | ICD-10-CM

## 2019-11-01 NOTE — PROGRESS NOTES
HISTORY OF PRESENT ILLNESS:  Angela Booker returns today for her right elbow. She had an I&D last week. She has been on antibiotics. She has been keeping it covered. She also had a skin tear of the skin from the drape at the time of surgery on her upper right arm. PHYSICAL EXAM:  She has a healing incision over the posterior elbow. She does have a skin tear noted on the right upper arm. There is minimal erythema around the incision and surgical site. ASSESSMENT AND PLAN:   Angela Booker is on antibiotics for her right elbow I&D. This appears to be healing well. I did redress the skin tear and gave her supplies for that. We will see her back in a week with plan to get her sutures out then.

## 2019-11-01 NOTE — PROGRESS NOTES
This note has been dictated below. Patient: Sanjuanita Garcia                MRN: [id number]       SSN: [id number] YOB: 1945        AGE: 69 y.o. SEX: female Body mass index is 17.7 kg/m². PCP: Jai Boyle MD 
11/01/19 Past Medical History:  
Diagnosis Date  Anxiety  Arthritis  [facility] ([facility]  Asthma  Back problem  Baker's cyst   
 Balance problems  Chronic lung disease  Cigarette smoker  Clotting disorder ([facility] [address]  COPD (chronic obstructive pulmonary disease) (HCC) oxygen 2/liters asbestosis  Depression  History of DVT (deep vein thrombosis)  Leg pain Right  Lung disease  Nausea & vomiting  Osteoporosis  Restless leg syndrome  Spinal stenosis  Thromboembolus ([facility] [address]  Vitamin D deficiency Past Surgical History:  
Procedure Laterality Date  COLONOSCOPY N/A 9/22/2018 COLONOSCOPY w bx w polypectonies performed by Vicky Pastrana MD at [facility]  COLONOSCOPY N/A 11/6/2018 COLONOSCOPY performed by Ricki Nash MD at [address] [place] [address] COLONOSCOPY N/A 8/2/2019 COLONOSCOPY with polypectomies and biopsies performed by Jd Guaman MD at [address] [place] [address] Av  HX BACK SURGERY    
 x4  
 HX BREAST BIOPSY    
 left  HX GI    
 exp lap and ileostomy  HX HEENT    
 cataract right  HX HYSTERECTOMY  HX LUMBAR LAMINECTOMY  HX MENISCUS REPAIR    
 HX ORTHOPAEDIC Knee bakers cyst  
 HX POLYPECTOMY    
 right colectomy  HX TONSILLECTOMY  HX VEIN STRIPPING  LAP,SURG,COLECTOMY, PARTIAL, W/ANAST N/A 10/23/2018 Dr. Danial Schroeder [place] [address] N/A 11/06/2018 Dr. Danial Schroeder  VASCULAR SURGERY PROCEDURE UNLIST    
 vein stripping Family History Problem Relation Age of Onset  Cancer Father  Heart Disease Mother  Hypertension Mother  Cancer Brother  Cancer Sister  Diabetes Other  Hypertension Other  Stroke Other  Heart Disease Sister  Hypertension Sister  Heart Disease Brother Social History Socioeconomic History  Marital status:  Spouse name: Not on file  Number of children: Not on file  Years of education: Not on file  Highest education level: Not on file Occupational History  Not on file Social Needs  Financial resource strain: Not on file  Food insecurity:  
  Worry: Not on file Inability: Not on file  Transportation needs:  
  Medical: Not on file Non-medical: Not on file Tobacco Use  Smoking status: Former Smoker Packs/day: 0.50 Years: 50.00 Pack years: 25.00 Types: Cigarettes Start date: 10/21/1964 Last attempt to quit: 10/20/2018 Years since quittin.0  Smokeless tobacco: Never Used Substance and Sexual Activity  Alcohol use: Never Frequency: Never  Drug use: No  
 Sexual activity: Not on file Lifestyle  Physical activity:  
  Days per week: Not on file Minutes per session: Not on file  Stress: Not on file Relationships  Social connections:  
  Talks on phone: Not on file Gets together: Not on file Attends Jainism service: Not on file Active member of club or organization: Not on file Attends meetings of clubs or organizations: Not on file Relationship status: Not on file  Intimate partner violence:  
  Fear of current or ex partner: Not on file Emotionally abused: Not on file Physically abused: Not on file Forced sexual activity: Not on file Other Topics Concern  Not on file Social History Narrative  Not on file Current Outpatient Medications Medication Sig Dispense Refill  predniSONE (DELTASONE) 10 mg tablet Take 10 mg by mouth daily. 100 Tab 0  
 trimethoprim-sulfamethoxazole (BACTRIM DS) 160-800 mg per tablet Take 1 Tab by mouth two (2) times a day.  20 Tab 0  
  albuterol (PROVENTIL HFA, VENTOLIN HFA, PROAIR HFA) 90 mcg/actuation inhaler Take 1 Puff by inhalation every four (4) hours as needed for Wheezing. 1 Inhaler 3  
 tiotropium bromide (SPIRIVA RESPIMAT IN) Take 2 Inhalation by inhalation daily.  montelukast (SINGULAIR) 10 mg tablet Take 1 Tab by mouth daily. (Patient taking differently: Take 10 mg by mouth every other day.) 30 Tab 3  
 albuterol (PROVENTIL VENTOLIN) 2.5 mg /3 mL (0.083 %) nebulizer solution 3 mL by Nebulization route every four (4) hours as needed for Wheezing or Shortness of Breath. ICD: J44.9, R09.02 file under Medicare Part B 300 Each 3  
 fluticasone propion-salmeterol (ADVAIR DISKUS) 250-50 mcg/dose diskus inhaler Take 1 Puff by inhalation two (2) times a day. BRAND NAME ONLY 3 Inhaler 3  
 simethicone (GAS-X) 80 mg chewable tablet Take 80 mg by mouth every six (6) hours as needed for Flatulence.  acetylcysteine 500 mg cap Take 500 mg by mouth two (2) times a day. (Patient taking differently: Take 600 mg by mouth two (2) times a day.) 60 Cap 3  therapeutic multivitamin (THERAGRAN) tablet Take 1 Tab by mouth daily. 30 Tab 11  
 acetaminophen (TYLENOL EXTRA STRENGTH) 500 mg tablet Take 500 mg by mouth every six (6) hours as needed for Pain.  LORazepam (ATIVAN) 1 mg tablet Take  by mouth two (2) times daily as needed for Anxiety.  rOPINIRole (REQUIP) 1 mg tablet Take 1 mg by mouth two (2) times a day.  OXYGEN-AIR DELIVERY SYSTEMS 2 L by Does Not Apply route. O2 via nasal cannula with bedtime and activity. O2 Company: [employer]  oxyCODONE-acetaminophen (PERCOCET) 5-325 mg per tablet Take 1 Tab by mouth every six (6) hours as needed for Pain for up to 7 days. Max Daily Amount: 4 Tabs. 28 Tab 0 Allergies Allergen Reactions  Anoro Ellipta [Umeclidinium-Vilanterol] Rash and Other (comments) Muscle cramps in arms  Aspirin Other (comments) GI upset and bleeding  Augmentin [Amoxicillin-Pot Clavulanate] Not Reported This Time Possible cramping  Doxycycline Nausea and Vomiting  Morphine Other (comments) Neurologic symptoms - severe agitation  Shellfish Derived Itching Pt able to eat small amounts per report  Sulfa (Sulfonamide Antibiotics) Rash Patient relates no longer allergic  Gopaln Nicole [Fluticasone Propion-Salmeterol] Other (comments) Mouth Sores REVIEW OF SYSTEMS:   
 
Review of systems unchanged from previous visit except as noted below. PHYSICAL EXAMINATION: 
Visit Vitals /61 (BP 1 Location: Left arm, BP Patient Position: Sitting) Pulse 72 Temp 97 °F (36.1 °C) (Oral) Resp 16 Ht 5' 7\" (1.702 m) Wt 113 lb (51.3 kg) BMI 17.70 kg/m² Body mass index is 17.7 kg/m². Dictation on: 11/01/2019  1:33 PM by: Yelena Blackwell [[id number]] Electronically signed by: Jimmy Gutierrez MD

## 2019-11-01 NOTE — Clinical Note
This note has been dictated below. Patient: Fanny Agarwal                MRN: [id number]       SSN: [id number] YOB: 1945        AGE: 69 y.o. SEX: female Body mass index is 17.7 kg/m². PCP: Noa Dunham MD 
11/01/19 Past Medical History:  
Diagnosis Date  Anxiety  Arthritis  Asbestosis ([facility] [address]  Asthma  Back problem  Baker's cyst   
 Balance problems  Chronic lung disease  Cigarette smoker  Clotting disorder ([facility] [address]  COPD (chronic obstructive pulmonary disease) (HCC) oxygen 2/liters asbestosis  Depression  History of DVT (deep vein thrombosis)  Leg pain Right  Lung disease  Nausea & vomiting  Osteoporosis  Restless leg syndrome  Spinal stenosis  Thromboembolus ([facility] [address]  Vitamin D deficiency Past Surgical History:  
Procedure Laterality Date  COLONOSCOPY N/A 9/22/2018 COLONOSCOPY w bx w polypectonies performed by Patti Yao MD at [facility]  COLONOSCOPY N/A 11/6/2018 COLONOSCOPY performed by Wicho Rodriguez MD at [address] [place] [address] COLONOSCOPY N/A 8/2/2019 COLONOSCOPY with polypectomies and biopsies performed by Radha Carter MD at [address] [place] [address] Av  HX BACK SURGERY    
 x4  
 HX BREAST BIOPSY    
 left  HX GI    
 exp lap and ileostomy  HX HEENT    
 cataract right  HX HYSTERECTOMY  HX LUMBAR LAMINECTOMY  HX MENISCUS REPAIR    
 HX ORTHOPAEDIC Knee bakers cyst  
 HX POLYPECTOMY    
 right colectomy  HX TONSILLECTOMY  HX VEIN STRIPPING  LAP,SURG,COLECTOMY, PARTIAL, W/ANAST [facility] 11/06/2018 Dr. Cory Dangelo  VASCULAR SURGERY PROCEDURE UNLIST    
 vein stripping Family History Problem Relation Age of Onset  Cancer Father  Heart Disease Mother  Hypertension Mother  Cancer Brother  Cancer Sister  Diabetes Other  Hypertension Other  Stroke Other  Heart Disease Sister  Hypertension Sister  Heart Disease Brother Social History Socioeconomic History  Marital status:  Spouse name: Not on file  Number of children: Not on file  Years of education: Not on file  Highest education level: Not on file Occupational History  Not on file Social Needs  Financial resource strain: Not on file  Food insecurity:  
  Worry: Not on file Inability: Not on file  Transportation needs:  
  Medical: Not on file Non-medical: Not on file Tobacco Use  Smoking status: Former Smoker Packs/day: 0.50 Years: 50.00 Pack years: 25.00 Types: Cigarettes Start date: 10/21/1964 Last attempt to quit: 10/20/2018 Years since quittin.0  Smokeless tobacco: Never Used Substance and Sexual Activity  Alcohol use: Never Frequency: Never  Drug use: No  
 Sexual activity: Not on file Lifestyle  Physical activity:  
  Days per week: Not on file Minutes per session: Not on file  Stress: Not on file Relationships  Social connections:  
  Talks on phone: Not on file Gets together: Not on file Attends Hinduism service: Not on file Active member of club or organization: Not on file Attends meetings of clubs or organizations: Not on file Relationship status: Not on file  Intimate partner violence:  
  Fear of current or ex partner: Not on file Emotionally abused: Not on file Physically abused: Not on file Forced sexual activity: Not on file Other Topics Concern  Not on file Social History Narrative  Not on file Current Outpatient Medications Medication Sig Dispense Refill  predniSONE (DELTASONE) 10 mg tablet Take 10 mg by mouth daily. 100 Tab 0  
 trimethoprim-sulfamethoxazole (BACTRIM DS) 160-800 mg per tablet Take 1 Tab by mouth two (2) times a day.  20 Tab 0  
  albuterol (PROVENTIL HFA, VENTOLIN HFA, PROAIR HFA) 90 mcg/actuation inhaler Take 1 Puff by inhalation every four (4) hours as needed for Wheezing. 1 Inhaler 3  
 tiotropium bromide (SPIRIVA RESPIMAT IN) Take 2 Inhalation by inhalation daily.  montelukast (SINGULAIR) 10 mg tablet Take 1 Tab by mouth daily. (Patient taking differently: Take 10 mg by mouth every other day.) 30 Tab 3  
 albuterol (PROVENTIL VENTOLIN) 2.5 mg /3 mL (0.083 %) nebulizer solution 3 mL by Nebulization route every four (4) hours as needed for Wheezing or Shortness of Breath. ICD: J44.9, R09.02 file under Medicare Part B 300 Each 3  
 fluticasone propion-salmeterol (ADVAIR DISKUS) 250-50 mcg/dose diskus inhaler Take 1 Puff by inhalation two (2) times a day. BRAND NAME ONLY 3 Inhaler 3  
 simethicone (GAS-X) 80 mg chewable tablet Take 80 mg by mouth every six (6) hours as needed for Flatulence.  acetylcysteine 500 mg cap Take 500 mg by mouth two (2) times a day. (Patient taking differently: Take 600 mg by mouth two (2) times a day.) 60 Cap 3  therapeutic multivitamin (THERAGRAN) tablet Take 1 Tab by mouth daily. 30 Tab 11  
 acetaminophen (TYLENOL EXTRA STRENGTH) 500 mg tablet Take 500 mg by mouth every six (6) hours as needed for Pain.  LORazepam (ATIVAN) 1 mg tablet Take  by mouth two (2) times daily as needed for Anxiety.  rOPINIRole (REQUIP) 1 mg tablet Take 1 mg by mouth two (2) times a day.  OXYGEN-AIR DELIVERY SYSTEMS 2 L by Does Not Apply route. O2 via nasal cannula with bedtime and activity. O2 Company: [employer]  oxyCODONE-acetaminophen (PERCOCET) 5-325 mg per tablet Take 1 Tab by mouth every six (6) hours as needed for Pain for up to 7 days. Max Daily Amount: 4 Tabs. 28 Tab 0 Allergies Allergen Reactions  Anoro Ellipta [Umeclidinium-Vilanterol] Rash and Other (comments) Muscle cramps in arms  Aspirin Other (comments) GI upset and bleeding  Augmentin [Amoxicillin-Pot Clavulanate] Not Reported This Time Possible cramping  Doxycycline Nausea and Vomiting  Morphine Other (comments) Neurologic symptoms - severe agitation  Shellfish Derived Itching Pt able to eat small amounts per report  Sulfa (Sulfonamide Antibiotics) Rash Patient relates no longer allergic  Duane Galley [Fluticasone Propion-Salmeterol] Other (comments) Mouth Sores REVIEW OF SYSTEMS:   
 
Review of systems unchanged from previous visit except as noted below. PHYSICAL EXAMINATION: 
Visit Vitals /61 (BP 1 Location: Left arm, BP Patient Position: Sitting) Pulse 72 Temp 97 °F (36.1 °C) (Oral) Resp 16 Ht 5' 7\" (1.702 m) Wt 113 lb (51.3 kg) BMI 17.70 kg/m² Body mass index is 17.7 kg/m². HISTORY OF PRESENT ILLNESS:  Yayo Crain returns today for her right elbow. She had an I&D last week. She has been on antibiotics. She has been keeping it covered. She also had a skin tear of the skin from the drape at the time of surgery on her upper right arm. PHYSICAL EXAM:  She has a healing incision over the posterior elbow. She does have a skin tear noted on the right upper arm. There is minimal erythema around the incision and surgical site. ASSESSMENT AND PLAN:   Yayo Crain is on antibiotics for her right elbow I&D. This appears to be healing well. I did redress the skin tear and gave her supplies for that. We will see her back in a week with plan to get her sutures out then.    
 
 
 
  
 
 
Electronically signed by: Xenia Zapata MD

## 2019-11-01 NOTE — PROGRESS NOTES
1. Have you been to the ER, urgent care clinic since your last visit? Hospitalized since your last visit? NO    2. Have you seen or consulted any other health care providers outside of the [address] [place] [address] since your last visit? Include any pap smears or colon screening.    NO No

## 2019-11-05 LAB
BACTERIA SPEC CULT: NORMAL
BACTERIA SPEC CULT: NORMAL
SOURCE, RSRC56: NORMAL
SOURCE, RSRC56: NORMAL

## 2019-11-08 ENCOUNTER — OFFICE VISIT (OUTPATIENT)
Dept: ORTHOPEDIC SURGERY | Facility: CLINIC | Age: 74
End: 2019-11-08

## 2019-11-08 VITALS
BODY MASS INDEX: 18.05 KG/M2 | HEART RATE: 85 BPM | SYSTOLIC BLOOD PRESSURE: 150 MMHG | RESPIRATION RATE: 18 BRPM | DIASTOLIC BLOOD PRESSURE: 89 MMHG | OXYGEN SATURATION: 90 % | WEIGHT: 115 LBS | TEMPERATURE: 95.7 F | HEIGHT: 67 IN

## 2019-11-08 DIAGNOSIS — M71.021 ABSCESS OF BURSA OF RIGHT ELBOW: Primary | ICD-10-CM

## 2019-11-08 NOTE — PROGRESS NOTES
This note has been dictated below. Patient: Nancy Garcia                MRN: [id number]       SSN: [id number]  YOB: 1945        AGE: 69 y.o. SEX: female  Body mass index is 18.01 kg/m².     PCP: Lizet Johnson MD  11/08/19    Past Medical History:   Diagnosis Date    Anxiety     Arthritis     Asbestosis ([address]     Asthma     Back problem     Baker's cyst     Balance problems     Chronic lung disease     Cigarette smoker     Clotting disorder (HCC)     COPD (chronic obstructive pulmonary disease) (HCC)     oxygen 2/liters asbestosis    Depression     History of DVT (deep vein thrombosis)     Leg pain     Right    Lung disease     Nausea & vomiting     Osteoporosis     Restless leg syndrome     Spinal stenosis     [facility]us [facility] [address]     Vitamin D deficiency        Past Surgical History:   Procedure Laterality Date    COLONOSCOPY N/A 9/22/2018    COLONOSCOPY w bx w polypectonies performed by Sabrina Quevedo MD at [address] COLONOSCOPY N/A 11/6/2018    COLONOSCOPY performed by Aaron Almazan MD at [address] [place] [address] COLONOSCOPY N/A 8/2/2019    COLONOSCOPY with polypectomies and biopsies performed by Jean-Claude Levi MD at [address] HX APPENDECTOMY      HX BACK SURGERY      x4    HX BREAST BIOPSY      left    HX GI      exp lap and ileostomy    HX HEENT      cataract right    HX HYSTERECTOMY      HX LUMBAR LAMINECTOMY      HX MENISCUS REPAIR      HX ORTHOPAEDIC      Knee bakers cyst    HX POLYPECTOMY      right colectomy    HX TONSILLECTOMY      HX VEIN STRIPPING      LAP,SURG,COLECTOMY, PARTIAL, W/ANAST N/A 10/23/2018    Dr. Vicente Lal N/A 11/06/2018    Dr. Brigid Gracia      vein stripping       Family History   Problem Relation Age of Onset   Emely Peace Cancer Father     Heart Disease Mother     Hypertension Mother     Cancer Brother     Cancer Sister     Diabetes Other     Hypertension Other     Stroke Other     Heart Disease Sister     Hypertension Sister     Heart Disease Brother        Social History     Socioeconomic History    Marital status:      Spouse name: Not on file    Number of children: Not on file    Years of education: Not on file    Highest education level: Not on file   Occupational History    Not on file   Social Needs    Financial resource strain: Not on file    Food insecurity:     Worry: Not on file     Inability: Not on file    Transportation needs:     Medical: Not on file     Non-medical: Not on file   Tobacco Use    Smoking status: Former Smoker     Packs/day: 0.50     Years: 50.00     Pack years: 25.00     Types: Cigarettes     Start date: 10/21/1964     Last attempt to quit: 10/20/2018     Years since quittin.0    Smokeless tobacco: Never Used   Substance and Sexual Activity    Alcohol use: Never     Frequency: Never    Drug use: No    Sexual activity: Not on file   Lifestyle    Physical activity:     Days per week: Not on file     Minutes per session: Not on file    Stress: Not on file   Relationships    Social connections:     Talks on phone: Not on file     Gets together: Not on file     Attends Pentecostalism service: Not on file     Active member of club or organization: Not on file     Attends meetings of clubs or organizations: Not on file     Relationship status: Not on file    Intimate partner violence:     Fear of current or ex partner: Not on file     Emotionally abused: Not on file     Physically abused: Not on file     Forced sexual activity: Not on file   Other Topics Concern    Not on file   Social History Narrative    Not on file       Current Outpatient Medications   Medication Sig Dispense Refill    predniSONE (DELTASONE) 10 mg tablet Take 10 mg by mouth daily.  100 Tab 0    albuterol (PROVENTIL HFA, VENTOLIN HFA, PROAIR HFA) 90 mcg/actuation inhaler Take 1 Puff by inhalation every four (4) hours as needed for Wheezing. 1 Inhaler 3    tiotropium bromide (SPIRIVA RESPIMAT IN) Take 2 Inhalation by inhalation daily.  montelukast (SINGULAIR) 10 mg tablet Take 1 Tab by mouth daily. (Patient taking differently: Take 10 mg by mouth every other day.) 30 Tab 3    albuterol (PROVENTIL VENTOLIN) 2.5 mg /3 mL (0.083 %) nebulizer solution 3 mL by Nebulization route every four (4) hours as needed for Wheezing or Shortness of Breath. ICD: J44.9, R09.02 file under Medicare Part B 300 Each 3    fluticasone propion-salmeterol (ADVAIR DISKUS) 250-50 mcg/dose diskus inhaler Take 1 Puff by inhalation two (2) times a day. BRAND NAME ONLY 3 Inhaler 3    simethicone (GAS-X) 80 mg chewable tablet Take 80 mg by mouth every six (6) hours as needed for Flatulence.  acetylcysteine 500 mg cap Take 500 mg by mouth two (2) times a day. (Patient taking differently: Take 600 mg by mouth two (2) times a day.) 60 Cap 3    therapeutic multivitamin (THERAGRAN) tablet Take 1 Tab by mouth daily. 30 Tab 11    acetaminophen (TYLENOL EXTRA STRENGTH) 500 mg tablet Take 500 mg by mouth every six (6) hours as needed for Pain.  LORazepam (ATIVAN) 1 mg tablet Take  by mouth two (2) times daily as needed for Anxiety.  rOPINIRole (REQUIP) 1 mg tablet Take 1 mg by mouth two (2) times a day.  OXYGEN-AIR DELIVERY SYSTEMS 2 L by Does Not Apply route. O2 via nasal cannula with bedtime and activity. O2 Company: [employer]      trimethoprim-sulfamethoxazole (BACTRIM DS) 160-800 mg per tablet Take 1 Tab by mouth two (2) times a day.  20 Tab 0       Allergies   Allergen Reactions    Anoro Ellipta [Umeclidinium-Vilanterol] Rash and Other (comments)     Muscle cramps in arms    Aspirin Other (comments)     GI upset and bleeding    Augmentin [Amoxicillin-Pot Clavulanate] Not Reported This Time     Possible cramping    Doxycycline Nausea and Vomiting    Morphine Other (comments)     Neurologic symptoms - severe agitation      Shellfish Derived Itching     Pt able to eat small amounts per report     Sulfa (Sulfonamide Antibiotics) Rash     Patient relates no longer allergic    Yvonne Enter [Fluticasone Propion-Salmeterol] Other (comments)     Mouth Sores         REVIEW OF SYSTEMS:      Review of systems unchanged from previous visit except as noted below. PHYSICAL EXAMINATION:  Visit Vitals  /89   Pulse 85   Temp 95.7 °F (35.4 °C) (Oral)   Resp 18   Ht 5' 7\" (1.702 m)   Wt 115 lb (52.2 kg)   SpO2 90% Comment: via nasal cannulla @ 2L 02   BMI 18.01 kg/m²     Body mass index is 18.01 kg/m². HISTORY OF PRESENT ILLNESS:  Mable Valladares returns today for her right elbow. She is now about two weeks out from her surgery. She is doing well. Her skin over her upper arm is also healing well. PHYSICAL EXAM:  Exam of the right elbow with sutures in place. No erythema. No drainage. She has good range of motion. The right upper arm skin is also healing and is scabbed over. ASSESSMENT AND PLAN:   Mable Valladares is now two weeks out from her right elbow I&D. It is looking well. We will take her sutures out today. Follow up in a month.                 Electronically signed by: Landy Dc MD

## 2019-11-19 ENCOUNTER — TELEPHONE (OUTPATIENT)
Dept: PULMONOLOGY | Age: 74
End: 2019-11-19

## 2019-11-19 NOTE — TELEPHONE ENCOUNTER
Pt calling re a POC. She has tanks now and the small tanks she has somehow hits her in the leg when she carries them. Pt last seen 7/2019. Was upcoming appt with Dr. Sil Sam 11/9/19. Pt to ask for testing for a POC at that visit. Pt verbalizes understanding.  Advised pt to write down questions to ask doctor at visit and add this to the list so she does not forget to ask for testing

## 2019-11-19 NOTE — TELEPHONE ENCOUNTER
PT'S DAUGHTER CALLED([phone]). PT WANTS TO KNOW IF DR CHAPA CAN ORDER HER THE TYPE OF O2 CONCENTRATOR THAT MAKES ITS OWN O2.  DAUGHTER STATES THAT THE PORTABLE TANK SHE HAS NOW IS BEATING UP HER KNEES. MARION TOLD HER TO CALL DR Yeison Pearson. PLEASE CHECK AND CALL HER BACK.

## 2019-11-22 ENCOUNTER — DOCUMENTATION ONLY (OUTPATIENT)
Dept: ORTHOPEDIC SURGERY | Facility: CLINIC | Age: 74
End: 2019-11-22

## 2019-11-22 NOTE — PROGRESS NOTES
11/22/2019 Patient called to cx appt with Dr. Shady Augustin for 11/26 as per patient she is doing very well and does not need to come in. Dr. Shady Augustin was very caring and concerned about her elbow wound and he made her feel very comfortable with his medical treatment. All the staff are very caring. She is grateful for Dr. Shady Augustin for his treatment of her elbow.

## 2019-12-07 ENCOUNTER — APPOINTMENT (OUTPATIENT)
Dept: GENERAL RADIOLOGY | Age: 74
End: 2019-12-07
Attending: EMERGENCY MEDICINE
Payer: MEDICARE

## 2019-12-07 ENCOUNTER — HOSPITAL ENCOUNTER (EMERGENCY)
Age: 74
Discharge: HOME | End: 2019-12-07
Attending: EMERGENCY MEDICINE
Payer: MEDICARE

## 2019-12-07 VITALS
DIASTOLIC BLOOD PRESSURE: 75 MMHG | RESPIRATION RATE: 32 BRPM | TEMPERATURE: 98.1 F | SYSTOLIC BLOOD PRESSURE: 122 MMHG | HEART RATE: 83 BPM | OXYGEN SATURATION: 90 %

## 2019-12-07 DIAGNOSIS — J44.1 COPD EXACERBATION (HCC): Primary | ICD-10-CM

## 2019-12-07 LAB
ALBUMIN SERPL-MCNC: 3.8 G/DL (ref 3.4–5)
ALBUMIN/GLOB SERPL: 1.3 {RATIO} (ref 0.8–1.7)
ALP SERPL-CCNC: 77 U/L (ref 45–117)
ALT SERPL-CCNC: 25 U/L (ref 13–56)
ANION GAP SERPL CALC-SCNC: 5 MMOL/L (ref 3–18)
ARTERIAL PATENCY WRIST A: YES
AST SERPL-CCNC: 21 U/L (ref 10–38)
BASE EXCESS BLD CALC-SCNC: 6 MMOL/L
BASOPHILS # BLD: 0 K/UL (ref 0–0.1)
BASOPHILS NFR BLD: 0 % (ref 0–2)
BDY SITE: ABNORMAL
BILIRUB SERPL-MCNC: 0.4 MG/DL (ref 0.2–1)
BUN SERPL-MCNC: 21 MG/DL (ref 7–18)
BUN/CREAT SERPL: 30 (ref 12–20)
CALCIUM SERPL-MCNC: 9.4 MG/DL (ref 8.5–10.1)
CHLORIDE SERPL-SCNC: 103 MMOL/L (ref 100–111)
CO2 SERPL-SCNC: 32 MMOL/L (ref 21–32)
CREAT SERPL-MCNC: 0.71 MG/DL (ref 0.6–1.3)
DIFFERENTIAL METHOD BLD: ABNORMAL
EOSINOPHIL # BLD: 0 K/UL (ref 0–0.4)
EOSINOPHIL NFR BLD: 0 % (ref 0–5)
ERYTHROCYTE [DISTWIDTH] IN BLOOD BY AUTOMATED COUNT: 13.6 % (ref 11.6–14.5)
GAS FLOW.O2 O2 DELIVERY SYS: ABNORMAL L/MIN
GLOBULIN SER CALC-MCNC: 3 G/DL (ref 2–4)
GLUCOSE SERPL-MCNC: 92 MG/DL (ref 74–99)
HCO3 BLD-SCNC: 29.9 MMOL/L (ref 22–26)
HCT VFR BLD AUTO: 41.2 % (ref 35–45)
HGB BLD-MCNC: 13.6 G/DL (ref 12–16)
LYMPHOCYTES # BLD: 1.2 K/UL (ref 0.9–3.6)
LYMPHOCYTES NFR BLD: 11 % (ref 21–52)
MAGNESIUM SERPL-MCNC: 2.2 MG/DL (ref 1.6–2.6)
MCH RBC QN AUTO: 32.2 PG (ref 24–34)
MCHC RBC AUTO-ENTMCNC: 33 G/DL (ref 31–37)
MCV RBC AUTO: 97.6 FL (ref 74–97)
MONOCYTES # BLD: 1 K/UL (ref 0.05–1.2)
MONOCYTES NFR BLD: 9 % (ref 3–10)
NEUTS SEG # BLD: 8.9 K/UL (ref 1.8–8)
NEUTS SEG NFR BLD: 80 % (ref 40–73)
O2/TOTAL GAS SETTING VFR VENT: 0.3 %
PCO2 BLD: 38.5 MMHG (ref 35–45)
PEEP RESPIRATORY: 5 CMH2O
PH BLD: 7.5 [PH] (ref 7.35–7.45)
PIP ISTAT,IPIP: 16
PLATELET # BLD AUTO: 377 K/UL (ref 135–420)
PMV BLD AUTO: 10.1 FL (ref 9.2–11.8)
PO2 BLD: 83 MMHG (ref 80–100)
POTASSIUM SERPL-SCNC: 4.1 MMOL/L (ref 3.5–5.5)
PROT SERPL-MCNC: 6.8 G/DL (ref 6.4–8.2)
RBC # BLD AUTO: 4.22 M/UL (ref 4.2–5.3)
SAO2 % BLD: 97 % (ref 92–97)
SERVICE CMNT-IMP: ABNORMAL
SODIUM SERPL-SCNC: 140 MMOL/L (ref 136–145)
SPECIMEN TYPE: ABNORMAL
SPONTANEOUS TIMED, IST: YES
TOTAL RESP. RATE, ITRR: 19
WBC # BLD AUTO: 11.2 K/UL (ref 4.6–13.2)

## 2019-12-07 PROCEDURE — 71045 X-RAY EXAM CHEST 1 VIEW: CPT

## 2019-12-07 PROCEDURE — 94640 AIRWAY INHALATION TREATMENT: CPT

## 2019-12-07 PROCEDURE — 94660 CPAP INITIATION&MGMT: CPT

## 2019-12-07 PROCEDURE — 74011000250 HC RX REV CODE- 250

## 2019-12-07 PROCEDURE — 74011250637 HC RX REV CODE- 250/637: Performed by: EMERGENCY MEDICINE

## 2019-12-07 PROCEDURE — 99285 EMERGENCY DEPT VISIT HI MDM: CPT

## 2019-12-07 PROCEDURE — 80053 COMPREHEN METABOLIC PANEL: CPT

## 2019-12-07 PROCEDURE — 74011000250 HC RX REV CODE- 250: Performed by: EMERGENCY MEDICINE

## 2019-12-07 PROCEDURE — 96374 THER/PROPH/DIAG INJ IV PUSH: CPT

## 2019-12-07 PROCEDURE — 36600 WITHDRAWAL OF ARTERIAL BLOOD: CPT

## 2019-12-07 PROCEDURE — 74011250636 HC RX REV CODE- 250/636: Performed by: EMERGENCY MEDICINE

## 2019-12-07 PROCEDURE — 82803 BLOOD GASES ANY COMBINATION: CPT

## 2019-12-07 PROCEDURE — 83735 ASSAY OF MAGNESIUM: CPT

## 2019-12-07 PROCEDURE — 85025 COMPLETE CBC W/AUTO DIFF WBC: CPT

## 2019-12-07 RX ORDER — IPRATROPIUM BROMIDE AND ALBUTEROL SULFATE 2.5; .5 MG/3ML; MG/3ML
SOLUTION RESPIRATORY (INHALATION)
Status: COMPLETED
Start: 2019-12-07 | End: 2019-12-07

## 2019-12-07 RX ORDER — LEVOFLOXACIN 750 MG/1
750 TABLET ORAL
Status: COMPLETED | OUTPATIENT
Start: 2019-12-07 | End: 2019-12-07

## 2019-12-07 RX ORDER — IPRATROPIUM BROMIDE AND ALBUTEROL SULFATE 2.5; .5 MG/3ML; MG/3ML
3 SOLUTION RESPIRATORY (INHALATION)
Status: COMPLETED | OUTPATIENT
Start: 2019-12-07 | End: 2019-12-07

## 2019-12-07 RX ORDER — PREDNISONE 20 MG/1
60 TABLET ORAL DAILY
Qty: 15 TAB | Refills: 0 | Status: SHIPPED | OUTPATIENT
Start: 2019-12-07 | End: 2019-12-12

## 2019-12-07 RX ORDER — LEVOFLOXACIN 750 MG/1
750 TABLET ORAL DAILY
Qty: 5 TAB | Refills: 0 | Status: SHIPPED | OUTPATIENT
Start: 2019-12-07 | End: 2019-12-12

## 2019-12-07 RX ADMIN — IPRATROPIUM BROMIDE AND ALBUTEROL SULFATE 3 ML: .5; 3 SOLUTION RESPIRATORY (INHALATION) at 21:15

## 2019-12-07 RX ADMIN — IPRATROPIUM BROMIDE AND ALBUTEROL SULFATE 3 ML: 2.5; .5 SOLUTION RESPIRATORY (INHALATION) at 20:44

## 2019-12-07 RX ADMIN — IPRATROPIUM BROMIDE AND ALBUTEROL SULFATE 3 ML: .5; 3 SOLUTION RESPIRATORY (INHALATION) at 21:00

## 2019-12-07 RX ADMIN — LEVOFLOXACIN 750 MG: 750 TABLET, FILM COATED ORAL at 22:52

## 2019-12-07 RX ADMIN — METHYLPREDNISOLONE SODIUM SUCCINATE 125 MG: 125 INJECTION, POWDER, FOR SOLUTION INTRAMUSCULAR; INTRAVENOUS at 20:50

## 2019-12-07 RX ADMIN — IPRATROPIUM BROMIDE AND ALBUTEROL SULFATE 3 ML: .5; 3 SOLUTION RESPIRATORY (INHALATION) at 21:30

## 2019-12-07 RX ADMIN — IPRATROPIUM BROMIDE AND ALBUTEROL SULFATE 3 ML: .5; 3 SOLUTION RESPIRATORY (INHALATION) at 20:44

## 2019-12-08 NOTE — DISCHARGE INSTRUCTIONS

## 2019-12-08 NOTE — ED NOTES
10:59 PM  Patient was informed by emergency physician that she needed further observationfor adequate evaluation for her COPD , and that by refusing the above, she is at risk for further deterioration.  She is awake, alert, and she understands her condition and the risks involved in leaving.   She is clinically aware of her surroundings and able to ask appropriate questions, the patients relative and the nurse present confirms she is able to make medical decisions. She verbalized knowing the risks and understood it was recommended that she stay and could also return at any time.  The patient's relative was present for the discussion and also verbalized that they understood both diagnosis, risks and could return at any time.  The patient was provided with warnings regarding worsening of her condition and were provided instructions to follow up with Mitchell Short MD tomorrow or return to the Emergency Room as soon as possible. This information was discussed by the Emergency Room Physician Dr. Hanny Ramirez  and witnessed by FERMIN Stratton RN.

## 2019-12-08 NOTE — ED PROVIDER NOTES
EMERGENCY DEPARTMENT HISTORY AND PHYSICAL EXAM    8:45 PM  Date: 12/7/2019  Patient Name: Chichi Brady    History of Presenting Illness     Chief Complaint   Patient presents with    Respiratory Distress       History Provided By: patient    HPI: Chichi Brady is a 69 y.o. female with medical history as below including severe COPD (2L of home oxygen) with reversal of his ileostomy in August presents with difficulty breathing for a few days. Patient using her inhaler with minimal improvement. No fever, no chills. But some cough.        PCP: Jacki Shannon MD    Past History     Past Medical History:  Past Medical History:   Diagnosis Date    Anxiety     Arthritis     Asbestosis ([address]     Asthma     Back problem     Baker's cyst     Balance problems     Chronic lung disease     Cigarette smoker     Clotting disorder ([address]     COPD (chronic obstructive pulmonary disease) (HCC)     oxygen 2/liters asbestosis    Depression     History of DVT (deep vein thrombosis)     Leg pain     Right    Lung disease     Nausea & vomiting     Osteoporosis     Restless leg syndrome     Spinal stenosis     Thromboembolus ([address]     Vitamin D deficiency        Past Surgical History:  Past Surgical History:   Procedure Laterality Date    COLONOSCOPY N/A 9/22/2018    COLONOSCOPY w bx w polypectonies performed by Mariann London MD at [address] COLONOSCOPY N/A 11/6/2018    COLONOSCOPY performed by Darrell Morse MD at [address] COLONOSCOPY N/A 8/2/2019    COLONOSCOPY with polypectomies and biopsies performed by Ele Parish MD at [address] HX APPENDECTOMY      HX BACK SURGERY      x4    HX BREAST BIOPSY      left    HX GI      exp lap and ileostomy    HX HEENT      cataract right    HX HYSTERECTOMY      HX LUMBAR LAMINECTOMY      HX MENISCUS REPAIR      HX ORTHOPAEDIC      Knee bakers cyst    HX POLYPECTOMY      right colectomy    HX TONSILLECTOMY      HX VEIN STRIPPING      LAP,SURG,COLECTOMY, PARTIAL, W/ANAST N/A 10/23/2018    Dr. Cem Elias N/A 2018    Dr. Aleksandra Avitia      vein stripping       Family History:  Family History   Problem Relation Age of Onset    Cancer Father     Heart Disease Mother     Hypertension Mother     Cancer Brother     Cancer Sister     Diabetes Other     Hypertension Other     Stroke Other     Heart Disease Sister     Hypertension Sister     Heart Disease Brother        Social History:  Social History     Tobacco Use    Smoking status: Former Smoker     Packs/day: 0.50     Years: 50.00     Pack years: 25.00     Types: Cigarettes     Start date: 10/21/1964     Last attempt to quit: 10/20/2018     Years since quittin.1    Smokeless tobacco: Never Used   Substance Use Topics    Alcohol use: Never     Frequency: Never    Drug use: No       Allergies: Allergies   Allergen Reactions    Anoro Ellipta [Umeclidinium-Vilanterol] Rash and Other (comments)     Muscle cramps in arms    Aspirin Other (comments)     GI upset and bleeding    Augmentin [Amoxicillin-Pot Clavulanate] Not Reported This Time     Possible cramping    Doxycycline Nausea and Vomiting    Morphine Other (comments)     Neurologic symptoms - severe agitation      Shellfish Derived Itching     Pt able to eat small amounts per report     Sulfa (Sulfonamide Antibiotics) Rash     Patient relates no longer allergic    Humberto Whitlock [Fluticasone Propion-Salmeterol] Other (comments)     Mouth Sores       Review of Systems   Review of Systems   Constitutional: Negative for activity change, appetite change and chills. HENT: Negative for congestion, ear discharge, ear pain and sore throat. Eyes: Negative for photophobia and pain. Respiratory: Positive for shortness of breath. Negative for cough and choking. Cardiovascular: Negative for palpitations and leg swelling. Gastrointestinal: Negative for anal bleeding and rectal pain. Endocrine: Negative for polydipsia and polyuria. Genitourinary: Negative for genital sores and urgency. Musculoskeletal: Negative for arthralgias and myalgias. Neurological: Negative for dizziness, seizures and speech difficulty. Psychiatric/Behavioral: Negative for hallucinations, self-injury and suicidal ideas. Physical Exam     No data found. Physical Exam  Vitals signs and nursing note reviewed. Constitutional:       General: She is in acute distress. Appearance: She is well-developed. HENT:      Head: Normocephalic and atraumatic. Eyes:      General:         Right eye: No discharge. Left eye: No discharge. Neck:      Musculoskeletal: Normal range of motion and neck supple. Cardiovascular:      Rate and Rhythm: Normal rate and regular rhythm. Heart sounds: Normal heart sounds. No murmur. Pulmonary:      Effort: Respiratory distress present. Breath sounds: No stridor. Wheezing present. No rales. Comments: Respiratory distress  Chest:      Chest wall: No tenderness. Abdominal:      General: Bowel sounds are normal. There is no distension. Palpations: Abdomen is soft. Tenderness: There is no tenderness. There is no guarding or rebound. Musculoskeletal: Normal range of motion. Skin:     General: Skin is warm and dry. Neurological:      Mental Status: She is alert and oriented to person, place, and time. Diagnostic Study Results     Labs -  No results found for this or any previous visit (from the past 12 hour(s)). Radiologic Studies -   No results found. Medical Decision Making     ED Course: Progress Notes, Reevaluation, and Consults:    8:45 PM Initial assessment performed. The patients presenting problems have been discussed, and they/their family are in agreement with the care plan formulated and outlined with them.   I have encouraged them to ask questions as they arise throughout their visit. Provider Notes (Medical Decision Making):   Patient in mild respiratory distress on arrival   Patient will be started on BiPAP  Given duonebs and steroids  On reassessment patient feels better and better air entry   Still wheezing though  Offered patient admission but refused  Strict return precautions given  2:56 PM    Patient was informed by emergency physician that she needed admission for adequate evaluation for her shortness of breath and that by refusing the above, she is at risk for further deterioration . She is awake, alert, and she understands her condition and the risks involved in leaving. She is clinically aware of her surroundings and able to ask appropriate questions, the patients son and the nurse present confirms she is  able to make medical decisions. She verbalized knowing the risks and understood it was recommended that she stay and could also return at any time. her  son was present for the discussion and also verbalized that they understood both diagnosis, risks and could return at any time. The patient was provided with warnings regarding worsening of her condition and were provided instructions to follow up with Mitchell Short MD tomorrow or return to the Emergency Room as soon as possible. This information was discussed by the Emergency Room Physician Dr Hanny Ramirez and witnessed by Vane Vicente. Vital Signs-Reviewed the patient's vital signs. Reviewed pt's pulse ox reading. Records Reviewed:  old medical records (Time of Review: 8:45 PM)  -I am the first provider for this patient.  -I reviewed the vital signs, available nursing notes, past medical history, past surgical history, family history and social history. Current Outpatient Medications   Medication Sig Dispense Refill    predniSONE (DELTASONE) 20 mg tablet Take 60 mg by mouth daily for 5 days.  With Breakfast 15 Tab 0    levoFLOXacin (LEVAQUIN) 750 mg tablet Take 1 Tab by mouth daily for 5 days. Indications: pneumonia 5 Tab 0    levoFLOXacin (LEVAQUIN) 750 mg tablet Take 1 Tab by mouth daily for 5 days. Indications: pneumonia 5 Tab 0    trimethoprim-sulfamethoxazole (BACTRIM DS) 160-800 mg per tablet Take 1 Tab by mouth two (2) times a day. 20 Tab 0    albuterol (PROVENTIL HFA, VENTOLIN HFA, PROAIR HFA) 90 mcg/actuation inhaler Take 1 Puff by inhalation every four (4) hours as needed for Wheezing. 1 Inhaler 3    tiotropium bromide (SPIRIVA RESPIMAT IN) Take 2 Inhalation by inhalation daily.  montelukast (SINGULAIR) 10 mg tablet Take 1 Tab by mouth daily. (Patient taking differently: Take 10 mg by mouth every other day.) 30 Tab 3    albuterol (PROVENTIL VENTOLIN) 2.5 mg /3 mL (0.083 %) nebulizer solution 3 mL by Nebulization route every four (4) hours as needed for Wheezing or Shortness of Breath. ICD: J44.9, R09.02 file under Medicare Part B 300 Each 3    fluticasone propion-salmeterol (ADVAIR DISKUS) 250-50 mcg/dose diskus inhaler Take 1 Puff by inhalation two (2) times a day. BRAND NAME ONLY 3 Inhaler 3    simethicone (GAS-X) 80 mg chewable tablet Take 80 mg by mouth every six (6) hours as needed for Flatulence.  acetylcysteine 500 mg cap Take 500 mg by mouth two (2) times a day. (Patient taking differently: Take 600 mg by mouth two (2) times a day.) 60 Cap 3    therapeutic multivitamin (THERAGRAN) tablet Take 1 Tab by mouth daily. 30 Tab 11    acetaminophen (TYLENOL EXTRA STRENGTH) 500 mg tablet Take 500 mg by mouth every six (6) hours as needed for Pain.  LORazepam (ATIVAN) 1 mg tablet Take  by mouth two (2) times daily as needed for Anxiety.  rOPINIRole (REQUIP) 1 mg tablet Take 1 mg by mouth two (2) times a day.  OXYGEN-AIR DELIVERY SYSTEMS 2 L by Does Not Apply route. O2 via nasal cannula with bedtime and activity. [facility]2 [facility]: [facility]          Clinical Impression     Clinical Impression:   1.  COPD exacerbation ([address] Disposition: discharge      DISCHARGE NOTE:   Pt has been reexamined. Patient has no new complaints, changes, or physical findings. Care plan outlined and precautions discussed. Results were reviewed with the patient. All medications were reviewed with the patient; will d/c home with PMD f/u. All of pt's questions and concerns were addressed. Patient was instructed and agrees to follow up with PMD, as well as to return to the ED upon further deterioration. Patient is ready to go home\. This note was dictated utilizing voice recognition software which may lead to typographical errors. I apologize in advance if the situation occurs. If questions arise please do not hesitate to contact me or call our department.     Chuck Valdez MD  8:45 PM

## 2019-12-12 ENCOUNTER — TELEPHONE (OUTPATIENT)
Dept: PULMONOLOGY | Age: 74
End: 2019-12-12

## 2019-12-12 RX ORDER — FLUCONAZOLE 200 MG/1
200 TABLET ORAL DAILY
Qty: 7 TAB | Refills: 0 | Status: SHIPPED | OUTPATIENT
Start: 2019-12-12 | End: 2019-12-19

## 2019-12-12 NOTE — TELEPHONE ENCOUNTER
PT'S DAUGHTER CALLED([phone]). SHE HAS THRUSH AND WANTS TO KNOW IF DR Gonzalo Verde CAN SEND A SCRIPT FOR DIFLUCAN TO [facility] [phone].

## 2019-12-16 ENCOUNTER — TELEPHONE (OUTPATIENT)
Dept: PULMONOLOGY | Age: 74
End: 2019-12-16

## 2019-12-16 NOTE — TELEPHONE ENCOUNTER
Pt has appt 1/6/20 but needs nebulizer tubing now. She has not had an order for the nebulizer supplies for several years per pt. She will need the face to face to go with the order.    Daughter to pickup nebulizer tubing from our office to hold her over until her appt 1/6/20

## 2020-01-02 ENCOUNTER — APPOINTMENT (OUTPATIENT)
Dept: GENERAL RADIOLOGY | Age: 75
End: 2020-01-02
Attending: EMERGENCY MEDICINE
Payer: MEDICARE

## 2020-01-02 ENCOUNTER — APPOINTMENT (OUTPATIENT)
Dept: GENERAL RADIOLOGY | Age: 75
End: 2020-01-02
Attending: PHYSICIAN ASSISTANT
Payer: MEDICARE

## 2020-01-02 ENCOUNTER — HOSPITAL ENCOUNTER (EMERGENCY)
Age: 75
Discharge: HOME | End: 2020-01-02
Attending: EMERGENCY MEDICINE
Payer: MEDICARE

## 2020-01-02 ENCOUNTER — APPOINTMENT (OUTPATIENT)
Dept: CT IMAGING | Age: 75
End: 2020-01-02
Attending: EMERGENCY MEDICINE
Payer: MEDICARE

## 2020-01-02 VITALS
HEART RATE: 85 BPM | RESPIRATION RATE: 16 BRPM | SYSTOLIC BLOOD PRESSURE: 92 MMHG | BODY MASS INDEX: 18.68 KG/M2 | TEMPERATURE: 98.1 F | HEIGHT: 67 IN | WEIGHT: 119 LBS | OXYGEN SATURATION: 97 % | DIASTOLIC BLOOD PRESSURE: 65 MMHG

## 2020-01-02 DIAGNOSIS — M25.561 ACUTE PAIN OF RIGHT KNEE: ICD-10-CM

## 2020-01-02 DIAGNOSIS — M25.511 ACUTE PAIN OF RIGHT SHOULDER: ICD-10-CM

## 2020-01-02 DIAGNOSIS — M25.531 RIGHT WRIST PAIN: ICD-10-CM

## 2020-01-02 DIAGNOSIS — M25.521 BILATERAL ELBOW JOINT PAIN: ICD-10-CM

## 2020-01-02 DIAGNOSIS — M25.522 BILATERAL ELBOW JOINT PAIN: ICD-10-CM
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
## 2020-01-02 LAB
ANION GAP SERPL CALC-SCNC: 4 MMOL/L (ref 3–18)
BASOPHILS # BLD: 0 K/UL (ref 0–0.1)
BASOPHILS NFR BLD: 0 % (ref 0–2)
BUN SERPL-MCNC: 22 MG/DL (ref 7–18)
BUN/CREAT SERPL: 29 (ref 12–20)
CALCIUM SERPL-MCNC: 9.2 MG/DL (ref 8.5–10.1)
CHLORIDE SERPL-SCNC: 106 MMOL/L (ref 100–111)
CO2 SERPL-SCNC: 31 MMOL/L (ref 21–32)
CREAT SERPL-MCNC: 0.77 MG/DL (ref 0.6–1.3)
DIFFERENTIAL METHOD BLD: ABNORMAL
EOSINOPHIL # BLD: 0 K/UL (ref 0–0.4)
EOSINOPHIL NFR BLD: 0 % (ref 0–5)
ERYTHROCYTE [DISTWIDTH] IN BLOOD BY AUTOMATED COUNT: 13.5 % (ref 11.6–14.5)
GLUCOSE SERPL-MCNC: 101 MG/DL (ref 74–99)
HCT VFR BLD AUTO: 39.1 % (ref 35–45)
HGB BLD-MCNC: 13 G/DL (ref 12–16)
LYMPHOCYTES # BLD: 1 K/UL (ref 0.9–3.6)
LYMPHOCYTES NFR BLD: 11 % (ref 21–52)
MCH RBC QN AUTO: 32.4 PG (ref 24–34)
MCHC RBC AUTO-ENTMCNC: 33.2 G/DL (ref 31–37)
MCV RBC AUTO: 97.5 FL (ref 74–97)
MONOCYTES # BLD: 0.6 K/UL (ref 0.05–1.2)
MONOCYTES NFR BLD: 7 % (ref 3–10)
NEUTS SEG # BLD: 7 K/UL (ref 1.8–8)
NEUTS SEG NFR BLD: 82 % (ref 40–73)
PLATELET # BLD AUTO: 301 K/UL (ref 135–420)
PMV BLD AUTO: 9.7 FL (ref 9.2–11.8)
POTASSIUM SERPL-SCNC: 4.2 MMOL/L (ref 3.5–5.5)
RBC # BLD AUTO: 4.01 M/UL (ref 4.2–5.3)
SODIUM SERPL-SCNC: 141 MMOL/L (ref 136–145)
WBC # BLD AUTO: 8.5 K/UL (ref 4.6–13.2)

## 2020-01-02 PROCEDURE — 73564 X-RAY EXAM KNEE 4 OR MORE: CPT

## 2020-01-02 PROCEDURE — 73130 X-RAY EXAM OF HAND: CPT

## 2020-01-02 PROCEDURE — 73110 X-RAY EXAM OF WRIST: CPT

## 2020-01-02 PROCEDURE — 74011250637 HC RX REV CODE- 250/637: Performed by: PHYSICIAN ASSISTANT

## 2020-01-02 PROCEDURE — 73080 X-RAY EXAM OF ELBOW: CPT

## 2020-01-02 PROCEDURE — 73030 X-RAY EXAM OF SHOULDER: CPT

## 2020-01-02 PROCEDURE — 85025 COMPLETE CBC W/AUTO DIFF WBC: CPT

## 2020-01-02 PROCEDURE — 99283 EMERGENCY DEPT VISIT LOW MDM: CPT

## 2020-01-02 PROCEDURE — 80048 BASIC METABOLIC PNL TOTAL CA: CPT

## 2020-01-02 PROCEDURE — 70450 CT HEAD/BRAIN W/O DYE: CPT

## 2020-01-02 RX ORDER — ACETAMINOPHEN 500 MG
1000 TABLET ORAL
Status: COMPLETED | OUTPATIENT
Start: 2020-01-02 | End: 2020-01-02

## 2020-01-02 RX ADMIN — ACETAMINOPHEN 1000 MG: 500 TABLET ORAL at 17:23

## 2020-01-02 NOTE — DISCHARGE INSTRUCTIONS
Patient Education        Preventing Falls: Care Instructions  Your Care Instructions    Getting around your home safely can be a challenge if you have injuries or health problems that make it easy for you to fall. Loose rugs and furniture in walkways are among the dangers for many older people who have problems walking or who have poor eyesight. People who have conditions such as arthritis, osteoporosis, or dementia also have to be careful not to fall. You can make your home safer with a few simple measures. Follow-up care is a key part of your treatment and safety. Be sure to make and go to all appointments, and call your doctor if you are having problems. It's also a good idea to know your test results and keep a list of the medicines you take. How can you care for yourself at home? Taking care of yourself  · You may get dizzy if you do not drink enough water. To prevent dehydration, drink plenty of fluids, enough so that your urine is light yellow or clear like water. Choose water and other caffeine-free clear liquids. If you have kidney, heart, or liver disease and have to limit fluids, talk with your doctor before you increase the amount of fluids you drink. · Exercise regularly to improve your strength, muscle tone, and balance. Walk if you can. Swimming may be a good choice if you cannot walk easily. · Have your vision and hearing checked each year or any time you notice a change. If you have trouble seeing and hearing, you might not be able to avoid objects and could lose your balance. · Know the side effects of the medicines you take. Ask your doctor or pharmacist whether the medicines you take can affect your balance. Sleeping pills or sedatives can affect your balance. · Limit the amount of alcohol you drink. Alcohol can impair your balance and other senses. · Ask your doctor whether calluses or corns on your feet need to be removed.  If you wear loose-fitting shoes because of calluses or corns, you can lose your balance and fall. · Talk to your doctor if you have numbness in your feet. Preventing falls at home  · Remove raised doorway thresholds, throw rugs, and clutter. Repair loose carpet or raised areas in the floor. · Move furniture and electrical cords to keep them out of walking paths. · Use nonskid floor wax, and wipe up spills right away, especially on ceramic tile floors. · If you use a walker or cane, put rubber tips on it. If you use crutches, clean the bottoms of them regularly with an abrasive pad, such as steel wool. · Keep your house well lit, especially [place], and outside walkways. Use night-lights in areas such as hallways and bathrooms. Add extra light switches or use remote switches (such as switches that go on or off when you clap your hands) to make it easier to turn lights on if you have to get up during the night. · Install sturdy handrails on stairways. · Move items in your cabinets so that the things you use a lot are on the lower shelves (about waist level). · Keep a cordless phone and a flashlight with new batteries by your bed. If possible, put a phone in each of the main rooms of your house, or carry a cell phone in case you fall and cannot reach a phone. Or, you can wear a device around your neck or wrist. You push a button that sends a signal for help. · Wear low-heeled shoes that fit well and give your feet good support. Use footwear with nonskid soles. Check the heels and soles of your shoes for wear. Repair or replace worn heels or soles. · Do not wear socks without shoes on wood floors. · Walk on the grass when the sidewalks are slippery. If you live in an area that gets snow and ice in the winter, sprinkle salt on slippery steps and sidewalks. Preventing falls in the bath  · Install grab bars and nonskid mats inside and outside your shower or tub and near the toilet and sinks. · Use shower chairs and bath benches.   · Use a hand-held shower head that will allow you to sit while showering. · Get into a tub or shower by putting the weaker leg in first. Get out of a tub or shower with your strong side first.  · Repair loose toilet seats and consider installing a raised toilet seat to make getting on and off the toilet easier. · Keep your bathroom door unlocked while you are in the shower. Where can you learn more? Go to http://robert-jorge.info/. Enter [phone] in the search box to learn more about \"Preventing Falls: Care Instructions. \"  Current as of: November 7, 2018  Content Version: 12.2  © 4126-4006 [employer]. Care instructions adapted under license by [employer] (which disclaims liability or warranty for this information). If you have questions about a medical condition or this instruction, always ask your healthcare professional. [address] any warranty or liability for your use of this information.

## 2020-01-02 NOTE — ED PROVIDER NOTES
EMERGENCY DEPARTMENT HISTORY AND PHYSICAL EXAM    Date: 1/2/2020  Patient Name: Ruben Barroso    History of Presenting Illness     Chief Complaint   Patient presents with   [facility] Fall         History Provided By: Patient and family     Chief Complaint: Fall, bilateral elbow pain, right shoulder/wrist and hand pain. Right knee pain and head trauma   Duration: this morning   Timing:  Acute   Location: see above  Quality: aching  Severity: mild to moderate   Modifying Factors: pain is worse after falling   Associated Symptoms: none       Additional History (Context): Ruben Barroso is a 69 y.o. female with a history of asthma, COPD, asbestos who presents today for history as listed above. Patient states that she was in her kitchen when she became unsteady on her feet and fell backwards onto the right side. Patient states that she does have a walker and cane at home but was not using these at that time. Patient states she is typically unsteady on her feet and this morning it was not out of the ordinary for her. Patient states she did hit the back of her head but denies LOC. Denies any back pain, chest pain or abdominal pain. Denies any loss of bowel or bladder. Patient reports she took a 650 mg Tylenol at 6 AM this morning. PCP: Ron Murillo MD    Current Facility-Administered Medications   Medication Dose Route Frequency Provider Last Rate Last Dose    sodium chloride 0.9 % bolus infusion 1,000 mL  1,000 mL IntraVENous ONCE Christen Griffin [place]         Current Outpatient Medications   Medication Sig Dispense Refill    trimethoprim-sulfamethoxazole (BACTRIM DS) 160-800 mg per tablet Take 1 Tab by mouth two (2) times a day. 20 Tab 0    albuterol (PROVENTIL HFA, VENTOLIN HFA, PROAIR HFA) 90 mcg/actuation inhaler Take 1 Puff by inhalation every four (4) hours as needed for Wheezing. 1 Inhaler 3    tiotropium bromide (SPIRIVA RESPIMAT IN) Take 2 Inhalation by inhalation daily.       montelukast (SINGULAIR) 10 mg tablet Take 1 Tab by mouth daily. (Patient taking differently: Take 10 mg by mouth every other day.) 30 Tab 3    albuterol (PROVENTIL VENTOLIN) 2.5 mg /3 mL (0.083 %) nebulizer solution 3 mL by Nebulization route every four (4) hours as needed for Wheezing or Shortness of Breath. ICD: J44.9, R09.02 file under Medicare Part B 300 Each 3    fluticasone propion-salmeterol (ADVAIR DISKUS) 250-50 mcg/dose diskus inhaler Take 1 Puff by inhalation two (2) times a day. BRAND NAME ONLY 3 Inhaler 3    simethicone (GAS-X) 80 mg chewable tablet Take 80 mg by mouth every six (6) hours as needed for Flatulence.  acetylcysteine 500 mg cap Take 500 mg by mouth two (2) times a day. (Patient taking differently: Take 600 mg by mouth two (2) times a day.) 60 Cap 3    therapeutic multivitamin (THERAGRAN) tablet Take 1 Tab by mouth daily. 30 Tab 11    acetaminophen (TYLENOL EXTRA STRENGTH) 500 mg tablet Take 500 mg by mouth every six (6) hours as needed for Pain.  LORazepam (ATIVAN) 1 mg tablet Take  by mouth two (2) times daily as needed for Anxiety.  rOPINIRole (REQUIP) 1 mg tablet Take 1 mg by mouth two (2) times a day.  OXYGEN-AIR DELIVERY SYSTEMS 2 L by Does Not Apply route. O2 via nasal cannula with bedtime and activity.  [employer]: [employer]         Past History     Past Medical History:  Past Medical History:   Diagnosis Date    Anxiety     Arthritis     Asbestosis ([address]     Asthma     Back problem     Baker's cyst     Balance problems     Chronic lung disease     Cigarette smoker     Clotting disorder ([address]     COPD (chronic obstructive pulmonary disease) (HCC)     oxygen 2/liters asbestosis    Depression     History of DVT (deep vein thrombosis)     Leg pain     Right    Lung disease     Nausea & vomiting     Osteoporosis     Restless leg syndrome     Spinal stenosis     Thromboembolus ([address]     Vitamin D deficiency        Past Surgical History:  Past Surgical History:   Procedure Laterality Date    COLONOSCOPY N/A 2018    COLONOSCOPY w bx w polypectonies performed by Josselyn Bravo MD at [address] COLONOSCOPY N/A 2018    COLONOSCOPY performed by Shabnam Cortes MD at [facility] COLONOSCOPY N/A 2019    COLONOSCOPY with polypectomies and biopsies performed by Tha Michaels MD at [address] HX APPENDECTOMY      HX BACK SURGERY      x4    HX BREAST BIOPSY      left    HX GI      exp lap and ileostomy    HX HEENT      cataract right    HX HYSTERECTOMY      HX LUMBAR LAMINECTOMY      HX MENISCUS REPAIR      HX ORTHOPAEDIC      Knee bakers cyst    HX POLYPECTOMY      right colectomy    HX TONSILLECTOMY      HX VEIN STRIPPING      LAP,SURG,COLECTOMY, PARTIAL, W/ANAST N/A 10/23/2018    Dr. Aleksandar Miller N/A 2018    Dr. Val Contreras      vein stripping       Family History:  Family History   Problem Relation Age of Onset    Cancer Father     Heart Disease Mother     Hypertension Mother     Cancer Brother    Henley Jewhipolito Sister     Diabetes Other     Hypertension Other     Stroke Other     Heart Disease Sister     Hypertension Sister     Heart Disease Brother        Social History:  Social History     Tobacco Use    Smoking status: Former Smoker     Packs/day: 0.50     Years: 50.00     Pack years: 25.00     Types: Cigarettes     Start date: 10/21/1964     Last attempt to quit: 10/20/2018     Years since quittin.2    Smokeless tobacco: Never Used   Substance Use Topics    Alcohol use: Never     Frequency: Never    Drug use: No       Allergies:   Allergies   Allergen Reactions    Anoro Ellipta [Umeclidinium-Vilanterol] Rash and Other (comments)     Muscle cramps in arms    Aspirin Other (comments)     GI upset and bleeding    Augmentin [Amoxicillin-Pot Clavulanate] Not Reported This Time     Possible cramping    Doxycycline Nausea and Vomiting    Morphine Other (comments)     Neurologic symptoms - severe agitation      Shellfish Derived Itching     Pt able to eat small amounts per report     Sulfa (Sulfonamide Antibiotics) Rash     Patient relates no longer allergic    Daleen Kanner [Fluticasone Propion-Salmeterol] Other (comments)     Mouth Sores         Review of Systems   Review of Systems   Constitutional: Negative for chills and fever. HENT: Negative for congestion, rhinorrhea and sore throat. Respiratory: Negative for cough and shortness of breath. Cardiovascular: Negative for chest pain. Gastrointestinal: Negative for abdominal pain, blood in stool, constipation, diarrhea, nausea and vomiting. Genitourinary: Negative for dysuria, frequency and hematuria. Musculoskeletal: Negative for back pain and myalgias. Skin: Negative for rash and wound. Neurological: Negative for dizziness and headaches. All other systems reviewed and are negative. All Other Systems Negative  Physical Exam     Vitals:    01/02/20 1501   BP: 92/65   Pulse: 85   Resp: 16   Temp: 98.1 °F (36.7 °C)   SpO2: 97%   Weight: 54 kg (119 lb)   Height: 5' 7\" (1.702 m)     Physical Exam  Vitals signs and nursing note reviewed. Constitutional:       General: She is not in acute distress. Appearance: She is well-developed. She is not diaphoretic. HENT:      Head: Normocephalic and atraumatic. Mouth/Throat:      Mouth: Mucous membranes are dry. Eyes:      Conjunctiva/sclera: Conjunctivae normal.   Neck:      Musculoskeletal: Normal range of motion and neck supple. Cardiovascular:      Rate and Rhythm: Normal rate and regular rhythm. Heart sounds: Normal heart sounds. Pulmonary:      Effort: Pulmonary effort is normal. No respiratory distress. Breath sounds: Normal breath sounds. Chest:      Chest wall: No tenderness. Abdominal:      General: Bowel sounds are normal. There is no distension. Palpations: Abdomen is soft. Tenderness: There is no tenderness. There is no guarding or rebound. Musculoskeletal:         General: No deformity. Right shoulder: She exhibits tenderness. She exhibits normal range of motion, no swelling, no effusion, no crepitus, normal pulse and normal strength. Right elbow: She exhibits normal range of motion, no swelling and no deformity. Tenderness found. Medial epicondyle tenderness noted. Right wrist: She exhibits tenderness (diffuse). Right knee: She exhibits normal range of motion, no swelling, no effusion, no laceration, no erythema, no LCL laxity and no MCL laxity. Tenderness found. Medial joint line tenderness noted. Comments: brusing noted throughout right right hand and right wrist. No laceration or avulsion noted. Cap refill <2 sec and strong radial pulses bilaterally    Skin:     General: Skin is warm and dry. Neurological:      Mental Status: She is alert and oriented to person, place, and time. Deep Tendon Reflexes: Reflexes are normal and symmetric. Diagnostic Study Results     Labs -     Recent Results (from the past 12 hour(s))   CBC WITH AUTOMATED DIFF    Collection Time: 01/02/20  5:31 PM   Result Value Ref Range    WBC 8.5 4.6 - 13.2 K/uL    RBC 4.01 (L) 4.20 - 5.30 M/uL    HGB 13.0 12.0 - 16.0 g/dL    HCT 39.1 35.0 - 45.0 %    MCV 97.5 (H) 74.0 - 97.0 FL    MCH 32.4 24.0 - 34.0 PG    MCHC 33.2 31.0 - 37.0 g/dL    RDW 13.5 11.6 - 14.5 %    PLATELET 647 648 - 744 K/uL    MPV 9.7 9.2 - 11.8 FL    NEUTROPHILS 82 (H) 40 - 73 %    LYMPHOCYTES 11 (L) 21 - 52 %    MONOCYTES 7 3 - 10 %    EOSINOPHILS 0 0 - 5 %    BASOPHILS 0 0 - 2 %    ABS. NEUTROPHILS 7.0 1.8 - 8.0 K/UL    ABS. LYMPHOCYTES 1.0 0.9 - 3.6 K/UL    ABS. MONOCYTES 0.6 0.05 - 1.2 K/UL    ABS. EOSINOPHILS 0.0 0.0 - 0.4 K/UL    ABS.  BASOPHILS 0.0 0.0 - 0.1 K/UL    DF AUTOMATED     METABOLIC PANEL, BASIC    Collection Time: 01/02/20  5:31 PM   Result Value Ref Range    Sodium 141 136 - 145 mmol/L    Potassium 4.2 3.5 - 5.5 mmol/L    Chloride 106 100 - 111 mmol/L    CO2 31 21 - 32 mmol/L    Anion gap 4 3.0 - 18 mmol/L    Glucose 101 (H) 74 - 99 mg/dL    BUN 22 (H) 7.0 - 18 MG/DL    Creatinine 0.77 0.6 - 1.3 MG/DL    BUN/Creatinine ratio 29 (H) 12 - 20      GFR est AA >60 >60 ml/min/1.73m2    GFR est non-AA >60 >60 ml/min/1.73m2    Calcium 9.2 8.5 - 10.1 MG/DL       Radiologic Studies -   CT HEAD WO CONT   Final Result   IMPRESSION:   1. No acute intracranial process identified. If continued clinical concern for   acute ischemia, consider MR for further evaluation. 2.  Cerebellar tonsillar ectopia. XR SHOULDER RT AP/LAT MIN 2 V   Final Result   IMPRESSION:   1. No acute fracture-dislocation. XR KNEE RT MIN 4 V   Final Result   IMPRESSION:   1. No acute fracture or dislocation. 2. Moderate round soft tissue density in the popliteal fossa with associated   vascular calcifications. Further follow-up with CT right lower extremity with IV   contrast is suggested. XR ELBOW RT MIN 3 V   Final Result   IMPRESSION:      No acute fracture or subluxation. XR ELBOW LT MIN 3 V   Final Result   IMPRESSION:      No acute fracture or subluxation. XR HAND RT MIN 3 V    (Results Pending)   XR WRIST RT AP/LAT/OBL MIN 3V    (Results Pending)     CT Results  (Last 48 hours)               01/02/20 1614  CT HEAD WO CONT Final result    Impression:  IMPRESSION:   1. No acute intracranial process identified. If continued clinical concern for   acute ischemia, consider MR for further evaluation. 2.  Cerebellar tonsillar ectopia. Narrative:  EXAM: CT of the Head without contrast       INDICATION:  fall leg weakness. TECHNIQUE: CT of the head from the vertex to the skull base performed. No IV   contrast administered.        All CT scans at this facility are performed using dose optimization technique as   appropriate to a performed exam, to include automated exposure control,   adjustment of the mA and/or kV according to patient size (including appropriate   matching for site specific examination) or use of iterative reconstruction   technique. COMPARISON: None. FINDINGS: No evidence of acute intra-axial or extra-axial hemorrhage. The   ventricles and sulci are symmetric and mildly prominent. Mild periventricular   hypodensities are noted. There is tonsillar ectopia with the tonsils extending 4   mm below the foramen magnum. No evidence to suggest hydrocephalus. No midline   shift, mass effect or mass lesion appreciated. The gray-white junction is   preserved. No evidence of an acute infarct identified. The mastoid air cells are   well aerated. The paranasal sinuses are unremarkable. The orbits are normal. The   scalp and skull are unremarkable. CXR Results  (Last 48 hours)    None            Medical Decision Making   I am the first provider for this patient. I reviewed the vital signs, available nursing notes, past medical history, past surgical history, family history and social history. Vital Signs-Reviewed the patient's vital signs. Records Reviewed: Nursing Notes and Old Medical Records     Procedures: None   Procedures    Provider Notes (Medical Decision Making):     Differential: fracture, dislocation, abrasion, sprain, contusion, laceration, closed head injury, intracranial bleed, dehydration, electrolyte abnormality      Plan: Will order xrays, CT of head, basic labs and fluids. Patient very clinically dehydrated on exam.  Blood pressure is 92/65.       5:41 PM  Patient refusing IV fluids. Patient states her typical systolic blood pressures in the 90s. Patient states he 92/65 blood pressure is her normal.  Patient will orally hydrate. Pending labs and hand x-ray at this time. Have discussed other reassuring imaging and need for follow-up CT of right knee.     6:17 PM  Patient states she is feeling better and is ready to go home.  Have discussed work-up results with patient. Have advised Ortho and PCP follow-up. Have stressed the importance of hydration. Have discussed rice care for at home. Have advised Tylenol as needed for pain. Have discussed fall precautions and advised patient to use walker or cane when walking around the house. Patient and family agrees with the plan and management and states all questions have been thoroughly answered and there are no more remaining questions. MED RECONCILIATION:  Current Facility-Administered Medications   Medication Dose Route Frequency    sodium chloride 0.9 % bolus infusion 1,000 mL  1,000 mL IntraVENous ONCE     Current Outpatient Medications   Medication Sig    trimethoprim-sulfamethoxazole (BACTRIM DS) 160-800 mg per tablet Take 1 Tab by mouth two (2) times a day.  albuterol (PROVENTIL HFA, VENTOLIN HFA, PROAIR HFA) 90 mcg/actuation inhaler Take 1 Puff by inhalation every four (4) hours as needed for Wheezing.  tiotropium bromide (SPIRIVA RESPIMAT IN) Take 2 Inhalation by inhalation daily.  montelukast (SINGULAIR) 10 mg tablet Take 1 Tab by mouth daily. (Patient taking differently: Take 10 mg by mouth every other day.)    albuterol (PROVENTIL VENTOLIN) 2.5 mg /3 mL (0.083 %) nebulizer solution 3 mL by Nebulization route every four (4) hours as needed for Wheezing or Shortness of Breath. ICD: J44.9, R09.02 file under Medicare Part B    fluticasone propion-salmeterol (ADVAIR DISKUS) 250-50 mcg/dose diskus inhaler Take 1 Puff by inhalation two (2) times a day. BRAND NAME ONLY    simethicone (GAS-X) 80 mg chewable tablet Take 80 mg by mouth every six (6) hours as needed for Flatulence.  acetylcysteine 500 mg cap Take 500 mg by mouth two (2) times a day. (Patient taking differently: Take 600 mg by mouth two (2) times a day.)    therapeutic multivitamin (THERAGRAN) tablet Take 1 Tab by mouth daily.     acetaminophen (TYLENOL EXTRA STRENGTH) 500 mg tablet Take 500 mg by mouth every six (6) hours as needed for Pain.  LORazepam (ATIVAN) 1 mg tablet Take  by mouth two (2) times daily as needed for Anxiety.  rOPINIRole (REQUIP) 1 mg tablet Take 1 mg by mouth two (2) times a day.  OXYGEN-AIR DELIVERY SYSTEMS 2 L by Does Not Apply route. O2 via nasal cannula with bedtime and activity. O2 Company: [email or web address]       Disposition:  Home     DISCHARGE NOTE:   Pt has been reexamined. Patient has no new complaints, changes, or physical findings. Care plan outlined and precautions discussed. Results of workup were reviewed with the patient. All medications were reviewed with the patient. All of pt's questions and concerns were addressed. Patient was instructed and agrees to follow up with PCP/Ortho as well as to return to the ED upon further deterioration. Patient is ready to go home. Follow-up Information     Follow up With Specialties Details Why Contact Info    [facility] EMERGENCY DEPT Emergency Medicine  As needed [address] [place] [id number]  [address] [place] [address] MD [facility]   [address] [place] [address]  [id number]  [phone]      [facility] [place] [address] Orthopedic Surgery Schedule an appointment as soon as possible for a visit  [address] [facility], [address] [facility]  [phone]          Current Discharge Medication List              Diagnosis     Clinical Impression:   1. Fall, initial encounter    2. Acute pain of right shoulder    3. Acute pain of right knee    4. Right wrist pain    5. Bilateral elbow joint pain          \"Please note that this dictation was completed with Dragon Tail, the computer voice recognition software. Quite often unanticipated grammatical, syntax, homophones, and other interpretive errors are inadvertently transcribed by the computer software. Please disregard these errors. Please excuse any errors that have escaped final proofreading. \"

## 2020-01-02 NOTE — ED TRIAGE NOTES
\"I fell today. My legs aren't very steady. Both of my elbow hurt and my right knee hurt. \"  Denies head trauma or LOC.

## 2020-01-03 NOTE — ED NOTES
Discharge instructions reviewed with patient by Frances Wolfe.  Pt left Ed in stable condition with no distress noted and no complaints voiced

## 2020-01-06 ENCOUNTER — OFFICE VISIT (OUTPATIENT)
Dept: PULMONOLOGY | Age: 75
End: 2020-01-06

## 2020-01-06 VITALS
BODY MASS INDEX: 18.27 KG/M2 | TEMPERATURE: 96.3 F | SYSTOLIC BLOOD PRESSURE: 132 MMHG | HEIGHT: 67 IN | HEART RATE: 85 BPM | RESPIRATION RATE: 18 BRPM | DIASTOLIC BLOOD PRESSURE: 84 MMHG | WEIGHT: 116.4 LBS | OXYGEN SATURATION: 94 %

## 2020-01-06 DIAGNOSIS — Z79.52 CURRENT CHRONIC USE OF SYSTEMIC STEROIDS: ICD-10-CM

## 2020-01-06 DIAGNOSIS — K43.2 INCISIONAL HERNIA, WITHOUT OBSTRUCTION OR GANGRENE: ICD-10-CM

## 2020-01-06 DIAGNOSIS — R64 CACHEXIA (HCC): ICD-10-CM

## 2020-01-06 DIAGNOSIS — R09.02 HYPOXEMIA REQUIRING SUPPLEMENTAL OXYGEN: ICD-10-CM

## 2020-01-06 DIAGNOSIS — Z99.81 HYPOXEMIA REQUIRING SUPPLEMENTAL OXYGEN: ICD-10-CM

## 2020-01-06 DIAGNOSIS — J44.9 COPD, SEVERE (HCC): Primary | ICD-10-CM

## 2020-01-06 DIAGNOSIS — Z01.818 PRE-OPERATIVE CLEARANCE: ICD-10-CM

## 2020-01-06 NOTE — PROGRESS NOTES
HISTORY OF PRESENT Pratik Padilla is a 69 y.o. female with COPD. Preoperative pulmonary evaluation for possible incisional hernia repair. Pt has a history of colon cancer S/p R hemicolectomy and subsequent reversal of ileostomy in August 2019. Since then pt has had pain from a gradually enlarging incisional hernia. She has no SSx of obstruction or gangrene. Pt has severe COPD last FEV1 43% and is on long term O2. She has been on low dose Prednisone 10 mg and reports increased SOB and wheezing if taken off this. She is back to her baseline level of function and denies SOB over her usual, no fever, chills, chest pain. Pt with usual cough occasionally productive of whitish phlegm. 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 Pt complains of intermittent hemoptysis, known since 2013, no fever or chills. Pt has quit smoking since hospital admission in 2018. Pt noted no apparent response to maintenance Azithromycin with no change in amount and character of phlegm. Daliresp given on prior visit also did not improve symptoms and caused side effects. Pt had a recent fall resulting in bruising if her R elbow and knee but no apparent fractures. COPD   The history is provided by the patient and relative. This is a chronic problem. The problem occurs daily. Progression since onset: waxing and waning. The symptoms are aggravated by exertion. The symptoms are relieved by medications. Treatments tried: see list. The treatment provided moderate relief. Review of Systems   Constitutional: Negative for chills, diaphoresis and malaise/fatigue. Weight loss: stabilized. HENT: Negative for congestion, ear discharge, hearing loss, nosebleeds, sinus pain and tinnitus. Eyes: Negative for blurred vision, double vision, photophobia, pain, discharge and redness. Respiratory: Negative for stridor. Cardiovascular: Negative for palpitations. Gastrointestinal: Negative for blood in stool, constipation, diarrhea, heartburn, melena and nausea. Genitourinary: Negative for dysuria, flank pain, frequency, hematuria and urgency. Musculoskeletal: Positive for falls and joint pain. Negative for back pain and myalgias. Skin: Negative for itching. Neurological: Negative for dizziness, tingling, tremors, sensory change, speech change, focal weakness, seizures, loss of consciousness and weakness. Endo/Heme/Allergies: Negative for environmental allergies and polydipsia. Bruises/bleeds easily. Psychiatric/Behavioral: Positive for depression. Negative for hallucinations, memory loss, substance abuse and suicidal ideas. The patient is nervous/anxious and has insomnia. Past Medical History:   Diagnosis Date    Anxiety     Arthritis     Asbestosis ([address]     Asthma     Back problem     Baker's cyst     Balance problems     Chronic lung disease     Cigarette smoker     [facility]     COPD (chronic obstructive pulmonary disease) ([facility])     oxygen 2/liters asbestosis    Depression     History of DVT (deep vein thrombosis)     Leg pain     Right    Lung disease     Nausea & vomiting     Osteoporosis     Restless leg syndrome     Spinal stenosis     Thromboembolus ([facility])     Vitamin D deficiency      Current Outpatient Medications on File Prior to Visit   Medication Sig Dispense Refill    omeprazole magnesium (PRILOSEC PO) Take  by mouth.  albuterol (PROVENTIL HFA, VENTOLIN HFA, PROAIR HFA) 90 mcg/actuation inhaler Take 1 Puff by inhalation every four (4) hours as needed for Wheezing. 1 Inhaler 3    tiotropium bromide (SPIRIVA RESPIMAT IN) Take 2 Inhalation by inhalation daily.       albuterol (PROVENTIL VENTOLIN) 2.5 mg /3 mL (0.083 %) nebulizer solution 3 mL by Nebulization route every four (4) hours as needed for Wheezing or Shortness of Breath. ICD: J44.9, R09.02 file under Medicare Part B 300 Each 3    fluticasone propion-salmeterol (ADVAIR DISKUS) 250-50 mcg/dose diskus inhaler Take 1 Puff by inhalation two (2) times a day. BRAND NAME ONLY 3 Inhaler 3    simethicone (GAS-X) 80 mg chewable tablet Take 80 mg by mouth every six (6) hours as needed for Flatulence.  acetylcysteine 500 mg cap Take 500 mg by mouth two (2) times a day. (Patient taking differently: Take 600 mg by mouth two (2) times a day.) 60 Cap 3    acetaminophen (TYLENOL EXTRA STRENGTH) 500 mg tablet Take 500 mg by mouth every six (6) hours as needed for Pain.  LORazepam (ATIVAN) 1 mg tablet Take  by mouth two (2) times daily as needed for Anxiety.  rOPINIRole (REQUIP) 1 mg tablet Take 1 mg by mouth two (2) times a day.  OXYGEN-AIR DELIVERY SYSTEMS 2 L by Does Not Apply route. O2 via nasal cannula with bedtime and activity. O2 Company: [employer]      montelukast (SINGULAIR) 10 mg tablet Take 1 Tab by mouth daily. (Patient taking differently: Take 10 mg by mouth every other day.) 30 Tab 3    therapeutic multivitamin (THERAGRAN) tablet Take 1 Tab by mouth daily. 30 Tab 11     No current facility-administered medications on file prior to visit.       Allergies   Allergen Reactions    Anoro Ellipta [Umeclidinium-Vilanterol] Rash and Other (comments)     Muscle cramps in arms    Aspirin Other (comments)     GI upset and bleeding    Augmentin [Amoxicillin-Pot Clavulanate] Not Reported This Time     Possible cramping    Doxycycline Nausea and Vomiting    Morphine Other (comments)     Neurologic symptoms - severe agitation      Shellfish Derived Itching     Pt able to eat small amounts per report     Sulfa (Sulfonamide Antibiotics) Rash     Patient relates no longer allergic    Alexus Cavazos [Fluticasone Propion-Salmeterol] Other (comments)     Mouth Sores     Social History     Socioeconomic History    Marital status:      Spouse name: Not on file    Number of children: Not on file    Years of education: Not on file    Highest education level: Not on file   Occupational History    Not on file   Social Needs    Financial resource strain: Not on file    Food insecurity:     Worry: Not on file     Inability: Not on file    Transportation needs:     Medical: Not on file     Non-medical: Not on file   Tobacco Use    Smoking status: Former Smoker     Packs/day: 0.50     Years: 50.00     Pack years: 25.00     Types: Cigarettes     Start date: 10/21/1964     Last attempt to quit: 10/20/2018     Years since quittin.2    Smokeless tobacco: Never Used   Substance and Sexual Activity    Alcohol use: Never     Frequency: Never    Drug use: No    Sexual activity: Not on file   Lifestyle    Physical activity:     Days per week: Not on file     Minutes per session: Not on file    Stress: Not on file   Relationships    Social connections:     Talks on phone: Not on file     Gets together: Not on file     Attends Yarsanism service: Not on file     Active member of club or organization: Not on file     Attends meetings of clubs or organizations: Not on file     Relationship status: Not on file    Intimate partner violence:     Fear of current or ex partner: Not on file     Emotionally abused: Not on file     Physically abused: Not on file     Forced sexual activity: Not on file   Other Topics Concern    Not on file   Social History Narrative    Not on file     Blood pressure 132/84, pulse 85, temperature 96.3 °F (35.7 °C), temperature source Oral, resp. rate 18, height 5' 7\" (1.702 m), weight 52.8 kg (116 lb 6.4 oz), SpO2 94 %. Physical Exam   Constitutional: She is oriented to person, place, and time. She appears well-developed. No distress. Asthenic    HENT:   Head: Normocephalic and atraumatic. Nose: Nose normal.   Mouth/Throat: No oropharyngeal exudate. Eyes: Pupils are equal, round, and reactive to light.  Conjunctivae and EOM are normal. Right eye exhibits no discharge. Left eye exhibits no discharge. No scleral icterus. Neck: No JVD present. No tracheal deviation present. No thyromegaly present. Cardiovascular: Normal rate, regular rhythm, normal heart sounds and intact distal pulses. Exam reveals no gallop. No murmur heard. Pulmonary/Chest: Effort normal. No stridor. No respiratory distress. She has no wheezes. She has no rales. She exhibits no tenderness. Distant breath sounds ,  Transmitted upper airway sounds   Abdominal: Soft. She exhibits no mass. There is no tenderness. There is no rebound. Ileostomy    Musculoskeletal:         General: No tenderness, deformity or edema. Lymphadenopathy:     She has no cervical adenopathy. Neurological: She is alert and oriented to person, place, and time. Coordination normal.   Skin: Skin is warm and dry. No rash noted. She is not diaphoretic. No erythema. No pallor. Small ecchymoses on both upper and lower extremities   Psychiatric: She has a normal mood and affect. Her behavior is normal. Judgment and thought content normal.     CT Results (most recent):  Results from Hospital Encounter encounter on 01/02/20   CT HEAD WO CONT    Narrative EXAM: CT of the Head without contrast    INDICATION:  fall leg weakness. TECHNIQUE: CT of the head from the vertex to the skull base performed. No IV  contrast administered. All CT scans at this facility are performed using dose optimization technique as  appropriate to a performed exam, to include automated exposure control,  adjustment of the mA and/or kV according to patient size (including appropriate  matching for site specific examination) or use of iterative reconstruction  technique. COMPARISON: None. FINDINGS: No evidence of acute intra-axial or extra-axial hemorrhage. The  ventricles and sulci are symmetric and mildly prominent. Mild periventricular  hypodensities are noted.  There is tonsillar ectopia with the tonsils extending 4  mm below the foramen magnum. No evidence to suggest hydrocephalus. No midline  shift, mass effect or mass lesion appreciated. The gray-white junction is  preserved. No evidence of an acute infarct identified. The mastoid air cells are  well aerated. The paranasal sinuses are unremarkable. The orbits are normal. The  scalp and skull are unremarkable. Impression IMPRESSION:  1. No acute intracranial process identified. If continued clinical concern for  acute ischemia, consider MR for further evaluation. 2.  Cerebellar tonsillar ectopia. ASSESSMENT and PLAN  Encounter Diagnoses   Name Primary?  COPD, severe ([address] Yes    Hypoxemia requiring supplemental oxygen     Incisional hernia, without obstruction or gangrene     Current chronic use of systemic steroids     Cachexia (HCC)     Pre-operative clearance        Pt with severe COPD on LTOT no recent exacerbations or pulmonary infections. She is maintained on chronic low dose Prednisone despite multiple attempts to taper off steroids. She has failed prior trials of Azithromycin suppression and Daliresp. Pt is at intermediate to high risk of post op pulmonary complications with ARSICAT score of 42, risks however are elevated to High if surgery site includes the upper abdomen as ARISCAT score increases by 16 points. However she is well aware of this and at this point benefits of surgery outweigh the risks. Following are my recommendations:     Preoperative Solucortef in stress dose amounts. Further stress dose steroids would depend on BP response but would anticipate returning to her usual dose of Prednisone post op. Supplemental O2 to maintain saturation greater than 90%. Encourage incentive spirometry and aggressive bronchial hygiene as well as early mobilization. Continue maintenance therapy as listed above and rescue Albuterol. Continue NAC nebulization and Tessalon perles prn. RTC 3 months.

## 2020-02-14 RX ORDER — PREDNISONE 10 MG/1
10 TABLET ORAL
Qty: 100 TAB | Refills: 0 | Status: SHIPPED | OUTPATIENT
Start: 2020-02-14 | End: 2020-06-05 | Stop reason: SDUPTHER

## 2020-02-14 NOTE — TELEPHONE ENCOUNTER
Pt daughter states pt needs refill for Prednisone to be sent to [address] [place] [address] Rd. Please advise [phone].

## 2020-02-24 ENCOUNTER — TELEPHONE (OUTPATIENT)
Dept: PULMONOLOGY | Age: 75
End: 2020-02-24

## 2020-02-24 NOTE — TELEPHONE ENCOUNTER
Pt requesting Diflucan for sore mouth/tounge. Wanting to be sent to [address] [place] [address] Rd. Please advise [phone].

## 2020-02-25 RX ORDER — FLUCONAZOLE 200 MG/1
200 TABLET ORAL DAILY
Qty: 7 TAB | Refills: 0 | Status: SHIPPED | OUTPATIENT
Start: 2020-02-25 | End: 2020-03-03

## 2020-03-27 ENCOUNTER — TELEPHONE (OUTPATIENT)
Dept: PULMONOLOGY | Age: 75
End: 2020-03-27

## 2020-03-27 NOTE — TELEPHONE ENCOUNTER
Pt states she is congested. Feels she has beginnings of URI. Advised pt to go to urgent care to be evaluated. She doesn't want to go. I advised she could check in and then go back to car to wait. She said if she feels worse she will go.  No fever

## 2020-03-27 NOTE — TELEPHONE ENCOUNTER
Pt states her chest feels really full and thinks she may have a UTI. Requesting medication. Please advise [phone].

## 2020-03-30 RX ORDER — DOXYCYCLINE 100 MG/1
100 CAPSULE ORAL 2 TIMES DAILY
Qty: 20 CAP | Refills: 0 | Status: SHIPPED | OUTPATIENT
Start: 2020-03-30 | End: 2020-07-16

## 2020-03-30 NOTE — TELEPHONE ENCOUNTER
Spoke with pt. She did not go to urgent care. She is still congested and coughing. Now this am her temp is 99.6. Coughing up greenish sputum.  [facility] [address]

## 2020-04-06 ENCOUNTER — TELEPHONE (OUTPATIENT)
Dept: PULMONOLOGY | Age: 75
End: 2020-04-06

## 2020-04-06 RX ORDER — CLOTRIMAZOLE 10 MG/1
LOZENGE ORAL; TOPICAL
Qty: 50 TAB | Refills: 0 | Status: SHIPPED | OUTPATIENT
Start: 2020-04-06 | End: 2021-02-11 | Stop reason: ALTCHOICE

## 2020-04-06 NOTE — TELEPHONE ENCOUNTER
Pt's daughter called(). Pt has yeast infection due to antibiotic and wants to know if Dr Tyesha Barrios will send script to [facility] [phone].

## 2020-04-06 NOTE — TELEPHONE ENCOUNTER
Per patient she started with the thrush in month and now having itching in her vaginal area also.  Pt requesting yeast/thrush medication

## 2020-06-05 RX ORDER — ACETYLCYSTEINE 600 MG
CAPSULE ORAL
Qty: 60 CAP | Refills: 5 | Status: SHIPPED | OUTPATIENT
Start: 2020-06-05 | End: 2021-02-11 | Stop reason: ALTCHOICE

## 2020-06-05 RX ORDER — PREDNISONE 10 MG/1
10 TABLET ORAL
Qty: 100 TAB | Refills: 0 | Status: SHIPPED | OUTPATIENT
Start: 2020-06-05 | End: 2020-10-01 | Stop reason: SDUPTHER

## 2020-06-05 NOTE — TELEPHONE ENCOUNTER
Pt's daughter called([phone]). Needs new script for her Prednisone sent to [facility] [phone]. She is almost out.

## 2020-06-15 ENCOUNTER — HOSPITAL ENCOUNTER (OUTPATIENT)
Dept: LAB | Age: 75
Discharge: HOME | End: 2020-06-15
Payer: MEDICARE

## 2020-06-15 DIAGNOSIS — E78.2 MIXED HYPERLIPIDEMIA: ICD-10-CM

## 2020-06-15 LAB
ALBUMIN SERPL-MCNC: 3.6 G/DL (ref 3.4–5)
ALBUMIN/GLOB SERPL: 1.2 {RATIO} (ref 0.8–1.7)
ALP SERPL-CCNC: 81 U/L (ref 45–117)
ALT SERPL-CCNC: 20 U/L (ref 13–56)
ANION GAP SERPL CALC-SCNC: 5 MMOL/L (ref 3–18)
AST SERPL-CCNC: 17 U/L (ref 10–38)
BASOPHILS # BLD: 0 K/UL (ref 0–0.1)
BASOPHILS NFR BLD: 0 % (ref 0–2)
BILIRUB SERPL-MCNC: 0.6 MG/DL (ref 0.2–1)
BUN SERPL-MCNC: 14 MG/DL (ref 7–18)
BUN/CREAT SERPL: 19 (ref 12–20)
CALCIUM SERPL-MCNC: 8.9 MG/DL (ref 8.5–10.1)
CHLORIDE SERPL-SCNC: 103 MMOL/L (ref 100–111)
CHOLEST SERPL-MCNC: 177 MG/DL
CO2 SERPL-SCNC: 32 MMOL/L (ref 21–32)
CREAT SERPL-MCNC: 0.75 MG/DL (ref 0.6–1.3)
DIFFERENTIAL METHOD BLD: ABNORMAL
EOSINOPHIL # BLD: 0.2 K/UL (ref 0–0.4)
EOSINOPHIL NFR BLD: 2 % (ref 0–5)
ERYTHROCYTE [DISTWIDTH] IN BLOOD BY AUTOMATED COUNT: 13.5 % (ref 11.6–14.5)
GLOBULIN SER CALC-MCNC: 2.9 G/DL (ref 2–4)
GLUCOSE SERPL-MCNC: 81 MG/DL (ref 74–99)
HCT VFR BLD AUTO: 39.3 % (ref 35–45)
HDLC SERPL-MCNC: 82 MG/DL (ref 40–60)
HDLC SERPL: 2.2 {RATIO} (ref 0–5)
HGB BLD-MCNC: 12.8 G/DL (ref 12–16)
LDLC SERPL CALC-MCNC: 79.6 MG/DL (ref 0–100)
LIPID PROFILE,FLP: ABNORMAL
LYMPHOCYTES # BLD: 2.2 K/UL (ref 0.9–3.6)
LYMPHOCYTES NFR BLD: 33 % (ref 21–52)
MCH RBC QN AUTO: 31.9 PG (ref 24–34)
MCHC RBC AUTO-ENTMCNC: 32.6 G/DL (ref 31–37)
MCV RBC AUTO: 98 FL (ref 74–97)
MONOCYTES # BLD: 0.8 K/UL (ref 0.05–1.2)
MONOCYTES NFR BLD: 12 % (ref 3–10)
NEUTS SEG # BLD: 3.6 K/UL (ref 1.8–8)
NEUTS SEG NFR BLD: 53 % (ref 40–73)
PLATELET # BLD AUTO: 352 K/UL (ref 135–420)
PMV BLD AUTO: 9.9 FL (ref 9.2–11.8)
POTASSIUM SERPL-SCNC: 3.8 MMOL/L (ref 3.5–5.5)
PROT SERPL-MCNC: 6.5 G/DL (ref 6.4–8.2)
RBC # BLD AUTO: 4.01 M/UL (ref 4.2–5.3)
SODIUM SERPL-SCNC: 140 MMOL/L (ref 136–145)
TRIGL SERPL-MCNC: 77 MG/DL (ref ?–150)
TSH SERPL DL<=0.05 MIU/L-ACNC: 0.94 UIU/ML (ref 0.36–3.74)
VLDLC SERPL CALC-MCNC: 15.4 MG/DL
WBC # BLD AUTO: 6.7 K/UL (ref 4.6–13.2)

## 2020-06-15 PROCEDURE — 84443 ASSAY THYROID STIM HORMONE: CPT

## 2020-06-15 PROCEDURE — 80061 LIPID PANEL: CPT

## 2020-06-15 PROCEDURE — 36415 COLL VENOUS BLD VENIPUNCTURE: CPT

## 2020-06-15 PROCEDURE — 85025 COMPLETE CBC W/AUTO DIFF WBC: CPT

## 2020-06-15 PROCEDURE — 80053 COMPREHEN METABOLIC PANEL: CPT

## 2020-06-18 ENCOUNTER — HOSPITAL ENCOUNTER (OUTPATIENT)
Dept: CT IMAGING | Age: 75
Discharge: HOME | End: 2020-06-18
Attending: INTERNAL MEDICINE
Payer: MEDICARE

## 2020-06-18 DIAGNOSIS — C18.0 MALIGNANT NEOPLASM OF CECUM (HCC): ICD-10-CM

## 2020-06-18 PROCEDURE — 74177 CT ABD & PELVIS W/CONTRAST: CPT

## 2020-06-18 PROCEDURE — 74011636320 HC RX REV CODE- 636/320: Performed by: INTERNAL MEDICINE

## 2020-06-18 RX ADMIN — IOPAMIDOL 80 ML: 612 INJECTION, SOLUTION INTRAVENOUS at 09:13

## 2020-07-10 NOTE — TELEPHONE ENCOUNTER
Spoke with pt. She is having swelling in both feet and hands. Left hand worse then right x 4 days. Increase sob also.  Pt scared to go to hospital and thinks a fluid pill will help

## 2020-07-10 NOTE — TELEPHONE ENCOUNTER
Pts dtr called asking if Dr. Melissa Mesa would give pt a fluid pill. She said that pt's hands and feet are swollen and that Dr. Melissa Mesa has called it in for her before. Uses [facility] on [address].  Please call [phone]

## 2020-07-16 ENCOUNTER — OFFICE VISIT (OUTPATIENT)
Dept: CARDIOLOGY CLINIC | Age: 75
End: 2020-07-16

## 2020-07-16 VITALS
TEMPERATURE: 97.5 F | OXYGEN SATURATION: 96 % | WEIGHT: 120 LBS | DIASTOLIC BLOOD PRESSURE: 56 MMHG | BODY MASS INDEX: 18.83 KG/M2 | SYSTOLIC BLOOD PRESSURE: 101 MMHG | HEART RATE: 82 BPM | HEIGHT: 67 IN

## 2020-07-16 DIAGNOSIS — I50.32 CHRONIC DIASTOLIC CONGESTIVE HEART FAILURE (HCC): Primary | ICD-10-CM

## 2020-07-16 DIAGNOSIS — I34.0 NON-RHEUMATIC MITRAL REGURGITATION: ICD-10-CM

## 2020-07-16 DIAGNOSIS — J44.9 COPD, SEVERE (HCC): ICD-10-CM

## 2020-07-16 DIAGNOSIS — I27.20 PULMONARY HTN (HCC): ICD-10-CM

## 2020-07-16 NOTE — PATIENT INSTRUCTIONS
Medications Discontinued During This Encounter   Medication Reason    acetylcysteine 500 mg cap     doxycycline (MONODOX) 100 mg capsule     montelukast (SINGULAIR) 10 mg tablet     omeprazole magnesium (PRILOSEC PO)           Avoiding Triggers With Heart Failure: Care Instructions  Your Care Instructions     Triggers are anything that make your heart failure flare up. A flare-up is also called \"sudden heart failure\" or \"acute heart failure. \" When you have a flare-up, fluid builds up in your lungs, and you have problems breathing. You might need to go to the hospital. By watching for changes in your condition and avoiding triggers, you can prevent heart failure flare-ups. Follow-up care is a key part of your treatment and safety. Be sure to make and go to all appointments, and call your doctor if you are having problems. It's also a good idea to know your test results and keep a list of the medicines you take. How can you care for yourself at home? Watch for changes in your weight and condition  · Weigh yourself without clothing at the same time each day. Record your weight. Call your doctor if you have sudden weight gain, such as more than 2 to 3 pounds in a day or 5 pounds in a week. (Your doctor may suggest a different range of weight gain.) A sudden weight gain may mean that your heart failure is getting worse. · Keep a daily record of your symptoms. Write down any changes in how you feel, such as new shortness of breath, cough, or problems eating. Also record if your ankles are more swollen than usual and if you feel more tired than usual. Note anything that you ate or did that could have triggered these changes. Limit sodium  Sodium causes your body to hold on to extra water. This may cause your heart failure symptoms to get worse. People get most of their sodium from processed foods. Fast food and restaurant meals also tend to be very high in sodium. · Your doctor may suggest that you limit sodium. Your doctor can tell you how much sodium is right for you. This includes limiting sodium in cooked and packaged foods. · Read food labels on cans and food packages. They tell you how much sodium you get in one serving. Check the serving size. If you eat more than one serving, you are getting more sodium. · Be aware that sodium can come in forms other than salt, including monosodium glutamate (MSG), sodium citrate, and sodium bicarbonate (baking soda). MSG is often added to Asian food. You can sometimes ask for food without MSG or salt. · Slowly reducing salt will help you adjust to the taste. Take the salt shaker off the table. · Flavor your food with garlic, lemon juice, onion, vinegar, herbs, and spices instead of salt. Do not use soy sauce, steak sauce, onion salt, garlic salt, mustard, or ketchup on your food, unless it is labeled \"low-sodium\" or \"low-salt. \"  · Make your own salad dressings, sauces, and ketchup without adding salt. · Use fresh or frozen ingredients, instead of canned ones, whenever you can. Choose low-sodium canned goods. · Eat less processed food and food from restaurants, including fast food. Exercise as directed  Moderate, regular exercise is very good for your heart. It improves your blood flow and helps control your weight. But too much exercise can stress your heart and cause a heart failure flare-up. · Check with your doctor before you start an exercise program.  · Walking is an easy way to get exercise. Start out slowly. Gradually increase the length and pace of your walk. Swimming, riding a bike, and using a treadmill are also good forms of exercise. · When you exercise, watch for signs that your heart is working too hard. You are pushing yourself too hard if you cannot talk while you are exercising. If you become short of breath or dizzy or have chest pain, stop, sit down, and rest.  · Do not exercise when you do not feel well.   Take medicines correctly  · Take your medicines exactly as prescribed. Call your doctor if you think you are having a problem with your medicine. · Make a list of all the medicines you take. Include those prescribed to you by other doctors and any over-the-counter medicines, vitamins, or supplements you take. Take this list with you when you go to any doctor. · Take your medicines at the same time every day. It may help you to post a list of all the medicines you take every day and what time of day you take them. · Make taking your medicine as simple as you can. Plan times to take your medicines when you are doing other things, such as eating a meal or getting ready for bed. This will make it easier to remember to take your medicines. · Get organized. Use helpful tools, such as daily or weekly pill containers. When should you call for help? Call 911 if you have symptoms of sudden heart failure such as:  · You have severe trouble breathing. · You cough up pink, foamy mucus. · You have a new irregular or rapid heartbeat. Call your doctor now or seek immediate medical care if:  · You have new or increased shortness of breath. · You are dizzy or lightheaded, or you feel like you may faint. · You have sudden weight gain, such as more than 2 to 3 pounds in a day or 5 pounds in a week. (Your doctor may suggest a different range of weight gain.)  · You have increased swelling in your legs, ankles, or feet. · You are suddenly so tired or weak that you cannot do your usual activities. Watch closely for changes in your health, and be sure to contact your doctor if you develop new symptoms. Where can you learn more? Go to http://robert-jorge.info/  Enter V089 in the search box to learn more about \"Avoiding Triggers With Heart Failure: Care Instructions. \"  Current as of: December 16, 2019               Content Version: 12.5  © 6503-1810 [employer].    Care instructions adapted under license by [employer] (which disclaims liability or warranty for this information). If you have questions about a medical condition or this instruction, always ask your healthcare professional. [address] any warranty or liability for your use of this information.

## 2020-07-16 NOTE — PROGRESS NOTES
Patient didn't bring medications, verbally reviewed. 1. Have you been to the ER, urgent care clinic since your last visit? Hospitalized since your last visit? Yes When: 01/02/2020 Where: [facility] Reason for visit: Fall/Injury    2. Have you seen or consulted any other health care providers outside of the [address] [place] [address] since your last visit? Include any pap smears or colon screening. Yes Where: Oncology/PCP Reason for visit: Routine Visit     3. Since your last visit, have you had any of the following symptoms? chest pains, palpitations, shortness of breath, dizziness and swelling in legs/arms. 4.  Have you had any blood work, X-rays or cardiac testing? Yes Where: [facility] Reason for visit: Labs/CT Scan     Requested: NO     In [facility]: YES    5. Where do you normally have your labs drawn? [facility]    6. Do you need any refills today?    No

## 2020-07-16 NOTE — PROGRESS NOTES
HISTORY OF PRESENT ILLNESS  Dre Rivero is a 70 y.o. female. 7/20 describes Otis phenomenon and hands. Happening for years. Shortness of Breath   The history is provided by the patient. This is a chronic problem. The problem occurs intermittently. The current episode started more than 1 week ago (yrs). The problem has been gradually worsening (few months). Associated symptoms include chest pain and leg swelling. Pertinent negatives include no fever, no headaches, no cough, no wheezing, no PND, no orthopnea, no vomiting, no rash and no claudication. The problem's precipitants include exercise (home O2 2 LPM 1 block; 7/20 room to room; ). Chest Pain (Angina)    The history is provided by the patient. This is a recurrent problem. The current episode started more than 1 week ago. The problem occurs constantly (Intermittent worsening). The pain is associated with breathing and coughing (when SOB). The pain is present in the lateral region and left side. The quality of the pain is described as heavy. The pain does not radiate. Associated symptoms include dizziness, lower extremity edema and shortness of breath. Pertinent negatives include no claudication, no cough, no fever, no headaches, no malaise/fatigue, no nausea, no orthopnea, no palpitations, no PND and no vomiting. Leg Swelling   The history is provided by the patient. This is a new problem. The current episode started more than 1 week ago (yrs). The problem has not changed since onset. Associated symptoms include chest pain and shortness of breath. Pertinent negatives include no headaches. The symptoms are aggravated by standing. The symptoms are relieved by sleep. Dizziness   The history is provided by the patient. This is a new problem. The current episode started more than 1 week ago. The problem has not changed since onset. Associated symptoms include chest pain and shortness of breath. Pertinent negatives include no headaches.  The symptoms are aggravated by twisting. Valvular Heart Disease   History provided by:  Associated symptoms include chest pain and shortness of breath. Pertinent negatives include no headaches. Review of Systems   Constitutional: Negative for chills, fever, malaise/fatigue and weight loss. HENT: Negative for nosebleeds. Eyes: Negative for discharge. Respiratory: Positive for shortness of breath. Negative for cough and wheezing. Cardiovascular: Positive for chest pain and leg swelling. Negative for palpitations, orthopnea, claudication and PND. Gastrointestinal: Negative for diarrhea, nausea and vomiting. Genitourinary: Negative for dysuria and hematuria. Musculoskeletal: Negative for joint pain. Skin: Negative for rash. Neurological: Positive for dizziness. Negative for seizures, loss of consciousness and headaches. Endo/Heme/Allergies: Negative for polydipsia. Does not bruise/bleed easily. Psychiatric/Behavioral: Negative for depression and substance abuse. The patient does not have insomnia.       Allergies   Allergen Reactions    Anoro Ellipta [Umeclidinium-Vilanterol] Rash and Other (comments)     Muscle cramps in arms    Aspirin Other (comments)     GI upset and bleeding    Augmentin [Amoxicillin-Pot Clavulanate] Not Reported This Time     Possible cramping    Doxycycline Nausea and Vomiting    Morphine Other (comments)     Neurologic symptoms - severe agitation      Shellfish Derived Itching     Pt able to eat small amounts per report     Sulfa (Sulfonamide Antibiotics) Rash     Patient relates no longer allergic    Lynn Chess [Fluticasone Propion-Salmeterol] Other (comments)     Mouth Sores       Past Medical History:   Diagnosis Date    Anxiety     Arthritis     [facility]bestosis [facility] [address]     Asthma     Back problem     Baker's cyst     Balance problems     Chronic lung disease     Cigarette smoker     Clotting disorder ([facility] [address]     COPD (chronic obstructive pulmonary disease) (HCC) oxygen 2/liters asbestosis    Depression     History of DVT (deep vein thrombosis)     Leg pain     Right    Lung disease     Nausea & vomiting     Osteoporosis     Restless leg syndrome     Spinal stenosis     Thromboembolus (HCC)     Vitamin D deficiency        Family History   Problem Relation Age of Onset    Cancer Father     Heart Disease Mother     Hypertension Mother     Cancer Brother     Cancer Sister     Diabetes Other     Hypertension Other     Stroke Other     Heart Disease Sister     Hypertension Sister     Heart Disease Brother        Social History     Tobacco Use    Smoking status: Former Smoker     Packs/day: 0.50     Years: 50.00     Pack years: 25.00     Types: Cigarettes     Start date: 10/21/1964     Last attempt to quit: 10/20/2018     Years since quittin.7    Smokeless tobacco: Never Used   Substance Use Topics    Alcohol use: Never     Frequency: Never    Drug use: No        Current Outpatient Medications   Medication Sig    acetylcysteine 600 mg cap capsule take 1 capsule by mouth twice a day    predniSONE (DELTASONE) 10 mg tablet Take 10 mg by mouth daily (with breakfast).  clotrimazole (MYCELEX) 10 mg keiry Dissolve in mouth 5 times daily (Patient taking differently: as needed. Dissolve in mouth 5 times daily)    albuterol (PROVENTIL HFA, VENTOLIN HFA, PROAIR HFA) 90 mcg/actuation inhaler Take 1 Puff by inhalation every four (4) hours as needed for Wheezing.  tiotropium bromide (SPIRIVA RESPIMAT IN) Take 2 Inhalation by inhalation daily.  albuterol (PROVENTIL VENTOLIN) 2.5 mg /3 mL (0.083 %) nebulizer solution 3 mL by Nebulization route every four (4) hours as needed for Wheezing or Shortness of Breath. ICD: J44.9, R09.02 file under Medicare Part B    fluticasone propion-salmeterol (ADVAIR DISKUS) 250-50 mcg/dose diskus inhaler Take 1 Puff by inhalation two (2) times a day.  BRAND NAME ONLY    simethicone (GAS-X) 80 mg chewable tablet Take 80 mg by mouth every six (6) hours as needed for Flatulence.  therapeutic multivitamin (THERAGRAN) tablet Take 1 Tab by mouth daily.  acetaminophen (TYLENOL EXTRA STRENGTH) 500 mg tablet Take 500 mg by mouth every six (6) hours as needed for Pain.  LORazepam (ATIVAN) 1 mg tablet Take  by mouth two (2) times daily as needed for Anxiety.  rOPINIRole (REQUIP) 1 mg tablet Take 1 mg by mouth nightly.  OXYGEN-AIR DELIVERY SYSTEMS 2 L by Does Not Apply route. O2 via nasal cannula with bedtime and activity. O2 Company: [email or web address]     No current facility-administered medications for this visit. Past Surgical History:   Procedure Laterality Date    COLONOSCOPY N/A 9/22/2018    COLONOSCOPY w bx w polypectonies performed by Trudell Claude, MD at [facility] N/A 11/6/2018    COLONOSCOPY performed by Victor M Sommer MD at [address] [place] [address] [address] COLONOSCOPY N/A 8/2/2019    COLONOSCOPY with polypectomies and biopsies performed by Carlyle Proctor MD at [address] HX APPENDECTOMY      HX BACK SURGERY      x4    HX BREAST BIOPSY      left    HX GI      exp lap and ileostomy    HX HEENT      cataract right    HX HYSTERECTOMY      HX LUMBAR LAMINECTOMY      HX MENISCUS REPAIR      HX ORTHOPAEDIC      Knee bakers cyst    HX POLYPECTOMY      right colectomy    HX TONSILLECTOMY      HX VEIN STRIPPING      LAP,SURG,COLECTOMY, PARTIAL, W/ANAST N/A 10/23/2018    Dr. Mounika Soriano N/A 11/06/2018    Dr. La Sherman      vein stripping       Visit Vitals  Temp 97.5 °F (36.4 °C) (Temporal)   Ht 5' 7\" (1.702 m)   Wt 54.4 kg (120 lb)   BMI 18.79 kg/m²       Diagnostic Studies:  I have reviewed the relevant tests done on the patient and show as follows  EKG tracings reviewed by me today.     EKG Results     Procedure 720 Value Units Date/Time    AMB POC EKG ROUTINE W/ 12 LEADS, INTER & REP [[id number]]     Order Status:  Sent XR Results (most recent):  Results from Hospital Encounter encounter on 01/02/20   XR WRIST RT AP/LAT/OBL MIN 3V    Narrative Examination: Right wrist, 3 views, and right hand, 3 views    History: Fall    Comparison: None    Findings: Alignment is anatomic. There is slight irregularity of the distal  aspect of the fifth metacarpal. Scaphoid is partially visualized but appears  grossly unremarkable. Bones are osteopenic. There are mild degenerative changes  of the first MCP joint. Impression Impression:  1. Age-indeterminate irregularity of the distal aspect of the fifth metacarpal.  No other definite acute fracture. 09/19/18   ECHO ADULT COMPLETE 09/23/2018 9/23/2018    Narrative · Estimated left ventricular ejection fraction is 61 - 65%. Visually   measured ejection fraction. Normal left ventricular wall motion, no   regional wall motion abnormality noted. Inconclusive left ventricular   diastolic function. · Moderate tricuspid valve regurgitation is present. Pulmonary arterial   systolic pressure is 43 mmHg. Mild pulmonary hypertension is present. · Mild mitral valve regurgitation. Signed by: Claudetta Passe, MD             9/18 echo  Interpretation Summary     · Estimated left ventricular ejection fraction is 61 - 65%. Visually measured ejection fraction. Normal left ventricular wall motion, no regional wall motion abnormality noted. Inconclusive left ventricular diastolic function. · Moderate tricuspid valve regurgitation is present. Pulmonary arterial systolic pressure is 43 mmHg. Mild pulmonary hypertension is present. · Mild mitral valve regurgitation. Cardiac catheterization 2009  CORONARY ANGIOGRAPHY:    Right Coronary Artery: The right coronary artery has an ectatic area at the acute margin; however, it smoothly tapers into and out of that. It has brisk flow and is not obstructive of flow at all. No other focal lesions are seen. Left Main Coronary Artery:  The left main coronary artery is normal but very short. Left Circumflex Coronary Artery: The left circumflex coronary artery is fairly large, non-dominant, and normal.      Left Anterior Descending Coronary Artery: The left anterior descending coronary artery and diagonals are normal.  Ms. Patricio Bush has a reminder for a \"due or due soon\" health maintenance. I have asked that she contact her primary care provider for follow-up on this health maintenance. Physical Exam   Constitutional: She is oriented to person, place, and time. She appears well-developed and well-nourished. No distress. Has O2 at 2LPM   HENT:   Head: Normocephalic and atraumatic. Mouth/Throat: Normal dentition. Eyes: Right eye exhibits no discharge. Left eye exhibits no discharge. No scleral icterus. Neck: Neck supple. No JVD present. Carotid bruit is not present. No thyromegaly present. Cardiovascular: Normal rate, regular rhythm, S1 normal, S2 normal, normal heart sounds and intact distal pulses. Exam reveals no gallop and no friction rub. No murmur heard. Pulmonary/Chest: Effort normal. She has wheezes (b/l mild diffuse). She has no rales. Abdominal: Soft. She exhibits no mass. There is no abdominal tenderness. Musculoskeletal:         General: Edema (trace) present. Comments: +varicose veins   Lymphadenopathy:        Right cervical: No superficial cervical adenopathy present. Left cervical: No superficial cervical adenopathy present. Neurological: She is alert and oriented to person, place, and time. Skin: Skin is warm and dry. No rash noted. Psychiatric: She has a normal mood and affect. Her behavior is normal.       ASSESSMENT and PLAN      Atrial fibrillation? ?  - seen in Telemetry on admission. Patient' s EKG showing NSR with PAC's, no afib seen on tele.   Echo on 9/18 showed  Preserved EF%   Atypical chest pain- report chest wall pain that last few seconds and sometimes triggered  by anxiety   Hypotension- on admission. BP is stable will monitor. 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 Tobacco abuse- Patient advised to stop smoking to reduce future cardiovascular events. per patient quit 3 weeks ago- 2019  Abnormal TSH- seen today by Dr Moises Reynoso    Use compression stockings preferably thigh-high but at least knee-high. Chest pain is atypical and noncardiac clinically. Stress test not being done at present due to continuous wheezing. Check echo to follow-up on pulmonary hypertension and MR. Patient advised not to overdo on salt because she has minimal edema today. Diagnoses and all orders for this visit:    1. Chronic diastolic congestive heart failure (HCC)    2. Pulmonary HTN (HCC)  -     AMB POC EKG ROUTINE W/ 12 LEADS, INTER & REP  -     ECHO ADULT COMPLETE; Future    3. Non-rheumatic mitral regurgitation  -     ECHO ADULT COMPLETE; Future    4. COPD, severe ([address]        Pertinent laboratory and test data reviewed and discussed with patient.   See patient instructions also for other medical advice given    Medications Discontinued During This Encounter   Medication Reason    acetylcysteine 500 mg cap     doxycycline (MONODOX) 100 mg capsule     montelukast (SINGULAIR) 10 mg tablet     omeprazole magnesium (PRILOSEC PO)        Follow-up and Dispositions    · Return in about 6 months (around 1/16/2021), or if symptoms worsen or fail to improve, for with ekg, post test.

## 2020-07-29 NOTE — TELEPHONE ENCOUNTER
Pt's daughter [phone]). Pt needs new prescription for Spiriva sent to [facility] mail order [phone].

## 2020-08-06 RX ORDER — ALBUTEROL SULFATE 90 UG/1
2 AEROSOL, METERED RESPIRATORY (INHALATION)
Qty: 1 INHALER | Refills: 5 | Status: SHIPPED | OUTPATIENT
Start: 2020-08-06 | End: 2021-04-27

## 2020-08-06 NOTE — TELEPHONE ENCOUNTER
Clara from [employer] [facility]([phone]). Pt needs refill for her Ventolin inhaler. Please check and send in.

## 2020-08-17 ENCOUNTER — OFFICE VISIT (OUTPATIENT)
Dept: PULMONOLOGY | Age: 75
End: 2020-08-17

## 2020-08-17 DIAGNOSIS — J44.9 COPD, SEVERE (HCC): Primary | ICD-10-CM

## 2020-08-18 ENCOUNTER — CLINICAL SUPPORT (OUTPATIENT)
Dept: FAMILY MEDICINE CLINIC | Facility: CLINIC | Age: 75
End: 2020-08-18

## 2020-08-18 ENCOUNTER — HOSPITAL ENCOUNTER (OUTPATIENT)
Dept: LAB | Age: 75
Discharge: HOME | End: 2020-08-18
Payer: MEDICARE

## 2020-08-18 DIAGNOSIS — J44.9 COPD, SEVERE (HCC): Primary | ICD-10-CM

## 2020-08-18 DIAGNOSIS — J44.9 COPD, SEVERE (HCC): ICD-10-CM

## 2020-08-18 PROCEDURE — 87635 SARS-COV-2 COVID-19 AMP PRB: CPT

## 2020-08-20 LAB — SARS-COV-2, COV2NT: NOT DETECTED

## 2020-08-24 ENCOUNTER — OFFICE VISIT (OUTPATIENT)
Dept: PULMONOLOGY | Age: 75
End: 2020-08-24

## 2020-08-24 VITALS
RESPIRATION RATE: 18 BRPM | HEART RATE: 90 BPM | SYSTOLIC BLOOD PRESSURE: 127 MMHG | BODY MASS INDEX: 19.24 KG/M2 | TEMPERATURE: 98 F | WEIGHT: 122.6 LBS | HEIGHT: 67 IN | OXYGEN SATURATION: 96 % | DIASTOLIC BLOOD PRESSURE: 70 MMHG

## 2020-08-24 DIAGNOSIS — R09.02 HYPOXEMIA REQUIRING SUPPLEMENTAL OXYGEN: ICD-10-CM

## 2020-08-24 DIAGNOSIS — Z85.038 HISTORY OF COLON CANCER: ICD-10-CM

## 2020-08-24 DIAGNOSIS — J44.9 PULMONARY CACHEXIA DUE TO COPD (HCC): ICD-10-CM

## 2020-08-24 DIAGNOSIS — J44.9 COPD, SEVERE (HCC): Primary | ICD-10-CM

## 2020-08-24 DIAGNOSIS — R64 PULMONARY CACHEXIA DUE TO COPD (HCC): ICD-10-CM

## 2020-08-24 DIAGNOSIS — Z99.81 HYPOXEMIA REQUIRING SUPPLEMENTAL OXYGEN: ICD-10-CM

## 2020-08-24 NOTE — PROGRESS NOTES
Ardyce Severance presents today for   Chief Complaint   Patient presents with    Results     Covid 19 Test        Is someone accompanying this pt? No    Is the patient using any DME equipment during OV? Oxygen    -DME Company [employer]    Depression Screening:  3 most recent PHQ Screens 1/6/2020   PHQ Not Done -   Little interest or pleasure in doing things Several days   Feeling down, depressed, irritable, or hopeless Several days   Total Score PHQ 2 2   Trouble falling or staying asleep, or sleeping too much -   Feeling tired or having little energy -   Poor appetite, weight loss, or overeating -   Feeling bad about yourself - or that you are a failure or have let yourself or your family down -   Trouble concentrating on things such as school, work, reading, or watching TV -   Moving or speaking so slowly that other people could have noticed; or the opposite being so fidgety that others notice -   Thoughts of being better off dead, or hurting yourself in some way -   PHQ 9 Score -   How difficult have these problems made it for you to do your work, take care of your home and get along with others -       Learning Assessment:  Learning Assessment 9/11/2017   PRIMARY LEARNER Patient   HIGHEST LEVEL OF EDUCATION - PRIMARY LEARNER  -   BARRIERS PRIMARY LEARNER -   PRIMARY LANGUAGE ENGLISH    NEED -   LEARNER PREFERENCE PRIMARY LISTENING     DEMONSTRATION   ANSWERED BY patient, Princess Noland     bsps   RELATIONSHIP SELF       Abuse Screening:  No flowsheet data found. Fall Risk  Fall Risk Assessment, last 12 mths 1/6/2020   Able to walk? Yes   Fall in past 12 months? Yes   Fall with injury? Yes   Number of falls in past 12 months 1   Fall Risk Score 2         Coordination of Care:  1. Have you been to the ER, urgent care clinic since your last visit? Hospitalized since your last visit? No    2.  Have you seen or consulted any other health care providers outside of the [address] [place] [address] [place] since your last visit? Include any pap smears or colon screening.  No

## 2020-08-24 NOTE — PROGRESS NOTES
HISTORY OF PRESENT ILLNESS  Shawn Baird is a 70 y.o. female with COPD. Pt has a history of colon cancer S/p R hemicolectomy and subsequent reversal of ileostomy in August 2019. Since then pt has complained of pain from an incisional hernia. Repair has been delayed indefinitely due to the pandemic. Pt has intermittent low grade fevers without a definite cause. Pt has severe COPD last FEV1 43% and is on long term O2. She has been on low dose Prednisone 10 mg and reports increased SOB and wheezing once off steroids. Pt complains of easy bruising and poor wound healing which she attributes to Prednisone. She also complains of pedal edema and vertigo. She has gained [place] few\" pounds since her surgery. 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 Pt complains of intermittent hemoptysis since 2013. Pt has quit smoking since hospital admission in 2018. Of note, pt was on Daliresp and Azithromycin for chronic suppression, both without  perceived benefit. COPD   The history is provided by the patient and relative. This is a chronic problem. The problem occurs daily. Progression since onset: waxing and waning. Pertinent negatives include no chest pain, no abdominal pain and no headaches. Shortness of breath: baseline. The symptoms are aggravated by exertion. The symptoms are relieved by medications. Treatments tried: see list. The treatment provided moderate relief. Results   Pertinent negatives include no chest pain, no abdominal pain and no headaches. Shortness of breath: baseline. Review of Systems   Constitutional: Negative for chills, diaphoresis, malaise/fatigue and weight loss. Fever: intermittent. HENT: Negative for congestion, ear discharge, ear pain, hearing loss, nosebleeds, sinus pain, sore throat and tinnitus.     Eyes: Negative for blurred vision, double vision, photophobia, pain, discharge and redness. Respiratory: Positive for wheezing. Negative for cough, sputum production and stridor. Hemoptysis: rare. Shortness of breath: baseline. Cardiovascular: Negative for chest pain, palpitations, orthopnea, claudication, leg swelling and PND. Gastrointestinal: Negative for abdominal pain, blood in stool, constipation, diarrhea, heartburn, melena, nausea and vomiting. Genitourinary: Negative for dysuria, flank pain, frequency, hematuria and urgency. Musculoskeletal: Positive for joint pain. Negative for back pain, falls, myalgias and neck pain. Skin: Negative for itching and rash. Neurological: Positive for dizziness. Negative for tingling, tremors, sensory change, speech change, focal weakness, seizures, loss of consciousness, weakness and headaches. Endo/Heme/Allergies: Negative for environmental allergies and polydipsia. Bruises/bleeds easily. Psychiatric/Behavioral: Positive for depression. Negative for hallucinations, memory loss, substance abuse and suicidal ideas. The patient is not nervous/anxious and does not have insomnia. Past Medical History:   Diagnosis Date    Anxiety     Arthritis     Asbestosis ([facility] [address]     Asthma     Back problem     Baker's cyst     Balance problems     Chronic lung disease     Cigarette smoker     Clotting disorder (HCC)     COPD (chronic obstructive pulmonary disease) ([facility])     oxygen 2/liters asbestosis    Depression     History of DVT (deep vein thrombosis)     Leg pain     Right    Lung disease     Nausea & vomiting     Osteoporosis     Restless leg syndrome     Spinal stenosis     Thromboembolus ([facility])     Vitamin D deficiency      Current Outpatient Medications on File Prior to Visit   Medication Sig Dispense Refill    albuterol (PROVENTIL HFA, VENTOLIN HFA, PROAIR HFA) 90 mcg/actuation inhaler Take 2 Puffs by inhalation every four (4) hours as needed for Wheezing or Shortness of Breath.  1 Inhaler 5    tiotropium bromide (Spiriva Respimat) 2.5 mcg/actuation inhaler Take 2 Puffs by inhalation daily. 3 Inhaler 3    predniSONE (DELTASONE) 10 mg tablet Take 10 mg by mouth daily (with breakfast). 100 Tab 0    albuterol (PROVENTIL VENTOLIN) 2.5 mg /3 mL (0.083 %) nebulizer solution 3 mL by Nebulization route every four (4) hours as needed for Wheezing or Shortness of Breath. ICD: J44.9, R09.02 file under Medicare Part B 300 Each 3    fluticasone propion-salmeterol (ADVAIR DISKUS) 250-50 mcg/dose diskus inhaler Take 1 Puff by inhalation two (2) times a day. BRAND NAME ONLY 3 Inhaler 3    simethicone (GAS-X) 80 mg chewable tablet Take 80 mg by mouth every six (6) hours as needed for Flatulence.  therapeutic multivitamin (THERAGRAN) tablet Take 1 Tab by mouth daily. 30 Tab 11    acetaminophen (TYLENOL EXTRA STRENGTH) 500 mg tablet Take 500 mg by mouth every six (6) hours as needed for Pain.  LORazepam (ATIVAN) 1 mg tablet Take  by mouth two (2) times daily as needed for Anxiety.  rOPINIRole (REQUIP) 1 mg tablet Take 1 mg by mouth nightly.  OXYGEN-AIR DELIVERY SYSTEMS 2 L by Does Not Apply route. O2 via nasal cannula with bedtime and activity. O2 Company: [employer]      acetylcysteine 600 mg cap capsule take 1 capsule by mouth twice a day 60 Cap 5    clotrimazole (MYCELEX) 10 mg keiry Dissolve in mouth 5 times daily (Patient taking differently: as needed. Dissolve in mouth 5 times daily) 50 Tab 0     No current facility-administered medications on file prior to visit.       Allergies   Allergen Reactions    Anoro Ellipta [Umeclidinium-Vilanterol] Rash and Other (comments)     Muscle cramps in arms    Aspirin Other (comments)     GI upset and bleeding    Augmentin [Amoxicillin-Pot Clavulanate] Not Reported This Time     Possible cramping    Doxycycline Nausea and Vomiting    Morphine Other (comments)     Neurologic symptoms - severe agitation      Shellfish Derived Itching     Pt able to eat small amounts per report     Sulfa (Sulfonamide Antibiotics) Rash     Patient relates no longer allergic    [place] Chess [Fluticasone Propion-Salmeterol] Other (comments)     Mouth Sores     Social History     Socioeconomic History    Marital status:      Spouse name: Not on file    Number of children: Not on file    Years of education: Not on file    Highest education level: Not on file   Occupational History    Not on file   Social Needs    Financial resource strain: Not on file    Food insecurity     Worry: Not on file     Inability: Not on file    Transportation needs     Medical: Not on file     Non-medical: Not on file   Tobacco Use    Smoking status: Former Smoker     Packs/day: 0.50     Years: 50.00     Pack years: 25.00     Types: Cigarettes     Start date: 10/21/1964     Last attempt to quit: 10/20/2018     Years since quittin.8    Smokeless tobacco: Never Used   Substance and Sexual Activity    Alcohol use: Never     Frequency: Never    Drug use: No    Sexual activity: Not on file   Lifestyle    Physical activity     Days per week: Not on file     Minutes per session: Not on file    Stress: Not on file   Relationships    Social connections     Talks on phone: Not on file     Gets together: Not on file     Attends Jain service: Not on file     Active member of club or organization: Not on file     Attends meetings of clubs or organizations: Not on file     Relationship status: Not on file    Intimate partner violence     Fear of current or ex partner: Not on file     Emotionally abused: Not on file     Physically abused: Not on file     Forced sexual activity: Not on file   Other Topics Concern    Not on file   Social History Narrative    Not on file     Blood pressure 127/70, pulse 90, temperature 98 °F (36.7 °C), temperature source Oral, resp. rate 18, height 5' 7\" (1.702 m), weight 55.6 kg (122 lb 9.6 oz), SpO2 96 %.          Physical Exam Constitutional: She is oriented to person, place, and time. She appears well-developed. No distress. Asthenic    HENT:   Head: Normocephalic and atraumatic. Nose: Nose normal.   Mouth/Throat: No oropharyngeal exudate. Eyes: Pupils are equal, round, and reactive to light. Conjunctivae and EOM are normal. Right eye exhibits no discharge. Left eye exhibits no discharge. No scleral icterus. Neck: No JVD present. No tracheal deviation present. No thyromegaly present. Cardiovascular: Normal rate, regular rhythm, normal heart sounds and intact distal pulses. Exam reveals no gallop. No murmur heard. Pulmonary/Chest: Effort normal. No stridor. No respiratory distress. Wheezes: mild left upper lung field. She has no rales. She exhibits no tenderness. Distant breath sounds    Abdominal: Soft. She exhibits no mass. There is no abdominal tenderness. There is no rebound. Stoma reversal scar   Musculoskeletal:         General: No tenderness, deformity or edema. Lymphadenopathy:     She has no cervical adenopathy. Neurological: She is alert and oriented to person, place, and time. Coordination normal.   Skin: Skin is warm and dry. No rash noted. She is not diaphoretic. No erythema. No pallor. Small ecchymoses and abrasions on both upper and lower extremities   Psychiatric: She has a normal mood and affect. Her behavior is normal. Judgment and thought content normal.     CT Results (most recent):  Results from Hospital Encounter encounter on 06/18/20   CT ABD PELV W CONT    Narrative CT of abdomen and pelvis with contrast     INDICATION: Malignant neoplasm of cecum    COMPARISON: CT 4/22/19    TECHNIQUE: 5 mm helical scan to the abdomen and pelvis is obtained  from the  diaphragm to the symphysis pubis after uneventful nonionic IV contrast  administration.     All CT scans at this facility performed using dose optimization techniques as  appreciated to a performed exam, to include automated exposure control,  adjustment of the mA and or KU according to patient size (including appropriate  matching for site specific examination), or use of iterative reconstruction  technique. FINDINGS:     Lung Bases: Emphysema. Clear for new finding. Liver: Normal.    Gallbladder: Normal.     Biliary System: No ductal dilatation. Spleen: Normal.    Pancreas: Normal.    Adrenal Glands: Normal.    Kidneys: Normal.    Bowel: The small and large bowel are nondilated. Partial proximal colectomy and  diverting ileostomy since the prior study. No evidence of bowel dilatation,  inflammation or definite mass lesion seen. Lower genitourinary system: The bladder is poorly distended. The uterus is  surgically absent. Peritoneum/Retroperitoneum: No adenopathy. Vasculature: Advanced atherosclerotic aortic disease. Other:  No free fluid. CT OSSEOUS STRUCTURES:       Significant osteopenia. Augmentation in L5 again noted. Exostosis at the  posterior lower L-spine. No suspicious bony lesion seen. Impression IMPRESSION:     1. Post partial proximal colectomy with diverting ileostomy. No bowel  dilatation, inflammation or definite mass lesion seen. 2. No adenopathy or definite metastasis. Thank you for this referral.        ASSESSMENT and PLAN  Encounter Diagnoses   Name Primary?  COPD, severe ([address] Yes    Hypoxemia requiring supplemental oxygen     Pulmonary cachexia due to COPD ([address]     History of colon cancer        Pt with severe COPD on LTOT without recent exacerbations or pulmonary infections. She is maintained on chronic low dose Prednisone despite multiple attempts to taper off steroids. She has failed prior trials of Azithromycin suppression and Daliresp. No new respiratory issues are identified and pt is at baseline. Pt will continue to benefit from supplemental O2 to maintain saturation greater than 90%. Continue LABA/LAMA/ICS and rescue Albuterol as written.   Continue NAC nebulization and Tessalon perles prn. RTC 6 months.

## 2020-10-01 RX ORDER — PREDNISONE 10 MG/1
10 TABLET ORAL
Qty: 100 TAB | Refills: 0 | Status: SHIPPED | OUTPATIENT
Start: 2020-10-01 | End: 2021-03-19 | Stop reason: SDUPTHER

## 2020-10-19 ENCOUNTER — HOSPITAL ENCOUNTER (OUTPATIENT)
Dept: BONE DENSITY | Age: 75
Discharge: HOME | End: 2020-10-19
Attending: FAMILY MEDICINE
Payer: MEDICARE

## 2020-10-19 ENCOUNTER — HOSPITAL ENCOUNTER (OUTPATIENT)
Dept: MAMMOGRAPHY | Age: 75
Discharge: HOME | End: 2020-10-19
Attending: FAMILY MEDICINE
Payer: MEDICARE

## 2020-10-19 DIAGNOSIS — Z78.0 MENOPAUSE: ICD-10-CM

## 2020-10-19 DIAGNOSIS — Z12.31 VISIT FOR SCREENING MAMMOGRAM: ICD-10-CM

## 2020-10-19 PROCEDURE — 77080 DXA BONE DENSITY AXIAL: CPT

## 2020-10-19 PROCEDURE — 77067 SCR MAMMO BI INCL CAD: CPT

## 2020-10-19 PROCEDURE — 77063 BREAST TOMOSYNTHESIS BI: CPT

## 2020-12-15 ENCOUNTER — TELEPHONE (OUTPATIENT)
Dept: PULMONOLOGY | Age: 75
End: 2020-12-15

## 2020-12-15 RX ORDER — BENZONATATE 100 MG/1
100 CAPSULE ORAL
Qty: 21 CAP | Refills: 0 | Status: SHIPPED | OUTPATIENT
Start: 2020-12-15 | End: 2020-12-22

## 2020-12-15 NOTE — TELEPHONE ENCOUNTER
Patient states she has tried the [facility] for her cough and that doesn't help. She found an old rx of Tessalon perls she had in past and that helps a lot.  Wants new rx

## 2021-02-08 ENCOUNTER — TRANSCRIBE ORDER (OUTPATIENT)
Dept: SCHEDULING | Age: 76
End: 2021-02-08

## 2021-02-08 DIAGNOSIS — C18.0 MALIGNANT NEOPLASM OF CECUM (HCC): Primary | ICD-10-CM

## 2021-02-11 ENCOUNTER — OFFICE VISIT (OUTPATIENT)
Dept: CARDIOLOGY CLINIC | Age: 76
End: 2021-02-11
Payer: MEDICARE

## 2021-02-11 VITALS
HEART RATE: 83 BPM | TEMPERATURE: 97.2 F | HEIGHT: 67 IN | BODY MASS INDEX: 18.27 KG/M2 | SYSTOLIC BLOOD PRESSURE: 124 MMHG | WEIGHT: 116.4 LBS | DIASTOLIC BLOOD PRESSURE: 73 MMHG

## 2021-02-11 DIAGNOSIS — C18.9 COLON CANCER WITHOUT DISTANT METASTASIS (HCC): ICD-10-CM

## 2021-02-11 DIAGNOSIS — J44.9 COPD, SEVERE (HCC): ICD-10-CM

## 2021-02-11 DIAGNOSIS — I08.0 MITRAL AND AORTIC REGURGITATION: ICD-10-CM

## 2021-02-11 DIAGNOSIS — I50.32 CHRONIC DIASTOLIC CONGESTIVE HEART FAILURE (HCC): ICD-10-CM

## 2021-02-11 DIAGNOSIS — I27.20 PULMONARY HTN (HCC): Primary | ICD-10-CM

## 2021-02-11 PROCEDURE — G9711 PT HX TOT COL OR COLON CA: HCPCS | Performed by: INTERNAL MEDICINE

## 2021-02-11 PROCEDURE — G9899 SCRN MAM PERF RSLTS DOC: HCPCS | Performed by: INTERNAL MEDICINE

## 2021-02-11 PROCEDURE — G8510 SCR DEP NEG, NO PLAN REQD: HCPCS | Performed by: INTERNAL MEDICINE

## 2021-02-11 PROCEDURE — 1101F PT FALLS ASSESS-DOCD LE1/YR: CPT | Performed by: INTERNAL MEDICINE

## 2021-02-11 PROCEDURE — G8536 NO DOC ELDER MAL SCRN: HCPCS | Performed by: INTERNAL MEDICINE

## 2021-02-11 PROCEDURE — 99214 OFFICE O/P EST MOD 30 MIN: CPT | Performed by: INTERNAL MEDICINE

## 2021-02-11 PROCEDURE — G8399 PT W/DXA RESULTS DOCUMENT: HCPCS | Performed by: INTERNAL MEDICINE

## 2021-02-11 PROCEDURE — 93000 ELECTROCARDIOGRAM COMPLETE: CPT | Performed by: INTERNAL MEDICINE

## 2021-02-11 PROCEDURE — G8427 DOCREV CUR MEDS BY ELIG CLIN: HCPCS | Performed by: INTERNAL MEDICINE

## 2021-02-11 PROCEDURE — G8419 CALC BMI OUT NRM PARAM NOF/U: HCPCS | Performed by: INTERNAL MEDICINE

## 2021-02-11 PROCEDURE — 1090F PRES/ABSN URINE INCON ASSESS: CPT | Performed by: INTERNAL MEDICINE

## 2021-02-11 NOTE — PROGRESS NOTES
HISTORY OF PRESENT ILLNESS  Nicki Romero is a 70 y.o. female. 7/20 describes Otis phenomenon and hands. Happening for years. Valvular Heart Disease  History provided by: [place] Associated symptoms include chest pain and shortness of breath. Pertinent negatives include no headaches. Shortness of Breath  The history is provided by the patient. This is a chronic problem. The problem occurs intermittently. The current episode started more than 1 week ago (yrs). The problem has been gradually worsening (few months). Associated symptoms include chest pain and leg swelling. Pertinent negatives include no fever, no headaches, no cough, no wheezing, no PND, no orthopnea, no vomiting, no rash and no claudication. The problem's precipitants include exercise (home O2 2 LPM 1 block; 7/20 room to room; ). Chest Pain (Angina)   The history is provided by the patient. This is a recurrent problem. The current episode started more than 1 week ago. The problem occurs constantly (Intermittent worsening). The pain is associated with breathing and coughing (when SOB). The pain is present in the lateral region and left side. The quality of the pain is described as heavy. The pain does not radiate. Associated symptoms include dizziness, lower extremity edema and shortness of breath. Pertinent negatives include no claudication, no cough, no fever, no headaches, no malaise/fatigue, no nausea, no orthopnea, no palpitations, no PND and no vomiting. Leg Swelling  The history is provided by the patient. This is a new problem. The current episode started more than 1 week ago (yrs). The problem has not changed since onset. Associated symptoms include chest pain and shortness of breath. Pertinent negatives include no headaches. The symptoms are aggravated by standing. The symptoms are relieved by sleep. Dizziness  The history is provided by the patient. This is a new problem. The current episode started more than 1 week ago.  The problem has not changed since onset. Associated symptoms include chest pain and shortness of breath. Pertinent negatives include no headaches. The symptoms are aggravated by twisting. Review of Systems   Constitutional: Negative for chills, fever, malaise/fatigue and weight loss. HENT: Negative for nosebleeds. Eyes: Negative for discharge. Respiratory: Positive for shortness of breath. Negative for cough and wheezing. Cardiovascular: Positive for chest pain and leg swelling. Negative for palpitations, orthopnea, claudication and PND. Gastrointestinal: Negative for diarrhea, nausea and vomiting. Genitourinary: Negative for dysuria and hematuria. Musculoskeletal: Negative for joint pain. Skin: Negative for rash. Neurological: Positive for dizziness. Negative for seizures, loss of consciousness and headaches. Endo/Heme/Allergies: Negative for polydipsia. Does not bruise/bleed easily. Psychiatric/Behavioral: Negative for depression and substance abuse. The patient does not have insomnia.       Allergies   Allergen Reactions    Anoro Ellipta [Umeclidinium-Vilanterol] Rash and Other (comments)     Muscle cramps in arms    Aspirin Other (comments)     GI upset and bleeding    Augmentin [Amoxicillin-Pot Clavulanate] Not Reported This Time     Possible cramping    Doxycycline Nausea and Vomiting    Morphine Other (comments)     Neurologic symptoms - severe agitation      Shellfish Derived Itching     Pt able to eat small amounts per report     Sulfa (Sulfonamide Antibiotics) Rash     Patient relates no longer allergic    Moira Fore [Fluticasone Propion-Salmeterol] Other (comments)     Mouth Sores       Past Medical History:   Diagnosis Date    Anxiety     Arthritis     [facility]osis [facility]     Asthma     Back problem     Baker's cyst     Balance problems     Chronic lung disease     Cigarette smoker     Clotting disorder ([facility]     COPD [facility]chronic obstructive pulmonary disease) ([facility]) oxygen 2/liters asbestosis    Depression     History of DVT (deep vein thrombosis)     Leg pain     Right    Lung disease     Nausea & vomiting     Osteoporosis     Restless leg syndrome     Spinal stenosis     Thromboembolus (HCC)     Vitamin D deficiency        Family History   Problem Relation Age of Onset    Cancer Father     Heart Disease Mother     Hypertension Mother     Cancer Brother     Cancer Sister     Diabetes Other     Hypertension Other     Stroke Other     Heart Disease Sister     Hypertension Sister     Heart Disease Brother        Social History     Tobacco Use    Smoking status: Former Smoker     Packs/day: 0.50     Years: 50.00     Pack years: 25.00     Types: Cigarettes     Start date: 10/21/1964     Quit date: 10/20/2018     Years since quittin.3    Smokeless tobacco: Never Used   Substance Use Topics    Alcohol use: Never     Frequency: Never    Drug use: No        Current Outpatient Medications   Medication Sig    predniSONE (DELTASONE) 10 mg tablet Take 10 mg by mouth daily (with breakfast).  albuterol (PROVENTIL HFA, VENTOLIN HFA, PROAIR HFA) 90 mcg/actuation inhaler Take 2 Puffs by inhalation every four (4) hours as needed for Wheezing or Shortness of Breath.  tiotropium bromide (Spiriva Respimat) 2.5 mcg/actuation inhaler Take 2 Puffs by inhalation daily.  albuterol (PROVENTIL VENTOLIN) 2.5 mg /3 mL (0.083 %) nebulizer solution 3 mL by Nebulization route every four (4) hours as needed for Wheezing or Shortness of Breath. ICD: J44.9, R09.02 file under Medicare Part B    fluticasone propion-salmeterol (ADVAIR DISKUS) 250-50 mcg/dose diskus inhaler Take 1 Puff by inhalation two (2) times a day. BRAND NAME ONLY    simethicone (GAS-X) 80 mg chewable tablet Take 80 mg by mouth every six (6) hours as needed for Flatulence.  therapeutic multivitamin (THERAGRAN) tablet Take 1 Tab by mouth daily.     acetaminophen (TYLENOL EXTRA STRENGTH) 500 mg tablet Take 500 mg by mouth every six (6) hours as needed for Pain.  LORazepam (ATIVAN) 1 mg tablet Take  by mouth two (2) times daily as needed for Anxiety.  rOPINIRole (REQUIP) 1 mg tablet Take 1 mg by mouth nightly.  OXYGEN-AIR DELIVERY SYSTEMS 2 L by Does Not Apply route. O2 via nasal cannula with bedtime and activity. O2 Company: [employer]     No current facility-administered medications for this visit. Past Surgical History:   Procedure Laterality Date    COLONOSCOPY N/A 9/22/2018    COLONOSCOPY w bx w polypectonies performed by Brittany Torres MD at [address] [facility] N/A 11/6/2018    COLONOSCOPY performed by Luci Falk MD at [address] [place] [address] COLONOSCOPY N/A 8/2/2019    COLONOSCOPY with polypectomies and biopsies performed by Shayy Ramírez MD at [address] HX APPENDECTOMY      HX BACK SURGERY      x4    HX BREAST BIOPSY      left    HX GI      exp lap and ileostomy    HX HEENT      cataract right    HX HYSTERECTOMY      HX LUMBAR LAMINECTOMY      HX MENISCUS REPAIR      HX ORTHOPAEDIC      Knee bakers cyst    HX POLYPECTOMY      right colectomy    HX TONSILLECTOMY      HX VEIN STRIPPING      TN LAP,SURG,COLECTOMY, PARTIAL, W/ANAST N/A 10/23/2018    Dr. Esha Quiroga N/A 11/06/2018    Dr. Edis Barnhart      vein stripping       Visit Vitals  Ht 5' 7\" (1.702 m)   Wt 52.8 kg (116 lb 6.4 oz)   BMI 18.23 kg/m²       Diagnostic Studies:  I have reviewed the relevant tests done on the patient and show as follows  EKG tracings reviewed by me today.     EKG Results     Procedure 720 Value Units Date/Time    AMB POC EKG ROUTINE W/ 12 LEADS, INTER & REP [[id number]]     Order Status: Sent         XR Results (most recent):  Results from Hospital Encounter encounter on 01/02/20   XR WRIST RT AP/LAT/OBL MIN 3V    Narrative Examination: Right wrist, 3 views, and right hand, 3 views    History: Fall    Comparison: None    Findings: Alignment is anatomic. There is slight irregularity of the distal  aspect of the fifth metacarpal. Scaphoid is partially visualized but appears  grossly unremarkable. Bones are osteopenic. There are mild degenerative changes  of the first MCP joint. Impression Impression:  1. Age-indeterminate irregularity of the distal aspect of the fifth metacarpal.  No other definite acute fracture. 09/19/18   ECHO ADULT COMPLETE 09/23/2018 9/23/2018    Narrative · Estimated left ventricular ejection fraction is 61 - 65%. Visually   measured ejection fraction. Normal left ventricular wall motion, no   regional wall motion abnormality noted. Inconclusive left ventricular   diastolic function. · Moderate tricuspid valve regurgitation is present. Pulmonary arterial   systolic pressure is 43 mmHg. Mild pulmonary hypertension is present. · Mild mitral valve regurgitation. Signed by: Camelia Jacinto MD             9/18 echo  Interpretation Summary     · Estimated left ventricular ejection fraction is 61 - 65%. Visually measured ejection fraction. Normal left ventricular wall motion, no regional wall motion abnormality noted. Inconclusive left ventricular diastolic function. · Moderate tricuspid valve regurgitation is present. Pulmonary arterial systolic pressure is 43 mmHg. Mild pulmonary hypertension is present. · Mild mitral valve regurgitation. Cardiac catheterization 2009  CORONARY ANGIOGRAPHY:    Right Coronary Artery: The right coronary artery has an ectatic area at the acute margin; however, it smoothly tapers into and out of that. It has brisk flow and is not obstructive of flow at all. No other focal lesions are seen. Left Main Coronary Artery: The left main coronary artery is normal but very short.       Left Circumflex Coronary Artery: The left circumflex coronary artery is fairly large, non-dominant, and normal.      Left Anterior Descending Coronary Artery: The left anterior descending coronary artery and diagonals are normal.    12/20 echo   Interpretation Summary    · LV: Estimated LVEF is 55 - 60%. Normal cavity size, wall thickness and systolic function (ejection fraction normal). Wall motion: normal. Mild (grade 1) left ventricular diastolic dysfunction. · AV: Mild aortic valve regurgitation is present. · MV: Mild mitral valve regurgitation is present. · TV: Moderate tricuspid valve regurgitation is present. · PV: Mild pulmonic valve regurgitation is present. · PA: Mild pulmonary hypertension. Pulmonary arterial systolic pressure is 39 mmHg. · IVC: Moderately elevated central venous pressure (10-15 mmHg); IVC diameter is larger than 21 mm and collapses more than 50% with respiration. Comparison Study Information    Prior Study    When compared to the previous study from 9/23/18, there is now evidence of mild aortic and pulmonic regurgitiaton. Ms. Hoa Owens has a reminder for a \"due or due soon\" health maintenance. I have asked that she contact her primary care provider for follow-up on this health maintenance. Physical Exam   Constitutional: She is oriented to person, place, and time. She appears well-developed and well-nourished. No distress. Has O2 at 2LPM   HENT:   Head: Normocephalic and atraumatic. Mouth/Throat: Normal dentition. Eyes: Right eye exhibits no discharge. Left eye exhibits no discharge. No scleral icterus. Neck: Neck supple. No JVD present. Carotid bruit is not present. No thyromegaly present. Cardiovascular: Normal rate, regular rhythm, S1 normal, S2 normal, normal heart sounds and intact distal pulses. Exam reveals no gallop and no friction rub. No murmur heard. Pulmonary/Chest: Effort normal. She has wheezes (b/l mild diffuse). She has no rales. Abdominal: Soft. She exhibits no mass. There is no abdominal tenderness. Musculoskeletal:         General: Edema (trace) present.       Comments: +varicose veins Lymphadenopathy:        Right cervical: No superficial cervical adenopathy present. Left cervical: No superficial cervical adenopathy present. Neurological: She is alert and oriented to person, place, and time. Skin: Skin is warm and dry. No rash noted. Psychiatric: She has a normal mood and affect. Her behavior is normal.       ASSESSMENT and PLAN    Mitral and aortic regurgitation: 12/20 mild;  Pulmonary hypertension: 9/18 PAP 43; 12/20 PAP 39;  LIPIDS : Results for Gustavo Ornelas (MRN [id number]) as of 2/11/2021 13:41   Ref. Range 6/15/2020 13:15   Triglyceride Latest Ref Range: <150 MG/DL 77   Cholesterol, total Latest Ref Range: <200 MG/   HDL Cholesterol Latest Ref Range: 40 - 60 MG/DL 82 (H)   CHOL/HDL Ratio Latest Ref Range: 0 - 5.0   2.2   VLDL, calculated Latest Units: MG/DL 15.4   LDL, calculated Latest Ref Range: 0 - 100 MG/DL 79.6     7/20 Atrial fibrillation? ?  - seen in Telemetry on admission. Patient' s EKG showing NSR with PAC's, no afib seen on tele. Echo on 9/18 showed  Preserved EF%   Atypical chest pain- report chest wall pain that last few seconds and sometimes triggered  by anxiety   Hypotension- on admission. BP is stable will monitor. 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 Tobacco abuse- Patient advised to stop smoking to reduce future cardiovascular events. per patient quit 3 weeks ago- 2019  Abnormal TSH- seen today by Dr Nakul Pedroza    Use compression stockings preferably thigh-high but at least knee-high. Chest pain is atypical and noncardiac clinically. Stress test not being done at present due to continuous wheezing. Check echo to follow-up on pulmonary hypertension and MR. Patient advised not to overdo on salt because she has minimal edema today. Diagnoses and all orders for this visit:    1.  Pulmonary HTN (HCC)  -     AMB POC EKG ROUTINE W/ 12 LEADS, INTER & REP    2. Mitral and aortic regurgitation    3. COPD, severe ([address]    4. Chronic diastolic congestive heart failure ([address]    5. Colon cancer without distant metastasis ([address]        Pertinent laboratory and test data reviewed and discussed with patient. See patient instructions also for other medical advice given    Medications Discontinued During This Encounter   Medication Reason    clotrimazole (MYCELEX) 10 mg keiry Therapy Completed    acetylcysteine 600 mg cap capsule Therapy Completed       Follow-up and Dispositions    · Return in about 1 year (around 2/11/2022), or if symptoms worsen or fail to improve. 2/21 CHF stable NYHA3  AI new in echo but mild- watch clinically and periodic echos  Pulmonary hypertension may have improved and definitely has not gotten any worse. LV function remains normal.  No new suggestions from cardiac standpoint.   Continue same treatment

## 2021-02-11 NOTE — PATIENT INSTRUCTIONS
Medications Discontinued During This Encounter   Medication Reason    clotrimazole (MYCELEX) 10 mg keiry Therapy Completed    acetylcysteine 600 mg cap capsule Therapy Completed        A Healthy Heart: Care Instructions  Your Care Instructions     Coronary artery disease, also called heart disease, occurs when a substance called plaque builds up in the vessels that supply oxygen-rich blood to your heart muscle. This can narrow the blood vessels and reduce blood flow. A heart attack happens when blood flow is completely blocked. A high-fat diet, smoking, and other factors increase the risk of heart disease. Your doctor has found that you have a chance of having heart disease. You can do lots of things to keep your heart healthy. It may not be easy, but you can change your diet, exercise more, and quit smoking. These steps really work to lower your chance of heart disease. Follow-up care is a key part of your treatment and safety. Be sure to make and go to all appointments, and call your doctor if you are having problems. It's also a good idea to know your test results and keep a list of the medicines you take. How can you care for yourself at home? Diet    · Use less salt when you cook and eat. This helps lower your blood pressure. Taste food before salting. Add only a little salt when you think you need it. With time, your taste buds will adjust to less salt.     · Eat fewer snack items, fast foods, canned soups, and other high-salt, high-fat, processed foods.     · Read food labels and try to avoid saturated and trans fats. They increase your risk of heart disease by raising cholesterol levels.     · Limit the amount of solid fat-butter, margarine, and shortening-you eat. Use olive, peanut, or canola oil when you cook. Bake, broil, and steam foods instead of frying them.     · Eat a variety of fruit and vegetables every day.  Dark green, deep orange, red, or yellow fruits and vegetables are especially good for you. Examples include spinach, carrots, peaches, and berries.     · Foods high in fiber can reduce your cholesterol and provide important vitamins and minerals. High-fiber foods include whole-grain cereals and breads, oatmeal, beans, brown rice, citrus fruits, and apples.     · Eat lean proteins. Heart-healthy proteins include seafood, lean meats and poultry, eggs, beans, peas, nuts, seeds, and soy products.     · Limit drinks and foods with added sugar. These include candy, desserts, and soda pop. Lifestyle changes    · If your doctor recommends it, get more exercise. Walking is a good choice. Bit by bit, increase the amount you walk every day. Try for at least 30 minutes on most days of the week. You also may want to swim, bike, or do other activities.     · Do not smoke. If you need help quitting, talk to your doctor about stop-smoking programs and medicines. These can increase your chances of quitting for good. Quitting smoking may be the most important step you can take to protect your heart. It is never too late to quit.     · Limit alcohol to 2 drinks a day for men and 1 drink a day for women. Too much alcohol can cause health problems.     · Manage other health problems such as diabetes, high blood pressure, and high cholesterol. If you think you may have a problem with alcohol or drug use, talk to your doctor. Medicines    · Take your medicines exactly as prescribed. Call your doctor if you think you are having a problem with your medicine.     · If your doctor recommends aspirin, take the amount directed each day. Make sure you take aspirin and not another kind of pain reliever, such as acetaminophen (Tylenol). When should you call for help? Call 911 if you have symptoms of a heart attack.  These may include:    · Chest pain or pressure, or a strange feeling in the chest.     · Sweating.     · Shortness of breath.     · Pain, pressure, or a strange feeling in the back, neck, jaw, or upper belly or in one or both shoulders or arms.     · Lightheadedness or sudden weakness.     · A fast or irregular heartbeat. After you call 911, the  may tell you to chew 1 adult-strength or 2 to 4 low-dose aspirin. Wait for an ambulance. Do not try to drive yourself. Watch closely for changes in your health, and be sure to contact your doctor if you have any problems. Where can you learn more? Go to http://www.holt.com/  Enter F075 in the search box to learn more about \"A Healthy Heart: Care Instructions. \"  Current as of: December 16, 2019               Content Version: 12.6  © 5340-1934 [employer]. Care instructions adapted under license by [employer] (which disclaims liability or warranty for this information). If you have questions about a medical condition or this instruction, always ask your healthcare professional. [address] any warranty or liability for your use of this information.

## 2021-02-11 NOTE — PROGRESS NOTES
1. Have you been to the ER, urgent care clinic since your last visit? Hospitalized since your last visit?     no  2. Have you seen or consulted any other health care providers outside of the [address] [place] [address] since your last visit? Include any pap smears or colon screening. No     3. Since your last visit, have you had any of the following symptoms? shortness of breath and dizziness. Explain:COPD    4. Have you had any blood work, X-rays or cardiac testing? Yes ECHO      Requested: YES     In [facility]: YES    5. Where do you normally have your labs drawn? [facility]    6. Do you need any refills today?    no

## 2021-02-17 ENCOUNTER — HOSPITAL ENCOUNTER (OUTPATIENT)
Dept: CT IMAGING | Age: 76
Discharge: HOME | End: 2021-02-17
Attending: PHYSICIAN ASSISTANT
Payer: MEDICARE

## 2021-02-17 DIAGNOSIS — C18.0 MALIGNANT NEOPLASM OF CECUM (HCC): ICD-10-CM

## 2021-02-17 LAB — CREAT UR-MCNC: 0.8 MG/DL (ref 0.6–1.3)

## 2021-02-17 PROCEDURE — 74011000636 HC RX REV CODE- 636

## 2021-02-17 PROCEDURE — 82565 ASSAY OF CREATININE: CPT

## 2021-02-17 PROCEDURE — 74177 CT ABD & PELVIS W/CONTRAST: CPT

## 2021-02-17 RX ADMIN — IOPAMIDOL 80 ML: 612 INJECTION, SOLUTION INTRAVENOUS at 14:12

## 2021-03-19 RX ORDER — PREDNISONE 10 MG/1
10 TABLET ORAL
Qty: 100 TAB | Refills: 0 | Status: SHIPPED | OUTPATIENT
Start: 2021-03-19 | End: 2021-07-19

## 2021-03-19 NOTE — TELEPHONE ENCOUNTER
Pt requesting refill for Prednisone to be sent to [address] [place] [address] Rd. Please advise [phone].

## 2021-04-27 RX ORDER — ALBUTEROL SULFATE 90 UG/1
AEROSOL, METERED RESPIRATORY (INHALATION)
Qty: 18 G | Refills: 5 | Status: SHIPPED | OUTPATIENT
Start: 2021-04-27 | End: 2021-11-19 | Stop reason: SDUPTHER

## 2021-04-27 NOTE — TELEPHONE ENCOUNTER
Pt requesting refill on Ventolin inhaler to be sent to [facility] aid. Requests it to be Ventolin not any of the others.

## 2021-06-04 ENCOUNTER — OFFICE VISIT (OUTPATIENT)
Dept: PULMONOLOGY | Age: 76
End: 2021-06-04
Payer: MEDICARE

## 2021-06-04 VITALS
HEIGHT: 67 IN | BODY MASS INDEX: 18.24 KG/M2 | TEMPERATURE: 98 F | OXYGEN SATURATION: 98 % | WEIGHT: 116.2 LBS | SYSTOLIC BLOOD PRESSURE: 118 MMHG | DIASTOLIC BLOOD PRESSURE: 70 MMHG | RESPIRATION RATE: 18 BRPM | HEART RATE: 85 BPM

## 2021-06-04 DIAGNOSIS — Z99.81 HYPOXEMIA REQUIRING SUPPLEMENTAL OXYGEN: ICD-10-CM

## 2021-06-04 DIAGNOSIS — Z79.52 CURRENT CHRONIC USE OF SYSTEMIC STEROIDS: ICD-10-CM

## 2021-06-04 DIAGNOSIS — R64 CACHEXIA (HCC): ICD-10-CM

## 2021-06-04 DIAGNOSIS — R91.1 LUNG NODULE: ICD-10-CM

## 2021-06-04 DIAGNOSIS — R09.02 HYPOXEMIA REQUIRING SUPPLEMENTAL OXYGEN: ICD-10-CM

## 2021-06-04 DIAGNOSIS — J44.9 COPD, SEVERE (HCC): Primary | ICD-10-CM

## 2021-06-04 DIAGNOSIS — Z87.891 FORMER SMOKER: ICD-10-CM

## 2021-06-04 PROBLEM — Z85.038 HISTORY OF COLON CANCER: Status: ACTIVE | Noted: 2019-07-31

## 2021-06-04 PROBLEM — Z93.2 ILEOSTOMY PRESENT (HCC): Status: ACTIVE | Noted: 2019-07-31

## 2021-06-04 PROBLEM — K43.2 INCISIONAL HERNIA: Status: ACTIVE | Noted: 2020-02-17

## 2021-06-04 PROCEDURE — 1090F PRES/ABSN URINE INCON ASSESS: CPT | Performed by: INTERNAL MEDICINE

## 2021-06-04 PROCEDURE — G8536 NO DOC ELDER MAL SCRN: HCPCS | Performed by: INTERNAL MEDICINE

## 2021-06-04 PROCEDURE — G9711 PT HX TOT COL OR COLON CA: HCPCS | Performed by: INTERNAL MEDICINE

## 2021-06-04 PROCEDURE — G8510 SCR DEP NEG, NO PLAN REQD: HCPCS | Performed by: INTERNAL MEDICINE

## 2021-06-04 PROCEDURE — G8427 DOCREV CUR MEDS BY ELIG CLIN: HCPCS | Performed by: INTERNAL MEDICINE

## 2021-06-04 PROCEDURE — G8419 CALC BMI OUT NRM PARAM NOF/U: HCPCS | Performed by: INTERNAL MEDICINE

## 2021-06-04 PROCEDURE — G8399 PT W/DXA RESULTS DOCUMENT: HCPCS | Performed by: INTERNAL MEDICINE

## 2021-06-04 PROCEDURE — 99214 OFFICE O/P EST MOD 30 MIN: CPT | Performed by: INTERNAL MEDICINE

## 2021-06-04 PROCEDURE — G9899 SCRN MAM PERF RSLTS DOC: HCPCS | Performed by: INTERNAL MEDICINE

## 2021-06-04 PROCEDURE — 1101F PT FALLS ASSESS-DOCD LE1/YR: CPT | Performed by: INTERNAL MEDICINE

## 2021-06-04 RX ORDER — FAMOTIDINE 20 MG/1
20 TABLET, FILM COATED ORAL
COMMUNITY
End: 2021-07-16

## 2021-06-04 RX ORDER — BENZONATATE 200 MG/1
200 CAPSULE ORAL
Qty: 90 CAPSULE | Refills: 3 | Status: SHIPPED | OUTPATIENT
Start: 2021-06-04 | End: 2021-06-11

## 2021-06-04 NOTE — PROGRESS NOTES
Savana Alba presents today for   Chief Complaint   Patient presents with    Results     Chest CT, Echo        Is someone accompanying this pt? Daughter    Is the patient using any DME equipment during [address]? Oxygen    -DME Company [employer]    Depression Screening:  3 most recent PHQ Screens 6/4/2021   PHQ Not Done -   Little interest or pleasure in doing things Not at all   Feeling down, depressed, irritable, or hopeless Not at all   Total Score PHQ 2 0   Trouble falling or staying asleep, or sleeping too much -   Feeling tired or having little energy -   Poor appetite, weight loss, or overeating -   Feeling bad about yourself - or that you are a failure or have let yourself or your family down -   Trouble concentrating on things such as school, work, reading, or watching TV -   Moving or speaking so slowly that other people could have noticed; or the opposite being so fidgety that others notice -   Thoughts of being better off dead, or hurting yourself in some way -   PHQ 9 Score -   How difficult have these problems made it for you to do your work, take care of your home and get along with others -       Learning Assessment:  Learning Assessment 6/4/2021   PRIMARY LEARNER Patient   HIGHEST LEVEL OF EDUCATION - PRIMARY LEARNER  -   BARRIERS PRIMARY LEARNER -   PRIMARY LANGUAGE ENGLISH    NEED -   LEARNER PREFERENCE PRIMARY LISTENING     DEMONSTRATION   ANSWERED BY Patient     -   RELATIONSHIP SELF       Abuse Screening:  Abuse Screening Questionnaire 6/4/2021   Do you ever feel afraid of your partner? N   Are you in a relationship with someone who physically or mentally threatens you? N   Is it safe for you to go home? Y       Fall Risk  Fall Risk Assessment, last 12 mths 2/11/2021   Able to walk? Yes   Fall in past 12 months? 0   Do you feel unsteady? 0   Are you worried about falling 0   Number of falls in past 12 months -   Fall with injury? -         Coordination of Care:  1.  Have you been to the ER, urgent care clinic since your last visit? Hospitalized since your last visit? No    2. Have you seen or consulted any other health care providers outside of the [address] [place] [address] since your last visit? Include any pap smears or colon screening.  No

## 2021-06-04 NOTE — PROGRESS NOTES
HISTORY OF PRESENT ILLNESS  Lesvia Alston is a 78 y.o. female with COPD. Pt has a history of colon cancer S/p R hemicolectomy and subsequent reversal of ileostomy in August 2019. Since then pt has complained of pain from an incisional hernia which has since been repaired. Pt has severe COPD last FEV1 43% and is on long term O2. She has been on low dose Prednisone 10 mg and reports increased SOB and wheezing once off steroids. She has a chronic cough with tenacious phlegm and rare episodes of hemoptysis which she had for years. CT chest/abdomen/pelvis done in Feb 2021 for follow up for colon cancer showed slight growth in a RML lung nodule. Pt complains of easy bruising and poor wound healing which she attributes to Prednisone. She has gained [place] few\" pounds since her surgery. 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 Pt has quit smoking since hospital admission in 2018 but has picked up smoking on some days. Of note, pt was placed on Daliresp and Azithromycin for chronic suppression, discontinue for lack of perceived benefit. She was also tried on Trelegy but had to stop due to side effects. Review of Systems   Constitutional: Negative for chills, diaphoresis, malaise/fatigue and weight loss. Fever: intermittent. HENT: Negative for congestion, ear discharge, ear pain, hearing loss, nosebleeds, sinus pain, sore throat and tinnitus. Eyes: Negative for blurred vision, double vision, photophobia, pain, discharge and redness. Respiratory: Positive for shortness of breath and wheezing. Negative for cough, sputum production and stridor. Hemoptysis: rare. Cardiovascular: Negative for chest pain, palpitations, orthopnea, claudication, leg swelling and PND.    Gastrointestinal: Negative for blood in stool, constipation, diarrhea, heartburn, melena, nausea and vomiting. Abdominal pain: intermittent. Genitourinary: Negative for dysuria, flank pain, frequency, hematuria and urgency. Musculoskeletal: Positive for joint pain. Negative for back pain, falls, myalgias and neck pain. Skin: Negative for itching and rash. Neurological: Positive for dizziness. Negative for tingling, tremors, sensory change, speech change, focal weakness, seizures, loss of consciousness, weakness and headaches. Endo/Heme/Allergies: Negative for environmental allergies and polydipsia. Bruises/bleeds easily. Psychiatric/Behavioral: Positive for depression. Negative for hallucinations, memory loss, substance abuse and suicidal ideas. The patient is not nervous/anxious and does not have insomnia. Past Medical History:   Diagnosis Date    Anxiety     Arthritis     [facility]bestosis [facility]     Asthma     Back problem     Baker's cyst     Balance problems     Chronic lung disease     Cigarette smoker     [facility]     COPD (chronic obstructive pulmonary disease) ([facility]     oxygen 2/liters asbestosis    Depression     History of DVT (deep vein thrombosis)     Leg pain     Right    Lung disease     Nausea & vomiting     Osteoporosis     Restless leg syndrome     Spinal stenosis     Thromboembolus ([facility]     Vitamin D deficiency      Current Outpatient Medications on File Prior to Visit   Medication Sig Dispense Refill    Ventolin HFA 90 mcg/actuation inhaler inhale 2 puffs by mouth and INTO THE LUNGS every 4 hours if needed for wheezing shortness of breath 18 g 5    predniSONE (DELTASONE) 10 mg tablet Take 10 mg by mouth daily (with breakfast). 100 Tab 0    tiotropium bromide (Spiriva Respimat) 2.5 mcg/actuation inhaler Take 2 Puffs by inhalation daily. 3 Inhaler 3    albuterol (PROVENTIL VENTOLIN) 2.5 mg /3 mL (0.083 %) nebulizer solution 3 mL by Nebulization route every four (4) hours as needed for Wheezing or Shortness of Breath.  ICD: J44.9, R09.02 file under Medicare Part B 300 Each 3    fluticasone propion-salmeterol (ADVAIR DISKUS) 250-50 mcg/dose diskus inhaler Take 1 Puff by inhalation two (2) times a day. BRAND NAME ONLY 3 Inhaler 3    simethicone (GAS-X) 80 mg chewable tablet Take 80 mg by mouth every six (6) hours as needed for Flatulence.  therapeutic multivitamin (THERAGRAN) tablet Take 1 Tab by mouth daily. 30 Tab 11    acetaminophen (TYLENOL EXTRA STRENGTH) 500 mg tablet Take 500 mg by mouth every six (6) hours as needed for Pain.  LORazepam (ATIVAN) 1 mg tablet Take  by mouth two (2) times daily as needed for Anxiety.  rOPINIRole (REQUIP) 1 mg tablet Take 1 mg by mouth nightly.  OXYGEN-AIR DELIVERY SYSTEMS 2 L by Does Not Apply route. O2 via nasal cannula with bedtime and activity. O2 Company: [employer]      famotidine (PEPCID) 20 mg tablet Take 20 mg by mouth nightly. (Patient not taking: Reported on 6/4/2021)       No current facility-administered medications on file prior to visit.      Allergies   Allergen Reactions    Anoro Ellipta [Umeclidinium-Vilanterol] Rash and Other (comments)     Muscle cramps in arms    Aspirin Other (comments)     GI upset and bleeding    Augmentin [Amoxicillin-Pot Clavulanate] Not Reported This Time     Possible cramping    Doxycycline Nausea and Vomiting    Morphine Other (comments)     Neurologic symptoms - severe agitation      Shellfish Derived Itching     Pt able to eat small amounts per report     Sulfa (Sulfonamide Antibiotics) Rash     Patient relates no longer allergic    Florestine Sill [Fluticasone Propion-Salmeterol] Other (comments)     Mouth Sores     Social History     Socioeconomic History    Marital status:      Spouse name: Not on file    Number of children: Not on file    Years of education: Not on file    Highest education level: Not on file   Occupational History    Not on file   Tobacco Use    Smoking status: Current Some Day Smoker     Packs/day: 1.00     Years: 50.00     Pack years: 50.00     Types: Cigarettes     Start date: 10/21/1964    Smokeless tobacco: Never Used   Vaping Use    Vaping Use: Never used   Substance and Sexual Activity    Alcohol use: Never    Drug use: No    Sexual activity: Not on file   Other Topics Concern    Not on file   Social History Narrative    Not on file     Social Determinants of Health     Financial Resource Strain:     Difficulty of Paying Living Expenses:    Food Insecurity:     Worried About Running Out of Food in the Last Year:     [address] in the Last Year:    Transportation Needs:     Lack of Transportation (Medical):  Lack of Transportation (Non-Medical):    Physical Activity:     Days of Exercise per Week:     Minutes of Exercise per Session:    Stress:     Feeling of Stress :    Social Connections:     Frequency of Communication with Friends and Family:     Frequency of Social Gatherings with Friends and Family:     Attends Quaker Services:     Active Member of Clubs or Organizations:     Attends Club or Organization Meetings:     Marital Status:    Intimate Partner Violence:     Fear of Current or Ex-Partner:     Emotionally Abused:     Physically Abused:     Sexually Abused:      Blood pressure 118/70, pulse 85, temperature 98 °F (36.7 °C), temperature source Oral, resp. rate 18, height 5' 7\" (1.702 m), weight 52.7 kg (116 lb 3.2 oz), SpO2 98 %. Physical Exam  Constitutional:       General: She is not in acute distress. Appearance: She is well-developed. She is not diaphoretic. Comments: Cachectic    HENT:      Head: Normocephalic and atraumatic. Nose: Nose normal.      Mouth/Throat:      Pharynx: No oropharyngeal exudate. Eyes:      General: No scleral icterus. Right eye: No discharge. Left eye: No discharge. Conjunctiva/sclera: Conjunctivae normal.      Pupils: Pupils are equal, round, and reactive to light. Neck:      Thyroid: No thyromegaly. Vascular: No JVD. Trachea: No tracheal deviation. Cardiovascular:      Rate and Rhythm: Normal rate and regular rhythm. Heart sounds: Normal heart sounds. No murmur heard. No gallop. Pulmonary:      Effort: Pulmonary effort is normal. No respiratory distress. Breath sounds: No stridor. No rales. Wheezes: scattered expiratory. Chest:      Chest wall: No tenderness. Abdominal:      Palpations: Abdomen is soft. There is no mass. Tenderness: There is no abdominal tenderness. There is no rebound. Comments: Stoma reversal scar   Musculoskeletal:         General: No tenderness or deformity. Lymphadenopathy:      Cervical: No cervical adenopathy. Skin:     General: Skin is warm and dry. Coloration: Skin is not pale. Findings: No erythema or rash. Comments: Small ecchymoses and abrasions on both upper and lower extremities   Neurological:      Mental Status: She is alert and oriented to person, place, and time. Coordination: Coordination normal.   Psychiatric:         Behavior: Behavior normal.         Thought Content: Thought content normal.         Judgment: Judgment normal.       CT Results (most recent):  Results from Hospital Encounter encounter on 02/17/21    CT CHEST ABD PELV W CONT    Narrative  EXAM: CT of the chest, abdomen, and pelvis    INDICATION: Neoplasm of the cecum prior colectomy    COMPARISON: Recent abdomen pelvis June 2020 chest CT October 2018. TECHNIQUE: Axial CT imaging of the chest, abdomen, and pelvis was performed 80  cc Isovue intravenous contrast. Multiplanar reformats were generated. Dose reduction techniques used: automated exposure control, adjustment of the  mAs and/or kVp according to patient size, and iterative reconstruction  techniques.       _______________    FINDINGS:    LUNGS: There is marked centrilobular emphysematous changes mid upper and lower  lung fields    There is a small nodule in the right middle lobe image 55 series 3 measures  about 4 mm this is increased in size slightly from its June 2020 exam there are  several areas of pleural parenchymal scarring these are minimal.    Stable benign. PLEURA: Normal.    AIRWAY: Normal.    MEDIASTINUM: Heart size is normal there is some focal fibrotic calcification in  the mid LAD pericardium normal.  No pericardial effusion. Great vessels  unremarkable. The thyroid is [grossly within normal limits]. Esophagus appears  unremarkable. LYMPH NODES: No enlarged lymph nodes.      ===============      ABDOMEN/PELVIS:    LIVER, BILIARY: Liver is normal.  No biliary dilation. Gallbladder is  unremarkable. PANCREAS: Normal.    SPLEEN: Normal.    ADRENALS: Normal.    KIDNEYS/GENITOURINARY SYSTEM: 2 cysts bilaterally are prominent but unchanged in  appearance from prior June 2020 study. LYMPH NODES: No enlarged lymph nodes. GASTROINTESTINAL TRACT: Prior colectomy changes identified stable no breakdown  no complication is suggested appendix is been apparently removed with this  colectomy. Appendix is [normal]. PELVIC ORGANS: R hysterectomy    There is a large capacity urinary bladder suggesting that there may be an  element of urinary retention    VASCULATURE: Aorta the atherosclerotic changes in the aorta identified. No  periaortic collections. [No significant atherosclerotic disease]. BONES: Prior fusion from L4 through S1 at least mild canal stenosis at the  nonoperative L3-4 level is suggested on these images. Iliac harvest sites appear  stable    OTHER:  No intraperitoneal free air. No free or loculated fluid. No evidence for abdominal wall hernia.      _______________    Impression  1.   There is severe emphysema    Right middle lobe nodule identified slightly larger than June 2020 recommend a  3-6 month follow-up CT scan with contrast    Postsurgical changes including prior colectomy low back surgery hysterectomy  noted    Urinary bladder is distended question urinary retention. ASSESSMENT and PLAN  Encounter Diagnoses   Name Primary?  COPD, severe ([address] Yes    Current chronic use of systemic steroids     Former smoker     Lung nodule     Cachexia ([address]     Hypoxemia requiring supplemental oxygen        Pt with severe COPD on LTOT without recent exacerbations or pulmonary infections. She is maintained on chronic low dose Prednisone despite multiple attempts to taper off steroids. She has failed prior trials of Trelegy, Azithromycin suppression and Daliresp. Follow up CT chest with contrast ordered, will call pt with results. Pt will continue to benefit from supplemental O2 to maintain saturation greater than 90%. Continue LABA/LAMA/ICS and rescue Albuterol as written. Rx for tessalon perles sent. RTC 6 months.

## 2021-06-11 ENCOUNTER — HOSPITAL ENCOUNTER (OUTPATIENT)
Dept: CT IMAGING | Age: 76
Discharge: HOME | End: 2021-06-11
Attending: INTERNAL MEDICINE
Payer: MEDICARE

## 2021-06-11 DIAGNOSIS — R91.1 LUNG NODULE: ICD-10-CM

## 2021-06-11 LAB — CREAT UR-MCNC: 0.8 MG/DL (ref 0.6–1.3)

## 2021-06-11 PROCEDURE — 82565 ASSAY OF CREATININE: CPT

## 2021-06-11 PROCEDURE — 74011000636 HC RX REV CODE- 636: Performed by: INTERNAL MEDICINE

## 2021-06-11 PROCEDURE — 71260 CT THORAX DX C+: CPT

## 2021-06-11 RX ADMIN — IOPAMIDOL 100 ML: 612 INJECTION, SOLUTION INTRAVENOUS at 14:55

## 2021-07-06 ENCOUNTER — TELEPHONE (OUTPATIENT)
Dept: PULMONOLOGY | Age: 76
End: 2021-07-06

## 2021-07-15 ENCOUNTER — HOSPITAL ENCOUNTER (INPATIENT)
Age: 76
LOS: 3 days | Discharge: HOME HEALTH | DRG: 871 | End: 2021-07-19
Attending: EMERGENCY MEDICINE | Admitting: INTERNAL MEDICINE
Payer: MEDICARE

## 2021-07-15 ENCOUNTER — APPOINTMENT (OUTPATIENT)
Dept: GENERAL RADIOLOGY | Age: 76
DRG: 871 | End: 2021-07-15
Attending: PHYSICIAN ASSISTANT
Payer: MEDICARE

## 2021-07-15 ENCOUNTER — APPOINTMENT (OUTPATIENT)
Dept: CT IMAGING | Age: 76
DRG: 871 | End: 2021-07-15
Attending: EMERGENCY MEDICINE
Payer: MEDICARE

## 2021-07-15 DIAGNOSIS — J44.1 ACUTE EXACERBATION OF CHRONIC OBSTRUCTIVE PULMONARY DISEASE (COPD) (HCC): Primary | ICD-10-CM

## 2021-07-15 LAB
ALBUMIN SERPL-MCNC: 3.9 G/DL (ref 3.4–5)
ALBUMIN/GLOB SERPL: 0.8 {RATIO} (ref 0.8–1.7)
ALP SERPL-CCNC: 114 U/L (ref 45–117)
ALT SERPL-CCNC: 20 U/L (ref 13–56)
ANION GAP SERPL CALC-SCNC: 5 MMOL/L (ref 3–18)
AST SERPL-CCNC: 20 U/L (ref 10–38)
BASOPHILS # BLD: 0 K/UL (ref 0–0.1)
BASOPHILS NFR BLD: 0 % (ref 0–2)
BILIRUB SERPL-MCNC: 0.9 MG/DL (ref 0.2–1)
BNP SERPL-MCNC: 473 PG/ML (ref 0–900)
BUN SERPL-MCNC: 16 MG/DL (ref 7–18)
BUN/CREAT SERPL: 18 (ref 12–20)
CALCIUM SERPL-MCNC: 9.7 MG/DL (ref 8.5–10.1)
CHLORIDE SERPL-SCNC: 97 MMOL/L (ref 100–111)
CK MB CFR SERPL CALC: NORMAL % (ref 0–4)
CK MB SERPL-MCNC: <1 NG/ML (ref 5–25)
CK SERPL-CCNC: 114 U/L (ref 26–192)
CO2 SERPL-SCNC: 31 MMOL/L (ref 21–32)
CREAT SERPL-MCNC: 0.9 MG/DL (ref 0.6–1.3)
DIFFERENTIAL METHOD BLD: ABNORMAL
EOSINOPHIL # BLD: 0 K/UL (ref 0–0.4)
EOSINOPHIL NFR BLD: 0 % (ref 0–5)
ERYTHROCYTE [DISTWIDTH] IN BLOOD BY AUTOMATED COUNT: 13.4 % (ref 11.6–14.5)
GLOBULIN SER CALC-MCNC: 4.6 G/DL (ref 2–4)
GLUCOSE SERPL-MCNC: 93 MG/DL (ref 74–99)
HCT VFR BLD AUTO: 45.3 % (ref 35–45)
HGB BLD-MCNC: 15.1 G/DL (ref 12–16)
LACTATE BLD-SCNC: 2.23 MMOL/L (ref 0.4–2)
LYMPHOCYTES # BLD: 2.4 K/UL (ref 0.9–3.6)
LYMPHOCYTES NFR BLD: 13 % (ref 21–52)
MCH RBC QN AUTO: 32.1 PG (ref 24–34)
MCHC RBC AUTO-ENTMCNC: 33.3 G/DL (ref 31–37)
MCV RBC AUTO: 96.4 FL (ref 74–97)
MONOCYTES # BLD: 0.4 K/UL (ref 0.05–1.2)
MONOCYTES NFR BLD: 2 % (ref 3–10)
NEUTS BAND NFR BLD MANUAL: 6 % (ref 0–5)
NEUTS SEG # BLD: 15.6 K/UL (ref 1.8–8)
NEUTS SEG NFR BLD: 79 % (ref 40–73)
PLATELET # BLD AUTO: 317 K/UL (ref 135–420)
PLATELET COMMENTS,PCOM: ABNORMAL
PMV BLD AUTO: 10 FL (ref 9.2–11.8)
POTASSIUM SERPL-SCNC: 3.9 MMOL/L (ref 3.5–5.5)
PROCALCITONIN SERPL-MCNC: 0.32 NG/ML
PROT SERPL-MCNC: 8.5 G/DL (ref 6.4–8.2)
RBC # BLD AUTO: 4.7 M/UL (ref 4.2–5.3)
RBC MORPH BLD: ABNORMAL
SODIUM SERPL-SCNC: 133 MMOL/L (ref 136–145)
TROPONIN I SERPL-MCNC: <0.02 NG/ML (ref 0–0.04)
WBC # BLD AUTO: 18.4 K/UL (ref 4.6–13.2)

## 2021-07-15 PROCEDURE — 94660 CPAP INITIATION&MGMT: CPT

## 2021-07-15 PROCEDURE — 82553 CREATINE MB FRACTION: CPT

## 2021-07-15 PROCEDURE — 96365 THER/PROPH/DIAG IV INF INIT: CPT

## 2021-07-15 PROCEDURE — 85025 COMPLETE CBC W/AUTO DIFF WBC: CPT

## 2021-07-15 PROCEDURE — 74011250636 HC RX REV CODE- 250/636: Performed by: EMERGENCY MEDICINE

## 2021-07-15 PROCEDURE — 93005 ELECTROCARDIOGRAM TRACING: CPT

## 2021-07-15 PROCEDURE — 74011000250 HC RX REV CODE- 250: Performed by: EMERGENCY MEDICINE

## 2021-07-15 PROCEDURE — 83605 ASSAY OF LACTIC ACID: CPT

## 2021-07-15 PROCEDURE — 99285 EMERGENCY DEPT VISIT HI MDM: CPT

## 2021-07-15 PROCEDURE — 71046 X-RAY EXAM CHEST 2 VIEWS: CPT

## 2021-07-15 PROCEDURE — 5A09357 ASSISTANCE WITH RESPIRATORY VENTILATION, LESS THAN 24 CONSECUTIVE HOURS, CONTINUOUS POSITIVE AIRWAY PRESSURE: ICD-10-PCS | Performed by: INTERNAL MEDICINE

## 2021-07-15 PROCEDURE — 96375 TX/PRO/DX INJ NEW DRUG ADDON: CPT

## 2021-07-15 PROCEDURE — 71275 CT ANGIOGRAPHY CHEST: CPT

## 2021-07-15 PROCEDURE — 87070 CULTURE OTHR SPECIMN AEROBIC: CPT

## 2021-07-15 PROCEDURE — 84145 PROCALCITONIN (PCT): CPT

## 2021-07-15 PROCEDURE — 87040 BLOOD CULTURE FOR BACTERIA: CPT

## 2021-07-15 PROCEDURE — 80053 COMPREHEN METABOLIC PANEL: CPT

## 2021-07-15 PROCEDURE — 83880 ASSAY OF NATRIURETIC PEPTIDE: CPT

## 2021-07-15 RX ORDER — ALBUTEROL SULFATE 0.83 MG/ML
2.5 SOLUTION RESPIRATORY (INHALATION)
Status: COMPLETED | OUTPATIENT
Start: 2021-07-15 | End: 2021-07-15

## 2021-07-15 RX ORDER — SODIUM CHLORIDE 0.9 % (FLUSH) 0.9 %
5-10 SYRINGE (ML) INJECTION AS NEEDED
Status: DISCONTINUED | OUTPATIENT
Start: 2021-07-15 | End: 2021-07-19 | Stop reason: HOSPADM

## 2021-07-15 RX ADMIN — AZITHROMYCIN 500 MG: 500 INJECTION, POWDER, LYOPHILIZED, FOR SOLUTION INTRAVENOUS at 21:25

## 2021-07-15 RX ADMIN — ALBUTEROL SULFATE 2.5 MG: 2.5 SOLUTION RESPIRATORY (INHALATION) at 22:01

## 2021-07-15 RX ADMIN — METHYLPREDNISOLONE SODIUM SUCCINATE 125 MG: 125 INJECTION, POWDER, FOR SOLUTION INTRAMUSCULAR; INTRAVENOUS at 22:01

## 2021-07-15 RX ADMIN — CEFTRIAXONE SODIUM 2 G: 2 INJECTION, POWDER, FOR SOLUTION INTRAMUSCULAR; INTRAVENOUS at 21:26

## 2021-07-15 NOTE — ED TRIAGE NOTES
Patient A/O x 4, presented to the ED with complaint of SOB, palpitations, cough x couple of days. Patient states she had fever of 102f, took extra strength tylenol x 2 at 4 pm this evening.

## 2021-07-15 NOTE — Clinical Note
Status[de-identified] INPATIENT [101]   Type of Bed: Stepdown [17]   Cardiac Monitoring Required?: Yes   Inpatient Hospitalization Certified Necessary for the Following Reasons: 3.  Patient receiving treatment that can only be provided in an inpatient setting (further clarification in H&P documentation)   Admitting Diagnosis: Acute bronchitis with chronic obstructive pulmonary disease (COPD) ([facility] [[id number]]   Admitting Diagnosis: Community acquired pneumonia [462235]   Admitting Physician: Yosef Lunsford [[id number]]   Attending Physician: Myrna Garcia   Estimated Length of Stay: 2 Midnights   Discharge Plan[de-identified] Home with Office Follow-up

## 2021-07-16 PROBLEM — J96.90 RESPIRATORY FAILURE (HCC): Status: ACTIVE | Noted: 2021-07-16

## 2021-07-16 PROBLEM — E43 SEVERE PROTEIN-CALORIE MALNUTRITION (HCC): Status: ACTIVE | Noted: 2021-07-16

## 2021-07-16 PROBLEM — J44.0 ACUTE BRONCHITIS WITH CHRONIC OBSTRUCTIVE PULMONARY DISEASE (COPD) (HCC): Status: ACTIVE | Noted: 2021-07-16

## 2021-07-16 PROBLEM — J18.9 COMMUNITY ACQUIRED PNEUMONIA: Status: ACTIVE | Noted: 2021-07-16

## 2021-07-16 PROBLEM — J20.9 ACUTE BRONCHITIS WITH CHRONIC OBSTRUCTIVE PULMONARY DISEASE (COPD) (HCC): Status: ACTIVE | Noted: 2021-07-16

## 2021-07-16 LAB
APPEARANCE UR: CLEAR
ATRIAL RATE: 108 BPM
BACTERIA URNS QL MICRO: ABNORMAL /HPF
BILIRUB UR QL: NEGATIVE
CALCULATED P AXIS, ECG09: 85 DEGREES
CALCULATED R AXIS, ECG10: 100 DEGREES
CALCULATED T AXIS, ECG11: 62 DEGREES
COLOR UR: YELLOW
COVID-19 RAPID TEST, COVR: NOT DETECTED
DIAGNOSIS, 93000: NORMAL
EST. AVERAGE GLUCOSE BLD GHB EST-MCNC: 120 MG/DL
GLUCOSE BLD STRIP.AUTO-MCNC: 122 MG/DL (ref 70–110)
GLUCOSE BLD STRIP.AUTO-MCNC: 156 MG/DL (ref 70–110)
GLUCOSE BLD STRIP.AUTO-MCNC: 191 MG/DL (ref 70–110)
GLUCOSE UR STRIP.AUTO-MCNC: NEGATIVE MG/DL
HBA1C MFR BLD: 5.8 % (ref 4.2–5.6)
HGB UR QL STRIP: ABNORMAL
KETONES UR QL STRIP.AUTO: NEGATIVE MG/DL
L PNEUMO AG UR QL IA: NEGATIVE
LACTATE BLD-SCNC: 1.21 MMOL/L (ref 0.4–2)
LEUKOCYTE ESTERASE UR QL STRIP.AUTO: NEGATIVE
NITRITE UR QL STRIP.AUTO: NEGATIVE
P-R INTERVAL, ECG05: 124 MS
PH UR STRIP: 5.5 [PH] (ref 5–8)
PROT UR STRIP-MCNC: ABNORMAL MG/DL
Q-T INTERVAL, ECG07: 310 MS
QRS DURATION, ECG06: 76 MS
QTC CALCULATION (BEZET), ECG08: 415 MS
RBC #/AREA URNS HPF: ABNORMAL /HPF (ref 0–5)
S PNEUM AG UR QL: NEGATIVE
SARS-COV-2, COV2: NORMAL
SOURCE, COVRS: NORMAL
SP GR UR REFRACTOMETRY: >1.03 (ref 1–1.03)
UROBILINOGEN UR QL STRIP.AUTO: 1 EU/DL (ref 0.2–1)
VENTRICULAR RATE, ECG03: 108 BPM
WBC URNS QL MICRO: ABNORMAL /HPF (ref 0–4)

## 2021-07-16 PROCEDURE — 74011250636 HC RX REV CODE- 250/636: Performed by: INTERNAL MEDICINE

## 2021-07-16 PROCEDURE — 94640 AIRWAY INHALATION TREATMENT: CPT

## 2021-07-16 PROCEDURE — 74011250637 HC RX REV CODE- 250/637: Performed by: INTERNAL MEDICINE

## 2021-07-16 PROCEDURE — 99233 SBSQ HOSP IP/OBS HIGH 50: CPT | Performed by: INTERNAL MEDICINE

## 2021-07-16 PROCEDURE — 83605 ASSAY OF LACTIC ACID: CPT

## 2021-07-16 PROCEDURE — 87449 NOS EACH ORGANISM AG IA: CPT

## 2021-07-16 PROCEDURE — 74011636637 HC RX REV CODE- 636/637: Performed by: INTERNAL MEDICINE

## 2021-07-16 PROCEDURE — 36415 COLL VENOUS BLD VENIPUNCTURE: CPT

## 2021-07-16 PROCEDURE — 2709999900 HC NON-CHARGEABLE SUPPLY

## 2021-07-16 PROCEDURE — 74011000250 HC RX REV CODE- 250: Performed by: INTERNAL MEDICINE

## 2021-07-16 PROCEDURE — 83036 HEMOGLOBIN GLYCOSYLATED A1C: CPT

## 2021-07-16 PROCEDURE — 87635 SARS-COV-2 COVID-19 AMP PRB: CPT

## 2021-07-16 PROCEDURE — 99407 BEHAV CHNG SMOKING > 10 MIN: CPT | Performed by: INTERNAL MEDICINE

## 2021-07-16 PROCEDURE — 81001 URINALYSIS AUTO W/SCOPE: CPT

## 2021-07-16 PROCEDURE — 99223 1ST HOSP IP/OBS HIGH 75: CPT | Performed by: INTERNAL MEDICINE

## 2021-07-16 PROCEDURE — 74011000636 HC RX REV CODE- 636: Performed by: EMERGENCY MEDICINE

## 2021-07-16 PROCEDURE — 82962 GLUCOSE BLOOD TEST: CPT

## 2021-07-16 PROCEDURE — 65660000004 HC RM CVT STEPDOWN

## 2021-07-16 RX ORDER — THERA TABS 400 MCG
1 TAB ORAL DAILY
Status: DISCONTINUED | OUTPATIENT
Start: 2021-07-16 | End: 2021-07-19 | Stop reason: HOSPADM

## 2021-07-16 RX ORDER — ACETAMINOPHEN 325 MG/1
650 TABLET ORAL
Status: DISCONTINUED | OUTPATIENT
Start: 2021-07-16 | End: 2021-07-19 | Stop reason: HOSPADM

## 2021-07-16 RX ORDER — IPRATROPIUM BROMIDE AND ALBUTEROL SULFATE 2.5; .5 MG/3ML; MG/3ML
3 SOLUTION RESPIRATORY (INHALATION)
Status: DISCONTINUED | OUTPATIENT
Start: 2021-07-16 | End: 2021-07-16

## 2021-07-16 RX ORDER — ENOXAPARIN SODIUM 100 MG/ML
40 INJECTION SUBCUTANEOUS DAILY
Status: DISCONTINUED | OUTPATIENT
Start: 2021-07-16 | End: 2021-07-19 | Stop reason: HOSPADM

## 2021-07-16 RX ORDER — SIMETHICONE 80 MG
80 TABLET,CHEWABLE ORAL
Status: DISCONTINUED | OUTPATIENT
Start: 2021-07-16 | End: 2021-07-19 | Stop reason: HOSPADM

## 2021-07-16 RX ORDER — BUDESONIDE 1 MG/2ML
1000 INHALANT ORAL
Status: DISCONTINUED | OUTPATIENT
Start: 2021-07-16 | End: 2021-07-19 | Stop reason: HOSPADM

## 2021-07-16 RX ORDER — SODIUM CHLORIDE 0.9 % (FLUSH) 0.9 %
5-40 SYRINGE (ML) INJECTION AS NEEDED
Status: DISCONTINUED | OUTPATIENT
Start: 2021-07-16 | End: 2021-07-19 | Stop reason: HOSPADM

## 2021-07-16 RX ORDER — ACETAMINOPHEN 650 MG/1
650 SUPPOSITORY RECTAL
Status: DISCONTINUED | OUTPATIENT
Start: 2021-07-16 | End: 2021-07-19 | Stop reason: HOSPADM

## 2021-07-16 RX ORDER — DEXTROSE 50 % IN WATER (D50W) INTRAVENOUS SYRINGE
25-50 AS NEEDED
Status: DISCONTINUED | OUTPATIENT
Start: 2021-07-16 | End: 2021-07-19 | Stop reason: HOSPADM

## 2021-07-16 RX ORDER — IPRATROPIUM BROMIDE AND ALBUTEROL SULFATE 2.5; .5 MG/3ML; MG/3ML
3 SOLUTION RESPIRATORY (INHALATION)
Status: DISCONTINUED | OUTPATIENT
Start: 2021-07-16 | End: 2021-07-19 | Stop reason: HOSPADM

## 2021-07-16 RX ORDER — POLYETHYLENE GLYCOL 3350 17 G/17G
17 POWDER, FOR SOLUTION ORAL DAILY PRN
Status: DISCONTINUED | OUTPATIENT
Start: 2021-07-16 | End: 2021-07-19 | Stop reason: HOSPADM

## 2021-07-16 RX ORDER — PROMETHAZINE HYDROCHLORIDE 12.5 MG/1
12.5 TABLET ORAL
Status: DISCONTINUED | OUTPATIENT
Start: 2021-07-16 | End: 2021-07-19 | Stop reason: HOSPADM

## 2021-07-16 RX ORDER — LORAZEPAM 1 MG/1
1 TABLET ORAL
Status: DISCONTINUED | OUTPATIENT
Start: 2021-07-16 | End: 2021-07-19 | Stop reason: HOSPADM

## 2021-07-16 RX ORDER — INSULIN LISPRO 100 [IU]/ML
INJECTION, SOLUTION INTRAVENOUS; SUBCUTANEOUS
Status: DISCONTINUED | OUTPATIENT
Start: 2021-07-16 | End: 2021-07-19 | Stop reason: HOSPADM

## 2021-07-16 RX ORDER — MAGNESIUM SULFATE 100 %
4 CRYSTALS MISCELLANEOUS AS NEEDED
Status: DISCONTINUED | OUTPATIENT
Start: 2021-07-16 | End: 2021-07-19 | Stop reason: HOSPADM

## 2021-07-16 RX ORDER — ROPINIROLE 1 MG/1
1 TABLET, FILM COATED ORAL
Status: DISCONTINUED | OUTPATIENT
Start: 2021-07-16 | End: 2021-07-19 | Stop reason: HOSPADM

## 2021-07-16 RX ORDER — ONDANSETRON 2 MG/ML
4 INJECTION INTRAMUSCULAR; INTRAVENOUS
Status: DISCONTINUED | OUTPATIENT
Start: 2021-07-16 | End: 2021-07-19 | Stop reason: HOSPADM

## 2021-07-16 RX ORDER — IPRATROPIUM BROMIDE 0.5 MG/2.5ML
500 SOLUTION RESPIRATORY (INHALATION)
Status: DISCONTINUED | OUTPATIENT
Start: 2021-07-16 | End: 2021-07-16

## 2021-07-16 RX ORDER — SODIUM CHLORIDE 0.9 % (FLUSH) 0.9 %
5-40 SYRINGE (ML) INJECTION EVERY 8 HOURS
Status: DISCONTINUED | OUTPATIENT
Start: 2021-07-16 | End: 2021-07-19 | Stop reason: HOSPADM

## 2021-07-16 RX ADMIN — IPRATROPIUM BROMIDE AND ALBUTEROL SULFATE 3 ML: .5; 3 SOLUTION RESPIRATORY (INHALATION) at 23:47

## 2021-07-16 RX ADMIN — BUDESONIDE 1000 MCG: 1 SUSPENSION RESPIRATORY (INHALATION) at 21:46

## 2021-07-16 RX ADMIN — IPRATROPIUM BROMIDE AND ALBUTEROL SULFATE 3 ML: .5; 3 SOLUTION RESPIRATORY (INHALATION) at 08:29

## 2021-07-16 RX ADMIN — ACETAMINOPHEN 650 MG: 325 TABLET ORAL at 14:40

## 2021-07-16 RX ADMIN — ENOXAPARIN SODIUM 40 MG: 40 INJECTION SUBCUTANEOUS at 08:15

## 2021-07-16 RX ADMIN — IPRATROPIUM BROMIDE AND ALBUTEROL SULFATE 3 ML: .5; 3 SOLUTION RESPIRATORY (INHALATION) at 12:00

## 2021-07-16 RX ADMIN — IPRATROPIUM BROMIDE AND ALBUTEROL SULFATE 3 ML: .5; 3 SOLUTION RESPIRATORY (INHALATION) at 21:46

## 2021-07-16 RX ADMIN — INSULIN LISPRO 2 UNITS: 100 INJECTION, SOLUTION INTRAVENOUS; SUBCUTANEOUS at 21:56

## 2021-07-16 RX ADMIN — ACETAMINOPHEN 650 MG: 325 TABLET ORAL at 08:32

## 2021-07-16 RX ADMIN — BUDESONIDE 1000 MCG: 1 SUSPENSION RESPIRATORY (INHALATION) at 12:00

## 2021-07-16 RX ADMIN — THERA TABS 1 TABLET: TAB at 08:17

## 2021-07-16 RX ADMIN — IPRATROPIUM BROMIDE AND ALBUTEROL SULFATE 3 ML: .5; 3 SOLUTION RESPIRATORY (INHALATION) at 16:00

## 2021-07-16 RX ADMIN — LORAZEPAM 1 MG: 1 TABLET ORAL at 21:37

## 2021-07-16 RX ADMIN — METHYLPREDNISOLONE SODIUM SUCCINATE 40 MG: 40 INJECTION, POWDER, FOR SOLUTION INTRAMUSCULAR; INTRAVENOUS at 08:16

## 2021-07-16 RX ADMIN — INSULIN LISPRO 2 UNITS: 100 INJECTION, SOLUTION INTRAVENOUS; SUBCUTANEOUS at 12:17

## 2021-07-16 RX ADMIN — IOPAMIDOL 100 ML: 755 INJECTION, SOLUTION INTRAVENOUS at 00:28

## 2021-07-16 RX ADMIN — Medication 10 ML: at 21:37

## 2021-07-16 RX ADMIN — AZITHROMYCIN 500 MG: 500 INJECTION, POWDER, LYOPHILIZED, FOR SOLUTION INTRAVENOUS at 21:34

## 2021-07-16 RX ADMIN — Medication 10 ML: at 14:46

## 2021-07-16 RX ADMIN — CEFTRIAXONE SODIUM 2 G: 2 INJECTION, POWDER, FOR SOLUTION INTRAMUSCULAR; INTRAVENOUS at 21:37

## 2021-07-16 RX ADMIN — METHYLPREDNISOLONE SODIUM SUCCINATE 40 MG: 40 INJECTION, POWDER, FOR SOLUTION INTRAMUSCULAR; INTRAVENOUS at 21:37

## 2021-07-16 RX ADMIN — Medication 10 ML: at 09:47

## 2021-07-16 RX ADMIN — ROPINIROLE HYDROCHLORIDE 1 MG: 1 TABLET, FILM COATED ORAL at 21:37

## 2021-07-16 NOTE — PROGRESS NOTES
Substitution Information per the P&T Committee approved Therapeutic Interchanges Policy    Nonformulary Medication Formulary Interchange   tiotropium bromide inhalations (Spiriva Respimat; Handihaler) daily Ipratropium (ATROVENT) 0.5 mg/2.5 mL nebulizer q8h

## 2021-07-16 NOTE — ED NOTES
TRANSFER - OUT REPORT:    Verbal report given to Briana Waddell RN (name) on Becca Apo  being transferred to CVT Stepdown (unit) for routine progression of care       Report consisted of patients Situation, Background, Assessment and   Recommendations(SBAR). Information from the following report(s) SBAR, ED Summary and MAR was reviewed with the receiving nurse. Opportunity for questions and clarification was provided.       Patient transported with:   Monitor  O2 @ 4  liters  Registered Nurse  [facility]

## 2021-07-16 NOTE — PROGRESS NOTES
conducted an initial consultation and Spiritual Assessment for Jackson Miguel, who is a 78 y.o.,female. Patient's Primary Language is: [place].    According to the patient's EMR Zoroastrian Affiliation is: Non Yarsani.     The reason the Patient came to the hospital is:   Patient Active Problem List    Diagnosis Date Noted    Community acquired pneumonia 07/16/2021    Acute bronchitis with chronic obstructive pulmonary disease (COPD) ([address] 07/16/2021    Respiratory failure ([address] 07/16/2021    ERRONEOUS ENCOUNTER--DISREGARD 08/17/2020    Chronic diastolic congestive heart failure ([address] 07/16/2020    Incisional hernia 02/17/2020    History of colon cancer 07/31/2019    Ileostomy present ([address] 07/31/2019    Dizziness 04/11/2019    Non-rheumatic mitral regurgitation 04/11/2019    Pulmonary HTN ([address] 04/11/2019    Chest pain 04/11/2019    Hypotension 02/28/2019    COPD exacerbation ([address] 02/28/2019    Post-op pain 10/28/2018    Obstruction of bowel ([address] 10/28/2018    Ileus following gastrointestinal surgery ([address] 10/28/2018    Colon cancer without distant metastasis ([address] 09/22/2018    Colon polyps 09/22/2018    COPD (chronic obstructive pulmonary disease) ([address] 09/20/2018    Pneumonia 09/20/2018    Abdominal pain 09/20/2018    Abdominal mass 09/20/2018    Acute exacerbation of chronic obstructive pulmonary disease (COPD) ([address] 08/14/2018    Degenerative disc disease, lumbar 08/14/2018    Left-sided low back pain with sciatica 08/14/2018    COPD with exacerbation ([address] 08/14/2018    Muscle spasm of back 09/12/2016    Sacroiliac joint pain 09/12/2016    Current chronic use of systemic steroids 09/12/2016    Baker's cyst of knee 09/12/2016    COPD with acute exacerbation ([address] 12/15/2015    Neuritis of lower extremity 11/18/2015    Lumbar spinal stenosis 11/13/2015    Facet arthritis of lumbar region 11/13/2015    Emphysema of lung ([address] 04/15/2015    Hemoptysis 02/05/2014    Nicotine addiction 05/16/2013    COPD, severe ([facility] [address]         The  provided the following Interventions:  Initiated a relationship of care and support. Explored issues of janelle, belief, spirituality and Amish/ritual needs while hospitalized. Listened empathically. Provided chaplaincy education. Provided information about Spiritual Care Services. Offered prayer and assurance of continued prayers on patient's behalf. Chart reviewed. The following outcomes where achieved:  Patient shared limited information about both their medical narrative and spiritual journey/beliefs. Patient processed feeling about current hospitalization. Patient expressed gratitude for 's visit. Assessment:  Patient does not have any Amish/cultural needs that will affect patient's preferences in health care. There are no spiritual or Amish issues which require intervention at this time. Plan:  Chaplains will continue to follow and will provide pastoral care on an as needed/requested basis.  recommends bedside caregivers page  on duty if patient shows signs of acute spiritual or emotional distress.     [address]   [phone]

## 2021-07-16 NOTE — PROGRESS NOTES
Reason for Admission:  Acute bronchitis with chronic obstructive pulmonary disease (COPD) ([facility] [J44.0, J20.9]  Community acquired pneumonia [J18.9]  Respiratory failure ([facility] [J96.90]                 RUR Score:    13 yes. Plan for utilizing home health:   SN/ Social work/ PT/OT                      Likelihood of Readmission:   LOW                         Transition of Care Plan:              Initial assessment completed with patient. Cognitive status of patient: oriented to time, place, person and situation. Face sheet information confirmed:  yes. The patient designates sister to participate in her discharge plan and to receive any needed information. This patient lives in a single family home with daughter. Patient is able to navigate 3steps as needed. Prior to hospitalization, patient was considered to be independent with ADLs/IADLS : no . If not independent,  patient needs assist with : food preparation, cooking and shopping, errands    Patient has a current ACP document on file: yes    Primary : Sister [address] [facility] Decision Maker:   Secondary Decision Maker: Suad Brink - Other Relative - [phone]    Click here to complete [address] including selection of the Healthcare Decision Maker Relationship (ie \"Primary\")    The sister will be available to transport patient home upon discharge. The patient already has Lalla Madeline, and oxygen through [employer] available in the home. Patient is not currently active with home health. If active, agency name is was to have [address] Skilled nurse and Social Work. Had no show and company did talk to pt. Pt stated did not follow back up so start of care happened. Would like to try them again if possible. Patient has not stayed in a skilled nursing facility or rehab. Was  stay within last 60 days : no.       This patient is on dialysis :no      List of available Home Health agencies were provided and reviewed with the patient prior to discharge. Freedom of choice signed: yes, for [facility] if can accommodate otherwise any company. Currently, the discharge plan is Home with [address]. The patient states that she can obtain her medications from the pharmacy, and take her medications as directed. Patient's current insurance is Medicare and [address] Management Interventions  PCP Verified by CM:  Yes Shamika Ratliff)  Last Visit to PCP: 06/16/21  Mode of Transport at Discharge: Self (family)  Transition of Care Consult (CM Consult): Discharge Planning  Current Support Network: Own Home (daughter lives with pt but not much help)  Confirm Follow Up Transport: Family  The Plan for Transition of Care is Related to the Following Treatment Goals : home health  The Patient and/or Patient Representative was Provided with a Choice of Provider and Agrees with the Discharge Plan?: Yes  Freedom of Choice List was Provided with Basic Dialogue that Supports the Patient's Individualized Plan of Care/Goals, Treatment Preferences and Shares the Quality Data Associated with the Providers?: Yes  Discharge Location  Discharge Placement: Home with home health        Loren Gong RN BSN  Outcomes Manager    Pager # [phone]

## 2021-07-16 NOTE — PROGRESS NOTES
Problem: Falls - Risk of  Goal: *Absence of Falls  Description: Document Maximilian Sites Fall Risk and appropriate interventions in the flowsheet.   Outcome: Progressing Towards Goal  Note: Fall Risk Interventions:  Mobility Interventions: Bed/chair exit alarm, Patient to call before getting OOB         Medication Interventions: Bed/chair exit alarm, Patient to call before getting OOB    Elimination Interventions: Bed/chair exit alarm, Call light in reach              Problem: Patient Education: Go to Patient Education Activity  Goal: Patient/Family Education  Outcome: Progressing Towards Goal     Problem: Pain  Goal: *Control of Pain  Outcome: Progressing Towards Goal     Problem: Patient Education: Go to Patient Education Activity  Goal: Patient/Family Education  Outcome: Progressing Towards Goal     Problem: Chronic Obstructive Pulmonary Disease (COPD)  Goal: *Absence of hypoxia  Outcome: Progressing Towards Goal     Problem: Patient Education: Go to Patient Education Activity  Goal: Patient/Family Education  Outcome: Progressing Towards Goal

## 2021-07-16 NOTE — ROUTINE PROCESS
TRANSFER - IN REPORT:    Verbal report received from Paz Swan RN(name) on Rasheed Saenz  being received from ED(unit) for routine progression of care      Report consisted of patients Situation, Background, Assessment and   Recommendations(SBAR). Information from the following report(s) SBAR and MAR was reviewed with the receiving nurse. Opportunity for questions and clarification was provided. Assessment completed upon patients arrival to unit and care assumed.

## 2021-07-16 NOTE — DISCHARGE INSTR - DIET
Continue Oral Nutrition Supplement (ONS) at discharge. Recommend Ensure Enlive or a comparable/similiar product Three times daily for 30 days unless otherwise directed by your Primary Care Physician.      Yusef Joshua RD

## 2021-07-16 NOTE — PROGRESS NOTES
Comprehensive Nutrition Assessment    Type and Reason for Visit: Initial, Positive nutrition screen    Nutrition Recommendations/Plan:  - Add supplements: Ensure Enlive, BID & Magic Cup once daily. - Continue daily multivitamin.  - Monitor and encourage po intake as tolerated, update food preferences. Nutrition Assessment:  Regular diet with poor appetite with no po intake over the past 2 days, generalized decreased appetite with wt loss over the years. Was able to consume 50% of a breakfast biscuit provided by family this morning. NFPE completed & agreeable to supplements. Malnutrition Assessment:  Malnutrition Status:  Severe malnutrition    Context:  Chronic illness     Findings of the 6 clinical characteristics of malnutrition:   Energy Intake:  7 - 75% or less est energy requirements for 1 month or longer  Weight Loss:  Mild weight loss (specify amount and time period) (x many years)     Body Fat Loss:  7 - Severe body fat loss, Fat overlying ribs, Orbital   Muscle Mass Loss:  7 - Severe muscle mass loss, Temples (temporalis), Clavicles (pectoralis &deltoids), Thigh (quadriceps), Scapula (trapezius)  Fluid Accumulation:  Unable to assess,     Strength:  Not performed     Nutrition History and Allergies: PMHx- COPD on home oxygen, colon cancer s/p right hemicolectomy with ileostomy & reversal, anxiety, depression, DVT and vitamin D deficiency. Decreased appetite x several years with inadequate intake over the past few months, occasionally able to consume Premier protein but not regularly. Does take daily MVI. Wt loss over many years, recently with a few lb wt loss from UBW of 120#. Wt hx per chart 120# (7/16/20), 122# (12/2/20), 116# (6/4/21), 117# (7/16/21). Shellfish allergy. Estimated Daily Nutrient Needs:  Energy (kcal): 6918-8183; Weight Used for Energy Requirements:    Protein (g): 53-80;  Weight Used for Protein Requirements: Current (1-1.5)  Fluid (ml/day): 4063-1122; Method Used for Fluid Requirements: 1 ml/kcal    Nutrition Related Findings:  BM 7/14. SSI, steroid, theragran. BiPAP at night and prn per discussion with pulmonology. Wounds:    None       Current Nutrition Therapies:  ADULT DIET Regular    Anthropometric Measures:  · Height:  5' 7\" (170.2 cm)  · Current Body Wt:  53.3 kg (117 lb 8.1 oz)   · Admission Body Wt:  117 lb 8.1 oz    · Usual Body Wt:  55.3 kg (122 lb) (12/2/20)     · Ideal Body Wt:  135 lbs:  87 %   · BMI Category:  Underweight (BMI less than 22) age over 72       Nutrition Diagnosis:   · Severe malnutrition, In context of chronic illness related to impaired respiratory function, catabolic illness, early satiety (decreased appetite) as evidenced by poor intake prior to admission, intake 26-50%, weight loss, severe muscle loss, severe loss of subcutaneous fat    Nutrition Interventions:   Food and/or Nutrient Delivery: Continue current diet, Start oral nutrition supplement, Vitamin supplement, Mineral supplement  Nutrition Education and Counseling: Education not indicated  Coordination of Nutrition Care: Continue to monitor while inpatient    Goals:  PO nutrition intake will continue to meet >75% of patients estimated nutritional needs over the next 7 days.        Nutrition Monitoring and Evaluation:   Behavioral-Environmental Outcomes: None identified  Food/Nutrient Intake Outcomes: Food and nutrient intake, Supplement intake, Vitamin/mineral intake  Physical Signs/Symptoms Outcomes: Biochemical data, Meal time behavior, Nutrition focused physical findings, Weight    Discharge Planning:    Continue oral nutrition supplement, Continue current diet (daily MVI)     Electronically signed by Katarina Moreno RD on 7/16/2021 at 2:54 PM    Contact: [phone]

## 2021-07-16 NOTE — H&P
History and Physical    Patient: Walter Rob MRN: [id number]  SSN: [id number]    YOB: 1945  Age: 78 y.o. Sex: female      Marcos Salazar MD    C/C : Respiratory failure    Subjective:      Walter Rob is a 78 y.o. female with past medical history of COPD on home oxygen, colon cancer s/p right hemicolectomy, steroid-dependent, history of off-and-on hemoptysis which is chronic. History of smoking off and on is being admitted for acute on chronic respiratory failure with hypoxia requiring BiPAP on admission. History obtained from chart review patient and ED MD discussion    Patient who has extremely compromised lung functions on home O2 came to emergency room with complaint of shortness of breath cough and fever for 2 days(rapid Covid test negative) . Initially patient's O2 saturation was 91% however her respiratory rate was close to 40. Initially patient was placed on BiPAP, patient remained comfortable no distress further noted. ED has consulted pulmonary who is going to consult patient for further management. Currently patient is on 4 L nasal cannula maintaining oxygen saturation vitals are stable afebrile.          Past Medical History:   Diagnosis Date    Anxiety     Arthritis     Asbestosis ([address]     Asthma     Back problem     Baker's cyst     Balance problems     Chronic lung disease     Cigarette smoker     Clotting disorder (HCC)     COPD (chronic obstructive pulmonary disease) (HCC)     oxygen 2/liters asbestosis    Depression     History of DVT (deep vein thrombosis)     Leg pain     Right    Lung disease     Nausea & vomiting     Osteoporosis     Restless leg syndrome     Spinal stenosis     Thromboembolus ([address]     Vitamin D deficiency      Past Surgical History:   Procedure Laterality Date    COLONOSCOPY N/A 9/22/2018    COLONOSCOPY w bx w polypectonies performed by Alexa Kumar MD at [address] COLONOSCOPY N/A 11/6/2018    COLONOSCOPY performed by Wil Taylor MD at [address] [facility] COLONOSCOPY N/A 8/2/2019    COLONOSCOPY with polypectomies and biopsies performed by Pavan Frederick MD at [address] [facility] HX APPENDECTOMY      HX BACK SURGERY      x4    HX BREAST BIOPSY      left    HX GI      exp lap and ileostomy    HX HEENT      cataract right    HX HYSTERECTOMY      HX LUMBAR LAMINECTOMY      HX MENISCUS REPAIR      HX ORTHOPAEDIC      Knee bakers cyst    HX POLYPECTOMY      right colectomy    HX TONSILLECTOMY      HX VEIN STRIPPING      VT LAP,SURG,COLECTOMY, PARTIAL, W/ANAST N/A 10/23/2018    Dr. Jose R Chambers N/A 11/06/2018    Dr. Shemar Linton      vein stripping      Family History   Problem Relation Age of Onset    Cancer Father     Heart Disease Mother     Hypertension Mother     Cancer Brother     Cancer Sister     Diabetes Other     Hypertension Other     Stroke Other     Heart Disease Sister     Hypertension Sister     Heart Disease Brother      Social History     Tobacco Use    Smoking status: Current Some Day Smoker     Packs/day: 1.00     Years: 50.00     Pack years: 50.00     Types: Cigarettes     Start date: 10/21/1964    Smokeless tobacco: Never Used   Substance Use Topics    Alcohol use: Never      Prior to Admission medications    Medication Sig Start Date End Date Taking? Authorizing Provider   Ventolin HFA 90 mcg/actuation inhaler inhale 2 puffs by mouth and INTO THE LUNGS every 4 hours if needed for wheezing shortness of breath 4/27/21   Martha Carter MD   predniSONE (DELTASONE) 10 mg tablet Take 10 mg by mouth daily (with breakfast). 3/19/21   Martha Carter MD   tiotropium bromide (Spiriva Respimat) 2.5 mcg/actuation inhaler Take 2 Puffs by inhalation daily.  7/29/20   Martha Carter MD   albuterol (PROVENTIL VENTOLIN) 2.5 mg /3 mL (0.083 %) nebulizer solution 3 mL by Nebulization route every four (4) hours as needed for Wheezing or Shortness of Breath. ICD: J44.9, R09.02 file under Medicare Part B 4/16/19   Jovan RENNER NP   fluticasone propion-salmeterol (ADVAIR DISKUS) 250-50 mcg/dose diskus inhaler Take 1 Puff by inhalation two (2) times a day. BRAND NAME ONLY 4/11/19   Dawn Cope MD   simethicone (GAS-X) 80 mg chewable tablet Take 80 mg by mouth every six (6) hours as needed for Flatulence. Provider, Historical   therapeutic multivitamin (THERAGRAN) tablet Take 1 Tab by mouth daily. 9/24/18   Margaret Soliz MD   acetaminophen (TYLENOL EXTRA STRENGTH) 500 mg tablet Take 500 mg by mouth every six (6) hours as needed for Pain. Provider, Historical   LORazepam (ATIVAN) 1 mg tablet Take  by mouth two (2) times daily as needed for Anxiety. Provider, Historical   rOPINIRole (REQUIP) 1 mg tablet Take 1 mg by mouth nightly. Provider, Historical   OXYGEN-AIR DELIVERY SYSTEMS 2 L by Does Not Apply route. O2 via nasal cannula with bedtime and activity. O2 Company: [facility]    Provider, Historical        Allergies   Allergen Reactions    Anoro Ellipta [Umeclidinium-Vilanterol] Rash and Other (comments)     Muscle cramps in arms    Aspirin Other (comments)     GI upset and bleeding    Augmentin [Amoxicillin-Pot Clavulanate] Not Reported This Time     Possible cramping    Doxycycline Nausea and Vomiting    Morphine Other (comments)     Neurologic symptoms - severe agitation      Shellfish Derived Itching     Pt able to eat small amounts per report     Sulfa (Sulfonamide Antibiotics) Rash     Patient relates no longer allergic    Kenzie Lind [Fluticasone Propion-Salmeterol] Other (comments)     Mouth Sores           Review of Systems:  positive responses in bold type   Constitutional: Negative for fever, chills, diaphoresis and unexpected weight change. HENT: Negative for ear pain, congestion, sore throat, rhinorrhea, drooling, trouble swallowing, neck pain and tinnitus.    Eyes: Negative for photophobia, pain, redness and visual disturbance. Respiratory: negative for shortness of breath, cough, choking, chest tightness, wheezing or stridor. Cardiovascular: Negative for chest pain, palpitations and leg swelling. Gastrointestinal: Negative for nausea, vomiting, abdominal pain, diarrhea, constipation, blood in stool, abdominal distention and anal bleeding. Genitourinary: Negative for dysuria, urgency, frequency, hematuria, flank pain and difficulty urinating. Musculoskeletal: Negative for back pain and arthralgias. Skin: Negative for color change, rash and wound. Neurological: Negative for dizziness, seizures, syncope, speech difficulty, light-headedness or headaches. Hematological: Does not bruise/bleed easily. Psychiatric/Behavioral: Negative for suicidal ideas, hallucinations, behavioral problems, self-injury or agitation         Objective: Body mass index is 18.4 kg/m².   Vitals:    07/16/21 0244 07/16/21 0251 07/16/21 0300 07/16/21 0356   BP: (!) 140/105 (!) 103/57 (!) 102/58 108/65   Pulse: 84 81 84 81   Resp: 16 21 26 24   Temp:   98.7 °F (37.1 °C) 97.9 °F (36.6 °C)   SpO2: 97% 98% 97% 98%   Weight:    53.3 kg (117 lb 8 oz)   Height:    5' 7\" (1.702 m)        Physical Exam:  General appearance - alert, well appearing, and in no distress and oriented to person, place, and time  Mental status - alert, oriented to person, place, and time  Eyes - pupils equal and reactive, extraocular eye movements intact  Ears - bilateral TM's and external ear canals normal  Chest -bilateral rhonchi present poor air entry both sides  Heart - normal rate, regular rhythm, normal S1, S2, no murmurs, rubs, clicks or gallops  Abdomen - soft, nontender, nondistended, no masses or organomegaly  Neurological - alert, oriented, normal speech, no focal findings or movement disorder noted  Extremities - peripheral pulses normal, no pedal edema, no clubbing or cyanosis          Hospital Problems  Date Reviewed: 6/4/2021        Codes Class Noted POA    Community acquired pneumonia ICD-10-CM: J18.9  ICD-9-CM: 375  7/16/2021 Unknown        Acute bronchitis with chronic obstructive pulmonary disease (COPD) ([facility] ICD-10-CM: J44.0, J20.9  ICD-9-CM: 491.22  7/16/2021 Unknown              CBC:  Lab Results   Component Value Date/Time    WBC 18.4 (H) 07/15/2021 08:26 PM    HGB 15.1 07/15/2021 08:26 PM    HCT 45.3 (H) 07/15/2021 08:26 PM    PLATELET 597 95/44/9756 08:26 PM    MCV 96.4 07/15/2021 08:26 PM        CMP:  Lab Results   Component Value Date/Time    Sodium 133 (L) 07/15/2021 08:26 PM    Potassium 3.9 07/15/2021 08:26 PM    Chloride 97 (L) 07/15/2021 08:26 PM    CO2 31 07/15/2021 08:26 PM    Anion gap 5 07/15/2021 08:26 PM    Glucose 93 07/15/2021 08:26 PM    BUN 16 07/15/2021 08:26 PM    Creatinine 0.90 07/15/2021 08:26 PM    BUN/Creatinine ratio 18 07/15/2021 08:26 PM    GFR est AA >60 07/15/2021 08:26 PM    GFR est non-AA >60 07/15/2021 08:26 PM    Calcium 9.7 07/15/2021 08:26 PM    Alk. phosphatase 114 07/15/2021 08:26 PM    Protein, total 8.5 (H) 07/15/2021 08:26 PM    Albumin 3.9 07/15/2021 08:26 PM    Globulin 4.6 (H) 07/15/2021 08:26 PM    A-G Ratio 0.8 07/15/2021 08:26 PM    ALT (SGPT) 20 07/15/2021 08:26 PM        PT/INR  Lab Results   Component Value Date/Time    INR 1.0 11/06/2018 04:00 AM    INR 0.9 10/28/2018 04:29 PM    INR 0.9 10/09/2018 10:02 AM    Prothrombin time 13.1 11/06/2018 04:00 AM    Prothrombin time 11.8 10/28/2018 04:29 PM    Prothrombin time 11.7 10/09/2018 10:02 AM            EKG: No results found for this or any previous visit. CTA chest:   IMPRESSION  1. No evidence of pulmonary embolus or dissection.     2.  Tree-in-bud changes and consolidation with right lower lobe brachial  opacification and mild tree-in-bud changes on the left concerning for infectious  process.     3. COPD changes.      Assessment And  Plan:     1 acute on chronic hypoxic respiratory failure  2 COPD exacerbation  3 right lower lobe Pneumonia- CAP  4 steroid-dependent    Planning    -   For acute respiratory failure: Continue supplemental oxygen, pulmonary hygiene, aspiration precautions    Pneumonia-currently patient on Zithromax and Rocephin, so far rapid Covid and antigens are negative    COPD exacerbation: Chronic O2 and steroid-dependent, now due to pneumonia and secretions worsening respiratory failure, increase the maintenance dose of steroid, continue antibiotics, O2, bronchodilators      Further recommendations per pulmonary        Signed By: Mariella Perry MD     July 16, 2021

## 2021-07-16 NOTE — ROUTINE PROCESS
Bedside shift change report given to [address] Linder RN (oncoming nurse) by Reji Vee RN (offgoing nurse).  Report included the following information SBAR, ED Summary, Procedure Summary, Intake/Output, MAR, Recent Results and Cardiac Rhythm SR.

## 2021-07-16 NOTE — PROGRESS NOTES
Received pt via stretcher on 4L, NC. She is alert and oriented. c/o neck and back pain rated 8/10. Call light within pt's reach. 09: Dr. Olivas Gather informed of pt's desire for breakfast, MD to place order.

## 2021-07-16 NOTE — PROGRESS NOTES
Patient admitted this morning, seen for follow-up. Patient feeling better, shortness with improving. Still gets short of breath on exertion. Visit Vitals  /62   Pulse 86   Temp 98.2 °F (36.8 °C)   Resp 23   Ht 5' 7\" (1.702 m)   Wt 53.3 kg (117 lb 8 oz)   SpO2 95%   BMI 18.40 kg/m²       Exam  General:  Alert, cooperative, no acute distress    Pulmonary: Bilateral air entry good, coarse breath sounds, expiratory wheezing. Cardiovascular: Regular rate and Rhythm. GI:  Soft, Non distended, Non tender. + Bowel sounds. Extremities:  No edema, cyanosis, clubbing. No calf tenderness. Psych: Good insight. Not anxious or agitated. Neurologic: Alert and oriented X 3. No acute neuro deficits. Labs, chart, and vitals noted    We will continue IV steroids, bronchodilators and O2 supplement. We will continue antibiotics  Discussed with pulmonary Dr. La Pruitt  Discussed with the patient and had extensive discussion about smoking cessation. Disclaimer: Sections of this note are dictated using utilizing voice recognition software. Minor typographical errors may be present. If questions arise, please do not hesitate to contact me or call our department.

## 2021-07-16 NOTE — PROGRESS NOTES
KAM spoke with Hannah from [facility]. They are following this pt.     Ezekiel Hernandez RN BSN  Care Manager  [phone]

## 2021-07-16 NOTE — PROGRESS NOTES
Problem: Falls - Risk of  Goal: *Absence of Falls  Description: Document Ric Rockwell Fall Risk and appropriate interventions in the flowsheet.   Outcome: Progressing Towards Goal  Note: Fall Risk Interventions:  Mobility Interventions: Bed/chair exit alarm, Communicate number of staff needed for ambulation/transfer, Patient to call before getting OOB      Medication Interventions: Bed/chair exit alarm, Evaluate medications/consider consulting pharmacy, Patient to call before getting OOB, Teach patient to arise slowly    Elimination Interventions: Call light in reach, Patient to call for help with toileting needs, Toilet paper/wipes in reach, Bed/chair exit alarm       Problem: Chronic Obstructive Pulmonary Disease (COPD)  Goal: *Oxygen saturation during activity within specified parameters  Outcome: Progressing Towards Goal  Goal: *Able to remain out of bed as prescribed  Outcome: Progressing Towards Goal  Goal: *Absence of hypoxia  Outcome: Progressing Towards Goal  Goal: *Optimize nutritional status  Outcome: Progressing Towards Goal     Problem: Pain  Goal: *Control of Pain  Outcome: Progressing Towards Goal     Problem: Anxiety  Goal: *Alleviation of anxiety  Outcome: Progressing Towards Goal  Goal: *Alleviation of anxiety (Palliative Care)  Outcome: Progressing Towards Goal

## 2021-07-16 NOTE — PROGRESS NOTES
[address]     NAME: Jdmikaelcarri Rufina  AGE: 48 y o  SEX: male  : 1968     DATE: 2021     Assessment and Plan:     Problem List Items Addressed This Visit     None      Visit Diagnoses     Colon cancer screening    -  Primary    Relevant Orders    Ambulatory referral to Gastroenterology    Annual physical exam        Screening for prostate cancer        Relevant Orders    PSA, Total Screen    Screening for diabetes mellitus        Relevant Orders    Comprehensive metabolic panel    Hemoglobin A1C    Screening for cardiovascular condition        Relevant Orders    Lipid panel          Immunizations and preventive care screenings were discussed with patient today  Appropriate education was printed on patient's after visit summary  Counseling:  Dental Health: discussed importance of regular tooth brushing, flossing, and dental visits  Injury prevention: discussed safety/seat belts, safety helmets, smoke detectors, carbon dioxide detectors, and smoking near bedding or upholstery  · Exercise: the importance of regular exercise/physical activity was discussed  Recommend exercise 3-5 times per week for at least 30 minutes  BMI Counseling: Body mass index is 25 85 kg/m²  The BMI is above normal  Nutrition recommendations include encouraging healthy choices of fruits and vegetables, consuming healthier snacks, limiting drinks that contain sugar, moderation in carbohydrate intake, increasing intake of lean protein, reducing intake of saturated and trans fat and reducing intake of cholesterol  Exercise recommendations include moderate physical activity 150 minutes/week  No pharmacotherapy was ordered  No follow-ups on file  Chief Complaint:     Chief Complaint   Patient presents with    Annual Exam     Pt states he is here for an annual physical exam with no concerns         History of Sultana Rosario presents today for   Chief Complaint   Patient presents with    COPD     CXR done 12/7    Pre-op Exam    Other     Resp Failure, Hypoxemia     Shortness of Breath       Is someone accompanying this pt? Daughter     Is the patient using any DME equipment during [address]? [employer]     -D[employer]     Depression Screening:  3 most recent PHQ Screens 1/6/2020   PHQ Not Done -   Little interest or pleasure in doing things Several days   Feeling down, depressed, irritable, or hopeless Several days   Total Score PHQ 2 2   Trouble falling or staying asleep, or sleeping too much -   Feeling tired or having little energy -   Poor appetite, weight loss, or overeating -   Feeling bad about yourself - or that you are a failure or have let yourself or your family down -   Trouble concentrating on things such as school, work, reading, or watching TV -   Moving or speaking so slowly that other people could have noticed; or the opposite being so fidgety that others notice -   Thoughts of being better off dead, or hurting yourself in some way -   PHQ 9 Score -   How difficult have these problems made it for you to do your work, take care of your home and get along with others -       Learning Assessment:  Learning Assessment 9/11/2017   PRIMARY LEARNER Patient   HIGHEST LEVEL OF EDUCATION - PRIMARY LEARNER  -   BARRIERS PRIMARY LEARNER -   PRIMARY LANGUAGE ENGLISH    NEED -   LEARNER PREFERENCE PRIMARY LISTENING     DEMONSTRATION   ANSWERED BY patient, Princess Noland     bsps   RELATIONSHIP SELF       Abuse Screening:  No flowsheet data found. Fall Risk  Fall Risk Assessment, last 12 mths 1/6/2020   Able to walk? Yes   Fall in past 12 months? Yes   Fall with injury? Yes   Number of falls in past 12 months 1   Fall Risk Score 2         Coordination of Care:  1. Have you been to the ER, urgent care clinic since your last visit? Hospitalized since your last visit? Yes; Name: [facility]    2.  Have you seen or Present Illness:     Adult Annual Physical   Patient here for a comprehensive physical exam  The patient reports no problems  Diet and Physical Activity  · Diet/Nutrition: well balanced diet and limited junk food  · Exercise: no formal exercise  Depression Screening  PHQ-9 Depression Screening    PHQ-9:   Frequency of the following problems over the past two weeks:      Little interest or pleasure in doing things: 0 - not at all  Feeling down, depressed, or hopeless: 0 - not at all  PHQ-2 Score: 0       General Health  · Sleep: sleeps well  · Hearing: normal - right  · Vision: goes for regular eye exams and wears glasses  · Dental: no dental visits for >1 year   Health  · Symptoms include: none     Review of Systems:     Review of Systems   Constitutional: Negative for appetite change, fatigue, fever and unexpected weight change  HENT: Negative for dental problem, ear pain, hearing loss, mouth sores, nosebleeds, rhinorrhea, tinnitus, trouble swallowing and voice change  Eyes: Negative for photophobia, pain, discharge and visual disturbance  Respiratory: Negative for cough, chest tightness, shortness of breath and wheezing  Cardiovascular: Negative for chest pain and palpitations  Gastrointestinal: Negative for abdominal pain, blood in stool, constipation, diarrhea and nausea  Endocrine: Negative for cold intolerance, polydipsia, polyphagia and polyuria  Genitourinary: Negative for decreased urine volume, difficulty urinating, dysuria, frequency, hematuria and urgency  Musculoskeletal: Positive for back pain  Negative for arthralgias, gait problem, joint swelling, myalgias, neck pain and neck stiffness  Skin: Negative for color change and rash  Allergic/Immunologic: Negative for environmental allergies, food allergies and immunocompromised state  Neurological: Negative for dizziness, seizures, speech difficulty, light-headedness and headaches     Hematological: Negative consulted any other health care providers outside of the [facility] since your last visit? Include any pap smears or colon screening.  Dr Kristopehr Vo PCP, Dr Amara Douglas for adenopathy  Does not bruise/bleed easily  Psychiatric/Behavioral: Negative for behavioral problems, confusion, decreased concentration, self-injury and sleep disturbance  The patient is not nervous/anxious and is not hyperactive  Past Medical History:     History reviewed  No pertinent past medical history  Past Surgical History:     Past Surgical History:   Procedure Laterality Date    ANKLE SURGERY      Last Assessed: 1/19/2015    OSTEOTOMY      Osteotomies of the femoral shaft with realignment on mary; Last Assessed: 1/19/2015      Family History:     Family History   Problem Relation Age of Onset    Other Mother         Unobtainable    Other Father         Unobtainable      Social History:     Social History     Socioeconomic History    Marital status: /Civil Union     Spouse name: None    Number of children: None    Years of education: None    Highest education level: None   Occupational History    None   Tobacco Use    Smoking status: Never Smoker    Smokeless tobacco: Never Used    Tobacco comment: former smoker per [employer]   Substance and Sexual Activity    Alcohol use: No     Comment: consumes alcohol occasionally per [employer]    Drug use: No    Sexual activity: None   Other Topics Concern    None   Social History Narrative    None     Social Determinants of Health     Financial Resource Strain:     Difficulty of Paying Living Expenses:    Food Insecurity:     Worried About Running Out of Food in the Last Year:     Ran Out of Food in the Last Year:    Transportation Needs:     Lack of Transportation (Medical):      Lack of Transportation (Non-Medical):    Physical Activity:     Days of Exercise per Week:     Minutes of Exercise per Session:    Stress:     Feeling of Stress :    Social Connections:     Frequency of Communication with Friends and Family:     Frequency of Social Gatherings with Friends and Family:     Attends Hinduism Services:     Active Member of Clubs or Organizations:     Attends Club or Organization Meetings:     Marital Status:    Intimate Partner Violence:     Fear of Current or Ex-Partner:     Emotionally Abused:     Physically Abused:     Sexually Abused:       Current Medications:     Current Outpatient Medications   Medication Sig Dispense Refill    ibuprofen (MOTRIN) 200 mg tablet Take 3 tablets (600 mg total) by mouth once for 1 dose 3 tablet 0     No current facility-administered medications for this visit  Allergies:     No Known Allergies   Physical Exam:     /72 (BP Location: Left arm, Patient Position: Sitting, Cuff Size: Adult)   Pulse 71   Temp (!) 97 1 °F (36 2 °C) (Tympanic)   Ht 5' 8" (1 727 m)   Wt 77 1 kg (170 lb)   SpO2 99%   BMI 25 85 kg/m²     Physical Exam  Vitals and nursing note reviewed  Constitutional:       Appearance: Normal appearance  He is well-developed and normal weight  HENT:      Head: Normocephalic  Right Ear: Hearing, tympanic membrane, ear canal and external ear normal       Left Ear: Hearing, tympanic membrane, ear canal and external ear normal       Nose: Nose normal       Mouth/Throat:      Mouth: Mucous membranes are moist       Pharynx: Oropharynx is clear  Uvula midline  Eyes:      Extraocular Movements: Extraocular movements intact  Conjunctiva/sclera: Conjunctivae normal    Neck:      Thyroid: No thyromegaly  Vascular: No carotid bruit  Cardiovascular:      Rate and Rhythm: Normal rate and regular rhythm  Pulses: Normal pulses  Heart sounds: Normal heart sounds  Pulmonary:      Effort: Pulmonary effort is normal       Breath sounds: Normal breath sounds  Abdominal:      General: Bowel sounds are normal       Palpations: Abdomen is soft  There is no mass  Tenderness: There is no abdominal tenderness  There is no right CVA tenderness or left CVA tenderness  Musculoskeletal:         General: Normal range of motion        Cervical back: Normal range of motion and neck supple  Right lower leg: No edema  Left lower leg: No edema  Lymphadenopathy:      Cervical: No cervical adenopathy  Skin:     General: Skin is warm and dry  Capillary Refill: Capillary refill takes less than 2 seconds  Neurological:      General: No focal deficit present  Mental Status: He is alert and oriented to person, place, and time  Psychiatric:         Mood and Affect: Mood normal          Behavior: Behavior normal          Thought Content:  Thought content normal          Judgment: Judgment normal           PERNELL [place] [id number]

## 2021-07-16 NOTE — REMOTE MONITORING
Spoke with primary RN Deana Mackey regarding 6-hour Sepsis bundle.     Thank you,     Wiliam Zhang, BSN RN   [address] [facility]  Callback # [phone]

## 2021-07-17 LAB
ANION GAP SERPL CALC-SCNC: 6 MMOL/L (ref 3–18)
ARTERIAL PATENCY WRIST A: POSITIVE
BASE EXCESS BLD CALC-SCNC: 2.5 MMOL/L
BASOPHILS # BLD: 0 K/UL (ref 0–0.1)
BASOPHILS NFR BLD: 0 % (ref 0–2)
BDY SITE: ABNORMAL
BUN SERPL-MCNC: 33 MG/DL (ref 7–18)
BUN/CREAT SERPL: 42 (ref 12–20)
CALCIUM SERPL-MCNC: 8.8 MG/DL (ref 8.5–10.1)
CHLORIDE SERPL-SCNC: 101 MMOL/L (ref 100–111)
CO2 SERPL-SCNC: 29 MMOL/L (ref 21–32)
CREAT SERPL-MCNC: 0.78 MG/DL (ref 0.6–1.3)
DIFFERENTIAL METHOD BLD: ABNORMAL
EOSINOPHIL # BLD: 0 K/UL (ref 0–0.4)
EOSINOPHIL NFR BLD: 0 % (ref 0–5)
ERYTHROCYTE [DISTWIDTH] IN BLOOD BY AUTOMATED COUNT: 13 % (ref 11.6–14.5)
GAS FLOW.O2 O2 DELIVERY SYS: ABNORMAL L/MIN
GLUCOSE BLD STRIP.AUTO-MCNC: 124 MG/DL (ref 70–110)
GLUCOSE BLD STRIP.AUTO-MCNC: 131 MG/DL (ref 70–110)
GLUCOSE BLD STRIP.AUTO-MCNC: 138 MG/DL (ref 70–110)
GLUCOSE BLD STRIP.AUTO-MCNC: 147 MG/DL (ref 70–110)
GLUCOSE SERPL-MCNC: 143 MG/DL (ref 74–99)
HCO3 BLD-SCNC: 27.1 MMOL/L (ref 22–26)
HCT VFR BLD AUTO: 34.9 % (ref 35–45)
HGB BLD-MCNC: 11.5 G/DL (ref 12–16)
LYMPHOCYTES # BLD: 0.7 K/UL (ref 0.9–3.6)
LYMPHOCYTES NFR BLD: 5 % (ref 21–52)
MAGNESIUM SERPL-MCNC: 2.5 MG/DL (ref 1.6–2.6)
MCH RBC QN AUTO: 31.3 PG (ref 24–34)
MCHC RBC AUTO-ENTMCNC: 33 G/DL (ref 31–37)
MCV RBC AUTO: 94.8 FL (ref 74–97)
MONOCYTES # BLD: 0.4 K/UL (ref 0.05–1.2)
MONOCYTES NFR BLD: 3 % (ref 3–10)
NEUTS SEG # BLD: 11.9 K/UL (ref 1.8–8)
NEUTS SEG NFR BLD: 91 % (ref 40–73)
O2/TOTAL GAS SETTING VFR VENT: 2 %
PCO2 BLD: 40.6 MMHG (ref 35–45)
PH BLD: 7.43 [PH] (ref 7.35–7.45)
PLATELET # BLD AUTO: 303 K/UL (ref 135–420)
PMV BLD AUTO: 10 FL (ref 9.2–11.8)
PO2 BLD: 69 MMHG (ref 80–100)
POTASSIUM SERPL-SCNC: 4.1 MMOL/L (ref 3.5–5.5)
RBC # BLD AUTO: 3.68 M/UL (ref 4.2–5.3)
SAO2 % BLD: 94.1 % (ref 92–97)
SERVICE CMNT-IMP: ABNORMAL
SODIUM SERPL-SCNC: 136 MMOL/L (ref 136–145)
SPECIMEN TYPE: ABNORMAL
WBC # BLD AUTO: 13.1 K/UL (ref 4.6–13.2)

## 2021-07-17 PROCEDURE — 74011250636 HC RX REV CODE- 250/636: Performed by: INTERNAL MEDICINE

## 2021-07-17 PROCEDURE — 36600 WITHDRAWAL OF ARTERIAL BLOOD: CPT

## 2021-07-17 PROCEDURE — 36415 COLL VENOUS BLD VENIPUNCTURE: CPT

## 2021-07-17 PROCEDURE — 82803 BLOOD GASES ANY COMBINATION: CPT

## 2021-07-17 PROCEDURE — 83735 ASSAY OF MAGNESIUM: CPT

## 2021-07-17 PROCEDURE — 74011250637 HC RX REV CODE- 250/637: Performed by: HOSPITALIST

## 2021-07-17 PROCEDURE — 94668 MNPJ CHEST WALL SBSQ: CPT

## 2021-07-17 PROCEDURE — 74011000250 HC RX REV CODE- 250: Performed by: INTERNAL MEDICINE

## 2021-07-17 PROCEDURE — 80048 BASIC METABOLIC PNL TOTAL CA: CPT

## 2021-07-17 PROCEDURE — 65660000004 HC RM CVT STEPDOWN

## 2021-07-17 PROCEDURE — 74011250637 HC RX REV CODE- 250/637: Performed by: INTERNAL MEDICINE

## 2021-07-17 PROCEDURE — 99232 SBSQ HOSP IP/OBS MODERATE 35: CPT | Performed by: INTERNAL MEDICINE

## 2021-07-17 PROCEDURE — 94762 N-INVAS EAR/PLS OXIMTRY CONT: CPT

## 2021-07-17 PROCEDURE — 99232 SBSQ HOSP IP/OBS MODERATE 35: CPT | Performed by: HOSPITALIST

## 2021-07-17 PROCEDURE — 82962 GLUCOSE BLOOD TEST: CPT

## 2021-07-17 PROCEDURE — 94667 MNPJ CHEST WALL 1ST: CPT

## 2021-07-17 PROCEDURE — 94640 AIRWAY INHALATION TREATMENT: CPT

## 2021-07-17 PROCEDURE — 85025 COMPLETE CBC W/AUTO DIFF WBC: CPT

## 2021-07-17 RX ORDER — MIDODRINE HYDROCHLORIDE 5 MG/1
5 TABLET ORAL 2 TIMES DAILY WITH MEALS
Status: DISCONTINUED | OUTPATIENT
Start: 2021-07-17 | End: 2021-07-19 | Stop reason: HOSPADM

## 2021-07-17 RX ORDER — PREDNISONE 20 MG/1
60 TABLET ORAL
Status: DISCONTINUED | OUTPATIENT
Start: 2021-07-18 | End: 2021-07-19 | Stop reason: HOSPADM

## 2021-07-17 RX ORDER — FAMOTIDINE 20 MG/1
20 TABLET, FILM COATED ORAL 2 TIMES DAILY
Status: DISCONTINUED | OUTPATIENT
Start: 2021-07-17 | End: 2021-07-19 | Stop reason: HOSPADM

## 2021-07-17 RX ADMIN — Medication 10 ML: at 06:20

## 2021-07-17 RX ADMIN — AZITHROMYCIN 500 MG: 500 INJECTION, POWDER, LYOPHILIZED, FOR SOLUTION INTRAVENOUS at 22:24

## 2021-07-17 RX ADMIN — ENOXAPARIN SODIUM 40 MG: 40 INJECTION SUBCUTANEOUS at 08:08

## 2021-07-17 RX ADMIN — BUDESONIDE 1000 MCG: 1 SUSPENSION RESPIRATORY (INHALATION) at 20:00

## 2021-07-17 RX ADMIN — IPRATROPIUM BROMIDE AND ALBUTEROL SULFATE 3 ML: .5; 3 SOLUTION RESPIRATORY (INHALATION) at 20:04

## 2021-07-17 RX ADMIN — FAMOTIDINE 20 MG: 20 TABLET, FILM COATED ORAL at 17:11

## 2021-07-17 RX ADMIN — BUDESONIDE 1000 MCG: 1 SUSPENSION RESPIRATORY (INHALATION) at 09:13

## 2021-07-17 RX ADMIN — LORAZEPAM 1 MG: 1 TABLET ORAL at 22:24

## 2021-07-17 RX ADMIN — METHYLPREDNISOLONE SODIUM SUCCINATE 40 MG: 40 INJECTION, POWDER, FOR SOLUTION INTRAMUSCULAR; INTRAVENOUS at 08:08

## 2021-07-17 RX ADMIN — FAMOTIDINE 20 MG: 20 TABLET, FILM COATED ORAL at 12:22

## 2021-07-17 RX ADMIN — THERA TABS 1 TABLET: TAB at 08:08

## 2021-07-17 RX ADMIN — Medication 10 ML: at 13:10

## 2021-07-17 RX ADMIN — IPRATROPIUM BROMIDE AND ALBUTEROL SULFATE 3 ML: .5; 3 SOLUTION RESPIRATORY (INHALATION) at 23:38

## 2021-07-17 RX ADMIN — METHYLPREDNISOLONE SODIUM SUCCINATE 40 MG: 40 INJECTION, POWDER, FOR SOLUTION INTRAMUSCULAR; INTRAVENOUS at 22:24

## 2021-07-17 RX ADMIN — IPRATROPIUM BROMIDE AND ALBUTEROL SULFATE 3 ML: .5; 3 SOLUTION RESPIRATORY (INHALATION) at 03:15

## 2021-07-17 RX ADMIN — IPRATROPIUM BROMIDE AND ALBUTEROL SULFATE 3 ML: .5; 3 SOLUTION RESPIRATORY (INHALATION) at 12:18

## 2021-07-17 RX ADMIN — IPRATROPIUM BROMIDE AND ALBUTEROL SULFATE 3 ML: .5; 3 SOLUTION RESPIRATORY (INHALATION) at 16:05

## 2021-07-17 RX ADMIN — CEFTRIAXONE SODIUM 2 G: 2 INJECTION, POWDER, FOR SOLUTION INTRAMUSCULAR; INTRAVENOUS at 22:24

## 2021-07-17 RX ADMIN — ACETAMINOPHEN 650 MG: 325 TABLET ORAL at 22:43

## 2021-07-17 RX ADMIN — IPRATROPIUM BROMIDE AND ALBUTEROL SULFATE 3 ML: .5; 3 SOLUTION RESPIRATORY (INHALATION) at 09:13

## 2021-07-17 RX ADMIN — MIDODRINE HYDROCHLORIDE 5 MG: 5 TABLET ORAL at 13:16

## 2021-07-17 RX ADMIN — ROPINIROLE HYDROCHLORIDE 1 MG: 1 TABLET, FILM COATED ORAL at 22:24

## 2021-07-17 NOTE — ROUTINE PROCESS
Bedside shift change report given to Madai Ortiz RN (oncoming nurse) by Maria Luisa Eastman RN (offgoing nurse). Report included the following information SBAR, Procedure Summary, Intake/Output, MAR and Recent Results.

## 2021-07-17 NOTE — PROGRESS NOTES
[facility] Hospitalist Group  Progress Note    Patient: Rasheed Saenz Age: 78 y.o. : 1945 MR#: [id number] SSN: [id number]  Date/Time: 2021     Subjective: Patient feels slightly better, still has shortness breath on exertion. No dizziness or stress. She did feel some dizzy this morning when she went to bathroom. Cough still there, minimal sputum production. Assessment/Plan:   1. Acute on chronic hypoxic respiratory failure  2. Acute COPD exacerbation  3. Sepsis with leukocytosis and tachycardia, POA due to #4  4. Right lower lobe Pneumonia- CAP  5. Mild hypotension, chronic   6. Active tobacco abuse   7. Cachexia   8. Hx Colon Ca s/p dain colectomy     Plan: We will continue IV steroids, bronchodilators and O2 supplement. Pulmonary input noted, discussed with Dr. Denny Torres   We will continue antibiotics with ceftriaxone Zithromax. Follow-up cultures. We will add low-dose of midodrine and titrate based on blood pressure. Advised on smoking cessation  Nutrition evaluation    Discussed with the patient and also with her sister at the bedside and explained in detail about my above plan care. Both of them understood and agreed with my plan.       Case discussed with:  [x]Patient  [x]Family  [x]Nursing  []Case Management  DVT Prophylaxis:  [x]Lovenox  []Hep SQ  []SCDs  []Coumadin   []Eliquis/Xarelto     Objective:   VS:   Visit Vitals  BP (!) 95/45 (BP 1 Location: Left upper arm, BP Patient Position: Semi fowlers)   Pulse 98   Temp 97.4 °F (36.3 °C)   Resp 27   Ht 5' 7\" (1.702 m)   Wt 53.4 kg (117 lb 11.2 oz)   SpO2 93%   BMI 18.43 kg/m²      Tmax/24hrs: Temp (24hrs), Av.1 °F (36.7 °C), Min:97.4 °F (36.3 °C), Max:98.6 °F (37 °C)  IOBRIEF    Intake/Output Summary (Last 24 hours) at 2021 1232  Last data filed at 2021 1200  Gross per 24 hour   Intake 660 ml   Output 200 ml   Net 460 ml       General:  Alert, cooperative, no acute distress    HEENT: PERRLA, anicteric sclerae. Pulmonary: Bilateral coarse breath sounds, some expiratory wheezing. No tachypnea. Cardiovascular: Regular rate and Rhythm. GI:  Soft, Non distended, Non tender. + Bowel sounds. Extremities:  No edema. No calf tenderness. Psych: Good insight. Not anxious or agitated. Neurologic: Alert and oriented X 3. Moves all ext.   Additional:    Medications:   Current Facility-Administered Medications   Medication Dose Route Frequency    famotidine (PEPCID) tablet 20 mg  20 mg Oral BID    midodrine (PROAMATINE) tablet 5 mg  5 mg Oral BID WITH MEALS    LORazepam (ATIVAN) tablet 1 mg  1 mg Oral QHS    methylPREDNISolone (PF) (SOLU-MEDROL) injection 40 mg  40 mg IntraVENous Q12H    rOPINIRole (REQUIP) tablet 1 mg  1 mg Oral QHS    simethicone (MYLICON) tablet 80 mg  80 mg Oral Q6H PRN    therapeutic multivitamin (THERAGRAN) tablet 1 Tablet  1 Tablet Oral DAILY    sodium chloride (NS) flush 5-40 mL  5-40 mL IntraVENous Q8H    sodium chloride (NS) flush 5-40 mL  5-40 mL IntraVENous PRN    acetaminophen (TYLENOL) tablet 650 mg  650 mg Oral Q6H PRN    Or    acetaminophen (TYLENOL) suppository 650 mg  650 mg Rectal Q6H PRN    polyethylene glycol (MIRALAX) packet 17 g  17 g Oral DAILY PRN    promethazine (PHENERGAN) tablet 12.5 mg  12.5 mg Oral Q6H PRN    Or    ondansetron (ZOFRAN) injection 4 mg  4 mg IntraVENous Q6H PRN    enoxaparin (LOVENOX) injection 40 mg  40 mg SubCUTAneous DAILY    insulin lispro (HUMALOG) injection   SubCUTAneous AC&HS    glucose chewable tablet 16 g  4 Tablet Oral PRN    glucagon (GLUCAGEN) injection 1 mg  1 mg IntraMUSCular PRN    dextrose (D50W) injection syrg 12.5-25 g  25-50 mL IntraVENous PRN    budesonide (PULMICORT) 1,000 mcg/2 mL nebulizer susp  1,000 mcg Nebulization BID RT    albuterol-ipratropium (DUO-NEB) 2.5 MG-0.5 MG/3 ML  3 mL Nebulization Q4H RT    sodium chloride (NS) flush 5-10 mL  5-10 mL IntraVENous PRN    cefTRIAXone (ROCEPHIN) 2 g in sterile water (preservative free) 20 mL IV syringe  2 g IntraVENous Q24H    azithromycin (ZITHROMAX) 500 mg in 0.9% sodium chloride 250 mL (VIAL-MATE)  500 mg IntraVENous Q24H       Labs:    Recent Results (from the past 24 hour(s))   GLUCOSE, POC    Collection Time: 07/16/21  3:52 PM   Result Value Ref Range    Glucose (POC) 122 (H) 70 - 110 mg/dL   GLUCOSE, POC    Collection Time: 07/16/21  9:48 PM   Result Value Ref Range    Glucose (POC) 191 (H) 70 - 110 mg/dL   BLOOD GAS, ARTERIAL POC    Collection Time: 07/17/21  3:10 AM   Result Value Ref Range    Device: NASAL CANNULA      FIO2 (POC) 2 %    pH (POC) 7.43 7.35 - 7.45      pCO2 (POC) 40.6 35.0 - 45.0 MMHG    pO2 (POC) 69 (L) 80 - 100 MMHG    HCO3 (POC) 27.1 (H) 22 - 26 MMOL/L    sO2 (POC) 94.1 92 - 97 %    Base excess (POC) 2.5 mmol/L    Allens test (POC) Positive      Site RIGHT RADIAL      Specimen type (POC) ARTERIAL      Performed by [facility]    METABOLIC PANEL, BASIC    Collection Time: 07/17/21  5:38 AM   Result Value Ref Range    Sodium 136 136 - 145 mmol/L    Potassium 4.1 3.5 - 5.5 mmol/L    Chloride 101 100 - 111 mmol/L    CO2 29 21 - 32 mmol/L    Anion gap 6 3.0 - 18 mmol/L    Glucose 143 (H) 74 - 99 mg/dL    BUN 33 (H) 7.0 - 18 MG/DL    Creatinine 0.78 0.6 - 1.3 MG/DL    BUN/Creatinine ratio 42 (H) 12 - 20      GFR est AA >60 >60 ml/min/1.73m2    GFR est non-AA >60 >60 ml/min/1.73m2    Calcium 8.8 8.5 - 10.1 MG/DL   CBC WITH AUTOMATED DIFF    Collection Time: 07/17/21  5:38 AM   Result Value Ref Range    WBC 13.1 4.6 - 13.2 K/uL    RBC 3.68 (L) 4.20 - 5.30 M/uL    HGB 11.5 (L) 12.0 - 16.0 g/dL    HCT 34.9 (L) 35.0 - 45.0 %    MCV 94.8 74.0 - 97.0 FL    MCH 31.3 24.0 - 34.0 PG    MCHC 33.0 31.0 - 37.0 g/dL    RDW 13.0 11.6 - 14.5 %    PLATELET 644 034 - 102 K/uL    MPV 10.0 9.2 - 11.8 FL    NEUTROPHILS 91 (H) 40 - 73 %    LYMPHOCYTES 5 (L) 21 - 52 %    MONOCYTES 3 3 - 10 %    EOSINOPHILS 0 0 - 5 %    BASOPHILS 0 0 - 2 %    ABS.  NEUTROPHILS 11.9 (H) 1.8 - 8.0 K/UL    ABS. LYMPHOCYTES 0.7 (L) 0.9 - 3.6 K/UL    ABS. MONOCYTES 0.4 0.05 - 1.2 K/UL    ABS. EOSINOPHILS 0.0 0.0 - 0.4 K/UL    ABS. BASOPHILS 0.0 0.0 - 0.1 K/UL    DF AUTOMATED     MAGNESIUM    Collection Time: 07/17/21  5:38 AM   Result Value Ref Range    Magnesium 2.5 1.6 - 2.6 mg/dL   GLUCOSE, POC    Collection Time: 07/17/21  7:51 AM   Result Value Ref Range    Glucose (POC) 131 (H) 70 - 110 mg/dL   GLUCOSE, POC    Collection Time: 07/17/21 11:45 AM   Result Value Ref Range    Glucose (POC) 124 (H) 70 - 110 mg/dL       Signed By: Estella Mittal MD     July 17, 2021      Disclaimer: Sections of this note are dictated using utilizing voice recognition software. Minor typographical errors may be present. If questions arise, please do not hesitate to contact me or call our department.

## 2021-07-17 NOTE — CONSULTS
[facility]. Pulmonary, Critical Care, and Sleep Medicine    Initial Patient Consult    Name: Darya Carty MRN: [id number]   : 1945 Hospital: [address] [place] [address] Dr   Date: 2021          This patient has been seen and evaluated at the request of Dr. Sejal Horn for dyspnea. IMPRESSION:   · Acute on chronic hypoxic respiratory failure  · COPD exacerbation in the setting of very severe COPD  · Pulm infiltrates:  Suspect atypical infection vs inspissated secretions, scattered, mild. Negative procalcitonin and no consolidation to suggest CAP. Most of findings in CT scan go with severe COPD with extensive emphysematous changes. Rapid Covid test negative, patient is immunized with Moderna vaccine  · COPD cachexia: Body mass index is 18.4 kg/m².   · Nicotine dependence, cigarettes:  Pt continues to smoke a few cigarettes daily  · History of colon cancer status post right hemicolectomy with reversal of ileostomy in 2019     Patient Active Problem List   Diagnosis Code    COPD, severe ([address] J44.9    Nicotine addiction F17.200    Hemoptysis R04.2    Lumbar spinal stenosis M48.061    Facet arthritis of lumbar region M47.816    Neuritis of lower extremity G57.90    Emphysema of lung ([address] J43.9    COPD with acute exacerbation ([address] J44.1    Muscle spasm of back M62.830    Sacroiliac joint pain M53.3    Current chronic use of systemic steroids Z79.52    Baker's cyst of knee M71.20    Acute exacerbation of chronic obstructive pulmonary disease (COPD) ([address] J44.1    Degenerative disc disease, lumbar M51.36    Left-sided low back pain with sciatica M54.42    COPD with exacerbation (HCC) J44.1    COPD (chronic obstructive pulmonary disease) (HCC) J44.9    Pneumonia J18.9    Abdominal pain R10.9    Abdominal mass R19.00    Colon cancer without distant metastasis (HCC) C18.9    Colon polyps K63.5    Post-op pain G89.18    Obstruction of bowel ([address] K56.609    Ileus following gastrointestinal surgery ([facility] K91.89, K56.7    Hypotension I95.9    COPD exacerbation ([facility]) J44.1    Dizziness R42    Non-rheumatic mitral regurgitation I34.0    Pulmonary HTN ([facility]) I27.20    Chest pain R07.9    Chronic diastolic congestive heart failure ([facility]) I50.32    ERRONEOUS ENCOUNTER--DISREGARD     History of colon cancer Z85.038    Ileostomy present ([facility]) Z93.2    Incisional hernia K43.2    Community acquired pneumonia J18.9    Acute bronchitis with chronic obstructive pulmonary disease (COPD) ([facility]) J44.0, J20.9    Respiratory failure ([facility]) J96.90    Severe protein-calorie malnutrition ([facility]) E43      RECOMMENDATIONS:   Continue IV steroids. Will wean as tolerated  ABx per primary service. Obtain sputum cx and f/u cx results  Due to severe bronchospasm, hold off on mucolytics -- mucus plugging on CT is mild  Schedule bronchodilators: duonebs q4h, pulmicort nebs 1mg BID, and albuterol PRN  Please hold home bronchodilators (Advair and Spiriva). May resume on discharge  Supplemental oxygen to maintain SpO2 88-93%  Bipap-ST switched from continuous to QHS and PRN. Will repeat ABG, I suspect pt will need NIPPV with Trilogy/Astral for AVAPS given recurrent copd exacerbations with respiratory failure  Please assess for home oxygen need prior to discharge  Aggressive pulmonary toileting/bronchial hygiene  Frequent incentive spirometry  Aspiration precautions including elevating HOB >30deg  PT/OT, OOB, ambulate with assistance as tolerated  DVT ppx per primary service  I spent 13 minutes with patient regarding cessation of smoking cigarettes. I reviewed health risks of tobacco use including increased risk of MI, stroke, cancer, etc.  We reviewed various approaches to cessation including pills, patches, inhaler, gum, weaning self, \"cold turkey\", etc.  I also strongly advised patient to avoid smoking with oxygen use due to fire/explosion risk. Pt expressed understanding.   Pt agreeable to cessation, but not requesting assistance, advised to revisit prior to discharge  Will follow    Plan of care updated with patient and multiple family members at bedside     Subjective:     Patient is a 78 y.o. female with a past medical history of very severe COPD, oxygen dependence, nicotine dependence, colon cancer status post resection [facility] onset of shortness of breath. History was obtained from the patient and multiple family members in the room including sister, brother, daughter, son. Patient reports that she had been short of breath for the last couple of weeks. She reports she was unable to [place] a cigarette\" for the last week. Patient reported ongoing chest tightness, worsening dyspnea with cough, intermittently productive. Patient reported associated symptom of sensation of mucus caught in chest.  Patient reports that dyspnea is worsened with excess heat given the weather. Patient also reports that she feels her apartment has excessive mold and drainage which results and worsening dyspnea. Patient reported no improvement with as needed albuterol. Patient reports she usually smokes a couple of cigarettes every few days. Patient reports that she was feeling febrile for the last day. Patient presented the ER, very dyspneic, required BiPAP throughout the night, discontinued this morning. Patient also reports that she improved with bronchodilators and steroids. She reports that most significant improvement was with BiPAP. Patient denies angina, nausea, vomiting, diarrhea, night sweats.       Past Medical History:   Diagnosis Date    Anxiety     Arthritis     Asbestosis ([facility]     Asthma     Back problem     Baker's cyst     Balance problems     Chronic lung disease     Cigarette smoker     [facility]     COPD (chronic obstructive pulmonary disease) (HCC)     oxygen 2/liters asbestosis    Depression     History of DVT (deep vein thrombosis)     Leg pain     Right    Lung disease     Nausea & vomiting     Osteoporosis     Restless leg syndrome     Spinal stenosis     Thromboembolus ([address]     Vitamin D deficiency       Past Surgical History:   Procedure Laterality Date    COLONOSCOPY N/A 9/22/2018    COLONOSCOPY w bx w polypectonies performed by González Gould MD at [address] COLONOSCOPY N/A 11/6/2018    COLONOSCOPY performed by Casandra Dubon MD at [address] [place] [address] COLONOSCOPY N/A 8/2/2019    COLONOSCOPY with polypectomies and biopsies performed by Sonido Overton MD at [address] HX APPENDECTOMY      HX BACK SURGERY      x4    HX BREAST BIOPSY      left    HX GI      exp lap and ileostomy    HX HEENT      cataract right    HX HYSTERECTOMY      HX LUMBAR LAMINECTOMY      HX MENISCUS REPAIR      HX ORTHOPAEDIC      Knee bakers cyst    HX POLYPECTOMY      right colectomy    HX TONSILLECTOMY      HX VEIN STRIPPING      UT LAP,SURG,COLECTOMY, PARTIAL, W/ANAST N/A 10/23/2018    Dr. Xochitl De La Garza N/A 11/06/2018    Dr. Issac Ryan      vein stripping      Prior to Admission medications    Medication Sig Start Date End Date Taking? Authorizing Provider   Ventolin HFA 90 mcg/actuation inhaler inhale 2 puffs by mouth and INTO THE LUNGS every 4 hours if needed for wheezing shortness of breath 4/27/21   Sweta Quinones MD   predniSONE (DELTASONE) 10 mg tablet Take 10 mg by mouth daily (with breakfast). 3/19/21   wSeta Quinones MD   tiotropium bromide (Spiriva Respimat) 2.5 mcg/actuation inhaler Take 2 Puffs by inhalation daily. 7/29/20   Sweta Quinones MD   albuterol (PROVENTIL VENTOLIN) 2.5 mg /3 mL (0.083 %) nebulizer solution 3 mL by Nebulization route every four (4) hours as needed for Wheezing or Shortness of Breath.  ICD: J44.9, R09.02 file under Medicare Part B 4/16/19   Neto Aviles NP   fluticasone propion-salmeterol (ADVAIR DISKUS) 250-50 mcg/dose diskus inhaler Take 1 Puff by inhalation two (2) times a day. BRAND NAME ONLY 4/11/19   Randal Cha MD   simethicone (GAS-X) 80 mg chewable tablet Take 80 mg by mouth every six (6) hours as needed for Flatulence. Provider, Historical   therapeutic multivitamin (THERAGRAN) tablet Take 1 Tab by mouth daily. 9/24/18   Kathya Rodas MD   acetaminophen (TYLENOL EXTRA STRENGTH) 500 mg tablet Take 500 mg by mouth every six (6) hours as needed for Pain. Provider, Historical   LORazepam (ATIVAN) 1 mg tablet Take  by mouth two (2) times daily as needed for Anxiety. Provider, Historical   rOPINIRole (REQUIP) 1 mg tablet Take 1 mg by mouth nightly. Provider, Historical   OXYGEN-AIR DELIVERY SYSTEMS 2 L by Does Not Apply route. O2 via nasal cannula with bedtime and activity.  O2 Company: [address]    Provider, Historical     Allergies   Allergen Reactions    Anoro Ellipta [Umeclidinium-Vilanterol] Rash and Other (comments)     Muscle cramps in arms    Aspirin Other (comments)     GI upset and bleeding    Augmentin [Amoxicillin-Pot Clavulanate] Not Reported This Time     Possible cramping    Doxycycline Nausea and Vomiting    Morphine Other (comments)     Neurologic symptoms - severe agitation      Shellfish Derived Itching     Pt able to eat small amounts per report     Sulfa (Sulfonamide Antibiotics) Rash     Patient relates no longer allergic    Donta Puller [Fluticasone Propion-Salmeterol] Other (comments)     Mouth Sores      Social History     Tobacco Use    Smoking status: Current Some Day Smoker     Packs/day: 1.00     Years: 50.00     Pack years: 50.00     Types: Cigarettes     Start date: 10/21/1964    Smokeless tobacco: Never Used   Substance Use Topics    Alcohol use: Never      Family History   Problem Relation Age of Onset    Cancer Father     Heart Disease Mother     Hypertension Mother     Cancer Brother     Cancer Sister     Diabetes Other     Hypertension Other     Stroke Other     Heart Disease Sister     Hypertension Sister     Heart Disease Brother         Current Facility-Administered Medications   Medication Dose Route Frequency    LORazepam (ATIVAN) tablet 1 mg  1 mg Oral QHS    methylPREDNISolone (PF) (SOLU-MEDROL) injection 40 mg  40 mg IntraVENous Q12H    rOPINIRole (REQUIP) tablet 1 mg  1 mg Oral QHS    therapeutic multivitamin (THERAGRAN) tablet 1 Tablet  1 Tablet Oral DAILY    sodium chloride (NS) flush 5-40 mL  5-40 mL IntraVENous Q8H    enoxaparin (LOVENOX) injection 40 mg  40 mg SubCUTAneous DAILY    insulin lispro (HUMALOG) injection   SubCUTAneous AC&HS    budesonide (PULMICORT) 1,000 mcg/2 mL nebulizer susp  1,000 mcg Nebulization BID RT    albuterol-ipratropium (DUO-NEB) 2.5 MG-0.5 MG/3 ML  3 mL Nebulization Q4H RT    cefTRIAXone (ROCEPHIN) 2 g in sterile water (preservative free) 20 mL IV syringe  2 g IntraVENous Q24H    azithromycin (ZITHROMAX) 500 mg in 0.9% sodium chloride 250 mL (VIAL-MATE)  500 mg IntraVENous Q24H       Review of Systems:  A comprehensive ROS was obtained as stated in HPI, all others are negative      Objective:   Vital Signs:    Visit Vitals  BP (!) 106/59 (BP 1 Location: Left upper arm, BP Patient Position: At rest)   Pulse 93   Temp 98.5 °F (36.9 °C)   Resp 16   Ht 5' 7\" (1.702 m)   Wt 53.3 kg (117 lb 8 oz)   SpO2 96%   BMI 18.40 kg/m²       O2 Device: Nasal cannula   O2 Flow Rate (L/min): 2 l/min   Temp (24hrs), Av.2 °F (36.8 °C), Min:97.8 °F (36.6 °C), Max:98.7 °F (37.1 °C)       Intake/Output:   Last shift:      No intake/output data recorded.   Last 3 shifts: 07/15 07 -  1900  In: 120 [P.O.:120]  Out: 200 [Urine:200]    Intake/Output Summary (Last 24 hours) at 2021 2675  Last data filed at 2021 1854  Gross per 24 hour   Intake 120 ml   Output 200 ml   Net -80 ml      Physical Exam:   General:  Alert, cooperative, no distress, appears stated age, thin, frail, does become dyspneic with lengthy conversation. Laying in bed sitting upright   Head:  Normocephalic, without obvious abnormality, atraumatic. Eyes:  Conjunctivae/corneas clear. ANicteric, no ocular drainage   Nose: Nares normal. Mucosa normal. No drainage or sinus tenderness. Wearing nasal cannula   Throat: Lips, mucosa dry. NO thrush; poor dentition, no oral lesions   Neck: Supple, symmetrical, trachea midline, no adenopathy, thyroid: no enlargment/tenderness/nodules, no carotid bruit and no JVD. No crepitus   Back:   Symmetric, no curvature, no spine tenderness or flank pain   Lungs:    Fair air entry bilaterally, very coarse breath sounds bilaterally with scattered rhonchi and wheezes throughout all lung fields   Chest wall:  No tenderness or deformity. NO CREPITUS   Heart:  Regular rate and rhythm, S1, S2 normal, no murmur, click, rub or gallop. Abdomen:   Soft, non-tender. Bowel sounds normal. No masses,  No organomegaly. No paradoxical motion   Extremities: normal, atraumatic, no cyanosis or edema. No clubbing   Pulses: 1-2+ and symmetric all extremities.    Skin: Skin color, texture, turgor normal. No rashes or lesions   Lymph nodes: Cervical, supraclavicular, and axillary nodes normal.   Neurologic: Grossly nonfocal, strength, sensation, coordination grossly intact throughout all extremities grossly          Data review:   Labs:  Recent Results (from the past 24 hour(s))   CULTURE, RESPIRATORY/SPUTUM/BRONCH W GRAM STAIN    Collection Time: 07/15/21 11:58 PM    Specimen: Sputum   Result Value Ref Range    Special Requests: NO SPECIAL REQUESTS      GRAM STAIN OCCASIONAL WBCS SEEN      GRAM STAIN OCCASIONAL EPITHELIAL CELLS SEEN      GRAM STAIN OCCASIONAL GRAM POSITIVE COCCI IN PAIRS      GRAM STAIN OCCASIONAL GRAM NEGATIVE RODS      GRAM STAIN RARE GRAM POSITIVE RODS      Culture result: PENDING    SARS-COV-2    Collection Time: 07/16/21 12:05 AM   Result Value Ref Range    SARS-CoV-2 Please find results under separate order     COVID-19 RAPID TEST    Collection Time: 07/16/21 12:05 AM   Result Value Ref Range    Specimen source Nasopharyngeal      COVID-19 rapid test Not detected NOTD     POC LACTIC ACID    Collection Time: 07/16/21  1:11 AM   Result Value Ref Range    Lactic Acid (POC) 1.21 0.40 - 2.00 mmol/L   STREP PNEUMO AG, URINE    Collection Time: 07/16/21  3:23 AM    Specimen: Urine, random   Result Value Ref Range    Strep pneumo Ag, urine Negative NEG     LEGIONELLA PNEUMOPHILA AG, URINE    Collection Time: 07/16/21  3:23 AM    Specimen: Urine, random   Result Value Ref Range    Legionella Ag, urine Negative NEG     URINALYSIS W/ RFLX MICROSCOPIC    Collection Time: 07/16/21  3:23 AM   Result Value Ref Range    Color YELLOW      Appearance CLEAR      Specific gravity >1.030 (H) 1.005 - 1.030    pH (UA) 5.5 5.0 - 8.0      Protein TRACE (A) NEG mg/dL    Glucose Negative NEG mg/dL    Ketone Negative NEG mg/dL    Bilirubin Negative NEG      Blood TRACE (A) NEG      Urobilinogen 1.0 0.2 - 1.0 EU/dL    Nitrites Negative NEG      Leukocyte Esterase Negative NEG     URINE MICROSCOPIC ONLY    Collection Time: 07/16/21  3:23 AM   Result Value Ref Range    WBC 0 to 2 0 - 4 /hpf    RBC 0 to 2 0 - 5 /hpf    Bacteria FEW (A) NEG /hpf   HEMOGLOBIN A1C WITH EAG    Collection Time: 07/16/21  9:23 AM   Result Value Ref Range    Hemoglobin A1c 5.8 (H) 4.2 - 5.6 %    Est. average glucose 120 mg/dL   GLUCOSE, POC    Collection Time: 07/16/21 11:17 AM   Result Value Ref Range    Glucose (POC) 156 (H) 70 - 110 mg/dL   GLUCOSE, POC    Collection Time: 07/16/21  3:52 PM   Result Value Ref Range    Glucose (POC) 122 (H) 70 - 110 mg/dL   GLUCOSE, POC    Collection Time: 07/16/21  9:48 PM   Result Value Ref Range    Glucose (POC) 191 (H) 70 - 110 mg/dL     ABG:  No results found for: PH, PHI, PCO2, PCO2I, PO2, PO2I, HCO3, HCO3I, FIO2, FIO2I      PFT Results  (Last 48 hours)    None        Echo Results  (Last 48 hours)    None Imaging:  I have personally reviewed the patients radiographs and have reviewed the reports:  Chest x-ray from 7/15/2021 shows hyperinflated lungs with some mild streaky opacities without consolidation, infiltrates, effusions. CTA chest with and without contrast from 7/16/2021 shows very severe bilateral emphysematous changes with scattered tree-in-bud opacities in left lingula and anterior segment of left lower lobe as well as scattered in right lower lobe, posteriorly. Although at base of right lung, there is a small consolidative defect very dependently posteriorly, which was present on prior CT scan. No masses or other consolidations or effusions seen. Official report per radiology  CXR Results  (Last 48 hours)               07/15/21 2009  XR CHEST PA LAT Final result    Impression:      Grossly similar severe emphysema/COPD. Streaky bibasilar densities. Findings are acute on chronic.       6 mm nodular density overlying the left upper lung. Please see subsequent CTA for further findings. Narrative:  EXAM: XR CHEST PA LAT       INDICATION: SOB       COMPARISON: CT June 11, 2021. FINDINGS: PA and lateral radiographs of the chest demonstrate severe   emphysema/COPD. Streaky bibasilar densities. 6 mm nodular density noted   overlying the left upper lung. . The cardiac and mediastinal contours and   pulmonary vascularity are normal. Decreased bone mineral density. Rotoscoliosis. Osseous degenerative changes. Calcified pleural plaques within the left   hemithorax better characterized on CT. Frankie Decent CT Results  (Last 48 hours)               07/16/21 0029  CTA CHEST W OR W WO CONT Final result    Impression:  1. No evidence of pulmonary embolus or dissection. 2.  Tree-in-bud changes and consolidation with right lower lobe brachial   opacification and mild tree-in-bud changes on the left concerning for infectious   process. 3.  COPD changes. Narrative:  EXAM: CT Angiogram of the Chest       CLINICAL INDICATION:  sob       TECHNIQUE: CT angiogram of the chest performed. MIPS performed       All CT scans at this facility are performed using dose optimization technique as   appropriate to a performed exam, to include automated exposure control,   adjustment of the mA and/or kV according to patient size (including appropriate   matching for site specific examination) or use of iterative reconstruction   technique. IV CONTRAST: 100 cc of Isovue 370        COMPARISON: 6/11/2021       FINDINGS:   Limitations: Mild breathing motion is present which limits evaluation of the   distal pulmonary arteries and evaluation of the lung parenchyma. Thyroid: Unremarkable. Mediastinum: Hilar adenopathy is noted. Mild level 7 lymph nodes are noted. Heart: Unremarkable       Pericardium: Unremarkable       Aorta: Plaster calcifications are noted. No aneurysm or dissection appreciated. Pulmonary Arteries: No evidence of pulmonary embolus. Trachea and Bronchi: The trachea is unremarkable. There is opacification of   several bronchi of the right lower lobe. Pleura: No evidence of pleural effusion. Lungs: Tree-in-bud changes in consolidation are noted in the right lower lobe. Mild tree-in-bud and consolidative changes noted in the medial aspect of the   left lower lobe. Extensive COPD changes noted especially in the upper lung   fields. Axilla/Chest wall: Unremarkable. Upper Abdomen: No acute findings. Musculoskeletal: No acute osseous findings.                     High complexity decision making was performed during the evaluation of this patient at high risk for decompensation with multiple organ involvement     Above mentioned total time spent on reviewing the case/medical record/data/notes/EMR/patient examination/documentation/coordinating care with nurse/consultants, exclusive of procedures with complex decision making performed and > 50% time spent in face to face evaluation.          Rohit Fu MD/MPH     Pulmonary, Critical Care Medicine  [facility]

## 2021-07-17 NOTE — CONSULTS
[facility]. Pulmonary, Critical Care, and Sleep Medicine    Initial Patient Consult    Name: Riya Lopez MRN: [id number]   : 1945 Hospital: [facility]   Date: 2021          This patient has been seen and evaluated at the request of Dr. Mirtha Greco for dyspnea. IMPRESSION:   · Acute on chronic hypoxic respiratory failure  · COPD exacerbation in the setting of very severe COPD  · Pulm infiltrates:  Suspect atypical infection vs inspissated secretions, scattered, mild. Negative procalcitonin and no consolidation to suggest CAP. Most of findings in CT scan go with severe COPD with extensive emphysematous changes. Rapid Covid test negative, patient is immunized with Moderna vaccine  · COPD cachexia: Body mass index is 18.43 kg/m².   · Nicotine dependence, cigarettes:  Pt continues to smoke a few cigarettes daily  · History of colon cancer status post right hemicolectomy with reversal of ileostomy in 2019     Patient Active Problem List   Diagnosis Code    COPD, severe ([address] J44.9    Nicotine addiction F17.200    Hemoptysis R04.2    Lumbar spinal stenosis M48.061    Facet arthritis of lumbar region M47.816    Neuritis of lower extremity G57.90    Emphysema of lung ([address] J43.9    COPD with acute exacerbation ([address] J44.1    Muscle spasm of back M62.830    Sacroiliac joint pain M53.3    Current chronic use of systemic steroids Z79.52    Baker's cyst of knee M71.20    Acute exacerbation of chronic obstructive pulmonary disease (COPD) ([address] J44.1    Degenerative disc disease, lumbar M51.36    Left-sided low back pain with sciatica M54.42    COPD with exacerbation (HCC) J44.1    COPD (chronic obstructive pulmonary disease) (HCC) J44.9    Pneumonia J18.9    Abdominal pain R10.9    Abdominal mass R19.00    Colon cancer without distant metastasis (HCC) C18.9    Colon polyps K63.5    Post-op pain G89.18    Obstruction of bowel ([address] K56.609    Ileus following gastrointestinal surgery ([facility] K91.89, K56.7    Hypotension I95.9    COPD exacerbation ([facility]) J44.1    Dizziness R42    Non-rheumatic mitral regurgitation I34.0    Pulmonary HTN ([facility]) I27.20    Chest pain R07.9    Chronic diastolic congestive heart failure ([facility]) I50.32    ERRONEOUS ENCOUNTER--DISREGARD     History of colon cancer Z85.038    Ileostomy present ([facility]) Z93.2    Incisional hernia K43.2    Community acquired pneumonia J18.9    Acute bronchitis with chronic obstructive pulmonary disease (COPD) ([facility]) J44.0, J20.9    Respiratory failure ([facility]) J96.90    Severe protein-calorie malnutrition ([facility]) E43      RECOMMENDATIONS:   Continue IV steroids. Will consider transition to PO tomorrow if continues to clinically improve  ABx per primary service, recommend 5-7 day course. Due to severe bronchospasm, hold off on mucolytics -- mucus plugging on CT is mild  Schedule bronchodilators: duonebs q4h, pulmicort nebs 1mg BID, and albuterol PRN  Please hold home bronchodilators (Advair and Spiriva). May resume on discharge  Supplemental oxygen to maintain SpO2 88-93%  Please assess for home oxygen need prior to discharge (on 2LPM at home)  Aggressive pulmonary toileting/bronchial hygiene. Will add flutter valve  Frequent incentive spirometry  Aspiration precautions including elevating HOB >30deg  PT/OT, OOB, ambulate with assistance as tolerated  DVT ppx per primary service  Will follow. Not quite ready for discharge    Plan of care updated with patient     Subjective:     Patient is a 78 y.o. female with a past medical history of very severe COPD, oxygen dependence, nicotine dependence, colon cancer status post resection presented [facility] with acute onset of shortness of breath. History was obtained from the patient and multiple family members in the room including sister, brother, daughter, son. Patient reports that she had been short of breath for the last couple of weeks. She reports she was unable to [place] a cigarette\" for the last week. Patient reported ongoing chest tightness, worsening dyspnea with cough, intermittently productive. Patient reported associated symptom of sensation of mucus caught in chest.  Patient reports that dyspnea is worsened with excess heat given the weather. Patient also reports that she feels her apartment has excessive mold and drainage which results and worsening dyspnea. Patient reported no improvement with as needed albuterol. Patient reports she usually smokes a couple of cigarettes every few days. Patient reports that she was feeling febrile for the last day. Patient presented the ER, very dyspneic, required BiPAP throughout the night, discontinued this morning. Patient also reports that she improved with bronchodilators and steroids. She reports that most significant improvement was with BiPAP. Patient denies angina, nausea, vomiting, diarrhea, night sweats. 24 Hr Events/Subjective:  Off NIPPV. Feels improvement today, however continues to have cough, dyspnea and mucous. Has not been able to use flutter valve yet (not present), but is using IS. No real ambulation up and about yet. No complaints today.          Past Medical History:   Diagnosis Date    Anxiety     Arthritis     Asbestosis ([address]     Asthma     Back problem     Baker's cyst     Balance problems     Chronic lung disease     Cigarette smoker     Clotting disorder (HCC)     COPD [facility]ch[facility] obstructive [facility] disease) [facility])     oxygen 2/liters asbestosis    Depression     History of DVT (deep vein thrombosis)     Leg pain     Right    Lung disease     Nausea & vomiting     Osteoporosis     Restless leg syndrome     Spinal stenosis     Thromboembolus ([address]     Vitamin D deficiency       Past Surgical History:   Procedure Laterality Date    COLONOSCOPY N/A 9/22/2018    COLONOSCOPY w bx w polypectonies performed by Raz Jackson MD at [facility]  COLONOSCOPY N/A 11/6/2018    COLONOSCOPY performed by Mavis Valle MD at [facility] COLONOSCOPY N/A 8/2/2019    COLONOSCOPY with polypectomies and biopsies performed by Miriam Reaves MD at [address] HX APPENDECTOMY      HX BACK SURGERY      x4    HX BREAST BIOPSY      left    HX GI      exp lap and ileostomy    HX HEENT      cataract right    HX HYSTERECTOMY      HX LUMBAR LAMINECTOMY      HX MENISCUS REPAIR      HX ORTHOPAEDIC      Knee bakers cyst    HX POLYPECTOMY      right colectomy    HX TONSILLECTOMY      HX VEIN STRIPPING      WI LAP,SURG,COLECTOMY, PARTIAL, W/ANAST N/A 10/23/2018    Dr. Mccarthy Him N/A 11/06/2018    Dr. Jazmín Pettit      vein stripping      Prior to Admission medications    Medication Sig Start Date End Date Taking? Authorizing Provider   Ventolin HFA 90 mcg/actuation inhaler inhale 2 puffs by mouth and INTO THE LUNGS every 4 hours if needed for wheezing shortness of breath 4/27/21   Abisai Guerrero MD   predniSONE (DELTASONE) 10 mg tablet Take 10 mg by mouth daily (with breakfast). 3/19/21   Abisai Guerrero MD   tiotropium bromide (Spiriva Respimat) 2.5 mcg/actuation inhaler Take 2 Puffs by inhalation daily. 7/29/20   Abisai Guerrero MD   albuterol (PROVENTIL VENTOLIN) 2.5 mg /3 mL (0.083 %) nebulizer solution 3 mL by Nebulization route every four (4) hours as needed for Wheezing or Shortness of Breath. ICD: J44.9, R09.02 file under Medicare Part B 4/16/19   Juli RENNER NP   fluticasone propion-salmeterol (ADVAIR DISKUS) 250-50 mcg/dose diskus inhaler Take 1 Puff by inhalation two (2) times a day. BRAND NAME ONLY 4/11/19   Abisai Guerrero MD   simethicone (GAS-X) 80 mg chewable tablet Take 80 mg by mouth every six (6) hours as needed for Flatulence. Provider, Historical   therapeutic multivitamin (THERAGRAN) tablet Take 1 Tab by mouth daily.  9/24/18   Abner Cinthya Ga MD   acetaminophen (TYLENOL EXTRA STRENGTH) 500 mg tablet Take 500 mg by mouth every six (6) hours as needed for Pain. Provider, Historical   LORazepam (ATIVAN) 1 mg tablet Take  by mouth two (2) times daily as needed for Anxiety. Provider, Historical   rOPINIRole (REQUIP) 1 mg tablet Take 1 mg by mouth nightly. Provider, Historical   OXYGEN-AIR DELIVERY SYSTEMS 2 L by Does Not Apply route. O2 via nasal cannula with bedtime and activity.  O2 Company: [employer]    Provider, Historical     Allergies   Allergen Reactions    Anoro Ellipta [Umeclidinium-Vilanterol] Rash and Other (comments)     Muscle cramps in arms    Aspirin Other (comments)     GI upset and bleeding    Augmentin [Amoxicillin-Pot Clavulanate] Not Reported This Time     Possible cramping    Doxycycline Nausea and Vomiting    Morphine Other (comments)     Neurologic symptoms - severe agitation      Shellfish Derived Itching     Pt able to eat small amounts per report     Sulfa (Sulfonamide Antibiotics) Rash     Patient relates no longer allergic    Haroldine Motts [Fluticasone Propion-Salmeterol] Other (comments)     Mouth Sores      Social History     Tobacco Use    Smoking status: Current Some Day Smoker     Packs/day: 1.00     Years: 50.00     Pack years: 50.00     Types: Cigarettes     Start date: 10/21/1964    Smokeless tobacco: Never Used   Substance Use Topics    Alcohol use: Never      Family History   Problem Relation Age of Onset    Cancer Father     Heart Disease Mother     Hypertension Mother     Cancer Brother     Cancer Sister     Diabetes Other     Hypertension Other     Stroke Other     Heart Disease Sister     Hypertension Sister     Heart Disease Brother         Current Facility-Administered Medications   Medication Dose Route Frequency    LORazepam (ATIVAN) tablet 1 mg  1 mg Oral QHS    methylPREDNISolone (PF) (SOLU-MEDROL) injection 40 mg  40 mg IntraVENous Q12H    rOPINIRole (REQUIP) tablet 1 mg 1 mg Oral QHS    therapeutic multivitamin (THERAGRAN) tablet 1 Tablet  1 Tablet Oral DAILY    sodium chloride (NS) flush 5-40 mL  5-40 mL IntraVENous Q8H    enoxaparin (LOVENOX) injection 40 mg  40 mg SubCUTAneous DAILY    insulin lispro (HUMALOG) injection   SubCUTAneous AC&HS    budesonide (PULMICORT) 1,000 mcg/2 mL nebulizer susp  1,000 mcg Nebulization BID RT    albuterol-ipratropium (DUO-NEB) 2.5 MG-0.5 MG/3 ML  3 mL Nebulization Q4H RT    cefTRIAXone (ROCEPHIN) 2 g in sterile water (preservative free) 20 mL IV syringe  2 g IntraVENous Q24H    azithromycin (ZITHROMAX) 500 mg in 0.9% sodium chloride 250 mL (VIAL-MATE)  500 mg IntraVENous Q24H       Review of Systems:  A comprehensive ROS was obtained as stated in HPI, all others are negative      Objective:   Vital Signs:    Visit Vitals  BP 98/63 (BP 1 Location: Left upper arm, BP Patient Position: Semi fowlers)   Pulse 84   Temp 98 °F (36.7 °C)   Resp 21   Ht 5' 7\" (1.702 m)   Wt 53.4 kg (117 lb 11.2 oz)   SpO2 92%   BMI 18.43 kg/m²       O2 Device: Nasal cannula   O2 Flow Rate (L/min): 2 l/min   Temp (24hrs), Av.2 °F (36.8 °C), Min:97.8 °F (36.6 °C), Max:98.6 °F (37 °C)       Intake/Output:   Last shift:      701 - 1900  In: 540 [I.V.:540]  Out: -   Last 3 shifts: 07/15 1901 -  0700  In: 120 [P.O.:120]  Out: 200 [Urine:200]    Intake/Output Summary (Last 24 hours) at 2021 0913  Last data filed at 2021 0750  Gross per 24 hour   Intake 660 ml   Output 200 ml   Net 460 ml      Physical Exam:   General:  Alert, cooperative, no distress, appears stated age, thin, frail, does become dyspneic with lengthy conversation. Laying in bed sitting upright   Head:  Normocephalic, without obvious abnormality, atraumatic. Eyes:  Conjunctivae/corneas clear. ANicteric, no ocular drainage   Nose: Nares normal. Mucosa normal. No drainage or sinus tenderness. Wearing nasal cannula   Throat: Lips, mucosa dry.  NO thrush; poor dentition, no oral lesions   Neck: Supple, symmetrical, trachea midline, no adenopathy, thyroid: no enlargment/tenderness/nodules, No crepitus   Back:   Symmetric, no curvature, no spine tenderness or flank pain   Lungs:    Fair air entry bilaterally, very coarse breath sounds bilaterally with scattered expiratory rhonchi and wheezes throughout all lung fields   Chest wall:  No tenderness or deformity. NO CREPITUS   Heart:  Regular rate and rhythm, S1, S2 normal, no murmur, click, rub or gallop. Abdomen:   Soft, non-tender. Bowel sounds normal. No masses,  No organomegaly. No paradoxical motion   Extremities: normal, atraumatic, no cyanosis or edema.   No clubbing       Skin: Skin color, texture, turgor normal. No rashes or lesions       Neurologic: Grossly nonfocal, strength, sensation, coordination grossly intact throughout all extremities grossly          Data review:   Labs:  Recent Results (from the past 24 hour(s))   HEMOGLOBIN A1C WITH EAG    Collection Time: 07/16/21  9:23 AM   Result Value Ref Range    Hemoglobin A1c 5.8 (H) 4.2 - 5.6 %    Est. average glucose 120 mg/dL   GLUCOSE, POC    Collection Time: 07/16/21 11:17 AM   Result Value Ref Range    Glucose (POC) 156 (H) 70 - 110 mg/dL   GLUCOSE, POC    Collection Time: 07/16/21  3:52 PM   Result Value Ref Range    Glucose (POC) 122 (H) 70 - 110 mg/dL   GLUCOSE, POC    Collection Time: 07/16/21  9:48 PM   Result Value Ref Range    Glucose (POC) 191 (H) 70 - 110 mg/dL   BLOOD GAS, ARTERIAL POC    Collection Time: 07/17/21  3:10 AM   Result Value Ref Range    Device: NASAL CANNULA      FIO2 (POC) 2 %    pH (POC) 7.43 7.35 - 7.45      pCO2 (POC) 40.6 35.0 - 45.0 MMHG    pO2 (POC) 69 (L) 80 - 100 MMHG    HCO3 (POC) 27.1 (H) 22 - 26 MMOL/L    sO2 (POC) 94.1 92 - 97 %    Base excess (POC) 2.5 mmol/L    Allens test (POC) Positive      Site RIGHT RADIAL      Specimen type (POC) ARTERIAL      Performed by [address] [place] [address], BASIC    Collection Time: 07/17/21  5:38 AM   Result Value Ref Range    Sodium 136 136 - 145 mmol/L    Potassium 4.1 3.5 - 5.5 mmol/L    Chloride 101 100 - 111 mmol/L    CO2 29 21 - 32 mmol/L    Anion gap 6 3.0 - 18 mmol/L    Glucose 143 (H) 74 - 99 mg/dL    BUN 33 (H) 7.0 - 18 MG/DL    Creatinine 0.78 0.6 - 1.3 MG/DL    BUN/Creatinine ratio 42 (H) 12 - 20      GFR est AA >60 >60 ml/min/1.73m2    GFR est non-AA >60 >60 ml/min/1.73m2    Calcium 8.8 8.5 - 10.1 MG/DL   CBC WITH AUTOMATED DIFF    Collection Time: 07/17/21  5:38 AM   Result Value Ref Range    WBC 13.1 4.6 - 13.2 K/uL    RBC 3.68 (L) 4.20 - 5.30 M/uL    HGB 11.5 (L) 12.0 - 16.0 g/dL    HCT 34.9 (L) 35.0 - 45.0 %    MCV 94.8 74.0 - 97.0 FL    MCH 31.3 24.0 - 34.0 PG    MCHC 33.0 31.0 - 37.0 g/dL    RDW 13.0 11.6 - 14.5 %    PLATELET 545 271 - 048 K/uL    MPV 10.0 9.2 - 11.8 FL    NEUTROPHILS 91 (H) 40 - 73 %    LYMPHOCYTES 5 (L) 21 - 52 %    MONOCYTES 3 3 - 10 %    EOSINOPHILS 0 0 - 5 %    BASOPHILS 0 0 - 2 %    ABS. NEUTROPHILS 11.9 (H) 1.8 - 8.0 K/UL    ABS. LYMPHOCYTES 0.7 (L) 0.9 - 3.6 K/UL    ABS. MONOCYTES 0.4 0.05 - 1.2 K/UL    ABS. EOSINOPHILS 0.0 0.0 - 0.4 K/UL    ABS. BASOPHILS 0.0 0.0 - 0.1 K/UL    DF AUTOMATED     MAGNESIUM    Collection Time: 07/17/21  5:38 AM   Result Value Ref Range    Magnesium 2.5 1.6 - 2.6 mg/dL   GLUCOSE, POC    Collection Time: 07/17/21  7:51 AM   Result Value Ref Range    Glucose (POC) 131 (H) 70 - 110 mg/dL     ABG:    Lab Results   Component Value Date/Time    PHI 7.43 07/17/2021 03:10 AM    PCO2I 40.6 07/17/2021 03:10 AM    PO2I 69 (L) 07/17/2021 03:10 AM    HCO3I 27.1 (H) 07/17/2021 03:10 AM    FIO2I 2 07/17/2021 03:10 AM         PFT Results  (Last 48 hours)    None        Echo Results  (Last 48 hours)    None        Imaging:  I have personally reviewed the patients radiographs and have reviewed the reports:  Chest x-ray from 7/15/2021 shows hyperinflated lungs with some mild streaky opacities without consolidation, infiltrates, effusions.   CTA chest with and without contrast from 7/16/2021 shows very severe bilateral emphysematous changes with scattered tree-in-bud opacities in left lingula and anterior segment of left lower lobe as well as scattered in right lower lobe, posteriorly. Although at base of right lung, there is a small consolidative defect very dependently posteriorly, which was present on prior CT scan. No masses or other consolidations or effusions seen. Official report per radiology  CXR Results  (Last 48 hours)               07/15/21 2009  XR CHEST PA LAT Final result    Impression:      Grossly similar severe emphysema/COPD. Streaky bibasilar densities. Findings are acute on chronic.       6 mm nodular density overlying the left upper lung. Please see subsequent CTA for further findings. Narrative:  EXAM: XR CHEST PA LAT       INDICATION: SOB       COMPARISON: CT June 11, 2021. FINDINGS: PA and lateral radiographs of the chest demonstrate severe   emphysema/COPD. Streaky bibasilar densities. 6 mm nodular density noted   overlying the left upper lung. . The cardiac and mediastinal contours and   pulmonary vascularity are normal. Decreased bone mineral density. Rotoscoliosis. Osseous degenerative changes. Calcified pleural plaques within the left   hemithorax better characterized on CT. Roverto Thapa CT Results  (Last 48 hours)               07/16/21 0029  CTA CHEST W OR W WO CONT Final result    Impression:  1. No evidence of pulmonary embolus or dissection. 2.  Tree-in-bud changes and consolidation with right lower lobe brachial   opacification and mild tree-in-bud changes on the left concerning for infectious   process. 3.  COPD changes. Narrative:  EXAM: CT Angiogram of the Chest       CLINICAL INDICATION:  sob       TECHNIQUE: CT angiogram of the chest performed.  MIPS performed       All CT scans at this facility are performed using dose optimization technique as   appropriate to a performed exam, to include automated exposure control,   adjustment of the mA and/or kV according to patient size (including appropriate   matching for site specific examination) or use of iterative reconstruction   technique. IV CONTRAST: 100 cc of Isovue 370        COMPARISON: 6/11/2021       FINDINGS:   Limitations: Mild breathing motion is present which limits evaluation of the   distal pulmonary arteries and evaluation of the lung parenchyma. Thyroid: Unremarkable. Mediastinum: Hilar adenopathy is noted. Mild level 7 lymph nodes are noted. Heart: Unremarkable       Pericardium: Unremarkable       Aorta: Plaster calcifications are noted. No aneurysm or dissection appreciated. Pulmonary Arteries: No evidence of pulmonary embolus. Trachea and Bronchi: The trachea is unremarkable. There is opacification of   several bronchi of the right lower lobe. Pleura: No evidence of pleural effusion. Lungs: Tree-in-bud changes in consolidation are noted in the right lower lobe. Mild tree-in-bud and consolidative changes noted in the medial aspect of the   left lower lobe. Extensive COPD changes noted especially in the upper lung   fields. Axilla/Chest wall: Unremarkable. Upper Abdomen: No acute findings. Musculoskeletal: No acute osseous findings.                     High complexity decision making was performed during the evaluation of this patient at high risk for decompensation with multiple organ involvement     Signed By: Belinda Valles MD     July 17, 2021      [facility]

## 2021-07-17 NOTE — PROGRESS NOTES
Bedside, Verbal, and Written shift change report given to Waldo Andres RN (oncoming nurse) by Kadie White RN (offgoing nurse). Report included the following information SBAR, Kardex, Intake/Output, MAR, Recent Results, and Cardiac Rhythm NSR. Bedside, Verbal, and Written shift change report given to Clinton Olmstead RN (oncoming nurse) by Waldo Andres RN (offgoing nurse). Report included the following information SBAR, Kardex, Intake/Output, MAR, Recent Results, and Cardiac Rhythm NSR.

## 2021-07-17 NOTE — PROGRESS NOTES
Physician Progress Note      Jhonny Beavers  CSN #:                  [id number]  :                       1945  ADMIT DATE:       7/15/2021 8:01 PM  [address] DATE:  RESPONDING  PROVIDER #:        Geofm Berger MD Donalda Schlatter MD          QUERY TEXT:    Dear Hospitalist,    Pt admitted with Pneumonia. Pt noted to have Elevated WBCs, Lactic Acid, and HR also reports temp of 102 at home. If possible, please document in the progress notes and discharge summary if you are evaluating and /or treating any of the following: The medical record reflects the following:  Risk Factors: 77 y/o, COPD, Pneumonia    Clinical Indicators:  * Lactic Acid -  2.23 -> 1.21  * WBCs -   18.4  * Pt reports \"fever of 102f, took extra strength Tylenol x2 at 4pm\" temp at 8pm 100F in triage    Treatment:  * Receiving: IV Rocephin and Zithromax  * Ordered:  Severe Sepsis and Septic Shock Bundle Initiated    Thank you,  Leland SIMONSN, RN, CRCR  Please contact me for any questions or concerns regarding this query at [email or web address]  Options provided:  -- Sepsis, present on admission  -- Pneumonia without Sepsis  -- Other - I will add my own diagnosis  -- Disagree - Not applicable / Not valid  -- Disagree - Clinically unable to determine / Unknown  -- Refer to Clinical Documentation Reviewer    PROVIDER RESPONSE TEXT:    This patient has sepsis which was present on admission.     Query created by: Margarita Piña on 2021 12:17 PM      Electronically signed by:  Geofm Berger MD Donalda Schlatter MD 2021 12:46 PM

## 2021-07-18 LAB
ANION GAP SERPL CALC-SCNC: 6 MMOL/L (ref 3–18)
BACTERIA SPEC CULT: NORMAL
BASOPHILS # BLD: 0 K/UL (ref 0–0.1)
BASOPHILS NFR BLD: 0 % (ref 0–2)
BUN SERPL-MCNC: 34 MG/DL (ref 7–18)
BUN/CREAT SERPL: 39 (ref 12–20)
CALCIUM SERPL-MCNC: 8.6 MG/DL (ref 8.5–10.1)
CHLORIDE SERPL-SCNC: 102 MMOL/L (ref 100–111)
CO2 SERPL-SCNC: 29 MMOL/L (ref 21–32)
CREAT SERPL-MCNC: 0.88 MG/DL (ref 0.6–1.3)
DIFFERENTIAL METHOD BLD: ABNORMAL
EOSINOPHIL # BLD: 0 K/UL (ref 0–0.4)
EOSINOPHIL NFR BLD: 0 % (ref 0–5)
ERYTHROCYTE [DISTWIDTH] IN BLOOD BY AUTOMATED COUNT: 13.3 % (ref 11.6–14.5)
GLUCOSE BLD STRIP.AUTO-MCNC: 124 MG/DL (ref 70–110)
GLUCOSE BLD STRIP.AUTO-MCNC: 144 MG/DL (ref 70–110)
GLUCOSE BLD STRIP.AUTO-MCNC: 152 MG/DL (ref 70–110)
GLUCOSE BLD STRIP.AUTO-MCNC: 160 MG/DL (ref 70–110)
GLUCOSE SERPL-MCNC: 187 MG/DL (ref 74–99)
GRAM STN SPEC: NORMAL
HCT VFR BLD AUTO: 34.7 % (ref 35–45)
HGB BLD-MCNC: 11.3 G/DL (ref 12–16)
LYMPHOCYTES # BLD: 0.5 K/UL (ref 0.9–3.6)
LYMPHOCYTES NFR BLD: 5 % (ref 21–52)
MCH RBC QN AUTO: 31.4 PG (ref 24–34)
MCHC RBC AUTO-ENTMCNC: 32.6 G/DL (ref 31–37)
MCV RBC AUTO: 96.4 FL (ref 74–97)
MONOCYTES # BLD: 0.3 K/UL (ref 0.05–1.2)
MONOCYTES NFR BLD: 3 % (ref 3–10)
NEUTS SEG # BLD: 9.8 K/UL (ref 1.8–8)
NEUTS SEG NFR BLD: 92 % (ref 40–73)
PLATELET # BLD AUTO: 313 K/UL (ref 135–420)
PMV BLD AUTO: 10.1 FL (ref 9.2–11.8)
POTASSIUM SERPL-SCNC: 4.5 MMOL/L (ref 3.5–5.5)
RBC # BLD AUTO: 3.6 M/UL (ref 4.2–5.3)
SERVICE CMNT-IMP: NORMAL
SODIUM SERPL-SCNC: 137 MMOL/L (ref 136–145)
WBC # BLD AUTO: 10.7 K/UL (ref 4.6–13.2)

## 2021-07-18 PROCEDURE — 77010033678 HC OXYGEN DAILY

## 2021-07-18 PROCEDURE — 2709999900 HC NON-CHARGEABLE SUPPLY

## 2021-07-18 PROCEDURE — 74011250637 HC RX REV CODE- 250/637: Performed by: HOSPITALIST

## 2021-07-18 PROCEDURE — 74011250636 HC RX REV CODE- 250/636: Performed by: INTERNAL MEDICINE

## 2021-07-18 PROCEDURE — 36415 COLL VENOUS BLD VENIPUNCTURE: CPT

## 2021-07-18 PROCEDURE — 85025 COMPLETE CBC W/AUTO DIFF WBC: CPT

## 2021-07-18 PROCEDURE — 74011000250 HC RX REV CODE- 250: Performed by: INTERNAL MEDICINE

## 2021-07-18 PROCEDURE — 74011636637 HC RX REV CODE- 636/637: Performed by: INTERNAL MEDICINE

## 2021-07-18 PROCEDURE — 94762 N-INVAS EAR/PLS OXIMTRY CONT: CPT

## 2021-07-18 PROCEDURE — 94640 AIRWAY INHALATION TREATMENT: CPT

## 2021-07-18 PROCEDURE — 65660000000 HC RM CCU STEPDOWN

## 2021-07-18 PROCEDURE — 99232 SBSQ HOSP IP/OBS MODERATE 35: CPT | Performed by: INTERNAL MEDICINE

## 2021-07-18 PROCEDURE — 74011250637 HC RX REV CODE- 250/637: Performed by: INTERNAL MEDICINE

## 2021-07-18 PROCEDURE — 80048 BASIC METABOLIC PNL TOTAL CA: CPT

## 2021-07-18 PROCEDURE — 94668 MNPJ CHEST WALL SBSQ: CPT

## 2021-07-18 PROCEDURE — 99232 SBSQ HOSP IP/OBS MODERATE 35: CPT | Performed by: EMERGENCY MEDICINE

## 2021-07-18 PROCEDURE — 82962 GLUCOSE BLOOD TEST: CPT

## 2021-07-18 RX ADMIN — FAMOTIDINE 20 MG: 20 TABLET, FILM COATED ORAL at 17:01

## 2021-07-18 RX ADMIN — IPRATROPIUM BROMIDE AND ALBUTEROL SULFATE 3 ML: .5; 3 SOLUTION RESPIRATORY (INHALATION) at 11:54

## 2021-07-18 RX ADMIN — FAMOTIDINE 20 MG: 20 TABLET, FILM COATED ORAL at 08:05

## 2021-07-18 RX ADMIN — CEFTRIAXONE SODIUM 2 G: 2 INJECTION, POWDER, FOR SOLUTION INTRAMUSCULAR; INTRAVENOUS at 21:51

## 2021-07-18 RX ADMIN — AZITHROMYCIN 500 MG: 500 INJECTION, POWDER, LYOPHILIZED, FOR SOLUTION INTRAVENOUS at 21:51

## 2021-07-18 RX ADMIN — MIDODRINE HYDROCHLORIDE 5 MG: 5 TABLET ORAL at 17:01

## 2021-07-18 RX ADMIN — THERA TABS 1 TABLET: TAB at 08:05

## 2021-07-18 RX ADMIN — IPRATROPIUM BROMIDE AND ALBUTEROL SULFATE 3 ML: .5; 3 SOLUTION RESPIRATORY (INHALATION) at 03:31

## 2021-07-18 RX ADMIN — IPRATROPIUM BROMIDE AND ALBUTEROL SULFATE 3 ML: .5; 3 SOLUTION RESPIRATORY (INHALATION) at 15:30

## 2021-07-18 RX ADMIN — LORAZEPAM 1 MG: 1 TABLET ORAL at 21:46

## 2021-07-18 RX ADMIN — BUDESONIDE 1000 MCG: 1 SUSPENSION RESPIRATORY (INHALATION) at 07:44

## 2021-07-18 RX ADMIN — ENOXAPARIN SODIUM 40 MG: 40 INJECTION SUBCUTANEOUS at 08:05

## 2021-07-18 RX ADMIN — INSULIN LISPRO 2 UNITS: 100 INJECTION, SOLUTION INTRAVENOUS; SUBCUTANEOUS at 11:27

## 2021-07-18 RX ADMIN — Medication 10 ML: at 22:16

## 2021-07-18 RX ADMIN — PREDNISONE 60 MG: 20 TABLET ORAL at 08:05

## 2021-07-18 RX ADMIN — Medication 10 ML: at 06:45

## 2021-07-18 RX ADMIN — ROPINIROLE HYDROCHLORIDE 1 MG: 1 TABLET, FILM COATED ORAL at 21:46

## 2021-07-18 RX ADMIN — Medication 10 ML: at 13:18

## 2021-07-18 RX ADMIN — MIDODRINE HYDROCHLORIDE 5 MG: 5 TABLET ORAL at 08:05

## 2021-07-18 RX ADMIN — ACETAMINOPHEN 650 MG: 325 TABLET ORAL at 22:32

## 2021-07-18 RX ADMIN — IPRATROPIUM BROMIDE AND ALBUTEROL SULFATE 3 ML: .5; 3 SOLUTION RESPIRATORY (INHALATION) at 07:44

## 2021-07-18 RX ADMIN — IPRATROPIUM BROMIDE AND ALBUTEROL SULFATE 3 ML: .5; 3 SOLUTION RESPIRATORY (INHALATION) at 23:37

## 2021-07-18 RX ADMIN — INSULIN LISPRO 2 UNITS: 100 INJECTION, SOLUTION INTRAVENOUS; SUBCUTANEOUS at 23:28

## 2021-07-18 NOTE — PROGRESS NOTES
1900 - Bedside shift change report [facility] to [facility] nurse) by Fabiana Sullivan RN  (offgoing nurse). Report included the following information SBAR, Kardex, ED Summary, OR Summary, Procedure Summary, Intake/Output, MAR, Recent Results and Cardiac Rhythm SR     0700 - Bedside shift change report given to Anaya Esquivel (oncoming nurse) by [facility] ([facility]). Report included the following information SBAR, Kardex, ED Summary, OR Summary, Procedure Summary, Intake/Output, MAR, Recent Results and Cardiac Rhythm SR. Phil Santacruz

## 2021-07-18 NOTE — PROGRESS NOTES
[facility] Hospitalist Group  Progress Note    Patient: Bella Bautista Age: 78 y.o. : 1945 MR#: [id number] SSN: [id number]  Date/Time: 2021     Subjective:      Patient is sitting in bed in no apparent distress, awake and alert. Feels a little better. Wishes to go home tomorrow. Assessment/Plan:   1. Acute on chronic hypoxic respiratory failure  2. Acute COPD exacerbation  3. Sepsis with leukocytosis and tachycardia, POA due to #4  4. Right lower lobe Pneumonia- CAP  5. Mild hypotension, chronic   6. Active tobacco abuse   7. Cachexia   8. Hx Colon Ca s/p dain colectomy     Plan:  Continue steroids, bronchodilators. Continue oxygen  Pulmonary is following  On antibiotics  On midodrine  Smoking cessation education  Can transfer to telemetry. Discussed with patient  Discharge planning.   Home with home health care soon  Discussed with RN  Patient is a full code        Case discussed with:  [x]Patient  []Family  [x]Nursing  []Case Management  DVT Prophylaxis:  [x]Lovenox  []Hep SQ  []SCDs  []Coumadin   []Eliquis/Xarelto     Objective:   VS:   Visit Vitals  /73   Pulse 84   Temp 98.1 °F (36.7 °C)   Resp 20   Ht 5' 7\" (1.702 m)   Wt 55 kg (121 lb 3.2 oz)   SpO2 98%   BMI 18.98 kg/m²      Tmax/24hrs: Temp (24hrs), Av.8 °F (36.6 °C), Min:97.3 °F (36.3 °C), Max:98.1 °F (36.7 °C)  IOBRIEF    Intake/Output Summary (Last 24 hours) at 2021 1706  Last data filed at 2021 1600  Gross per 24 hour   Intake 390 ml   Output --   Net 390 ml       General:  Awake, alert  Cardiovascular:  S1S2+, RRR  Pulmonary: Coarse breath sounds bilaterally, some rhonchi  GI:  Soft, BS+, NT, ND  Extremities:  No edema      Medications:   Current Facility-Administered Medications   Medication Dose Route Frequency    famotidine (PEPCID) tablet 20 mg  20 mg Oral BID    midodrine (PROAMATINE) tablet 5 mg  5 mg Oral BID WITH MEALS    predniSONE (DELTASONE) tablet 60 mg  60 mg Oral DAILY WITH BREAKFAST    LORazepam (ATIVAN) tablet 1 mg  1 mg Oral QHS    rOPINIRole (REQUIP) tablet 1 mg  1 mg Oral QHS    simethicone (MYLICON) tablet 80 mg  80 mg Oral Q6H PRN    therapeutic multivitamin (THERAGRAN) tablet 1 Tablet  1 Tablet Oral DAILY    sodium chloride (NS) flush 5-40 mL  5-40 mL IntraVENous Q8H    sodium chloride (NS) flush 5-40 mL  5-40 mL IntraVENous PRN    acetaminophen (TYLENOL) tablet 650 mg  650 mg Oral Q6H PRN    Or    acetaminophen (TYLENOL) suppository 650 mg  650 mg Rectal Q6H PRN    polyethylene glycol (MIRALAX) packet 17 g  17 g Oral DAILY PRN    promethazine (PHENERGAN) tablet 12.5 mg  12.5 mg Oral Q6H PRN    Or    ondansetron (ZOFRAN) injection 4 mg  4 mg IntraVENous Q6H PRN    enoxaparin (LOVENOX) injection 40 mg  40 mg SubCUTAneous DAILY    insulin lispro (HUMALOG) injection   SubCUTAneous AC&HS    glucose chewable tablet 16 g  4 Tablet Oral PRN    glucagon (GLUCAGEN) injection 1 mg  1 mg IntraMUSCular PRN    dextrose (D50W) injection syrg 12.5-25 g  25-50 mL IntraVENous PRN    budesonide (PULMICORT) 1,000 mcg/2 mL nebulizer susp  1,000 mcg Nebulization BID RT    albuterol-ipratropium (DUO-NEB) 2.5 MG-0.5 MG/3 ML  3 mL Nebulization Q4H RT    sodium chloride (NS) flush 5-10 mL  5-10 mL IntraVENous PRN    cefTRIAXone (ROCEPHIN) 2 g in sterile water (preservative free) 20 mL IV syringe  2 g IntraVENous Q24H    azithromycin (ZITHROMAX) 500 mg in 0.9% sodium chloride 250 mL (VIAL-MATE)  500 mg IntraVENous Q24H       Labs:    Recent Results (from the past 24 hour(s))   GLUCOSE, POC    Collection Time: 07/17/21 10:23 PM   Result Value Ref Range    Glucose (POC) 147 (H) 70 - 623 mg/dL   METABOLIC PANEL, BASIC    Collection Time: 07/18/21  5:58 AM   Result Value Ref Range    Sodium 137 136 - 145 mmol/L    Potassium 4.5 3.5 - 5.5 mmol/L    Chloride 102 100 - 111 mmol/L    CO2 29 21 - 32 mmol/L    Anion gap 6 3.0 - 18 mmol/L    Glucose 187 (H) 74 - 99 mg/dL    BUN 34 (H) 7.0 - 18 MG/DL    Creatinine 0.88 0.6 - 1.3 MG/DL    BUN/Creatinine ratio 39 (H) 12 - 20      GFR est AA >60 >60 ml/min/1.73m2    GFR est non-AA >60 >60 ml/min/1.73m2    Calcium 8.6 8.5 - 10.1 MG/DL   CBC WITH AUTOMATED DIFF    Collection Time: 07/18/21  5:58 AM   Result Value Ref Range    WBC 10.7 4.6 - 13.2 K/uL    RBC 3.60 (L) 4.20 - 5.30 M/uL    HGB 11.3 (L) 12.0 - 16.0 g/dL    HCT 34.7 (L) 35.0 - 45.0 %    MCV 96.4 74.0 - 97.0 FL    MCH 31.4 24.0 - 34.0 PG    MCHC 32.6 31.0 - 37.0 g/dL    RDW 13.3 11.6 - 14.5 %    PLATELET 003 030 - 496 K/uL    MPV 10.1 9.2 - 11.8 FL    NEUTROPHILS 92 (H) 40 - 73 %    LYMPHOCYTES 5 (L) 21 - 52 %    MONOCYTES 3 3 - 10 %    EOSINOPHILS 0 0 - 5 %    BASOPHILS 0 0 - 2 %    ABS. NEUTROPHILS 9.8 (H) 1.8 - 8.0 K/UL    ABS. LYMPHOCYTES 0.5 (L) 0.9 - 3.6 K/UL    ABS. MONOCYTES 0.3 0.05 - 1.2 K/UL    ABS. EOSINOPHILS 0.0 0.0 - 0.4 K/UL    ABS. BASOPHILS 0.0 0.0 - 0.1 K/UL    DF AUTOMATED     GLUCOSE, POC    Collection Time: 07/18/21  7:21 AM   Result Value Ref Range    Glucose (POC) 144 (H) 70 - 110 mg/dL   GLUCOSE, POC    Collection Time: 07/18/21 11:03 AM   Result Value Ref Range    Glucose (POC) 152 (H) 70 - 110 mg/dL   GLUCOSE, POC    Collection Time: 07/18/21  3:33 PM   Result Value Ref Range    Glucose (POC) 124 (H) 70 - 110 mg/dL       Signed By: David Plascencia MD     July 18, 2021      Dragon medical dictation software was used for portions of this report. Unintended errors may occur.

## 2021-07-18 NOTE — PROGRESS NOTES
Bedside, Verbal, and Written shift change report given to Coty Marinelli RN (oncoming nurse) by [address], RN (offgoing nurse). Report included the following information SBAR, Kardex, Intake/Output, MAR, Recent Results, and Cardiac Rhythm NSR.

## 2021-07-18 NOTE — PROGRESS NOTES
TRANSFER - OUT REPORT:    Verbal report given to SADI Mcdonough(name) on [place]  being transferred to (unit) for routine progression of care       Report consisted of patients Situation, Background, Assessment and   Recommendations(SBAR). Information from the following report(s) SBAR, Kardex, [facility] and Cardiac Rhythm NSR was reviewed with the receiving nurse. Lines:   Peripheral IV 07/15/21 Anterior;Proximal;Right Forearm (Active)   Site Assessment Clean, dry, & intact 07/18/21 1600   Phlebitis Assessment 0 07/18/21 1600   Infiltration Assessment 0 07/18/21 1600   Dressing Status Clean, dry, & intact 07/18/21 1600   Dressing Type Transparent 07/18/21 1600   Hub Color/Line Status Patent; Flushed 07/18/21 1600   Action Taken Open ports on tubing capped 07/18/21 1600   Alcohol Cap Used Yes 07/18/21 1600       Peripheral IV 07/15/21 Anterior;Left;Proximal Forearm (Active)   Site Assessment Clean, dry, & intact 07/18/21 1600   Phlebitis Assessment 0 07/18/21 1600   Infiltration Assessment 0 07/18/21 1600   Dressing Status Clean, dry, & intact 07/18/21 1600   Dressing Type Transparent 07/18/21 1600   Hub Color/Line Status Pink;Flushed 07/18/21 1600   Action Taken Open ports on tubing capped 07/18/21 1600   Alcohol Cap Used Yes 07/18/21 1600        Opportunity for questions and clarification was provided.       Patient transported with:   O2 @ 2 liters

## 2021-07-19 VITALS
TEMPERATURE: 97.7 F | WEIGHT: 121.25 LBS | HEART RATE: 74 BPM | DIASTOLIC BLOOD PRESSURE: 74 MMHG | SYSTOLIC BLOOD PRESSURE: 136 MMHG | RESPIRATION RATE: 20 BRPM | BODY MASS INDEX: 19.03 KG/M2 | OXYGEN SATURATION: 97 % | HEIGHT: 67 IN

## 2021-07-19 LAB
ANION GAP SERPL CALC-SCNC: 4 MMOL/L (ref 3–18)
BASOPHILS # BLD: 0 K/UL (ref 0–0.1)
BASOPHILS NFR BLD: 0 % (ref 0–2)
BUN SERPL-MCNC: 36 MG/DL (ref 7–18)
BUN/CREAT SERPL: 43 (ref 12–20)
CALCIUM SERPL-MCNC: 8.6 MG/DL (ref 8.5–10.1)
CHLORIDE SERPL-SCNC: 105 MMOL/L (ref 100–111)
CO2 SERPL-SCNC: 30 MMOL/L (ref 21–32)
CREAT SERPL-MCNC: 0.83 MG/DL (ref 0.6–1.3)
DIFFERENTIAL METHOD BLD: ABNORMAL
EOSINOPHIL # BLD: 0 K/UL (ref 0–0.4)
EOSINOPHIL NFR BLD: 0 % (ref 0–5)
ERYTHROCYTE [DISTWIDTH] IN BLOOD BY AUTOMATED COUNT: 13.4 % (ref 11.6–14.5)
GLUCOSE BLD STRIP.AUTO-MCNC: 95 MG/DL (ref 70–110)
GLUCOSE SERPL-MCNC: 91 MG/DL (ref 74–99)
HCT VFR BLD AUTO: 35.3 % (ref 35–45)
HGB BLD-MCNC: 11.4 G/DL (ref 12–16)
LYMPHOCYTES # BLD: 1.4 K/UL (ref 0.9–3.6)
LYMPHOCYTES NFR BLD: 15 % (ref 21–52)
MCH RBC QN AUTO: 31.4 PG (ref 24–34)
MCHC RBC AUTO-ENTMCNC: 32.3 G/DL (ref 31–37)
MCV RBC AUTO: 97.2 FL (ref 74–97)
MONOCYTES # BLD: 1.1 K/UL (ref 0.05–1.2)
MONOCYTES NFR BLD: 12 % (ref 3–10)
NEUTS SEG # BLD: 6.9 K/UL (ref 1.8–8)
NEUTS SEG NFR BLD: 73 % (ref 40–73)
PLATELET # BLD AUTO: 298 K/UL (ref 135–420)
PMV BLD AUTO: 10.1 FL (ref 9.2–11.8)
POTASSIUM SERPL-SCNC: 3.7 MMOL/L (ref 3.5–5.5)
RBC # BLD AUTO: 3.63 M/UL (ref 4.2–5.3)
SODIUM SERPL-SCNC: 139 MMOL/L (ref 136–145)
WBC # BLD AUTO: 9.5 K/UL (ref 4.6–13.2)

## 2021-07-19 PROCEDURE — 97166 OT EVAL MOD COMPLEX 45 MIN: CPT

## 2021-07-19 PROCEDURE — 77010033678 HC OXYGEN DAILY

## 2021-07-19 PROCEDURE — 99232 SBSQ HOSP IP/OBS MODERATE 35: CPT | Performed by: INTERNAL MEDICINE

## 2021-07-19 PROCEDURE — 99239 HOSP IP/OBS DSCHRG MGMT >30: CPT | Performed by: EMERGENCY MEDICINE

## 2021-07-19 PROCEDURE — 94761 N-INVAS EAR/PLS OXIMETRY MLT: CPT

## 2021-07-19 PROCEDURE — 74011250636 HC RX REV CODE- 250/636: Performed by: INTERNAL MEDICINE

## 2021-07-19 PROCEDURE — 94668 MNPJ CHEST WALL SBSQ: CPT

## 2021-07-19 PROCEDURE — 80048 BASIC METABOLIC PNL TOTAL CA: CPT

## 2021-07-19 PROCEDURE — 97116 GAIT TRAINING THERAPY: CPT

## 2021-07-19 PROCEDURE — 74011250637 HC RX REV CODE- 250/637: Performed by: INTERNAL MEDICINE

## 2021-07-19 PROCEDURE — 36415 COLL VENOUS BLD VENIPUNCTURE: CPT

## 2021-07-19 PROCEDURE — 94640 AIRWAY INHALATION TREATMENT: CPT

## 2021-07-19 PROCEDURE — 74011636637 HC RX REV CODE- 636/637: Performed by: INTERNAL MEDICINE

## 2021-07-19 PROCEDURE — 2709999900 HC NON-CHARGEABLE SUPPLY

## 2021-07-19 PROCEDURE — 97161 PT EVAL LOW COMPLEX 20 MIN: CPT

## 2021-07-19 PROCEDURE — 74011250637 HC RX REV CODE- 250/637: Performed by: HOSPITALIST

## 2021-07-19 PROCEDURE — 82962 GLUCOSE BLOOD TEST: CPT

## 2021-07-19 PROCEDURE — 97535 SELF CARE MNGMENT TRAINING: CPT

## 2021-07-19 PROCEDURE — 74011000250 HC RX REV CODE- 250: Performed by: INTERNAL MEDICINE

## 2021-07-19 PROCEDURE — 85025 COMPLETE CBC W/AUTO DIFF WBC: CPT

## 2021-07-19 RX ORDER — MIDODRINE HYDROCHLORIDE 2.5 MG/1
2.5 TABLET ORAL 2 TIMES DAILY WITH MEALS
Qty: 60 TABLET | Refills: 0 | Status: SHIPPED | OUTPATIENT
Start: 2021-07-19

## 2021-07-19 RX ORDER — CEFPODOXIME PROXETIL 200 MG/1
200 TABLET, FILM COATED ORAL 2 TIMES DAILY
Qty: 6 TABLET | Refills: 0 | Status: SHIPPED | OUTPATIENT
Start: 2021-07-19 | End: 2021-07-22

## 2021-07-19 RX ORDER — FAMOTIDINE 20 MG/1
20 TABLET, FILM COATED ORAL DAILY
Qty: 30 TABLET | Refills: 0 | Status: SHIPPED | OUTPATIENT
Start: 2021-07-19

## 2021-07-19 RX ORDER — PREDNISONE 10 MG/1
TABLET ORAL
Qty: 40 TABLET | Refills: 0 | Status: SHIPPED | OUTPATIENT
Start: 2021-07-19 | End: 2021-08-23

## 2021-07-19 RX ORDER — AZITHROMYCIN 500 MG/1
500 TABLET, FILM COATED ORAL DAILY
Qty: 3 TABLET | Refills: 0 | Status: SHIPPED | OUTPATIENT
Start: 2021-07-19 | End: 2021-07-22

## 2021-07-19 RX ADMIN — PREDNISONE 60 MG: 20 TABLET ORAL at 09:28

## 2021-07-19 RX ADMIN — IPRATROPIUM BROMIDE AND ALBUTEROL SULFATE 3 ML: .5; 3 SOLUTION RESPIRATORY (INHALATION) at 08:18

## 2021-07-19 RX ADMIN — BUDESONIDE 1000 MCG: 1 SUSPENSION RESPIRATORY (INHALATION) at 08:18

## 2021-07-19 RX ADMIN — Medication 10 ML: at 05:51

## 2021-07-19 RX ADMIN — FAMOTIDINE 20 MG: 20 TABLET, FILM COATED ORAL at 09:28

## 2021-07-19 RX ADMIN — THERA TABS 1 TABLET: TAB at 09:28

## 2021-07-19 RX ADMIN — IPRATROPIUM BROMIDE AND ALBUTEROL SULFATE 3 ML: .5; 3 SOLUTION RESPIRATORY (INHALATION) at 03:26

## 2021-07-19 RX ADMIN — MIDODRINE HYDROCHLORIDE 5 MG: 5 TABLET ORAL at 09:28

## 2021-07-19 RX ADMIN — ENOXAPARIN SODIUM 40 MG: 40 INJECTION SUBCUTANEOUS at 09:28

## 2021-07-19 RX ADMIN — IPRATROPIUM BROMIDE AND ALBUTEROL SULFATE 3 ML: .5; 3 SOLUTION RESPIRATORY (INHALATION) at 11:19

## 2021-07-19 NOTE — PROGRESS NOTES
Progress Note  Date:2021       Room:[facility]  Patient Juice Kraft     YOB: 1945     Age:76 y.o. Natalie Cueva is a 78 y.o. female with a PMH of colon cancer, COPD on 2L O2 at home, and asthma who presented to the ED on 7/15 with worsening shortness of breath for the last few weeks. She was started on antibiotics for pulmonary infiltrates as well as oral steroids in addition to nebulizer treatments and supplemental oxygen. Today, patient reports that her symptoms are still improving towards her baseline, but she knows that she can only get so much better. She states that she still becomes short of breath with minimal activity, and that she feels the need to continue to cough to clear the mucus from her lungs. Patient states that she is ready to go home, and that she is \"ready to get her affairs in order. \" She states that she is interested in having help around the house, and she becomes tearful when she says that her family has been more of a detriment than an help to her. Patient admits that she would continue to smoke if she were able to, but lately she has been to weak to light her cigarettes. Review of Systems   Constitutional: Negative for chills and fever. Cardiovascular: Negative for chest pain. Gastrointestinal: Negative for anal bleeding, constipation and diarrhea. Objective         Vitals Last 24 Hours:  TEMPERATURE:  Temp  Av.1 °F (36.7 °C)  Min: 97.7 °F (36.5 °C)  Max: 98.3 °F (36.8 °C)  RESPIRATIONS RANGE: Resp  Av.2  Min: 20  Max: 21  PULSE OXIMETRY RANGE: SpO2  Av.2 %  Min: 92 %  Max: 98 %  PULSE RANGE: Pulse  Av.4  Min: 74  Max: 86  BLOOD PRESSURE RANGE: Systolic (48TVR), UHE:944 , Min:117 , UTQ:410   ; Diastolic (09YWL), IHL:19, Min:72, Max:75    I/O (24Hr):     Intake/Output Summary (Last 24 hours) at 2021 1231  Last data filed at 2021 1925  Gross per 24 hour   Intake 240 ml   Output 0 ml   Net 240 ml     Objective:  General Appearance:  General patient appearance: tearful. Vital signs: (most recent): Blood pressure 136/74, pulse 74, temperature 97.7 °F (36.5 °C), resp. rate 20, height 5' 7\" (1.702 m), weight 55 kg (121 lb 4.1 oz), SpO2 97 %. No fever. Lungs: There are rhonchi. Heart: Normal rate. Regular rhythm. S1 normal and S2 normal.  No murmur, gallop or friction rub. Labs/Imaging/Diagnostics    Labs:  CBC:  Recent Labs     07/19/21 0347 07/18/21  0558 07/17/21  0538   WBC 9.5 10.7 13.1   RBC 3.63* 3.60* 3.68*   HGB 11.4* 11.3* 11.5*   HCT 35.3 34.7* 34.9*   MCV 97.2* 96.4 94.8   RDW 13.4 13.3 13.0    313 303     CHEMISTRIES:  Recent Labs     07/19/21 0347 07/18/21  0558 07/17/21  0538    137 136   K 3.7 4.5 4.1    102 101   CO2 30 29 29   BUN 36* 34* 33*   CA 8.6 8.6 8.8   MG  --   --  2.5   PT/INR:No results for input(s): INR, INREXT in the last 72 hours. No lab exists for component: PROTIME  APTT:No results for input(s): APTT in the last 72 hours. LIVER PROFILE:No results for input(s): AST, ALT in the last 72 hours. No lab exists for component: BENITO RubioPHOS  Lab Results   Component Value Date/Time    ALT (SGPT) 20 07/15/2021 08:26 PM    AST (SGOT) 20 07/15/2021 08:26 PM    Alk. phosphatase 114 07/15/2021 08:26 PM    Bilirubin, direct 0.2 10/29/2018 02:59 AM    Bilirubin, total 0.9 07/15/2021 08:26 PM       Imaging Last 24 Hours:  No results found.   Assessment//Plan   Assessment:    -COPD exacerbation, improving    -Pulmonary infiltrates    -Nicotine dependence    Plan:    -Continue steroid and taper, continue nebulizer treatments and supplemental O2\    -Complete course of antibiotics    -Smoking cessation    Electronically signed by Ahmet Cerrato on 7/19/2021 at 12:31 PM

## 2021-07-19 NOTE — PROGRESS NOTES
1925: Assumed patient care from [address] Patient is alert and oriented to person, place, time and situation. Respiratory status is stable on 2L via nasal cannula. Vital signs are stable. MEWS score is a one. Patient denies any pain, discomfort, nausea vomiting dizziness or anxiety. White board and fall card is updated. Bed is locked and in lowest position. Call bell, water and personal belongings are within reach. Patient has no questions, comments or concerns after bedside shift report. 0700: Patient had an uneventful shift. Respiratory status, vital signs and MEWS score remained stable. Patient was resting quietly with no signs of distress noted. Bed locked and in lowest position. Call bell water and personal belongings were within reach. Patient had no questions, comments or concerns after bedside shift report.  Bedside report given to Shady Quintanilla R.N.

## 2021-07-19 NOTE — PROGRESS NOTES
Discharge order noted for today. Pt has been accepted to [facility] agency. Met with patient and are agreeable to the transition plan today. Transport has been arranged through daughter. Patient's discharge summary and home health  orders have been forwarded to [address] [facility] via [facility] Updated bedside RN, Mechelle Sampson,  to the transition plan.   Discharge information has been documented on the AVS.       Theresa Swanson RN BSN  Care Manager  [phone]

## 2021-07-19 NOTE — PROGRESS NOTES
Problem: Mobility Impaired (Adult and Pediatric)  Goal: *Acute Goals and Plan of Care (Insert Text)  Description: Physical Therapy Goals  Initiated 7/19/2021 and to be accomplished within 7 day(s)  1. Patient will move from supine to sit and sit to supine , scoot up and down, and roll side to side in bed with modified independence. 2.  Patient will transfer from bed to chair and chair to bed with modified independence using the least restrictive device. 3.  Patient will perform sit to stand with modified independence. 4.  Patient will ambulate with modified independence for 200 feet with the least restrictive device. 5.  Patient will ascend/descend 3 stairs with unilateral handrail(s) with modified independence. PLOF: Pt reporting she lives with daughter in 1 story house with 3 GLORIA with B handrails. Pt reporting her daughter is with her 24/7 and can assist her as needed, has canes and walkers but doesn't use them. Pt reporting increased falls lately even with assist.  Outcome: Progressing Towards Goal    PHYSICAL THERAPY EVALUATION    Patient: Car Tomlin (78 y.o. female)  Date: 7/19/2021  Primary Diagnosis: Acute bronchitis with chronic obstructive pulmonary disease (COPD) ([facility] [address] [J44.0, J20.9]  Community acquired pneumonia [J18.9]  Respiratory failure ([facility] [address] [J96.90]        Precautions:   Fall, Aspiration      ASSESSMENT :  Pt cleared to participate in PT session, pt received semi-reclined in bed and agreeable to therapy session. Based on the objective data described below, the patient presents with decreased endurance, decreased strength, decreased balance reactions, gait deviations, and decreased independence in functional mobility. Pt on 2L O2 via NC throughout. Pt completing bed mobility with Jodi. Pt standing with SBA to RW and ambulating x50 feet with CGA. Pt declined further ambulation at this time. Pt agreeable to sitting up in recliner and getting on phone with friend.  Pt positioned for comfort and educated to call for assist before getting up, pt verbalized understanding. Pt left with all needs met and call bell in reach. RN notified of position and participation. Patient will benefit from skilled intervention to address the above impairments. Patient's rehabilitation potential is considered to be Good  Factors which may influence rehabilitation potential include:   [x]         None noted  []         Mental ability/status  []         Medical condition  []         Home/family situation and support systems  []         Safety awareness  []         Pain tolerance/management  []         Other:      PLAN :  Recommendations and Planned Interventions:   [x]           Bed Mobility Training             []    Neuromuscular Re-Education  [x]           Transfer Training                   []    Orthotic/Prosthetic Training  [x]           Gait Training                          []    Modalities  [x]           Therapeutic Exercises           []    Edema Management/Control  [x]           Therapeutic Activities            [x]    Family Training/Education  [x]           Patient Education  []           Other (comment):    Frequency/Duration: Patient will be followed by physical therapy 1-2 times per day/4-7 days per week to address goals. Discharge Recommendations: Home Health with 24/7 support from family  Further Equipment Recommendations for Discharge: rolling walker     SUBJECTIVE:   Patient stated I am sad I am losing my independence.     OBJECTIVE DATA SUMMARY:     Past Medical History:   Diagnosis Date    Anxiety     Arthritis     Asbestosis ([facility] [address]     Asthma     Back problem     Baker's cyst     Balance problems     Chronic lung disease     Cigarette smoker     Clotting disorder (HCC)     COPD (chronic obstructive pulmonary disease) ([facility])     oxygen 2/liters asbestosis    Depression     History of DVT (deep vein thrombosis)     Leg pain     Right    Lung disease     Nausea & vomiting     Osteoporosis Restless leg syndrome     Spinal stenosis     Thromboembolus ([facility] [address]     Vitamin D deficiency      Past Surgical History:   Procedure Laterality Date    COLONOSCOPY N/A 9/22/2018    COLONOSCOPY w bx w polypectonies performed by Sarah Vazquez MD at [address] N/A 11/6/2018    COLONOSCOPY performed by Candy Baca MD at [facility] MAIN OR    COLONOSCOPY N/A 8/2/2019    COLONOSCOPY with polypectomies and biopsies performed by Adan Carey MD at [facility] ENDOSCOPY    HX APPENDECTOMY      HX BACK SURGERY      x4    HX BREAST BIOPSY      left    HX GI      exp lap and ileostomy    HX HEENT      cataract right    HX HYSTERECTOMY      HX LUMBAR LAMINECTOMY      HX MENISCUS REPAIR      HX ORTHOPAEDIC      Knee bakers cyst    HX POLYPECTOMY      right colectomy    HX TONSILLECTOMY      HX VEIN STRIPPING      DE LAP,SURG,COLECTOMY, PARTIAL, W/ANAST N/A 10/23/2018    Dr. Lori Coates N/A 11/06/2018    Dr. Lugo Phlegm      vein stripping     Barriers to Learning/Limitations: None  Compensate with: N/A  Home Situation:  Home Situation  Home Environment: Private residence  # Steps to Enter: 3  Rails to Enter: Yes  Hand Rails : Bilateral  Wheelchair Ramp: No  One/Two Story Residence: One story  Living Alone: No  Support Systems: Child(sarah)  Patient Expects to be Discharged toF Cor[de-identified]ration (patient room)  Current DME Used/Available at Home: [place] Behavior:  Neurologic State: Alert  Orientation Level: Oriented X4  Cognition: Appropriate decision making; Follows commands  Safety/Judgement: Awareness of environment; Fall prevention  Psychosocial  Patient Behaviors: Calm; Cooperative  Purposeful Interaction: Yes  Pt Identified Daily Priority: Clinical issues (comment)  Caritas Process: Establish trust;Teaching/learning; Attend basic human needs  Caring Interventions: Reassure  Reassure: Therapeutic listening; Informing;Caring rounds  Therapeutic Modalities: Humor; Intentional therapeutic touch    Strength:    Strength: Generally decreased, functional (4+/5 BLEs )    Tone & Sensation:   Tone: Normal    Sensation: Intact    Range Of Motion:  AROM: Within functional limits    Posture:  Posture (WDL): Exceptions to WDL  Posture Assessment: Forward head;Trunk flexion  Functional Mobility:  Bed Mobility:     Supine to Sit: Modified independent  Sit to Supine: Modified independent  Scooting: Modified independent  Transfers:  Sit to Stand: Stand-by assistance  Stand to Sit: Stand-by assistance    Balance:   Sitting: Intact  Standing: Impaired; With support  Standing - Static: Good  Standing - Dynamic : Fair (+ )    Ambulation/Gait Training:  Distance (ft): 50 Feet (ft)  Assistive Device: Walker, rolling  Ambulation - Level of Assistance: Contact guard assistance     Gait Description (WDL): Exceptions to WDL  Gait Abnormalities: Decreased step clearance    Base of Support: Center of gravity altered;Narrowed     Speed/Marybeth: Shuffled; Slow  Step Length: Left shortened;Right shortened    Pain:  Pain level pre-treatment: 0/10   Pain level post-treatment: 0/10     Activity Tolerance:   Fair tolerance in room     Please refer to the flowsheet for vital signs taken during this treatment. After treatment:   [x]         Patient left in no apparent distress sitting up in chair  []         Patient left in no apparent distress in bed  [x]         Call bell left within reach  [x]         Nursing notified  []         Caregiver present  []         Bed alarm activated  []         SCDs applied    COMMUNICATION/EDUCATION:   [x]         Role of Physical Therapy in the acute care setting. [x]         Fall prevention education was provided and the patient/caregiver indicated understanding. [x]         Patient/family have participated as able in goal setting and plan of care. [x]         Patient/family agree to work toward stated goals and plan of care.   [] Patient understands intent and goals of therapy, but is neutral about his/her participation. []         Patient is unable to participate in goal setting/plan of care: ongoing with therapy staff.  []         Other:     Thank you for this referral.  Leena Rodriguez, PT   Time Calculation: 23 mins      Eval Complexity: History: MEDIUM  Complexity : 1-2 comorbidities / personal factors will impact the outcome/ POC Exam:MEDIUM Complexity : 3 Standardized tests and measures addressing body structure, function, activity limitation and / or participation in recreation  Presentation: LOW Complexity : Stable, uncomplicated  Clinical Decision Making:Low Complexity low  Overall Complexity:LOW

## 2021-07-19 NOTE — DISCHARGE INSTRUCTIONS
Patient armband removed and shredded  Patient Education        Using a Metered-Dose Inhaler: Care Instructions  Overview     A metered-dose inhaler lets you breathe medicine into your lungs quickly. Inhaled medicine works faster than the same medicine in a pill. An inhaler allows you to take less medicine than you would need if you took it as a pill. \"Metered-dose\" means that the inhaler gives a measured amount of medicine each time you use it. A metered-dose inhaler gives medicine in the form of a liquid mist.  Your doctor may want you to use a spacer with your inhaler. A spacer is a chamber that you attach to the inhaler. The chamber holds the medicine before you inhale it. That way, you can inhale the medicine in as many breaths as you need. Doctors recommend using a spacer with most metered-dose inhalers. This is even more important when using corticosteroid medicines. Follow-up care is a key part of your treatment and safety. Be sure to make and go to all appointments, and call your doctor if you are having problems. It's also a good idea to know your test results and keep a list of the medicines you take. How can you care for yourself at home? To get started  · Talk with your health care provider to be sure you are using your inhaler the right way. It might help if you practice using it in front of a mirror. Use the inhaler exactly as prescribed. · Check that you have the correct medicine. If you use more than one inhaler, put a label on each one. This will let you know which one to use at the right time. · Keep track of how much medicine is in the inhaler. Check the label to see how many doses are in the container. If you know how many puffs you can take, you can replace the inhaler before you run out. Ask your health care provider how you can keep track of how much medicine is left. · Talk to your health care provider about using a spacer with your inhaler.  Spacers make it easier to get the medicine into your lungs. You may need a spacer if you are using corticosteroid medicines. A spacer can also help if you have problems pressing the inhaler and breathing in at the same time. · If you are using a corticosteroid inhaler, gargle and rinse out your mouth with water after use. Do not swallow the water. Swallowing the water will increase the chance that the medicine will get into your bloodstream. This may make it more likely that you will have side effects. To use a spacer with an inhaler  1. Shake the inhaler. Remove the inhaler cap, and place the mouthpiece of the inhaler into the spacer. Check the inhaler instructions to see if you need to prime your inhaler before you use it. If it needs priming, follow the instructions on how to prime your inhaler. 2. Remove the cap from the spacer. 3. Hold the inhaler upright with the mouthpiece at the bottom. 4. Tilt your head back a little, and breathe out slowly and completely. 5. Place the spacer's mouthpiece in your mouth. 6. Press down on the inhaler to spray one puff of medicine into the spacer, and then start breathing in slowly. Wait to inhale until after you have pressed down on the inhaler. Some spacers have a whistle. If you hear it, you should breathe in more slowly. 7. Hold your breath for 10 seconds. This will let the medicine settle in your lungs. 8. If you need to take a second dose, wait 30 to 60 seconds to allow the inhaler valve to refill. To use an inhaler without a spacer  1. Shake the inhaler as directed. Remove the cap. Check the instructions to see if you need to prime your inhaler before you use it. If it needs priming, follow the instructions on how to prime your inhaler. 2. Hold the inhaler upright with the mouthpiece at the bottom. 3. Tilt your head back a little, and breathe out slowly and completely. 4. Position the inhaler in one of two ways:  ? You can place the inhaler in your mouth. This is easier for most people.  And it lowers the risk that any of the medicine will get into your eyes. ? Or you can place the inhaler 1 to 2 inches in front of your open mouth, without closing your lips over it. Try to open your mouth as wide as you can. Placing the inhaler in front of your open mouth may be better for getting the medicine into your lungs. But some people may find this too hard to do. 5. Start taking slow, even breaths through your mouth. Press down on the inhaler once, then inhale fully. 6. Hold your breath for 10 seconds. This will let the medicine settle in your lungs. 7. If you need to take a second dose, wait 30 to 60 seconds to allow the inhaler valve to refill. Where can you learn more? Go to http://www.holt.com/  Enter K111 in the search box to learn more about \"Using a Metered-Dose Inhaler: Care Instructions. \"  Current as of: October 26, 2020               Content Version: 12.8  © 7538-7692 [employer]. Care instructions adapted under license by [employer] (which disclaims liability or warranty for this information). If you have questions about a medical condition or this instruction, always ask your healthcare professional. [place] [address] any warranty or liability for your use of this information. [employer] Engine Activation    Thank you for requesting access to [employer]. Please follow the instructions below to securely access and download your online medical record. [employer] allows you to send messages to your doctor, view your test results, renew your prescriptions, schedule appointments, and more. How Do I Sign Up? 1. In your internet browser, go to [email or web address]  2. Click on the First Time User? Click Here link in the Sign In box. You will be redirect to the New Member Sign Up page. 3. Enter your [employer] Access Code exactly as it appears below. You will not need to use this code after youve completed the sign-up process.  If you do not sign up before the expiration date, you must request a new code. Herotainment Access Code: Activation code not generated  Current Herotainment Status: Active (This is the date your Herotainment access code will )    4. Enter the last four digits of your Social Security Number (xxxx) and Date of Birth (mm/dd/yyyy) as indicated and click Submit. You will be taken to the next sign-up page. 5. Create a [employer] ID. This will be your Herotainment login ID and cannot be changed, so think of one that is secure and easy to remember. 6. Create a Herotainment password. You can change your password at any time. 7. Enter your Password Reset Question and Answer. This can be used at a later time if you forget your password. 8. Enter your e-mail address. You will receive e-mail notification when new information is available in [address] 9. Click Sign Up. You can now view and download portions of your medical record. 10. Click the Download Summary menu link to download a portable copy of your medical information. Additional Information    If you have questions, please visit the Frequently Asked Questions section of the Herotainment website at [email or web address] Remember, Herotainment is NOT to be used for urgent needs. For medical emergencies, dial 911. As part of the discharge instructions, medications already given today were discussed with the patient. The next dose due of all ordered meds was highlighted as part of the medication discharge instructions. Discussed with the patient the importance of taking medications as directed, as well as the side effects and adverse reactions to medications ordered.      DISCHARGE SUMMARY from Nurse    PATIENT INSTRUCTIONS:    After general anesthesia or intravenous sedation, for 24 hours or while taking prescription Narcotics:  · Limit your activities  · Do not drive and operate hazardous machinery  · Do not make important personal or business decisions  · Do  not drink alcoholic beverages  · If you have not urinated within 8 hours after discharge, please contact your surgeon on call. Report the following to your surgeon:  · Excessive pain, swelling, redness or odor of or around the surgical area  · Temperature over 100.5  · Nausea and vomiting lasting longer than 4 hours or if unable to take medications  · Any signs of decreased circulation or nerve impairment to extremity: change in color, persistent  numbness, tingling, coldness or increase pain  · Any questions    What to do at Home:  Recommended activity: Activity as tolerated,     If you experience any of the following symptoms shortness of breath, chest pain, coughing up green, yellow, red, or brown sputum. Temp 101 or above, please follow up with Emergency Department or Primary MD.    *  Please give a list of your current medications to your Primary Care Provider. *  Please update this list whenever your medications are discontinued, doses are      changed, or new medications (including over-the-counter products) are added. *  Please carry medication information at all times in case of emergency situations. These are general instructions for a healthy lifestyle:    No smoking/ No tobacco products/ Avoid exposure to second hand smoke  Surgeon General's Warning:  Quitting smoking now greatly reduces serious risk to your health. Obesity, smoking, and sedentary lifestyle greatly increases your risk for illness    A healthy diet, regular physical exercise & weight monitoring are important for maintaining a healthy lifestyle    You may be retaining fluid if you have a history of heart failure or if you experience any of the following symptoms:  Weight gain of 3 pounds or more overnight or 5 pounds in a week, increased swelling in our hands or feet or shortness of breath while lying flat in bed. Please call your doctor as soon as you notice any of these symptoms; do not wait until your next office visit.         The discharge information has been reviewed with the patient. The patient verbalized understanding. Discharge medications reviewed with the patient and appropriate educational materials and side effects teaching were provided.   ___________________________________________________________________________________________________________________________________

## 2021-07-19 NOTE — PROGRESS NOTES
Description: Occupational Therapy Goals  Initiated 7/19/2021 within 7 day(s). 1.  Patient will perform grooming with modified independence using energy conservation techniques. .   2.  Patient will perform bathing with modified independence using energy conservation techniques. 3.  Patient will perform upper body dressing and lower body dressing with modified independence using energy conservation techniques. .  4.  Patient will perform toilet transfers with modified independence using RW with good safety awareness. 5.  Patient will perform all aspects of toileting with modified independence. 6.  Patient will participate in upper extremity therapeutic exercise/activities with modified independence for 8 minutes. 7.  Patient will utilize energy conservation techniques during functional activities with min verbal cues. Prior Level of Function: Mod I with ADLs (beginning to get more difficult), independent without AD for functional mobility (owns rollator and [address])     Outcome: Progressing Towards Goal         OCCUPATIONAL THERAPY EVALUATION    Patient: Mark Pardo (78 y.o. female)  Date: 7/19/2021  Primary Diagnosis: Acute bronchitis with chronic obstructive pulmonary disease (COP[facility]) [facility] [J44.0, J20.9]  Community acquired pneumonia [J18.9]  Respiratory failure ([facility] [J96.90]        Precautions:   Fall, Aspiration  PLOF: see above    ASSESSMENT :  Nursing/RN cleared for pt to participate in OT evaluation and tx session. Patient presents sitting up in recliner, completing her lunch meal - mod I with self feeding and set up, A & O x 4, no c/o pain. Patient reports floating circles with vision when BP is off-nursing notified. Vitals monitored and WNL: Seated prior to toilet transfer: 100% O2 @ 2 LPM, 86 HR, 114/69 BP, Following toilet transfer using RW: 98% O2 @  LPM, 80 HR, 119/70 BP-nursing notified.  Instruction for safety with use of walker, keep close in front of self for balance and fall prevention, pt verbalized understanding. Patient Supv with ADL routine for bathing and dressing tasks, toilet tasks. Patient reports he daughter \"occasionally helps her\", her brother \"is senile and good for nothing\" and \"family don't want to come around because of her daughter and brother. Patient's son arrived to visit, pt reports he lives close-by and that her sister occasionally assists her. Patient sitting up in recliner at end of tx session, call bell within reach & pt verbalized understanding and provided return demonstration to utilize for assist e.g. functional transfers in order to prevent falls. OT recommending increased assistance for safe d/c home e.g. ADLs, IADLs, home making tasks and meal pre with [facility] services recommended. Patient will benefit from skilled intervention to address the above impairments. Patient's rehabilitation potential is considered to be Good  Factors which may influence rehabilitation potential include:   []             None noted  []             Mental ability/status  []             Medical condition  [x]             Home/family situation and support systems  []             Safety awareness  []             Pain tolerance/management  []             Other:      PLAN :  Recommendations and Planned Interventions:   [x]               Self Care Training                  [x]      Therapeutic Activities  [x]               Functional Mobility Training   []      Cognitive Retraining  [x]               Therapeutic Exercises           [x]      Endurance Activities  [x]               Balance Training                    [x]      Neuromuscular Re-Education  []               Visual/Perceptual Training     [x]      Home Safety Training  [x]               Patient Education                   [x]      Family Training/Education  []               Other (comment):    Frequency/Duration: Patient will be followed by occupational therapy 1-2 times per day/4-7 days per week to address goals.   Discharge Recommendations: Home Health with increased assist provided by family e.g. ADLs, IADLs, meal prep and home making tasks  Further Equipment Recommendations for Discharge: rolling walker     SUBJECTIVE:   Patient stated Chan munoz says I should be getting more help from my daughter.     OBJECTIVE DATA SUMMARY:     Past Medical History:   Diagnosis Date    Anxiety     Arthritis     Asbestosis ([facility]     Asthma     Back problem     Baker's cyst     Balance problems     Chronic lung disease     Cigarette smoker     Clotting disorder ([facility]     COPD (chronic obstructive pulmonary disease) (HCC)     oxygen 2/liters asbestosis    Depression     History of DVT (deep vein thrombosis)     Leg pain     Right    Lung disease     Nausea & vomiting     Osteoporosis     Restless leg syndrome     Spinal stenosis     Thromboembolus (HCC)     Vitamin D deficiency      Past Surgical History:   Procedure Laterality Date    COLONOSCOPY N/A 9/22/2018    COLONOSCOPY w bx w polypectonies performed by Kathy Wray MD at [place] [address]. N/A 11/6/2018    COLONOSCOPY performed by Nadine Seaman MD at [address] [place] [address] N/A 8/2/2019    COLONOSCOPY with polypectomies and biopsies performed by Marion Mendoza MD at [facility]    HX APPENDECTOMY      HX BACK SURGERY      x4    HX BREAST BIOPSY      left    HX GI      exp lap and ileostomy    HX HEENT      cataract right    HX HYSTERECTOMY      HX LUMBAR LAMINECTOMY      HX MENISCUS REPAIR      HX ORTHOPAEDIC      Knee bakers cyst    HX POLYPECTOMY      right colectomy    HX TONSILLECTOMY      HX VEIN STRIPPING      MA LAP,SURG,COLECTOMY, PARTIAL, W/ANAST N/A 10/23/2018    Dr. Adolfo Bennett N/A 11/06/2018    Dr. Dexter Alaniz      vein stripping     Barriers to Learning/Limitations: None  Compensate with: visual, verbal, tactile, kinesthetic cues/model    Home Situation:   Home Situation  Home Environment: Private residence  # Steps to Enter: 3  Rails to Enter: Yes  Hand Rails : Bilateral  Wheelchair Ramp: No  One/Two Story Residence: One story  Living Alone: No  Support Systems: Child(sarah)  Patient Expects to be Discharged to[de-identified] [employer]  Current DME Used/Available at Home: Theodor Mode, rolling, [address], [address], straight, Shower chair  Tub or Shower Type: Tub/Shower combination  [x]  Right hand dominant   []  Left hand dominant    Cognitive/Behavioral Status:  Neurologic State: Alert  Orientation Level: Oriented X4  Cognition: Follows commands  Safety/Judgement: Fall prevention    Skin: appears intact    Edema: none noted      Vision/Perceptual:       appears intact, able to tell correct time on wall clock    Corrective Lenses: Reading glasses    Coordination: BUE  Coordination: Within functional limits  Fine Motor Skills-Upper: Left Intact; Right Intact    Gross Motor Skills-Upper: Left Intact; Right Intact    Balance:  Sitting: Intact  Standing: Impaired; With support  Standing - Static: Good  Standing - Dynamic : Fair (+)    Strength: BUE  Strength:  Within functional limits     Tone & Sensation: BUE  Tone: Normal  Sensation: Intact     Range of Motion: BUE  AROM: Within functional limits     Functional Mobility and Transfers for ADLs:  Bed Mobility:     Supine to Sit: Modified independent  Sit to Supine: Modified independent  Scooting: Modified independent  Transfers:  Sit to Stand: Stand-by assistance  Stand to Sit: Stand-by assistance     Toilet Transfer : Stand-by assistance     ADL Assessment:   Feeding: Modified independent    Oral Facial Hygiene/Grooming: Supervision    Bathing: Supervision    Upper Body Dressing: Supervision    Lower Body Dressing: Supervision    Toileting: Supervision    ADL Intervention:  Feeding  Feeding Assistance: Modified independent    Upper Body Bathing  Bathing Assistance: Supervision  Position Performed:  (seated)    Lower Body Bathing  Bathing Assistance: Supervision  Perineal  : Supervision  Position Performed:  (seated)  Lower Body : Supervision  Position Performed:  (seated)    Upper Body Dressing Assistance  Dressing Assistance: Pooja Narvaez [address]: Supervision    Lower Body Dressing Assistance  Socks: Supervision  Leg Crossed Method Used: Yes  Position Performed:  (seated)    Toileting  Bladder Hygiene: Supervision  Clothing Management: Supervision    Cognitive Retraining  Safety/Judgement: Fall prevention    Pain:  Pain level pre-treatment: 0/10   Pain level post-treatment: 0/10     Activity Tolerance:   Poor   Please refer to the flowsheet for vital signs taken during this treatment. After treatment:   [x] Patient left in no apparent distress sitting up in chair  [] Patient left in no apparent distress in bed  [x] Call bell left within reach  [x] Nursing notified  [] Caregiver present  [] Bed alarm activated    COMMUNICATION/EDUCATION:   [x] Role of Occupational Therapy in the acute care setting  [x] Home safety education was provided and the patient/caregiver indicated understanding. [x] Patient/family have participated as able in goal setting and plan of care. [x] Patient/family agree to work toward stated goals and plan of care. [] Patient understands intent and goals of therapy, but is neutral about his/her participation. [] Patient is unable to participate in goal setting and plan of care. Thank you for this referral.  Jayson Turner  Time Calculation: 27 mins    Eval Complexity: History: MEDIUM Complexity : Expanded review of history including physical, cognitive and psychosocial  history ; Examination: MEDIUM Complexity : 3-5 performance deficits relating to physical, cognitive , or psychosocial skils that result in activity limitations and / or participation restrictions; Decision Making:MEDIUM Complexity : Patient may present with comorbidities that affect occupational performnce.  Miniml to moderate modification of tasks or assistance (eg, physical or verbal ) with assesment(s) is necessary to enable patient to complete evaluation  Goal: *Acute Goals and Plan of Care (Insert Text)

## 2021-07-19 NOTE — PROGRESS NOTES
[facility]. Pulmonary, Critical Care, and Sleep Medicine    Progress note    Name: Rasheed Saenz MRN: [id number]   : 1945 Hospital: [facility]   Date: 2021            IMPRESSION:   · Acute on chronic hypoxic respiratory failure, improving  · COPD exacerbation in the setting of very severe COPD, chronic persistent bronchitis with difficulty clearing secretions  · Pulm infiltrates:  Suspect atypical infection vs inspissated secretions, scattered, mild. Negative procalcitonin and no consolidation to suggest CAP. Most of findings in CT scan go with severe COPD with extensive emphysematous changes. Rapid Covid test negative, patient is immunized with Moderna vaccine  · COPD cachexia: Body mass index is 18.99 kg/m². · Nicotine dependence, cigarettes:  Pt continues to smoke a few cigarettes daily  · History of colon cancer status post right hemicolectomy with reversal of ileostomy in 2019  · Anxiety /chronic complex depression-related to disabling disease as above.   She is fearful for losing her independence     Patient Active Problem List   Diagnosis Code    COPD, severe ([facility] [address] J44.9    Nicotine addiction F17.200    Hemoptysis R04.2    Lumbar spinal stenosis M48.061    Facet arthritis of lumbar region M47.816    Neuritis of lower extremity G57.90    Emphysema of lung ([facility] [address] J43.9    COPD with acute exacerbation ([facility]) J44.1    Muscle spasm of back M62.830    Sacroiliac joint pain M53.3    Current chronic use of systemic steroids Z79.52    Baker's cyst of knee M71.20    Acute exacerbation of chronic obstructive pulmonary disease (COPD) ([facility]) J44.1    Degenerative disc disease, lumbar M51.36    Left-sided low back pain with sciatica M54.42    COPD with exacerbation ([facility]) J44.1    COPD (chronic obstructive pulmonary disease) ([facility]) J44.9    Pneumonia J18.9    Abdominal pain R10.9    Abdominal mass R19.00    Colon cancer without distant metastasis ([facility] [address] C18.9    Colon polyps K63.5    Post-op pain G89.18    Obstruction of bowel ([facility] [address] K56.609    Ileus following gastrointestinal surgery ([facility]) K91.89, K56.7    Hypotension I95.9    COPD exacerbation ([facility]) J44.1    Dizziness R42    Non-rheumatic mitral regurgitation I34.0    Pulmonary HTN ([facility]) I27.20    Chest pain R07.9    Chronic diastolic congestive heart failure ([facility]) I50.32    ERRONEOUS ENCOUNTER--DISREGARD     History of colon cancer Z85.038    Ileostomy present ([facility]) Z93.2    Incisional hernia K43.2    Community acquired pneumonia J18.9    Acute bronchitis with chronic obstructive pulmonary disease (COPD) ([facility]) J44.0, J20.9    Respiratory failure ([facility]) J96.90    Severe protein-calorie malnutrition ([facility]) E43      RECOMMENDATIONS:   Continue PO steroids-slow taper over 10 days as outpatient  ABx per primary service, recommend 5-7 day course. Due to severe bronchospasm, hold off on mucolytics -- mucus plugging on CT is mild. Will add smart vest as outpatient treatment option  Will maintained schedule bronchodilators: duonebs q4h, pulmicort nebs 1mg BID   Please hold home bronchodilators (Advair and Spiriva). May resume on discharge  Supplemental oxygen to maintain SpO2 88-93%  Please assess for home oxygen need prior to discharge (on 2LPM at home)  Aggressive pulmonary toileting/bronchial hygiene. Continue with flutter valve 4-5 times per day  Frequent incentive spirometry  Aspiration precautions including elevating HOB >30deg  PT/OT, OOB, ambulate with assistance as tolerated  DVT ppx per primary service  Will follow in clinic with Dr. Cris Dorsey of care updated with patient     Subjective:   07/19/21   States that she is feeling better-wants to go home  Still has cough with difficulty expectorating from time to time  Continues to remain significantly short of breath with minimal activity and getting increasingly more short of breath with activities of daily living.   She has help and family support but she is feeling very frustrated anxious and depressed that she will become care dependent and lose her independence. Denies fever chills night sweats  Compliant to her treatment    HPI  Patient is a 78 y.o. female with a past medical history of very severe COPD, oxygen dependence, nicotine dependence, colon cancer status post resection [facility]. History was obtained from the patient and multiple family members in the room including sister, brother, daughter, son. Patient reports that she had been short of breath for the last couple of weeks. She reports she was unable to [place] a cigarette\" for the last week. Patient reported ongoing chest tightness, worsening dyspnea with cough, intermittently productive. Patient reported associated symptom of sensation of mucus caught in chest.  Patient reports that dyspnea is worsened with excess heat given the weather. Patient also reports that she feels her apartment has excessive mold and drainage which results and worsening dyspnea. Patient reported no improvement with as needed albuterol. Patient reports she usually smokes a couple of cigarettes every few days. Patient reports that she was feeling febrile for the last day. Patient presented the ER, very dyspneic, required BiPAP throughout the night, discontinued this morning. Patient also reports that she improved with bronchodilators and steroids. She reports that most significant improvement was with BiPAP. Patient denies angina, nausea, vomiting, diarrhea, night sweats.     Past Medical History:   Diagnosis Date    Anxiety     Arthritis     Asbestosis ([facility] [address]     Asthma     Back problem     Baker's cyst     Balance problems     Chronic lung disease     Cigarette smoker     Clotting disorder ([facility] [address]     COPD (chronic obstructive pulmonary disease) (HCC)     oxygen 2/liters asbestosis    Depression     History of DVT (deep vein thrombosis)     Leg pain     Right    Lung disease     Nausea & vomiting     Osteoporosis     Restless leg syndrome     Spinal stenosis     Thromboembolus ([address]     Vitamin D deficiency       Past Surgical History:   Procedure Laterality Date    COLONOSCOPY N/A 9/22/2018    COLONOSCOPY w bx w polypectonies performed by Clearnce Fabry, MD at [address] COLONOSCOPY N/A 11/6/2018    COLONOSCOPY performed by Aubrie Rubio MD at [address] [place] [address] COLONOSCOPY N/A 8/2/2019    COLONOSCOPY with polypectomies and biopsies performed by Britany Blanca MD at [address] HX APPENDECTOMY      HX BACK SURGERY      x4    HX BREAST BIOPSY      left    HX GI      exp lap and ileostomy    HX HEENT      cataract right    HX HYSTERECTOMY      HX LUMBAR LAMINECTOMY      HX MENISCUS REPAIR      HX ORTHOPAEDIC      Knee bakers cyst    HX POLYPECTOMY      right colectomy    HX TONSILLECTOMY      HX VEIN STRIPPING      LA LAP,SURG,COLECTOMY, PARTIAL, W/ANAST N/A 10/23/2018    Dr. Bailey Lord N/A 11/06/2018    Dr. Nam Cervantes      vein stripping        Allergies   Allergen Reactions    Anoro Ellipta [Umeclidinium-Vilanterol] Rash and Other (comments)     Muscle cramps in arms    Aspirin Other (comments)     GI upset and bleeding    Augmentin [Amoxicillin-Pot Clavulanate] Not Reported This Time     Possible cramping    Doxycycline Nausea and Vomiting    Morphine Other (comments)     Neurologic symptoms - severe agitation      Shellfish Derived Itching     Pt able to eat small amounts per report     Sulfa (Sulfonamide Antibiotics) Rash     Patient relates no longer allergic    Wixela Inhub [Fluticasone Propion-Salmeterol] Other (comments)     Mouth Sores        Current Facility-Administered Medications   Medication Dose Route Frequency    famotidine (PEPCID) tablet 20 mg  20 mg Oral BID    midodrine (PROAMATINE) tablet 5 mg  5 mg Oral BID WITH MEALS    predniSONE (DELTASONE) tablet 60 mg  60 mg Oral DAILY WITH BREAKFAST    LORazepam (ATIVAN) tablet 1 mg  1 mg Oral QHS    rOPINIRole (REQUIP) tablet 1 mg  1 mg Oral QHS    therapeutic multivitamin (THERAGRAN) tablet 1 Tablet  1 Tablet Oral DAILY    sodium chloride (NS) flush 5-40 mL  5-40 mL IntraVENous Q8H    enoxaparin (LOVENOX) injection 40 mg  40 mg SubCUTAneous DAILY    insulin lispro (HUMALOG) injection   SubCUTAneous AC&HS    budesonide (PULMICORT) 1,000 mcg/2 mL nebulizer susp  1,000 mcg Nebulization BID RT    albuterol-ipratropium (DUO-NEB) 2.5 MG-0.5 MG/3 ML  3 mL Nebulization Q4H RT    cefTRIAXone (ROCEPHIN) 2 g in sterile water (preservative free) 20 mL IV syringe  2 g IntraVENous Q24H    azithromycin (ZITHROMAX) 500 mg in 0.9% sodium chloride 250 mL (VIAL-MATE)  500 mg IntraVENous Q24H       Review of Systems:  A comprehensive ROS was obtained as stated in HPI, all others are negative      Objective:   Vital Signs:    Visit Vitals  /74   Pulse 74   Temp 97.7 °F (36.5 °C)   Resp 20   Ht 5' 7\" (1.702 m)   Wt 55 kg (121 lb 4.1 oz)   SpO2 97%   BMI 18.99 kg/m²       O2 Device: Nasal cannula   O2 Flow Rate (L/min): 2 l/min   Temp (24hrs), Av.1 °F (36.7 °C), Min:97.7 °F (36.5 °C), Max:98.3 °F (36.8 °C)       Intake/Output:   Last shift:      No intake/output data recorded. Last 3 shifts:  1901 -  0700  In: 510 [P.O.:240; I.V.:270]  Out: 0     Intake/Output Summary (Last 24 hours) at 2021 1123  Last data filed at 2021 1925  Gross per 24 hour   Intake 240 ml   Output 0 ml   Net 240 ml      Physical Exam:   General:  Alert, cooperative, no distress, appears stated age, thin, frail, mild conversational dyspnea. Laying in bed comfortably   Head:  Normocephalic, without obvious abnormality, atraumatic. Eyes:  Conjunctivae/corneas clear. ANicteric, no ocular drainage   Nose: Nares normal. Mucosa normal. No drainage or sinus tenderness. Wearing nasal cannula   Throat: Lips, mucosa dry. NO thrush; poor dentition, no oral lesions   Neck: Supple, symmetrical, trachea midline, No crepitus   Back:   Symmetric, no curvature, no spine tenderness or flank pain   Lungs:    Fair air entry bilaterally improving from admission, coarse breath sounds bilaterally with scattered expiratory rhonchi and wheezes throughout all lung fields   Chest wall:  No tenderness or deformity. NO CREPITUS   Heart:  Regular rate and rhythm, S1, S2 normal, no murmur, click, rub or gallop. Abdomen:   Soft, non-tender. Bowel sounds normal. No masses,  No organomegaly. No paradoxical motion   Extremities: normal, atraumatic, no cyanosis or edema.   No clubbing       Skin: Skin color, texture, turgor normal. No rashes or lesions       Neurologic: Grossly nonfocal, strength, sensation, coordination grossly intact throughout all extremities grossly          Data review:   Labs:  Recent Results (from the past 24 hour(s))   GLUCOSE, POC    Collection Time: 07/18/21  3:33 PM   Result Value Ref Range    Glucose (POC) 124 (H) 70 - 110 mg/dL   GLUCOSE, POC    Collection Time: 07/18/21 11:22 PM   Result Value Ref Range    Glucose (POC) 160 (H) 70 - 427 mg/dL   METABOLIC PANEL, BASIC    Collection Time: 07/19/21  3:47 AM   Result Value Ref Range    Sodium 139 136 - 145 mmol/L    Potassium 3.7 3.5 - 5.5 mmol/L    Chloride 105 100 - 111 mmol/L    CO2 30 21 - 32 mmol/L    Anion gap 4 3.0 - 18 mmol/L    Glucose 91 74 - 99 mg/dL    BUN 36 (H) 7.0 - 18 MG/DL    Creatinine 0.83 0.6 - 1.3 MG/DL    BUN/Creatinine ratio 43 (H) 12 - 20      GFR est AA >60 >60 ml/min/1.73m2    GFR est non-AA >60 >60 ml/min/1.73m2    Calcium 8.6 8.5 - 10.1 MG/DL   CBC WITH AUTOMATED DIFF    Collection Time: 07/19/21  3:47 AM   Result Value Ref Range    WBC 9.5 4.6 - 13.2 K/uL    RBC 3.63 (L) 4.20 - 5.30 M/uL    HGB 11.4 (L) 12.0 - 16.0 g/dL    HCT 35.3 35.0 - 45.0 %    MCV 97.2 (H) 74.0 - 97.0 FL    MCH 31.4 24.0 - 34.0 PG    MCHC 32.3 31.0 - 37.0 g/dL    RDW 13.4 11.6 - 14.5 %    PLATELET 683 216 - 884 K/uL    MPV 10.1 9.2 - 11.8 FL    NEUTROPHILS 73 40 - 73 %    LYMPHOCYTES 15 (L) 21 - 52 %    MONOCYTES 12 (H) 3 - 10 %    EOSINOPHILS 0 0 - 5 %    BASOPHILS 0 0 - 2 %    ABS. NEUTROPHILS 6.9 1.8 - 8.0 K/UL    ABS. LYMPHOCYTES 1.4 0.9 - 3.6 K/UL    ABS. MONOCYTES 1.1 0.05 - 1.2 K/UL    ABS. EOSINOPHILS 0.0 0.0 - 0.4 K/UL    ABS. BASOPHILS 0.0 0.0 - 0.1 K/UL    DF AUTOMATED       ABG:    No results found for: PH, PHI, PCO2, PCO2I, PO2, PO2I, HCO3, HCO3I, FIO2, FIO2I      PFT Results  (Last 48 hours)    None        Echo Results  (Last 48 hours)    None        Imaging:  I have personally reviewed the patients radiographs and have reviewed the reports:  Chest x-ray from 7/15/2021 shows hyperinflated lungs with some mild streaky opacities without consolidation, infiltrates, effusions. CTA chest with and without contrast from 7/16/2021 shows very severe bilateral emphysematous changes with scattered tree-in-bud opacities in left lingula and anterior segment of left lower lobe as well as scattered in right lower lobe, posteriorly. Although at base of right lung, there is a small consolidative defect very dependently posteriorly, which was present on prior CT scan. No masses or other consolidations or effusions seen. Official report per radiology  XR Results (most recent):  Results from Hospital Encounter encounter on 07/15/21    XR CHEST PA LAT    Narrative  EXAM: XR CHEST PA LAT    INDICATION: SOB    COMPARISON: CT June 11, 2021. FINDINGS: PA and lateral radiographs of the chest demonstrate severe  emphysema/COPD. Streaky bibasilar densities. 6 mm nodular density noted  overlying the left upper lung. . The cardiac and mediastinal contours and  pulmonary vascularity are normal. Decreased bone mineral density. Rotoscoliosis. Osseous degenerative changes.  Calcified pleural plaques within the left  hemithorax better characterized on CT. .    Impression  Grossly similar severe emphysema/COPD. Streaky bibasilar densities. Findings are acute on chronic.    6 mm nodular density overlying the left upper lung. Please see subsequent CTA for further findings. CT Results (most recent):  Results from Hospital Encounter encounter on 07/15/21    CTA CHEST W OR W WO CONT    Narrative  EXAM: CT Angiogram of the Chest    CLINICAL INDICATION:  sob    TECHNIQUE: CT angiogram of the chest performed. MIPS performed    All CT scans at this facility are performed using dose optimization technique as  appropriate to a performed exam, to include automated exposure control,  adjustment of the mA and/or kV according to patient size (including appropriate  matching for site specific examination) or use of iterative reconstruction  technique. IV CONTRAST: 100 cc of Isovue 370    COMPARISON: 6/11/2021    FINDINGS:  Limitations: Mild breathing motion is present which limits evaluation of the  distal pulmonary arteries and evaluation of the lung parenchyma. Thyroid: Unremarkable. Mediastinum: Hilar adenopathy is noted. Mild level 7 lymph nodes are noted. Heart: Unremarkable    Pericardium: Unremarkable    Aorta: Plaster calcifications are noted. No aneurysm or dissection appreciated. Pulmonary Arteries: No evidence of pulmonary embolus. Trachea and Bronchi: The trachea is unremarkable. There is opacification of  several bronchi of the right lower lobe. Pleura: No evidence of pleural effusion. Lungs: Tree-in-bud changes in consolidation are noted in the right lower lobe. Mild tree-in-bud and consolidative changes noted in the medial aspect of the  left lower lobe. Extensive COPD changes noted especially in the upper lung  fields. Axilla/Chest wall: Unremarkable. Upper Abdomen: No acute findings. Musculoskeletal: No acute osseous findings. Impression  1. No evidence of pulmonary embolus or dissection.     2. Tree-in-bud changes and consolidation with right lower lobe brachial  opacification and mild tree-in-bud changes on the left concerning for infectious  process. 3.  COPD changes.     High complexity decision making was performed during the evaluation of this patient at high risk for decompensation with multiple organ involvement     Signed By: Angeline Portillo MD     July 19, 2021      [employer] [facility]

## 2021-07-20 ENCOUNTER — PATIENT OUTREACH (OUTPATIENT)
Dept: CASE MANAGEMENT | Age: 76
End: 2021-07-20

## 2021-07-20 NOTE — PROGRESS NOTES
Care Transitions Initial Call    Call within 2 business days of discharge: Yes     Patient: Claire Talavera Patient : 1945 MRN: [id number]    Last Discharge [address]       Complaint Diagnosis Description Type Department Provider    7/15/21 Shortness of Breath; Cough; Palpitations Acute exacerbation of chronic obstructive pulmonary disease (COPD) ([facility] [address] ED to Hosp-Admission (Discharged) (ADMIT) Terrie Cadet MD; Christi Meza. .. Was this an external facility discharge? No Discharge Facility: n/a    Patient reached a female voice on phone who informed CTN that home health is currently in with the Pt. The female voice who answered the phone handed the Phone to Nurse Nicole of [facility] agency. Nurse Nicole reported that Patient looks well. VS is also good. Nurse Nicole states that she will call Pulmonology office to follow up on Pt. Vest.     Will call back some other time. [facility] follow up appointment(s):   Future Appointments   Date Time Provider Aubrie Mills   2021 12:00 PM Yazmin Layton MD [facility] BS AMB   2/10/2022  1:00 PM Tucker Lomas MD CAP BS AMB

## 2021-07-21 ENCOUNTER — TRANSCRIBE ORDER (OUTPATIENT)
Dept: SCHEDULING | Age: 76
End: 2021-07-21

## 2021-07-21 ENCOUNTER — PATIENT OUTREACH (OUTPATIENT)
Dept: CASE MANAGEMENT | Age: 76
End: 2021-07-21

## 2021-07-21 DIAGNOSIS — R91.1 PULMONARY NODULE: Primary | ICD-10-CM

## 2021-07-21 NOTE — PROGRESS NOTES
Care Transitions Initial Call    Call within 2 business days of discharge: Yes     Patient: Fred Robles Patient : 1945 MRN: [id number]    Last Discharge [address]       Complaint Diagnosis Description Type Department Provider    7/15/21 Shortness of Breath; Cough; Palpitations Acute exacerbation of chronic obstructive pulmonary disease (COPD) ([facility] [address] ED to Hosp-Admission (Discharged) (ADMIT) Lei Barros MD; Roxana Benson. .. Was this an external facility discharge? No Discharge Facility: n/a    Challenges to be reviewed by the provider   Additional needs identified to be addressed with provider: no  none         Method of communication with provider : none    Discussed COVID-19 related testing which was available at this time. Test results were negative. Patient informed of results, if available? Pt. Aware of her test result below. COVID-19 RAPID TEST  Order: [id number]  Collected:  2021 00:05 Status:  Final result   0 Result Notes   Ref Range & Units 21 0005   Specimen source   Nasopharyngeal    COVID-19 rapid test NOTD   Not detected                Advance Care Planning:   Does patient have an Advance Directive: noted on file. Inpatient Readmission Risk score: Unplanned Readmit Risk Score: 21    Was this a readmission? no   Patient stated reason for the admission: shortness of breath cough and fever     Patients top risk factors for readmission: medical condition-Asthma, COPD and age   Interventions to address risk factors: closely follow up with PCP and Pulmonology DrMontana     Care Transition Nurse (CTN) contacted the patient by telephone to perform post hospital discharge assessment. Verified name and  with patient as identifiers. Provided introduction to self, and explanation of the CTN role. Patient reported that she is feeling better. Patient states that she is covid 19 vaccinated. Pt. States that she has good support from her Daughter.    Donna Yin review with Pt. Patient states that she will self schedule PCP appointment. No D/C Summary noted available at this time. CTN reviewed discharge instructions, medical action plan and red flags with patient who verbalized understanding. Were discharge instructions available to patient? yes. Reviewed appropriate site of care based on symptoms and resources available to patient including: PCP, Home Health and When to call 911. Patient given an opportunity to ask questions and does not have any further questions or concerns at this time. The patient agrees to contact the PCP office for questions related to their healthcare. Medication reconciliation was performed with patient, who verbalizes understanding of administration of home medications. Advised obtaining a 90-day supply of all daily and as-needed medications. Referral to Pharm D needed: Will defer with Pt. PCP. Home Health/Outpatient orders at discharge: home health care  [address] [place] [address] Way: [facility]  Date of initial visit: 7/20/21 per Pt. Durable Medical Equipment ordered at discharge: Vest?    Covid Risk Education    Educated patient about risk for severe COVID-19 due to risk factors according to CDC guidelines. CTN reviewed discharge instructions, medical action plan and red flag symptoms with the patient who verbalized understanding. Discussed COVID vaccination status: yes. Education provided on COVID-19 vaccination as appropriate. Discussed exposure protocols and quarantine with CDC Guidelines. Patient was given an opportunity to verbalize any questions and concerns and agrees to contact CTN or health care provider for questions related to their healthcare. Was patient discharged with a pulse oximeter? None noted at this time. No D/C Summary available at this time. Discussed follow-up appointments.  If no appointment was previously scheduled, appointment scheduling offered: Pt. states that she will self schedule PCP apt. Jose Manuel Nagel [address] follow up appointment(s):   Future Appointments   Date Time Provider Aubrie Mills   7/28/2021 12:00 PM Sharonda Shelton MD [facility] BS AMB   2/10/2022  1:00 PM Alexis Heard MD CAP BS AMB     [facility] follow up appointment(s): none noted at this time    Plan for follow-up call in 10-14 days based on severity of symptoms and risk factors. Plan for next call: follow up symptoms, PCP appointment, f/u Vest from pulmonology, assess for any questions or needs  CTN provided contact information for future needs. Goals Addressed                 This Visit's Progress     Prevent complications post hospitalization. Patient will call PCP office to schedule post hospital follow up appointment. Patient will continue to take all medication as prescribed. Pt. Will have an apt. With PCP within 7 days post hospital discharged. Pt. Will call CTN or PCP office for any questions, concerns and/or needs. Pt. Will finish taking her antibiotic as prescribed. Patient will attend Pulmonology Dr. Ella Flores. On 7/28/21.  Understands red flags post discharge. Pt. Will go to the nearest emergency room for chest pain, shortness of breath, returning of symptoms that brought her to the emergency room and/or worsening of symptoms.

## 2021-07-27 ENCOUNTER — PATIENT OUTREACH (OUTPATIENT)
Dept: CASE MANAGEMENT | Age: 76
End: 2021-07-27

## 2021-07-27 NOTE — PROGRESS NOTES
Care Transitions Follow Up Call    CTN Attempt to contact patient via telephone on 7/27/21 for  follow up. Unable to reach. Left message on voicemail with office contact information. No Patient medical information left on message.     [address] follow up appointment(s):   Future Appointments   Date Time Provider Aubrie Mills   7/28/2021 12:00 PM Mary Echevarria MD [facility] BS AMB   2/10/2022  1:00 PM Benito Bravo MD CAP BS AMB

## 2021-07-29 ENCOUNTER — OFFICE VISIT (OUTPATIENT)
Dept: PULMONOLOGY | Age: 76
End: 2021-07-29
Payer: MEDICARE

## 2021-07-29 VITALS
WEIGHT: 121 LBS | HEIGHT: 67 IN | RESPIRATION RATE: 18 BRPM | OXYGEN SATURATION: 96 % | HEART RATE: 94 BPM | TEMPERATURE: 98.4 F | DIASTOLIC BLOOD PRESSURE: 130 MMHG | SYSTOLIC BLOOD PRESSURE: 146 MMHG | BODY MASS INDEX: 18.99 KG/M2

## 2021-07-29 DIAGNOSIS — T17.500A MUCUS PLUGGING OF BRONCHI: ICD-10-CM

## 2021-07-29 DIAGNOSIS — J96.21 ACUTE ON CHRONIC RESPIRATORY FAILURE WITH HYPOXEMIA (HCC): ICD-10-CM

## 2021-07-29 DIAGNOSIS — J44.9 PULMONARY CACHEXIA DUE TO COPD (HCC): ICD-10-CM

## 2021-07-29 DIAGNOSIS — F17.200 TOBACCO DEPENDENCE: ICD-10-CM

## 2021-07-29 DIAGNOSIS — R64 PULMONARY CACHEXIA DUE TO COPD (HCC): ICD-10-CM

## 2021-07-29 DIAGNOSIS — J44.1 COPD WITH EXACERBATION (HCC): Primary | ICD-10-CM

## 2021-07-29 PROCEDURE — 1101F PT FALLS ASSESS-DOCD LE1/YR: CPT | Performed by: INTERNAL MEDICINE

## 2021-07-29 PROCEDURE — G8427 DOCREV CUR MEDS BY ELIG CLIN: HCPCS | Performed by: INTERNAL MEDICINE

## 2021-07-29 PROCEDURE — G8536 NO DOC ELDER MAL SCRN: HCPCS | Performed by: INTERNAL MEDICINE

## 2021-07-29 PROCEDURE — G8510 SCR DEP NEG, NO PLAN REQD: HCPCS | Performed by: INTERNAL MEDICINE

## 2021-07-29 PROCEDURE — G9711 PT HX TOT COL OR COLON CA: HCPCS | Performed by: INTERNAL MEDICINE

## 2021-07-29 PROCEDURE — G9899 SCRN MAM PERF RSLTS DOC: HCPCS | Performed by: INTERNAL MEDICINE

## 2021-07-29 PROCEDURE — 1090F PRES/ABSN URINE INCON ASSESS: CPT | Performed by: INTERNAL MEDICINE

## 2021-07-29 PROCEDURE — G8399 PT W/DXA RESULTS DOCUMENT: HCPCS | Performed by: INTERNAL MEDICINE

## 2021-07-29 PROCEDURE — 99214 OFFICE O/P EST MOD 30 MIN: CPT | Performed by: INTERNAL MEDICINE

## 2021-07-29 PROCEDURE — G8420 CALC BMI NORM PARAMETERS: HCPCS | Performed by: INTERNAL MEDICINE

## 2021-07-29 PROCEDURE — 1111F DSCHRG MED/CURRENT MED MERGE: CPT | Performed by: INTERNAL MEDICINE

## 2021-07-29 NOTE — PROGRESS NOTES
Unknown Twain presents today for   Chief Complaint   Patient presents with    COPD     CAP [facility] 7/15/2021-7/19/2021. Is someone accompanying this pt? Daughter    Is the patient using any DME equipment during [address]? Oxygen    -DME [employer]    Depression Screening:  3 most recent [facility] 36 Screens 7/29/2021   PHQ Not Done -   Little interest or pleasure in doing things Not at all   Feeling down, depressed, irritable, or hopeless Not at all   Total Score PHQ 2 0   Trouble falling or staying asleep, or sleeping too much -   Feeling tired or having little energy -   Poor appetite, weight loss, or overeating -   Feeling bad about yourself - or that you are a failure or have let yourself or your family down -   Trouble concentrating on things such as school, work, reading, or watching TV -   Moving or speaking so slowly that other people could have noticed; or the opposite being so fidgety that others notice -   Thoughts of being better off dead, or hurting yourself in some way -   PHQ 9 Score -   How difficult have these problems made it for you to do your work, take care of your home and get along with others -       Learning Assessment:  Learning Assessment 6/4/2021   PRIMARY LEARNER Patient   HIGHEST LEVEL OF EDUCATION - PRIMARY LEARNER  -   BARRIERS PRIMARY LEARNER -   PRIMARY LANGUAGE ENGLISH    NEED -   LEARNER PREFERENCE PRIMARY LISTENING     DEMONSTRATION   ANSWERED BY Patient     -   RELATIONSHIP SELF       Abuse Screening:  Abuse Screening Questionnaire 6/4/2021   Do you ever feel afraid of your partner? N   Are you in a relationship with someone who physically or mentally threatens you? N   Is it safe for you to go home? Y       Fall Risk  Fall Risk Assessment, last 12 mths 7/29/2021   Able to walk? Yes   Fall in past 12 months? 0   Do you feel unsteady? 0   Are you worried about falling 0   Number of falls in past 12 months -   Fall with injury? -         Coordination of Care:  1.  Have you been to the ER, urgent care clinic since your last visit? Hospitalized since your last visit? Yes; Name: [facility] 7/15/2021-7/19/2021. CAP    2. Have you seen or consulted any other health care providers outside of the [address] [place] [address] since your last visit? Include any pap smears or colon screening.  No.

## 2021-07-29 NOTE — PROGRESS NOTES
HISTORY OF PRESENT ILLNESS  Gwendolyn Bae is a 78 y.o. female with COPD. Pt with COPD, very severe and on LTOT. Pt was recently admitted for acute on chronic hypoxic respiratory failure due to another COPD exacerbation. CTA done was negative for PE but showed mucus impaction and extensive emphysematous changes. She was discharged on a slow Prednisone taper. Pt is a long term smoker who quit during this last admission. COVID test was negative. Pt has baseline shortness of breath even at rest. She also has a cough with tenacious phlegm which she struggles to expectorate. Pt complains of easy bruising and poor wound healing which she attributes to Prednisone. She has gained [place] few\" pounds since her admission as her appetite has improved on Prednisone. Pt is in the process if going into a hospice program and expresses her wishes on no resuscitation or intubation. \"When I go, I go\". 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 Ileostomy was taken down in August 2019. Of note, pt was placed on Daliresp and Azithromycin for chronic suppression, discontinue for lack of perceived benefit. She was also tried on Trelegy but had to stop due to side effects. Review of Systems   Constitutional: Negative for chills, diaphoresis, fever, malaise/fatigue and weight loss. HENT: Negative for congestion, ear discharge, ear pain, hearing loss, nosebleeds, sinus pain, sore throat and tinnitus. Eyes: Negative for blurred vision, double vision, photophobia, pain, discharge and redness. Respiratory: Positive for cough, shortness of breath and wheezing. Negative for hemoptysis, sputum production and stridor. Cardiovascular: Positive for leg swelling. Negative for chest pain, palpitations, orthopnea, claudication and PND.    Gastrointestinal: Negative for blood in stool, constipation, diarrhea, heartburn, melena, nausea and vomiting. Abdominal pain: intermittent. Genitourinary: Negative for dysuria, flank pain, frequency, hematuria and urgency. Musculoskeletal: Positive for joint pain. Negative for back pain, falls, myalgias and neck pain. Skin: Negative for itching and rash. Neurological: Positive for dizziness. Negative for tingling, tremors, sensory change, speech change, focal weakness, seizures, loss of consciousness, weakness and headaches. Endo/Heme/Allergies: Negative for environmental allergies and polydipsia. Bruises/bleeds easily. Psychiatric/Behavioral: Positive for depression. Negative for hallucinations, memory loss, substance abuse and suicidal ideas. The patient is not nervous/anxious and does not have insomnia. Past Medical History:   Diagnosis Date    Anxiety     Arthritis     [facility]bestosis ([facility] [address]     Asthma     Back problem     Baker's cyst     Balance problems     Chronic lung disease     Cigarette smoker     [facility]otting disorder ([facility])     COPD (chronic obstructive pulmonary disease) ([facility])     oxygen 2/liters asbestosis    Depression     History of DVT (deep vein thrombosis)     Leg pain     Right    Lung disease     Nausea & vomiting     Osteoporosis     Restless leg syndrome     Spinal stenosis     Thromboembolus ([facility])     Vitamin D deficiency      Current Outpatient Medications on File Prior to Visit   Medication Sig Dispense Refill    famotidine (PEPCID) 20 mg tablet Take 1 Tablet by mouth daily. 30 Tablet 0    midodrine (PROAMATINE) 2.5 mg tablet Take 1 Tablet by mouth two (2) times daily (with meals).  60 Tablet 0    predniSONE (DELTASONE) 10 mg tablet 6 tabs ( 60 mg ) po daily for 3 days, then 4 tabs ( 40 mg ) po daily for 3 days, then 2 tab ( 20 mg ) po daily for 3 days, then continue taking 1 tablet (10 mg) daily ongoing  Indications: worsening chronic obstructive pulmonary disease 40 Tablet 0    OTHER Incentive spirometry-use as directed 1 Each 0    OTHER Incentive spirometry-use as directed 1 Each 0    OTHER Check a CBC, CMP, magnesium in 4 days. Results to PCP immediately. Diagnosis-COPD 1 Each 0    Ventolin HFA 90 mcg/actuation inhaler inhale 2 puffs by mouth and INTO THE LUNGS every 4 hours if needed for wheezing shortness of breath 18 g 5    tiotropium bromide (Spiriva Respimat) 2.5 mcg/actuation inhaler Take 2 Puffs by inhalation daily. 3 Inhaler 3    albuterol (PROVENTIL VENTOLIN) 2.5 mg /3 mL (0.083 %) nebulizer solution 3 mL by Nebulization route every four (4) hours as needed for Wheezing or Shortness of Breath. ICD: J44.9, R09.02 file under Medicare Part B 300 Each 3    fluticasone propion-salmeterol (ADVAIR DISKUS) 250-50 mcg/dose diskus inhaler Take 1 Puff by inhalation two (2) times a day. BRAND NAME ONLY 3 Inhaler 3    simethicone (GAS-X) 80 mg chewable tablet Take 80 mg by mouth every six (6) hours as needed for Flatulence.  therapeutic multivitamin (THERAGRAN) tablet Take 1 Tab by mouth daily. 30 Tab 11    acetaminophen (TYLENOL EXTRA STRENGTH) 500 mg tablet Take 500 mg by mouth every six (6) hours as needed for Pain.  LORazepam (ATIVAN) 1 mg tablet Take  by mouth two (2) times daily as needed for Anxiety.  rOPINIRole (REQUIP) 1 mg tablet Take 1 mg by mouth nightly.  OXYGEN-AIR DELIVERY SYSTEMS 2 L by Does Not Apply route. O2 via nasal cannula with bedtime and activity. O2 Company: [email or web address]       No current facility-administered medications on file prior to visit.      Allergies   Allergen Reactions    Latex, Natural Rubber Itching    Aspirin Other (comments)     GI upset and bleeding  Other reaction(s): gi distress  GI upset and bleeding    Augmentin [Amoxicillin-Pot Clavulanate] Not Reported This Time     Possible cramping    Doxycycline Nausea and Vomiting     Other reaction(s): gi distress    Ibuprofen Nausea and Vomiting     Nausea & \"blood show in my urine\"    Morphine Other (comments)     Neurologic symptoms - severe agitation    Other reaction(s): psychological reaction  Neurologic symptoms - severe agitation    Shellfish Derived Itching     Pt able to eat small amounts per report     Sulfa (Sulfonamide Antibiotics) Rash     Patient relates no longer allergic    Umeclidinium-Vilanterol Rash, Other (comments) and Itching     Muscle cramps in arms  Muscle cramps in arms    Fluticasone Propion-Salmeterol Other (comments)     Mouth Sores  Other reaction(s): other/intolerance  Mouth Sores  \"generic Advair caused the sores.  I had to go back to the name brand\"     Social History     Socioeconomic History    Marital status:      Spouse name: Not on file    Number of children: Not on file    Years of education: Not on file    Highest education level: Not on file   Occupational History    Not on file   Tobacco Use    Smoking status: Former Smoker     Packs/day: 1.00     Years: 50.00     Pack years: 50.00     Types: Cigarettes     Start date: 10/21/1964     Quit date: 2021     Years since quittin.0    Smokeless tobacco: Never Used   Vaping Use    Vaping Use: Never used   Substance and Sexual Activity    Alcohol use: Never    Drug use: No    Sexual activity: Not on file   Other Topics Concern    Not on file   Social History Narrative    Not on file     Social Determinants of Health     Financial Resource Strain:     Difficulty of Paying Living Expenses:    Food Insecurity:     Worried About Running Out of Food in the Last Year:     Ran Out of Food in the Last Year:    Transportation Needs:     Lack of Transportation (Medical):      Lack of Transportation (Non-Medical):    Physical Activity:     Days of Exercise per Week:     Minutes of Exercise per Session:    Stress:     Feeling of Stress :    Social Connections:     Frequency of Communication with Friends and Family:     Frequency of Social Gatherings with Friends and Family:     Attends Orthodoxy Services:     Active Member of Clubs or Organizations:     Attends Club or Organization Meetings:     Marital Status:    Intimate Partner Violence:     Fear of Current or Ex-Partner:     Emotionally Abused:     Physically Abused:     Sexually Abused:      Blood pressure (!) 146/130, pulse 94, temperature 98.4 °F (36.9 °C), temperature source Skin, resp. rate 18, height 5' 7\" (1.702 m), weight 54.9 kg (121 lb), SpO2 96 %. Physical Exam  Constitutional:       General: She is not in acute distress. Appearance: She is well-developed. She is not diaphoretic. Comments: Cachectic, wheelchair  use   HENT:      Head: Normocephalic and atraumatic. Right Ear: External ear normal.      Left Ear: External ear normal.      Nose:      Comments: Deferred      Mouth/Throat:      Comments: Deferred   Eyes:      General: No scleral icterus. Right eye: No discharge. Left eye: No discharge. Conjunctiva/sclera: Conjunctivae normal.      Pupils: Pupils are equal, round, and reactive to light. Neck:      Thyroid: No thyromegaly. Vascular: No JVD. Trachea: No tracheal deviation. Cardiovascular:      Rate and Rhythm: Normal rate and regular rhythm. Heart sounds: Normal heart sounds. No murmur heard. No gallop. Pulmonary:      Effort: Pulmonary effort is normal. No respiratory distress. Breath sounds: No stridor. No rhonchi or rales. Wheezes: scattered bilateral expiratory    Chest:      Chest wall: No tenderness. Abdominal:      Palpations: Abdomen is soft. There is no mass. Tenderness: There is no abdominal tenderness. There is no rebound. Comments: Stoma reversal scar   Musculoskeletal:         General: No tenderness or deformity. Right lower leg: Right lower leg edema: 1+      Left lower leg: Left lower leg edema: 1+      Comments: Dependent rubor   Lymphadenopathy:      Cervical: No cervical adenopathy.    Skin:     General: Skin is warm and dry. Coloration: Skin is not pale. Findings: No erythema or rash. Comments: Small ecchymoses and abrasions on both upper and lower extremities   Neurological:      Mental Status: She is alert and oriented to person, place, and time. Coordination: Coordination normal.   Psychiatric:         Behavior: Behavior normal.         Thought Content: Thought content normal.         Judgment: Judgment normal.       CT Results (most recent):  Results from Hospital Encounter encounter on 07/15/21    CTA CHEST W OR W WO CONT    Narrative  EXAM: CT Angiogram of the Chest    CLINICAL INDICATION:  sob    TECHNIQUE: CT angiogram of the chest performed. MIPS performed    All CT scans at this facility are performed using dose optimization technique as  appropriate to a performed exam, to include automated exposure control,  adjustment of the mA and/or kV according to patient size (including appropriate  matching for site specific examination) or use of iterative reconstruction  technique. IV CONTRAST: 100 cc of Isovue 370    COMPARISON: 6/11/2021    FINDINGS:  Limitations: Mild breathing motion is present which limits evaluation of the  distal pulmonary arteries and evaluation of the lung parenchyma. Thyroid: Unremarkable. Mediastinum: Hilar adenopathy is noted. Mild level 7 lymph nodes are noted. Heart: Unremarkable    Pericardium: Unremarkable    Aorta: Plaster calcifications are noted. No aneurysm or dissection appreciated. Pulmonary Arteries: No evidence of pulmonary embolus. Trachea and Bronchi: The trachea is unremarkable. There is opacification of  several bronchi of the right lower lobe. Pleura: No evidence of pleural effusion. Lungs: Tree-in-bud changes in consolidation are noted in the right lower lobe. Mild tree-in-bud and consolidative changes noted in the medial aspect of the  left lower lobe.  Extensive COPD changes noted especially in the upper lung  fields. Axilla/Chest wall: Unremarkable. Upper Abdomen: No acute findings. Musculoskeletal: No acute osseous findings. Impression  1. No evidence of pulmonary embolus or dissection. 2.  Tree-in-bud changes and consolidation with right lower lobe brachial  opacification and mild tree-in-bud changes on the left concerning for infectious  process. 3.  COPD changes. ASSESSMENT and PLAN  Encounter Diagnoses   Name Primary?  COPD with exacerbation ([facility] [address] Yes    Acute on chronic respiratory failure with hypoxemia (HCC)     Tobacco dependence     Pulmonary cachexia due to COPD ([facility] [address]        Pt with severe COPD on LTOT with at least 2 admissions to hospital for exacerbations. Mucus plugging is likely aggravating her symptoms and slowing her recovery. Start Smartvest therapy and continue PEP device to facilitate mucus clearance. Pt to complete Prednisone taper and antibiotic course from hospital.  Pt will continue to benefit from supplemental O2 to maintain saturation greater than 90%. Continue LABA/LAMA/ICS and rescue Albuterol as written. Encouraged pt on continued tobacco abstinence. RTC 3 months.

## 2021-07-30 ENCOUNTER — TELEPHONE (OUTPATIENT)
Dept: PULMONOLOGY | Age: 76
End: 2021-07-30

## 2021-07-30 NOTE — TELEPHONE ENCOUNTER
Spoke with patient, she states she has been placed on hospice. Requesting to know the status of VEST therapy. Will call patient back once I know more.

## 2021-07-30 NOTE — TELEPHONE ENCOUNTER
Sister, Karma Mcfarland, called for pt. Pt stated while in hospital Dr. Reyes Tan was ordering pt a vest.  She has not heard anything about it. Please check on status and call pt at [phone].

## 2021-08-03 PROBLEM — J44.9 COPD (CHRONIC OBSTRUCTIVE PULMONARY DISEASE) (HCC): Status: RESOLVED | Noted: 2018-09-20 | Resolved: 2021-08-03

## 2021-08-09 NOTE — DISCHARGE SUMMARY
[address] [place] [address] Dr Chepe Dumont [facility], [address]     DISCHARGE SUMMARY    Name: Mark Lee MRN: [id number]   Age / Sex: 78 y.o. / female CSN: [id number]   YOB: 1945 Length of Stay: 3 days   Admit Date: 7/15/2021 Discharge Date:        PRIMARY CARE PHYSICIAN: SLAVA Mendoza      DISCHARGE DIAGNOSES:    1. Acute on chronic hypoxic respiratory failure  2. Acute COPD exacerbation  3. Sepsis with leukocytosis and tachycardia, POA due to #4  4. Right lower lobe Pneumonia- CAP  5. Mild hypotension, chronic   6. Active tobacco abuse   7. Cachexia   8. Hx Colon Ca s/p dain colectomy     CONSULTS CALLED: Pulmonary      PROCEDURES DONE: None      COURSE IN THE HOSPITAL: This is a 81-year-old female with a prior history of advanced COPD who presented to the ED with worsening shortness of breath. Patient was evaluated and was admitted. Patient was noted to have acute on chronic respiratory failure. Patient was started on steroids and bronchodilators. There was concern for right lower lobe pneumonia. Patient was started on antibiotics. Pulmonary was consulted. Patient showed some slow improvement. Patient was counseled to quit smoking. PT OT evaluated the patient. Patient was adamant about going home. Pulmonary cleared the patient for discharge and patient was discharged home with home health care. Discharge plans were discussed with the patient at length. Case management was involved. MEDICATIONS ON DISCHARGE:    Discharge Medication List as of 7/19/2021  1:20 PM      START taking these medications    Details   famotidine (PEPCID) 20 mg tablet Take 1 Tablet by mouth daily. , Print, Disp-30 Tablet, R-0      midodrine (PROAMATINE) 2.5 mg tablet Take 1 Tablet by mouth two (2) times daily (with meals). , Print, Disp-60 Tablet, R-0      azithromycin (Zithromax) 500 mg tab Take 1 Tablet by mouth daily for 3 days. , Print, Disp-3 Tablet, R-0      cefpodoxime (VANTIN) 200 mg tablet Take 1 Tablet by mouth two (2) times a day for 3 days. , Print, Disp-6 Tablet, R-0      !! OTHER Incentive spirometry-use as directed, Print, Disp-1 Each, R-0      !! OTHER Incentive spirometry-use as directed, Print, Disp-1 Each, R-0      !! OTHER Check a CBC, CMP, magnesium in 4 days. Results to PCP immediately. Diagnosis-COPD, Print, Disp-1 Each, R-0       !! - Potential duplicate medications found. Please discuss with provider. CONTINUE these medications which have CHANGED    Details   predniSONE (DELTASONE) 10 mg tablet 6 tabs ( 60 mg ) po daily for 3 days, then 4 tabs ( 40 mg ) po daily for 3 days, then 2 tab ( 20 mg ) po daily for 3 days, then continue taking 1 tablet (10 mg) daily ongoing  Indications: worsening chronic obstructive pulmonary disease, Print, Disp-40 T ablet, R-0         CONTINUE these medications which have NOT CHANGED    Details   Ventolin HFA 90 mcg/actuation inhaler inhale 2 puffs by mouth and INTO THE LUNGS every 4 hours if needed for wheezing shortness of breath, Normal, Disp-18 g, R-5      tiotropium bromide (Spiriva Respimat) 2.5 mcg/actuation inhaler Take 2 Puffs by inhalation daily. , Mail Order, Disp-3 Inhaler,R-3      albuterol (PROVENTIL VENTOLIN) 2.5 mg /3 mL (0.083 %) nebulizer solution 3 mL by Nebulization route every four (4) hours as needed for Wheezing or Shortness of Breath. ICD: J44.9, R09.02 file under Medicare Part B, Normal, Disp-300 Each, R-3      fluticasone propion-salmeterol (ADVAIR DISKUS) 250-50 mcg/dose diskus inhaler Take 1 Puff by inhalation two (2) times a day. BRAND NAME ONLY, Mail Order, Disp-3 Inhaler, R-3      simethicone (GAS-X) 80 mg chewable tablet Take 80 mg by mouth every six (6) hours as needed for Flatulence., Historical Med      therapeutic multivitamin (THERAGRAN) tablet Take 1 Tab by mouth daily. , Print, Disp-30 Tab, R-11      acetaminophen (TYLENOL EXTRA STRENGTH) 500 mg tablet Take 500 mg by mouth every six (6) hours as needed for Pain. , Historical Med      LORazepam (ATIVAN) 1 mg tablet Take  by mouth two (2) times daily as needed for Anxiety. , Historical Med      rOPINIRole (REQUIP) 1 mg tablet Take 1 mg by mouth nightly., Historical Med      OXYGEN-AIR DELIVERY SYSTEMS 2 L by Does Not Apply route. O2 via nasal cannula with bedtime and activity. O2 Company: [facility]               DISCHARGE VITAL SIGNS:  Visit Vitals  /74   Pulse 74   Temp 97.7 °F (36.5 °C)   Resp 20   Ht 5' 7\" (1.702 m)   Wt 55 kg (121 lb 4.1 oz)   SpO2 97%   BMI 18.99 kg/m²         CONDITION ON DISCHARGE: Stable. DISPOSITION: Home with home health care      FOLLOW-UP RECOMMENDATIONS:   Follow-up Information     Follow up With Specialties Details Why [facility]   [id number]  [address] [facility]  [phone]    SLAVA Belle Physician Assistant On 7/27/2021 @ 11am on the [facility] office only [address] office closed  completely)  [address] [place] [address]      [facility], Ur[facility] Care, Internal Medicine On 9/10/2021 @ 12:15  office will speak with md , for an early appt/time [address] [facility]  [phone]            OTHER INSTRUCTIONS:        TIME SPENT ON DISCHARGE ACTIVITIES: More than 35 minutes. Dragon medical dictation software was used for portions of this report. Unintended errors may occur.       Signed:  Brigette Garza MD      8/9/2021

## 2021-08-23 RX ORDER — PREDNISONE 10 MG/1
TABLET ORAL
Qty: 100 TABLET | Refills: 3 | Status: SHIPPED | OUTPATIENT
Start: 2021-08-23 | End: 2022-10-03

## 2021-11-19 RX ORDER — ALBUTEROL SULFATE 90 UG/1
AEROSOL, METERED RESPIRATORY (INHALATION)
Qty: 1 EACH | Refills: 5 | Status: SHIPPED | OUTPATIENT
Start: 2021-11-19 | End: 2022-10-03

## 2021-11-19 NOTE — TELEPHONE ENCOUNTER
[facility] She is completely out.

## 2021-11-23 RX ORDER — ALBUTEROL SULFATE 90 UG/1
AEROSOL, METERED RESPIRATORY (INHALATION)
Qty: 18 G | Refills: 5 | Status: SHIPPED | OUTPATIENT
Start: 2021-11-23

## 2022-08-30 NOTE — PROGRESS NOTES
[facility] Hospitalist Group  Progress Note    Patient: Galina Colon Age: 72 y.o. : 1945 MR#: [id number] SSN: [id number]  Date/Time: 2018    Subjective:     S/p colonoscopy which revealed cecal malignancy, which was biopsied. Seen in pacu, patient denies chest or abdominal pain. No nausea or vomiting. She is still coughing, at times able to expectorate. Breathing has improved since admission. Seen again later in room with Dr. Jessica Lopez. Several family members present, patient sleeping soundly. Extensive discussion held. Patient on full liquids post procedure but has not eaten as     Hypertonic saline added per pulmonary, and spiriva. Assessment/Plan:     1. COPD GOLD 2017 group D - with acute exacerbation. Solumedrol, BD. Pulmonary input appreciated. 2. Chronic hypoxic respiratory failure on home oxygen 2 L around the clock. 3. Leukocytosis poa - source m/l lungs - resolving. 4. Several cigarettes a day despite inability to inhale completely and being on oxygen - smoking cessation education. 5. Chronic back pain - hx of DJDz of L-spine. Controlled. 6. Underweight Body mass index is 18.88 kg/(m^2). Nutritionist following; on supplements. Multivit. 7. Hx anxiety, depression. 7. Hx DVT - noted. 8. Cad, hx MI, followed with Dr. Yung Corona in the past - cardiology consult regarding perioperative management. Echo. 5. Newly diagnosed cecal malignancy - follow pathology, and CEA. Follow surgery recs, and pulmonary and cards recs regarding perioperative management. 10. DNR. Durable DNR on file in cc. Pt / ot. Extensive discussion held at bedside with 3 siblings. Dr. Jessica Lopez present. All questions answered to the best of my ability. I discussed the case with Dr. Ruel Haji, Dr. Jessica Lopez, Dr. Bhargavi Butler.      Additional Notes:      Case discussed with:  [x]Patient  [x]Family  [x]Nursing  []Case Management  DVT Prophylaxis:  []Lovenox  [x]Hep SQ  []SCDs Saw hospice patient Boris Swan this am, his pain was 3/10 so the increased MS contin seems to be helping. He did say yesterday was a bad day for pain 8/10 and shortness of breath. It was very humid here and that seems to bring on his exacerbations.     He has a hard area in his left upper abdomen that is growing and very tender, this was present on admission but worsening.  I suspect a tumor of some sort. Pain is always present but worsens and improves. He asked about testing or ultrasound today. I asked what knowing would change and would he want any surgery, he tells me no - he never wants to go back in the hospital. So he is no longer requesting any testing, but wondering if anything can help with tenderness.     He is tells me the ibuprofen does nothing to help. Could we try dexathmethasone to help with inflammation and pain?  If so, please send to [facility].     ACTION: agree to try dexamethasone.    []Coumadin   []On Heparin gtt    Objective:   VS:   Visit Vitals    /74    Pulse 74    Temp 97.7 °F (36.5 °C)    Resp 14    Ht 5' 6\" (1.676 m)    Wt 53.1 kg (117 lb)    SpO2 100%    BMI 18.88 kg/m2      Tmax/24hrs: Temp (24hrs), Av.6 °F (36.4 °C), Min:97 °F (36.1 °C), Max:98 °F (36.7 °C)    Input/Output:     Intake/Output Summary (Last 24 hours) at 18  Last data filed at 18 0845   Gross per 24 hour   Intake              690 ml   Output              750 ml   Net              -60 ml       General: resting comfortably. Family at bedside. Heent: ncat. perrl. Cardiovascular:  RRR. Pulmonary:  CTA b.l anterior and infraaxillary fields. Symmetrical chest expansion. No dullness to percussion. GI:  Soft, ND, NABS. nt   Extremities:  No edema. dp 2+ b.l  Neuro no focal deficit. Additional: no rash    Labs:    Recent Results (from the past 24 hour(s))   GLUCOSE, POC    Collection Time: 18  9:27 AM   Result Value Ref Range    Glucose (POC) 130 (H) 70 - 110 mg/dL   GLUCOSE, POC    Collection Time: 18 11:38 AM   Result Value Ref Range    Glucose (POC) 123 (H) 70 - 110 mg/dL   GLUCOSE, POC    Collection Time: 18  3:44 PM   Result Value Ref Range    Glucose (POC) 117 (H) 70 - 110 mg/dL   GLUCOSE, POC    Collection Time: 18  9:28 PM   Result Value Ref Range    Glucose (POC) 123 (H) 70 - 110 mg/dL   CBC WITH AUTOMATED DIFF    Collection Time: 18  4:15 AM   Result Value Ref Range    WBC 10.8 4.6 - 13.2 K/uL    RBC 3.70 (L) 4.20 - 5.30 M/uL    HGB 11.8 (L) 12.0 - 16.0 g/dL    HCT 35.2 35.0 - 45.0 %    MCV 95.1 74.0 - 97.0 FL    MCH 31.9 24.0 - 34.0 PG    MCHC 33.5 31.0 - 37.0 g/dL    RDW 13.3 11.6 - 14.5 %    PLATELET 444 889 - 057 K/uL    MPV 10.0 9.2 - 11.8 FL    NEUTROPHILS 88 (H) 40 - 73 %    LYMPHOCYTES 5 (L) 21 - 52 %    MONOCYTES 7 3 - 10 %    EOSINOPHILS 0 0 - 5 %    BASOPHILS 0 0 - 2 %    ABS. NEUTROPHILS 9.5 (H) 1.8 - 8.0 K/UL    ABS.  LYMPHOCYTES 0.5 (L) 0.9 - 3.6 K/UL    ABS. MONOCYTES 0.8 0.05 - 1.2 K/UL    ABS. EOSINOPHILS 0.0 0.0 - 0.4 K/UL    ABS.  BASOPHILS 0.0 0.0 - 0.1 K/UL    DF AUTOMATED     MAGNESIUM    Collection Time: 09/22/18  4:15 AM   Result Value Ref Range    Magnesium 2.7 (H) 1.6 - 2.6 mg/dL   METABOLIC PANEL, BASIC    Collection Time: 09/22/18  4:15 AM   Result Value Ref Range    Sodium 138 136 - 145 mmol/L    Potassium 4.1 3.5 - 5.5 mmol/L    Chloride 99 (L) 100 - 108 mmol/L    CO2 34 (H) 21 - 32 mmol/L    Anion gap 5 3.0 - 18 mmol/L    Glucose 115 (H) 74 - 99 mg/dL    BUN 24 (H) 7.0 - 18 MG/DL    Creatinine 0.64 0.6 - 1.3 MG/DL    BUN/Creatinine ratio 38 (H) 12 - 20      GFR est AA >60 >60 ml/min/1.73m2    GFR est non-AA >60 >60 ml/min/1.73m2    Calcium 8.3 (L) 8.5 - 10.1 MG/DL   PROTHROMBIN TIME + INR    Collection Time: 09/22/18  4:15 AM   Result Value Ref Range    Prothrombin time 13.5 11.5 - 15.2 sec    INR 1.1 0.8 - 1.2       Additional Data Reviewed:      Signed By: Verona Mcfadden MD     September 22, 2018 3:46 PM

## 2022-09-27 ENCOUNTER — APPOINTMENT (OUTPATIENT)
Dept: CT IMAGING | Age: 77
DRG: 190 | End: 2022-09-27
Attending: STUDENT IN AN ORGANIZED HEALTH CARE EDUCATION/TRAINING PROGRAM
Payer: MEDICARE

## 2022-09-27 ENCOUNTER — HOSPITAL ENCOUNTER (INPATIENT)
Age: 77
LOS: 6 days | Discharge: HOME HEALTH | DRG: 190 | End: 2022-10-03
Attending: STUDENT IN AN ORGANIZED HEALTH CARE EDUCATION/TRAINING PROGRAM | Admitting: STUDENT IN AN ORGANIZED HEALTH CARE EDUCATION/TRAINING PROGRAM
Payer: MEDICARE

## 2022-09-27 ENCOUNTER — APPOINTMENT (OUTPATIENT)
Dept: GENERAL RADIOLOGY | Age: 77
DRG: 190 | End: 2022-09-27
Attending: PHYSICIAN ASSISTANT
Payer: MEDICARE

## 2022-09-27 DIAGNOSIS — F41.9 ANXIETY: ICD-10-CM

## 2022-09-27 DIAGNOSIS — C80.1 CANCER (HCC): Primary | ICD-10-CM

## 2022-09-27 DIAGNOSIS — M54.9 CHRONIC BACK PAIN, UNSPECIFIED BACK LOCATION, UNSPECIFIED BACK PAIN LATERALITY: ICD-10-CM

## 2022-09-27 DIAGNOSIS — J44.1 ACUTE EXACERBATION OF CHRONIC OBSTRUCTIVE PULMONARY DISEASE (COPD) (HCC): ICD-10-CM

## 2022-09-27 DIAGNOSIS — J44.9 CHRONIC OBSTRUCTIVE PULMONARY DISEASE, UNSPECIFIED COPD TYPE (HCC): ICD-10-CM

## 2022-09-27 DIAGNOSIS — G89.29 CHRONIC BACK PAIN, UNSPECIFIED BACK LOCATION, UNSPECIFIED BACK PAIN LATERALITY: ICD-10-CM

## 2022-09-27 PROBLEM — J43.9 EMPHYSEMA LUNG (HCC): Status: ACTIVE | Noted: 2022-09-27

## 2022-09-27 LAB
ALBUMIN SERPL-MCNC: 3 G/DL (ref 3.4–5)
ALBUMIN/GLOB SERPL: 1 {RATIO} (ref 0.8–1.7)
ALP SERPL-CCNC: 109 U/L (ref 45–117)
ALT SERPL-CCNC: 24 U/L (ref 13–56)
ANION GAP SERPL CALC-SCNC: 6 MMOL/L (ref 3–18)
ARTERIAL PATENCY WRIST A: POSITIVE
AST SERPL-CCNC: 11 U/L (ref 10–38)
BASE EXCESS BLD CALC-SCNC: 11.3 MMOL/L
BASOPHILS # BLD: 0.1 K/UL (ref 0–0.1)
BASOPHILS NFR BLD: 1 % (ref 0–2)
BDY SITE: ABNORMAL
BILIRUB SERPL-MCNC: 0.7 MG/DL (ref 0.2–1)
BNP SERPL-MCNC: 929 PG/ML (ref 0–1800)
BUN SERPL-MCNC: 20 MG/DL (ref 7–18)
BUN/CREAT SERPL: 43 (ref 12–20)
CALCIUM SERPL-MCNC: 9.2 MG/DL (ref 8.5–10.1)
CHLORIDE SERPL-SCNC: 94 MMOL/L (ref 100–111)
CO2 SERPL-SCNC: 38 MMOL/L (ref 21–32)
CREAT SERPL-MCNC: 0.46 MG/DL (ref 0.6–1.3)
DIFFERENTIAL METHOD BLD: ABNORMAL
EOSINOPHIL # BLD: 0 K/UL (ref 0–0.4)
EOSINOPHIL NFR BLD: 0 % (ref 0–5)
ERYTHROCYTE [DISTWIDTH] IN BLOOD BY AUTOMATED COUNT: 13.3 % (ref 11.6–14.5)
FLUAV RNA SPEC QL NAA+PROBE: NOT DETECTED
FLUBV RNA SPEC QL NAA+PROBE: NOT DETECTED
GLOBULIN SER CALC-MCNC: 3.1 G/DL (ref 2–4)
GLUCOSE SERPL-MCNC: 128 MG/DL (ref 74–99)
HCO3 BLD-SCNC: 39.1 MMOL/L (ref 22–26)
HCT VFR BLD AUTO: 36.4 % (ref 35–45)
HGB BLD-MCNC: 11.3 G/DL (ref 12–16)
IMM GRANULOCYTES # BLD AUTO: 0.1 K/UL (ref 0–0.04)
IMM GRANULOCYTES NFR BLD AUTO: 1 % (ref 0–0.5)
LYMPHOCYTES # BLD: 0.7 K/UL (ref 0.9–3.6)
LYMPHOCYTES NFR BLD: 8 % (ref 21–52)
MCH RBC QN AUTO: 31 PG (ref 24–34)
MCHC RBC AUTO-ENTMCNC: 31 G/DL (ref 31–37)
MCV RBC AUTO: 100 FL (ref 78–100)
MONOCYTES # BLD: 1.2 K/UL (ref 0.05–1.2)
MONOCYTES NFR BLD: 13 % (ref 3–10)
NEUTS SEG # BLD: 7.5 K/UL (ref 1.8–8)
NEUTS SEG NFR BLD: 78 % (ref 40–73)
NRBC # BLD: 0 K/UL (ref 0–0.01)
NRBC BLD-RTO: 0 PER 100 WBC
PCO2 BLD: 66.4 MMHG (ref 35–45)
PH BLD: 7.38 [PH] (ref 7.35–7.45)
PLATELET # BLD AUTO: 315 K/UL (ref 135–420)
PMV BLD AUTO: 10.1 FL (ref 9.2–11.8)
PO2 BLD: 67 MMHG (ref 80–100)
POTASSIUM SERPL-SCNC: 3.5 MMOL/L (ref 3.5–5.5)
PROT SERPL-MCNC: 6.1 G/DL (ref 6.4–8.2)
RBC # BLD AUTO: 3.64 M/UL (ref 4.2–5.3)
SAO2 % BLD: 91.7 % (ref 92–97)
SARS-COV-2, COV2: NOT DETECTED
SERVICE CMNT-IMP: ABNORMAL
SODIUM SERPL-SCNC: 138 MMOL/L (ref 136–145)
SPECIMEN TYPE: ABNORMAL
TROPONIN-HIGH SENSITIVITY: 18 NG/L (ref 0–54)
WBC # BLD AUTO: 9.6 K/UL (ref 4.6–13.2)

## 2022-09-27 PROCEDURE — 74011250636 HC RX REV CODE- 250/636: Performed by: STUDENT IN AN ORGANIZED HEALTH CARE EDUCATION/TRAINING PROGRAM

## 2022-09-27 PROCEDURE — 74011000250 HC RX REV CODE- 250: Performed by: STUDENT IN AN ORGANIZED HEALTH CARE EDUCATION/TRAINING PROGRAM

## 2022-09-27 PROCEDURE — 94762 N-INVAS EAR/PLS OXIMTRY CONT: CPT

## 2022-09-27 PROCEDURE — 83880 ASSAY OF NATRIURETIC PEPTIDE: CPT

## 2022-09-27 PROCEDURE — 93005 ELECTROCARDIOGRAM TRACING: CPT

## 2022-09-27 PROCEDURE — 36600 WITHDRAWAL OF ARTERIAL BLOOD: CPT

## 2022-09-27 PROCEDURE — 94640 AIRWAY INHALATION TREATMENT: CPT

## 2022-09-27 PROCEDURE — 80053 COMPREHEN METABOLIC PANEL: CPT

## 2022-09-27 PROCEDURE — 96374 THER/PROPH/DIAG INJ IV PUSH: CPT

## 2022-09-27 PROCEDURE — 82803 BLOOD GASES ANY COMBINATION: CPT

## 2022-09-27 PROCEDURE — 70450 CT HEAD/BRAIN W/O DYE: CPT

## 2022-09-27 PROCEDURE — 87636 SARSCOV2 & INF A&B AMP PRB: CPT

## 2022-09-27 PROCEDURE — 71275 CT ANGIOGRAPHY CHEST: CPT

## 2022-09-27 PROCEDURE — 74011250636 HC RX REV CODE- 250/636: Performed by: EMERGENCY MEDICINE

## 2022-09-27 PROCEDURE — 96375 TX/PRO/DX INJ NEW DRUG ADDON: CPT

## 2022-09-27 PROCEDURE — 65270000029 HC RM PRIVATE

## 2022-09-27 PROCEDURE — 99285 EMERGENCY DEPT VISIT HI MDM: CPT

## 2022-09-27 PROCEDURE — 85025 COMPLETE CBC W/AUTO DIFF WBC: CPT

## 2022-09-27 PROCEDURE — 71045 X-RAY EXAM CHEST 1 VIEW: CPT

## 2022-09-27 PROCEDURE — 84484 ASSAY OF TROPONIN QUANT: CPT

## 2022-09-27 PROCEDURE — 74011000250 HC RX REV CODE- 250: Performed by: EMERGENCY MEDICINE

## 2022-09-27 PROCEDURE — 74011000636 HC RX REV CODE- 636: Performed by: STUDENT IN AN ORGANIZED HEALTH CARE EDUCATION/TRAINING PROGRAM

## 2022-09-27 PROCEDURE — 74011250637 HC RX REV CODE- 250/637: Performed by: STUDENT IN AN ORGANIZED HEALTH CARE EDUCATION/TRAINING PROGRAM

## 2022-09-27 RX ORDER — LIDOCAINE 4 G/100G
1 PATCH TOPICAL EVERY 24 HOURS
Status: DISCONTINUED | OUTPATIENT
Start: 2022-09-27 | End: 2022-10-03 | Stop reason: HOSPADM

## 2022-09-27 RX ORDER — ROPINIROLE 1 MG/1
1 TABLET, FILM COATED ORAL 2 TIMES DAILY
Status: DISCONTINUED | OUTPATIENT
Start: 2022-09-27 | End: 2022-09-28

## 2022-09-27 RX ORDER — DOXYCYCLINE 100 MG/1
100 CAPSULE ORAL
Status: COMPLETED | OUTPATIENT
Start: 2022-09-27 | End: 2022-09-27

## 2022-09-27 RX ORDER — ALBUTEROL SULFATE 0.83 MG/ML
10 SOLUTION RESPIRATORY (INHALATION)
Status: COMPLETED | OUTPATIENT
Start: 2022-09-27 | End: 2022-09-27

## 2022-09-27 RX ORDER — IPRATROPIUM BROMIDE AND ALBUTEROL SULFATE 2.5; .5 MG/3ML; MG/3ML
9 SOLUTION RESPIRATORY (INHALATION)
Status: COMPLETED | OUTPATIENT
Start: 2022-09-27 | End: 2022-09-27

## 2022-09-27 RX ADMIN — ALBUTEROL SULFATE 10 MG: 2.5 SOLUTION RESPIRATORY (INHALATION) at 19:37

## 2022-09-27 RX ADMIN — IOPAMIDOL 80 ML: 755 INJECTION, SOLUTION INTRAVENOUS at 18:46

## 2022-09-27 RX ADMIN — CEFTRIAXONE 1 G: 1 INJECTION, POWDER, FOR SOLUTION INTRAMUSCULAR; INTRAVENOUS at 19:36

## 2022-09-27 RX ADMIN — METHYLPREDNISOLONE SODIUM SUCCINATE 125 MG: 125 INJECTION, POWDER, FOR SOLUTION INTRAMUSCULAR; INTRAVENOUS at 22:47

## 2022-09-27 RX ADMIN — DOXYCYCLINE HYCLATE 100 MG: 100 CAPSULE ORAL at 19:36

## 2022-09-27 RX ADMIN — IPRATROPIUM BROMIDE AND ALBUTEROL SULFATE 9 ML: .5; 3 SOLUTION RESPIRATORY (INHALATION) at 19:05

## 2022-09-27 NOTE — ED TRIAGE NOTES
Pt arrives from home with c/o sob. Pt reports she fell and hit her head and was without her oxygen for unknown amount of time. Pt has hoarse cough during triage.

## 2022-09-27 NOTE — DISCHARGE INSTRUCTIONS
DISCHARGE SUMMARY from Nurse    PATIENT INSTRUCTIONS:    After general anesthesia or intravenous sedation, for 24 hours or while taking prescription Narcotics:  Limit your activities  Do not drive and operate hazardous machinery  Do not make important personal or business decisions  Do  not drink alcoholic beverages  If you have not urinated within 8 hours after discharge, please contact your surgeon on call. Report the following to your surgeon:  Excessive pain, swelling, redness or odor of or around the surgical area  Temperature over 100.5  Nausea and vomiting lasting longer than 4 hours or if unable to take medications  Any signs of decreased circulation or nerve impairment to extremity: change in color, persistent  numbness, tingling, coldness or increase pain  Any questions    What to do at Home:  Recommended activity: Activity as tolerated. If you experience any of the following symptoms elevated temp, increased pain, please follow up with Hospice. *  Please give a list of your current medications to your Primary Care Provider. *  Please update this list whenever your medications are discontinued, doses are      changed, or new medications (including over-the-counter products) are added. *  Please carry medication information at all times in case of emergency situations. These are general instructions for a healthy lifestyle:    No smoking/ No tobacco products/ Avoid exposure to second hand smoke  Surgeon General's Warning:  Quitting smoking now greatly reduces serious risk to your health.     Obesity, smoking, and sedentary lifestyle greatly increases your risk for illness    A healthy diet, regular physical exercise & weight monitoring are important for maintaining a healthy lifestyle    You may be retaining fluid if you have a history of heart failure or if you experience any of the following symptoms:  Weight gain of 3 pounds or more overnight or 5 pounds in a week, increased swelling in our hands or feet or shortness of breath while lying flat in bed. Please call your doctor as soon as you notice any of these symptoms; do not wait until your next office visit. The discharge information has been reviewed with the patient. The patient verbalized understanding. Discharge medications reviewed with the patient and appropriate educational materials and side effects teaching were provided.   Patient armband removed and shredded   ___________________________________________________________________________________________________________________________________

## 2022-09-27 NOTE — ED PROVIDER NOTES
72-year-old female history of COPD, lung cancer, colon cancer on hospice with a DNR/DNI presents the ED with past several days of cough and fatigue achiness all over and shortness of breath. Reportedly patient suffered a fall at home prior to arrival, hitting her head and was off of her oxygen as result of this fall became exquisitely short of breath and was placed on BiPAP by EMS. Patient has a wet sounding productive cough with bilateral rhonchi especially in her left lower lung. States she normally wears 4 to 5 L of oxygen at home. She reportedly was tested for COVID a few days ago and was negative. Solu-Medrol, Atrovent x1 times albuterol x1 and mag per EMS.         Past Medical History:   Diagnosis Date    Anxiety     Arthritis     Asbestosis ([address]     Asthma     Back problem     Baker's cyst     Balance problems     Chronic lung disease     Cigarette smoker     Clotting disorder (HCC)     COPD (chronic obstructive pulmonary disease) ([facility])     oxygen 2/liters asbestosis    Depression     History of DVT (deep vein thrombosis)     Leg pain     Right    Lung disease     Nausea & vomiting     Osteoporosis     Restless leg syndrome     Spinal stenosis     Thromboembolus ([facility])     Vitamin D deficiency        Past Surgical History:   Procedure Laterality Date    COLONOSCOPY N/A 9/22/2018    COLONOSCOPY w bx w polypectonies performed by Ez Medellin MD at [address] [place] [address] Rd N/A 11/6/2018    COLONOSCOPY performed by Adis Morales MD at [facility] MAIN OR    COLONOSCOPY N/A 8/2/2019    COLONOSCOPY with polypectomies and biopsies performed by Katie Welch MD at [facility] ENDOSCOPY    HX APPENDECTOMY      HX BACK SURGERY      x4    HX BREAST BIOPSY      left    HX GI      exp lap and ileostomy    HX HEENT      cataract right    HX HYSTERECTOMY      HX LUMBAR LAMINECTOMY      HX MENISCUS REPAIR      HX ORTHOPAEDIC      Knee bakers cyst    HX POLYPECTOMY      right colectomy    HX TONSILLECTOMY      HX VEIN STRIPPING      DE LAP,SURG,COLECTOMY, PARTIAL, W/ANAST N/A 10/23/2018    Dr. Lyn Mendez N/A 2018    Dr. Nilda Bey      vein stripping         Family History:   Problem Relation Age of Onset    Cancer Father     Heart Disease Mother     Hypertension Mother     Cancer Brother     Cancer Sister     Diabetes Other     Hypertension Other     Stroke Other     Heart Disease Sister     Hypertension Sister     Heart Disease Brother        Social History     Socioeconomic History    Marital status:      Spouse name: Not on file    Number of children: Not on file    Years of education: Not on file    Highest education level: Not on file   Occupational History    Not on file   Tobacco Use    Smoking status: Former     Packs/day: 1.00     Years: 50.00     Pack years: 50.00     Types: Cigarettes     Start date: 10/21/1964     Quit date: 2021     Years since quittin.2    Smokeless tobacco: Never   Vaping Use    Vaping Use: Never used   Substance and Sexual Activity    Alcohol use: Never    Drug use: No    Sexual activity: Not on file   Other Topics Concern    Not on file   Social History Narrative    Not on file     Social Determinants of Health     Financial Resource Strain: Not on file   Food Insecurity: Not on file   Transportation Needs: Not on file   Physical Activity: Not on file   Stress: Not on file   Social Connections: Not on file   Intimate Partner Violence: Not on file   Housing Stability: Not on file         ALLERGIES: Latex, natural rubber; Aspirin; Augmentin [amoxicillin-pot clavulanate]; Doxycycline; Ibuprofen; Morphine; Shellfish derived; Sulfa (sulfonamide antibiotics); Umeclidinium-vilanterol; and Fluticasone propion-salmeterol    Review of Systems   Constitutional:  Positive for activity change, chills and fatigue. Negative for fever. HENT:  Positive for rhinorrhea and sore throat.     Eyes:  Positive for discharge. Respiratory:  Positive for cough and shortness of breath. Negative for chest tightness. Cardiovascular:  Negative for chest pain and leg swelling. Gastrointestinal:  Positive for abdominal pain. Negative for nausea and vomiting. Musculoskeletal:  Negative for back pain, neck pain and neck stiffness. Neurological:  Positive for numbness. Negative for dizziness and headaches. Numbness in bilateral hands and tingling chronic     There were no vitals filed for this visit. Physical Exam  Vitals and nursing note reviewed. Constitutional:       Appearance: She is ill-appearing. HENT:      Head: Normocephalic and atraumatic. Eyes:      General:         Right eye: Discharge present. Left eye: Discharge present. Extraocular Movements: Extraocular movements intact. Pupils: Pupils are equal, round, and reactive to light. Comments: Pupils pinpoint, reactive bilaterally   Cardiovascular:      Rate and Rhythm: Normal rate and regular rhythm. Pulses: No decreased pulses. Heart sounds: No murmur heard. Pulmonary:      Effort: Tachypnea and accessory muscle usage present. Breath sounds: Examination of the left-upper field reveals rhonchi. Examination of the right-middle field reveals rhonchi. Examination of the left-middle field reveals rhonchi. Examination of the right-lower field reveals rhonchi. Examination of the left-lower field reveals rhonchi. Rhonchi present. Chest:      Chest wall: No deformity or tenderness. Abdominal:      Tenderness: There is abdominal tenderness. Musculoskeletal:         General: Normal range of motion. Cervical back: Normal range of motion. Right lower leg: No edema. Left lower leg: No edema. Skin:     General: Skin is warm and dry. Neurological:      General: No focal deficit present. Mental Status: She is alert and oriented to person, place, and time.       Cranial Nerves: No cranial nerve deficit. Motor: No weakness. Psychiatric:         Mood and Affect: Mood normal.         Behavior: Behavior normal.        MDM  Number of Diagnoses or Management Options  Cancer ([facility]  Chronic obstructive pulmonary disease, unspecified COPD type [facility])  Hospice care patient  Diagnosis management comments: 77-year-old female history of COPD, lung cancer, colon cancer on hospice with a DNR/DNI per patient's statement presents the ED with past several days of cough and fatigue achiness all over and shortness of breath. Reportedly patient suffered a fall at home prior to arrival, hitting her head and was off of her oxygen as result of this fall became exquisitely short of breath and was placed on BiPAP by EMS. Solu-Medrol, Atrovent x1 times albuterol x1 and mag per EMS. patient has a wet sounding productive cough with bilateral rhonchi especially in her left lower lung. States she normally wears 4 to 5 L of oxygen at home. She reportedly was tested for COVID a few days ago and was negative    Denies blood thinners    Upon arrival in ED patient is satting 94% on 3 L of O2 nasal cannula. Her heart rate is regular with no murmurs rubs or gallops, pulses intact, sensation intact to light touch x4. Patient reports that she has chronic numbness and tingling in her bilateral hands for the last several months. Denies worsening with turning her head, denies midline spinal tenderness. .  No lacerations or bleeding to the head. Neuro exam within normal limits. she does have guarding to her abdomen on abdominal exam in the lower quadrants.      EKG obtained at 5:34 PM on my interpretation: Ventricular rate 112, AL interval 118, QRS 88  with a normal axis, okay R wave progression, no significant ST elevations or depressions, occasional PAC    White blood cell count 9.6, hemoglobin 11.3, consistent with prior, neutrophilia at 78 with a lymphopenia 8 absolute neutrophil count normal.  Chemistry significant for bicarb of 38 anion gap is normal at 6, glucose 128 creatinine 0.46 proBNP and troponin within normal limits, influenza a and B are negative. A COVID test negative    Parenteral includes PE, pneumonia, COPD exacerbation, viral upper or lower respiratory infection    Arterial Blood Gas result:7.38 pH, PCO2 66, PO2 67, bicarb 39.1, base excess 11 right radial at 1815        CTA chest  pending. CXR final read pending     CT Head:    FINDINGS:      No intracranial hemorrhage. No evidence of midline shift, mass effect, or  obvious mass lesion. There is preservation of the gray and white matter  differentiation, no acute infarct (Please note that CT is less sensitive in the  detection of early infarct). Stable ectopic cerebellar tonsils     The size of the ventricles and sulci are normal.  No extra axial fluid  collections. Visualized paranasal sinuses are clear. No evidence for skull fracture  IMPRESSION  1. No CT evidence for an acute intracranial process. No significant change. Chest x-ray on my read showing intact airway, no cardiomegaly, aortic valve contour appears within normal limits, no evidence of fractured ribs bilaterally, increased interstitial markings throughout both lungs with increased pulmonary vascular congestion. No pleural effusions or pneumothorax      Attending spoke respiratory, decided to place patient back on BiPAP due to ABG showing an elevated CO2 to 66 and to recheck her ABG in a few hours.     Pt discussed and evaluated by attending  Pt turned over to Dr. Julien Musa in stable condition       Amount and/or Complexity of Data Reviewed  Clinical lab tests: ordered and reviewed  Tests in the radiology section of CPT®: reviewed  Tests in the medicine section of CPT®: ordered and reviewed           Procedures

## 2022-09-28 PROBLEM — R53.81 DEBILITY: Status: ACTIVE | Noted: 2022-09-28

## 2022-09-28 LAB
ATRIAL RATE: 112 BPM
CALCULATED P AXIS, ECG09: 85 DEGREES
CALCULATED R AXIS, ECG10: 82 DEGREES
CALCULATED T AXIS, ECG11: 156 DEGREES
DIAGNOSIS, 93000: NORMAL
P-R INTERVAL, ECG05: 118 MS
Q-T INTERVAL, ECG07: 342 MS
QRS DURATION, ECG06: 88 MS
QTC CALCULATION (BEZET), ECG08: 466 MS
VENTRICULAR RATE, ECG03: 112 BPM

## 2022-09-28 PROCEDURE — 65270000046 HC RM TELEMETRY

## 2022-09-28 PROCEDURE — 74011250636 HC RX REV CODE- 250/636: Performed by: FAMILY MEDICINE

## 2022-09-28 PROCEDURE — 94640 AIRWAY INHALATION TREATMENT: CPT

## 2022-09-28 PROCEDURE — 74011636637 HC RX REV CODE- 636/637: Performed by: STUDENT IN AN ORGANIZED HEALTH CARE EDUCATION/TRAINING PROGRAM

## 2022-09-28 PROCEDURE — 74011000250 HC RX REV CODE- 250: Performed by: STUDENT IN AN ORGANIZED HEALTH CARE EDUCATION/TRAINING PROGRAM

## 2022-09-28 PROCEDURE — 74011250636 HC RX REV CODE- 250/636: Performed by: STUDENT IN AN ORGANIZED HEALTH CARE EDUCATION/TRAINING PROGRAM

## 2022-09-28 PROCEDURE — 74011250637 HC RX REV CODE- 250/637: Performed by: FAMILY MEDICINE

## 2022-09-28 PROCEDURE — 74011250637 HC RX REV CODE- 250/637: Performed by: STUDENT IN AN ORGANIZED HEALTH CARE EDUCATION/TRAINING PROGRAM

## 2022-09-28 PROCEDURE — 2709999900 HC NON-CHARGEABLE SUPPLY

## 2022-09-28 PROCEDURE — 77010033678 HC OXYGEN DAILY

## 2022-09-28 PROCEDURE — 94761 N-INVAS EAR/PLS OXIMETRY MLT: CPT

## 2022-09-28 PROCEDURE — 99222 1ST HOSP IP/OBS MODERATE 55: CPT | Performed by: STUDENT IN AN ORGANIZED HEALTH CARE EDUCATION/TRAINING PROGRAM

## 2022-09-28 PROCEDURE — 97162 PT EVAL MOD COMPLEX 30 MIN: CPT

## 2022-09-28 PROCEDURE — 99222 1ST HOSP IP/OBS MODERATE 55: CPT | Performed by: NURSE PRACTITIONER

## 2022-09-28 RX ORDER — PREDNISONE 10 MG/1
10 TABLET ORAL
Status: DISCONTINUED | OUTPATIENT
Start: 2022-09-28 | End: 2022-09-29

## 2022-09-28 RX ORDER — THERA TABS 400 MCG
1 TAB ORAL DAILY
Status: DISCONTINUED | OUTPATIENT
Start: 2022-09-28 | End: 2022-10-03 | Stop reason: HOSPADM

## 2022-09-28 RX ORDER — POLYETHYLENE GLYCOL 3350 17 G/17G
17 POWDER, FOR SOLUTION ORAL DAILY PRN
Status: DISCONTINUED | OUTPATIENT
Start: 2022-09-28 | End: 2022-10-03 | Stop reason: HOSPADM

## 2022-09-28 RX ORDER — ONDANSETRON 4 MG/1
4 TABLET, ORALLY DISINTEGRATING ORAL
Status: DISCONTINUED | OUTPATIENT
Start: 2022-09-28 | End: 2022-10-03 | Stop reason: HOSPADM

## 2022-09-28 RX ORDER — ROPINIROLE 1 MG/1
1 TABLET, FILM COATED ORAL
Status: DISCONTINUED | OUTPATIENT
Start: 2022-09-28 | End: 2022-10-03 | Stop reason: HOSPADM

## 2022-09-28 RX ORDER — SODIUM CHLORIDE FOR INHALATION 3 %
4 VIAL, NEBULIZER (ML) INHALATION ONCE
Status: COMPLETED | OUTPATIENT
Start: 2022-09-28 | End: 2022-09-28

## 2022-09-28 RX ORDER — MIDODRINE HYDROCHLORIDE 2.5 MG/1
2.5 TABLET ORAL 2 TIMES DAILY WITH MEALS
Status: DISCONTINUED | OUTPATIENT
Start: 2022-09-28 | End: 2022-10-03 | Stop reason: HOSPADM

## 2022-09-28 RX ORDER — SODIUM CHLORIDE 0.9 % (FLUSH) 0.9 %
5-40 SYRINGE (ML) INJECTION AS NEEDED
Status: DISCONTINUED | OUTPATIENT
Start: 2022-09-28 | End: 2022-10-03 | Stop reason: HOSPADM

## 2022-09-28 RX ORDER — HYDROCODONE BITARTRATE AND ACETAMINOPHEN 5; 325 MG/1; MG/1
1-2 TABLET ORAL
Status: DISCONTINUED | OUTPATIENT
Start: 2022-09-28 | End: 2022-10-02

## 2022-09-28 RX ORDER — ENOXAPARIN SODIUM 100 MG/ML
40 INJECTION SUBCUTANEOUS DAILY
Status: DISCONTINUED | OUTPATIENT
Start: 2022-09-28 | End: 2022-10-03 | Stop reason: HOSPADM

## 2022-09-28 RX ORDER — ALBUTEROL SULFATE 0.83 MG/ML
2.5 SOLUTION RESPIRATORY (INHALATION)
Status: DISCONTINUED | OUTPATIENT
Start: 2022-09-28 | End: 2022-10-03 | Stop reason: HOSPADM

## 2022-09-28 RX ORDER — ONDANSETRON 2 MG/ML
4 INJECTION INTRAMUSCULAR; INTRAVENOUS
Status: DISCONTINUED | OUTPATIENT
Start: 2022-09-28 | End: 2022-10-03 | Stop reason: HOSPADM

## 2022-09-28 RX ORDER — LORAZEPAM 0.5 MG/1
0.5 TABLET ORAL ONCE
Status: COMPLETED | OUTPATIENT
Start: 2022-09-28 | End: 2022-09-28

## 2022-09-28 RX ORDER — SODIUM CHLORIDE 0.9 % (FLUSH) 0.9 %
5-40 SYRINGE (ML) INJECTION EVERY 8 HOURS
Status: DISCONTINUED | OUTPATIENT
Start: 2022-09-28 | End: 2022-10-03 | Stop reason: HOSPADM

## 2022-09-28 RX ORDER — IPRATROPIUM BROMIDE 0.5 MG/2.5ML
500 SOLUTION RESPIRATORY (INHALATION)
Status: DISCONTINUED | OUTPATIENT
Start: 2022-09-28 | End: 2022-09-29

## 2022-09-28 RX ORDER — SIMETHICONE 80 MG
80 TABLET,CHEWABLE ORAL
Status: DISCONTINUED | OUTPATIENT
Start: 2022-09-28 | End: 2022-10-03 | Stop reason: HOSPADM

## 2022-09-28 RX ORDER — LORAZEPAM 0.5 MG/1
0.5 TABLET ORAL
Status: DISCONTINUED | OUTPATIENT
Start: 2022-09-28 | End: 2022-10-03 | Stop reason: HOSPADM

## 2022-09-28 RX ORDER — BUDESONIDE AND FORMOTEROL FUMARATE DIHYDRATE 160; 4.5 UG/1; UG/1
2 AEROSOL RESPIRATORY (INHALATION)
Status: DISCONTINUED | OUTPATIENT
Start: 2022-09-28 | End: 2022-09-28 | Stop reason: CLARIF

## 2022-09-28 RX ORDER — SODIUM CHLORIDE, SODIUM LACTATE, POTASSIUM CHLORIDE, CALCIUM CHLORIDE 600; 310; 30; 20 MG/100ML; MG/100ML; MG/100ML; MG/100ML
75 INJECTION, SOLUTION INTRAVENOUS CONTINUOUS
Status: DISCONTINUED | OUTPATIENT
Start: 2022-09-28 | End: 2022-09-29

## 2022-09-28 RX ORDER — ACETAMINOPHEN 500 MG
500 TABLET ORAL
Status: DISCONTINUED | OUTPATIENT
Start: 2022-09-28 | End: 2022-10-03 | Stop reason: HOSPADM

## 2022-09-28 RX ORDER — LORAZEPAM 2 MG/ML
1 INJECTION, SOLUTION INTRAMUSCULAR; INTRAVENOUS ONCE
Status: COMPLETED | OUTPATIENT
Start: 2022-09-28 | End: 2022-09-28

## 2022-09-28 RX ORDER — LORAZEPAM 1 MG/1
1 TABLET ORAL
Status: DISCONTINUED | OUTPATIENT
Start: 2022-09-28 | End: 2022-10-02

## 2022-09-28 RX ORDER — IPRATROPIUM BROMIDE 0.5 MG/2.5ML
0.5 SOLUTION RESPIRATORY (INHALATION)
Status: DISCONTINUED | OUTPATIENT
Start: 2022-09-28 | End: 2022-09-28

## 2022-09-28 RX ORDER — FAMOTIDINE 20 MG/1
20 TABLET, FILM COATED ORAL DAILY
Status: DISCONTINUED | OUTPATIENT
Start: 2022-09-28 | End: 2022-10-03 | Stop reason: HOSPADM

## 2022-09-28 RX ADMIN — ARFORMOTEROL TARTRATE: 15 SOLUTION RESPIRATORY (INHALATION) at 09:20

## 2022-09-28 RX ADMIN — IPRATROPIUM BROMIDE 0.5 MG: 0.5 SOLUTION RESPIRATORY (INHALATION) at 09:20

## 2022-09-28 RX ADMIN — HYDROCODONE BITARTRATE AND ACETAMINOPHEN 2 TABLET: 5; 325 TABLET ORAL at 22:46

## 2022-09-28 RX ADMIN — IPRATROPIUM BROMIDE 0.5 MG: 0.5 SOLUTION RESPIRATORY (INHALATION) at 15:22

## 2022-09-28 RX ADMIN — SODIUM CHLORIDE, POTASSIUM CHLORIDE, SODIUM LACTATE AND CALCIUM CHLORIDE 75 ML/HR: 600; 310; 30; 20 INJECTION, SOLUTION INTRAVENOUS at 03:11

## 2022-09-28 RX ADMIN — FAMOTIDINE 20 MG: 20 TABLET ORAL at 09:13

## 2022-09-28 RX ADMIN — MIDODRINE HYDROCHLORIDE 2.5 MG: 2.5 TABLET ORAL at 17:25

## 2022-09-28 RX ADMIN — ROPINIROLE HYDROCHLORIDE 1 MG: 1 TABLET, FILM COATED ORAL at 22:45

## 2022-09-28 RX ADMIN — ENOXAPARIN SODIUM 40 MG: 100 INJECTION SUBCUTANEOUS at 09:14

## 2022-09-28 RX ADMIN — MIDODRINE HYDROCHLORIDE 2.5 MG: 2.5 TABLET ORAL at 09:13

## 2022-09-28 RX ADMIN — THERA TABS 1 TABLET: TAB at 09:13

## 2022-09-28 RX ADMIN — SODIUM CHLORIDE, PRESERVATIVE FREE 40 ML: 5 INJECTION INTRAVENOUS at 03:30

## 2022-09-28 RX ADMIN — HYDROCODONE BITARTRATE AND ACETAMINOPHEN 1 TABLET: 5; 325 TABLET ORAL at 15:22

## 2022-09-28 RX ADMIN — LORAZEPAM 0.5 MG: 0.5 TABLET ORAL at 01:07

## 2022-09-28 RX ADMIN — SODIUM CHLORIDE, POTASSIUM CHLORIDE, SODIUM LACTATE AND CALCIUM CHLORIDE 75 ML/HR: 600; 310; 30; 20 INJECTION, SOLUTION INTRAVENOUS at 15:14

## 2022-09-28 RX ADMIN — LORAZEPAM 0.5 MG: 0.5 TABLET ORAL at 09:13

## 2022-09-28 RX ADMIN — LORAZEPAM 1 MG: 2 INJECTION, SOLUTION INTRAMUSCULAR; INTRAVENOUS at 13:36

## 2022-09-28 RX ADMIN — PREDNISONE 10 MG: 10 TABLET ORAL at 09:14

## 2022-09-28 RX ADMIN — SODIUM CHLORIDE, PRESERVATIVE FREE 10 ML: 5 INJECTION INTRAVENOUS at 12:05

## 2022-09-28 RX ADMIN — ARFORMOTEROL TARTRATE: 15 SOLUTION RESPIRATORY (INHALATION) at 20:00

## 2022-09-28 RX ADMIN — SODIUM CHLORIDE SOLN NEBU 3% 4 ML: 3 NEBU SOLN at 03:00

## 2022-09-28 RX ADMIN — SODIUM CHLORIDE, PRESERVATIVE FREE 10 ML: 5 INJECTION INTRAVENOUS at 13:39

## 2022-09-28 RX ADMIN — SODIUM CHLORIDE, PRESERVATIVE FREE 10 ML: 5 INJECTION INTRAVENOUS at 22:45

## 2022-09-28 NOTE — H&P
History & Physical    Patient: Dinesh Freeman MRN: [id number]  CSN: [id number]    YOB: 1945  Age: 78 y.o. Sex: female      DOA: 9/27/2022  CC: shortness of breath and fall    PCP: SLAVA Jang       HPI:     Dinesh Freeman is a 78 y.o. female with medical end stage emphysema on home oxygen (4L NC) who was brought to the ED after a fall and was also short of breath. She was without her home oxygen for an unknown period of time. Per family (sister and daughter in-law) they are not sure why pt was on hospice. Please reevaluate goals of care. Pt is AOx3 and stated that she wants all medical treatments. Pt has not seen a PCP or her pulmonologist for 2 years. Hospice has been prescribing her medications. Review of Systems  GENERAL: +Fever for 2 days last week. No chill, No malaise   HEENT: No change in vision, no ear ache, tinnitus, no sore throat or sinus congestion. NECK: No pain or stiffness. PULMONARY: +cough, congestion, SOB  Cardiovascular: no pnd / orthopnea, no Chest Pain  GASTROINTESTINAL: No abd pain, No nausea/vomiting, No diarrhea, No melena or bright red blood per rectum. GENITOURINARY: No urinary frequency, No urgency or pain with urination. MUSCULOSKELETAL: +chronic left chest wall pain +restless leg syndrome. DERMATOLOGIC: Easy brusing  ENDOCRINE: No polyuria, polydipsia, NO heat or cold intolerance. No recent change in weight.    NEUROLOGIC: No headache, No seizures, No generalized weakness       Past Medical History:   Diagnosis Date    Anxiety     Arthritis     Asbestosis ([address]     Asthma     Back problem     Baker's cyst     Balance problems     Chronic lung disease     Cigarette smoker     Clotting disorder (HCC)     COPD (chronic obstructive pulmonary disease) ([facility])     oxygen 2/liters asbestosis    Depression     History of DVT (deep vein thrombosis)     Leg pain     Right    Lung disease     Nausea & vomiting     Osteoporosis     Restless leg syndrome     Spinal stenosis     Thromboembolus ([facility] [address]     Vitamin D deficiency        Past Surgical History:   Procedure Laterality Date    COLONOSCOPY N/A 2018    COLONOSCOPY w bx w polypectonies performed by Ez Medellin MD at [facility] ENDOSCOPY    COLONOSCOPY N/A 2018    COLONOSCOPY performed by Adis Morales MD at [facility] MAIN OR    COLONOSCOPY N/A 2019    COLONOSCOPY with polypectomies and biopsies performed by Katie Welch MD at [facility] ENDOSCOPY    HX APPENDECTOMY      HX BACK SURGERY      x4    HX BREAST BIOPSY      left    HX GI      exp lap and ileostomy    HX HEENT      cataract right    HX HYSTERECTOMY      HX LUMBAR LAMINECTOMY      HX MENISCUS REPAIR      HX ORTHOPAEDIC      Knee bakers cyst    HX POLYPECTOMY      right colectomy    HX TONSILLECTOMY      HX VEIN STRIPPING      AL LAP,SURG,COLECTOMY, PARTIAL, W/ANAST N/A 10/23/2018    Dr. Dana Bledsoe N/A 2018    Dr. Yevgeniy Burton      vein stripping       Family History   Problem Relation Age of Onset    Cancer Father     Heart Disease Mother     Hypertension Mother     Cancer Brother     Cancer Sister     Diabetes Other     Hypertension Other     Stroke Other     Heart Disease Sister     Hypertension Sister     Heart Disease Brother        Social History     Socioeconomic History    Marital status:    Tobacco Use    Smoking status: Former     Packs/day: 1.00     Years: 50.00     Pack years: 50.00     Types: Cigarettes     Start date: 10/21/1964     Quit date: 2021     Years since quittin.2    Smokeless tobacco: Never   Vaping Use    Vaping Use: Never used   Substance and Sexual Activity    Alcohol use: Never    Drug use: No       Prior to Admission medications    Medication Sig Start Date End Date Taking?  Authorizing Provider   Ventolin HFA 90 mcg/actuation inhaler inhale 2 puffs by mouth and INTO THE LUNGS every 4 hours if needed for wheezing shortness of breath 11/23/21  Yes Jd Bowling MD   albuterol (Ventolin HFA) 90 mcg/actuation inhaler inhale 2 puffs by mouth and INTO THE LUNGS every 4 hours if needed for wheezing shortness of breath 11/19/21  Yes Madonna Mtz MD   famotidine (PEPCID) 20 mg tablet Take 1 Tablet by mouth daily. 7/19/21  Yes Jordan Pennington MD   midodrine (PROAMATINE) 2.5 mg tablet Take 1 Tablet by mouth two (2) times daily (with meals). 7/19/21  Yes Jordan Pennington MD   tiotropium bromide (Spiriva Respimat) 2.5 mcg/actuation inhaler Take 2 Puffs by inhalation daily. 7/29/20  Yes Jd Bowling MD   albuterol (PROVENTIL VENTOLIN) 2.5 mg /3 mL (0.083 %) nebulizer solution 3 mL by Nebulization route every four (4) hours as needed for Wheezing or Shortness of Breath. ICD: J44.9, R09.02 file under Medicare Part B 4/16/19  Yes Baron RENNER NP   fluticasone propion-salmeterol (ADVAIR DISKUS) 250-50 mcg/dose diskus inhaler Take 1 Puff by inhalation two (2) times a day. BRAND NAME ONLY 4/11/19  Yes Jd Bowling MD   [facility]) 80 mg chewable tablet Take 80 mg by mouth every six (6) hours as needed for Flatulence. Yes Provider, Historical   therapeutic multivitamin (THERAGRAN) tablet Take 1 Tab by mouth daily. 9/24/18  Yes Sven Ortiz MD   acetaminophen (TYLENOL) 500 mg tablet Take 500 mg by mouth every six (6) hours as needed for Pain. Yes Provider, Historical   LORazepam (ATIVAN) 1 mg tablet Take  by mouth two (2) times daily as needed for Anxiety. Yes Provider, Historical   rOPINIRole (REQUIP) 1 mg tablet Take 1 mg by mouth nightly. Yes Provider, Historical   OXYGEN-AIR DELIVERY SYSTEMS 2 L by Does Not Apply route. O2 via nasal cannula with bedtime and activity.  O2 Company: "[employer]"   Yes Provider, Historical   predniSONE (DELTASONE) 10 mg tablet take 1 tablet by mouth daily with breakfast 8/23/21   Jd Bowling MD   OTHER Incentive spirometry-use as directed 7/19/21   Jordan Pennington MD OTHER Incentive spirometry-use as directed 7/19/21   Prashant Gómez MD   OTHER Check a CBC, CMP, magnesium in 4 days. Results to PCP immediately. Diagnosis-COPD 7/19/21   Prashant Gómez MD       Allergies   Allergen Reactions    Latex, Natural Rubber Itching    Aspirin Other (comments)     GI upset and bleeding  Other reaction(s): gi distress  GI upset and bleeding    Augmentin [Amoxicillin-Pot Clavulanate] Not Reported This Time     Possible cramping    Doxycycline Nausea and Vomiting     Other reaction(s): gi distress    Ibuprofen Nausea and Vomiting     Nausea & \"blood show in my urine\"    Morphine Other (comments)     Neurologic symptoms - severe agitation    Other reaction(s): psychological reaction  Neurologic symptoms - severe agitation    Shellfish Derived Itching     Pt able to eat small amounts per report     Sulfa (Sulfonamide Antibiotics) Rash     Patient relates no longer allergic    Umeclidinium-Vilanterol Rash, Other (comments) and Itching     Muscle cramps in arms  Muscle cramps in arms    Fluticasone Propion-Salmeterol Other (comments)     Mouth Sores  Other reaction(s): other/intolerance  Mouth Sores  \"generic Advair caused the sores. I had to go back to the name brand\"              Physical Exam:      Visit Vitals  /73   Pulse (!) 101   Temp 98.4 °F (36.9 °C)   Resp 27   Ht 5' 5\" (1.651 m)   Wt 54.9 kg (121 lb 0.5 oz)   SpO2 99%   BMI 20.14 kg/m²       Physical Exam:  Tele: Sinus tachy  General:  Alert, cooperative, no distress   Head: Normocephalic, without obvious abnormality, atraumatic. Eyes:  Conjunctivae/corneas clear. PERRL, EOMs intact. Neck: Supple, symmetrical, trachea midline, no adenopathy, thyroid: no enlargement, no carotid bruit and no JVD. Lungs:   Diffuse coarse crackles   Heart:  Tachy, regular rhythm, S1, S2 normal, no murmur   Abdomen: Soft, non-tender. Bowel sounds normal.    Extremities: Extremities normal, atraumatic, no cyanosis or edema.    Pulses: 2+ and symmetric all extremities. Skin: Scattered bruises   Neurologic:  AAOx3, No focal motor or sensory deficit   Psych:  Normal affect, insight and judgement      Lab/Data Review:  Labs: Results:       Chemistry Recent Labs     09/27/22  1710   *      K 3.5   CL 94*   CO2 38*   BUN 20*   CREA 0.46*   CA 9.2   AGAP 6   BUCR 43*      TP 6.1*   ALB 3.0*   GLOB 3.1   AGRAT 1.0      CBC w/Diff Recent Labs     09/27/22  1710   WBC 9.6   RBC 3.64*   HGB 11.3*   HCT 36.4      GRANS 78*   LYMPH 8*   EOS 0      Coagulation No results for input(s): PTP, INR, APTT, INREXT in the last 72 hours. Iron/Ferritin No results for input(s): IRON in the last 72 hours. No lab exists for component: TIBCCALC   BNP No results for input(s): BNPP in the last 72 hours. Cardiac Enzymes No results for input(s): CPK, CKND1, BERNY in the last 72 hours. No lab exists for component: CKRMB, TROIP   Liver Enzymes Recent Labs     09/27/22  1710   TP 6.1*   ALB 3.0*         Thyroid Studies Lab Results   Component Value Date/Time    T4, Total 10.3 03/04/2010 01:00 AM    TSH 0.94 06/15/2020 01:15 PM          All Micro Results       Procedure Component Value Units Date/Time    COVID-19 WITH INFLUENZA A/B [[id number]] Collected: 09/27/22 1735    Order Status: Completed Specimen: Nasopharyngeal Updated: 09/27/22 1804     SARS-CoV-2 by PCR Not detected        Comment: Not Detected results do not preclude SARS-CoV-2 infection and should not be used as the sole basis for patient management decisions. Results must be combined with clinical observations, patient history, and epidemiological information. Influenza A by PCR Not detected        Influenza B by PCR Not detected        Comment: Testing was performed using anyi Kaylen SARS-CoV-2 and Influenza A/B nucleic acid assay.   This test is a multiplex Real-Time Reverse Transcriptase Polymerase Chain Reaction (RT-PCR) based in [employer] diagnostic test intended for the qualitative detection of nucleic acids from SARS-CoV-2, Influenza A, and Influenza B in nasopharyngeal for use under the FDA's Emergency Use Authorization (EAU) only. Fact sheet for Patients: FindDrives.[email or web address]  Fact sheet for Healthcare Providers: Briseidaives.[email or web address]                 Imaging Reviewed:  CT Results  (Last 48 hours)                 09/27/22 1846  CT HEAD WO CONT Final result    Impression:      1. No CT evidence for an acute intracranial process. No significant change. Narrative:  CT Head without contrast       HISTORY: Head trauma. COMPARISON: 1/2/2020       TECHNIQUE:  Axial imaging from the skull base to the vertex was performed   without intravenous contrast.  Coronal and sagittal reformations. All CT scans   are performed using dose optimization techniques as appropriate to the performed   exam including the following: Automated exposure control, adjustment of mA   and/or kV according to patient size, and use of iterative reconstructive   technique. FINDINGS:        No intracranial hemorrhage. No evidence of midline shift, mass effect, or   obvious mass lesion. There is preservation of the gray and white matter   differentiation, no acute infarct (Please note that CT is less sensitive in the   detection of early infarct). Stable ectopic cerebellar tonsils       The size of the ventricles and sulci are normal.  No extra axial fluid   collections. Visualized paranasal sinuses are clear. No evidence for skull fracture. 09/27/22 1846  CTA CHEST W OR W WO CONT Final result    Impression:      1. No finding for acute pulmonary embolism. .   2. Moderately severe emphysema. 3. Probable chronic bronchitis with lower lobe bronchial secretions. 4. Peripheral focal opacities in the right lung base which are likely   inflammatory. A follow-up CT scan in 6-8 weeks to assess for resolution   suggested. 5. Likely reactive right hilar mild adenopathy, stable. Narrative:  CT Chest Pulmonary Embolism Protocol        CPT CODE: 79190       INDICATION: Chest pain. Question pulmonary embolism. TECHNIQUE: Thin collimation axial images obtained through the level of the   pulmonary arteries with additional imaging through the chest following the   uneventful administration of nonionic intravenous contrast.  Images   reconstructed into coronal and sagittal maximum intensity projections for better   evaluation of the tortuous and overlapping pulmonary vascular structures and to   reduce patient radiation dose. The patient received . All CT scans are performed using dose optimization techniques as appropriate to   the performed exam including the following: Automated exposure control,   adjustment of mA and/or kV according to patient size, and use of iterative   reconstructive technique. COMPARISON: .       FINDINGS:       This is a technically adequate study. No filling defects are appreciated within the main, left, right, lobar or   visualized segmental pulmonary arteries to suggest embolism. The thoracic aorta is not aneurysmal.  No evidence for dissection. Heart size   is within normal limits. No pericardial effusion. No enlarged axillary or mediastinal lymphadenopathy. Mildly enlarged right   hilar nodes stable. Moderately severe emphysema. Probably some secretions in the bronchus   intermedius. Some secretions in the right lower lobe bronchi. There are a few   peripheral focal opacities anterolateral right lung base image 56 and posterior   right lower lobe image 39. Rhenda Erendira No effusions. No pneumothorax. Visualized upper abdominal structures are without gross abnormality. Osseous structures are intact.                   XR Results (most recent):  Results from Hospital Encounter encounter on 09/27/22    XR CHEST PORT    Narrative  Portable Chest    CPT CODE: 68799    HISTORY: Cough, shortness of breath. FINDINGS:    Correlated with same day CT scan and chest radiograph 7/15/2021. Overlying breast attenuation at clothing artifact. Heart size and mediastinal  contour stable. Slight increase of reticular lung markings. Slight increase  patchy density at the right lateral base. No pneumothorax. Minimal blunted  costophrenic angles. .    Impression  Slight increase of diffuse reticular lung markings and more focal opacities at  the right lung. . May represent acute on chronic lung disease. Correlate for  infection. Assessment:   Active Problems:    Acute exacerbation of chronic obstructive pulmonary disease (COPD) ([facility] [address] (8/14/2018)      Emphysema lung ([facility] [address] (9/27/2022) on home oxygen        Plan:   CT Head negative for acute findings. No PE on CTA chest    No definitive consolidation on imaging, doubt acute infection. RVP negative. Her respiratory status is likely due to lack of oxygen which she admitted to not using at times in the house.   -holding antibiotic unless new sx develops   -continue home Prednisone and inhalers   -pulmonary toileting, trial of hypertonic saline for thickened mucous   -consider pulmonary consult to re-establish care     Family encouraged to contact Hospice point of care since patient wants full treatment moving forward.  Hospice consult placed     PT/OT evaluation    Risk of deterioration:  [x]Low    []Moderate  []High     Prophylaxis:  [x]Lovenox  []Coumadin  []Hep SQ  []SCDs  []H2B/PPI     Disposition:  []Home w/ Family   [x] PT,OT,RN   []SNF/LTC   []SAH/Rehab     Discussed Code Status:         [x]Full Code      []DNR           Point of contact: Kingston Dean, [phone]  ___________________________________________________     Care Plan discussed with:    [x]Patient   [x]Family    []ED Care Manager  [x]ED Doc   []Specialist :  Total Time Coordinating Admission:    30  minutes    []Total Critical Care Time: Donell Desai,   9/28/2022, 2:41 AM

## 2022-09-28 NOTE — PROGRESS NOTES
conducted an initial consultation and Spiritual Assessment for Ellyn Staley, who is a 78 y.o.,female. Patients Primary Language is: [place].    According to the patients EMR Evangelical Affiliation is: Non Hinduism.     The reason the Patient came to the hospital is:   Patient Active Problem List    Diagnosis Date Noted    Emphysema lung ([address] 09/27/2022    Community acquired pneumonia 07/16/2021    Acute bronchitis with chronic obstructive pulmonary disease (COPD) ([address] 07/16/2021    Respiratory failure ([address] 07/16/2021    Severe protein-calorie malnutrition ([address] 07/16/2021    ERRONEOUS ENCOUNTER--DISREGARD 08/17/2020    Chronic diastolic congestive heart failure ([address] 07/16/2020    Incisional hernia 02/17/2020    History of colon cancer 07/31/2019    Ileostomy present ([address] 07/31/2019    Dizziness 04/11/2019    Non-rheumatic mitral regurgitation 04/11/2019    Pulmonary HTN ([address] 04/11/2019    Chest pain 04/11/2019    Hypotension 02/28/2019    COPD exacerbation ([address] 02/28/2019    Post-op pain 10/28/2018    Obstruction of bowel ([address] 10/28/2018    Ileus following gastrointestinal surgery ([address] 10/28/2018    Colon cancer without distant metastasis ([address] 09/22/2018    Colon polyps 09/22/2018    Pneumonia 09/20/2018    Abdominal pain 09/20/2018    Abdominal mass 09/20/2018    Acute exacerbation of chronic obstructive pulmonary disease (COPD) ([address] 08/14/2018    Degenerative disc disease, lumbar 08/14/2018    Left-sided low back pain with sciatica 08/14/2018    COPD with exacerbation ([address] 08/14/2018    Muscle spasm of back 09/12/2016    Sacroiliac joint pain 09/12/2016    Current chronic use of systemic steroids 09/12/2016    Baker's cyst of knee 09/12/2016    COPD with acute exacerbation ([address] 12/15/2015    Neuritis of lower extremity 11/18/2015    Lumbar spinal stenosis 11/13/2015    Facet arthritis of lumbar region 11/13/2015    Emphysema of lung ([facility] [address] 04/15/2015    Hemoptysis 02/05/2014    Nicotine addiction 05/16/2013    COPD, severe ([facility] [address]         The  provided the following Interventions:  Initiated a relationship of care and support. Explored issues of janelle, belief, spirituality and Evangelical/ritual needs while hospitalized. Listened empathically. Provided information about Spiritual Care Services. Offered prayer and assurance of continued prayers on patient's behalf. Chart reviewed. The following outcomes where achieved:  Patient said she has a Advance Medical Directive at home in which Giovana Boggs (daughter-in- law) is the primary decision maker. Asked for them to bring in a copy.  confirmed Patient's Yazdanism Affiliation. Patient processed feeling about current hospitalization. Patient expressed gratitude for 's visit. Assessment:  Patient does not have any Evangelical/cultural needs that will affect patients preferences in health care. Patient is having a difficult time in which she stated she wants to go home (heaven). Plan:  Chaplains will continue to follow and will provide pastoral care on an as needed/requested basis.  recommends bedside caregivers page  on duty if patient shows signs of acute spiritual or emotional distress.     [address]  ([phone])

## 2022-09-28 NOTE — ED NOTES
Report called to floor. Pt void x 1 using bedside. Dr. Vaishali Patricia putting in for safety sitter for patient. Awaiting transport to floor.

## 2022-09-28 NOTE — PROGRESS NOTES
Problem: Mobility Impaired (Adult and Pediatric)  Goal: *Acute Goals and Plan of Care (Insert Text)  Description: Physical Therapy Goals  Initiated 9/28/2022 and to be accomplished within 7 day(s)  1. Patient will move from supine to sit and sit to supine  in bed with modified independence. 2.  Patient will transfer from bed to chair and chair to bed with modified independence using the least restrictive device. 3.  Patient will perform sit to stand with modified independence. 4.  Patient will ambulate with modified independence for 100 feet with the least restrictive device. 5.  Patient will ascend/descend 2 stairs with handrail(s) with contact guard assist.    PLOF: Patient was independent no AD PTA. She lives with family in single story home. Outcome: Progressing Towards Goal    PHYSICAL THERAPY EVALUATION    Patient: Lawton Lesches (78 y.o. female)  Date: 9/28/2022  Primary Diagnosis: Acute exacerbation of chronic obstructive pulmonary disease (COPD) ([facility] [address] [J44.1]  Emphysema lung ([facility] [address] [J43.9]       Precautions:  Fall      ASSESSMENT :  Based on the objective data described below, the patient presents with decreased strength, decreased balance, and decreased activity tolerance resulting in decreased independence with mobility. Pt resting semi reclined in bed upon arrival. Supportive family at bedside. Pt is restless, constantly moving in bed. SBA supine to sit transfer. Nurse entered the room to give Ativan. Pt declined OOB mobility. She returns to supine with CGA. Bed alarm activated and call bell within reach. Patient will benefit from skilled intervention to address the above impairments.   Patient's rehabilitation potential is considered to be Good  Factors which may influence rehabilitation potential include:   []         None noted  []         Mental ability/status  [x]         Medical condition  []         Home/family situation and support systems  [x]         Safety awareness  [] Pain tolerance/management  []         Other:      PLAN :  Recommendations and Planned Interventions:   [x]           Bed Mobility Training             [x]    Neuromuscular Re-Education  [x]           Transfer Training                   []    Orthotic/Prosthetic Training  [x]           Gait Training                          []    Modalities  [x]           Therapeutic Exercises           []    Edema Management/Control  [x]           Therapeutic Activities            [x]    Family Training/Education  [x]           Patient Education  []           Other (comment):    Frequency/Duration: Patient will be followed by physical therapy 1-2 times per day/4-7 days per week to address goals. Further Equipment Recommendations for Discharge: rolling walker, wheelchair     AMPAC:   Based on an AM-PAC score of 15/20 if omitting stairs) and their current functional mobility deficits, it is recommended that the patient have 2-3 sessions per week of Physical Therapy at d/c to increase the patient's independence. This AMPAC score should be considered in conjunction with interdisciplinary team recommendations to determine the most appropriate discharge setting. Patient's social support, diagnosis, medical stability, and prior level of function should also be taken into consideration. SUBJECTIVE:   Patient stated I can't sit still .     OBJECTIVE DATA SUMMARY:     Past Medical History:   Diagnosis Date    Anxiety     Arthritis     Asbestosis ([facility] [address]     Asthma     Back problem     Baker's cyst     Balance problems     Chronic lung disease     Cigarette smoker     Clotting disorder (HCC)     COPD (chronic obstructive pulmonary disease) ([facility])     oxygen 2/liters asbestosis    Depression     History of DVT (deep vein thrombosis)     Leg pain     Right    Lung disease     Nausea & vomiting     Osteoporosis     Restless leg syndrome     Spinal stenosis     Thromboembolus (HCC)     Vitamin D deficiency      Past Surgical History: Procedure Laterality Date    COLONOSCOPY N/A 9/22/2018    COLONOSCOPY w bx w polypectonies performed by Wilda Naik MD at [address] N/A 11/6/2018    COLONOSCOPY performed by Luna Ruelas MD at [address]    COLONOSCOPY N/A 8/2/2019    COLONOSCOPY with polypectomies and biopsies performed by Durga Becerra MD at [facility]    HX APPENDECTOMY      HX BACK SURGERY      x4    HX BREAST BIOPSY      left    HX GI      exp lap and ileostomy    HX HEENT      cataract right    HX HYSTERECTOMY      HX LUMBAR LAMINECTOMY      HX MENISCUS REPAIR      HX ORTHOPAEDIC      Knee bakers cyst    HX POLYPECTOMY      right colectomy    HX TONSILLECTOMY      HX VEIN STRIPPING      [place] LAP,SURG,COLECTOMY, PARTIAL, W/ANAST N/A 10/23/2018    Dr. Richard Andrea N/A 11/06/2018    Dr. Walter Thomas      vein stripping     Barriers to Learning/Limitations: yes; Compensate with: Visual Cues, Verbal Cues, and Tactile Cues  Home Situation:  Home Situation  Home Environment: Private residence  One/Two Story Residence: One story  Living Alone: No  Support Systems: Other Family Member(s)  Patient Expects to be Discharged to[de-identified] Home  Current DME Used/Available at Home: None  Critical Behavior:              Psychosocial  Patient Behaviors: Anxious; Restless  Purposeful Interaction: Yes  Pt Identified Daily Priority: Clinical issues (comment); Communication issues (comment); Family issues (comment)  Caritas Process: Nurture loving kindness;Enable janelle/hope;Establish trust;Teaching/learning;Create healing environment;Supportive expression  Caring Interventions: Reassure; Therapeutic modalities  Reassure: Therapeutic listening;Prayer;Caring rounds  Therapeutic Modalities: Deep breathing; Intentional therapeutic touch                 Strength:    Strength: Generally decreased, functional                    Tone & Sensation:   Tone: Normal Sensation: Intact               Range Of Motion:  AROM: Within functional limits                      Functional Mobility:  Bed Mobility:     Supine to Sit: Stand-by assistance  Sit to Supine: Contact guard assistance  Scooting: Stand-by assistance       Balance:   Sitting: Impaired; With support           Pain:  Pain level pre-treatment: 10/10   Pain level post-treatment: 10/10   Pain Intervention(s) : Medication (see MAR); Rest, Ice, Repositioning  Response to intervention: Nurse notified, See doc flow    Activity Tolerance:   Fair  Please refer to the flowsheet for vital signs taken during this treatment. After treatment:   []         Patient left in no apparent distress sitting up in chair  [x]         Patient left in no apparent distress in bed  [x]         Call bell left within reach  [x]         Nursing notified  []         Caregiver present  [x]         Bed alarm activated  []         SCDs applied    COMMUNICATION/EDUCATION:   [x]         Role of Physical Therapy in the acute care setting. [x]         Fall prevention education was provided and the patient/caregiver indicated understanding. [x]         Patient/family have participated as able in goal setting and plan of care. [x]         Patient/family agree to work toward stated goals and plan of care. []         Patient understands intent and goals of therapy, but is neutral about his/her participation. []         Patient is unable to participate in goal setting/plan of care: ongoing with therapy staff.  []         Other:     Thank you for this referral.  Sherlyn Giron, PT   Time Calculation: 13 mins      Eval Complexity: History: MEDIUM  Complexity : 1-2 comorbidities / personal factors will impact the outcome/ POC Exam:MEDIUM Complexity : 3 Standardized tests and measures addressing body structure, function, activity limitation and / or participation in recreation  Presentation: MEDIUM Complexity : Evolving with changing characteristics  Clinical Decision Making:Medium Complexity    Overall Complexity:MEDIUM    [employer] AM-PAC® Basic Mobility Inpatient Short Form (6-Clicks) Version 2    How much HELP from another person does the patient currently need    (If the patient hasn't done an activity recently, how much help from another person do you think he/she would need if he/she tried?)   Total (Total A or Dep)   A Lot  (Mod to Max A)   A Little (Sup or Min A)   None (Mod I to I)   Turning from your back to your side while in a flat bed without using bedrails? [] 1 [] 2 [x] 3 [] 4   2. Moving from lying on your back to sitting on the side of a flat bed without using bedrails? [] 1 [] 2 [x] 3 [] 4   3. Moving to and from a bed to a chair (including a wheelchair)? [] 1 [] 2 [x] 3 [] 4   4. Standing up from a chair using your arms (e.g., wheelchair, or bedside chair)? [] 1 [] 2 [x] 3 [] 4   5. Walking in hospital room? [] 1 [] 2 [x] 3 [] 4   6. Climbing 3-5 steps with a railing?+   [] 1 [] 2 [] 3 [] 4   +If stair climbing cannot be assessed, skip item #6. Sum responses from items 1-5.

## 2022-09-28 NOTE — PROGRESS NOTES
Substitution Information per the P&T Committee approved Therapeutic Interchanges Policy    Nonformulary Medication Formulary Interchange   budesonide-formoterol (SYMBICORT) 160-4.5 mcg/actuation inhaler  arformoterol (BROVANA) 15 mcg/2 mL nebulizer    +  budesonide (PULMICORT RESPULES)  0.5 mg/2 mL nebulizer

## 2022-09-28 NOTE — CONSULTS
[facility]: [phone] ([phone])  [facility]: [phone]     Patient Name: Pam Tirado  YOB: 1945    Date of Initial Consult : 9/28/2022  Reason for Consult: Care decisions  Requesting Provider: Dr. Brielle Garner   Primary Care Physician: SLAVA Garcia      SUMMARY:   Pam Tirado is a 78 y.o. female with a past history of COPD, restless leg syndrome, anxiety, colon cancer and had been on hospice services for over a  year, was admitted on 9/27/2022 from home with a diagnosis of acute exacerbation of COPD and emphysema. Current medical issues leading to Palliative Medicine involvement include: 78year old, debilitated female who has been on  hospice services for over a year presented to the ED after suffering a fall at home as well as increasing shortness of breath. Patient now wishes to pursue medical treatment. Family is unclear why patient was on hospice services. Palliative medicine is consulted to re-clarify goals of care care. CHIEF COMPLAINT: fall, shortness of breath    HPI/SUBJECTIVE:    Past history as above. Ms. Marcos Joseph is a 78year old female with longstanding COPD and emphysema who has been on hospice services for over one year and presented to the ED after a fall at home and concerns of shortness of breath. Patient is on home oxygen at 4-5 liters but had been without her home oxygen for an unknown period of time after the fall. Family is unclear why patient was on hospice services. Patient wishes to pursue medical treatment at this time and is admitted for further medical management.     The patient is:   [x] Verbal and participatory  [] Non-participatory due to:     GOALS OF CARE:  DNR/DNI, limited interventions, no feeding tube  Patient/Health Care Proxy Stated Goals: Prolong life      TREATMENT PREFERENCES:   Code Status: DNR/DNI         PALLIATIVE DIAGNOSES:   Encounter for palliative care/goals of care  Acute exacerbation of COPD  Emphysema  Debility       PLAN:   Encounter for palliative care/goals of care - seen at bedside along with Ms. Jake Hurst, RN. Ms. Keena Pena is reclined in bed, appears dyspneic and a bit agitated at time of visit. Nasal oxygen at 4 liters in place. Daughter, Mirian Osuna present in room as well as a sitter at bedside. Patient is awake, alert and oriented x4. Able to speak in short sentences. Introduced the role of palliative medicine. Daughter shared that patient has been on hospice services for over a year but that they did not really understand about hospice services. Patient has expressed wanting medical treatment and hospitalization. Daughter shared patient has been taking Dilaudid and Ativan at home prior to admission and had been taking it about every 3-4 hours more recently. There is an AMD on file; however, patient shared that she is in the process of updating her POA paperwork with an . Offered to completed an AMD with patient today to reflect her updated wishes for healthcare decision maker and patient is agreeable. AMD completed today naming patient's daughter-in-law Justino Brooke as primary MPOA and her son, Maeve Lyman as secondary MPOA and her other son Isaias Santana as a supplemental decision maker. Copy of AMD placed on chart for scanning. There is a DDNR on file that was completed in 2018. Reviewed this document with patient who re-affirms her desire for DNR and no intubation for any reason. Patient elects DNR/DNI. Introduced and explained POST form to patient who is agreeable to completing today for DNR/DNI, limited interventions and no feeding tube. Copy placed on chart for scanning. Original and copies of AMD and POST forms given to patient's daughter to take home. Attending and bedside RN updated. Goals of care are DNR/DNI, limited interventions, no feeding tube.   Acute exacerbation of COPD - primary team managing, pulmonary consult, nebulizers, steroids, nasal oxygen  Emphysema - on home oxygen at 4-5 liters, pulmonary consult, had been on hospice services for over a year - now wishes for   Debility - PPS 40 which indicates an overall poor prognosis, lives with her son, daughter-in-law and daughter who serves as caregiver for patient, requires assistance with ADLs including ambulation, on home oxygen at 4-5 liters  Initial consult note routed to primary continuity provider  Communicated plan of care with: Palliative IDT      Advance Care Planning:  [] The [facility] Interdisciplinary Team has updated the ACP Navigator with [address] and Patient Capacity    Primary Decision [facility] ([address]):   Primary Decision Maker: Quiroga Tammyjamaica - Daughter-in-Law    Secondary Decision Maker: Katie Fernandes - Son - [phone]    Secondary Decision Maker: Ashtyn Jarquin - Other Relative - [phone]    Medical Interventions: Limited additional interventions   Artificially Administered Nutrition: No feeding tube     As far as possible, the palliative care team has discussed with patient / health care proxy about goals of care / treatment preferences for patient.          HISTORY:     History obtained from: chart and patient    Active Problems:    Acute exacerbation of chronic obstructive pulmonary disease (COPD) ([facility] [address] (8/14/2018)      Emphysema lung ([facility] [address] (9/27/2022)    Past Medical History:   Diagnosis Date    Anxiety     Arthritis     Asbestosis ([facility] [address]     Asthma     Back problem     Baker's cyst     Balance problems     Chronic lung disease     Cigarette smoker     Clotting disorder (HCC)     COPD (chronic obstructive pulmonary disease) (HCC)     oxygen 2/liters asbestosis    Depression     History of DVT (deep vein thrombosis)     Leg pain     Right    Lung disease     Nausea & vomiting     Osteoporosis     Restless leg syndrome     Spinal stenosis     Thromboembolus (HCC)     Vitamin D deficiency       Past Surgical History:   Procedure Laterality Date    COLONOSCOPY N/A 2018    COLONOSCOPY w bx w polypectonies performed by Aramis Royal MD at [facility]    COLONOSCOPY N/A 2018    COLONOSCOPY performed by Shakir Baez MD at [facility] MA[facility] OR    COLONOSCOPY N/A 2019    COLONOSCOPY with polypectomies and biopsies performed by Dinorah Bonilla MD at [facility]    HX APPENDECTOMY      HX BACK SURGERY      x4    HX BREAST BIOPSY      left    HX GI      exp lap and ileostomy    HX HEENT      cataract right    HX HYSTERECTOMY      HX LUMBAR LAMINECTOMY      HX MENISCUS REPAIR      HX ORTHOPAEDIC      Knee bakers cyst    HX POLYPECTOMY      right colectomy    HX TONSILLECTOMY      HX VEIN STRIPPING      [place] LAP,SURG,COLECTOMY, PARTIAL, W/ANAST N/A 10/23/2018    Dr. Giancarlo Harkins 2018    Dr. Neha Carpenter      vein stripping      Family History   Problem Relation Age of Onset    Cancer Father     Heart Disease Mother     Hypertension Mother     Cancer Brother     Cancer Sister     Diabetes Other     Hypertension Other     Stroke Other     Heart Disease Sister     Hypertension Sister     Heart Disease Brother      History reviewed, no pertinent family history.   Social History     Tobacco Use    Smoking status: Former     Packs/day: 1.00     Years: 50.00     Pack years: 50.00     Types: Cigarettes     Start date: 10/21/1964     Quit date: 2021     Years since quittin.2    Smokeless tobacco: Never   Substance Use Topics    Alcohol use: Never     Allergies   Allergen Reactions    Latex, Natural Rubber Itching    Aspirin Other (comments)     GI upset and bleeding  Other reaction(s): gi distress  GI upset and bleeding    Augmentin [Amoxicillin-Pot Clavulanate] Not Reported This Time     Possible cramping    Doxycycline Nausea and Vomiting     Other reaction(s): gi distress    Ibuprofen Nausea and Vomiting     Nausea & \"blood show in my urine\"    Morphine Other (comments)     Neurologic symptoms - severe agitation    Other reaction(s): psychological reaction  Neurologic symptoms - severe agitation    Shellfish Derived Itching     Pt able to eat small amounts per report     Sulfa (Sulfonamide Antibiotics) Rash     Patient relates no longer allergic    Umeclidinium-Vilanterol Rash, Other (comments) and Itching     Muscle cramps in arms  Muscle cramps in arms    Fluticasone Propion-Salmeterol Other (comments)     Mouth Sores  Other reaction(s): other/intolerance  Mouth Sores  \"generic Advair caused the sores.   I had to go back to the name brand\"      Current Facility-Administered Medications   Medication Dose Route Frequency    acetaminophen (TYLENOL) tablet 500 mg  500 mg Oral Q6H PRN    albuterol (PROVENTIL VENTOLIN) nebulizer solution 2.5 mg  2.5 mg Nebulization Q4H PRN    famotidine (PEPCID) tablet 20 mg  20 mg Oral DAILY    LORazepam (ATIVAN) tablet 0.5 mg  0.5 mg Oral BID PRN    midodrine (PROAMATINE) tablet 2.5 mg  2.5 mg Oral BID WITH MEALS    predniSONE (DELTASONE) tablet 10 mg  10 mg Oral DAILY WITH BREAKFAST    rOPINIRole (REQUIP) tablet 1 mg  1 mg Oral QHS    simethicone (MYLICON) tablet 80 mg  80 mg Oral Q6H PRN    therapeutic multivitamin (THERAGRAN) tablet 1 Tablet  1 Tablet Oral DAILY    ipratropium (ATROVENT) 0.02 % nebulizer solution 0.5 mg  500 mcg Nebulization Q8H RT    sodium chloride (NS) flush 5-40 mL  5-40 mL IntraVENous Q8H    sodium chloride (NS) flush 5-40 mL  5-40 mL IntraVENous PRN    polyethylene glycol (MIRALAX) packet 17 g  17 g Oral DAILY PRN    ondansetron (ZOFRAN ODT) tablet 4 mg  4 mg Oral Q8H PRN    Or    ondansetron (ZOFRAN) injection 4 mg  4 mg IntraVENous Q6H PRN    enoxaparin (LOVENOX) injection 40 mg  40 mg SubCUTAneous DAILY    lactated Ringers infusion  75 mL/hr IntraVENous CONTINUOUS    arformoterol 15 mcg/budesonide 0.5 mg neb solution   Nebulization BID RT    LORazepam (ATIVAN) tablet 1 mg  1 mg Oral BID PRN HYDROcodone-acetaminophen (NORCO) 5-325 mg per tablet 1-2 Tablet  1-2 Tablet Oral Q4H PRN    lidocaine 4 % patch 1 Patch  1 Patch TransDERmal Q24H          Clinical Pain Assessment (nonverbal scale for nonverbal patients): Clinical Pain Assessment  Severity: 2          Duration: for how long has pt been experiencing pain (e.g., 2 days, 1 month, years)  Frequency: how often pain is an issue (e.g., several times per day, once every few days, constant)     FUNCTIONAL ASSESSMENT:     Palliative Performance Scale (PPS):  PPS: 40    ECOG  ECOG Status : Limited self-care     PSYCHOSOCIAL/SPIRITUAL SCREENING:      Any spiritual / Voodoo concerns:  [] Yes /  [x] No    Caregiver Burnout:  [] Yes /  [x] No /  [] No Caregiver Present      Anticipatory grief assessment:   [x] Normal  / [] Maladaptive        REVIEW OF SYSTEMS:     Systems: constitutional, ears/nose/mouth/throat, respiratory, gastrointestinal, genitourinary, musculoskeletal, integumentary, neurologic, psychiatric, endocrine. Positive findings noted below. Modified ESAS Completed by: provider           Pain: 2           Dyspnea: 4               Positive and pertinent negative findings in ROS are noted above in HPI. The following systems were [x] reviewed / [] unable to be reviewed as noted in HPI  Other findings are noted below.      PHYSICAL EXAM:     Constitutional: reclined in bed, awake, alert and oriented x4  Eyes: pupils equal, anicteric  ENMT: no nasal discharge, dry mucous membranes  Cardiovascular: regular rhythm, distal pulses intact  Respiratory: breathing moderately labored, symmetric, + dyspneic, + accessory muscle use, coarse breath sounds, + rhonchi bilaterally, on nasal oxygen at 4 liters  Gastrointestinal: soft non-tender  Last bowel movement: none recorded  Musculoskeletal: no deformity, no tenderness to palpation  Skin: warm, dry, multiple bruises/ecchymotic areas on lower legs/feet  Neurologic: following commands, moving all extremities  Psychiatric: full affect, no hallucinations    Other: Wt Readings from Last 3 Encounters:   09/27/22 54.9 kg (121 lb 0.5 oz)   07/29/21 54.9 kg (121 lb)   07/18/21 55 kg (121 lb 4.1 oz)     Blood pressure (!) 153/71, pulse (!) 103, temperature 97.7 °F (36.5 °C), resp. rate 20, height 5' 5\" (1.651 m), weight 54.9 kg (121 lb 0.5 oz), SpO2 96 %. Pain:  Pain Scale 1: Numeric (0 - 10)  Pain Intensity 1: 5     Pain Location 1: Leg  Pain Orientation 1: Inner  Pain Description 1: Aching  Pain Intervention(s) 1: Medication (see MAR)       LAB AND IMAGING FINDINGS:     Lab Results   Component Value Date/Time    WBC 9.6 09/27/2022 05:10 PM    HGB 11.3 (L) 09/27/2022 05:10 PM    PLATELET 667 76/67/3996 05:10 PM     Lab Results   Component Value Date/Time    Sodium 138 09/27/2022 05:10 PM    Potassium 3.5 09/27/2022 05:10 PM    Chloride 94 (L) 09/27/2022 05:10 PM    CO2 38 (H) 09/27/2022 05:10 PM    BUN 20 (H) 09/27/2022 05:10 PM    Creatinine 0.46 (L) 09/27/2022 05:10 PM    Calcium 9.2 09/27/2022 05:10 PM    Magnesium 2.5 07/17/2021 05:38 AM    Phosphorus 2.8 03/03/2019 05:35 AM      Lab Results   Component Value Date/Time    Alk.  phosphatase 109 09/27/2022 05:10 PM    Protein, total 6.1 (L) 09/27/2022 05:10 PM    Albumin 3.0 (L) 09/27/2022 05:10 PM    Globulin 3.1 09/27/2022 05:10 PM     Lab Results   Component Value Date/Time    INR 1.0 11/06/2018 04:00 AM    Prothrombin time 13.1 11/06/2018 04:00 AM    aPTT 35.5 03/01/2019 01:49 AM      Lab Results   Component Value Date/Time    Iron 87 09/23/2018 03:01 AM    TIBC 260 09/23/2018 03:01 AM    Iron % saturation 33 09/23/2018 03:01 AM    Ferritin 118 09/23/2018 03:01 AM      No results found for: PH, PCO2, PO2  No components found for: [place]   Lab Results   Component Value Date/Time     07/15/2021 08:26 PM    CK - MB <1.0 07/15/2021 08:26 PM              Total time: 50 minutes  C  > 50% counseling / coordination:  Time spent in direct consultation with the patient, medical team, and family     Prolonged service was provided for  []30 min   []75 min in face to face time in the presence of the patient, spent as noted above. Time Start:   Time End:     Disclaimer: Sections of this note are dictated using utilizing voice recognition software, which may have resulted in some phonetic based errors in grammar and contents. Even though attempts were made to correct all the mistakes, some may have been missed, and remained in the body of the document. If questions arise, please contact our department.

## 2022-09-28 NOTE — ED NOTES
[facility] EMERGENCY DEPARTMENT TRANSFER OF CARE         Patient care Handed off from Dr Judy Martinez to Dr Lisa Mann @ 08:02 PM     Discussed the patient's complaint, presentation, vitals and differential diagnosis. Discussed the patient's current status, and response to treatments  Reviewed critical lab values, allergies, and other factors. Rounded at the patient's bedside, the patient and family's questions and concerns were addressed. Issues or problems that are still being evaluated are:   1. Goals of care discussion and shared decision making and reassessment  Recent Results (from the past 24 hour(s))   CBC WITH AUTOMATED DIFF    Collection Time: 09/27/22  5:10 PM   Result Value Ref Range    WBC 9.6 4.6 - 13.2 K/uL    RBC 3.64 (L) 4.20 - 5.30 M/uL    HGB 11.3 (L) 12.0 - 16.0 g/dL    HCT 36.4 35.0 - 45.0 %    .0 78.0 - 100.0 FL    MCH 31.0 24.0 - 34.0 PG    MCHC 31.0 31.0 - 37.0 g/dL    RDW 13.3 11.6 - 14.5 %    PLATELET 867 029 - 636 K/uL    MPV 10.1 9.2 - 11.8 FL    NRBC 0.0 0  WBC    ABSOLUTE NRBC 0.00 0.00 - 0.01 K/uL    NEUTROPHILS 78 (H) 40 - 73 %    LYMPHOCYTES 8 (L) 21 - 52 %    MONOCYTES 13 (H) 3 - 10 %    EOSINOPHILS 0 0 - 5 %    BASOPHILS 1 0 - 2 %    IMMATURE GRANULOCYTES 1 (H) 0.0 - 0.5 %    ABS. NEUTROPHILS 7.5 1.8 - 8.0 K/UL    ABS. LYMPHOCYTES 0.7 (L) 0.9 - 3.6 K/UL    ABS. MONOCYTES 1.2 0.05 - 1.2 K/UL    ABS. EOSINOPHILS 0.0 0.0 - 0.4 K/UL    ABS. BASOPHILS 0.1 0.0 - 0.1 K/UL    ABS. IMM.  GRANS. 0.1 (H) 0.00 - 0.04 K/UL    DF AUTOMATED     METABOLIC PANEL, COMPREHENSIVE    Collection Time: 09/27/22  5:10 PM   Result Value Ref Range    Sodium 138 136 - 145 mmol/L    Potassium 3.5 3.5 - 5.5 mmol/L    Chloride 94 (L) 100 - 111 mmol/L    CO2 38 (H) 21 - 32 mmol/L    Anion gap 6 3.0 - 18 mmol/L    Glucose 128 (H) 74 - 99 mg/dL    BUN 20 (H) 7.0 - 18 MG/DL    Creatinine 0.46 (L) 0.6 - 1.3 MG/DL    BUN/Creatinine ratio 43 (H) 12 - 20      GFR est AA >60 >60 ml/min/1.73m2    GFR est non-AA >60 >60 ml/min/1.73m2    Calcium 9.2 8.5 - 10.1 MG/DL    Bilirubin, total 0.7 0.2 - 1.0 MG/DL    ALT (SGPT) 24 13 - 56 U/L    AST (SGOT) 11 10 - 38 U/L    Alk.  phosphatase 109 45 - 117 U/L    Protein, total 6.1 (L) 6.4 - 8.2 g/dL    Albumin 3.0 (L) 3.4 - 5.0 g/dL    Globulin 3.1 2.0 - 4.0 g/dL    A-G Ratio 1.0 0.8 - 1.7     TROPONIN-HIGH SENSITIVITY    Collection Time: 09/27/22  5:10 PM   Result Value Ref Range    Troponin-High Sensitivity 18 0 - 54 ng/L   NT-PRO BNP    Collection Time: 09/27/22  5:10 PM   Result Value Ref Range    NT pro- 0 - 1,800 PG/ML   EKG, 12 LEAD, INITIAL    Collection Time: 09/27/22  5:34 PM   Result Value Ref Range    Ventricular Rate 112 BPM    Atrial Rate 112 BPM    P-R Interval 118 ms    QRS Duration 88 ms    Q-T Interval 342 ms    QTC Calculation (Bezet) 466 ms    Calculated P Axis 85 degrees    Calculated R Axis 82 degrees    Calculated T Axis 156 degrees    Diagnosis       Sinus tachycardia with premature atrial complexes with aberrant conduction  Low voltage QRS  Nonspecific ST and T wave abnormality  Abnormal ECG  When compared with ECG of 15-JUL-2021 20:35,  aberrant conduction is now present  Nonspecific T wave abnormality, worse in Inferior leads  Nonspecific T wave abnormality has replaced inverted T waves in Anterior   leads  T wave inversion now evident in Lateral leads     COVID-19 WITH INFLUENZA A/B    Collection Time: 09/27/22  5:35 PM   Result Value Ref Range    SARS-CoV-2 by PCR Not detected NOTD      Influenza A by PCR Not detected NOTD      Influenza B by PCR Not detected NOTD     BLOOD GAS, ARTERIAL POC    Collection Time: 09/27/22  6:15 PM   Result Value Ref Range    pH (POC) 7.38 7.35 - 7.45      pCO2 (POC) 66.4 (H) 35.0 - 45.0 MMHG    pO2 (POC) 67 (L) 80 - 100 MMHG    HCO3 (POC) 39.1 (H) 22 - 26 MMOL/L    sO2 (POC) 91.7 (L) 92 - 97 %    Base excess (POC) 11.3 mmol/L    Allens test (POC) Positive      Site RIGHT RADIAL      Specimen type (POC) ARTERIAL      Performed by Roxana Strickland        CT HEAD WO CONT   Final Result      1. No CT evidence for an acute intracranial process. No significant change. CTA CHEST W OR W WO CONT   Final Result      1. No finding for acute pulmonary embolism. .   2. Moderately severe emphysema. 3. Probable chronic bronchitis with lower lobe bronchial secretions. 4. Peripheral focal opacities in the right lung base which are likely   inflammatory. A follow-up CT scan in 6-8 weeks to assess for resolution   suggested. 5. Likely reactive right hilar mild adenopathy, stable. XR CHEST PORT    (Results Pending)       ED Course as of 09/28/22 0118   Tue Sep 27, 2022   2238 Had a long discussion with the patient and her daughter. The patient states although she was given hospice treatment she was not under the understanding that this would mean comfort only and she desires to prolong her life although still would not want a ventilator. She states that she would be admitted to the hospital if that was helpful to her prolong her life. She states that she does not believe she was ever at a point where she was interested in only seeking comfort measures. [SCOTT]      ED Course User Index  [SCOTT] Nani Sin, DO         Assessment/Plan-   See ED course section. Patient states she would desire life-prolonging care although no intubation and is adamant about that. Patient clinically has a COPD exacerbation and she desires hospitalization and medical treatment. 2.  The patient reportedly received Solu-Medrol in route we will give her an additional 60 mg, continue antibiotics and bronchodilators and admit to the hospitalist.    Diagnosis:   1. Cancer ([facility] [facility]    2.  Chronic obstructive pulmonary disease, unspecified [facility] type [facility]          Disposition: Admitted    Follow-up Information       Follow up With Specialties Details Why Contact Info Fetters, Heidi Cockayne, PA Physician Assistant In 1 week  [id number]      [facility] EMERGENCY DEPT Emergency Medicine  As needed [address]  [phone]    Pulmonology    Follow-up with your pulmonologist within the next 2 weeks regarding your emergency department visit            Patient's Medications   Start Taking    No medications on file   Continue Taking    ACETAMINOPHEN (TYLENOL EXTRA STRENGTH) 500 MG TABLET    Take 500 mg by mouth every six (6) hours as needed for Pain. ALBUTEROL (PROVENTIL VENTOLIN) 2.5 MG /3 ML (0.083 %) NEBULIZER SOLUTION    3 mL by Nebulization route every four (4) hours as needed for Wheezing or Shortness of Breath. ICD: J44.9, R09.02 file under Medicare Part B    ALBUTEROL (VENTOLIN HFA) 90 MCG/ACTUATION INHALER    inhale 2 puffs by mouth and INTO THE LUNGS every 4 hours if needed for wheezing shortness of breath    FAMOTIDINE (PEPCID) 20 MG TABLET    Take 1 Tablet by mouth daily. FLUTICASONE PROPION-SALMETEROL (ADVAIR DISKUS) 250-50 MCG/DOSE DISKUS INHALER    Take 1 Puff by inhalation two (2) times a day. BRAND NAME ONLY    LORAZEPAM (ATIVAN) 1 MG TABLET    Take  by mouth two (2) times daily as needed for Anxiety. MIDODRINE (PROAMATINE) 2.5 MG TABLET    Take 1 Tablet by mouth two (2) times daily (with meals). OTHER    Incentive spirometry-use as directed    OTHER    Incentive spirometry-use as directed    OTHER    Check a CBC, CMP, magnesium in 4 days. Results to PCP immediately. Diagnosis-COPD    OXYGEN-AIR DELIVERY SYSTEMS    2 L by Does Not Apply route. O2 via nasal cannula with bedtime and activity. O2 Company: [employer]    PREDNISONE (DELTASONE) 10 MG TABLET    take 1 tablet by mouth daily with breakfast    ROPINIROLE (REQUIP) 1 MG TABLET    Take 1 mg by mouth nightly. SIMETHICONE (GAS-X) 80 MG CHEWABLE TABLET    Take 80 mg by mouth every six (6) hours as needed for Flatulence. THERAPEUTIC MULTIVITAMIN (THERAGRAN) TABLET    Take 1 Tab by mouth daily.     TIOTROPIUM BROMIDE (SPIRIVA RESPIMAT) 2.5 MCG/ACTUATION INHALER    Take 2 Puffs by inhalation daily.     VENTOLIN HFA 90 MCG/ACTUATION INHALER    inhale 2 puffs by mouth and INTO THE LUNGS every 4 hours if needed for wheezing shortness of breath   These Medications have changed    No medications on file   Stop Taking    No medications on file         10:02 PM, 9/27/2022, Suman Dalton DO

## 2022-09-28 NOTE — ACP (ADVANCE CARE PLANNING)
Advance Care Planning     General Advance Care Planning (ACP) Conversation    Advanced Steps [facility] (Physician Orders for Scope of Treatment)    Date of Conversation: 9/27/2022  Conducted with: Patient with Decision Making Capacity    Healthcare Decision Maker:     Primary Decision Maker: Alisha Dowr - Daughter-in-Law    Secondary Decision Maker: Rosina Herrera - Son - [phone]    Secondary Decision Maker: Aundrea Verduzco - Other Relative - [phone]  Click here to complete [address] including selection of the Healthcare Decision Maker Relationship (ie \"Primary\")    Today we documented Decision Maker(s) consistent with ACP documents on file. Content/Action Overview:   Has NO ACP documents/care preferences - requested patient complete ACP documents  Reviewed DNR/DNI and patient elects DNR order - completed portable DNR form & placed order  Topics discussed: treatment goals, artificial nutrition, ventilation preferences, resuscitation preferences, and hospice care  Conversation Topics   Palliative Medicine team members Imani Mason NP and this writer for consult visit. Ms Mary Zamarripa was sitting up in bed, with O2 on. Ms. Mary Zamarripa is alert and aware x  4. She become short of breath with conversation. Also present in room was patient's daughter, Radha Das. Caroline Kerns is a 78 y.o. female with medical end stage emphysema on home oxygen (4L NC) who was brought to the ED after a fall and was also short of breath. She was without her home oxygen for an unknown period of time. Patient and Dustin Wilks were able to provide medical history. Daughter shared that patient was under hospice care with [facility] with service starting over a year ago. Ms Mary Zamarripa added that \"you don't get to see your doctors or get labs done, how can they treat you\"? Patient has an incomplete [place] directive on file (missing page 2).    Patient lives in the home of her son with his family and her daughter Yazmin Harrell. Who acts as caregiver for Ms. Rosie Miranda. Palliative team introduced the completion of an AMD naming health care agents. Patient is agreeable to complete the AMD. She named her Daughter in law Bishop Hoang as primary Healthcare agent. According to patient, Berta Caldwell is accepting of the role. She named her son, Luna Evans as surrogate and son, Kusum Ibrahim as supplemental.  Team confirmed with patient her end of life wishes and introduced the completion of Physician Order For Scope of Treatment. Team reviewed with her the DDNR that was on file and completed in 2018. Ms Rosie Miranda completed the POST form for DNR/DNI Limited medical interventions and NO Feeding Tubes. Needs to discuss with spiritual/Caodaism advisor: [] Yes  [x] No    Needs more information about illness and complications:  [x] Yes  [] No      Cardiopulmonary Resuscitation      \"What do you understand about CPR? \" Response: that is where the pound on my chest     Order Elected for CPR:  []  Attempt Resuscitation [x]  Do Not Attempt Resuscitation      When NOT in Cardiopulmonary Arrest, Order Elected:      [] Comfort Measures  [x] Limited Additional Interventions  [] Full Interventions    Artificially Administered Nutrition, Order Elected:    [x] No Feeding Tube   [] Feeding Tube for a defined trial period  [] Feeding Tube long-term if indicated    The following was provided (check all that apply):      Healthcare Decision Information Cards:   [] Help with Breathing Facts   [] Tube Feeding Facts   [] CPR Facts    []   Review of existing Advance Directive  [x] Assistance with Completion of New Advance Directive   [x] Review of [place] POST Form       Meeting Outcomes:   [x] ACP discussion completed   [x] [place] form completed  [x] [place] prepared for Provider review and signature   [x] Original placed on Chart, if in facility (form to be sent with patient at discharge)  [x] Copy given to healthcare agent    [x] Copy placed on chart for scanning into electronic medical record       Follow-up plan:  will determine with patient and family her desires for hospice are at discharge.     [] Schedule follow-up conversation to continue planning   [] Referred individual to Provider for additional questions/concerns   [x] Advised patient/agent/surrogate to review completed POST form and update if needed with changes in condition, patient preferences or care setting     [] This note routed to one or more involved healthcare providers     CODE STATUS - DNR/DNI     Macarena SIMONSN, RN, [facility]  Palliative Medicine Inpatient RN  Palliative COPE Line: [phone]

## 2022-09-29 ENCOUNTER — APPOINTMENT (OUTPATIENT)
Dept: GENERAL RADIOLOGY | Age: 77
DRG: 190 | End: 2022-09-29
Attending: FAMILY MEDICINE
Payer: MEDICARE

## 2022-09-29 PROCEDURE — 74011000250 HC RX REV CODE- 250: Performed by: STUDENT IN AN ORGANIZED HEALTH CARE EDUCATION/TRAINING PROGRAM

## 2022-09-29 PROCEDURE — 74011000250 HC RX REV CODE- 250: Performed by: FAMILY MEDICINE

## 2022-09-29 PROCEDURE — 94640 AIRWAY INHALATION TREATMENT: CPT

## 2022-09-29 PROCEDURE — 97530 THERAPEUTIC ACTIVITIES: CPT

## 2022-09-29 PROCEDURE — 71045 X-RAY EXAM CHEST 1 VIEW: CPT

## 2022-09-29 PROCEDURE — 94761 N-INVAS EAR/PLS OXIMETRY MLT: CPT

## 2022-09-29 PROCEDURE — 94669 MECHANICAL CHEST WALL OSCILL: CPT

## 2022-09-29 PROCEDURE — 2709999900 HC NON-CHARGEABLE SUPPLY

## 2022-09-29 PROCEDURE — 74011636637 HC RX REV CODE- 636/637: Performed by: STUDENT IN AN ORGANIZED HEALTH CARE EDUCATION/TRAINING PROGRAM

## 2022-09-29 PROCEDURE — 77010033678 HC OXYGEN DAILY

## 2022-09-29 PROCEDURE — 74011250637 HC RX REV CODE- 250/637: Performed by: FAMILY MEDICINE

## 2022-09-29 PROCEDURE — 74011250637 HC RX REV CODE- 250/637: Performed by: STUDENT IN AN ORGANIZED HEALTH CARE EDUCATION/TRAINING PROGRAM

## 2022-09-29 PROCEDURE — 74011250636 HC RX REV CODE- 250/636: Performed by: STUDENT IN AN ORGANIZED HEALTH CARE EDUCATION/TRAINING PROGRAM

## 2022-09-29 PROCEDURE — 99232 SBSQ HOSP IP/OBS MODERATE 35: CPT | Performed by: FAMILY MEDICINE

## 2022-09-29 PROCEDURE — 65270000046 HC RM TELEMETRY

## 2022-09-29 PROCEDURE — 99223 1ST HOSP IP/OBS HIGH 75: CPT | Performed by: INTERNAL MEDICINE

## 2022-09-29 PROCEDURE — 74011250636 HC RX REV CODE- 250/636: Performed by: FAMILY MEDICINE

## 2022-09-29 RX ORDER — ACETYLCYSTEINE 200 MG/ML
1 SOLUTION ORAL; RESPIRATORY (INHALATION) EVERY 8 HOURS
Status: DISCONTINUED | OUTPATIENT
Start: 2022-09-29 | End: 2022-09-30

## 2022-09-29 RX ORDER — IPRATROPIUM BROMIDE AND ALBUTEROL SULFATE 2.5; .5 MG/3ML; MG/3ML
3 SOLUTION RESPIRATORY (INHALATION)
Status: DISCONTINUED | OUTPATIENT
Start: 2022-09-29 | End: 2022-09-30

## 2022-09-29 RX ORDER — ACETYLCYSTEINE 200 MG/ML
1 SOLUTION ORAL; RESPIRATORY (INHALATION)
Status: DISCONTINUED | OUTPATIENT
Start: 2022-09-29 | End: 2022-09-29

## 2022-09-29 RX ORDER — AZITHROMYCIN 250 MG/1
500 TABLET, FILM COATED ORAL DAILY
Status: DISCONTINUED | OUTPATIENT
Start: 2022-09-29 | End: 2022-10-03 | Stop reason: HOSPADM

## 2022-09-29 RX ORDER — GUAIFENESIN 600 MG/1
600 TABLET, EXTENDED RELEASE ORAL EVERY 12 HOURS
Status: DISCONTINUED | OUTPATIENT
Start: 2022-09-29 | End: 2022-10-03 | Stop reason: HOSPADM

## 2022-09-29 RX ADMIN — HYDROCODONE BITARTRATE AND ACETAMINOPHEN 1 TABLET: 5; 325 TABLET ORAL at 10:00

## 2022-09-29 RX ADMIN — ARFORMOTEROL TARTRATE: 15 SOLUTION RESPIRATORY (INHALATION) at 07:29

## 2022-09-29 RX ADMIN — LORAZEPAM 0.5 MG: 0.5 TABLET ORAL at 10:00

## 2022-09-29 RX ADMIN — GUAIFENESIN 600 MG: 600 TABLET, EXTENDED RELEASE ORAL at 21:27

## 2022-09-29 RX ADMIN — METHYLPREDNISOLONE SODIUM SUCCINATE 40 MG: 40 INJECTION, POWDER, FOR SOLUTION INTRAMUSCULAR; INTRAVENOUS at 18:00

## 2022-09-29 RX ADMIN — HYDROCODONE BITARTRATE AND ACETAMINOPHEN 2 TABLET: 5; 325 TABLET ORAL at 15:18

## 2022-09-29 RX ADMIN — SODIUM CHLORIDE, PRESERVATIVE FREE 10 ML: 5 INJECTION INTRAVENOUS at 21:31

## 2022-09-29 RX ADMIN — ENOXAPARIN SODIUM 40 MG: 100 INJECTION SUBCUTANEOUS at 10:00

## 2022-09-29 RX ADMIN — FAMOTIDINE 20 MG: 20 TABLET ORAL at 10:00

## 2022-09-29 RX ADMIN — HYDROCODONE BITARTRATE AND ACETAMINOPHEN 2 TABLET: 5; 325 TABLET ORAL at 21:27

## 2022-09-29 RX ADMIN — AZITHROMYCIN MONOHYDRATE 500 MG: 250 TABLET ORAL at 10:00

## 2022-09-29 RX ADMIN — IPRATROPIUM BROMIDE AND ALBUTEROL SULFATE 3 ML: .5; 3 SOLUTION RESPIRATORY (INHALATION) at 20:24

## 2022-09-29 RX ADMIN — IPRATROPIUM BROMIDE AND ALBUTEROL SULFATE 3 ML: .5; 3 SOLUTION RESPIRATORY (INHALATION) at 16:42

## 2022-09-29 RX ADMIN — THERA TABS 1 TABLET: TAB at 10:00

## 2022-09-29 RX ADMIN — ACETYLCYSTEINE 200 MG: 200 SOLUTION ORAL; RESPIRATORY (INHALATION) at 16:42

## 2022-09-29 RX ADMIN — SODIUM CHLORIDE, PRESERVATIVE FREE 10 ML: 5 INJECTION INTRAVENOUS at 14:28

## 2022-09-29 RX ADMIN — GUAIFENESIN 600 MG: 600 TABLET, EXTENDED RELEASE ORAL at 14:27

## 2022-09-29 RX ADMIN — LORAZEPAM 1 MG: 1 TABLET ORAL at 21:27

## 2022-09-29 RX ADMIN — SODIUM CHLORIDE, POTASSIUM CHLORIDE, SODIUM LACTATE AND CALCIUM CHLORIDE 75 ML/HR: 600; 310; 30; 20 INJECTION, SOLUTION INTRAVENOUS at 04:04

## 2022-09-29 RX ADMIN — IPRATROPIUM BROMIDE AND ALBUTEROL SULFATE 3 ML: .5; 3 SOLUTION RESPIRATORY (INHALATION) at 23:42

## 2022-09-29 RX ADMIN — ARFORMOTEROL TARTRATE: 15 SOLUTION RESPIRATORY (INHALATION) at 20:24

## 2022-09-29 RX ADMIN — ALBUTEROL SULFATE 2.5 MG: 2.5 SOLUTION RESPIRATORY (INHALATION) at 14:03

## 2022-09-29 RX ADMIN — METHYLPREDNISOLONE SODIUM SUCCINATE 40 MG: 40 INJECTION, POWDER, FOR SOLUTION INTRAMUSCULAR; INTRAVENOUS at 14:00

## 2022-09-29 RX ADMIN — ACETYLCYSTEINE 200 MG: 200 SOLUTION ORAL; RESPIRATORY (INHALATION) at 20:24

## 2022-09-29 RX ADMIN — MIDODRINE HYDROCHLORIDE 2.5 MG: 2.5 TABLET ORAL at 10:00

## 2022-09-29 RX ADMIN — ONDANSETRON 4 MG: 2 INJECTION INTRAMUSCULAR; INTRAVENOUS at 06:50

## 2022-09-29 RX ADMIN — MIDODRINE HYDROCHLORIDE 2.5 MG: 2.5 TABLET ORAL at 18:00

## 2022-09-29 RX ADMIN — ROPINIROLE HYDROCHLORIDE 1 MG: 1 TABLET, FILM COATED ORAL at 21:27

## 2022-09-29 RX ADMIN — PREDNISONE 10 MG: 10 TABLET ORAL at 10:00

## 2022-09-29 RX ADMIN — IPRATROPIUM BROMIDE 0.5 MG: 0.5 SOLUTION RESPIRATORY (INHALATION) at 07:29

## 2022-09-29 RX ADMIN — METHYLPREDNISOLONE SODIUM SUCCINATE 40 MG: 40 INJECTION, POWDER, FOR SOLUTION INTRAMUSCULAR; INTRAVENOUS at 23:45

## 2022-09-29 NOTE — PROGRESS NOTES
Spoke with pt's family member who stated that she will stay overnight to help redirect pt if need be. MD made aware, will continue to monitor.

## 2022-09-29 NOTE — CONSULTS
3 [facility]  Pulmonary, Critical Care, and Sleep Medicine    Initial Patient Consult    Name: Jian Nolan MRN: [id number]   : 1945 Hospital: [facility]   Date: 2022        IMPRESSION:   Acute on chronic hypoxemic hypercapnic respiratory failure due to COPD with exacerbation. Negative COVID and influenza swabs  End stage COPD on LTOT  Former heavy smoker  History of colorectal cancer S/p colectomy/colostomy with subsequent takedown. History of DVT  Spinal stenosis  RLS  Chronic back pain      RECOMMENDATIONS:   Agree with bronchodilators, LABA/ICS  Azithromycin for AECOPD  Systemic steroids and taper rapidly jeanne with delirium which could be worsened by steroids. Slow Prednisone  OSMANY Albuterol Q 4 hours and then prn after respiratory status has stabilized  Resume Spiriva Respimat on discharge  Prophylaxis and glycemic control issues per primary team  Titrate FiO2 to saturation greater than 90%  Will follow with you. Thank you for consulting     Subjective: This patient has been seen and evaluated at the request of Dr. Sd Gold for COPD exacerbation. Patient is a 78 y.o. female with end stage COPD on LTOT who presented with shortness of breath similar to previous exacerbations. Pt denies fever, chest pain or hemoptysis. + baseline orthopnea, no pedal edema. Baseline cough with minimal phlegm. Pt has chronic back and joint pains.         Past Medical History:   Diagnosis Date    Anxiety     Arthritis     Asbestosis ([address]     Asthma     Back problem     Baker's cyst     Balance problems     Chronic lung disease     Cigarette smoker     Clotting disorder (HCC)     COPD (chronic obstructive pulmonary disease) ([facility])     oxygen 2/liters asbestosis    Depression     History of DVT (deep vein thrombosis)     Leg pain     Right    Lung disease     Nausea & vomiting     Osteoporosis     Restless leg syndrome     Spinal stenosis     Thromboembolus ([facility])     Vitamin D deficiency Past Surgical History:   Procedure Laterality Date    COLONOSCOPY N/A 9/22/2018    COLONOSCOPY w bx w polypectonies performed by Shoshana Chakraborty MD at [address] N/A 11/6/2018    COLONOSCOPY performed by Jack Chamberlain MD at [address]    COLONOSCOPY N/A 8/2/2019    COLONOSCOPY with polypectomies and biopsies performed by Marj Shell MD at [facility]    HX APPENDECTOMY      HX BACK SURGERY      x4    HX BREAST BIOPSY      left    HX GI      exp lap and ileostomy    HX HEENT      cataract right    HX HYSTERECTOMY      HX LUMBAR LAMINECTOMY      HX MENISCUS REPAIR      HX ORTHOPAEDIC      Knee bakers cyst    HX POLYPECTOMY      right colectomy    HX TONSILLECTOMY      HX VEIN STRIPPING      TN LAP,SURG,COLECTOMY, PARTIAL, W/ANAST N/A 10/23/2018    Dr. Kacy Singleton N/A 11/06/2018    Dr. Brice Thomas      vein stripping      Prior to Admission medications    Medication Sig Start Date End Date Taking? Authorizing Provider   Ventolin HFA 90 mcg/actuation inhaler inhale 2 puffs by mouth and INTO THE LUNGS every 4 hours if needed for wheezing shortness of breath 11/23/21  Yes Laura Preciado MD   albuterol (Ventolin HFA) 90 mcg/actuation inhaler inhale 2 puffs by mouth and INTO THE LUNGS every 4 hours if needed for wheezing shortness of breath 11/19/21  Yes Amanda Turner MD   famotidine (PEPCID) 20 mg tablet Take 1 Tablet by mouth daily. 7/19/21  Yes Kasie Elise MD   midodrine (PROAMATINE) 2.5 mg tablet Take 1 Tablet by mouth two (2) times daily (with meals). 7/19/21  Yes Kasie Elise MD   tiotropium bromide (Spiriva Respimat) 2.5 mcg/actuation inhaler Take 2 Puffs by inhalation daily. 7/29/20  Yes Laura Preciado MD   albuterol (PROVENTIL VENTOLIN) 2.5 mg /3 mL (0.083 %) nebulizer solution 3 mL by Nebulization route every four (4) hours as needed for Wheezing or Shortness of Breath.  ICD: J44.9, R09.02 file under Medicare Part B 4/16/19  Yes Artemio RENNER NP   fluticasone propion-salmeterol (ADVAIR DISKUS) 250-50 mcg/dose diskus inhaler Take 1 Puff by inhalation two (2) times a day. BRAND NAME ONLY 4/11/19  Yes Dionne Emmanuel MD   [facility]) 80 mg chewable tablet Take 80 mg by mouth every six (6) hours as needed for Flatulence. Yes Provider, Historical   therapeutic multivitamin (THERAGRAN) tablet Take 1 Tab by mouth daily. 9/24/18  Yes Donivan Kehr, MD   acetaminophen (TYLENOL) 500 mg tablet Take 500 mg by mouth every six (6) hours as needed for Pain. Yes Provider, Historical   LORazepam (ATIVAN) 1 mg tablet Take  by mouth two (2) times daily as needed for Anxiety. Yes Provider, Historical   rOPINIRole (REQUIP) 1 mg tablet Take 1 mg by mouth nightly. Yes Provider, Historical   OXYGEN-AIR DELIVERY SYSTEMS 2 L by Does Not Apply route. O2 via nasal cannula with bedtime and activity. O2 Company: [employer]   Yes Provider, Historical   predniSONE (DELTASONE) 10 mg tablet take 1 tablet by mouth daily with breakfast 8/23/21   Dionne Emmanuel MD   OTHER Incentive spirometry-use as directed 7/19/21   Radha Alford MD   OTHER Incentive spirometry-use as directed 7/19/21   Radha Alford MD   OTHER Check a CBC, CMP, magnesium in 4 days. Results to PCP immediately.   Diagnosis-COPD 7/19/21   Radha Alford MD     Allergies   Allergen Reactions    Latex, Natural Rubber Itching    Aspirin Other (comments)     GI upset and bleeding  Other reaction(s): gi distress  GI upset and bleeding    Augmentin [Amoxicillin-Pot Clavulanate] Not Reported This Time     Possible cramping    Doxycycline Nausea and Vomiting     Other reaction(s): gi distress    Ibuprofen Nausea and Vomiting     Nausea & \"blood show in my urine\"    Morphine Other (comments)     Neurologic symptoms - severe agitation    Other reaction(s): psychological reaction  Neurologic symptoms - severe agitation    Shellfish Derived Itching Pt able to eat small amounts per report     Sulfa (Sulfonamide Antibiotics) Rash     Patient relates no longer allergic    Umeclidinium-Vilanterol Rash, Other (comments) and Itching     Muscle cramps in arms  Muscle cramps in arms    Fluticasone Propion-Salmeterol Other (comments)     Mouth Sores  Other reaction(s): other/intolerance  Mouth Sores  \"generic Advair caused the sores. I had to go back to the name brand\"      Social History     Tobacco Use    Smoking status: Former     Packs/day: 1.00     Years: 50.00     Pack years: 50.00     Types: Cigarettes     Start date: 10/21/1964     Quit date: 2021     Years since quittin.2    Smokeless tobacco: Never   Substance Use Topics    Alcohol use: Never      Family History   Problem Relation Age of Onset    Cancer Father     Heart Disease Mother     Hypertension Mother     Cancer Brother     Cancer Sister     Diabetes Other     Hypertension Other     Stroke Other     Heart Disease Sister     Hypertension Sister     Heart Disease Brother         Current Facility-Administered Medications   Medication Dose Route Frequency    azithromycin (ZITHROMAX) tablet 500 mg  500 mg Oral DAILY    famotidine (PEPCID) tablet 20 mg  20 mg Oral DAILY    midodrine (PROAMATINE) tablet 2.5 mg  2.5 mg Oral BID WITH MEALS    predniSONE (DELTASONE) tablet 10 mg  10 mg Oral DAILY WITH BREAKFAST    rOPINIRole (REQUIP) tablet 1 mg  1 mg Oral QHS    therapeutic multivitamin (THERAGRAN) tablet 1 Tablet  1 Tablet Oral DAILY    ipratropium (ATROVENT) 0.02 % nebulizer solution 0.5 mg  500 mcg Nebulization Q8H RT    sodium chloride (NS) flush 5-40 mL  5-40 mL IntraVENous Q8H    enoxaparin (LOVENOX) injection 40 mg  40 mg SubCUTAneous DAILY    arformoterol 15 mcg/budesonide 0.5 mg neb solution   Nebulization BID RT    lidocaine 4 % patch 1 Patch  1 Patch TransDERmal Q24H       Review of Systems:  Pertinent items are noted in HPI.     Objective:   Vital Signs:    Visit Vitals  /80 (BP 1 Location: Right upper arm, BP Patient Position: At rest;Supine)   Pulse 91   Temp 97.8 °F (36.6 °C)   Resp 20   Ht 5' 5\" (1.651 m)   Wt 54.9 kg (121 lb 0.5 oz)   SpO2 94%   BMI 20.14 kg/m²       O2 Device: Nasal cannula   O2 Flow Rate (L/min): 3 l/min   Temp (24hrs), Av.8 °F (36.6 °C), Min:97.6 °F (36.4 °C), Max:98.1 °F (36.7 °C)       Intake/Output:   Last shift:      No intake/output data recorded. Last 3 shifts:  1901 -  0700  In: 210 [P.O.:210]  Out: -     Intake/Output Summary (Last 24 hours) at 2022 1107  Last data filed at 2022 1935  Gross per 24 hour   Intake 210 ml   Output --   Net 210 ml      Physical Exam:   General:  Alert, cooperative, no distress, appears stated age. frail   Head:  Normocephalic, without obvious abnormality, atraumatic. Eyes:  Conjunctivae/corneas clear. PERRL, EOMs intact. Nose: Nares normal.  Mucosa normal. No drainage or sinus tenderness. Throat: Lips, mucosa, and tongue normal. Teeth and gums normal.   Neck: Supple, symmetrical, trachea midline, no adenopathy, thyroid: no enlargment/tenderness/nodules    Back:   Symmetric    Lungs:   Distant breath sounds. Biapical wheezes, no rales. + central rhonchi   Chest wall:  No tenderness or deformity. Heart:  Regular rate and rhythm, S1, S2 normal, no murmur    Abdomen:   Soft, non-tender. Bowel sounds normal. No masses,  No organomegaly. Extremities: Extremities normal, atraumatic, no cyanosis or edema. Pulses: 2+ and symmetric all extremities. Skin: Skin color, texture, turgor normal. Scattered ecchymoses both UE/LE   Lymph nodes: Cervical, supraclavicular nodes normal.   Neurologic: Grossly nonfocal     Data review:   No results found for this or any previous visit (from the past 24 hour(s)).     Imaging:  I have personally reviewed the patients radiographs and have reviewed the reports:  XR Results (most recent):  Results from Hospital Encounter encounter on 22    XR CHEST PORT    Narrative  Portable Chest    CPT CODE: 06353    HISTORY: Cough, shortness of breath. FINDINGS:    Correlated with same day CT scan and chest radiograph 7/15/2021. Overlying breast attenuation at clothing artifact. Heart size and mediastinal  contour stable. Slight increase of reticular lung markings. Slight increase  patchy density at the right lateral base. No pneumothorax. Minimal blunted  costophrenic angles. .    Impression  Slight increase of diffuse reticular lung markings and more focal opacities at  the right lung. . May represent acute on chronic lung disease. Correlate for  infection. CT Results (most recent):  Results from Hospital Encounter encounter on 09/27/22    CTA CHEST W OR W WO CONT    Narrative  CT Chest Pulmonary Embolism Protocol    CPT CODE: 86299    INDICATION: Chest pain. Question pulmonary embolism. TECHNIQUE: Thin collimation axial images obtained through the level of the  pulmonary arteries with additional imaging through the chest following the  uneventful administration of nonionic intravenous contrast.  Images  reconstructed into coronal and sagittal maximum intensity projections for better  evaluation of the tortuous and overlapping pulmonary vascular structures and to  reduce patient radiation dose. The patient received . All CT scans are performed using dose optimization techniques as appropriate to  the performed exam including the following: Automated exposure control,  adjustment of mA and/or kV according to patient size, and use of iterative  reconstructive technique. COMPARISON: .    FINDINGS:    This is a technically adequate study. No filling defects are appreciated within the main, left, right, lobar or  visualized segmental pulmonary arteries to suggest embolism. The thoracic aorta is not aneurysmal.  No evidence for dissection. Heart size  is within normal limits. No pericardial effusion.     No enlarged axillary or mediastinal lymphadenopathy. Mildly enlarged right  hilar nodes stable. Moderately severe emphysema. Probably some secretions in the bronchus  intermedius. Some secretions in the right lower lobe bronchi. There are a few  peripheral focal opacities anterolateral right lung base image 56 and posterior  right lower lobe image 39. Remington Amend No effusions. No pneumothorax. Visualized upper abdominal structures are without gross abnormality. Osseous structures are intact. Impression  1. No finding for acute pulmonary embolism. .  2. Moderately severe emphysema. 3. Probable chronic bronchitis with lower lobe bronchial secretions. 4. Peripheral focal opacities in the right lung base which are likely  inflammatory. A follow-up CT scan in 6-8 weeks to assess for resolution  suggested. 5. Likely reactive right hilar mild adenopathy, stable.            Saul Henderson MD

## 2022-09-29 NOTE — PROGRESS NOTES
[facility]  Progress Note    Patient: Jagjit Peña Age: 78 y.o. : 1945 MR#: [id number] SSN: [id number]  Date: 2022     Subjective/24-hour events:     Still with complaints of shortness of breath and cough/chest congestion. Afebrile overnight. Sister present at bedside. Assessment:   COPD with acute exacerbation  Suspected acute on chronic bronchitis  Debility     Plan:   Initiate treatment with azithromycin 500 mg daily. Discontinue prednisone, changed to IV Solu-Medrol every 6 hours x4 doses. Pulmonology evaluation, assistance with management appreciated in advance. Palliative medicine evaluation completed and case discussed. Patient DNR/DNI but wants to continue to receive aggressive medical treatments as appropriate. PT/OT, mobilize as soon as able. Would like to see continued improvement in respiratory status prior to therapy being initiated, however. Disposition to be determined. Continue supportive care otherwise. Follow. Case discussed with:  [x]Patient  [x]Family  [x]Nursing  [x]Case Management  DVT Prophylaxis:  []Lovenox  []Hep SQ  []SCDs  []Coumadin   []On Heparin gtt    Objective:   VS: Visit Vitals  /80 (BP 1 Location: Right upper arm, BP Patient Position: At rest)   Pulse 90   Temp 97.8 °F (36.6 °C)   Resp 20   Ht 5' 5\" (1.651 m)   Wt 54.9 kg (121 lb 0.5 oz)   SpO2 94%   BMI 20.14 kg/m²        General:  In NAD. Nontoxic-appearing. Cardiovascular:  RRR. Pulmonary: Breath sounds decreased throughout, bilateral expiratory wheezing noted. Mild accessory muscle use present. GI:  Abdomen soft, NTTP. Extremities:  Warm, no edema or ischemia. Neuro:  Awake and alert but mildly confused. .  Moves extremities spontaneously. Labs:    No results found for this or any previous visit (from the past 24 hour(s)).     Signed By: Mortimer Laity, MD     2022

## 2022-09-29 NOTE — PROGRESS NOTES
Bedside and Verbal shift change report given to Lynne Day (oncoming nurse) by Camden De Jesus (offgoing nurse). Report included the following information SBAR, Kardex, MAR, and Recent Results.

## 2022-09-29 NOTE — PROGRESS NOTES
Problem: Mobility Impaired (Adult and Pediatric)  Goal: *Acute Goals and Plan of Care (Insert Text)  Description: Physical Therapy Goals  Initiated 9/28/2022 and to be accomplished within 7 day(s)  1. Patient will move from supine to sit and sit to supine  in bed with modified independence. 2.  Patient will transfer from bed to chair and chair to bed with modified independence using the least restrictive device. 3.  Patient will perform sit to stand with modified independence. 4.  Patient will ambulate with modified independence for 100 feet with the least restrictive device. 5.  Patient will ascend/descend 2 stairs with handrail(s) with contact guard assist.    PLOF: Patient was independent no AD PTA. She lives with family in single story home. PHYSICAL THERAPY TREATMENT    Patient: Brandy Jaquez (60 y.o. female)  Date: 9/29/2022  Diagnosis: Acute exacerbation of chronic obstructive pulmonary disease (COPD) ([facility]) [J44.1]  Emphysema lung ([facility]) [J43.9] <principal problem not specified>      Precautions: Fall      ASSESSMENT:  Patient received semi reclined in bed with sister present. Patient is anxious and reports SOB; had just returned back to bed from using the [facility]. On 3L NC with O2 sats 87-89%. Patient reports she is on 4L at home and increased to 4L. Educated on breathing technique to improve oxygenation. Supine to sit with supervision. She sat EOB for 15 minutes with good balance while completing hygiene task and changing her gown. Patient returns to supine with supervision. Discussed benefit of having BSC and WC at home to assist with energy conservation. Progression toward goals:   []      Improving appropriately and progressing toward goals  [x]      Improving slowly and progressing toward goals  []      Not making progress toward goals and plan of care will be adjusted     PLAN:  Patient continues to benefit from skilled intervention to address the above impairments.   Continue treatment per established plan of care. Further Equipment Recommendations for Discharge:  bedside commode, shower chair, rolling walker, wheelchair     AMPAC:   Based on an AM-PAC score of 15//20 if omitting stairs and their current functional mobility deficits, it is recommended that the patient have 2-3 sessions per week of Physical Therapy at d/c to increase the patient's independence. This AMPAC score should be considered in conjunction with interdisciplinary team recommendations to determine the most appropriate discharge setting. Patient's social support, diagnosis, medical stability, and prior level of function should also be taken into consideration. SUBJECTIVE:   Patient stated  Tell her why im crying.     OBJECTIVE DATA SUMMARY:   Critical Behavior:              Functional Mobility Training:  Bed Mobility:     Supine to Sit: Supervision  Sit to Supine: Supervision  Scooting: Supervision           Balance:  Sitting: Intact; With support                  Pain:  Pain level pre-treatment: 0/10  Pain level post-treatment: 0/10   Pain Intervention(s): Medication (see MAR); Rest, Ice, Repositioning   Response to intervention: Nurse notified, See doc flow    Activity Tolerance:   Fair  Please refer to the flowsheet for vital signs taken during this treatment. After treatment:   [] Patient left in no apparent distress sitting up in chair  [x] Patient left in no apparent distress in bed  [x] Call bell left within reach  [x] Nursing notified  [] Caregiver present  [] Bed alarm activated  [] SCDs applied      COMMUNICATION/EDUCATION:   []         Role of Physical Therapy in the acute care setting. [x]         Fall prevention education was provided and the patient/caregiver indicated understanding. [x]         Patient/family have participated as able in working toward goals and plan of care. []         Patient/family agree to work toward stated goals and plan of care.   []         Patient understands intent and goals of therapy, but is neutral about his/her participation. []         Patient is unable to participate in stated goals/plan of care: ongoing with therapy staff.  []         Other:        Toño Schaefer, PT   Time Calculation: 26 mins    Dominic Holden -PAC® Basic Mobility Inpatient Short Form (6-Clicks) Version 2    How much HELP from another person does the patient currently need    (If the patient hasn't done an activity recently, how much help from another person do you think he/she would need if he/she tried?)   Total (Total A or Dep)   A Lot  (Mod to Max A)   A Little (Sup or Min A)   None (Mod I to I)   Turning from your back to your side while in a flat bed without using bedrails? [] 1 [] 2 [x] 3 [] 4   2. Moving from lying on your back to sitting on the side of a flat bed without using bedrails? [] 1 [] 2 [x] 3 [] 4   3. Moving to and from a bed to a chair (including a wheelchair)? [] 1 [] 2 [x] 3 [] 4   4. Standing up from a chair using your arms (e.g., wheelchair, or bedside chair)? [] 1 [] 2 [x] 3 [] 4   5. Walking in hospital room? [] 1 [] 2 [x] 3 [] 4   6. Climbing 3-5 steps with a railing?+   [] 1 [] 2 [] 3 [] 4   +If stair climbing cannot be assessed, skip item #6. Sum responses from items 1-5.          Outcome: Progressing Towards Goal

## 2022-09-29 NOTE — PROGRESS NOTES
Patient will need oxygen testing for home o2 . Oxygen at home through hospice and will need to be picked up. Patient stated she had [address] prior to the hospice and worked out well   Reason for Admission:  Acute exacerbation of chronic obstructive pulmonary disease (COPD) ([facility] [address] [J44.1]  Emphysema lung ([facility] [address] [J43.9]                 RUR Score:    11            Plan for utilizing home health:    offered SNF rehab and pt was not interested . Agreed to home health                       Likelihood of Readmission:   LOW                         Transition of Care Plan:              Initial assessment completed with patient. Cognitive status of patient: oriented to time, place, person and situation. Sister and niece in room and CM received permission to ask questions in front of them         Face sheet information confirmed:  yes. Address was old . Chen rTacy lives at [address] [place] [address], [place] which is son . Chele Quispe and his wife home . Grandchild there as well   The patient designates sonCarol to participate in her discharge plan and to receive any needed information. This patient lives in a single family home with son and other:  daughter in law, grandchild . Patient is not able to navigate steps as needed. Prior to hospitalization, patient was considered to be independent with ADLs/IADLS : no . If not independent,  patient needs assist with : dressing, bathing, food preparation, cooking, toileting, and grooming    Patient has a current ACP document on file: yes      Healthcare Decision Maker:   Primary Decision Maker: Rowan Ponceel - Daughter-in-Law    Secondary Decision Maker: Michelle Thompson - Son - [phone]    Secondary Decision Maker: Marcy Hammer - Other Relative - [phone]    Click here to complete [address] including selection of the Healthcare Decision Maker Relationship (ie \"Primary\")    The son and daughter will be available to transport patient home upon discharge. The patient already has [facility], and  medical equipment available in the home. Needs oxygen as o2 is hospice equipment  . May benefit from wheelchair     Patient is not currently active with home If active, agency name is [facility] hospice . Patient has not stayed in a skilled nursing facility or rehab. Was  stay within last 60 days : no. This patient is on dialysis :no        List of available Home Health agencies were provided and reviewed with the patient prior to discharge. Freedom of choice signed: yes, for [facility] . Currently, the discharge plan is Home with [address] [place] [address] Todd Membreno. The patient states that she can obtain her medications from the pharmacy, and take her medications as directed. Patient's current insurance is Medicare and [employer]  PCP Verified by CM: Yes Amber Reynoso)  Mode of Transport at Discharge:  Other (see comment) (tbd)  Transition of Care Consult (CM Consult): Home Health, Discharge Planning  Support Systems: Child(sarah), Other Family Member(s)  Confirm Follow Up Transport: Family  The Plan for Transition of Care is Related to the Following Treatment Goals : home health  The Patient and/or Patient Representative was Provided with a Choice of Provider and Agrees with the Discharge Plan?: Yes  Freedom of Choice List was Provided with Basic Dialogue that Supports the Patient's Individualized Plan of Care/Goals, Treatment Preferences and Shares the Quality Data Associated with the Providers?: Yes  Discharge Location  Patient Expects to be Discharged to[de-identified] Home with home health

## 2022-09-29 NOTE — PROGRESS NOTES
SBAR report given to oncoming nurse Josette Johnson RN at the pt bedside. No complaints at this time. Bed in low position. Call bell in reach.

## 2022-09-30 LAB
ANION GAP SERPL CALC-SCNC: 5 MMOL/L (ref 3–18)
BASOPHILS # BLD: 0 K/UL (ref 0–0.1)
BASOPHILS NFR BLD: 0 % (ref 0–2)
BUN SERPL-MCNC: 29 MG/DL (ref 7–18)
BUN/CREAT SERPL: 53 (ref 12–20)
CALCIUM SERPL-MCNC: 8.6 MG/DL (ref 8.5–10.1)
CHLORIDE SERPL-SCNC: 94 MMOL/L (ref 100–111)
CO2 SERPL-SCNC: 37 MMOL/L (ref 21–32)
CREAT SERPL-MCNC: 0.55 MG/DL (ref 0.6–1.3)
DIFFERENTIAL METHOD BLD: ABNORMAL
EOSINOPHIL # BLD: 0 K/UL (ref 0–0.4)
EOSINOPHIL NFR BLD: 0 % (ref 0–5)
ERYTHROCYTE [DISTWIDTH] IN BLOOD BY AUTOMATED COUNT: 13.2 % (ref 11.6–14.5)
GLUCOSE SERPL-MCNC: 178 MG/DL (ref 74–99)
HCT VFR BLD AUTO: 34.9 % (ref 35–45)
HGB BLD-MCNC: 10.8 G/DL (ref 12–16)
IMM GRANULOCYTES # BLD AUTO: 0 K/UL
IMM GRANULOCYTES NFR BLD AUTO: 0 %
LYMPHOCYTES # BLD: 0.4 K/UL (ref 0.9–3.6)
LYMPHOCYTES NFR BLD: 9 % (ref 21–52)
MCH RBC QN AUTO: 32 PG (ref 24–34)
MCHC RBC AUTO-ENTMCNC: 30.9 G/DL (ref 31–37)
MCV RBC AUTO: 103.6 FL (ref 78–100)
MONOCYTES # BLD: 0 K/UL (ref 0.05–1.2)
MONOCYTES NFR BLD: 0 % (ref 3–10)
NEUTS SEG # BLD: 4.1 K/UL (ref 1.8–8)
NEUTS SEG NFR BLD: 91 % (ref 40–73)
NRBC # BLD: 0 K/UL (ref 0–0.01)
NRBC BLD-RTO: 0 PER 100 WBC
PLATELET # BLD AUTO: 255 K/UL (ref 135–420)
PLATELET COMMENTS,PCOM: ABNORMAL
PMV BLD AUTO: 10.2 FL (ref 9.2–11.8)
POTASSIUM SERPL-SCNC: 4.2 MMOL/L (ref 3.5–5.5)
RBC # BLD AUTO: 3.37 M/UL (ref 4.2–5.3)
RBC MORPH BLD: ABNORMAL
SODIUM SERPL-SCNC: 136 MMOL/L (ref 136–145)
WBC # BLD AUTO: 4.5 K/UL (ref 4.6–13.2)

## 2022-09-30 PROCEDURE — 99232 SBSQ HOSP IP/OBS MODERATE 35: CPT | Performed by: FAMILY MEDICINE

## 2022-09-30 PROCEDURE — 74011250636 HC RX REV CODE- 250/636: Performed by: FAMILY MEDICINE

## 2022-09-30 PROCEDURE — 94668 MNPJ CHEST WALL SBSQ: CPT

## 2022-09-30 PROCEDURE — 74011636637 HC RX REV CODE- 636/637: Performed by: INTERNAL MEDICINE

## 2022-09-30 PROCEDURE — 94667 MNPJ CHEST WALL 1ST: CPT

## 2022-09-30 PROCEDURE — 77010033678 HC OXYGEN DAILY

## 2022-09-30 PROCEDURE — 74011000250 HC RX REV CODE- 250: Performed by: FAMILY MEDICINE

## 2022-09-30 PROCEDURE — 36415 COLL VENOUS BLD VENIPUNCTURE: CPT

## 2022-09-30 PROCEDURE — 94761 N-INVAS EAR/PLS OXIMETRY MLT: CPT

## 2022-09-30 PROCEDURE — 65270000046 HC RM TELEMETRY

## 2022-09-30 PROCEDURE — 74011000250 HC RX REV CODE- 250: Performed by: INTERNAL MEDICINE

## 2022-09-30 PROCEDURE — 74011000250 HC RX REV CODE- 250: Performed by: STUDENT IN AN ORGANIZED HEALTH CARE EDUCATION/TRAINING PROGRAM

## 2022-09-30 PROCEDURE — 80048 BASIC METABOLIC PNL TOTAL CA: CPT

## 2022-09-30 PROCEDURE — 74011250637 HC RX REV CODE- 250/637: Performed by: STUDENT IN AN ORGANIZED HEALTH CARE EDUCATION/TRAINING PROGRAM

## 2022-09-30 PROCEDURE — 74011250636 HC RX REV CODE- 250/636: Performed by: STUDENT IN AN ORGANIZED HEALTH CARE EDUCATION/TRAINING PROGRAM

## 2022-09-30 PROCEDURE — 94640 AIRWAY INHALATION TREATMENT: CPT

## 2022-09-30 PROCEDURE — 85025 COMPLETE CBC W/AUTO DIFF WBC: CPT

## 2022-09-30 PROCEDURE — 99233 SBSQ HOSP IP/OBS HIGH 50: CPT | Performed by: INTERNAL MEDICINE

## 2022-09-30 PROCEDURE — 74011250637 HC RX REV CODE- 250/637: Performed by: FAMILY MEDICINE

## 2022-09-30 RX ORDER — ACETYLCYSTEINE 200 MG/ML
1 SOLUTION ORAL; RESPIRATORY (INHALATION)
Status: DISCONTINUED | OUTPATIENT
Start: 2022-09-30 | End: 2022-10-03 | Stop reason: HOSPADM

## 2022-09-30 RX ORDER — PREDNISONE 20 MG/1
40 TABLET ORAL
Status: DISCONTINUED | OUTPATIENT
Start: 2022-09-30 | End: 2022-10-03 | Stop reason: HOSPADM

## 2022-09-30 RX ORDER — IPRATROPIUM BROMIDE AND ALBUTEROL SULFATE 2.5; .5 MG/3ML; MG/3ML
3 SOLUTION RESPIRATORY (INHALATION)
Status: DISCONTINUED | OUTPATIENT
Start: 2022-09-30 | End: 2022-10-03 | Stop reason: HOSPADM

## 2022-09-30 RX ADMIN — ACETYLCYSTEINE 200 MG: 200 SOLUTION ORAL; RESPIRATORY (INHALATION) at 04:05

## 2022-09-30 RX ADMIN — ACETYLCYSTEINE 200 MG: 200 SOLUTION ORAL; RESPIRATORY (INHALATION) at 13:32

## 2022-09-30 RX ADMIN — ARFORMOTEROL TARTRATE: 15 SOLUTION RESPIRATORY (INHALATION) at 08:41

## 2022-09-30 RX ADMIN — IPRATROPIUM BROMIDE AND ALBUTEROL SULFATE 3 ML: .5; 3 SOLUTION RESPIRATORY (INHALATION) at 22:31

## 2022-09-30 RX ADMIN — MIDODRINE HYDROCHLORIDE 2.5 MG: 2.5 TABLET ORAL at 17:02

## 2022-09-30 RX ADMIN — ENOXAPARIN SODIUM 40 MG: 100 INJECTION SUBCUTANEOUS at 10:15

## 2022-09-30 RX ADMIN — SODIUM CHLORIDE, PRESERVATIVE FREE 10 ML: 5 INJECTION INTRAVENOUS at 05:35

## 2022-09-30 RX ADMIN — SODIUM CHLORIDE, PRESERVATIVE FREE 10 ML: 5 INJECTION INTRAVENOUS at 21:42

## 2022-09-30 RX ADMIN — HYDROCODONE BITARTRATE AND ACETAMINOPHEN 1 TABLET: 5; 325 TABLET ORAL at 13:26

## 2022-09-30 RX ADMIN — ARFORMOTEROL TARTRATE: 15 SOLUTION RESPIRATORY (INHALATION) at 22:31

## 2022-09-30 RX ADMIN — GUAIFENESIN 600 MG: 600 TABLET, EXTENDED RELEASE ORAL at 10:14

## 2022-09-30 RX ADMIN — IPRATROPIUM BROMIDE AND ALBUTEROL SULFATE 3 ML: .5; 3 SOLUTION RESPIRATORY (INHALATION) at 04:05

## 2022-09-30 RX ADMIN — HYDROCODONE BITARTRATE AND ACETAMINOPHEN 2 TABLET: 5; 325 TABLET ORAL at 22:00

## 2022-09-30 RX ADMIN — THERA TABS 1 TABLET: TAB at 10:14

## 2022-09-30 RX ADMIN — IPRATROPIUM BROMIDE AND ALBUTEROL SULFATE 3 ML: .5; 3 SOLUTION RESPIRATORY (INHALATION) at 08:41

## 2022-09-30 RX ADMIN — SODIUM CHLORIDE, PRESERVATIVE FREE 40 ML: 5 INJECTION INTRAVENOUS at 13:26

## 2022-09-30 RX ADMIN — LORAZEPAM 1 MG: 1 TABLET ORAL at 22:00

## 2022-09-30 RX ADMIN — GUAIFENESIN 600 MG: 600 TABLET, EXTENDED RELEASE ORAL at 21:39

## 2022-09-30 RX ADMIN — PREDNISONE 40 MG: 20 TABLET ORAL at 17:02

## 2022-09-30 RX ADMIN — AZITHROMYCIN MONOHYDRATE 500 MG: 250 TABLET ORAL at 10:14

## 2022-09-30 RX ADMIN — METHYLPREDNISOLONE SODIUM SUCCINATE 40 MG: 40 INJECTION, POWDER, FOR SOLUTION INTRAMUSCULAR; INTRAVENOUS at 05:35

## 2022-09-30 RX ADMIN — IPRATROPIUM BROMIDE AND ALBUTEROL SULFATE 3 ML: .5; 3 SOLUTION RESPIRATORY (INHALATION) at 13:31

## 2022-09-30 RX ADMIN — ACETYLCYSTEINE 200 MG: 200 SOLUTION ORAL; RESPIRATORY (INHALATION) at 22:31

## 2022-09-30 RX ADMIN — ROPINIROLE HYDROCHLORIDE 1 MG: 1 TABLET, FILM COATED ORAL at 21:39

## 2022-09-30 RX ADMIN — MIDODRINE HYDROCHLORIDE 2.5 MG: 2.5 TABLET ORAL at 10:14

## 2022-09-30 RX ADMIN — FAMOTIDINE 20 MG: 20 TABLET ORAL at 09:00

## 2022-09-30 NOTE — PROGRESS NOTES
Discharge/Transition Planning       1000: Discussed in IDR that will need oxygen orders and o2 testing so can get new home o2. Oxygen still at home from Hospice if there is overlap   Extreme weather conditions will prevent o2 delivery till Monday     1145: Sent wheelchair order with required documentation to [place]/ [email or web address]     1400: Received call from ALEXANDREαjessieλιρρόης Giselle at [facility] notifying CM she was processing wheelchair  order but then notified they are on emergency delivery only till Monday

## 2022-09-30 NOTE — PROGRESS NOTES
[facility]  Progress Note    Patient: Suman Staples Age: 78 y.o. : 1945 MR#: [id number] SSN: [id number]  Date: 2022     Subjective/24-hour events:     Shortness of breath somewhat better today. Still having some issues with confusion at times. Sister present at bedside. Assessment:   COPD with acute exacerbation  Suspected acute on chronic bronchitis  Debility     Plan:   Continue azithromycin, nebulizers and steroid. Solu-Medrol transitioned  to prednisone. Goals of care outlined by palliative medicine team.  Documentation completed. PT/OT, mobilize as tolerated. Disposition recommendations to be determined. Continue supportive care otherwise. Follow. Case discussed with:  [x]Patient  [x]Family  [x]Nursing  [x]Case Management  DVT Prophylaxis:  []Lovenox  []Hep SQ  []SCDs  []Coumadin   []On Heparin gtt    Objective:   Visit Vitals  BP (!) 141/94 (BP 1 Location: Right upper arm, BP Patient Position: At rest)   Pulse 77   Temp 97.9 °F (36.6 °C)   Resp 20   Ht 5' 5\" (1.651 m)   Wt 54.9 kg (121 lb 0.5 oz)   SpO2 96%   BMI 20.14 kg/m²       General:  In NAD. Nontoxic-appearing. Cardiovascular:  RRR. Pulmonary: Breath sounds decreased throughout, bilateral expiratory wheezing noted. No accessory muscle use currently. GI:  Abdomen soft, NTTP. Extremities:  Warm, no edema or ischemia. Neuro:  Awake and alert but mildly confused. .  Moves extremities spontaneously, motor grossly nonfocal.    Labs:    Recent Results (from the past 24 hour(s))   METABOLIC PANEL, BASIC    Collection Time: 22  4:30 AM   Result Value Ref Range    Sodium 136 136 - 145 mmol/L    Potassium 4.2 3.5 - 5.5 mmol/L    Chloride 94 (L) 100 - 111 mmol/L    CO2 37 (H) 21 - 32 mmol/L    Anion gap 5 3.0 - 18 mmol/L    Glucose 178 (H) 74 - 99 mg/dL    BUN 29 (H) 7.0 - 18 MG/DL    Creatinine 0.55 (L) 0.6 - 1.3 MG/DL    BUN/Creatinine ratio 53 (H) 12 - 20      GFR est AA >60 >60 ml/min/1.73m2    GFR est non-AA >60 >60 ml/min/1.73m2    Calcium 8.6 8.5 - 10.1 MG/DL   CBC WITH AUTOMATED DIFF    Collection Time: 09/30/22  4:30 AM   Result Value Ref Range    WBC 4.5 (L) 4.6 - 13.2 K/uL    RBC 3.37 (L) 4.20 - 5.30 M/uL    HGB 10.8 (L) 12.0 - 16.0 g/dL    HCT 34.9 (L) 35.0 - 45.0 %    .6 (H) 78.0 - 100.0 FL    MCH 32.0 24.0 - 34.0 PG    MCHC 30.9 (L) 31.0 - 37.0 g/dL    RDW 13.2 11.6 - 14.5 %    PLATELET 221 117 - 635 K/uL    MPV 10.2 9.2 - 11.8 FL    NRBC 0.0 0  WBC    ABSOLUTE NRBC 0.00 0.00 - 0.01 K/uL    NEUTROPHILS 91 (H) 40 - 73 %    LYMPHOCYTES 9 (L) 21 - 52 %    MONOCYTES 0 (L) 3 - 10 %    EOSINOPHILS 0 0 - 5 %    BASOPHILS 0 0 - 2 %    IMMATURE GRANULOCYTES 0 %    ABS. NEUTROPHILS 4.1 1.8 - 8.0 K/UL    ABS. LYMPHOCYTES 0.4 (L) 0.9 - 3.6 K/UL    ABS. MONOCYTES 0.0 (L) 0.05 - 1.2 K/UL    ABS. EOSINOPHILS 0.0 0.0 - 0.4 K/UL    ABS. BASOPHILS 0.0 0.0 - 0.1 K/UL    ABS. IMM.  GRANS. 0.0 K/UL    DF MANUAL      PLATELET COMMENTS ADEQUATE PLATELETS      RBC COMMENTS ANISOCYTOSIS  SLIGHT           Signed By: Jannet Medina MD     September 30, 2022

## 2022-09-30 NOTE — PROGRESS NOTES
1 Cortney Mann      Patient _______________________________________ was evaluated on _________________ for a manual wheelchair for home use. Patient has a diagnosis(s) of: ______________________________________________________________________ that causes mobility limitations in the home that significantly impairs the ability to participate in mobility -related activities of daily living (MRADLs) like-   Please check all that apply:  __ Toileting __Bathing __ Grooming __ Dressing __ Feeding      Patient has the following mobility condition/limitation that - Please check one:  __ Prevents the beneficiary from accomplishing MRADL entirely  __ Places the beneficiary at reasonably determined heightened risk of morbidity or mortality secondary to the attempts to perform MRADL  __ Prevents the beneficiary from completing an MRADL within a reasonable time frame      The Patients: (Please check all that apply)  __ Mobility limitation cannot be sufficiently resolved by the use of appropriately fitted cane or walker   __ Home provides adequate access between rooms, maneuvering space, and surfaces for use of the manual wheelchair that is provided  __ Use of a manual wheelchair will significantly improve the ability to participate in MRADLs and will be able to use it at home on a regular basis  __ Has not expressed an unwillingness to use the manual wheelchair in the home  __ Has sufficient upper extremity function and other physical and mental capabilities needed to safely self-propel the wheelchair that is provided in the home during a typical day. Limitations of strength, endurance and range of motion, or coordination, presence of pain, or deformity or absence of one or both upper extremities are relevant to the assessment of upper extremity function.    __ Has a caregiver who is available, willing and able to provide assistance with the wheelchair     _________ Height                _________ Weight _______________________________                   ______________________  Magalys Ratel                                                         NPI

## 2022-09-30 NOTE — PROGRESS NOTES
Bedside shift change report given to Antonio (oncoming nurse) by [facility] (offgoing nurse). Report included the following information SBAR, Kardex, Intake/Output, MAR, and Recent Results.

## 2022-09-30 NOTE — PROGRESS NOTES
[facility]: [phone] ([phone])  [facility]: [phone]    Goals of care defined. Palliative team will sign off. Advance Medical Directive completed. Please consult team as needed, if appropriate. Thank you for the Palliative Medicine consult and allowing us to participate in the care of Ms. Dana Park. CODE STATUS - DNR/DNI no Feeding Tube POST form on file.        Emmanuel SIMONSN, RN, [facility]  Palliative Medicine Inpatient RN  [place] 78: [phone]

## 2022-09-30 NOTE — PROGRESS NOTES
Problem: Falls - Risk of  Goal: *Absence of Falls  Description: Document Jujuda Gamma Fall Risk and appropriate interventions in the flowsheet.   Outcome: Progressing Towards Goal  Note: Fall Risk Interventions:  Mobility Interventions: Bed/chair exit alarm, Communicate number of staff needed for ambulation/transfer, Patient to call before getting OOB, Strengthening exercises (ROM-active/passive)         Medication Interventions: Evaluate medications/consider consulting pharmacy, Patient to call before getting OOB, Teach patient to arise slowly    Elimination Interventions: Bed/chair exit alarm, Call light in reach, Patient to call for help with toileting needs, Stay With Me (per policy)    History of Falls Interventions: Bed/chair exit alarm, Door open when patient unattended, Investigate reason for fall, Room close to nurse's station, Evaluate medications/consider consulting pharmacy

## 2022-09-30 NOTE — PROGRESS NOTES
[facility]  Pulmonary, Critical Care, and Sleep Medicine    Pulmonary Medicine Progress Note    Name: Jagjit Peña MRN: [id number]  : 1945 Hospital: [address] [place] [address] Dr  Date: 2022       Subjective:  Pt seems improved this AM. On 5L NC. Appears comfortable. Coughing up thick phlegm. No fevers.      Patient Active Problem List   Diagnosis Code    COPD, severe ([address] J44.9    Nicotine addiction F17.200    Hemoptysis R04.2    Lumbar spinal stenosis M48.061    Facet arthritis of lumbar region M47.816    Neuritis of lower extremity G57.90    Emphysema of lung ([address] J43.9    COPD with acute exacerbation ([facility]) J44.1    Muscle spasm of back M62.830    Sacroiliac joint pain M53.3    Current chronic use of systemic steroids Z79.52    Maldonado's cyst of knee M71.20    Acute exacerbation of chronic obstructive pulmonary disease (COPD) ([facility]) J44.1    Degenerative disc disease, lumbar M51.36    Left-sided low back pain with sciatica M54.42    COPD with exacerbation ([address] J44.1    Pneumonia J18.9    Abdominal pain R10.9    Abdominal mass R19.00    Colon cancer without distant metastasis ([facility]) C18.9    Colon polyps K63.5    Post-op pain G89.18    Obstruction of bowel ([address] K56.609    Ileus following gastrointestinal surgery ([address] K91.89, K56.7    Hypotension I95.9    COPD exacerbation (HCC) J44.1    Dizziness R42    Non-rheumatic mitral regurgitation I34.0    Pulmonary HTN ([facility]) I27.20    Chest pain R07.9    Chronic diastolic congestive heart failure ([facility]) I50.32    ERRONEOUS ENCOUNTER--DISREGARD     History of colon cancer Z85.038    Ileostomy present ([address] Z93.2    Incisional hernia K43.2    Community acquired pneumonia J18.9    Acute bronchitis with chronic obstructive pulmonary disease (COPD) ([address] J44.0, J20.9    Respiratory failure ([address] J96.90    Severe protein-calorie malnutrition ([address] E43    Emphysema lung (HCC) J43.9    Debility R53.81       Assessment:  Acute on chronic hypoxemic hypercapnic respiratory failure due to COPD with exacerbation. Negative COVID and influenza swabs  End stage COPD on LTOT  Former heavy smoker  History of colorectal cancer S/p colectomy/colostomy with subsequent takedown. History of DVT  Spinal stenosis  RLS  Chronic back pain    Impression/Plan:  Continue scheduled nebulizers- brovana/pulmicort, aliciabs. Azithromycin for AECOPD, no other ABX needed. Change to PO prednisone 40mg, on 10mg daily. Resume Spiriva Respimat on discharge  Prophylaxis and glycemic control issues per primary team  Titrate FiO2 to saturation greater than 90%  Will follow    FiO2 to keep SpO2 >=92%, HOB >=30 degree, aspiration precautions, aggressive pulmonary toileting, incentive spirometry. Other issues management by primary team and respective consultants. Events and notes from last 24 hours reviewed. Discussed with patient and family, answered all questions to their satisfaction. Care plan discussed with nursing.      Labs and images personally seen and available reports reviewed  All current medicines are reviewed       Medications- Current:  Current Facility-Administered Medications   Medication Dose Route Frequency    azithromycin (ZITHROMAX) tablet 500 mg  500 mg Oral DAILY    guaiFENesin ER (MUCINEX) tablet 600 mg  600 mg Oral Q12H    albuterol-ipratropium (DUO-NEB) 2.5 MG-0.5 MG/3 ML  3 mL Nebulization Q4H RT    acetylcysteine (MUCOMYST) 200 mg/mL (20 %) solution 200 mg  1 mL Nebulization Q8H    acetaminophen (TYLENOL) tablet 500 mg  500 mg Oral Q6H PRN    albuterol (PROVENTIL VENTOLIN) nebulizer solution 2.5 mg  2.5 mg Nebulization Q4H PRN    famotidine (PEPCID) tablet 20 mg  20 mg Oral DAILY    LORazepam (ATIVAN) tablet 0.5 mg  0.5 mg Oral BID PRN    midodrine (PROAMATINE) tablet 2.5 mg  2.5 mg Oral BID WITH MEALS    rOPINIRole (REQUIP) tablet 1 mg  1 mg Oral QHS    simethicone (MYLICON) tablet 80 mg  80 mg Oral Q6H PRN    therapeutic multivitamin (THERAGRAN) tablet 1 Tablet  1 Tablet Oral DAILY    sodium chloride (NS) flush 5-40 mL  5-40 mL IntraVENous Q8H    sodium chloride (NS) flush 5-40 mL  5-40 mL IntraVENous PRN    polyethylene glycol (MIRALAX) packet 17 g  17 g Oral DAILY PRN    ondansetron (ZOFRAN ODT) tablet 4 mg  4 mg Oral Q8H PRN    Or    ondansetron (ZOFRAN) injection 4 mg  4 mg IntraVENous Q6H PRN    enoxaparin (LOVENOX) injection 40 mg  40 mg SubCUTAneous DAILY    arformoterol 15 mcg/budesonide 0.5 mg neb solution   Nebulization BID RT    LORazepam (ATIVAN) tablet 1 mg  1 mg Oral BID PRN    HYDROcodone-acetaminophen (NORCO) 5-325 mg per tablet 1-2 Tablet  1-2 Tablet Oral Q4H PRN    lidocaine 4 % patch 1 Patch  1 Patch TransDERmal Q24H       Objective:  Vital Signs:    Visit Vitals  BP (!) 141/94 (BP 1 Location: Right upper arm, BP Patient Position: At rest)   Pulse 77   Temp 97.9 °F (36.6 °C)   Resp 20   Ht 5' 5\" (1.651 m)   Wt 54.9 kg (121 lb 0.5 oz)   SpO2 96%   BMI 20.14 kg/m²      O2 Device: Nasal cannula  O2 Flow Rate (L/min): 4 l/min  Temp (24hrs), Av.9 °F (36.6 °C), Min:97.6 °F (36.4 °C), Max:98 °F (36.7 °C)      Intake/Output:   Last shift:      701 - 1900  In: -   Out: 1 [Urine:1]  Last 3 shifts: 1901 -  07  In: 100 [P.O.:100]  Out: -     Intake/Output Summary (Last 24 hours) at 2022 1124  Last data filed at 2022 0910  Gross per 24 hour   Intake --   Output 1 ml   Net -1 ml       Physical Exam:     General/Neurology: Alert, Awake  Head:   Normocephalic, without obvious abnormality  Eye:   PERRL, EOM intact  Oral:  Mucus membranes moist  Lung:   B/l air entry fair, thick rhonchi, expiratory wheezing. Heart:   S1 S2 present  Abdomen:  Soft, non tender, BS+nt   Extremities:  No pedal edema.    Skin:   Dry, intact  Data:      Recent Results (from the past 24 hour(s))   METABOLIC PANEL, BASIC    Collection Time: 22  4:30 AM   Result Value Ref Range    Sodium 136 136 - 145 mmol/L    Potassium 4.2 3.5 - 5.5 mmol/L    Chloride 94 (L) 100 - 111 mmol/L    CO2 37 (H) 21 - 32 mmol/L    Anion gap 5 3.0 - 18 mmol/L    Glucose 178 (H) 74 - 99 mg/dL    BUN 29 (H) 7.0 - 18 MG/DL    Creatinine 0.55 (L) 0.6 - 1.3 MG/DL    BUN/Creatinine ratio 53 (H) 12 - 20      GFR est AA >60 >60 ml/min/1.73m2    GFR est non-AA >60 >60 ml/min/1.73m2    Calcium 8.6 8.5 - 10.1 MG/DL   CBC WITH AUTOMATED DIFF    Collection Time: 09/30/22  4:30 AM   Result Value Ref Range    WBC 4.5 (L) 4.6 - 13.2 K/uL    RBC 3.37 (L) 4.20 - 5.30 M/uL    HGB 10.8 (L) 12.0 - 16.0 g/dL    HCT 34.9 (L) 35.0 - 45.0 %    .6 (H) 78.0 - 100.0 FL    MCH 32.0 24.0 - 34.0 PG    MCHC 30.9 (L) 31.0 - 37.0 g/dL    RDW 13.2 11.6 - 14.5 %    PLATELET 642 456 - 211 K/uL    MPV 10.2 9.2 - 11.8 FL    NRBC 0.0 0  WBC    ABSOLUTE NRBC 0.00 0.00 - 0.01 K/uL    NEUTROPHILS 91 (H) 40 - 73 %    LYMPHOCYTES 9 (L) 21 - 52 %    MONOCYTES 0 (L) 3 - 10 %    EOSINOPHILS 0 0 - 5 %    BASOPHILS 0 0 - 2 %    IMMATURE GRANULOCYTES 0 %    ABS. NEUTROPHILS 4.1 1.8 - 8.0 K/UL    ABS. LYMPHOCYTES 0.4 (L) 0.9 - 3.6 K/UL    ABS. MONOCYTES 0.0 (L) 0.05 - 1.2 K/UL    ABS. EOSINOPHILS 0.0 0.0 - 0.4 K/UL    ABS. BASOPHILS 0.0 0.0 - 0.1 K/UL    ABS. IMM. GRANS. 0.0 K/UL    DF MANUAL      PLATELET COMMENTS ADEQUATE PLATELETS      RBC COMMENTS ANISOCYTOSIS  SLIGHT             Chemistry   Recent Labs     09/30/22  0430 09/27/22  1710   * 128*    138   K 4.2 3.5   CL 94* 94*   CO2 37* 38*   BUN 29* 20*   CREA 0.55* 0.46*   CA 8.6 9.2   AGAP 5 6   BUCR 53* 43*   AP  --  109   TP  --  6.1*   ALB  --  3.0*   GLOB  --  3.1   AGRAT  --  1.0       CBC w/Diff   Recent Labs     09/30/22  0430 09/27/22  1710   WBC 4.5* 9.6   RBC 3.37* 3.64*   HGB 10.8* 11.3*   HCT 34.9* 36.4    315   GRANS 91* 78*   LYMPH 9* 8*   EOS 0 0       ABG   Recent Labs     09/27/22  1815   PHI 7.38   PCO2I 66.4*   PO2I 67*   HCO3I 39.1*       Micro  No results for input(s): SDES, CULT in the last 72 hours.   No results for input(s): CULT in the last 72 hours. CT (Most Recent) Results from Hospital Encounter encounter on 09/27/22    CTA CHEST W OR W WO CONT    Narrative  CT Chest Pulmonary Embolism Protocol    CPT CODE: 11242    INDICATION: Chest pain. Question pulmonary embolism. TECHNIQUE: Thin collimation axial images obtained through the level of the  pulmonary arteries with additional imaging through the chest following the  uneventful administration of nonionic intravenous contrast.  Images  reconstructed into coronal and sagittal maximum intensity projections for better  evaluation of the tortuous and overlapping pulmonary vascular structures and to  reduce patient radiation dose. The patient received . All CT scans are performed using dose optimization techniques as appropriate to  the performed exam including the following: Automated exposure control,  adjustment of mA and/or kV according to patient size, and use of iterative  reconstructive technique. COMPARISON: .    FINDINGS:    This is a technically adequate study. No filling defects are appreciated within the main, left, right, lobar or  visualized segmental pulmonary arteries to suggest embolism. The thoracic aorta is not aneurysmal.  No evidence for dissection. Heart size  is within normal limits. No pericardial effusion. No enlarged axillary or mediastinal lymphadenopathy. Mildly enlarged right  hilar nodes stable. Moderately severe emphysema. Probably some secretions in the bronchus  intermedius. Some secretions in the right lower lobe bronchi. There are a few  peripheral focal opacities anterolateral right lung base image 56 and posterior  right lower lobe image 39. Haylie Rast No effusions. No pneumothorax. Visualized upper abdominal structures are without gross abnormality. Osseous structures are intact. Impression  1. No finding for acute pulmonary embolism. .  2. Moderately severe emphysema.   3. Probable chronic bronchitis with lower lobe bronchial secretions. 4. Peripheral focal opacities in the right lung base which are likely  inflammatory. A follow-up CT scan in 6-8 weeks to assess for resolution  suggested. 5. Likely reactive right hilar mild adenopathy, stable. XR (Most Recent). CXR reviewed by me and compared with previous CXR Results from Hospital Encounter encounter on 09/27/22    XR CHEST PORT    Narrative  EXAMINATION: Chest single view    INDICATION: Worsening shortness of breath    COMPARISON: 9/27/2022    FINDINGS: Subtle frontal view the chest. Medius on silhouette normal in size. Minimal aortic arch calcifications. Patchy streaky densities throughout the  lungs, somewhat coarse appearing, most notable at the right lung base. No  confluent consolidation. No definite pneumothorax. No obvious acute osseous  findings. Impression  Patchy streaky densities, most notable at right lung base, likely at least  partly due to chronic interstitial thickening, but component of  infectious/inflammatory infiltrate also suspected, better delineated on recent  CT. See my orders for details     Total care time exclusive of procedures with complex decision making, coordination of care and counseling patient performed and > 50% time spent in face to face evaluation as mentioned above.     Anna Levin MD  Critical Care Medicine

## 2022-09-30 NOTE — PROGRESS NOTES
9/30/2022  19:15- Bedside report received from previous nurse, Oz Montiel LPN. Patient awake  Sitter at the bedside with the patient. Continue to monitor- call light in reach.

## 2022-10-01 LAB
ANION GAP SERPL CALC-SCNC: 6 MMOL/L (ref 3–18)
BUN SERPL-MCNC: 32 MG/DL (ref 7–18)
BUN/CREAT SERPL: 52 (ref 12–20)
CALCIUM SERPL-MCNC: 8.6 MG/DL (ref 8.5–10.1)
CHLORIDE SERPL-SCNC: 94 MMOL/L (ref 100–111)
CO2 SERPL-SCNC: 38 MMOL/L (ref 21–32)
CREAT SERPL-MCNC: 0.61 MG/DL (ref 0.6–1.3)
ERYTHROCYTE [DISTWIDTH] IN BLOOD BY AUTOMATED COUNT: 13.2 % (ref 11.6–14.5)
GLUCOSE SERPL-MCNC: 171 MG/DL (ref 74–99)
HCT VFR BLD AUTO: 32.6 % (ref 35–45)
HGB BLD-MCNC: 10.3 G/DL (ref 12–16)
MCH RBC QN AUTO: 30.7 PG (ref 24–34)
MCHC RBC AUTO-ENTMCNC: 31.6 G/DL (ref 31–37)
MCV RBC AUTO: 97.3 FL (ref 78–100)
NRBC # BLD: 0 K/UL (ref 0–0.01)
NRBC BLD-RTO: 0 PER 100 WBC
PLATELET # BLD AUTO: 299 K/UL (ref 135–420)
PMV BLD AUTO: 10.2 FL (ref 9.2–11.8)
POTASSIUM SERPL-SCNC: 4 MMOL/L (ref 3.5–5.5)
RBC # BLD AUTO: 3.35 M/UL (ref 4.2–5.3)
SODIUM SERPL-SCNC: 138 MMOL/L (ref 136–145)
WBC # BLD AUTO: 9.3 K/UL (ref 4.6–13.2)

## 2022-10-01 PROCEDURE — 85027 COMPLETE CBC AUTOMATED: CPT

## 2022-10-01 PROCEDURE — 74011250636 HC RX REV CODE- 250/636: Performed by: STUDENT IN AN ORGANIZED HEALTH CARE EDUCATION/TRAINING PROGRAM

## 2022-10-01 PROCEDURE — 94761 N-INVAS EAR/PLS OXIMETRY MLT: CPT

## 2022-10-01 PROCEDURE — 80048 BASIC METABOLIC PNL TOTAL CA: CPT

## 2022-10-01 PROCEDURE — 74011250637 HC RX REV CODE- 250/637: Performed by: STUDENT IN AN ORGANIZED HEALTH CARE EDUCATION/TRAINING PROGRAM

## 2022-10-01 PROCEDURE — 77010033678 HC OXYGEN DAILY

## 2022-10-01 PROCEDURE — 99233 SBSQ HOSP IP/OBS HIGH 50: CPT | Performed by: INTERNAL MEDICINE

## 2022-10-01 PROCEDURE — 74011636637 HC RX REV CODE- 636/637: Performed by: INTERNAL MEDICINE

## 2022-10-01 PROCEDURE — 74011000250 HC RX REV CODE- 250: Performed by: INTERNAL MEDICINE

## 2022-10-01 PROCEDURE — 2709999900 HC NON-CHARGEABLE SUPPLY

## 2022-10-01 PROCEDURE — 74011000250 HC RX REV CODE- 250: Performed by: STUDENT IN AN ORGANIZED HEALTH CARE EDUCATION/TRAINING PROGRAM

## 2022-10-01 PROCEDURE — 65270000046 HC RM TELEMETRY

## 2022-10-01 PROCEDURE — 74011250637 HC RX REV CODE- 250/637: Performed by: FAMILY MEDICINE

## 2022-10-01 PROCEDURE — 99232 SBSQ HOSP IP/OBS MODERATE 35: CPT | Performed by: EMERGENCY MEDICINE

## 2022-10-01 PROCEDURE — 36415 COLL VENOUS BLD VENIPUNCTURE: CPT

## 2022-10-01 PROCEDURE — 94669 MECHANICAL CHEST WALL OSCILL: CPT

## 2022-10-01 PROCEDURE — 94640 AIRWAY INHALATION TREATMENT: CPT

## 2022-10-01 RX ADMIN — HYDROCODONE BITARTRATE AND ACETAMINOPHEN 1 TABLET: 5; 325 TABLET ORAL at 22:09

## 2022-10-01 RX ADMIN — GUAIFENESIN 600 MG: 600 TABLET, EXTENDED RELEASE ORAL at 09:07

## 2022-10-01 RX ADMIN — THERA TABS 1 TABLET: TAB at 09:07

## 2022-10-01 RX ADMIN — PREDNISONE 40 MG: 20 TABLET ORAL at 17:39

## 2022-10-01 RX ADMIN — ACETYLCYSTEINE 200 MG: 200 SOLUTION ORAL; RESPIRATORY (INHALATION) at 20:56

## 2022-10-01 RX ADMIN — IPRATROPIUM BROMIDE AND ALBUTEROL SULFATE 3 ML: .5; 3 SOLUTION RESPIRATORY (INHALATION) at 20:58

## 2022-10-01 RX ADMIN — IPRATROPIUM BROMIDE AND ALBUTEROL SULFATE 3 ML: .5; 3 SOLUTION RESPIRATORY (INHALATION) at 08:40

## 2022-10-01 RX ADMIN — LORAZEPAM 1 MG: 1 TABLET ORAL at 22:09

## 2022-10-01 RX ADMIN — HYDROCODONE BITARTRATE AND ACETAMINOPHEN 1 TABLET: 5; 325 TABLET ORAL at 13:47

## 2022-10-01 RX ADMIN — ARFORMOTEROL TARTRATE: 15 SOLUTION RESPIRATORY (INHALATION) at 08:40

## 2022-10-01 RX ADMIN — SODIUM CHLORIDE, PRESERVATIVE FREE 10 ML: 5 INJECTION INTRAVENOUS at 09:11

## 2022-10-01 RX ADMIN — AZITHROMYCIN MONOHYDRATE 500 MG: 250 TABLET ORAL at 09:07

## 2022-10-01 RX ADMIN — IPRATROPIUM BROMIDE AND ALBUTEROL SULFATE 3 ML: .5; 3 SOLUTION RESPIRATORY (INHALATION) at 14:52

## 2022-10-01 RX ADMIN — SODIUM CHLORIDE, PRESERVATIVE FREE 10 ML: 5 INJECTION INTRAVENOUS at 05:31

## 2022-10-01 RX ADMIN — ACETYLCYSTEINE 200 MG: 200 SOLUTION ORAL; RESPIRATORY (INHALATION) at 08:40

## 2022-10-01 RX ADMIN — FAMOTIDINE 20 MG: 20 TABLET ORAL at 09:08

## 2022-10-01 RX ADMIN — ARFORMOTEROL TARTRATE: 15 SOLUTION RESPIRATORY (INHALATION) at 20:56

## 2022-10-01 RX ADMIN — ENOXAPARIN SODIUM 40 MG: 100 INJECTION SUBCUTANEOUS at 09:08

## 2022-10-01 RX ADMIN — LORAZEPAM 1 MG: 1 TABLET ORAL at 09:18

## 2022-10-01 RX ADMIN — ACETYLCYSTEINE 200 MG: 200 SOLUTION ORAL; RESPIRATORY (INHALATION) at 14:52

## 2022-10-01 RX ADMIN — GUAIFENESIN 600 MG: 600 TABLET, EXTENDED RELEASE ORAL at 22:09

## 2022-10-01 RX ADMIN — SODIUM CHLORIDE, PRESERVATIVE FREE 10 ML: 5 INJECTION INTRAVENOUS at 22:09

## 2022-10-01 RX ADMIN — SODIUM CHLORIDE, PRESERVATIVE FREE 10 ML: 5 INJECTION INTRAVENOUS at 15:55

## 2022-10-01 RX ADMIN — ROPINIROLE HYDROCHLORIDE 1 MG: 1 TABLET, FILM COATED ORAL at 22:09

## 2022-10-01 RX ADMIN — HYDROCODONE BITARTRATE AND ACETAMINOPHEN 1 TABLET: 5; 325 TABLET ORAL at 09:18

## 2022-10-01 NOTE — PROGRESS NOTES
[facility]    Patient: Andrea Robison Age: 78 y.o. : 1945 MR#: [id number] SSN: [id number]  Date: 10/1/2022     Subjective/24-hour events:     Patient is sitting in bed in no apparent distress. Has dyspnea on minimal exertion    Assessment:   End-stage COPD with acute exacerbation  Acute on chronic hypoxic respiratory failure  Suspected acute on chronic bronchitis  Debility   History of tobacco use  Chronic back pain    Plan:   Continue antibiotics, bronchial hygiene  Steroids. Pulmonary is following  Palliative care input noted  Await PT OT  Discussed with patient    Case discussed with:  [x]Patient  []Family  [x]Nursing  []Case Management  DVT Prophylaxis:  [x]Lovenox  []Hep SQ  []SCDs  []Coumadin   []On Heparin gtt    Objective:   Visit Vitals  BP (!) 143/76 (BP 1 Location: Right upper arm, BP Patient Position: Semi fowlers; At rest)   Pulse 80   Temp 98.3 °F (36.8 °C)   Resp 18   Ht 5' 5\" (1.651 m)   Wt 54.9 kg (121 lb 0.5 oz)   SpO2 94%   BMI 20.14 kg/m²     General:  Awake, follows commands  Cardiovascular:  S1S2+, RRR  Pulmonary: Coarse breath sounds, has some wheezing  GI:  Soft, BS+, NT, ND  Extremities:  No edema  Patient is able to move all 4 extremities    Labs:    Recent Results (from the past 24 hour(s))   CBC W/O DIFF    Collection Time: 10/01/22  3:56 AM   Result Value Ref Range    WBC 9.3 4.6 - 13.2 K/uL    RBC 3.35 (L) 4.20 - 5.30 M/uL    HGB 10.3 (L) 12.0 - 16.0 g/dL    HCT 32.6 (L) 35.0 - 45.0 %    MCV 97.3 78.0 - 100.0 FL    MCH 30.7 24.0 - 34.0 PG    MCHC 31.6 31.0 - 37.0 g/dL    RDW 13.2 11.6 - 14.5 %    PLATELET 053 509 - 609 K/uL    MPV 10.2 9.2 - 11.8 FL    NRBC 0.0 0  WBC    ABSOLUTE NRBC 0.00 0.00 - 8.80 K/uL   METABOLIC PANEL, BASIC    Collection Time: 10/01/22  3:56 AM   Result Value Ref Range    Sodium 138 136 - 145 mmol/L    Potassium 4.0 3.5 - 5.5 mmol/L    Chloride 94 (L) 100 - 111 mmol/L    CO2 38 (H) 21 - 32 mmol/L Anion gap 6 3.0 - 18 mmol/L    Glucose 171 (H) 74 - 99 mg/dL    BUN 32 (H) 7.0 - 18 MG/DL    Creatinine 0.61 0.6 - 1.3 MG/DL    BUN/Creatinine ratio 52 (H) 12 - 20      GFR est AA >60 >60 ml/min/1.73m2    GFR est non-AA >60 >60 ml/min/1.73m2    Calcium 8.6 8.5 - 10.1 MG/DL       Signed By: Walt Ayers MD     October 1, 2022

## 2022-10-01 NOTE — PROGRESS NOTES
[facility]  Pulmonary, Critical Care, and Sleep Medicine    Pulmonary Medicine Progress Note    Name: Darrell Posey MRN: [id number]  : 1945 Hospital: [address] [place] [address] Dr  Date: 10/1/2022       Subjective:  Pt continues to improve. Having some chest pain from her fall. Breathing improved, less wheezing.      Patient Active Problem List   Diagnosis Code    COPD, severe ([address] J44.9    Nicotine addiction F17.200    Hemoptysis R04.2    Lumbar spinal stenosis M48.061    Facet arthritis of lumbar region M47.816    Neuritis of lower extremity G57.90    Emphysema of lung ([address] J43.9    COPD with acute exacerbation ([facility]) J44.1    Muscle spasm of back M62.830    Sacroiliac joint pain M53.3    Current chronic use of systemic steroids Z79.52    Maldonado's cyst of knee M71.20    Acute exacerbation of chronic obstructive pulmonary disease (COPD) ([facility]) J44.1    Degenerative disc disease, lumbar M51.36    Left-sided low back pain with sciatica M54.42    COPD with exacerbation ([address] J44.1    Pneumonia J18.9    Abdominal pain R10.9    Abdominal mass R19.00    Colon cancer without distant metastasis ([facility]) C18.9    Colon polyps K63.5    Post-op pain G89.18    Obstruction of bowel ([address] K56.609    Ileus following gastrointestinal surgery ([address] K91.89, K56.7    Hypotension I95.9    COPD exacerbation (HCC) J44.1    Dizziness R42    Non-rheumatic mitral regurgitation I34.0    Pulmonary HTN ([facility]) I27.20    Chest pain R07.9    Chronic diastolic congestive heart failure ([facility]) I50.32    ERRONEOUS ENCOUNTER--DISREGARD     History of colon cancer Z85.038    Ileostomy present ([address] Z93.2    Incisional hernia K43.2    Community acquired pneumonia J18.9    Acute bronchitis with chronic obstructive pulmonary disease (COPD) ([address] J44.0, J20.9    Respiratory failure ([address] J96.90    Severe protein-calorie malnutrition ([address] E43    Emphysema lung (HCC) J43.9    Debility R53.81       Assessment:  Acute on chronic hypoxemic hypercapnic respiratory failure due to COPD with exacerbation. Negative COVID and influenza swabs  End stage COPD on LTOT  Former heavy smoker  History of colorectal cancer S/p colectomy/colostomy with subsequent takedown. History of DVT  Spinal stenosis  RLS  Chronic back pain    Impression/Plan:  Continue scheduled nebulizers- brovana/pulmicort, jina. Azithromycin for AECOPD, no other ABX needed. Change to PO prednisone 40mg, on 10mg daily. Resume Spiriva Respimat on discharge  Prophylaxis and glycemic control issues per primary team  Titrate FiO2 to saturation greater than 90%  Will follow    FiO2 to keep SpO2 >=92%, HOB >=30 degree, aspiration precautions, aggressive pulmonary toileting, incentive spirometry. Other issues management by primary team and respective consultants. Events and notes from last 24 hours reviewed. Discussed with patient and family, answered all questions to their satisfaction. Care plan discussed with nursing.      Labs and images personally seen and available reports reviewed  All current medicines are reviewed       Medications- Current:  Current Facility-Administered Medications   Medication Dose Route Frequency    albuterol-ipratropium (DUO-NEB) 2.5 MG-0.5 MG/3 ML  3 mL Nebulization Q6H RT    predniSONE (DELTASONE) tablet 40 mg  40 mg Oral DAILY WITH DINNER    acetylcysteine (MUCOMYST) 200 mg/mL (20 %) solution 200 mg  1 mL Nebulization Q6H RT    azithromycin (ZITHROMAX) tablet 500 mg  500 mg Oral DAILY    guaiFENesin ER (MUCINEX) tablet 600 mg  600 mg Oral Q12H    acetaminophen (TYLENOL) tablet 500 mg  500 mg Oral Q6H PRN    albuterol (PROVENTIL VENTOLIN) nebulizer solution 2.5 mg  2.5 mg Nebulization Q4H PRN    famotidine (PEPCID) tablet 20 mg  20 mg Oral DAILY    LORazepam (ATIVAN) tablet 0.5 mg  0.5 mg Oral BID PRN    midodrine (PROAMATINE) tablet 2.5 mg  2.5 mg Oral BID WITH MEALS    rOPINIRole (REQUIP) tablet 1 mg  1 mg Oral QHS    simethicone (MYLICON) tablet 80 mg  80 mg Oral Q6H PRN    therapeutic multivitamin (THERAGRAN) tablet 1 Tablet  1 Tablet Oral DAILY    sodium chloride (NS) flush 5-40 mL  5-40 mL IntraVENous Q8H    sodium chloride (NS) flush 5-40 mL  5-40 mL IntraVENous PRN    polyethylene glycol (MIRALAX) packet 17 g  17 g Oral DAILY PRN    ondansetron (ZOFRAN ODT) tablet 4 mg  4 mg Oral Q8H PRN    Or    ondansetron (ZOFRAN) injection 4 mg  4 mg IntraVENous Q6H PRN    enoxaparin (LOVENOX) injection 40 mg  40 mg SubCUTAneous DAILY    arformoterol 15 mcg/budesonide 0.5 mg neb solution   Nebulization BID RT    LORazepam (ATIVAN) tablet 1 mg  1 mg Oral BID PRN    HYDROcodone-acetaminophen (NORCO) 5-325 mg per tablet 1-2 Tablet  1-2 Tablet Oral Q4H PRN    lidocaine 4 % patch 1 Patch  1 Patch TransDERmal Q24H       Objective:  Vital Signs:    Visit Vitals  /80 (BP 1 Location: Right upper arm, BP Patient Position: At rest;Lying left side)   Pulse (!) 102   Temp 98.3 °F (36.8 °C)   Resp 17   Ht 5' 5\" (1.651 m)   Wt 54.9 kg (121 lb 0.5 oz)   SpO2 93%   BMI 20.14 kg/m²      O2 Device: Nasal cannula  O2 Flow Rate (L/min): 4 l/min  Temp (24hrs), Av.2 °F (36.8 °C), Min:98 °F (36.7 °C), Max:98.5 °F (36.9 °C)      Intake/Output:   Last shift:      10/01 0701 - 10/01 1900  In: 240 [P.O.:240]  Out: -   Last 3 shifts: 1901 - 10/01 0700  In: 120 [P.O.:120]  Out: 1 [Urine:1]    Intake/Output Summary (Last 24 hours) at 10/1/2022 1710  Last data filed at 10/1/2022 1011  Gross per 24 hour   Intake 360 ml   Output --   Net 360 ml         Physical Exam:     General/Neurology: Alert, Awake  Head:   Normocephalic, without obvious abnormality  Eye:   PERRL, EOM intact  Oral:  Mucus membranes moist  Lung:   B/l air entry fair, thick rhonchi, expiratory wheezing. Heart:   S1 S2 present  Abdomen:  Soft, non tender, BS+nt   Extremities:  No pedal edema.    Skin:   Dry, intact  Data:      Recent Results (from the past 24 hour(s))   CBC W/O DIFF    Collection Time: 10/01/22 3:56 AM   Result Value Ref Range    WBC 9.3 4.6 - 13.2 K/uL    RBC 3.35 (L) 4.20 - 5.30 M/uL    HGB 10.3 (L) 12.0 - 16.0 g/dL    HCT 32.6 (L) 35.0 - 45.0 %    MCV 97.3 78.0 - 100.0 FL    MCH 30.7 24.0 - 34.0 PG    MCHC 31.6 31.0 - 37.0 g/dL    RDW 13.2 11.6 - 14.5 %    PLATELET 560 660 - 616 K/uL    MPV 10.2 9.2 - 11.8 FL    NRBC 0.0 0  WBC    ABSOLUTE NRBC 0.00 0.00 - 6.09 K/uL   METABOLIC PANEL, BASIC    Collection Time: 10/01/22  3:56 AM   Result Value Ref Range    Sodium 138 136 - 145 mmol/L    Potassium 4.0 3.5 - 5.5 mmol/L    Chloride 94 (L) 100 - 111 mmol/L    CO2 38 (H) 21 - 32 mmol/L    Anion gap 6 3.0 - 18 mmol/L    Glucose 171 (H) 74 - 99 mg/dL    BUN 32 (H) 7.0 - 18 MG/DL    Creatinine 0.61 0.6 - 1.3 MG/DL    BUN/Creatinine ratio 52 (H) 12 - 20      GFR est AA >60 >60 ml/min/1.73m2    GFR est non-AA >60 >60 ml/min/1.73m2    Calcium 8.6 8.5 - 10.1 MG/DL         Chemistry   Recent Labs     10/01/22  0356 09/30/22  0430   * 178*    136   K 4.0 4.2   CL 94* 94*   CO2 38* 37*   BUN 32* 29*   CREA 0.61 0.55*   CA 8.6 8.6   AGAP 6 5   BUCR 52* 53*         CBC w/Diff   Recent Labs     10/01/22  0356 09/30/22  0430   WBC 9.3 4.5*   RBC 3.35* 3.37*   HGB 10.3* 10.8*   HCT 32.6* 34.9*    255   GRANS  --  91*   LYMPH  --  9*   EOS  --  0         ABG   No results for input(s): PHI, PHI, POC2, PCO2I, PO2, PO2I, HCO3, HCO3I, FIO2, FIO2I in the last 72 hours. Micro  No results for input(s): SDES, CULT in the last 72 hours. No results for input(s): CULT in the last 72 hours. CT (Most Recent) Results from Hospital Encounter encounter on 09/27/22    CTA CHEST W OR W WO CONT    Narrative  CT Chest Pulmonary Embolism Protocol    CPT CODE: 72994    INDICATION: Chest pain. Question pulmonary embolism.     TECHNIQUE: Thin collimation axial images obtained through the level of the  pulmonary arteries with additional imaging through the chest following the  uneventful administration of nonionic intravenous contrast.  Images  reconstructed into coronal and sagittal maximum intensity projections for better  evaluation of the tortuous and overlapping pulmonary vascular structures and to  reduce patient radiation dose. The patient received . All CT scans are performed using dose optimization techniques as appropriate to  the performed exam including the following: Automated exposure control,  adjustment of mA and/or kV according to patient size, and use of iterative  reconstructive technique. COMPARISON: .    FINDINGS:    This is a technically adequate study. No filling defects are appreciated within the main, left, right, lobar or  visualized segmental pulmonary arteries to suggest embolism. The thoracic aorta is not aneurysmal.  No evidence for dissection. Heart size  is within normal limits. No pericardial effusion. No enlarged axillary or mediastinal lymphadenopathy. Mildly enlarged right  hilar nodes stable. Moderately severe emphysema. Probably some secretions in the bronchus  intermedius. Some secretions in the right lower lobe bronchi. There are a few  peripheral focal opacities anterolateral right lung base image 56 and posterior  right lower lobe image 39. Dwain Noel No effusions. No pneumothorax. Visualized upper abdominal structures are without gross abnormality. Osseous structures are intact. Impression  1. No finding for acute pulmonary embolism. .  2. Moderately severe emphysema. 3. Probable chronic bronchitis with lower lobe bronchial secretions. 4. Peripheral focal opacities in the right lung base which are likely  inflammatory. A follow-up CT scan in 6-8 weeks to assess for resolution  suggested. 5. Likely reactive right hilar mild adenopathy, stable. XR (Most Recent).  CXR reviewed by me and compared with previous CXR Results from Hospital Encounter encounter on 09/27/22    XR CHEST PORT    Narrative  EXAMINATION: Chest single view    INDICATION: Worsening shortness of breath    COMPARISON: 9/27/2022    FINDINGS: Subtle frontal view the chest. Medius on silhouette normal in size. Minimal aortic arch calcifications. Patchy streaky densities throughout the  lungs, somewhat coarse appearing, most notable at the right lung base. No  confluent consolidation. No definite pneumothorax. No obvious acute osseous  findings. Impression  Patchy streaky densities, most notable at right lung base, likely at least  partly due to chronic interstitial thickening, but component of  infectious/inflammatory infiltrate also suspected, better delineated on recent  CT. See my orders for details     Total care time exclusive of procedures with complex decision making, coordination of care and counseling patient performed and > 50% time spent in face to face evaluation as mentioned above.     Josie Laura MD  Critical Care Medicine

## 2022-10-01 NOTE — PROGRESS NOTES
Problem: Falls - Risk of  Goal: *Absence of Falls  Description: Document Johnathan Arnold Fall Risk and appropriate interventions in the flowsheet.   Outcome: Progressing Towards Goal  Note: Fall Risk Interventions:  Mobility Interventions: Bed/chair exit alarm, Patient to call before getting OOB    Medication Interventions: Bed/chair exit alarm, Patient to call before getting OOB, Teach patient to arise slowly    Elimination Interventions: Call light in reach, Bed/chair exit alarm, Patient to call for help with toileting needs, Toileting schedule/hourly rounds    History of Falls Interventions: Room close to nurse's station, Bed/chair exit alarm         Problem: Pain  Goal: *Control of Pain  Outcome: Progressing Towards Goal

## 2022-10-01 NOTE — PROGRESS NOTES
Bedside and Verbal shift change report given to Fifi RN (oncoming nurse) by Orestes Cantor RN (offgoing nurse). Report included the following information SBAR, Kardex, Intake/Output, MAR, and Recent Results.

## 2022-10-02 LAB
ANION GAP SERPL CALC-SCNC: 2 MMOL/L (ref 3–18)
BASOPHILS # BLD: 0 K/UL (ref 0–0.1)
BASOPHILS NFR BLD: 0 % (ref 0–2)
BUN SERPL-MCNC: 29 MG/DL (ref 7–18)
BUN/CREAT SERPL: 44 (ref 12–20)
CALCIUM SERPL-MCNC: 8.7 MG/DL (ref 8.5–10.1)
CHLORIDE SERPL-SCNC: 94 MMOL/L (ref 100–111)
CO2 SERPL-SCNC: 38 MMOL/L (ref 21–32)
CREAT SERPL-MCNC: 0.66 MG/DL (ref 0.6–1.3)
DIFFERENTIAL METHOD BLD: ABNORMAL
EOSINOPHIL # BLD: 0 K/UL (ref 0–0.4)
EOSINOPHIL NFR BLD: 0 % (ref 0–5)
ERYTHROCYTE [DISTWIDTH] IN BLOOD BY AUTOMATED COUNT: 13.2 % (ref 11.6–14.5)
GLUCOSE SERPL-MCNC: 179 MG/DL (ref 74–99)
HCT VFR BLD AUTO: 36 % (ref 35–45)
HGB BLD-MCNC: 11.4 G/DL (ref 12–16)
IMM GRANULOCYTES # BLD AUTO: 0.1 K/UL (ref 0–0.04)
IMM GRANULOCYTES NFR BLD AUTO: 2 % (ref 0–0.5)
LYMPHOCYTES # BLD: 0.5 K/UL (ref 0.9–3.6)
LYMPHOCYTES NFR BLD: 6 % (ref 21–52)
MAGNESIUM SERPL-MCNC: 2.1 MG/DL (ref 1.6–2.6)
MCH RBC QN AUTO: 30.8 PG (ref 24–34)
MCHC RBC AUTO-ENTMCNC: 31.7 G/DL (ref 31–37)
MCV RBC AUTO: 97.3 FL (ref 78–100)
MONOCYTES # BLD: 0.2 K/UL (ref 0.05–1.2)
MONOCYTES NFR BLD: 3 % (ref 3–10)
NEUTS SEG # BLD: 7.4 K/UL (ref 1.8–8)
NEUTS SEG NFR BLD: 89 % (ref 40–73)
NRBC # BLD: 0 K/UL (ref 0–0.01)
NRBC BLD-RTO: 0 PER 100 WBC
PLATELET # BLD AUTO: 304 K/UL (ref 135–420)
PMV BLD AUTO: 10.1 FL (ref 9.2–11.8)
POTASSIUM SERPL-SCNC: 3.9 MMOL/L (ref 3.5–5.5)
RBC # BLD AUTO: 3.7 M/UL (ref 4.2–5.3)
SODIUM SERPL-SCNC: 134 MMOL/L (ref 136–145)
WBC # BLD AUTO: 8.3 K/UL (ref 4.6–13.2)

## 2022-10-02 PROCEDURE — 74011636637 HC RX REV CODE- 636/637: Performed by: INTERNAL MEDICINE

## 2022-10-02 PROCEDURE — 2709999900 HC NON-CHARGEABLE SUPPLY

## 2022-10-02 PROCEDURE — 74011250637 HC RX REV CODE- 250/637: Performed by: FAMILY MEDICINE

## 2022-10-02 PROCEDURE — 65270000046 HC RM TELEMETRY

## 2022-10-02 PROCEDURE — 85025 COMPLETE CBC W/AUTO DIFF WBC: CPT

## 2022-10-02 PROCEDURE — 36415 COLL VENOUS BLD VENIPUNCTURE: CPT

## 2022-10-02 PROCEDURE — 74011250637 HC RX REV CODE- 250/637: Performed by: STUDENT IN AN ORGANIZED HEALTH CARE EDUCATION/TRAINING PROGRAM

## 2022-10-02 PROCEDURE — 77010033678 HC OXYGEN DAILY

## 2022-10-02 PROCEDURE — 74011000250 HC RX REV CODE- 250: Performed by: STUDENT IN AN ORGANIZED HEALTH CARE EDUCATION/TRAINING PROGRAM

## 2022-10-02 PROCEDURE — 74011250636 HC RX REV CODE- 250/636: Performed by: STUDENT IN AN ORGANIZED HEALTH CARE EDUCATION/TRAINING PROGRAM

## 2022-10-02 PROCEDURE — 94760 N-INVAS EAR/PLS OXIMETRY 1: CPT

## 2022-10-02 PROCEDURE — 74011000250 HC RX REV CODE- 250: Performed by: INTERNAL MEDICINE

## 2022-10-02 PROCEDURE — 94761 N-INVAS EAR/PLS OXIMETRY MLT: CPT

## 2022-10-02 PROCEDURE — 94640 AIRWAY INHALATION TREATMENT: CPT

## 2022-10-02 PROCEDURE — 99232 SBSQ HOSP IP/OBS MODERATE 35: CPT | Performed by: EMERGENCY MEDICINE

## 2022-10-02 PROCEDURE — 80048 BASIC METABOLIC PNL TOTAL CA: CPT

## 2022-10-02 PROCEDURE — 83735 ASSAY OF MAGNESIUM: CPT

## 2022-10-02 RX ORDER — HYDROCODONE BITARTRATE AND ACETAMINOPHEN 5; 325 MG/1; MG/1
1 TABLET ORAL
Status: DISCONTINUED | OUTPATIENT
Start: 2022-10-02 | End: 2022-10-03 | Stop reason: HOSPADM

## 2022-10-02 RX ADMIN — IPRATROPIUM BROMIDE AND ALBUTEROL SULFATE 3 ML: .5; 3 SOLUTION RESPIRATORY (INHALATION) at 21:24

## 2022-10-02 RX ADMIN — ACETYLCYSTEINE 200 MG: 200 SOLUTION ORAL; RESPIRATORY (INHALATION) at 14:33

## 2022-10-02 RX ADMIN — ENOXAPARIN SODIUM 40 MG: 100 INJECTION SUBCUTANEOUS at 08:37

## 2022-10-02 RX ADMIN — LORAZEPAM 0.5 MG: 0.5 TABLET ORAL at 22:01

## 2022-10-02 RX ADMIN — HYDROCODONE BITARTRATE AND ACETAMINOPHEN 2 TABLET: 5; 325 TABLET ORAL at 08:37

## 2022-10-02 RX ADMIN — ACETYLCYSTEINE 200 MG: 200 SOLUTION ORAL; RESPIRATORY (INHALATION) at 02:00

## 2022-10-02 RX ADMIN — ARFORMOTEROL TARTRATE: 15 SOLUTION RESPIRATORY (INHALATION) at 21:24

## 2022-10-02 RX ADMIN — GUAIFENESIN 600 MG: 600 TABLET, EXTENDED RELEASE ORAL at 21:25

## 2022-10-02 RX ADMIN — AZITHROMYCIN MONOHYDRATE 500 MG: 250 TABLET ORAL at 08:37

## 2022-10-02 RX ADMIN — ACETYLCYSTEINE 200 MG: 200 SOLUTION ORAL; RESPIRATORY (INHALATION) at 21:25

## 2022-10-02 RX ADMIN — ACETYLCYSTEINE 800 MG: 200 SOLUTION ORAL; RESPIRATORY (INHALATION) at 08:01

## 2022-10-02 RX ADMIN — IPRATROPIUM BROMIDE AND ALBUTEROL SULFATE 3 ML: .5; 3 SOLUTION RESPIRATORY (INHALATION) at 08:02

## 2022-10-02 RX ADMIN — SODIUM CHLORIDE, PRESERVATIVE FREE 10 ML: 5 INJECTION INTRAVENOUS at 21:36

## 2022-10-02 RX ADMIN — SODIUM CHLORIDE, PRESERVATIVE FREE 10 ML: 5 INJECTION INTRAVENOUS at 17:52

## 2022-10-02 RX ADMIN — FAMOTIDINE 20 MG: 20 TABLET ORAL at 08:37

## 2022-10-02 RX ADMIN — SODIUM CHLORIDE, PRESERVATIVE FREE 10 ML: 5 INJECTION INTRAVENOUS at 06:27

## 2022-10-02 RX ADMIN — LORAZEPAM 1 MG: 1 TABLET ORAL at 12:48

## 2022-10-02 RX ADMIN — GUAIFENESIN 600 MG: 600 TABLET, EXTENDED RELEASE ORAL at 08:37

## 2022-10-02 RX ADMIN — HYDROCODONE BITARTRATE AND ACETAMINOPHEN 2 TABLET: 5; 325 TABLET ORAL at 17:03

## 2022-10-02 RX ADMIN — MIDODRINE HYDROCHLORIDE 2.5 MG: 2.5 TABLET ORAL at 08:37

## 2022-10-02 RX ADMIN — IPRATROPIUM BROMIDE AND ALBUTEROL SULFATE 3 ML: .5; 3 SOLUTION RESPIRATORY (INHALATION) at 01:32

## 2022-10-02 RX ADMIN — HYDROCODONE BITARTRATE AND ACETAMINOPHEN 2 TABLET: 5; 325 TABLET ORAL at 01:54

## 2022-10-02 RX ADMIN — IPRATROPIUM BROMIDE AND ALBUTEROL SULFATE 3 ML: .5; 3 SOLUTION RESPIRATORY (INHALATION) at 14:32

## 2022-10-02 RX ADMIN — PREDNISONE 40 MG: 20 TABLET ORAL at 17:37

## 2022-10-02 RX ADMIN — THERA TABS 1 TABLET: TAB at 08:37

## 2022-10-02 RX ADMIN — ARFORMOTEROL TARTRATE: 15 SOLUTION RESPIRATORY (INHALATION) at 08:00

## 2022-10-02 RX ADMIN — ROPINIROLE HYDROCHLORIDE 1 MG: 1 TABLET, FILM COATED ORAL at 21:25

## 2022-10-02 NOTE — PROGRESS NOTES
[facility]  Progress Note    Patient: Angelique Gordon Age: 78 y.o. : 1945 MR#: [id number] SSN: [id number]  Date: 10/2/2022     Subjective/24-hour events:     Patient is sitting in bed in no apparent distress, starting to feel better. Currently on 4 L via nasal cannula. Patient is in good spirits and is looking forward to going home soon    Assessment:   End-stage COPD with acute exacerbation  Acute on chronic hypoxic respiratory failure. On 4 L via nasal cannula at baseline  Suspected acute on chronic bronchitis  Debility   History of tobacco use  Chronic back pain  Anxiety  Patient was on home hospice care but now wishes to discontinue home hospice and wishes to go home with home health care    Plan:   Bronchial hygiene. Antibiotics  Taper steroids. Pulmonary is following  While under hospice care patient has been receiving significant doses of benzodiazepines and opioids. We have decreased the dosing of the opioids and the benzodiazepines. Patient and family understand that they will have to follow-up with her outpatient provider team for any long-term prescriptions of these medications. Palliative care input noted  PT OT  Discussed with patient. .  I also discussed with patient's son and daughter-in-law over the phone regarding patient's care and discharge plans    Disposition-Home with home health care  Anticipated date of discharge is October 3    Case discussed with:  [x]Patient  [x]Family  [x]Nursing  []Case Management  DVT Prophylaxis:  [x]Lovenox  []Hep SQ  []SCDs  []Coumadin   []On Heparin gtt    Objective:   Visit Vitals  /84 (BP 1 Location: Right upper arm, BP Patient Position: At rest)   Pulse 86   Temp 98.3 °F (36.8 °C)   Resp 18   Ht 5' 5\" (1.651 m)   Wt 50.8 kg (112 lb)   SpO2 97%   BMI 18.64 kg/m²     General:  Awake, alert  Cardiovascular:  S1S2+, RRR  Pulmonary: Coarse breath sounds bilaterally  GI:  Soft, BS+, NT, ND  Extremities:  No edema  Patient is able to move all 4 extremities    Labs:    Recent Results (from the past 24 hour(s))   CBC WITH AUTOMATED DIFF    Collection Time: 10/02/22  1:45 AM   Result Value Ref Range    WBC 8.3 4.6 - 13.2 K/uL    RBC 3.70 (L) 4.20 - 5.30 M/uL    HGB 11.4 (L) 12.0 - 16.0 g/dL    HCT 36.0 35.0 - 45.0 %    MCV 97.3 78.0 - 100.0 FL    MCH 30.8 24.0 - 34.0 PG    MCHC 31.7 31.0 - 37.0 g/dL    RDW 13.2 11.6 - 14.5 %    PLATELET 606 680 - 910 K/uL    MPV 10.1 9.2 - 11.8 FL    NRBC 0.0 0  WBC    ABSOLUTE NRBC 0.00 0.00 - 0.01 K/uL    NEUTROPHILS 89 (H) 40 - 73 %    LYMPHOCYTES 6 (L) 21 - 52 %    MONOCYTES 3 3 - 10 %    EOSINOPHILS 0 0 - 5 %    BASOPHILS 0 0 - 2 %    IMMATURE GRANULOCYTES 2 (H) 0.0 - 0.5 %    ABS. NEUTROPHILS 7.4 1.8 - 8.0 K/UL    ABS. LYMPHOCYTES 0.5 (L) 0.9 - 3.6 K/UL    ABS. MONOCYTES 0.2 0.05 - 1.2 K/UL    ABS. EOSINOPHILS 0.0 0.0 - 0.4 K/UL    ABS. BASOPHILS 0.0 0.0 - 0.1 K/UL    ABS. IMM.  GRANS. 0.1 (H) 0.00 - 0.04 K/UL    DF AUTOMATED     METABOLIC PANEL, BASIC    Collection Time: 10/02/22  1:45 AM   Result Value Ref Range    Sodium 134 (L) 136 - 145 mmol/L    Potassium 3.9 3.5 - 5.5 mmol/L    Chloride 94 (L) 100 - 111 mmol/L    CO2 38 (H) 21 - 32 mmol/L    Anion gap 2 (L) 3.0 - 18 mmol/L    Glucose 179 (H) 74 - 99 mg/dL    BUN 29 (H) 7.0 - 18 MG/DL    Creatinine 0.66 0.6 - 1.3 MG/DL    BUN/Creatinine ratio 44 (H) 12 - 20      GFR est AA >60 >60 ml/min/1.73m2    GFR est non-AA >60 >60 ml/min/1.73m2    Calcium 8.7 8.5 - 10.1 MG/DL   MAGNESIUM    Collection Time: 10/02/22  1:45 AM   Result Value Ref Range    Magnesium 2.1 1.6 - 2.6 mg/dL       Signed By: Brandie Chiu MD     October 2, 2022

## 2022-10-02 NOTE — ROUTINE PROCESS
On Sunday, October 2, 2022, at 1427 hours, Ms. Noe Alas refused her Chest Physio Therapy (CPT). She was wiling to do her tx. Ms. Surendra Ruvalcaba has been doing her [place] at bedside.

## 2022-10-02 NOTE — ROUTINE PROCESS
Bedside and verbal report received from [address] (offgoing nurse).  Report included the following information; SBAR, MAR, LABS, Intake/output, Kardex, and summary of care

## 2022-10-02 NOTE — ROUTINE PROCESS
On Sunday, October 2, 2022 at 0757 hours, I went into Ms. Noland's room and asked if she wanted her tx and chest physio therapy (CPT). Ms. Margreta Landau was getting ready to eat her breakfast. Ms. Margreta Landau voiced that she wanted her breathing tx at this, however not her CPT. Breathing tx was administered. I will administer quality respiratory care until properly relieved.

## 2022-10-03 ENCOUNTER — TELEPHONE (OUTPATIENT)
Dept: PULMONOLOGY | Age: 77
End: 2022-10-03

## 2022-10-03 VITALS
BODY MASS INDEX: 18.66 KG/M2 | RESPIRATION RATE: 20 BRPM | SYSTOLIC BLOOD PRESSURE: 108 MMHG | HEIGHT: 65 IN | DIASTOLIC BLOOD PRESSURE: 51 MMHG | HEART RATE: 91 BPM | TEMPERATURE: 98.1 F | OXYGEN SATURATION: 94 % | WEIGHT: 112 LBS

## 2022-10-03 PROCEDURE — 77010033678 HC OXYGEN DAILY

## 2022-10-03 PROCEDURE — 97535 SELF CARE MNGMENT TRAINING: CPT

## 2022-10-03 PROCEDURE — 74011000250 HC RX REV CODE- 250: Performed by: STUDENT IN AN ORGANIZED HEALTH CARE EDUCATION/TRAINING PROGRAM

## 2022-10-03 PROCEDURE — 74011000250 HC RX REV CODE- 250: Performed by: INTERNAL MEDICINE

## 2022-10-03 PROCEDURE — 74011250637 HC RX REV CODE- 250/637: Performed by: EMERGENCY MEDICINE

## 2022-10-03 PROCEDURE — 74011250637 HC RX REV CODE- 250/637: Performed by: STUDENT IN AN ORGANIZED HEALTH CARE EDUCATION/TRAINING PROGRAM

## 2022-10-03 PROCEDURE — 74011250637 HC RX REV CODE- 250/637: Performed by: FAMILY MEDICINE

## 2022-10-03 PROCEDURE — 99239 HOSP IP/OBS DSCHRG MGMT >30: CPT | Performed by: EMERGENCY MEDICINE

## 2022-10-03 PROCEDURE — 97165 OT EVAL LOW COMPLEX 30 MIN: CPT

## 2022-10-03 PROCEDURE — 94640 AIRWAY INHALATION TREATMENT: CPT

## 2022-10-03 PROCEDURE — 97116 GAIT TRAINING THERAPY: CPT

## 2022-10-03 PROCEDURE — 74011250636 HC RX REV CODE- 250/636: Performed by: STUDENT IN AN ORGANIZED HEALTH CARE EDUCATION/TRAINING PROGRAM

## 2022-10-03 RX ORDER — LIDOCAINE 4 G/100G
PATCH TOPICAL
Qty: 10 PATCH | Refills: 0 | Status: SHIPPED | OUTPATIENT
Start: 2022-10-03

## 2022-10-03 RX ORDER — PREDNISONE 10 MG/1
TABLET ORAL
Qty: 42 TABLET | Refills: 0 | Status: SHIPPED | OUTPATIENT
Start: 2022-10-03

## 2022-10-03 RX ORDER — POLYETHYLENE GLYCOL 3350 17 G/17G
17 POWDER, FOR SOLUTION ORAL
Qty: 15 EACH | Refills: 0 | Status: SHIPPED | OUTPATIENT
Start: 2022-10-03

## 2022-10-03 RX ORDER — AZITHROMYCIN 500 MG/1
500 TABLET, FILM COATED ORAL ONCE
Qty: 1 TABLET | Refills: 0 | Status: SHIPPED | OUTPATIENT
Start: 2022-10-03 | End: 2022-10-03

## 2022-10-03 RX ORDER — NALOXONE HYDROCHLORIDE 4 MG/.1ML
SPRAY NASAL
Qty: 5 EACH | Refills: 0 | Status: SHIPPED | OUTPATIENT
Start: 2022-10-03

## 2022-10-03 RX ORDER — HYDROCODONE BITARTRATE AND ACETAMINOPHEN 5; 325 MG/1; MG/1
1 TABLET ORAL
Qty: 10 TABLET | Refills: 0 | Status: SHIPPED | OUTPATIENT
Start: 2022-10-03 | End: 2022-10-08

## 2022-10-03 RX ORDER — LORAZEPAM 0.5 MG/1
0.5 TABLET ORAL
Qty: 14 TABLET | Refills: 0 | Status: SHIPPED | OUTPATIENT
Start: 2022-10-03

## 2022-10-03 RX ORDER — GUAIFENESIN 600 MG/1
600 TABLET, EXTENDED RELEASE ORAL EVERY 12 HOURS
Qty: 10 TABLET | Refills: 0 | Status: SHIPPED | OUTPATIENT
Start: 2022-10-03 | End: 2022-10-08

## 2022-10-03 RX ADMIN — SODIUM CHLORIDE, PRESERVATIVE FREE 10 ML: 5 INJECTION INTRAVENOUS at 06:10

## 2022-10-03 RX ADMIN — IPRATROPIUM BROMIDE AND ALBUTEROL SULFATE 3 ML: .5; 3 SOLUTION RESPIRATORY (INHALATION) at 01:24

## 2022-10-03 RX ADMIN — HYDROCODONE BITARTRATE AND ACETAMINOPHEN 1 TABLET: 5; 325 TABLET ORAL at 01:26

## 2022-10-03 RX ADMIN — HYDROCODONE BITARTRATE AND ACETAMINOPHEN 1 TABLET: 5; 325 TABLET ORAL at 08:44

## 2022-10-03 RX ADMIN — IPRATROPIUM BROMIDE AND ALBUTEROL SULFATE 3 ML: .5; 3 SOLUTION RESPIRATORY (INHALATION) at 07:49

## 2022-10-03 RX ADMIN — AZITHROMYCIN MONOHYDRATE 500 MG: 250 TABLET ORAL at 08:44

## 2022-10-03 RX ADMIN — ACETYLCYSTEINE 200 MG: 200 SOLUTION ORAL; RESPIRATORY (INHALATION) at 13:18

## 2022-10-03 RX ADMIN — FAMOTIDINE 20 MG: 20 TABLET ORAL at 08:44

## 2022-10-03 RX ADMIN — IPRATROPIUM BROMIDE AND ALBUTEROL SULFATE 3 ML: .5; 3 SOLUTION RESPIRATORY (INHALATION) at 13:18

## 2022-10-03 RX ADMIN — ACETYLCYSTEINE 200 MG: 200 SOLUTION ORAL; RESPIRATORY (INHALATION) at 01:23

## 2022-10-03 RX ADMIN — ENOXAPARIN SODIUM 40 MG: 100 INJECTION SUBCUTANEOUS at 08:46

## 2022-10-03 RX ADMIN — LORAZEPAM 0.5 MG: 0.5 TABLET ORAL at 15:53

## 2022-10-03 RX ADMIN — GUAIFENESIN 600 MG: 600 TABLET, EXTENDED RELEASE ORAL at 08:44

## 2022-10-03 RX ADMIN — ARFORMOTEROL TARTRATE: 15 SOLUTION RESPIRATORY (INHALATION) at 07:49

## 2022-10-03 RX ADMIN — THERA TABS 1 TABLET: TAB at 08:44

## 2022-10-03 RX ADMIN — LORAZEPAM 0.5 MG: 0.5 TABLET ORAL at 11:32

## 2022-10-03 RX ADMIN — MIDODRINE HYDROCHLORIDE 2.5 MG: 2.5 TABLET ORAL at 12:04

## 2022-10-03 RX ADMIN — ACETYLCYSTEINE 200 MG: 200 SOLUTION ORAL; RESPIRATORY (INHALATION) at 07:48

## 2022-10-03 NOTE — PROGRESS NOTES
[facility]

## 2022-10-03 NOTE — PROGRESS NOTES
Problem: Mobility Impaired (Adult and Pediatric)  Goal: *Acute Goals and Plan of Care (Insert Text)  Description: Physical Therapy Goals  Initiated 9/28/2022 and to be accomplished within 7 day(s)  1. Patient will move from supine to sit and sit to supine  in bed with modified independence. 2.  Patient will transfer from bed to chair and chair to bed with modified independence using the least restrictive device. 3.  Patient will perform sit to stand with modified independence. 4.  Patient will ambulate with modified independence for 100 feet with the least restrictive device. 5.  Patient will ascend/descend 2 stairs with handrail(s) with contact guard assist.    PLOF: Patient was independent no AD PTA. She lives with family in single story home. Outcome: Progressing Towards Goal     PHYSICAL THERAPY TREATMENT    Patient: Dinesh Freeman (79 y.o. female)  Date: 10/3/2022  Diagnosis: Acute exacerbation of chronic obstructive pulmonary disease (COPD) ([facility] [J44.1]  Emphysema lung ([facility] [J43.9] <principal problem not specified>      Precautions: Fall, Aspiration  PLOF:     ASSESSMENT:  Patient agreeable to PT treatment. Supervision for bed mobility. CGA sit to stand to RW. She ambulates around the room with CGA for steadying and safety. Educated on activity pacing and rest breaks to prevent over exertion. Patient returns to supine, sitting present, and call bell in reach. Progression toward goals:   []      Improving appropriately and progressing toward goals  [x]      Improving slowly and progressing toward goals  []      Not making progress toward goals and plan of care will be adjusted     PLAN:  Patient continues to benefit from skilled intervention to address the above impairments. Continue treatment per established plan of care.     Further Equipment Recommendations for Discharge:  rolling walker and wheelchair     AMPAC: Current research shows that an AM-PAC score of 18 (14 without stairs) or greater is associated with a discharge to the patient's home setting. Based on an AM-PAC score of 18/24 (or **/20 if omitting stairs) and their current functional mobility deficits, it is recommended that the patient have 2-3 sessions per week of Physical Therapy at d/c to increase the patient's independence. This [facility] score should be considered in conjunction with interdisciplinary team recommendations to determine the most appropriate discharge setting. Patient's social support, diagnosis, medical stability, and prior level of function should also be taken into consideration. SUBJECTIVE:   Patient stated I am going home.     OBJECTIVE DATA SUMMARY:   Critical Behavior:  Neurologic State: Alert  Orientation Level: Oriented to person, Oriented to place, Oriented to situation  Cognition: Follows commands  Safety/Judgement: Fall prevention  Functional Mobility Training:  Bed Mobility:     Supine to Sit: Supervision  Sit to Supine: Supervision  Scooting: Supervision         Transfers:  Sit to Stand: Contact guard assistance  Stand to Sit: Supervision                             Balance:  Sitting: Intact  Standing: Impaired; With support  Standing - Static: Fair (F+)  Standing - Dynamic : Fair   Range Of Motion:   AROM: Within functional limits                               Posture:           Wheelchair Mobility:          Ambulation/Gait Training:  Distance (ft): 30 Feet (ft)  Assistive Device: Walker, rolling  Ambulation - Level of Assistance: Contact guard assistance        Gait Abnormalities: Decreased step clearance        Base of Support: Narrowed                 Pain:  Pain level pre-treatment: 0/10  Pain level post-treatment: 0/10   Pain Intervention(s): Medication (see MAR); Rest, Ice, Repositioning   Response to intervention: Nurse notified, See doc flow    Activity Tolerance:   Fair  Please refer to the flowsheet for vital signs taken during this treatment.   After treatment:   [] Patient left in no apparent distress sitting up in chair  [x] Patient left in no apparent distress in bed  [x] Call bell left within reach  [x] Nursing notified  [] Caregiver present  [] Bed alarm activated  [] SCDs applied      COMMUNICATION/EDUCATION:   []         Role of Physical Therapy in the acute care setting. [x]         Fall prevention education was provided and the patient/caregiver indicated understanding. [x]         Patient/family have participated as able in working toward goals and plan of care. [x]         Patient/family agree to work toward stated goals and plan of care. []         Patient understands intent and goals of therapy, but is neutral about his/her participation. []         Patient is unable to participate in stated goals/plan of care: ongoing with therapy staff.  []         Other:        Toño Schaefer, PT   Time Calculation: 14 mins    Dominic Holden AM-PAC® Basic Mobility Inpatient Short Form (6-Clicks) Version 2    How much HELP from another person does the patient currently need    (If the patient hasn't done an activity recently, how much help from another person do you think he/she would need if he/she tried?)   Total (Total A or Dep)   A Lot  (Mod to Max A)   A Little (Sup or Min A)   None (Mod I to I)   Turning from your back to your side while in a flat bed without using bedrails? [] 1 [] 2 [x] 3 [] 4   2. Moving from lying on your back to sitting on the side of a flat bed without using bedrails? [] 1 [] 2 [x] 3 [] 4   3. Moving to and from a bed to a chair (including a wheelchair)? [] 1 [] 2 [x] 3 [] 4   4. Standing up from a chair using your arms (e.g., wheelchair, or bedside chair)? [] 1 [] 2 [x] 3 [] 4   5. Walking in hospital room? [] 1 [] 2 [x] 3 [] 4   6. Climbing 3-5 steps with a railing?+   [] 1 [] 2 [x] 3 [] 4   +If stair climbing cannot be assessed, skip item #6. Sum responses from items 1-5.

## 2022-10-03 NOTE — PROGRESS NOTES
Problem: Self Care Deficits Care Plan (Adult)  Goal: *Acute Goals and Plan of Care (Insert Text)  Description: Occupational Therapy Goals  Initiated 10/3/2022 within 7 day(s). 1.  Patient will perform functional activity standing > 3 minutes with modified independence, F+ balance. 2.  Patient will perform upper body dressing with modified independence. 3.  Patient will perform lower body dressing with modified independence. 4.  Patient will perform toilet transfers with supervision/set-up. 5.  Patient will perform all aspects of toileting with modified independence. 6.  Patient will participate in upper extremity therapeutic exercise/activities with modified independence for 8 minutes. 7.  Patient will utilize energy conservation techniques during functional activities with verbal cues. Prior Level of Function: Patient was modified independent with self-care and functional mobility PTA. Outcome: Progressing Towards Goal      OCCUPATIONAL THERAPY EVALUATION    Patient: Tacos Low (54 y.o. female)  Date: 10/3/2022  Primary Diagnosis: Acute exacerbation of chronic obstructive pulmonary disease (COPD) ([facility] [J44.1]  Emphy[facility] lung [facility] [J43.9]  Precautions:   Fall, Aspiration      ASSESSMENT :  Patient cleared to participate in OT evaluation by RN. Upon entering the room, the patient was supine in bed with 4L nasal cannula donned, alert, and agreeable to participate in OT evaluation. Patient educated on the role of OT, evaluation process, and safety during this admission with patient verbalizing understanding. Patient educated on energy conservation techniques, pursed lip breathing, and self pacing during daily activities. Patient will benefit from energy conservation handout next session.  During the evaluation, the patient presented with decreased BUE strength, however BUEs present to be still functional. Patient demonstrated fair dynamic standing balance and verbalized understanding of not standing without a RW or staff member for safety. Patient supervision for bed mobility, stand by assist for lower body dressing seated EOB and contact guard assist - stand by assist for functional transfers. Based on the objective data described below, the patient presented with decreased independence, decreased strength, decreased endurance, decreased functional balance, and decreased functional mobility, which impede pt's function with basic self-care/ADL tasks. Patient will benefit from skilled intervention to address the above impairments. Patient's rehabilitation potential is considered to be Fair  Factors which may influence rehabilitation potential include:   []             None noted  [x]             Mental ability/status  [x]             Medical condition  [x]             Home/family situation and support systems  [x]             Safety awareness  []             Pain tolerance/management  []             Other:      PLAN :  Recommendations and Planned Interventions:   [x]               Self Care Training                  [x]      Therapeutic Activities  [x]               Functional Mobility Training   []      Cognitive Retraining  [x]               Therapeutic Exercises           [x]      Endurance Activities  [x]               Balance Training                    [x]      Neuromuscular Re-Education  []               Visual/Perceptual Training     [x]      Home Safety Training  [x]               Patient Education                   [x]      Family Training/Education  []               Other (comment):    Frequency/Duration: Patient will be followed by occupational therapy 3 - 5 times a week to address goals. Further Equipment Recommendations for Discharge: bedside commode     [facility]: Current research shows that an AM-PAC score of 18 or greater is associated with a discharge to the patient's home setting.   Based on an AM-PAC score of 19/24 and their current ADL deficits; it is recommended that the patient have 2-3 sessions per week of Occupational Therapy at d/c to increase the patient's independence. This AMPAC score should be considered in conjunction with interdisciplinary team recommendations to determine the most appropriate discharge setting. Patient's social support, diagnosis, medical stability, and prior level of function should also be taken into consideration.      SUBJECTIVE:   Patient stated I live with my son and daughter but I dont think they watch me as much as I need it     OBJECTIVE DATA SUMMARY:     Past Medical History:   Diagnosis Date    Anxiety     Arthritis     Asbestosis ([facility]     Asthma     Back problem     Baker's cyst     Balance problems     Chronic lung disease     Cigarette smoker     Clotting disorder ([facility] [address] [facility]     COPD (chronic obstructive pulmonary disease) ([facility])     oxygen 2/liters asbestosis    Depression     History of DVT (deep vein thrombosis)     Leg pain     Right    Lung disease     Nausea & vomiting     Osteoporosis     Restless leg syndrome     Spinal stenosis     Thromboembolus ([facility] [address]     Vitamin D deficiency      Past Surgical History:   Procedure Laterality Date    COLONOSCOPY N/A 9/22/2018    COLONOSCOPY w bx w polypectonies performed by Darren Tillman MD at [facility] N/A 11/6/2018    COLONOSCOPY performed by Doron Gama MD at [address] [place] [address] N/A 8/2/2019    COLONOSCOPY with polypectomies and biopsies performed by Nikki Pichardo MD at [facility]OSCOPY    HX APPENDECTOMY      HX BACK SURGERY      x4    HX BREAST BIOPSY      left    HX GI      exp lap and ileostomy    HX HEENT      cataract right    HX HYSTERECTOMY      HX LUMBAR LAMINECTOMY      HX MENISCUS REPAIR      HX ORTHOPAEDIC      Knee bakers cyst    HX POLYPECTOMY      right colectomy    HX TONSILLECTOMY      HX VEIN STRIPPING      MO LAP,SURG,COLECTOMY, PARTIAL, W/ANAST N/A 10/23/2018    Dr. Daniela Vaughn N/A 11/06/2018 Dr. Yevgeniy Burton      vein stripping     Barriers to Learning/Limitations: None  Compensate with: visual, verbal, tactile, kinesthetic cues/model    Home Situation:   Home Situation  Home Environment: Private residence  One/Two Story Residence: One story  Living Alone: No  Support Systems: Child(sarah), Other Family Member(s)  Patient Expects to be Discharged to[de-identified] Home with home health  Current DME Used/Available at Home: Areta Nay, rolling, Areta Nay, rollator, Shower chair  Tub or Shower Type: Tub/Shower combination  [x]  Right hand dominant   []  Left hand dominant    Cognitive/Behavioral Status:  Neurologic State: Alert  Orientation Level: Oriented to person;Oriented to place;Oriented to situation  Cognition: Follows commands  Safety/Judgement: Fall prevention    Skin: Intact  Edema: None noted    Vision/Perceptual:    Acuity: Within Defined Limits      Coordination: BUE     Fine Motor Skills-Upper: Left Intact; Right Intact    Gross Motor Skills-Upper: Left Intact; Right Intact    Balance:  Sitting: Intact  Standing: Impaired; With support  Standing - Static: Fair (F+)  Standing - Dynamic : Fair    Strength: BUE  Strength: Generally decreased, functional    Tone & Sensation: BUE  Tone: Normal  Sensation: Intact    Range of Motion: BUE  AROM: Within functional limits  Functional Mobility and Transfers for ADLs:  Bed Mobility:  Supine to Sit: Supervision  Sit to Supine: Supervision  Scooting: Supervision    Transfers:  Sit to Stand: Contact guard assistance  Stand to Sit: Supervision   Toilet Transfer : Contact guard assistance    ADL Assessment:   Feeding: Modified independent    Oral Facial Hygiene/Grooming: Supervision;Stand-by assistance;Contact guard assistance    Bathing: Stand-by assistance;Contact guard assistance    Upper Body Dressing: Stand-by assistance    Lower Body Dressing: Stand-by assistance;Contact guard assistance    Toileting: Stand by assistance;Contact guard assistance    ADL Intervention:  Lower Body Dressing Assistance  Dressing Assistance: Stand-by assistance  Socks: Stand-by assistance  Leg Crossed Method Used: Yes  Position Performed: Seated edge of bed    Cognitive Retraining  Safety/Judgement: Fall prevention    Pain:  Pain level pre-treatment: 0/10   Pain level post-treatment: 0/10   Pain Intervention(s): Medication (see MAR); Rest, Ice, Repositioning   Response to intervention: Nurse notified, See doc flow    Activity Tolerance:   Fair      Please refer to the flowsheet for vital signs taken during this treatment. After treatment:   [] Patient left in no apparent distress sitting up in chair  [x] Patient left in no apparent distress in bed  [x] Call bell left within reach  [x] Nursing notified  [] Caregiver present  [] Bed alarm activated    COMMUNICATION/EDUCATION:   [x] Role of Occupational Therapy in the acute care setting  [x] Home safety education was provided and the patient/caregiver indicated understanding. [x] Patient/family have participated as able in goal setting and plan of care. [x] Patient/family agree to work toward stated goals and plan of care. [] Patient understands intent and goals of therapy, but is neutral about his/her participation. [] Patient is unable to participate in goal setting and plan of care. Thank you for this referral.  Ruel Retana, OTR/L  Time Calculation: 27 mins    Eval Complexity: History: MEDIUM Complexity : Expanded review of history including physical, cognitive and psychosocial  history ; Examination: MEDIUM Complexity : 3-5 performance deficits relating to physical, cognitive , or psychosocial skils that result in activity limitations and / or participation restrictions;    Decision Making:LOW Complexity : No comorbidities that affect functional and no verbal or physical assistance needed to complete eval tasks     [employer] AM-PAC® Daily Activity Inpatient Short Form (6-Clicks)*    How much HELP from another person does the patient currently need    (If the patient hasn't done an activity recently, how much help from another person do you think he/she would need if he/she tried?)   Total (Total A or Dep)   A Lot  (Mod to Max A)   A Little (Sup or Min A)   None (Mod I to I)   Putting on and taking off regular lower body clothing? [] 1 [] 2 [x] 3 [] 4   2. Bathing (including washing, rinsing,      drying)? [] 1 [] 2 [x] 3 [] 4   3. Toileting, which includes using toilet, bedpan or urinal?   [] 1 [] 2 [x] 3 [] 4   4. Putting on and taking off regular upper body clothing? [] 1 [] 2 [x] 3 [] 4   5. Taking care of personal grooming such as brushing teeth? [] 1 [] 2 [x] 3 [] 4   6. Eating meals?    [] 1 [] 2 [] 3 [x] 4

## 2022-10-03 NOTE — PROGRESS NOTES
Comprehensive Nutrition Assessment    Type and Reason for Visit: Initial, Positive nutrition screen    Nutrition Recommendations/Plan:   Continue oral nutrition supplement to optimize nutrition intake opportunity: Ensure Enlive (each provides 350 kcal, 20g protein) TID    Continue current diet as tolerated by patient   Monitor PO intake, compliance of oral supplement, weight, labs, and plan of care during admission. Malnutrition Assessment:  Malnutrition Status:  Insufficient data (10/03/22 1406)    Context:  Chronic illness       Nutrition History and Allergies:   Past medical history: COPD, nicotine addiction, hemoptysis, lumber spinal stenosis, pneumonia, abdominal pain and mass, colon cancer, colon polyps, obstruction of bowel, hypotension, ileostomy present, severe protein-calorie malnutrition, end stage emphysema on home oxygen (4L NC). NKFA. Weight history per chart review:  CBW: 10/02/22 : 50.8 kg (112 lb), < 1 year: 07/29/21 : 54.9 kg (121 lb). Weight trending down: -7.4% change x 1 year (not significant). Nutrition Assessment:    Visited pt in room, pt unavailable currently with PT/OT. Pt admitted after a fall and was also short of breath; She was without her home oxygen for an unknown period of time per MD note. Pt tolerating current diet with 51-75% PO intake at most meals and fair acceptance of oral supplements. Current lab shows low Na (134) and elevated Glu (179) and BUN (29). Discharge pending. Nutrition Related Findings:    Last BM 9/30-formed. Output: 220mL (urine). Pertinent Medications: pepcid, midodrine, MVI, miralax, zofran, lovenox.  Wound Type: None     Current Nutrition Intake & Therapies:  Average Meal Intake: 51-75%  Average Supplement Intake: 26-50%  ADULT ORAL NUTRITION SUPPLEMENT Breakfast, Lunch, Dinner; Standard High Calorie/High Protein  ADULT DIET Regular    Anthropometric Measures:  Height: 5' 5\" (165.1 cm)  Ideal Body Weight (IBW): 125 lbs (57 kg)  Admission Body Weight: 111 lb 15.9 oz  Current Body Wt:  50.8 kg (111 lb 15.9 oz), 89.6 % IBW.  Stated  Current BMI (kg/m2): 18.6  Usual Body Weight: 55.3 kg (122 lb)  % Weight Change (Calculated): -8.2  Weight Adjustment: No adjustment  BMI Category: Underweight (BMI less than 22) age over 72    Estimated Daily Nutrient Needs:  Energy Requirements Based On: Formula (MSJ x 1.2-1.4)  Weight Used for Energy Requirements: Current  Energy (kcal/day): 5479-1058  Weight Used for Protein Requirements: Current (0.8-1.1)  Protein (g/day): 41-56  Method Used for Fluid Requirements: 1 ml/kcal  Fluid (ml/day): 0383-4662    Nutrition Diagnosis:   Increased nutrient needs related to impaired respiratory function, increased demand for energy/nutrients, inadequate protein-energy intake as evidenced by poor intake prior to admission, BMI, intake 51-75%, intake 26-50%    Nutrition Interventions:   Food and/or Nutrient Delivery: Continue current diet, Continue oral nutrition supplement, Mineral supplement, Vitamin supplement  Nutrition Education/Counseling: Education not indicated, No recommendations at this time  Coordination of Nutrition Care: Continue to monitor while inpatient       Goals:     Goals: Meet at least 75% of estimated needs, by next RD assessment       Nutrition Monitoring and Evaluation:   Behavioral-Environmental Outcomes: None identified  Food/Nutrient Intake Outcomes: Diet advancement/tolerance, Food and nutrient intake, Supplement intake, Vitamin/mineral intake  Physical Signs/Symptoms Outcomes: Biochemical data, GI status, Weight, Meal time behavior, Nutrition focused physical findings    Discharge Planning:    Continue current diet, Continue oral nutrition supplement    Cristiane Andino MA, RDN, LD   Contact: [phone]

## 2022-10-03 NOTE — ROUTINE PROCESS
Bedside and Verbal shift change report given to [address] (oncoming nurse) by Lissette Tran RN (offgoing nurse). Report included the following information SBAR, Kardex, OR Summary, Procedure Summary, MAR, and Recent Results. Patient asleep in bed. No distress noted.  Call bell and phone in reach

## 2022-10-03 NOTE — PROGRESS NOTES
New PT order received and chart reviewed. Patient was evaluated and is on PT caseload. Will acknowledge the order.      Thank you for this referral.   Elilot Orourke PT, DPT

## 2022-10-03 NOTE — DISCHARGE SUMMARY
[address], [address]     DISCHARGE SUMMARY    Name: Jazmín Valencia MRN: [id number]   Age / Sex: 78 y.o. / female CSN: [id number]   YOB: 1945 Length of Stay: 6 days   Admit Date: 9/27/2022 Discharge Date:        PRIMARY CARE PHYSICIAN: SLAVA Laguna      DISCHARGE DIAGNOSES:    End-stage COPD with acute exacerbation  Acute on chronic hypoxic respiratory failure. On 4 L via nasal cannula at baseline  Suspected acute on chronic bronchitis  Debility   History of tobacco use  Chronic back pain  Anxiety  Patient was on home hospice care but now wishes to discontinue home hospice and wishes to go home with home health care    CONSULTS CALLED: Pulmonary and palliative care      PROCEDURES DONE: None      COURSE IN [facility]: This is a 68-year-old female with end-stage COPD and chronic hypoxic respiratory failure was brought to the ED after a fall and was also noted to be short of breath. Prior to admission patient was on hospice. Patient wished to pursue care and was admitted. Palliative care was consulted. Pulmonary was consulted. Patient had a acute exacerbation of COPD. Patient was started on steroids and oxygen was continued. Bronchodilators were continued. Bronchial hygiene was continued. Patient was mobilized. Patient wished to discontinue home hospice and wished to go home with home health care. Case management was involved. Patient breathing showed some improvement towards baseline. Discharge plans and medications were discussed with the patient and family. Patient was discharged to home with home health      MEDICATIONS ON DISCHARGE:    Current Discharge Medication List        START taking these medications    Details   guaiFENesin ER (MUCINEX) 600 mg ER tablet Take 1 Tablet by mouth every twelve (12) hours for 5 days.   Qty: 10 Tablet, Refills: 0  Start date: 10/3/2022, End date: 10/8/2022      azithromycin (ZITHROMAX) 500 mg tab Take 1 Tablet by mouth once for 1 dose. Qty: 1 Tablet, Refills: 0  Start date: 10/3/2022, End date: 10/3/2022      HYDROcodone-acetaminophen (NORCO) 5-325 mg per tablet Take 1 Tablet by mouth every eight (8) hours as needed for Pain for up to 5 days. Max Daily Amount: 3 Tablets. Qty: 10 Tablet, Refills: 0  Start date: 10/3/2022, End date: 10/8/2022    Associated Diagnoses: Chronic back pain, unspecified back location, unspecified back pain laterality      lidocaine 4 % patch Apply patch to area of pain once daily. Apply patch to the affected area for 12 hours a day and remove for 12 hours a day. Qty: 10 Patch, Refills: 0  Start date: 10/3/2022      polyethylene glycol (MIRALAX) 17 gram packet Take 1 Packet by mouth daily as needed for Constipation. Qty: 15 Each, Refills: 0  Start date: 10/3/2022      naloxone [facility]) 4 mg/actuation nasal spray Use 1 spray intranasally, then discard. Repeat with new spray every 2 min as needed for opioid overdose symptoms, alternating nostrils. Qty: 5 Each, Refills: 0  Start date: 10/3/2022           CONTINUE these medications which have CHANGED    Details   LORazepam (ATIVAN) 0.5 mg tablet Take 1 Tablet by mouth two (2) times daily as needed for Anxiety. Max Daily Amount: 1 mg. Qty: 14 Tablet, Refills: 0  Start date: 10/3/2022    Associated Diagnoses: Anxiety      predniSONE (DELTASONE) 10 mg tablet 4 tabs ( 40 mg ) po daily for 3 days, then 2 tabs ( 20 mg ) po daily for 3 days, then 1 tab ( 10 mg ) po daily ongoing  Qty: 42 Tablet, Refills: 0  Start date: 10/3/2022      !! OTHER Home oxygen-4 L via nasal cannula continuously  Qty: 1 Each, Refills: 0  Start date: 10/3/2022       !! - Potential duplicate medications found. Please discuss with provider.         CONTINUE these medications which have NOT CHANGED    Details   Ventolin HFA 90 mcg/actuation inhaler inhale 2 puffs by mouth and INTO THE LUNGS every 4 hours if needed for wheezing shortness of breath  Qty: 18 g, Refills: 5 famotidine (PEPCID) 20 mg tablet Take 1 Tablet by mouth daily. Qty: 30 Tablet, Refills: 0      midodrine (PROAMATINE) 2.5 mg tablet Take 1 Tablet by mouth two (2) times daily (with meals). Qty: 60 Tablet, Refills: 0      tiotropium bromide (Spiriva Respimat) 2.5 mcg/actuation inhaler Take 2 Puffs by inhalation daily. Qty: 3 Inhaler, Refills: 3      albuterol (PROVENTIL VENTOLIN) 2.5 mg /3 mL (0.083 %) nebulizer solution 3 mL by Nebulization route every four (4) hours as needed for Wheezing or Shortness of Breath. ICD: J44.9, R09.02 file under Medicare Part B  Qty: 300 Each, Refills: 3      fluticasone propion-salmeterol (ADVAIR DISKUS) 250-50 mcg/dose diskus inhaler Take 1 Puff by inhalation two (2) times a day. BRAND NAME ONLY  Qty: 3 Inhaler, Refills: 3      simethicone (MYLICON) 80 mg chewable tablet Take 80 mg by mouth every six (6) hours as needed for Flatulence. therapeutic multivitamin (THERAGRAN) tablet Take 1 Tab by mouth daily. Qty: 30 Tab, Refills: 11      acetaminophen (TYLENOL) 500 mg tablet Take 500 mg by mouth every six (6) hours as needed for Pain. rOPINIRole (REQUIP) 1 mg tablet Take 1 mg by mouth nightly. !! OTHER Incentive spirometry-use as directed  Qty: 1 Each, Refills: 0       !! - Potential duplicate medications found. Please discuss with provider. STOP taking these medications       OXYGEN-AIR DELIVERY SYSTEMS Comments:   Reason for Stopping:                 DISCHARGE VITAL SIGNS:  Visit Vitals  BP (!) 108/51 (BP 1 Location: Right upper arm, BP Patient Position: At rest)   Pulse 91   Temp 98.1 °F (36.7 °C)   Resp 20   Ht 5' 5\" (1.651 m)   Wt 50.8 kg (112 lb)   SpO2 94%   BMI 18.64 kg/m²           CONDITION ON DISCHARGE: Stable.       DISPOSITION: Home with home health care      FOLLOW-UP RECOMMENDATIONS:   Follow-up Information       Follow up With Specialties Details Why Contact Info    Fetters, Heidi Cockayne, PA Physician Assistant In 1 week  Ariane Monet [address]      [facility] EMERGENCY DEPT Emergency Medicine  As needed [address]  [phone]    Pulmonology    Follow-up with your pulmonologist within the next 2 weeks regarding your emergency department visit            OTHER INSTRUCTIONS:        TIME SPENT ON DISCHARGE ACTIVITIES: More than 35 minutes. Dragon medical dictation software was used for portions of this report. Unintended errors may occur.       Signed:  Alen Diaz MD      10/3/2022

## 2022-10-03 NOTE — PROGRESS NOTES
Pulse oximetry assessment   87% at rest on room air (if 88% or less, skip next steps)  98% at rest on 4 LPM

## 2022-10-03 NOTE — PROGRESS NOTES
Verbal and written discharge instructions given to pt with son and daughter present. Questions asked and answered.

## 2022-10-03 NOTE — PROGRESS NOTES
Discharge/Transition Planning       0845: Spoke with [facility] and they have not picked up oxygen yet and can wait till other oxygen safely delivers    1015: Home o2 orders with required documentation sent to [place]: Normal working on order    [phone]: Updated patient and son on oxygen and wheelchair .  Notified hospice would not  old o2 till new o2 safely at home and they can contact for  or let home health know   Son will be here around 330p-4p to  patient

## 2022-10-03 NOTE — PROGRESS NOTES
Physician Progress Note      Jorge A Cantor  CSN #:                  [id number]  :                       1945  ADMIT DATE:       2022 4:52 PM  [address] DATE:  RESPONDING  PROVIDER #:        Cary Mace MD          QUERY TEXT:    Pt admitted with COPD with acute exacerbation. Pt noted to have confusion. If possible, please document in the progress notes and discharge summary if you are evaluating and / or treating any of the following: The medical record reflects the following:  Risk Factors: COPD with acute exacerbation    Clinical Indicators:  2022, ED provider note, Dr. Jayme Lucero:  \"Reportedly patient suffered a fall at home prior to arrival, hitting her head and was off of her oxygen as result of this fall became exquisitely short of breath and was placed on BiPAP by EMS. \"  2022, consult, Dr. Jayce Saleh:  Gerhardt Hal on chronic hypoxemic hypercapnic respiratory failure due to COPD with exacerbation. Negative COVID and influenza swabs. Systemic steroids and taper rapidly jeanne with delirium which could be worsened by steroids. Slow Prednisone. \"  2022, PN,  Oral Real:  Lico Brambila:  Awake and alert but mildly confused. \"  oxygen saturation:  93 - 96% on oxygen at 5 - 7 lpm per nasal cannula  heart rate:  >90 bpm upon admission    Treatment: BiPAP en route to the hospital, oxygen per nasal cannula since admission, methylprednisolone IV, azithromycin, albuterol-ipratropium, guaifenesin ER, arformoterol/budesonide nebulization    Thank you,  AMADOR Evans RN, [email or web address]  Options provided:  -- Alcoholic encephalopathy  -- Anoxic encephalopathy  -- Drug-induced encephalopathy due to methylprednisolone  -- Drug-induced encephalopathy due to, Please specify substance. -- Drug-induced encephalopathy, substance(s) unknown  -- Encephalopathy due to hypoxia and hypercapnia  -- Delirium due to, Please specify cause.   -- Delirium  -- Other - I will add my own diagnosis  -- Disagree - Not applicable / Not valid  -- Disagree - Clinically unable to determine / Unknown  -- Refer to Clinical Documentation Reviewer    PROVIDER RESPONSE TEXT:    This patient has encephalopathy due to hypoxia and hypercapnia. Query created by:  Cayetano Calix on 9/29/2022 3:59 PM      Electronically signed by:  Billy Grimes MD 10/3/2022 10:32 AM

## 2022-10-04 DIAGNOSIS — Z85.038 HISTORY OF COLON CANCER: ICD-10-CM

## 2022-10-04 DIAGNOSIS — J44.9 COPD, SEVERE (HCC): Primary | ICD-10-CM

## 2022-10-04 NOTE — TELEPHONE ENCOUNTER
Spoke to Dr. Leandra Maya,  [facility]  put in prescriptions for meds requested. Dr. Leandra Maya also put in referral for a new pcp. Apt made for hosp fu with Dr. Leandra Maya for 11/16. Pt made aware.

## 2022-10-06 NOTE — ROUTINE PROCESS
2:46 AM  Hydrocodone-acetaminophen was given at 0126. Forgot to scan the medication. The MAR was corrected concerning this medication.

## 2022-11-01 ENCOUNTER — APPOINTMENT (OUTPATIENT)
Dept: CT IMAGING | Age: 77
End: 2022-11-01
Attending: EMERGENCY MEDICINE
Payer: MEDICARE

## 2022-11-01 ENCOUNTER — APPOINTMENT (OUTPATIENT)
Dept: GENERAL RADIOLOGY | Age: 77
End: 2022-11-01
Attending: EMERGENCY MEDICINE
Payer: MEDICARE

## 2022-11-01 ENCOUNTER — HOSPITAL ENCOUNTER (EMERGENCY)
Age: 77
Discharge: HOME | End: 2022-11-01
Attending: EMERGENCY MEDICINE
Payer: MEDICARE

## 2022-11-01 VITALS
TEMPERATURE: 98.3 F | HEART RATE: 99 BPM | BODY MASS INDEX: 16.55 KG/M2 | SYSTOLIC BLOOD PRESSURE: 114 MMHG | HEIGHT: 66 IN | RESPIRATION RATE: 25 BRPM | DIASTOLIC BLOOD PRESSURE: 66 MMHG | WEIGHT: 103 LBS | OXYGEN SATURATION: 96 %

## 2022-11-01 DIAGNOSIS — J44.1 ACUTE EXACERBATION OF CHRONIC OBSTRUCTIVE PULMONARY DISEASE (COPD) (HCC): Primary | ICD-10-CM

## 2022-11-01 DIAGNOSIS — J96.12 CHRONIC RESPIRATORY FAILURE WITH HYPOXIA AND HYPERCAPNIA (HCC): ICD-10-CM

## 2022-11-01 DIAGNOSIS — J96.11 CHRONIC RESPIRATORY FAILURE WITH HYPOXIA AND HYPERCAPNIA (HCC): ICD-10-CM

## 2022-11-01 LAB
ALBUMIN SERPL-MCNC: 3.2 G/DL (ref 3.4–5)
ALBUMIN/GLOB SERPL: 0.8 {RATIO} (ref 0.8–1.7)
ALP SERPL-CCNC: 113 U/L (ref 45–117)
ALT SERPL-CCNC: 20 U/L (ref 13–56)
ANION GAP BLD CALC-SCNC: 8 MMOL/L (ref 10–20)
ANION GAP SERPL CALC-SCNC: 1 MMOL/L (ref 3–18)
ARTERIAL PATENCY WRIST A: POSITIVE
AST SERPL-CCNC: 15 U/L (ref 10–38)
ATRIAL RATE: 124 BPM
B PERT DNA SPEC QL NAA+PROBE: NOT DETECTED
BASE EXCESS BLD CALC-SCNC: 8.4 MMOL/L
BASE EXCESS BLD CALC-SCNC: 8.5 MMOL/L
BASOPHILS # BLD: 0 K/UL (ref 0–0.1)
BASOPHILS NFR BLD: 0 % (ref 0–2)
BDY SITE: ABNORMAL
BILIRUB SERPL-MCNC: 0.5 MG/DL (ref 0.2–1)
BNP SERPL-MCNC: 795 PG/ML (ref 0–1800)
BORDETELLA PARAPERTUSSIS PCR, BORPAR: NOT DETECTED
BUN SERPL-MCNC: 27 MG/DL (ref 7–18)
BUN/CREAT SERPL: 43 (ref 12–20)
C PNEUM DNA SPEC QL NAA+PROBE: NOT DETECTED
CA-I BLD-MCNC: 1.22 MMOL/L (ref 1.12–1.32)
CALCIUM SERPL-MCNC: 9.7 MG/DL (ref 8.5–10.1)
CALCULATED P AXIS, ECG09: 89 DEGREES
CALCULATED R AXIS, ECG10: 97 DEGREES
CALCULATED T AXIS, ECG11: 75 DEGREES
CHLORIDE BLD-SCNC: 92 MMOL/L (ref 98–107)
CHLORIDE SERPL-SCNC: 93 MMOL/L (ref 100–111)
CO2 BLD-SCNC: 39 MMOL/L (ref 19–24)
CO2 SERPL-SCNC: 40 MMOL/L (ref 21–32)
CREAT BLD-MCNC: 0.61 MG/DL (ref 0.6–1.3)
CREAT SERPL-MCNC: 0.63 MG/DL (ref 0.6–1.3)
DIAGNOSIS, 93000: NORMAL
DIFFERENTIAL METHOD BLD: ABNORMAL
EOSINOPHIL # BLD: 0 K/UL (ref 0–0.4)
EOSINOPHIL NFR BLD: 0 % (ref 0–5)
ERYTHROCYTE [DISTWIDTH] IN BLOOD BY AUTOMATED COUNT: 13.7 % (ref 11.6–14.5)
FLUAV SUBTYP SPEC NAA+PROBE: NOT DETECTED
FLUBV RNA SPEC QL NAA+PROBE: NOT DETECTED
GLOBULIN SER CALC-MCNC: 4 G/DL (ref 2–4)
GLUCOSE BLD-MCNC: 101 MG/DL (ref 65–100)
GLUCOSE SERPL-MCNC: 102 MG/DL (ref 74–99)
HADV DNA SPEC QL NAA+PROBE: NOT DETECTED
HCO3 BLD-SCNC: 37.2 MMOL/L (ref 22–26)
HCO3 BLD-SCNC: 39.3 MMOL/L (ref 22–26)
HCOV 229E RNA SPEC QL NAA+PROBE: NOT DETECTED
HCOV HKU1 RNA SPEC QL NAA+PROBE: NOT DETECTED
HCOV NL63 RNA SPEC QL NAA+PROBE: NOT DETECTED
HCOV OC43 RNA SPEC QL NAA+PROBE: NOT DETECTED
HCT VFR BLD AUTO: 39.7 % (ref 35–45)
HGB BLD-MCNC: 12.6 G/DL (ref 12–16)
HMPV RNA SPEC QL NAA+PROBE: NOT DETECTED
HPIV1 RNA SPEC QL NAA+PROBE: NOT DETECTED
HPIV2 RNA SPEC QL NAA+PROBE: NOT DETECTED
HPIV3 RNA SPEC QL NAA+PROBE: NOT DETECTED
HPIV4 RNA SPEC QL NAA+PROBE: NOT DETECTED
IMM GRANULOCYTES # BLD AUTO: 0.1 K/UL (ref 0–0.04)
IMM GRANULOCYTES NFR BLD AUTO: 1 % (ref 0–0.5)
LACTATE BLD-SCNC: 1.5 MMOL/L (ref 0.4–2)
LYMPHOCYTES # BLD: 0.8 K/UL (ref 0.9–3.6)
LYMPHOCYTES NFR BLD: 9 % (ref 21–52)
M PNEUMO DNA SPEC QL NAA+PROBE: NOT DETECTED
MAGNESIUM SERPL-MCNC: 2 MG/DL (ref 1.6–2.6)
MCH RBC QN AUTO: 31.9 PG (ref 24–34)
MCHC RBC AUTO-ENTMCNC: 31.7 G/DL (ref 31–37)
MCV RBC AUTO: 100.5 FL (ref 78–100)
MONOCYTES # BLD: 1.5 K/UL (ref 0.05–1.2)
MONOCYTES NFR BLD: 16 % (ref 3–10)
NEUTS SEG # BLD: 7.1 K/UL (ref 1.8–8)
NEUTS SEG NFR BLD: 74 % (ref 40–73)
NRBC # BLD: 0 K/UL (ref 0–0.01)
NRBC BLD-RTO: 0 PER 100 WBC
P-R INTERVAL, ECG05: 126 MS
PCO2 BLD: 71.9 MMHG (ref 35–45)
PCO2 BLDV: 84.5 MMHG (ref 41–51)
PH BLD: 7.32 [PH] (ref 7.35–7.45)
PH BLDV: 7.28 [PH] (ref 7.32–7.42)
PLATELET # BLD AUTO: 326 K/UL (ref 135–420)
PMV BLD AUTO: 10.2 FL (ref 9.2–11.8)
PO2 BLD: 95 MMHG (ref 80–100)
PO2 BLDV: 38 MMHG (ref 25–40)
POTASSIUM BLD-SCNC: 3.9 MMOL/L (ref 3.5–5.1)
POTASSIUM SERPL-SCNC: 4.1 MMOL/L (ref 3.5–5.5)
PROT SERPL-MCNC: 7.2 G/DL (ref 6.4–8.2)
Q-T INTERVAL, ECG07: 316 MS
QRS DURATION, ECG06: 82 MS
QTC CALCULATION (BEZET), ECG08: 453 MS
RBC # BLD AUTO: 3.95 M/UL (ref 4.2–5.3)
RSV RNA SPEC QL NAA+PROBE: NOT DETECTED
RV+EV RNA SPEC QL NAA+PROBE: NOT DETECTED
SAO2 % BLD: 61 %
SAO2 % BLD: 96.3 % (ref 92–97)
SARS-COV-2 PCR, COVPCR: NOT DETECTED
SERVICE CMNT-IMP: ABNORMAL
SERVICE CMNT-IMP: ABNORMAL
SODIUM BLD-SCNC: 138 MMOL/L (ref 136–145)
SODIUM SERPL-SCNC: 134 MMOL/L (ref 136–145)
SPECIMEN SITE: ABNORMAL
SPECIMEN TYPE: ABNORMAL
TROPONIN-HIGH SENSITIVITY: 23 NG/L (ref 0–54)
VENTRICULAR RATE, ECG03: 124 BPM
WBC # BLD AUTO: 9.6 K/UL (ref 4.6–13.2)

## 2022-11-01 PROCEDURE — 0202U NFCT DS 22 TRGT SARS-COV-2: CPT

## 2022-11-01 PROCEDURE — 74011000636 HC RX REV CODE- 636: Performed by: EMERGENCY MEDICINE

## 2022-11-01 PROCEDURE — 71275 CT ANGIOGRAPHY CHEST: CPT

## 2022-11-01 PROCEDURE — 94660 CPAP INITIATION&MGMT: CPT

## 2022-11-01 PROCEDURE — 80053 COMPREHEN METABOLIC PANEL: CPT

## 2022-11-01 PROCEDURE — 94640 AIRWAY INHALATION TREATMENT: CPT

## 2022-11-01 PROCEDURE — 85025 COMPLETE CBC W/AUTO DIFF WBC: CPT

## 2022-11-01 PROCEDURE — 83735 ASSAY OF MAGNESIUM: CPT

## 2022-11-01 PROCEDURE — 82947 ASSAY GLUCOSE BLOOD QUANT: CPT

## 2022-11-01 PROCEDURE — 74011000250 HC RX REV CODE- 250: Performed by: EMERGENCY MEDICINE

## 2022-11-01 PROCEDURE — 87040 BLOOD CULTURE FOR BACTERIA: CPT

## 2022-11-01 PROCEDURE — 82803 BLOOD GASES ANY COMBINATION: CPT

## 2022-11-01 PROCEDURE — 99285 EMERGENCY DEPT VISIT HI MDM: CPT

## 2022-11-01 PROCEDURE — 96375 TX/PRO/DX INJ NEW DRUG ADDON: CPT

## 2022-11-01 PROCEDURE — 71045 X-RAY EXAM CHEST 1 VIEW: CPT

## 2022-11-01 PROCEDURE — 36600 WITHDRAWAL OF ARTERIAL BLOOD: CPT

## 2022-11-01 PROCEDURE — 96374 THER/PROPH/DIAG INJ IV PUSH: CPT

## 2022-11-01 PROCEDURE — 74011250637 HC RX REV CODE- 250/637: Performed by: EMERGENCY MEDICINE

## 2022-11-01 PROCEDURE — 83880 ASSAY OF NATRIURETIC PEPTIDE: CPT

## 2022-11-01 PROCEDURE — 93005 ELECTROCARDIOGRAM TRACING: CPT

## 2022-11-01 PROCEDURE — 84484 ASSAY OF TROPONIN QUANT: CPT

## 2022-11-01 PROCEDURE — 74011250636 HC RX REV CODE- 250/636: Performed by: EMERGENCY MEDICINE

## 2022-11-01 RX ORDER — DOXYCYCLINE HYCLATE 100 MG
100 TABLET ORAL 2 TIMES DAILY
Qty: 20 TABLET | Refills: 0 | Status: SHIPPED | OUTPATIENT
Start: 2022-11-01 | End: 2022-11-11

## 2022-11-01 RX ORDER — CEFDINIR 300 MG/1
300 CAPSULE ORAL 2 TIMES DAILY
Qty: 14 CAPSULE | Refills: 0 | Status: SHIPPED | OUTPATIENT
Start: 2022-11-01 | End: 2022-11-08

## 2022-11-01 RX ORDER — DOXYCYCLINE 100 MG/1
100 CAPSULE ORAL
Status: COMPLETED | OUTPATIENT
Start: 2022-11-01 | End: 2022-11-01

## 2022-11-01 RX ORDER — IPRATROPIUM BROMIDE AND ALBUTEROL SULFATE 2.5; .5 MG/3ML; MG/3ML
6 SOLUTION RESPIRATORY (INHALATION)
Status: COMPLETED | OUTPATIENT
Start: 2022-11-01 | End: 2022-11-01

## 2022-11-01 RX ORDER — PREDNISONE 10 MG/1
TABLET ORAL
Qty: 36 TABLET | Refills: 0 | Status: SHIPPED | OUTPATIENT
Start: 2022-11-01 | End: 2022-11-13

## 2022-11-01 RX ADMIN — METHYLPREDNISOLONE SODIUM SUCCINATE 125 MG: 125 INJECTION, POWDER, FOR SOLUTION INTRAMUSCULAR; INTRAVENOUS at 11:11

## 2022-11-01 RX ADMIN — IPRATROPIUM BROMIDE AND ALBUTEROL SULFATE 6 ML: 2.5; .5 SOLUTION RESPIRATORY (INHALATION) at 16:29

## 2022-11-01 RX ADMIN — IPRATROPIUM BROMIDE AND ALBUTEROL SULFATE 6 ML: .5; 3 SOLUTION RESPIRATORY (INHALATION) at 11:10

## 2022-11-01 RX ADMIN — DOXYCYCLINE HYCLATE 100 MG: 100 CAPSULE ORAL at 18:07

## 2022-11-01 RX ADMIN — SODIUM CHLORIDE 1000 ML: 9 INJECTION, SOLUTION INTRAVENOUS at 16:33

## 2022-11-01 RX ADMIN — IOPAMIDOL 80 ML: 755 INJECTION, SOLUTION INTRAVENOUS at 16:03

## 2022-11-01 RX ADMIN — CEFTRIAXONE 1 G: 1 INJECTION, POWDER, FOR SOLUTION INTRAMUSCULAR; INTRAVENOUS at 18:07

## 2022-11-01 NOTE — DISCHARGE INSTRUCTIONS
1.  If you are feeling worse in any way in the meantime please do not hesitate to return to the emergency room or go to your nearest emergency room. Even if this is in the next few hours it is okay to return if you need help. Call 911 if you need to.  2.  I have prescribed you a prednisone taper and antibiotics to take which should help with your breathing. Follow up with your Pulmonologist as soon as you are able and your primary care physician.

## 2022-11-01 NOTE — PROGRESS NOTES
11/01/22 1619   Oxygen Therapy   O2 Sat (%) 98 %   Pulse via Oximetry 104 beats per minute   O2 Device Nasal cannula   O2 Flow Rate (L/min) 4 l/min     Received a call from RN patient back in ER 14. Patient on 6 lpm Spo2 100%. Patient titrated to 4 lpm. No respiratory distress or issues noted. Patient stated she will call if bipap is needed. Dr Lj Tobar notified.

## 2022-11-01 NOTE — ED TRIAGE NOTES
Assumed care of pt in rm 14. Pt arrived via EMS for respiratory distress. Pt hyperventilating, tachypneic,work of breathing noted, unable to get through full sentence, pt normally wears 4L O2 nasal cannual at home, arrived on 8L, paged respiratory to assist with placing pt on BIPAP, coarse rhonchi throughout, wheezing present, sinus tachy on monitor, thin and frail,  abdomen concave, skin intact, however cheeks flushed, scattered bruising throughout. Patient placed on full cardiac monitor, EKG completed given to Jacky BHATTI to sign, 18 gauge IV started to R AC and R forearm, labs and blood cultures collected to include a lactic acid drawn from site. Medicated per MAR. Patient placed on BiPAP per respiratory, verbalized that she wished to be DNR, DNI. Call bell within reach. Patient appears more comfortable and is resting quietly.

## 2022-11-01 NOTE — ED PROVIDER NOTES
EMERGENCY DEPARTMENT HISTORY AND PHYSICAL EXAM      Date: 11/1/2022  Patient Name: Haylie Asencio      History of Presenting Illness     Chief Complaint   Patient presents with    Shortness of Breath       History Provided By: Patient, Patient's Son, and Patient's Daughter    Location/Duration/Severity/Modifying factors   Patient is a 82-year-old female presenting with shortness of breath. Patient states for about 2 to 3 days she has been progressively short of breath with a productive cough. History is limited as the patient seems to be in respiratory distress. Patient was found by EMS to be saturating about 86% on her home 4 L. Wavered between 80 and 89. She is in obvious respiratory distress and we placed her on BiPAP when she arrived. To me she denies any chest pain. She is adamant that she does not want to be intubated or have any chest compressions performed. She denies any fevers or chills, vomiting or diarrhea. She was in the hospital about 1 month ago, and I saw her during the course of her ER portion of that stay for shortness of breath. Patient states she feels worse now than she did previously. There are no other complaints, changes, or physical findings at this time. PCP: SLAVA Andersen    Current Outpatient Medications   Medication Sig Dispense Refill    predniSONE (DELTASONE) 10 mg tablet Take 50 mg by mouth daily for 3 days, THEN 40 mg daily for 3 days, THEN 20 mg daily for 3 days, THEN 10 mg daily for 3 days. 36 Tablet 0    doxycycline (VIBRA-TABS) 100 mg tablet Take 1 Tablet by mouth two (2) times a day for 10 days. 20 Tablet 0    cefdinir (OMNICEF) 300 mg capsule Take 1 Capsule by mouth two (2) times a day for 7 days. 14 Capsule 0    LORazepam (ATIVAN) 0.5 mg tablet Take 1 Tablet by mouth two (2) times daily as needed for Anxiety.  Max Daily Amount: 1 mg. 14 Tablet 0    predniSONE (DELTASONE) 10 mg tablet 4 tabs ( 40 mg ) po daily for 3 days, then 2 tabs ( 20 mg ) po daily for 3 days, then 1 tab ( 10 mg ) po daily ongoing 42 Tablet 0    lidocaine 4 % patch Apply patch to area of pain once daily. Apply patch to the affected area for 12 hours a day and remove for 12 hours a day. 10 Patch 0    polyethylene glycol (MIRALAX) 17 gram packet Take 1 Packet by mouth daily as needed for Constipation. 15 Each 0    OTHER Home oxygen-4 L via nasal cannula continuously 1 Each 0    naloxone (NARCAN) 4 mg/actuation nasal spray Use 1 spray intranasally, then discard. Repeat with new spray every 2 min as needed for opioid overdose symptoms, alternating nostrils. 5 Each 0    Ventolin HFA 90 mcg/actuation inhaler inhale 2 puffs by mouth and INTO THE LUNGS every 4 hours if needed for wheezing shortness of breath 18 g 5    famotidine (PEPCID) 20 mg tablet Take 1 Tablet by mouth daily. 30 Tablet 0    midodrine (PROAMATINE) 2.5 mg tablet Take 1 Tablet by mouth two (2) times daily (with meals). 60 Tablet 0    OTHER Incentive spirometry-use as directed 1 Each 0    tiotropium bromide (Spiriva Respimat) 2.5 mcg/actuation inhaler Take 2 Puffs by inhalation daily. 3 Inhaler 3    albuterol (PROVENTIL VENTOLIN) 2.5 mg /3 mL (0.083 %) nebulizer solution 3 mL by Nebulization route every four (4) hours as needed for Wheezing or Shortness of Breath. ICD: J44.9, R09.02 file under Medicare Part B 300 Each 3    fluticasone propion-salmeterol (ADVAIR DISKUS) 250-50 mcg/dose diskus inhaler Take 1 Puff by inhalation two (2) times a day. BRAND NAME ONLY 3 Inhaler 3    simethicone (MYLICON) 80 mg chewable tablet Take 80 mg by mouth every six (6) hours as needed for Flatulence. therapeutic multivitamin (THERAGRAN) tablet Take 1 Tab by mouth daily. 30 Tab 11    acetaminophen (TYLENOL) 500 mg tablet Take 500 mg by mouth every six (6) hours as needed for Pain. rOPINIRole (REQUIP) 1 mg tablet Take 1 mg by mouth nightly.          Past History     Past Medical History:  Past Medical History:   Diagnosis Date    Anxiety Arthritis     Asbestosis ([facility] [address]     Asthma     Back problem     Baker's cyst     Balance problems     Chronic lung disease     Cigarette smoker     Clotting disorder (HCC)     COPD (chronic obstructive pulmonary disease) (HCC)     oxygen 2/liters asbestosis    Depression     History of DVT (deep vein thrombosis)     Leg pain     Right    Lung disease     Nausea & vomiting     Osteoporosis     Restless leg syndrome     Spinal stenosis     Thromboembolus ([facility] [address]     Vitamin D deficiency        Past Surgical History:  Past Surgical History:   Procedure Laterality Date    COLONOSCOPY N/A 9/22/2018    COLONOSCOPY w bx w polypectonies performed by Marcelino Staton MD at [facility] ENDOSCOPY    COLONOSCOPY N/A 11/6/2018    COLONOSCOPY performed by Valdene Shone, MD at [facility] MAIN OR    COLONOSCOPY N/A 8/2/2019    COLONOSCOPY with polypectomies and biopsies performed by Mason Gamino MD at [facility] ENDOSCOPY    HX APPENDECTOMY      HX BACK SURGERY      x4    HX BREAST BIOPSY      left    HX GI      exp lap and ileostomy    HX HEENT      cataract right    HX HYSTERECTOMY      HX LUMBAR LAMINECTOMY      HX MENISCUS REPAIR      HX ORTHOPAEDIC      Knee bakers cyst    HX POLYPECTOMY      right colectomy    HX TONSILLECTOMY      HX VEIN STRIPPING      ND LAP,SURG,COLECTOMY, PARTIAL, W/ANAST N/A 10/23/2018    Dr. Richard Ga 11/06/2018    Dr. Katie Fierro      vein stripping       Family History:  Family History   Problem Relation Age of Onset    Cancer Father     Heart Disease Mother     Hypertension Mother     Cancer Brother     Cancer Sister     Diabetes Other     Hypertension Other     Stroke Other     Heart Disease Sister     Hypertension Sister     Heart Disease Brother        Social History:  Social History     Tobacco Use    Smoking status: Former     Packs/day: 1.00     Years: 50.00     Pack years: 50.00     Types: Cigarettes     Start date: 10/21/1964 Quit date: 2021     Years since quittin.3    Smokeless tobacco: Never   Vaping Use    Vaping Use: Never used   Substance Use Topics    Alcohol use: Never    Drug use: No       Allergies: Allergies   Allergen Reactions    Latex, Natural Rubber Itching    Aspirin Other (comments)     GI upset and bleeding  Other reaction(s): gi distress  GI upset and bleeding    Augmentin [Amoxicillin-Pot Clavulanate] Not Reported This Time     Possible cramping    Doxycycline Nausea and Vomiting     Other reaction(s): gi distress    Ibuprofen Nausea and Vomiting     Nausea & \"blood show in my urine\"    Morphine Other (comments)     Neurologic symptoms - severe agitation    Other reaction(s): psychological reaction  Neurologic symptoms - severe agitation    Shellfish Derived Itching     Pt able to eat small amounts per report     Sulfa (Sulfonamide Antibiotics) Rash     Patient relates no longer allergic    Umeclidinium-Vilanterol Rash, Other (comments) and Itching     Muscle cramps in arms  Muscle cramps in arms    Fluticasone Propion-Salmeterol Other (comments)     Mouth Sores  Other reaction(s): other/intolerance  Mouth Sores  \"generic Advair caused the sores. I had to go back to the name brand\"         Review of Systems     Review of Systems   Constitutional:  Negative for chills and fever. HENT:  Negative for congestion, rhinorrhea, sinus pressure and sneezing. Eyes:  Negative for visual disturbance. Respiratory:  Positive for cough, shortness of breath and wheezing. Cardiovascular:  Negative for chest pain and leg swelling. Gastrointestinal:  Negative for abdominal pain, diarrhea, nausea and vomiting. Genitourinary:  Negative for dysuria, frequency and urgency. Musculoskeletal:  Negative for back pain and neck pain. Skin:  Negative for rash. Neurological:  Negative for syncope, numbness and headaches. All other systems reviewed and are negative.     Physical Exam     Physical Exam  Vitals and nursing note reviewed. Constitutional:       General: She is not in acute distress. Appearance: Normal appearance. She is not ill-appearing. HENT:      Head: Normocephalic and atraumatic. Right Ear: External ear normal.      Left Ear: External ear normal.      Nose: Nose normal.      Mouth/Throat:      Mouth: Mucous membranes are moist.   Eyes:      Conjunctiva/sclera: Conjunctivae normal.   Cardiovascular:      Rate and Rhythm: Normal rate and regular rhythm. Pulses: Normal pulses. Heart sounds: Normal heart sounds. No murmur heard. Pulmonary:      Effort: Tachypnea and accessory muscle usage present. Breath sounds: Examination of the right-middle field reveals decreased breath sounds and wheezing. Examination of the left-middle field reveals decreased breath sounds and wheezing. Examination of the right-lower field reveals decreased breath sounds and wheezing. Examination of the left-lower field reveals decreased breath sounds and wheezing. Decreased breath sounds and wheezing present. No rhonchi or rales. Abdominal:      General: Abdomen is flat. Tenderness: There is no abdominal tenderness. There is no guarding or rebound. Musculoskeletal:         General: No swelling or tenderness. Normal range of motion. Cervical back: Normal range of motion and neck supple. Right lower leg: No tenderness. No edema. Left lower leg: No tenderness. No edema. Skin:     General: Skin is warm and dry. Capillary Refill: Capillary refill takes less than 2 seconds. Findings: No rash. Neurological:      General: No focal deficit present. Mental Status: She is alert.        Lab and Diagnostic Study Results     Labs -  Recent Results (from the past 24 hour(s))   CULTURE, BLOOD    Collection Time: 11/01/22 10:50 AM    Specimen: Blood   Result Value Ref Range    Special Requests: NO SPECIAL REQUESTS      Culture result: NO GROWTH AFTER 19 HOURS     EKG, 12 LEAD, INITIAL Collection Time: 11/01/22 11:00 AM   Result Value Ref Range    Ventricular Rate 124 BPM    Atrial Rate 124 BPM    P-R Interval 126 ms    QRS Duration 82 ms    Q-T Interval 316 ms    QTC Calculation (Bezet) 453 ms    Calculated P Axis 89 degrees    Calculated R Axis 97 degrees    Calculated T Axis 75 degrees    Diagnosis       Sinus tachycardia with premature supraventricular complexes and with frequent   premature ventricular complexes  Rightward axis  Pulmonary disease pattern  Nonspecific ST abnormality  Abnormal ECG  When compared with ECG of 27-SEP-2022 17:34,  premature ventricular complexes are now present  Nonspecific T wave abnormality no longer evident in Inferior leads  Confirmed by Mauri Whitaker [id number]) on 11/1/2022 1:22:05 PM  Also confirmed by Mauri Whitaker ([id number]),  Damari Jaquez ([id number])  on   11/1/2022 2:35:21 PM     CULTURE, BLOOD    Collection Time: 11/01/22 11:02 AM    Specimen: Blood   Result Value Ref Range    Special Requests: NO SPECIAL REQUESTS      Culture result: NO GROWTH AFTER 19 HOURS     BLOOD GAS, ARTERIAL POC    Collection Time: 11/01/22 11:02 AM   Result Value Ref Range    pH (POC) 7.32 (L) 7.35 - 7.45      pCO2 (POC) 71.9 (H) 35.0 - 45.0 MMHG    pO2 (POC) 95 80 - 100 MMHG    HCO3 (POC) 37.2 (H) 22 - 26 MMOL/L    sO2 (POC) 96.3 92 - 97 %    Base excess (POC) 8.4 mmol/L    Allens test (POC) Positive      Site LEFT RADIAL      Specimen type (POC) ARTERIAL      Performed by Treasure Carbajal    MAGNESIUM    Collection Time: 11/01/22 11:03 AM   Result Value Ref Range    Magnesium 2.0 1.6 - 2.6 mg/dL   TROPONIN-HIGH SENSITIVITY    Collection Time: 11/01/22 11:03 AM   Result Value Ref Range    Troponin-High Sensitivity 23 0 - 54 ng/L   NT-PRO BNP    Collection Time: 11/01/22 11:03 AM   Result Value Ref Range    NT pro- 0 - 2,245 PG/ML   METABOLIC PANEL, COMPREHENSIVE    Collection Time: 11/01/22 11:03 AM   Result Value Ref Range    Sodium 134 (L) 136 - 145 mmol/L    Potassium 4.1 3.5 - 5.5 mmol/L    Chloride 93 (L) 100 - 111 mmol/L    CO2 40 (H) 21 - 32 mmol/L    Anion gap 1 (L) 3.0 - 18 mmol/L    Glucose 102 (H) 74 - 99 mg/dL    BUN 27 (H) 7.0 - 18 MG/DL    Creatinine 0.63 0.6 - 1.3 MG/DL    BUN/Creatinine ratio 43 (H) 12 - 20      eGFR >60 >60 ml/min/1.73m2    Calcium 9.7 8.5 - 10.1 MG/DL    Bilirubin, total 0.5 0.2 - 1.0 MG/DL    ALT (SGPT) 20 13 - 56 U/L    AST (SGOT) 15 10 - 38 U/L    Alk. phosphatase 113 45 - 117 U/L    Protein, total 7.2 6.4 - 8.2 g/dL    Albumin 3.2 (L) 3.4 - 5.0 g/dL    Globulin 4.0 2.0 - 4.0 g/dL    A-G Ratio 0.8 0.8 - 1.7     CBC WITH AUTOMATED DIFF    Collection Time: 11/01/22 11:03 AM   Result Value Ref Range    WBC 9.6 4.6 - 13.2 K/uL    RBC 3.95 (L) 4.20 - 5.30 M/uL    HGB 12.6 12.0 - 16.0 g/dL    HCT 39.7 35.0 - 45.0 %    .5 (H) 78.0 - 100.0 FL    MCH 31.9 24.0 - 34.0 PG    MCHC 31.7 31.0 - 37.0 g/dL    RDW 13.7 11.6 - 14.5 %    PLATELET 643 742 - 607 K/uL    MPV 10.2 9.2 - 11.8 FL    NRBC 0.0 0  WBC    ABSOLUTE NRBC 0.00 0.00 - 0.01 K/uL    NEUTROPHILS 74 (H) 40 - 73 %    LYMPHOCYTES 9 (L) 21 - 52 %    MONOCYTES 16 (H) 3 - 10 %    EOSINOPHILS 0 0 - 5 %    BASOPHILS 0 0 - 2 %    IMMATURE GRANULOCYTES 1 (H) 0.0 - 0.5 %    ABS. NEUTROPHILS 7.1 1.8 - 8.0 K/UL    ABS. LYMPHOCYTES 0.8 (L) 0.9 - 3.6 K/UL    ABS. MONOCYTES 1.5 (H) 0.05 - 1.2 K/UL    ABS. EOSINOPHILS 0.0 0.0 - 0.4 K/UL    ABS. BASOPHILS 0.0 0.0 - 0.1 K/UL    ABS. IMM.  GRANS. 0.1 (H) 0.00 - 0.04 K/UL    DF AUTOMATED     BLOOD GAS,CHEM8,LACTIC ACID POC    Collection Time: 11/01/22 11:05 AM   Result Value Ref Range    Calcium, ionized (POC) 1.22 1.12 - 1.32 mmol/L    Base excess (POC) 8.5 mmol/L    HCO3 (POC) 39.3 (H) 22 - 26 MMOL/L    CO2, POC 39 (H) 19 - 24 MMOL/L    O2 SAT 61 %    Sample source VENOUS BLOOD      Performed by Reji Leigh      Sodium (POC) 138 136 - 145 mmol/L    Potassium (POC) 3.9 3.5 - 5.1 mmol/L    Glucose (POC) 101 (H) 65 - 100 mg/dL    Creatinine (POC) 0.61 0.6 - 1.3 mg/dL    Lactic Acid (POC) 1.50 0.40 - 2.00 mmol/L    Chloride (POC) 92 (L) 98 - 107 mmol/L    Anion gap, POC 8 (L) 10 - 20      pH, venous (POC) 7.28 (L) 7.32 - 7.42      pCO2, venous (POC) 84.5 (H) 41 - 51 MMHG    pO2, venous (POC) 38 25 - 40 mmHg   RESPIRATORY VIRUS PANEL W/COVID-19, PCR    Collection Time: 11/01/22  4:19 PM    Specimen: Nasopharyngeal   Result Value Ref Range    Adenovirus Not detected NOTD      Coronavirus 229E Not detected NOTD      Coronavirus HKU1 Not detected NOTD      Coronavirus CVNL63 Not detected NOTD      Coronavirus OC43 Not detected NOTD      SARS-CoV-2, PCR Not detected NOTD      Metapneumovirus Not detected NOTD      Rhinovirus and Enterovirus Not detected NOTD      Influenza A Not detected NOTD      Influenza B Not detected NOTD      Parainfluenza 1 Not detected NOTD      Parainfluenza 2 Not detected NOTD      Parainfluenza 3 Not detected NOTD      Parainfluenza virus 4 Not detected NOTD      RSV by PCR Not detected NOTD      B. parapertussis, PCR Not detected NOTD      Bordetella pertussis - PCR Not detected NOTD      Chlamydophila pneumoniae DNA, QL, PCR Not detected NOTD      Mycoplasma pneumoniae DNA, QL, PCR Not detected NOTD           Radiologic Studies -   CTA CHEST W OR W WO CONT   Final Result   1. No pulmonary embolism or aortic dissection. 2. Advanced emphysema with mild fibrosis. Mild patchy infiltrate in the right   lateral lung still possible. Continued follow-up recommended. No consolidation. XR CHEST PORT   Final Result   Mild interstitial edema, less severe than before. Background of   underlying fibrosis            Medical Decision Making and ED Course   - I am the first and primary provider for this patient AND AM THE PRIMARY PROVIDER OF RECORD. - I reviewed the vital signs, available nursing notes, past medical history, past surgical history, family history and social history. - Initial assessment performed.  The patients presenting problems have been discussed, and the staff are in agreement with the care plan formulated and outlined with them. I have encouraged them to ask questions as they arise throughout their visit. Vital Signs-Reviewed the patient's vital signs. No data found. EKG interpretation: Sinus Tachycardia with frequent PJCs    Records Reviewed: Nursing Notes, Old Medical Records, Previous Radiology Studies, and Previous Laboratory Studies        Provider Notes (Medical Decision Making):     MDM  Number of Diagnoses or Management Options  Acute exacerbation of chronic obstructive pulmonary disease (COPD) ([facility] [address]  Chronic respiratory failure with hypoxia and hypercapnia ([facility] [address]  Diagnosis management comments: Patient presents in respiratory distress. History of advanced COPD on 4.5L at baseline, hypoxic by EMS account and she looks to be in great distress on her initial presentation. She is wheezy throughout and diminished in both lung fields. Suspect COPD exacerbation, rule out pulmonary embolism, pneumonia, pleural effusion, pneumothorax sepsis, etc.    Blood gas shows acute on chronic hypercapnic respiratory failure with a mild respiratory acidosis. Patient looks greatly dyspneic. On reassessment patient is back on her baseline oxygen she is to intermittently looks pretty dyspneic. I recommended she be admitted to the hospitalist service for further care and continued monitoring at least overnight. She is adamant that she does not want to stay. I discussed with her that she has advanced lung disease and she seems understand that her time is limited based on her lung disease although she does not commit to full comfort measures at this point so I suggested continued hospital management. I will treat her for presumptive pneumonia given her CT findings as well as prolonged taper for her COPD exacerbation.   She is adamant again she does not want to stay in the hospital.  Her son was present at the bedside for the conversation he is in agreement and wants to take the patient home as well. He will stay with her tonight. I encouraged her and her son to come back at any time if she is feeling worse including tonight even if she feels worse is when she gets home to come back to the emergency room promptly. She expressed understanding of this and nurse present for conversation as well. Ultimately patient does look better however given her initial respiratory distress and dyspnea I recommended strongly to be admitted however she declines. Again recommended she return at any time. ED Course:       ED Course as of 11/02/22 0831   Tue Nov 01, 2022   1652 Patient reassessed, she looks better than before. Still occasionally becoming more tachypneic. .  Will reassess after CAT scan [SCOTT]   1124 On reassessment patient looking better. I think the patient needs to be admitted and I discussed that with her that admission would be in her best interest for improved breathing. Her oxygen level is good right now however she is still somewhat tachypneic appearing although certainly better than on arrival.  She is adamant that she wants to go home she does not want to stay. I advised her anytime if she feels worse to return even if it is within a few hours from now. She will have her son stay with her tonight. [SCOTT]      ED Course User Index  [SCOTT] Neva Hodgkin, DO     ------------------------------------------------------------------------------------------------------------        Consultations:       Consultations: - NONE        Procedures and Critical Care       Performed by: Renato Bautista DO    Procedures             CRITICAL CARE NOTE :  12:06 PM  CRITICAL CARE TIME: I have spent 49 minutes of critical care time involved in lab review, consultations with specialist, family decision-making, and documentation. During this entire length of time I was immediately available to the patient. Critical Care:   The reason for providing this level of medical care for this critically ill patient was due a critical illness that impaired one or more vital organ systems such that there was a high probability of imminent or life threatening deterioration in the patients condition. This care involved high complexity decision making to assess, manipulate, and support vital system functions, to treat this vital organ system failure and to prevent further life threatening deterioration of the patients condition. Time is exclusive of separately billable procedures and teaching time. DO Jasen Olson DO        Disposition         Diagnosis:   1. Acute exacerbation of chronic obstructive pulmonary disease (COPD) ([facility] [address]    2. Chronic respiratory failure with hypoxia and hypercapnia ([facility] [address]          Disposition: [place] Discharge    Follow-up Information       Follow up With Specialties Details Why Contact Info    SLAVA Joel Physician Assistant Call today  [address] [facility] Elias Osler, MD Pulmonary Disease, Urgent Care, Internal Medicine Physician Call today  [address] [place] [id number]      [facility] EMERGENCY DEPT Emergency Medicine  As needed, If symptoms worsen; or [facility] Emergency Department [address] [place] [id number]  [phone]            Discharge Medication List as of 11/1/2022  7:43 PM        START taking these medications    Details   ! ! predniSONE (DELTASONE) 10 mg tablet Take 50 mg by mouth daily for 3 days, THEN 40 mg daily for 3 days, THEN 20 mg daily for 3 days, THEN 10 mg daily for 3 days. , Normal, Disp-36 Tablet, R-0      doxycycline (VIBRA-TABS) 100 mg tablet Take 1 Tablet by mouth two (2) times a day for 10 days. , Normal, Disp-20 Tablet, R-0      cefdinir (OMNICEF) 300 mg capsule Take 1 Capsule by mouth two (2) times a day for 7 days. , Normal, Disp-14 Capsule, R-0       !! - Potential duplicate medications found. Please discuss with provider. CONTINUE these medications which have NOT CHANGED    Details   LORazepam (ATIVAN) 0.5 mg tablet Take 1 Tablet by mouth two (2) times daily as needed for Anxiety. Max Daily Amount: 1 mg., Normal, Disp-14 Tablet, R-0      !! predniSONE (DELTASONE) 10 mg tablet 4 tabs ( 40 mg ) po daily for 3 days, then 2 tabs ( 20 mg ) po daily for 3 days, then 1 tab ( 10 mg ) po daily ongoing, Normal, Disp-42 Tablet, R-0      lidocaine 4 % patch Apply patch to area of pain once daily. Apply patch to the affected area for 12 hours a day and remove for 12 hours a day., Normal, Disp-10 Patch, R-0      polyethylene glycol (MIRALAX) 17 gram packet Take 1 Packet by mouth daily as needed for Constipation. , Normal, Disp-15 Each, R-0      !! OTHER Home oxygen-4 L via nasal cannula continuously, Print, Disp-1 Each, R-0      naloxone (NARCAN) 4 mg/actuation nasal spray Use 1 spray intranasally, then discard. Repeat with new spray every 2 min as needed for opioid overdose symptoms, alternating nostrils. , Normal, Disp-5 Each, R-0      Ventolin HFA 90 mcg/actuation inhaler inhale 2 puffs by mouth and INTO THE LUNGS every 4 hours if needed for wheezing shortness of breath, Normal, Disp-18 g, R-5      famotidine (PEPCID) 20 mg tablet Take 1 Tablet by mouth daily. , Print, Disp-30 Tablet, R-0      midodrine (PROAMATINE) 2.5 mg tablet Take 1 Tablet by mouth two (2) times daily (with meals). , Print, Disp-60 Tablet, R-0      !! OTHER Incentive spirometry-use as directed, Print, Disp-1 Each, R-0      tiotropium bromide (Spiriva Respimat) 2.5 mcg/actuation inhaler Take 2 Puffs by inhalation daily. , Mail Order, Disp-3 Inhaler,R-3      albuterol (PROVENTIL VENTOLIN) 2.5 mg /3 mL (0.083 %) nebulizer solution 3 mL by Nebulization route every four (4) hours as needed for Wheezing or Shortness of Breath.  ICD: J44.9, R09.02 file under Medicare Part B, Normal, Disp-300 Each, R-3 fluticasone propion-salmeterol (ADVAIR DISKUS) 250-50 mcg/dose diskus inhaler Take 1 Puff by inhalation two (2) times a day. BRAND NAME ONLY, Mail Order, Disp-3 Inhaler, R-3      simethicone (MYLICON) 80 mg chewable tablet Take 80 mg by mouth every six (6) hours as needed for Flatulence., Historical Med      therapeutic multivitamin (THERAGRAN) tablet Take 1 Tab by mouth daily. , Print, Disp-30 Tab, R-11      acetaminophen (TYLENOL) 500 mg tablet Take 500 mg by mouth every six (6) hours as needed for Pain., Historical Med      rOPINIRole (REQUIP) 1 mg tablet Take 1 mg by mouth nightly., Historical Med       !! - Potential duplicate medications found. Please discuss with provider. Diagnosis     Clinical Impression:   1. Acute exacerbation of chronic obstructive pulmonary disease (COPD) ([facility] [address]    2. Chronic respiratory failure with hypoxia and hypercapnia (HCC)        Attestations:    Randal Wolf, DO    Please note that this dictation was completed with [employer], the [email or web address] voice recognition software. Quite often unanticipated grammatical, syntax, homophones, and other interpretive errors are inadvertently transcribed by the computer software. Please disregard these errors. Please excuse any errors that have escaped final proofreading. Thank you.

## 2022-11-01 NOTE — PROGRESS NOTES
11/01/22 1530   Oxygen Therapy   O2 Sat (%) 95 %   Pulse via Oximetry 96 beats per minute   O2 Device Nasal cannula   O2 Flow Rate (L/min) 6 l/min   CPAP/BIPAP   CPAP/BIPAP Start/Stop Off     Patient taken off bipap and placed on 6 lpm. No respiratory distress or issues noted. HOB laid flat for trial on NC and SpO2 remain > 95%.  Patient HOB raised up and ready for transport to Ct on 6 lpm.

## 2022-11-03 RX ORDER — IPRATROPIUM BROMIDE 0.5 MG/2.5ML
0.5 SOLUTION RESPIRATORY (INHALATION) EVERY 6 HOURS
Qty: 90 EACH | Refills: 5 | Status: SHIPPED | OUTPATIENT
Start: 2022-11-03

## 2022-11-03 NOTE — TELEPHONE ENCOUNTER
Patient is asking for rx for Ipatropium solution for  nebulizer. With review she has Spiriva on profile  She states when she either was with hospice or in hospital they stopped the Spiriva and she isn't sure if she is to take. She knows she is not to take Ipatropium neb if she is on Spiriva bu hasn't been taking the Spiriva. Please advise to take Spiriva or Ipatropium?

## 2022-11-04 ENCOUNTER — TELEPHONE (OUTPATIENT)
Dept: PULMONOLOGY | Age: 77
End: 2022-11-04

## 2022-11-04 NOTE — TELEPHONE ENCOUNTER
Pt dtr stated that in order for the pt to get the solution to pt nebulizer [employer] [facility] needs a form faxed over.  Pt dtr is requesting a call back [phone]

## 2022-11-16 RX ORDER — ALBUTEROL SULFATE 90 UG/1
AEROSOL, METERED RESPIRATORY (INHALATION)
Qty: 18 G | Refills: 5 | Status: SHIPPED | OUTPATIENT
Start: 2022-11-16

## 2022-11-16 NOTE — TELEPHONE ENCOUNTER
Pt is out of her rescue inhaler. Is requesting ventolin be sent to [facility] on [address].  Please advise

## 2024-10-07 NOTE — PROGRESS NOTES
Patient is sitting in bed in no apparent distress, awake, feels better. Wishes to go home. I discussed discharge plans and medications with the patient and she verbalized understanding and agreed. I also called her son at phone #[phone] and updated him regarding discharge plans. I have discussed with the . Home with home health care and home oxygen today. 6 (moderate pain)

## (undated) DEVICE — SEAL

## (undated) DEVICE — 4-PORT MANIFOLD: Brand: NEPTUNE 2

## (undated) DEVICE — SOL ANTI-FOG 6ML MEDC -- MEDICHOICE - CONVERT TO 358427

## (undated) DEVICE — AIRLIFE™ NASAL OXYGEN CANNULA CURVED, NONFLARED TIP WITH 14 FOOT (4.3 M) CRUSH-RESISTANT TUBING, OVER-THE-EAR STYLE: Brand: AIRLIFE™

## (undated) DEVICE — MEDI-VAC NON-CONDUCTIVE SUCTION TUBING: Brand: CARDINAL HEALTH

## (undated) DEVICE — DRAPE,TOP,102X53,STERILE: Brand: MEDLINE

## (undated) DEVICE — SYRINGE MED 25GA 3ML L5/8IN SUBQ PLAS W/ DETACH NDL SFTY

## (undated) DEVICE — INTENDED FOR TISSUE SEPARATION, AND OTHER PROCEDURES THAT REQUIRE A SHARP SURGICAL BLADE TO PUNCTURE OR CUT.: Brand: BARD-PARKER SAFETY BLADES SIZE 15, STERILE

## (undated) DEVICE — TRAP SPEC COLL POLYP POLYSTYR --

## (undated) DEVICE — STAPLER SHEATH: Brand: ENDOWRIST

## (undated) DEVICE — GOWN ISOL IMPERV UNIV, DISP, OPEN BACK, BLUE --

## (undated) DEVICE — SUTURE VCRL SZ 0 L27IN ABSRB UD L36MM CT-1 1/2 CIR J260H

## (undated) DEVICE — STERILE POLYISOPRENE POWDER-FREE SURGICAL GLOVES: Brand: PROTEXIS

## (undated) DEVICE — SOLUTION IV 1000ML 0.9% SOD CHL

## (undated) DEVICE — SURGICAL PROCEDURE PACK TISS 3X5 IN ABSORBABLE SEPRAFILM

## (undated) DEVICE — CANNULA ORIG TL CLR W FOAM CUSHIONS AND 14FT SUPL TB 3 CHN

## (undated) DEVICE — FLUFF AND POLYMER UNDERPAD,EXTRA HEAVY: Brand: WINGS

## (undated) DEVICE — SYR 50ML SLIP TIP NSAF LF STRL --

## (undated) DEVICE — SNARE POLYP M W27MMXL240CM OVL STIFF DISP CAPTIVATOR

## (undated) DEVICE — GARMENT,MEDLINE,DVT,SEQUENTIAL,CALF,MD: Brand: MEDLINE

## (undated) DEVICE — COLUMN DRAPE

## (undated) DEVICE — ENDOSCOPY PUMP TUBING/ CAP SET: Brand: ERBE

## (undated) DEVICE — THREE-QUARTER SHEET: Brand: CONVERTORS

## (undated) DEVICE — HANDPIECE SET WITH HIGH FLOW TIP AND SUCTION TUBE: Brand: INTERPULSE

## (undated) DEVICE — CATHETER SUCT TR FL TIP 14FR W/ O CTRL

## (undated) DEVICE — SOLUTION IRRIG 1000ML H2O STRL BLT

## (undated) DEVICE — INTENDED FOR TISSUE SEPARATION, AND OTHER PROCEDURES THAT REQUIRE A SHARP SURGICAL BLADE TO PUNCTURE OR CUT.: Brand: BARD-PARKER SAFETY BLADES SIZE 10, STERILE

## (undated) DEVICE — STOCKINET,IMPERVIOUS,6X30: Brand: MEDLINE

## (undated) DEVICE — SUTURE VCRL SZ 0 L18IN ABSRB UD POLYGLACTIN 910 BRAID TIE J912G

## (undated) DEVICE — PACK PROCEDURE SURG MAJ W/ BASIN LF

## (undated) DEVICE — RELOAD STPL L75MM OPN H3.8MM CLS 1.5MM WIRE DIA0.2MM REG

## (undated) DEVICE — SHEET, DRAPE, SPLIT, STERILE: Brand: MEDLINE

## (undated) DEVICE — PADDING CAST W4INXL4YD ST COT COHESIVE HND TEARABLE SPEC

## (undated) DEVICE — ELECTRO LUBE IS A SINGLE PATIENT USE DEVICE THAT IS INTENDED TO BE USED ON ELECTROSURGICAL ELECTRODES TO REDUCE STICKING.: Brand: KEY SURGICAL ELECTRO LUBE

## (undated) DEVICE — STAPLER INT L75MM CUT LN L73MM STPL LN L77MM BLU B FRM 8

## (undated) DEVICE — SUTURE VCRL SZ 3-0 L27IN ABSRB UD L26MM SH 1/2 CIR J416H

## (undated) DEVICE — MEDI-VAC SUCTION HIGH CAPACITY: Brand: CARDINAL HEALTH

## (undated) DEVICE — OCCLUSIVE GAUZE STRIP,3% BISMUTH TRIBROMOPHENATE IN PETROLATUM BLEND: Brand: XEROFORM

## (undated) DEVICE — REM POLYHESIVE ADULT PATIENT RETURN ELECTRODE: Brand: VALLEYLAB

## (undated) DEVICE — SNARE ENDO 2.4MMX230CM -- COLD EXACTO

## (undated) DEVICE — GAUZE,SPONGE,4"X4",16PLY,STRL,LF,10/TRAY: Brand: MEDLINE

## (undated) DEVICE — BANDAGE COMPR W4INXL5YD BGE COHESIVE SELF ADH ADBAN CBN1104] AVCOR HEALTHCARE PRODUCTS INC]

## (undated) DEVICE — DRAPE,U/SHT,SPLIT,FILM,60X84,STERILE: Brand: MEDLINE

## (undated) DEVICE — Z INACTIVE PER BARD TRAY URO DIA16FR INF CTRL F COMPLT CARE 350ML URIN M

## (undated) DEVICE — 3M™ STERI-DRAPE™ INSTRUMENT POUCH 1018L: Brand: STERI-DRAPE™

## (undated) DEVICE — PADDING CAST SOF-ROL 4INX4YD -- NS

## (undated) DEVICE — SYRINGE MED 20ML STD CLR PLAS LUERLOCK TIP N CTRL DISP

## (undated) DEVICE — SUTURE VCRL SZ 0 L36IN ABSRB UD L36MM CT-1 1/2 CIR J946H

## (undated) DEVICE — SYR 10ML CTRL LR LCK NSAF LF --

## (undated) DEVICE — SEAL UNIV 5-8MM DISP BX/10 -- DA VINCI XI - SNGL USE

## (undated) DEVICE — KIT CLN UP BON SECOURS MARYV

## (undated) DEVICE — GAUZE SPONGES,16 PLY: Brand: CURITY

## (undated) DEVICE — FORCEPS BX L240CM JAW DIA2.8MM L CAP W/ NDL MIC MESH TOOTH

## (undated) DEVICE — SUTURE ABSORBABLE BRAIDED 2-0 CT-1 27 IN UD VICRYL J259H

## (undated) DEVICE — FLEX ADVANTAGE 3000CC: Brand: FLEX ADVANTAGE

## (undated) DEVICE — LAPAROSCOPIC ACCESS SYSTEM: Brand: ALEXIS LAPAROSCOPIC SYSTEM WITH KII FIOS FIRST ENTRY

## (undated) DEVICE — SUTURE VCRL SZ 3-0 L27IN ABSRB VLT L26MM SH 1/2 CIR J316H

## (undated) DEVICE — CLIPPING DEVICE: Brand: RESOLUTION CLIP

## (undated) DEVICE — 3M™ WARMING BLANKET, LOWER BODY, 10 PER CASE, 42568: Brand: BAIR HUGGER™

## (undated) DEVICE — VESSEL SEALER EXTEND: Brand: ENDOWRIST

## (undated) DEVICE — SUTURE MCRYL SZ 4-0 L18IN ABSRB UD L19MM PS-2 3/8 CIR PRIM Y496G

## (undated) DEVICE — DRESSING,GAUZE,XEROFORM,CURAD,1"X8",ST: Brand: CURAD

## (undated) DEVICE — (D)GLOVE EXAM LG NITRL NS -- DISC BY MFR NO SUB

## (undated) DEVICE — SUT ETHLN 3-0 18IN PS1 BLK --

## (undated) DEVICE — Device

## (undated) DEVICE — IRRIGATION KT PIST SYR 60ML -- CONVERT TO ITEM 116415

## (undated) DEVICE — WOUND RETRACTOR AND PROTECTOR: Brand: ALEXIS O WOUND PROTECTOR-RETRACTOR

## (undated) DEVICE — SOFT SILICONE HYDROCELLULAR SACRUM DRESSING WITH LOCK AWAY LAYER: Brand: ALLEVYN LIFE SACRUM (LARGE) PACK OF 10

## (undated) DEVICE — SUTURE PDS II SZ 1 L36IN ABSRB VLT CTXB L48MM 1/2 CIR BLNT ZB371

## (undated) DEVICE — Z DISCONTINUED BY MEDLINE USE 2711682 TRAY SKIN PREP DRY W/ PREM GLV

## (undated) DEVICE — STAPLER 45 RELOAD BLUE: Brand: ENDOWRIST

## (undated) DEVICE — 3M™ COBAN™ NL STERILE NON-LATEX SELF-ADHERENT WRAP, 2086S, 6 IN X 5 YD (15 CM X 4,5 M), 12 ROLLS/CASE: Brand: 3M™ COBAN™

## (undated) DEVICE — SUTURE MCRYL SZ 2-0 L27IN ABSRB UD SH L26MM TAPERPOINT NDL Y417H

## (undated) DEVICE — CANNULA SEAL

## (undated) DEVICE — CONNECTOR IV PRIMING 0.19ML 0 REFLX NO FLTR MAX0

## (undated) DEVICE — SUTURE V-LOC 180 SZ 3-0 L6IN ABSRB VLT CV-23 L17MM 1/2 CIR VLOCM0804

## (undated) DEVICE — SUTURE PDS II SZ 1 L36IN ABSRB VLT L36MM CT-1 1/2 CIR Z347H

## (undated) DEVICE — PAD,ABDOMINAL,8"X10",ST,LF: Brand: MEDLINE

## (undated) DEVICE — REDUCER CANN ENDOWRIST 12-8MM -- DA VINCI XI - SNGL USE

## (undated) DEVICE — 3M™ DURAPORE™ SURGICAL TAPE 1538-3, 3 INCH X 10 YARD (7,5CM X 9,1M), 4 ROLLS/BOX: Brand: 3M™ DURAPORE™

## (undated) DEVICE — SYR 10ML LUER LOK 1/5ML GRAD --

## (undated) DEVICE — BLADELESS OPTICAL TROCAR WITH FIXATION CANNULA: Brand: VERSAPORT

## (undated) DEVICE — 3M™ STERI-DRAPE™ U-DRAPE 1015: Brand: STERI-DRAPE™

## (undated) DEVICE — BLADE ELECTRODE: Brand: VALLEYLAB

## (undated) DEVICE — DISPOSABLE TOURNIQUET CUFF SINGLE BLADDER, DUAL PORT AND QUICK CONNECT CONNECTOR: Brand: COLOR CUFF

## (undated) DEVICE — COLOSTOMY/ILEOSTOMY KIT, FLEXWEAR: Brand: NEW IMAGE

## (undated) DEVICE — BLADELESS OBTURATOR: Brand: WECK VISTA

## (undated) DEVICE — SUTURE ETHLN SZ 2-0 L18IN NONABSORBABLE BLK L26MM PS 3/8 585H

## (undated) DEVICE — IMMOBILIZER SHLDR L L18IN D9IN UNIV POLY COT W/ FOAM STRP

## (undated) DEVICE — ARM DRAPE

## (undated) DEVICE — AIRLIFE™ ADULT CUSHION NASAL CANNULA 14 FOOT (4.3) CRUSH-RESISTANT OXYGEN TUBING, AND U/CONNECT-IT ADAPTER: Brand: AIRLIFE™

## (undated) DEVICE — KENDALL SCD EXPRESS SLEEVES, KNEE LENGTH, MEDIUM: Brand: KENDALL SCD

## (undated) DEVICE — 3M™ BAIR PAWS FLEX™ WARMING GOWN, STANDARD, 20 PER CASE 81003: Brand: BAIR PAWS™

## (undated) DEVICE — COVER LT HNDL BLU STRL -- MEDICHOICE

## (undated) DEVICE — NEEDLE INSUF L120MM DIA2MM DISP FOR PNEUMOPERI ENDOPATH

## (undated) DEVICE — LUB SURG MEDC STRL 2OZ TUBE MC -- MEDICHOICE

## (undated) DEVICE — (D)PREP SKN CHLRAPRP APPL 26ML -- CONVERT TO ITEM 371833

## (undated) DEVICE — SNARE VASC L240CM LOOP W10MM SHTH DIA2.4MM RND STIFF CLD

## (undated) DEVICE — FCPS ENDOSCP 5MMX33CM -- ENDOPATH

## (undated) DEVICE — BLANKET WRM AD W50XL85.8IN PACU FULL BODY FORC AIR

## (undated) DEVICE — 3M™ DURAPORE™ SURGICAL TAPE 2 INCHES X 10YARDS (5.0CM X 9.1M) 6ROLLS/CARTON 10CARTONS/CASE 1538-2: Brand: 3M™ DURAPORE™

## (undated) DEVICE — RELOAD STPL L55MM OPN H3.8MM CLS H1.5MM WIRE DIA0.2MM REG